# Patient Record
Sex: FEMALE | Race: WHITE | Employment: UNEMPLOYED | URBAN - METROPOLITAN AREA
[De-identification: names, ages, dates, MRNs, and addresses within clinical notes are randomized per-mention and may not be internally consistent; named-entity substitution may affect disease eponyms.]

---

## 2017-02-21 RX ORDER — METOPROLOL TARTRATE 50 MG/1
TABLET, FILM COATED ORAL
Qty: 60 TABLET | Refills: 11 | Status: SHIPPED | OUTPATIENT
Start: 2017-02-21 | End: 2018-02-13 | Stop reason: SDUPTHER

## 2017-03-02 ENCOUNTER — APPOINTMENT (OUTPATIENT)
Dept: NUCLEAR MEDICINE | Age: 57
DRG: 190 | End: 2017-03-02
Attending: INTERNAL MEDICINE
Payer: COMMERCIAL

## 2017-03-02 ENCOUNTER — HOSPITAL ENCOUNTER (INPATIENT)
Age: 57
LOS: 1 days | Discharge: HOME | DRG: 190 | End: 2017-03-03
Attending: INTERNAL MEDICINE | Admitting: FAMILY MEDICINE
Payer: COMMERCIAL

## 2017-03-02 DIAGNOSIS — I21.4 NSTEMI (NON-ST ELEVATED MYOCARDIAL INFARCTION) (HCC): Primary | ICD-10-CM

## 2017-03-02 PROBLEM — F12.10 MARIHUANA ABUSE: Status: ACTIVE | Noted: 2017-03-02

## 2017-03-02 PROBLEM — F10.10 ALCOHOL ABUSE: Status: ACTIVE | Noted: 2017-03-02

## 2017-03-02 LAB
ABSOLUTE EOS #: 0.2 K/UL (ref 0–0.4)
ABSOLUTE LYMPH #: 2.1 K/UL (ref 1–4.8)
ABSOLUTE MONO #: 0.4 K/UL (ref 0.1–1.2)
ALBUMIN SERPL-MCNC: 3.6 G/DL (ref 3.5–5.2)
ALBUMIN/GLOBULIN RATIO: 1.2 (ref 1–2.5)
ALP BLD-CCNC: 54 U/L (ref 35–104)
ALT SERPL-CCNC: 18 U/L (ref 5–33)
AMPHETAMINE SCREEN URINE: NEGATIVE
ANION GAP SERPL CALCULATED.3IONS-SCNC: 14 MMOL/L (ref 9–17)
AST SERPL-CCNC: 28 U/L
BARBITURATE SCREEN URINE: NEGATIVE
BASOPHILS # BLD: 1 % (ref 0–2)
BASOPHILS ABSOLUTE: 0 K/UL (ref 0–0.2)
BENZODIAZEPINE SCREEN, URINE: NEGATIVE
BILIRUB SERPL-MCNC: 1.41 MG/DL (ref 0.3–1.2)
BILIRUBIN DIRECT: 0.25 MG/DL
BILIRUBIN, INDIRECT: 1.16 MG/DL (ref 0–1)
BUN BLDV-MCNC: 13 MG/DL (ref 6–20)
BUN/CREAT BLD: NORMAL (ref 9–20)
BUPRENORPHINE URINE: ABNORMAL
CALCIUM SERPL-MCNC: 9.2 MG/DL (ref 8.6–10.4)
CANNABINOID SCREEN URINE: POSITIVE
CHLORIDE BLD-SCNC: 103 MMOL/L (ref 98–107)
CHOLESTEROL/HDL RATIO: 3.9
CHOLESTEROL: 232 MG/DL
CO2: 21 MMOL/L (ref 20–31)
COCAINE METABOLITE, URINE: NEGATIVE
CREAT SERPL-MCNC: 0.65 MG/DL (ref 0.5–0.9)
DIFFERENTIAL TYPE: NORMAL
EOSINOPHILS RELATIVE PERCENT: 3 % (ref 1–4)
ESTIMATED AVERAGE GLUCOSE: 120 MG/DL
GFR AFRICAN AMERICAN: >60 ML/MIN
GFR NON-AFRICAN AMERICAN: >60 ML/MIN
GFR SERPL CREATININE-BSD FRML MDRD: NORMAL ML/MIN/{1.73_M2}
GFR SERPL CREATININE-BSD FRML MDRD: NORMAL ML/MIN/{1.73_M2}
GLOBULIN: ABNORMAL G/DL (ref 1.5–3.8)
GLUCOSE BLD-MCNC: 87 MG/DL (ref 70–99)
HBA1C MFR BLD: 5.8 % (ref 4–6)
HCT VFR BLD CALC: 37.8 % (ref 36–46)
HDLC SERPL-MCNC: 60 MG/DL
HEMOGLOBIN: 13 G/DL (ref 12–16)
LDL CHOLESTEROL: 148 MG/DL (ref 0–130)
LYMPHOCYTES # BLD: 39 % (ref 24–44)
MAGNESIUM: 2.2 MG/DL (ref 1.6–2.6)
MCH RBC QN AUTO: 32.4 PG (ref 26–34)
MCHC RBC AUTO-ENTMCNC: 34.4 G/DL (ref 31–37)
MCV RBC AUTO: 94.1 FL (ref 80–100)
MDMA URINE: ABNORMAL
METHADONE SCREEN, URINE: NEGATIVE
METHAMPHETAMINE, URINE: ABNORMAL
MONOCYTES # BLD: 9 % (ref 2–11)
OPIATES, URINE: NEGATIVE
OXYCODONE SCREEN URINE: NEGATIVE
PDW BLD-RTO: 14 % (ref 12.5–15.4)
PHENCYCLIDINE, URINE: NEGATIVE
PLATELET # BLD: 381 K/UL (ref 140–450)
PLATELET ESTIMATE: NORMAL
PMV BLD AUTO: 7.5 FL (ref 6–12)
POTASSIUM SERPL-SCNC: 4.4 MMOL/L (ref 3.7–5.3)
PROPOXYPHENE, URINE: ABNORMAL
RBC # BLD: 4.02 M/UL (ref 4–5.2)
RBC # BLD: NORMAL 10*6/UL
SEG NEUTROPHILS: 48 % (ref 36–66)
SEGMENTED NEUTROPHILS ABSOLUTE COUNT: 2.5 K/UL (ref 1.8–7.7)
SODIUM BLD-SCNC: 138 MMOL/L (ref 135–144)
TEST INFORMATION: ABNORMAL
TOTAL PROTEIN: 6.6 G/DL (ref 6.4–8.3)
TRICYCLIC ANTIDEPRESSANTS, UR: ABNORMAL
TRIGL SERPL-MCNC: 118 MG/DL
TROPONIN INTERP: ABNORMAL
TROPONIN INTERP: ABNORMAL
TROPONIN T: 0.18 NG/ML
TROPONIN T: 0.22 NG/ML
VLDLC SERPL CALC-MCNC: ABNORMAL MG/DL (ref 1–30)
WBC # BLD: 5.2 K/UL (ref 3.5–11)
WBC # BLD: NORMAL 10*3/UL

## 2017-03-02 PROCEDURE — 6370000000 HC RX 637 (ALT 250 FOR IP): Performed by: NURSE PRACTITIONER

## 2017-03-02 PROCEDURE — 83036 HEMOGLOBIN GLYCOSYLATED A1C: CPT

## 2017-03-02 PROCEDURE — 99223 1ST HOSP IP/OBS HIGH 75: CPT | Performed by: FAMILY MEDICINE

## 2017-03-02 PROCEDURE — 85025 COMPLETE CBC W/AUTO DIFF WBC: CPT

## 2017-03-02 PROCEDURE — 83735 ASSAY OF MAGNESIUM: CPT

## 2017-03-02 PROCEDURE — 2580000003 HC RX 258: Performed by: NURSE PRACTITIONER

## 2017-03-02 PROCEDURE — 80048 BASIC METABOLIC PNL TOTAL CA: CPT

## 2017-03-02 PROCEDURE — 36415 COLL VENOUS BLD VENIPUNCTURE: CPT

## 2017-03-02 PROCEDURE — 97161 PT EVAL LOW COMPLEX 20 MIN: CPT

## 2017-03-02 PROCEDURE — G8978 MOBILITY CURRENT STATUS: HCPCS

## 2017-03-02 PROCEDURE — 2060000000 HC ICU INTERMEDIATE R&B

## 2017-03-02 PROCEDURE — 6360000002 HC RX W HCPCS: Performed by: NURSE PRACTITIONER

## 2017-03-02 PROCEDURE — 80076 HEPATIC FUNCTION PANEL: CPT

## 2017-03-02 PROCEDURE — 84484 ASSAY OF TROPONIN QUANT: CPT

## 2017-03-02 PROCEDURE — 80307 DRUG TEST PRSMV CHEM ANLYZR: CPT

## 2017-03-02 PROCEDURE — 6360000002 HC RX W HCPCS

## 2017-03-02 PROCEDURE — 94762 N-INVAS EAR/PLS OXIMTRY CONT: CPT

## 2017-03-02 PROCEDURE — 80061 LIPID PANEL: CPT

## 2017-03-02 PROCEDURE — 6370000000 HC RX 637 (ALT 250 FOR IP): Performed by: INTERNAL MEDICINE

## 2017-03-02 PROCEDURE — 2500000003 HC RX 250 WO HCPCS: Performed by: NURSE PRACTITIONER

## 2017-03-02 PROCEDURE — 2580000003 HC RX 258: Performed by: INTERNAL MEDICINE

## 2017-03-02 RX ORDER — SODIUM CHLORIDE 0.9 % (FLUSH) 0.9 %
10 SYRINGE (ML) INJECTION PRN
Status: DISCONTINUED | OUTPATIENT
Start: 2017-03-02 | End: 2017-03-03 | Stop reason: HOSPADM

## 2017-03-02 RX ORDER — NITROGLYCERIN 0.4 MG/1
0.4 TABLET SUBLINGUAL EVERY 5 MIN PRN
Status: DISCONTINUED | OUTPATIENT
Start: 2017-03-02 | End: 2017-03-03 | Stop reason: HOSPADM

## 2017-03-02 RX ORDER — DOCUSATE SODIUM 100 MG/1
100 CAPSULE, LIQUID FILLED ORAL 2 TIMES DAILY
Status: DISCONTINUED | OUTPATIENT
Start: 2017-03-02 | End: 2017-03-03 | Stop reason: HOSPADM

## 2017-03-02 RX ORDER — SODIUM CHLORIDE 0.9 % (FLUSH) 0.9 %
10 SYRINGE (ML) INJECTION PRN
Status: CANCELLED | OUTPATIENT
Start: 2017-03-02 | End: 2017-03-02

## 2017-03-02 RX ORDER — POTASSIUM CHLORIDE 20MEQ/15ML
40 LIQUID (ML) ORAL PRN
Status: DISCONTINUED | OUTPATIENT
Start: 2017-03-02 | End: 2017-03-03 | Stop reason: HOSPADM

## 2017-03-02 RX ORDER — DOCUSATE SODIUM 100 MG/1
100 CAPSULE, LIQUID FILLED ORAL 2 TIMES DAILY PRN
Status: DISCONTINUED | OUTPATIENT
Start: 2017-03-02 | End: 2017-03-02

## 2017-03-02 RX ORDER — BISACODYL 10 MG
10 SUPPOSITORY, RECTAL RECTAL DAILY PRN
Status: DISCONTINUED | OUTPATIENT
Start: 2017-03-02 | End: 2017-03-03 | Stop reason: HOSPADM

## 2017-03-02 RX ORDER — AMINOPHYLLINE DIHYDRATE 25 MG/ML
100 INJECTION, SOLUTION INTRAVENOUS
Status: CANCELLED | OUTPATIENT
Start: 2017-03-02 | End: 2017-03-02

## 2017-03-02 RX ORDER — NITROGLYCERIN 0.4 MG/1
0.4 TABLET SUBLINGUAL EVERY 5 MIN PRN
Status: CANCELLED | OUTPATIENT
Start: 2017-03-02 | End: 2017-03-02

## 2017-03-02 RX ORDER — ATORVASTATIN CALCIUM 80 MG/1
80 TABLET, FILM COATED ORAL NIGHTLY
Status: DISCONTINUED | OUTPATIENT
Start: 2017-03-02 | End: 2017-03-03 | Stop reason: HOSPADM

## 2017-03-02 RX ORDER — ACETAMINOPHEN 325 MG/1
650 TABLET ORAL EVERY 4 HOURS PRN
Status: DISCONTINUED | OUTPATIENT
Start: 2017-03-02 | End: 2017-03-03 | Stop reason: HOSPADM

## 2017-03-02 RX ORDER — ALBUTEROL SULFATE 2.5 MG/3ML
2.5 SOLUTION RESPIRATORY (INHALATION)
Status: DISCONTINUED | OUTPATIENT
Start: 2017-03-02 | End: 2017-03-03 | Stop reason: HOSPADM

## 2017-03-02 RX ORDER — MORPHINE SULFATE 2 MG/ML
2 INJECTION, SOLUTION INTRAMUSCULAR; INTRAVENOUS EVERY 4 HOURS PRN
Status: DISCONTINUED | OUTPATIENT
Start: 2017-03-02 | End: 2017-03-03 | Stop reason: HOSPADM

## 2017-03-02 RX ORDER — ASPIRIN 81 MG/1
81 TABLET, CHEWABLE ORAL DAILY
Status: DISCONTINUED | OUTPATIENT
Start: 2017-03-03 | End: 2017-03-03 | Stop reason: HOSPADM

## 2017-03-02 RX ORDER — POTASSIUM CHLORIDE 7.45 MG/ML
10 INJECTION INTRAVENOUS PRN
Status: DISCONTINUED | OUTPATIENT
Start: 2017-03-02 | End: 2017-03-03 | Stop reason: HOSPADM

## 2017-03-02 RX ORDER — SODIUM CHLORIDE 9 MG/ML
INJECTION, SOLUTION INTRAVENOUS CONTINUOUS
Status: DISCONTINUED | OUTPATIENT
Start: 2017-03-02 | End: 2017-03-03 | Stop reason: HOSPADM

## 2017-03-02 RX ORDER — SODIUM CHLORIDE 0.9 % (FLUSH) 0.9 %
10 SYRINGE (ML) INJECTION EVERY 12 HOURS SCHEDULED
Status: DISCONTINUED | OUTPATIENT
Start: 2017-03-02 | End: 2017-03-03 | Stop reason: HOSPADM

## 2017-03-02 RX ORDER — LISINOPRIL 20 MG/1
20 TABLET ORAL 2 TIMES DAILY
Status: DISCONTINUED | OUTPATIENT
Start: 2017-03-02 | End: 2017-03-03 | Stop reason: HOSPADM

## 2017-03-02 RX ORDER — METOPROLOL TARTRATE 50 MG/1
50 TABLET, FILM COATED ORAL 2 TIMES DAILY
Status: DISCONTINUED | OUTPATIENT
Start: 2017-03-02 | End: 2017-03-03 | Stop reason: HOSPADM

## 2017-03-02 RX ORDER — POTASSIUM CHLORIDE 20 MEQ/1
40 TABLET, EXTENDED RELEASE ORAL PRN
Status: DISCONTINUED | OUTPATIENT
Start: 2017-03-02 | End: 2017-03-03 | Stop reason: HOSPADM

## 2017-03-02 RX ORDER — SODIUM CHLORIDE 9 MG/ML
50 INJECTION, SOLUTION INTRAVENOUS ONCE
Status: CANCELLED | OUTPATIENT
Start: 2017-03-02 | End: 2017-03-02

## 2017-03-02 RX ORDER — ONDANSETRON 2 MG/ML
4 INJECTION INTRAMUSCULAR; INTRAVENOUS EVERY 6 HOURS PRN
Status: DISCONTINUED | OUTPATIENT
Start: 2017-03-02 | End: 2017-03-03 | Stop reason: HOSPADM

## 2017-03-02 RX ADMIN — Medication 10 ML: at 08:24

## 2017-03-02 RX ADMIN — LISINOPRIL 20 MG: 20 TABLET ORAL at 08:24

## 2017-03-02 RX ADMIN — LISINOPRIL 20 MG: 20 TABLET ORAL at 02:29

## 2017-03-02 RX ADMIN — DOCUSATE SODIUM 100 MG: 100 CAPSULE, LIQUID FILLED ORAL at 02:29

## 2017-03-02 RX ADMIN — Medication 10 ML: at 20:42

## 2017-03-02 RX ADMIN — METOPROLOL TARTRATE 50 MG: 50 TABLET, FILM COATED ORAL at 10:31

## 2017-03-02 RX ADMIN — SODIUM CHLORIDE: 9 INJECTION, SOLUTION INTRAVENOUS at 16:47

## 2017-03-02 RX ADMIN — METOPROLOL TARTRATE 5 MG: 5 INJECTION INTRAVENOUS at 12:32

## 2017-03-02 RX ADMIN — LISINOPRIL 20 MG: 20 TABLET ORAL at 20:42

## 2017-03-02 RX ADMIN — ATORVASTATIN CALCIUM 80 MG: 80 TABLET, FILM COATED ORAL at 03:44

## 2017-03-02 RX ADMIN — DOCUSATE SODIUM 100 MG: 100 CAPSULE, LIQUID FILLED ORAL at 08:24

## 2017-03-02 RX ADMIN — METOPROLOL TARTRATE 50 MG: 50 TABLET, FILM COATED ORAL at 20:42

## 2017-03-02 RX ADMIN — METOPROLOL TARTRATE 50 MG: 50 TABLET, FILM COATED ORAL at 02:29

## 2017-03-02 RX ADMIN — ENOXAPARIN SODIUM 60 MG: 60 INJECTION SUBCUTANEOUS at 20:42

## 2017-03-02 RX ADMIN — DOCUSATE SODIUM 100 MG: 100 CAPSULE, LIQUID FILLED ORAL at 20:42

## 2017-03-02 ASSESSMENT — ENCOUNTER SYMPTOMS
NAUSEA: 0
ABDOMINAL PAIN: 0
RHINORRHEA: 0
BLOOD IN STOOL: 0
EYE PAIN: 0
CONSTIPATION: 0
COUGH: 0
BACK PAIN: 0
VOMITING: 0
SHORTNESS OF BREATH: 0
CHEST TIGHTNESS: 0
DIARRHEA: 0

## 2017-03-02 ASSESSMENT — PAIN SCALES - GENERAL: PAINLEVEL_OUTOF10: 0

## 2017-03-03 ENCOUNTER — APPOINTMENT (OUTPATIENT)
Dept: CARDIAC CATH/INVASIVE PROCEDURES | Age: 57
DRG: 190 | End: 2017-03-03
Attending: INTERNAL MEDICINE
Payer: COMMERCIAL

## 2017-03-03 VITALS
HEART RATE: 70 BPM | TEMPERATURE: 97.5 F | DIASTOLIC BLOOD PRESSURE: 70 MMHG | SYSTOLIC BLOOD PRESSURE: 100 MMHG | BODY MASS INDEX: 22.43 KG/M2 | RESPIRATION RATE: 22 BRPM | HEIGHT: 63 IN | OXYGEN SATURATION: 97 % | WEIGHT: 126.6 LBS

## 2017-03-03 LAB
ABSOLUTE EOS #: 0.2 K/UL (ref 0–0.4)
ABSOLUTE LYMPH #: 2.5 K/UL (ref 1–4.8)
ABSOLUTE MONO #: 0.7 K/UL (ref 0.1–1.2)
ANION GAP SERPL CALCULATED.3IONS-SCNC: 11 MMOL/L (ref 9–17)
BASOPHILS # BLD: 0 % (ref 0–2)
BASOPHILS ABSOLUTE: 0 K/UL (ref 0–0.2)
BUN BLDV-MCNC: 17 MG/DL (ref 6–20)
BUN/CREAT BLD: NORMAL (ref 9–20)
CALCIUM SERPL-MCNC: 8.8 MG/DL (ref 8.6–10.4)
CHLORIDE BLD-SCNC: 100 MMOL/L (ref 98–107)
CO2: 24 MMOL/L (ref 20–31)
CREAT SERPL-MCNC: 0.61 MG/DL (ref 0.5–0.9)
DIFFERENTIAL TYPE: NORMAL
EOSINOPHILS RELATIVE PERCENT: 3 % (ref 1–4)
GFR AFRICAN AMERICAN: >60 ML/MIN
GFR NON-AFRICAN AMERICAN: >60 ML/MIN
GFR SERPL CREATININE-BSD FRML MDRD: NORMAL ML/MIN/{1.73_M2}
GFR SERPL CREATININE-BSD FRML MDRD: NORMAL ML/MIN/{1.73_M2}
GLUCOSE BLD-MCNC: 92 MG/DL (ref 70–99)
HCT VFR BLD CALC: 38.9 % (ref 36–46)
HEMOGLOBIN: 13.3 G/DL (ref 12–16)
LYMPHOCYTES # BLD: 35 % (ref 24–44)
MCH RBC QN AUTO: 32.8 PG (ref 26–34)
MCHC RBC AUTO-ENTMCNC: 34.2 G/DL (ref 31–37)
MCV RBC AUTO: 96 FL (ref 80–100)
MONOCYTES # BLD: 10 % (ref 2–11)
PDW BLD-RTO: 14.4 % (ref 12.5–15.4)
PLATELET # BLD: 352 K/UL (ref 140–450)
PLATELET ESTIMATE: NORMAL
PMV BLD AUTO: 7.5 FL (ref 6–12)
POTASSIUM SERPL-SCNC: 3.8 MMOL/L (ref 3.7–5.3)
RBC # BLD: 4.05 M/UL (ref 4–5.2)
RBC # BLD: NORMAL 10*6/UL
SEG NEUTROPHILS: 52 % (ref 36–66)
SEGMENTED NEUTROPHILS ABSOLUTE COUNT: 3.8 K/UL (ref 1.8–7.7)
SODIUM BLD-SCNC: 135 MMOL/L (ref 135–144)
WBC # BLD: 7.2 K/UL (ref 3.5–11)
WBC # BLD: NORMAL 10*3/UL

## 2017-03-03 PROCEDURE — 6370000000 HC RX 637 (ALT 250 FOR IP): Performed by: NURSE PRACTITIONER

## 2017-03-03 PROCEDURE — B41F1ZZ FLUOROSCOPY OF RIGHT LOWER EXTREMITY ARTERIES USING LOW OSMOLAR CONTRAST: ICD-10-PCS | Performed by: INTERNAL MEDICINE

## 2017-03-03 PROCEDURE — 99239 HOSP IP/OBS DSCHRG MGMT >30: CPT | Performed by: FAMILY MEDICINE

## 2017-03-03 PROCEDURE — C1769 GUIDE WIRE: HCPCS

## 2017-03-03 PROCEDURE — 36140 INTRO NDL ICATH UPR/LXTR ART: CPT | Performed by: INTERNAL MEDICINE

## 2017-03-03 PROCEDURE — C1894 INTRO/SHEATH, NON-LASER: HCPCS

## 2017-03-03 PROCEDURE — 2580000003 HC RX 258: Performed by: NURSE PRACTITIONER

## 2017-03-03 PROCEDURE — 6370000000 HC RX 637 (ALT 250 FOR IP): Performed by: INTERNAL MEDICINE

## 2017-03-03 PROCEDURE — 6370000000 HC RX 637 (ALT 250 FOR IP): Performed by: FAMILY MEDICINE

## 2017-03-03 PROCEDURE — 2500000003 HC RX 250 WO HCPCS: Performed by: NURSE PRACTITIONER

## 2017-03-03 PROCEDURE — 75710 ARTERY X-RAYS ARM/LEG: CPT | Performed by: INTERNAL MEDICINE

## 2017-03-03 PROCEDURE — 36120: CPT | Performed by: INTERNAL MEDICINE

## 2017-03-03 PROCEDURE — 80048 BASIC METABOLIC PNL TOTAL CA: CPT

## 2017-03-03 PROCEDURE — 36415 COLL VENOUS BLD VENIPUNCTURE: CPT

## 2017-03-03 PROCEDURE — 2500000003 HC RX 250 WO HCPCS

## 2017-03-03 PROCEDURE — 94762 N-INVAS EAR/PLS OXIMTRY CONT: CPT

## 2017-03-03 PROCEDURE — 85025 COMPLETE CBC W/AUTO DIFF WBC: CPT

## 2017-03-03 RX ORDER — ISOSORBIDE MONONITRATE 30 MG/1
30 TABLET, EXTENDED RELEASE ORAL DAILY
Status: DISCONTINUED | OUTPATIENT
Start: 2017-03-03 | End: 2017-03-03 | Stop reason: HOSPADM

## 2017-03-03 RX ORDER — ISOSORBIDE MONONITRATE 30 MG/1
30 TABLET, EXTENDED RELEASE ORAL DAILY
Qty: 30 TABLET | Refills: 3 | Status: SHIPPED | OUTPATIENT
Start: 2017-03-03 | End: 2017-05-01 | Stop reason: SDUPTHER

## 2017-03-03 RX ORDER — NITROGLYCERIN 0.4 MG/1
TABLET SUBLINGUAL
Qty: 25 TABLET | Refills: 3 | Status: SHIPPED | OUTPATIENT
Start: 2017-03-03 | End: 2021-07-21

## 2017-03-03 RX ORDER — ATORVASTATIN CALCIUM 80 MG/1
80 TABLET, FILM COATED ORAL NIGHTLY
Qty: 30 TABLET | Refills: 3 | Status: SHIPPED | OUTPATIENT
Start: 2017-03-03 | End: 2017-05-01 | Stop reason: SDUPTHER

## 2017-03-03 RX ORDER — CLOPIDOGREL BISULFATE 75 MG/1
75 TABLET ORAL DAILY
Status: DISCONTINUED | OUTPATIENT
Start: 2017-03-03 | End: 2017-03-03 | Stop reason: HOSPADM

## 2017-03-03 RX ORDER — ASPIRIN 81 MG/1
81 TABLET, CHEWABLE ORAL DAILY
Qty: 30 TABLET | Refills: 3 | Status: SHIPPED | OUTPATIENT
Start: 2017-03-03 | End: 2017-05-01 | Stop reason: SDUPTHER

## 2017-03-03 RX ORDER — CLOPIDOGREL BISULFATE 75 MG/1
75 TABLET ORAL DAILY
Qty: 30 TABLET | Refills: 3 | Status: SHIPPED | OUTPATIENT
Start: 2017-03-03 | End: 2017-05-01 | Stop reason: SDUPTHER

## 2017-03-03 RX ADMIN — ASPIRIN 81 MG: 81 TABLET, CHEWABLE ORAL at 09:04

## 2017-03-03 RX ADMIN — METOPROLOL TARTRATE 5 MG: 5 INJECTION INTRAVENOUS at 16:26

## 2017-03-03 RX ADMIN — CLOPIDOGREL 75 MG: 75 TABLET, FILM COATED ORAL at 09:05

## 2017-03-03 RX ADMIN — ISOSORBIDE MONONITRATE 30 MG: 30 TABLET ORAL at 16:26

## 2017-03-03 RX ADMIN — LISINOPRIL 20 MG: 20 TABLET ORAL at 09:05

## 2017-03-03 RX ADMIN — Medication 10 ML: at 09:06

## 2017-03-03 RX ADMIN — METOPROLOL TARTRATE 50 MG: 50 TABLET, FILM COATED ORAL at 09:05

## 2017-03-03 ASSESSMENT — PAIN SCALES - GENERAL
PAINLEVEL_OUTOF10: 0
PAINLEVEL_OUTOF10: 2
PAINLEVEL_OUTOF10: 4

## 2017-03-03 ASSESSMENT — ENCOUNTER SYMPTOMS
DIARRHEA: 0
ABDOMINAL PAIN: 0
VOMITING: 0
CHEST TIGHTNESS: 0
NAUSEA: 0
RHINORRHEA: 0
CONSTIPATION: 0
COUGH: 0
SHORTNESS OF BREATH: 0
BLOOD IN STOOL: 0
EYE PAIN: 0
BACK PAIN: 0

## 2017-03-03 ASSESSMENT — PAIN DESCRIPTION - ORIENTATION
ORIENTATION: RIGHT;OTHER (COMMENT)
ORIENTATION: RIGHT;INNER

## 2017-03-03 ASSESSMENT — PAIN DESCRIPTION - LOCATION
LOCATION: ARM
LOCATION: ARM

## 2017-03-03 ASSESSMENT — PAIN DESCRIPTION - PAIN TYPE
TYPE: ACUTE PAIN
TYPE: ACUTE PAIN

## 2017-03-20 ENCOUNTER — HOSPITAL ENCOUNTER (OUTPATIENT)
Dept: CARDIAC REHAB | Age: 57
Setting detail: THERAPIES SERIES
Discharge: HOME | End: 2017-03-20
Payer: COMMERCIAL

## 2017-03-20 VITALS — BODY MASS INDEX: 21.79 KG/M2 | WEIGHT: 123 LBS | HEIGHT: 63 IN

## 2017-03-20 PROCEDURE — 93798 PHYS/QHP OP CAR RHAB W/ECG: CPT

## 2017-03-22 ENCOUNTER — HOSPITAL ENCOUNTER (OUTPATIENT)
Dept: CARDIAC REHAB | Age: 57
Setting detail: THERAPIES SERIES
Discharge: HOME | End: 2017-03-22
Payer: COMMERCIAL

## 2017-03-22 PROCEDURE — 93798 PHYS/QHP OP CAR RHAB W/ECG: CPT

## 2017-03-23 ENCOUNTER — HOSPITAL ENCOUNTER (OUTPATIENT)
Age: 57
Setting detail: SPECIMEN
Discharge: HOME | End: 2017-03-23
Payer: COMMERCIAL

## 2017-03-23 ENCOUNTER — PROCEDURE VISIT (OUTPATIENT)
Dept: UROLOGY | Age: 57
End: 2017-03-23
Payer: COMMERCIAL

## 2017-03-23 VITALS
HEIGHT: 63 IN | DIASTOLIC BLOOD PRESSURE: 80 MMHG | BODY MASS INDEX: 22.15 KG/M2 | SYSTOLIC BLOOD PRESSURE: 128 MMHG | WEIGHT: 125 LBS

## 2017-03-23 DIAGNOSIS — C67.2 MALIGNANT NEOPLASM OF LATERAL WALL OF URINARY BLADDER (HCC): Primary | ICD-10-CM

## 2017-03-23 LAB
CASE NUMBER:: NORMAL
SPECIMEN DESCRIPTION: NORMAL

## 2017-03-23 PROCEDURE — 99213 OFFICE O/P EST LOW 20 MIN: CPT | Performed by: UROLOGY

## 2017-03-23 PROCEDURE — 52000 CYSTOURETHROSCOPY: CPT | Performed by: UROLOGY

## 2017-03-23 PROCEDURE — 88112 CYTOPATH CELL ENHANCE TECH: CPT

## 2017-03-23 ASSESSMENT — ENCOUNTER SYMPTOMS
BACK PAIN: 0
EYE PAIN: 0
VOMITING: 0
COLOR CHANGE: 0
NAUSEA: 0
WHEEZING: 0
COUGH: 0
ABDOMINAL PAIN: 0
SHORTNESS OF BREATH: 0
EYE REDNESS: 0

## 2017-03-24 ENCOUNTER — HOSPITAL ENCOUNTER (OUTPATIENT)
Dept: CARDIAC REHAB | Age: 57
Setting detail: THERAPIES SERIES
Discharge: HOME | End: 2017-03-24
Payer: COMMERCIAL

## 2017-03-24 PROCEDURE — 93798 PHYS/QHP OP CAR RHAB W/ECG: CPT

## 2017-03-27 ENCOUNTER — HOSPITAL ENCOUNTER (OUTPATIENT)
Age: 57
Discharge: HOME | End: 2017-03-27
Payer: COMMERCIAL

## 2017-03-27 ENCOUNTER — HOSPITAL ENCOUNTER (OUTPATIENT)
Dept: CARDIAC REHAB | Age: 57
Setting detail: THERAPIES SERIES
Discharge: HOME | End: 2017-03-27
Payer: COMMERCIAL

## 2017-03-27 DIAGNOSIS — I73.9 PERIPHERAL VASCULAR DISEASE (HCC): ICD-10-CM

## 2017-03-27 DIAGNOSIS — E55.9 UNSPECIFIED VITAMIN D DEFICIENCY: ICD-10-CM

## 2017-03-27 DIAGNOSIS — I10 ESSENTIAL HYPERTENSION: ICD-10-CM

## 2017-03-27 LAB
ALBUMIN SERPL-MCNC: 3.8 G/DL (ref 3.5–5.2)
ALBUMIN/GLOBULIN RATIO: ABNORMAL (ref 1–2.5)
ALP BLD-CCNC: 68 U/L (ref 35–104)
ALT SERPL-CCNC: 21 U/L (ref 5–33)
ANION GAP SERPL CALCULATED.3IONS-SCNC: 12 MMOL/L (ref 9–17)
AST SERPL-CCNC: 21 U/L
BILIRUB SERPL-MCNC: 0.48 MG/DL (ref 0.3–1.2)
BUN BLDV-MCNC: 17 MG/DL (ref 6–20)
BUN/CREAT BLD: 25 (ref 9–20)
CALCIUM SERPL-MCNC: 9.6 MG/DL (ref 8.6–10.4)
CHLORIDE BLD-SCNC: 102 MMOL/L (ref 98–107)
CO2: 23 MMOL/L (ref 20–31)
CREAT SERPL-MCNC: 0.69 MG/DL (ref 0.5–0.9)
GFR AFRICAN AMERICAN: >60 ML/MIN
GFR NON-AFRICAN AMERICAN: >60 ML/MIN
GFR SERPL CREATININE-BSD FRML MDRD: ABNORMAL ML/MIN/{1.73_M2}
GFR SERPL CREATININE-BSD FRML MDRD: ABNORMAL ML/MIN/{1.73_M2}
GLUCOSE BLD-MCNC: 85 MG/DL (ref 70–99)
PATIENT FASTING?: YES
POTASSIUM SERPL-SCNC: 4.3 MMOL/L (ref 3.7–5.3)
SODIUM BLD-SCNC: 137 MMOL/L (ref 135–144)
SURGICAL PATHOLOGY REPORT: NORMAL
TOTAL PROTEIN: 7.5 G/DL (ref 6.4–8.3)
TSH SERPL DL<=0.05 MIU/L-ACNC: 1.93 MIU/L (ref 0.3–5)
VITAMIN D 25-HYDROXY: 24.5 NG/ML (ref 30–100)

## 2017-03-27 PROCEDURE — 80053 COMPREHEN METABOLIC PANEL: CPT

## 2017-03-27 PROCEDURE — 93798 PHYS/QHP OP CAR RHAB W/ECG: CPT

## 2017-03-27 PROCEDURE — 82306 VITAMIN D 25 HYDROXY: CPT

## 2017-03-27 PROCEDURE — 36415 COLL VENOUS BLD VENIPUNCTURE: CPT

## 2017-03-27 PROCEDURE — 84443 ASSAY THYROID STIM HORMONE: CPT

## 2017-03-29 ENCOUNTER — HOSPITAL ENCOUNTER (OUTPATIENT)
Dept: CARDIAC REHAB | Age: 57
Setting detail: THERAPIES SERIES
Discharge: HOME | End: 2017-03-29
Payer: COMMERCIAL

## 2017-03-30 ENCOUNTER — OFFICE VISIT (OUTPATIENT)
Dept: CARDIOLOGY CLINIC | Age: 57
End: 2017-03-30
Payer: COMMERCIAL

## 2017-03-30 VITALS
WEIGHT: 128 LBS | SYSTOLIC BLOOD PRESSURE: 140 MMHG | OXYGEN SATURATION: 97 % | HEART RATE: 70 BPM | DIASTOLIC BLOOD PRESSURE: 70 MMHG | BODY MASS INDEX: 22.67 KG/M2

## 2017-03-30 DIAGNOSIS — R94.31 ABNORMAL EKG: ICD-10-CM

## 2017-03-30 DIAGNOSIS — R06.02 SOB (SHORTNESS OF BREATH): ICD-10-CM

## 2017-03-30 DIAGNOSIS — I25.118 CORONARY ARTERY DISEASE OF NATIVE HEART WITH STABLE ANGINA PECTORIS, UNSPECIFIED VESSEL OR LESION TYPE (HCC): ICD-10-CM

## 2017-03-30 DIAGNOSIS — E78.5 HYPERLIPIDEMIA, UNSPECIFIED HYPERLIPIDEMIA TYPE: ICD-10-CM

## 2017-03-30 DIAGNOSIS — I10 ESSENTIAL HYPERTENSION: Primary | ICD-10-CM

## 2017-03-30 PROCEDURE — 99214 OFFICE O/P EST MOD 30 MIN: CPT | Performed by: INTERNAL MEDICINE

## 2017-03-30 RX ORDER — LOSARTAN POTASSIUM 50 MG/1
50 TABLET ORAL 2 TIMES DAILY
Qty: 60 TABLET | Refills: 11 | Status: SHIPPED | OUTPATIENT
Start: 2017-03-30 | End: 2018-03-07 | Stop reason: SDUPTHER

## 2017-03-31 ENCOUNTER — HOSPITAL ENCOUNTER (OUTPATIENT)
Dept: CARDIAC REHAB | Age: 57
Setting detail: THERAPIES SERIES
Discharge: HOME | End: 2017-03-31
Payer: COMMERCIAL

## 2017-03-31 PROCEDURE — 93798 PHYS/QHP OP CAR RHAB W/ECG: CPT

## 2017-04-03 ENCOUNTER — HOSPITAL ENCOUNTER (OUTPATIENT)
Dept: CARDIAC REHAB | Age: 57
Setting detail: THERAPIES SERIES
Discharge: HOME | End: 2017-04-03
Payer: COMMERCIAL

## 2017-04-03 PROCEDURE — 93798 PHYS/QHP OP CAR RHAB W/ECG: CPT

## 2017-04-05 ENCOUNTER — HOSPITAL ENCOUNTER (OUTPATIENT)
Dept: CARDIAC REHAB | Age: 57
Setting detail: THERAPIES SERIES
Discharge: HOME | End: 2017-04-05
Payer: COMMERCIAL

## 2017-04-05 PROCEDURE — 93798 PHYS/QHP OP CAR RHAB W/ECG: CPT

## 2017-04-06 ENCOUNTER — HOSPITAL ENCOUNTER (OUTPATIENT)
Dept: NUCLEAR MEDICINE | Age: 57
Discharge: HOME | End: 2017-04-06
Payer: COMMERCIAL

## 2017-04-06 ENCOUNTER — HOSPITAL ENCOUNTER (OUTPATIENT)
Dept: NON INVASIVE DIAGNOSTICS | Age: 57
Discharge: HOME | End: 2017-04-06
Payer: COMMERCIAL

## 2017-04-06 VITALS — SYSTOLIC BLOOD PRESSURE: 119 MMHG | HEART RATE: 70 BPM | DIASTOLIC BLOOD PRESSURE: 68 MMHG

## 2017-04-06 DIAGNOSIS — R94.31 ABNORMAL EKG: ICD-10-CM

## 2017-04-06 DIAGNOSIS — R06.02 SOB (SHORTNESS OF BREATH): ICD-10-CM

## 2017-04-06 LAB
LV EF: 55 %
LVEF MODALITY: NORMAL

## 2017-04-06 PROCEDURE — 78452 HT MUSCLE IMAGE SPECT MULT: CPT

## 2017-04-06 PROCEDURE — 93306 TTE W/DOPPLER COMPLETE: CPT

## 2017-04-06 PROCEDURE — A9500 TC99M SESTAMIBI: HCPCS | Performed by: INTERNAL MEDICINE

## 2017-04-06 PROCEDURE — 3430000000 HC RX DIAGNOSTIC RADIOPHARMACEUTICAL: Performed by: INTERNAL MEDICINE

## 2017-04-06 PROCEDURE — 6360000002 HC RX W HCPCS: Performed by: INTERNAL MEDICINE

## 2017-04-06 PROCEDURE — 93017 CV STRESS TEST TRACING ONLY: CPT

## 2017-04-06 PROCEDURE — 2580000003 HC RX 258: Performed by: INTERNAL MEDICINE

## 2017-04-06 RX ORDER — AMINOPHYLLINE DIHYDRATE 25 MG/ML
100 INJECTION, SOLUTION INTRAVENOUS
Status: ACTIVE | OUTPATIENT
Start: 2017-04-06 | End: 2017-04-06

## 2017-04-06 RX ORDER — AMINOPHYLLINE DIHYDRATE 25 MG/ML
50 INJECTION, SOLUTION INTRAVENOUS
Status: ACTIVE | OUTPATIENT
Start: 2017-04-06 | End: 2017-04-06

## 2017-04-06 RX ORDER — 0.9 % SODIUM CHLORIDE 0.9 %
10 VIAL (ML) INJECTION PRN
Status: DISCONTINUED | OUTPATIENT
Start: 2017-04-06 | End: 2017-04-07 | Stop reason: HOSPADM

## 2017-04-06 RX ADMIN — TETRAKIS(2-METHOXYISOBUTYLISOCYANIDE)COPPER(I) TETRAFLUOROBORATE 28.7 MILLICURIE: 1 INJECTION, POWDER, LYOPHILIZED, FOR SOLUTION INTRAVENOUS at 11:47

## 2017-04-06 RX ADMIN — Medication 10 ML: at 11:47

## 2017-04-06 RX ADMIN — REGADENOSON 0.4 MG: 0.08 INJECTION, SOLUTION INTRAVENOUS at 11:47

## 2017-04-06 RX ADMIN — TETRAKIS(2-METHOXYISOBUTYLISOCYANIDE)COPPER(I) TETRAFLUOROBORATE 9.8 MILLICURIE: 1 INJECTION, POWDER, LYOPHILIZED, FOR SOLUTION INTRAVENOUS at 10:45

## 2017-04-07 ENCOUNTER — HOSPITAL ENCOUNTER (OUTPATIENT)
Dept: CARDIAC REHAB | Age: 57
Setting detail: THERAPIES SERIES
Discharge: HOME | End: 2017-04-07
Payer: COMMERCIAL

## 2017-04-07 PROCEDURE — 93798 PHYS/QHP OP CAR RHAB W/ECG: CPT

## 2017-04-10 ENCOUNTER — HOSPITAL ENCOUNTER (OUTPATIENT)
Dept: CARDIAC REHAB | Age: 57
Setting detail: THERAPIES SERIES
Discharge: HOME | End: 2017-04-10
Payer: COMMERCIAL

## 2017-04-10 PROCEDURE — 93798 PHYS/QHP OP CAR RHAB W/ECG: CPT

## 2017-04-12 ENCOUNTER — HOSPITAL ENCOUNTER (OUTPATIENT)
Dept: CARDIAC REHAB | Age: 57
Setting detail: THERAPIES SERIES
Discharge: HOME | End: 2017-04-12
Payer: COMMERCIAL

## 2017-04-12 PROCEDURE — 93798 PHYS/QHP OP CAR RHAB W/ECG: CPT

## 2017-04-14 ENCOUNTER — HOSPITAL ENCOUNTER (OUTPATIENT)
Dept: CARDIAC REHAB | Age: 57
Setting detail: THERAPIES SERIES
Discharge: HOME | End: 2017-04-14
Payer: COMMERCIAL

## 2017-04-14 PROCEDURE — 93798 PHYS/QHP OP CAR RHAB W/ECG: CPT

## 2017-04-17 ENCOUNTER — HOSPITAL ENCOUNTER (OUTPATIENT)
Dept: CARDIAC REHAB | Age: 57
Setting detail: THERAPIES SERIES
Discharge: HOME | End: 2017-04-17
Payer: COMMERCIAL

## 2017-04-17 PROCEDURE — 93798 PHYS/QHP OP CAR RHAB W/ECG: CPT

## 2017-04-19 ENCOUNTER — HOSPITAL ENCOUNTER (OUTPATIENT)
Dept: CARDIAC REHAB | Age: 57
Setting detail: THERAPIES SERIES
Discharge: HOME | End: 2017-04-19
Payer: COMMERCIAL

## 2017-04-19 PROCEDURE — 93798 PHYS/QHP OP CAR RHAB W/ECG: CPT

## 2017-04-21 ENCOUNTER — HOSPITAL ENCOUNTER (OUTPATIENT)
Dept: CARDIAC REHAB | Age: 57
Setting detail: THERAPIES SERIES
Discharge: HOME | End: 2017-04-21
Payer: COMMERCIAL

## 2017-04-21 PROCEDURE — 93798 PHYS/QHP OP CAR RHAB W/ECG: CPT

## 2017-04-24 ENCOUNTER — HOSPITAL ENCOUNTER (OUTPATIENT)
Dept: CARDIAC REHAB | Age: 57
Setting detail: THERAPIES SERIES
Discharge: HOME | End: 2017-04-24
Payer: COMMERCIAL

## 2017-04-24 PROCEDURE — 93798 PHYS/QHP OP CAR RHAB W/ECG: CPT

## 2017-04-26 ENCOUNTER — HOSPITAL ENCOUNTER (OUTPATIENT)
Dept: CARDIAC REHAB | Age: 57
Setting detail: THERAPIES SERIES
Discharge: HOME | End: 2017-04-26
Payer: COMMERCIAL

## 2017-04-26 PROCEDURE — 93798 PHYS/QHP OP CAR RHAB W/ECG: CPT

## 2017-04-28 ENCOUNTER — HOSPITAL ENCOUNTER (OUTPATIENT)
Dept: CARDIAC REHAB | Age: 57
Setting detail: THERAPIES SERIES
Discharge: HOME | End: 2017-04-28
Payer: COMMERCIAL

## 2017-04-28 PROCEDURE — 93798 PHYS/QHP OP CAR RHAB W/ECG: CPT

## 2017-05-01 ENCOUNTER — OFFICE VISIT (OUTPATIENT)
Dept: CARDIOLOGY CLINIC | Age: 57
End: 2017-05-01
Payer: COMMERCIAL

## 2017-05-01 ENCOUNTER — HOSPITAL ENCOUNTER (OUTPATIENT)
Dept: CARDIAC REHAB | Age: 57
Setting detail: THERAPIES SERIES
Discharge: HOME | End: 2017-05-01
Payer: COMMERCIAL

## 2017-05-01 VITALS — BODY MASS INDEX: 22.67 KG/M2 | WEIGHT: 128 LBS | HEART RATE: 69 BPM | OXYGEN SATURATION: 100 %

## 2017-05-01 DIAGNOSIS — I25.118 CORONARY ARTERY DISEASE OF NATIVE HEART WITH STABLE ANGINA PECTORIS, UNSPECIFIED VESSEL OR LESION TYPE (HCC): ICD-10-CM

## 2017-05-01 DIAGNOSIS — I10 ESSENTIAL HYPERTENSION: ICD-10-CM

## 2017-05-01 DIAGNOSIS — I21.4 NSTEMI (NON-ST ELEVATED MYOCARDIAL INFARCTION) (HCC): Primary | ICD-10-CM

## 2017-05-01 PROCEDURE — 93798 PHYS/QHP OP CAR RHAB W/ECG: CPT

## 2017-05-01 PROCEDURE — 99213 OFFICE O/P EST LOW 20 MIN: CPT | Performed by: INTERNAL MEDICINE

## 2017-05-01 RX ORDER — ASPIRIN 81 MG/1
81 TABLET, CHEWABLE ORAL DAILY
Qty: 30 TABLET | Refills: 11 | Status: SHIPPED | OUTPATIENT
Start: 2017-05-01 | End: 2021-07-21

## 2017-05-01 RX ORDER — ATORVASTATIN CALCIUM 80 MG/1
80 TABLET, FILM COATED ORAL NIGHTLY
Qty: 30 TABLET | Refills: 11 | Status: SHIPPED | OUTPATIENT
Start: 2017-05-01 | End: 2017-07-06 | Stop reason: SDUPTHER

## 2017-05-01 RX ORDER — CLOPIDOGREL BISULFATE 75 MG/1
75 TABLET ORAL DAILY
Qty: 30 TABLET | Refills: 11 | Status: SHIPPED | OUTPATIENT
Start: 2017-05-01 | End: 2017-07-06 | Stop reason: SDUPTHER

## 2017-05-01 RX ORDER — ISOSORBIDE MONONITRATE 30 MG/1
30 TABLET, EXTENDED RELEASE ORAL DAILY
Qty: 30 TABLET | Refills: 11 | Status: SHIPPED | OUTPATIENT
Start: 2017-05-01 | End: 2017-07-06 | Stop reason: SDUPTHER

## 2017-05-03 ENCOUNTER — HOSPITAL ENCOUNTER (OUTPATIENT)
Dept: CARDIAC REHAB | Age: 57
Setting detail: THERAPIES SERIES
Discharge: HOME | End: 2017-05-03
Payer: COMMERCIAL

## 2017-05-05 ENCOUNTER — HOSPITAL ENCOUNTER (OUTPATIENT)
Dept: CARDIAC REHAB | Age: 57
Setting detail: THERAPIES SERIES
Discharge: HOME | End: 2017-05-05
Payer: COMMERCIAL

## 2017-05-05 PROCEDURE — 93798 PHYS/QHP OP CAR RHAB W/ECG: CPT

## 2017-05-08 ENCOUNTER — HOSPITAL ENCOUNTER (OUTPATIENT)
Dept: CARDIAC REHAB | Age: 57
Setting detail: THERAPIES SERIES
Discharge: HOME | End: 2017-05-08
Payer: COMMERCIAL

## 2017-05-08 PROCEDURE — 93798 PHYS/QHP OP CAR RHAB W/ECG: CPT

## 2017-05-10 ENCOUNTER — HOSPITAL ENCOUNTER (OUTPATIENT)
Dept: CARDIAC REHAB | Age: 57
Setting detail: THERAPIES SERIES
Discharge: HOME | End: 2017-05-10
Payer: COMMERCIAL

## 2017-05-10 PROCEDURE — 93798 PHYS/QHP OP CAR RHAB W/ECG: CPT

## 2017-05-12 ENCOUNTER — HOSPITAL ENCOUNTER (OUTPATIENT)
Dept: CARDIAC REHAB | Age: 57
Setting detail: THERAPIES SERIES
Discharge: HOME | End: 2017-05-12
Payer: COMMERCIAL

## 2017-05-12 PROCEDURE — 93798 PHYS/QHP OP CAR RHAB W/ECG: CPT

## 2017-05-15 ENCOUNTER — HOSPITAL ENCOUNTER (OUTPATIENT)
Dept: CARDIAC REHAB | Age: 57
Setting detail: THERAPIES SERIES
Discharge: HOME | End: 2017-05-15
Payer: COMMERCIAL

## 2017-05-15 PROCEDURE — 93798 PHYS/QHP OP CAR RHAB W/ECG: CPT

## 2017-05-17 ENCOUNTER — HOSPITAL ENCOUNTER (OUTPATIENT)
Dept: CARDIAC REHAB | Age: 57
Setting detail: THERAPIES SERIES
Discharge: HOME | End: 2017-05-17
Payer: COMMERCIAL

## 2017-05-17 PROCEDURE — 93798 PHYS/QHP OP CAR RHAB W/ECG: CPT

## 2017-05-22 ENCOUNTER — HOSPITAL ENCOUNTER (OUTPATIENT)
Dept: CARDIAC REHAB | Age: 57
Discharge: HOME | End: 2017-05-22

## 2017-05-30 ENCOUNTER — HOSPITAL ENCOUNTER (OUTPATIENT)
Dept: CARDIAC REHAB | Age: 57
Setting detail: THERAPIES SERIES
Discharge: HOME | End: 2017-05-30
Payer: COMMERCIAL

## 2017-05-31 ENCOUNTER — HOSPITAL ENCOUNTER (OUTPATIENT)
Dept: CARDIAC REHAB | Age: 57
Setting detail: THERAPIES SERIES
Discharge: HOME | End: 2017-05-31
Payer: COMMERCIAL

## 2017-06-02 ENCOUNTER — HOSPITAL ENCOUNTER (OUTPATIENT)
Dept: CARDIAC REHAB | Age: 57
Setting detail: THERAPIES SERIES
Discharge: HOME | End: 2017-06-02
Payer: COMMERCIAL

## 2017-06-02 PROCEDURE — 93798 PHYS/QHP OP CAR RHAB W/ECG: CPT

## 2017-06-05 ENCOUNTER — HOSPITAL ENCOUNTER (OUTPATIENT)
Dept: CARDIAC REHAB | Age: 57
Setting detail: THERAPIES SERIES
Discharge: HOME | End: 2017-06-05
Payer: COMMERCIAL

## 2017-06-05 PROCEDURE — 93798 PHYS/QHP OP CAR RHAB W/ECG: CPT

## 2017-06-07 ENCOUNTER — HOSPITAL ENCOUNTER (OUTPATIENT)
Dept: CARDIAC REHAB | Age: 57
Setting detail: THERAPIES SERIES
Discharge: HOME | End: 2017-06-07
Payer: COMMERCIAL

## 2017-06-07 PROCEDURE — 93798 PHYS/QHP OP CAR RHAB W/ECG: CPT

## 2017-06-09 ENCOUNTER — HOSPITAL ENCOUNTER (OUTPATIENT)
Dept: CARDIAC REHAB | Age: 57
Setting detail: THERAPIES SERIES
Discharge: HOME | End: 2017-06-09
Payer: COMMERCIAL

## 2017-06-09 PROCEDURE — 93798 PHYS/QHP OP CAR RHAB W/ECG: CPT

## 2017-06-12 ENCOUNTER — HOSPITAL ENCOUNTER (OUTPATIENT)
Dept: CARDIAC REHAB | Age: 57
Setting detail: THERAPIES SERIES
Discharge: HOME | End: 2017-06-12
Payer: COMMERCIAL

## 2017-06-12 PROCEDURE — 93798 PHYS/QHP OP CAR RHAB W/ECG: CPT

## 2017-06-14 ENCOUNTER — HOSPITAL ENCOUNTER (OUTPATIENT)
Dept: CARDIAC REHAB | Age: 57
Setting detail: THERAPIES SERIES
Discharge: HOME | End: 2017-06-14
Payer: COMMERCIAL

## 2017-06-14 PROCEDURE — 93798 PHYS/QHP OP CAR RHAB W/ECG: CPT

## 2017-06-15 ENCOUNTER — TELEPHONE (OUTPATIENT)
Dept: CARDIOLOGY CLINIC | Age: 57
End: 2017-06-15

## 2017-06-15 RX ORDER — AMLODIPINE BESYLATE 5 MG/1
5 TABLET ORAL DAILY
Qty: 30 TABLET | Refills: 11 | Status: SHIPPED | OUTPATIENT
Start: 2017-06-15 | End: 2018-05-31 | Stop reason: SDUPTHER

## 2017-06-29 ENCOUNTER — PROCEDURE VISIT (OUTPATIENT)
Dept: UROLOGY | Age: 57
End: 2017-06-29
Payer: COMMERCIAL

## 2017-06-29 ENCOUNTER — HOSPITAL ENCOUNTER (OUTPATIENT)
Age: 57
Setting detail: SPECIMEN
Discharge: HOME | End: 2017-06-29
Payer: COMMERCIAL

## 2017-06-29 VITALS
DIASTOLIC BLOOD PRESSURE: 80 MMHG | HEIGHT: 63 IN | WEIGHT: 128 LBS | SYSTOLIC BLOOD PRESSURE: 134 MMHG | BODY MASS INDEX: 22.68 KG/M2

## 2017-06-29 DIAGNOSIS — C67.2 MALIGNANT NEOPLASM OF LATERAL WALL OF URINARY BLADDER (HCC): ICD-10-CM

## 2017-06-29 DIAGNOSIS — C67.2 MALIGNANT NEOPLASM OF LATERAL WALL OF URINARY BLADDER (HCC): Primary | ICD-10-CM

## 2017-06-29 LAB
-: NORMAL
AMORPHOUS: NORMAL
BACTERIA: NORMAL
BILIRUBIN URINE: NEGATIVE
CASTS UA: NORMAL /LPF
COLOR: YELLOW
COMMENT UA: NORMAL
CRYSTALS, UA: NORMAL /HPF
EPITHELIAL CELLS UA: NORMAL /HPF
GLUCOSE URINE: NEGATIVE
KETONES, URINE: NEGATIVE
LEUKOCYTE ESTERASE, URINE: NEGATIVE
MUCUS: NORMAL
NITRITE, URINE: NEGATIVE
OTHER OBSERVATIONS UA: NORMAL
PH UA: 6 (ref 5–8)
PROTEIN UA: NEGATIVE
RBC UA: NORMAL /HPF (ref 0–2)
RENAL EPITHELIAL, UA: NORMAL /HPF
SPECIFIC GRAVITY UA: 1.01 (ref 1–1.03)
TRICHOMONAS: NORMAL
TURBIDITY: CLEAR
URINE HGB: NEGATIVE
UROBILINOGEN, URINE: NORMAL
WBC UA: NORMAL /HPF
YEAST: NORMAL

## 2017-06-29 PROCEDURE — 81001 URINALYSIS AUTO W/SCOPE: CPT

## 2017-06-29 PROCEDURE — 99213 OFFICE O/P EST LOW 20 MIN: CPT | Performed by: UROLOGY

## 2017-06-29 PROCEDURE — 52000 CYSTOURETHROSCOPY: CPT | Performed by: UROLOGY

## 2017-06-29 PROCEDURE — 87086 URINE CULTURE/COLONY COUNT: CPT

## 2017-06-29 ASSESSMENT — ENCOUNTER SYMPTOMS
EYE REDNESS: 0
EYE PAIN: 0
ABDOMINAL PAIN: 0
COUGH: 0
NAUSEA: 0
VOMITING: 0
COLOR CHANGE: 0
SHORTNESS OF BREATH: 0
BACK PAIN: 0
WHEEZING: 0

## 2017-06-30 LAB
CULTURE: NORMAL
CULTURE: NORMAL
Lab: NORMAL
SPECIMEN DESCRIPTION: NORMAL
SPECIMEN DESCRIPTION: NORMAL
STATUS: NORMAL

## 2017-07-06 RX ORDER — CLOPIDOGREL BISULFATE 75 MG/1
75 TABLET ORAL DAILY
Qty: 30 TABLET | Refills: 11 | Status: SHIPPED | OUTPATIENT
Start: 2017-07-06 | End: 2018-06-23 | Stop reason: SDUPTHER

## 2017-07-06 RX ORDER — ATORVASTATIN CALCIUM 80 MG/1
80 TABLET, FILM COATED ORAL NIGHTLY
Qty: 30 TABLET | Refills: 11 | Status: SHIPPED | OUTPATIENT
Start: 2017-07-06 | End: 2019-09-12 | Stop reason: SDUPTHER

## 2017-07-06 RX ORDER — ISOSORBIDE MONONITRATE 30 MG/1
30 TABLET, EXTENDED RELEASE ORAL DAILY
Qty: 30 TABLET | Refills: 11 | Status: SHIPPED | OUTPATIENT
Start: 2017-07-06 | End: 2018-07-03 | Stop reason: SDUPTHER

## 2017-07-20 ENCOUNTER — OFFICE VISIT (OUTPATIENT)
Dept: UROLOGY | Age: 57
End: 2017-07-20
Payer: COMMERCIAL

## 2017-07-20 VITALS
SYSTOLIC BLOOD PRESSURE: 124 MMHG | BODY MASS INDEX: 23.04 KG/M2 | HEIGHT: 63 IN | WEIGHT: 130 LBS | DIASTOLIC BLOOD PRESSURE: 78 MMHG

## 2017-07-20 DIAGNOSIS — C67.2 MALIGNANT NEOPLASM OF LATERAL WALL OF URINARY BLADDER (HCC): Primary | ICD-10-CM

## 2017-07-20 DIAGNOSIS — N32.89 BLADDER MASS: ICD-10-CM

## 2017-07-20 PROCEDURE — 51720 TREATMENT OF BLADDER LESION: CPT | Performed by: UROLOGY

## 2017-07-27 ENCOUNTER — OFFICE VISIT (OUTPATIENT)
Dept: UROLOGY | Age: 57
End: 2017-07-27
Payer: COMMERCIAL

## 2017-07-27 VITALS
WEIGHT: 128 LBS | DIASTOLIC BLOOD PRESSURE: 78 MMHG | BODY MASS INDEX: 22.68 KG/M2 | HEIGHT: 63 IN | SYSTOLIC BLOOD PRESSURE: 122 MMHG

## 2017-07-27 DIAGNOSIS — C67.2 MALIGNANT NEOPLASM OF LATERAL WALL OF URINARY BLADDER (HCC): Primary | ICD-10-CM

## 2017-07-27 PROCEDURE — 51720 TREATMENT OF BLADDER LESION: CPT | Performed by: UROLOGY

## 2017-08-03 ENCOUNTER — OFFICE VISIT (OUTPATIENT)
Dept: UROLOGY | Age: 57
End: 2017-08-03
Payer: COMMERCIAL

## 2017-08-03 VITALS
SYSTOLIC BLOOD PRESSURE: 128 MMHG | BODY MASS INDEX: 23.19 KG/M2 | WEIGHT: 126 LBS | HEIGHT: 62 IN | DIASTOLIC BLOOD PRESSURE: 80 MMHG

## 2017-08-03 DIAGNOSIS — C67.2 MALIGNANT NEOPLASM OF LATERAL WALL OF URINARY BLADDER (HCC): Primary | ICD-10-CM

## 2017-08-03 PROCEDURE — 51720 TREATMENT OF BLADDER LESION: CPT | Performed by: UROLOGY

## 2017-10-05 ENCOUNTER — PROCEDURE VISIT (OUTPATIENT)
Dept: UROLOGY | Age: 57
End: 2017-10-05
Payer: COMMERCIAL

## 2017-10-05 ENCOUNTER — HOSPITAL ENCOUNTER (OUTPATIENT)
Age: 57
Setting detail: SPECIMEN
Discharge: HOME | End: 2017-10-05
Payer: COMMERCIAL

## 2017-10-05 VITALS — SYSTOLIC BLOOD PRESSURE: 116 MMHG | DIASTOLIC BLOOD PRESSURE: 60 MMHG | WEIGHT: 125 LBS | BODY MASS INDEX: 22.86 KG/M2

## 2017-10-05 DIAGNOSIS — C67.2 MALIGNANT NEOPLASM OF LATERAL WALL OF URINARY BLADDER (HCC): Primary | ICD-10-CM

## 2017-10-05 DIAGNOSIS — C67.2 MALIGNANT NEOPLASM OF LATERAL WALL OF URINARY BLADDER (HCC): ICD-10-CM

## 2017-10-05 LAB
CASE NUMBER:: NORMAL
SPECIMEN DESCRIPTION: NORMAL

## 2017-10-05 PROCEDURE — 99213 OFFICE O/P EST LOW 20 MIN: CPT | Performed by: UROLOGY

## 2017-10-05 PROCEDURE — 52000 CYSTOURETHROSCOPY: CPT | Performed by: UROLOGY

## 2017-10-05 PROCEDURE — 88112 CYTOPATH CELL ENHANCE TECH: CPT

## 2017-10-05 ASSESSMENT — ENCOUNTER SYMPTOMS
NAUSEA: 0
BACK PAIN: 0
VOMITING: 0
COLOR CHANGE: 0
WHEEZING: 0
ABDOMINAL PAIN: 0
EYE PAIN: 0
SHORTNESS OF BREATH: 0
EYE REDNESS: 0
COUGH: 0

## 2017-10-05 NOTE — MR AVS SNAPSHOT
After Visit Summary             Christ Scheuermann   10/5/2017 4:00 PM   Procedure visit    Description:  Female : 1960   Provider:  Carlos Montiel MD   Department:  [facility]              Your Follow-Up and Future Appointments         Below is a list of your follow-up and future appointments. This may not be a complete list as you may have made appointments directly with providers that we are not aware of or your providers may have made some for you. Please call your providers to confirm appointments. It is important to keep your appointments. Please bring your current insurance card, photo ID, co-pay, and all medication bottles to your appointment. If self-pay, payment is expected at the time of service. Your To-Do List     Future Appointments Provider Department Dept Phone    2018 2:00 PM Carlos Montiel MD [facility] [phone]    2018 1:00 PM [place]gs [place] [address] [facility] Cardiology Specialist [phone]         Information from Your Visit        Department     Name Address Phone Fax    [facility] [address] [place] [address] [phone]      You Were Seen for:         Comments    Malignant neoplasm of lateral wall of urinary bladder ([facility] [address]   [038. 2. ICD-9-CM]         Vital Signs     Blood Pressure Weight Last Menstrual Period Body Mass Index Smoking Status       116/60 (Site: Right Arm, Position: Sitting, Cuff Size: Medium Adult) 125 lb (56.7 kg) 2010 22.86 kg/m2 Former Smoker          Medications and Orders      Your Current Medications Are              isosorbide mononitrate (IMDUR) 30 MG extended release tablet Take 1 tablet by mouth daily    clopidogrel (PLAVIX) 75 MG tablet Take 1 tablet by mouth daily    atorvastatin (LIPITOR) 80 MG tablet Take 1 tablet by mouth nightly    amLODIPine (NORVASC) 5 MG tablet Take 1 tablet by mouth daily    aspirin 81 MG chewable tablet Take 1 tablet by mouth daily losartan (COZAAR) 50 MG tablet Take 1 tablet by mouth 2 times daily    nitroGLYCERIN (NITROSTAT) 0.4 MG SL tablet up to max of 3 total doses. If no relief after 1 dose, call 911. metoprolol tartrate (LOPRESSOR) 50 MG tablet TAKE 1 TABLET BY MOUTH TWICE DAILY      Allergies           No Known Allergies      We Ordered/Performed the following           Cytology, Non-Gyn     SC CYSTOURETHROSCOPY     Comments: To be performed in clinic today         Additional Information        Basic Information     Date Of Birth Sex Race Ethnicity Preferred Language    1960 Female White Non-/Non  English      Problem List as of 10/5/2017  Date Reviewed: 7/27/2017                Coronary artery disease of native heart with stable angina pectoris (HCC)    NSTEMI (non-ST elevated myocardial infarction) ([facility] [address]    Alcohol abuse    Marihuana abuse    Malignant neoplasm of lateral wall of urinary bladder (HCC)    Bladder mass    Umbilical hernia    Hyponatremia    Agitation    Takayasu's arteritis (HCC)    Peripheral vascular disease (HCC)    Altered mental status    Calcium deposit tendon    Abscess of finger    Incisional hernia at bottom of median sternotomy scar    Low back pain    Essential hypertension    Dyslipidemia      Preventive Care        Date Due    Tetanus Combination Vaccine (1 - Tdap) 12/17/1980    Pap Smear 6/24/2017    Yearly Flu Vaccine (1) 9/1/2017    Mammograms are recommended every 2 years for low/average risk patients aged 48 - 69, and every year for high risk patients per updated national guidelines. However these guidelines can be individualized by your provider. 3/11/2018    Cholesterol Screening 3/2/2022    Colonoscopy 6/24/2026            Net[employer] Signup           Our records indicate that you have an active Net[employer] account. You can view your After Visit Summary by going to [email or web address] and logging in with your Net[employer] username and password.

## 2017-10-05 NOTE — PROGRESS NOTES
Subjective:      Patient ID: Joann Haddad is a 55 y.o. female. HPI  Patient is a 55 y.o. female in no acute distress. She is alert and oriented to person, place, and time. History  TURBT 4/5/2016 High Grade Ta  TURBT re-resection 5/17/2016 High Grade Ta  6 Weekly BCG instuillations starting 6/16/16  3 weekly BCG starting 7/20/17      Currently  Patient is here today for lower tract visualization. Patient has been doing well. No recent gross hematuria or dysuria. She reports no recent new or worsening lower urinary tract symptoms. She has no pain today. She did recently finished a 3 week course of maintenance BCG. She did tolerate this well. Cystoscopy Procedure Note    Indications:   1. Malignant neoplasm of lateral wall of urinary bladder (HCC)  AK CYSTOURETHROSCOPY    Cytology, Non-Gyn       Pre-operative Diagnosis:   1. Malignant neoplasm of lateral wall of urinary bladder (HCC)  AK CYSTOURETHROSCOPY    Cytology, Non-Gyn       Post-operative Diagnosis: Same    Surgeon: Mendel Vaughn     Assistants: None    Anesthesia : Local    Procedure Details   The risks, benefits, complications, treatment options, and expected outcomes were discussed with the patient. The patient concurred with the proposed plan, giving informed consent. Cystoscopy was performed today under local anesthesia, using sterile technique. The patient was placed in the dorsal lithotomy position, prepped with CHG, and draped in the usual sterile fashion. A 14 Kyrgyz flexible cystoscope was used to systematically inspect both the urethra and bladder in their entirety. Findings:  Anterior urethra: normal without strictures  Hyperplasia: na  Bladder: Normal mucosa, without lesions.   Ureteral orifice(s) was/were seen in the normal position and effluxing clear urine  Trabeculations No  Diverticulum No  Description: normal anatomy, previous resection site clear         Specimens: Cytology                 Complications:  None; patient tolerated the procedure well. Disposition: home           Condition: stable    Past Medical History:   Diagnosis Date    Alcohol abuse 3/2/2017    Arthritis     Bladder cancer ([facility] [address] 2016    CAD (coronary artery disease)     Cancer ([facility] [address] 2001    vulvar-    Carotid artery stenosis     Chronic back pain     Hyperlipidemia     Hypertension     Hypoglycemia     MI (myocardial infarction) ([facility] [address] 03/01/2017    Peripheral vascular disease ([facility] [address]     Takayasu's arteritis ([facility] [address]     Tibial fracture     right    Umbilical hernia     Unspecified cerebral artery occlusion with cerebral infarction 1993     Past Surgical History:   Procedure Laterality Date    BLADDER SURGERY  5/17/2016    cysto with transurethral resection of bladder with mitomycin    CARDIAC CATHETERIZATION  12/2010   [facility] CARDIAC SURGERY  1993    aortic bypass graft for right carotid artery obstruction    FEMORAL BYPASS  5/12, 6/12    per Dr Kristin Mejia  4-2016    TURBT    OTHER SURGICAL HISTORY      brachiocephalic-bypass    OTHER SURGICAL HISTORY      stenting of left internal carotid artery    VASCULAR SURGERY      VASCULAR SURGERY  5/12, 6/12    ken leg vacular surgery.     VULVA SURGERY  2001    cancer     Family History   Problem Relation Age of Onset    Cancer Father      lung    Cancer Mother      Current Outpatient Prescriptions on File Prior to Visit   Medication Sig Dispense Refill    isosorbide mononitrate (IMDUR) 30 MG extended release tablet Take 1 tablet by mouth daily 30 tablet 11    clopidogrel (PLAVIX) 75 MG tablet Take 1 tablet by mouth daily 30 tablet 11    atorvastatin (LIPITOR) 80 MG tablet Take 1 tablet by mouth nightly 30 tablet 11    amLODIPine (NORVASC) 5 MG tablet Take 1 tablet by mouth daily 30 tablet 11    aspirin 81 MG chewable tablet Take 1 tablet by mouth daily 30 tablet 11    losartan (COZAAR) 50 MG tablet Take 1 tablet by mouth 2 times daily 60 tablet 11    nitroGLYCERIN (NITROSTAT) 0.4 MG SL tablet up to max of 3 total doses. If no relief after 1 dose, call 911. 25 tablet 3    metoprolol tartrate (LOPRESSOR) 50 MG tablet TAKE 1 TABLET BY MOUTH TWICE DAILY 60 tablet 11     No current facility-administered medications on file prior to visit. Outpatient Encounter Prescriptions as of 10/5/2017   Medication Sig Dispense Refill    isosorbide mononitrate (IMDUR) 30 MG extended release tablet Take 1 tablet by mouth daily 30 tablet 11    clopidogrel (PLAVIX) 75 MG tablet Take 1 tablet by mouth daily 30 tablet 11    atorvastatin (LIPITOR) 80 MG tablet Take 1 tablet by mouth nightly 30 tablet 11    amLODIPine (NORVASC) 5 MG tablet Take 1 tablet by mouth daily 30 tablet 11    aspirin 81 MG chewable tablet Take 1 tablet by mouth daily 30 tablet 11    losartan (COZAAR) 50 MG tablet Take 1 tablet by mouth 2 times daily 60 tablet 11    nitroGLYCERIN (NITROSTAT) 0.4 MG SL tablet up to max of 3 total doses. If no relief after 1 dose, call 911. 25 tablet 3    metoprolol tartrate (LOPRESSOR) 50 MG tablet TAKE 1 TABLET BY MOUTH TWICE DAILY 60 tablet 11     No facility-administered encounter medications on file as of 10/5/2017. Review of patient's allergies indicates no known allergies. History   Smoking Status    Former Smoker    Packs/day: 1.50    Years: 15.00    Types: Cigarettes   Smokeless Tobacco    Never Used     Comment: QUIT 1993     History   Alcohol Use    3.6 oz/week    6 Cans of beer per week     Comment: occasional       There were no vitals filed for this visit. PHYSICAL EXAM:  Constitutional: Patient resting comfortably, in no acute distress. Neuro: Alert and oriented to person place and time. Cranial nerves grossly intact.   Psych: Mood and affect normal.  Skin: Warm, dry  HEENT: normocephalic, atraumatic  Lymphatics: No palpable lymphadenopathy  Lungs: Respiratory effort normal, unlabored  Cardiovascular:  Normal peripheral pulses  Abdomen: Soft, non-tender, non-distended with no organomegaly or palpable masses. : No CVA tenderness. Bladder non-tender and not distended. Pelvic: deferred      No results found for this visit on 10/05/17. Lab Results   Component Value Date    BUN 17 03/27/2017     Lab Results   Component Value Date    CREATININE 0.69 03/27/2017     Review of Systems   Constitutional: Negative for appetite change, chills and fever. Eyes: Negative for pain, redness and visual disturbance. Respiratory: Negative for cough, shortness of breath and wheezing. Cardiovascular: Negative for chest pain and leg swelling. Gastrointestinal: Negative for abdominal pain, nausea and vomiting. Genitourinary: Negative for difficulty urinating, dysuria, flank pain, frequency, hematuria, pelvic pain, vaginal bleeding and vaginal discharge. Musculoskeletal: Negative for back pain, joint swelling and myalgias. Skin: Negative for color change, rash and wound. Neurological: Negative for dizziness, tremors and numbness. Hematological: Negative for adenopathy. Does not bruise/bleed easily. Objective:   Physical Exam    Assessment: This is a 55 y.o. female with the following diagnoses:  1. Malignant neoplasm of lateral wall of urinary bladder (HCC)  MI CYSTOURETHROSCOPY    Cytology, Non-Gyn         Plan:      Patient is clear today. We will repeat her surveillance in 3 months. We will need to repeat maintenance BCG in the near future but not now. We will check urinary for cytology.

## 2017-10-09 LAB — SURGICAL PATHOLOGY REPORT: NORMAL

## 2018-02-06 ENCOUNTER — OFFICE VISIT (OUTPATIENT)
Dept: OBGYN CLINIC | Age: 58
End: 2018-02-06
Payer: COMMERCIAL

## 2018-02-06 ENCOUNTER — HOSPITAL ENCOUNTER (OUTPATIENT)
Age: 58
Setting detail: SPECIMEN
Discharge: HOME | End: 2018-02-06
Payer: COMMERCIAL

## 2018-02-06 VITALS
DIASTOLIC BLOOD PRESSURE: 84 MMHG | RESPIRATION RATE: 16 BRPM | HEIGHT: 65 IN | BODY MASS INDEX: 21.16 KG/M2 | SYSTOLIC BLOOD PRESSURE: 160 MMHG | WEIGHT: 127 LBS

## 2018-02-06 DIAGNOSIS — Z12.31 ENCOUNTER FOR SCREENING MAMMOGRAM FOR BREAST CANCER: Primary | ICD-10-CM

## 2018-02-06 DIAGNOSIS — Z12.4 ENCOUNTER FOR SCREENING FOR CERVICAL CANCER: ICD-10-CM

## 2018-02-06 PROCEDURE — G0145 SCR C/V CYTO,THINLAYER,RESCR: HCPCS

## 2018-02-06 PROCEDURE — 99386 PREV VISIT NEW AGE 40-64: CPT | Performed by: OBSTETRICS & GYNECOLOGY

## 2018-02-06 ASSESSMENT — PATIENT HEALTH QUESTIONNAIRE - PHQ9
2. FEELING DOWN, DEPRESSED OR HOPELESS: 0
SUM OF ALL RESPONSES TO PHQ QUESTIONS 1-9: 0
SUM OF ALL RESPONSES TO PHQ9 QUESTIONS 1 & 2: 0
1. LITTLE INTEREST OR PLEASURE IN DOING THINGS: 0

## 2018-02-06 NOTE — PROGRESS NOTES
YEARLY PHYSICAL    Date of service: 2018    Keven Vela  Is a 65 y.o.   female    PT's PCP is: Miguel Ashford MD     : 1960                                             Subjective:       Patient's last menstrual period was 2010. Are your menses regular: not applicable    OB History    Para Term  AB Living   4 3     1 3   SAB TAB Ectopic Molar Multiple Live Births                    # Outcome Date GA Lbr Ruslan/2nd Weight Sex Delivery Anes PTL Lv   4 AB            3 Para            2 Para            1 Para                    History   Smoking Status    Former Smoker    Packs/day: 1.50    Years: 15.00    Types: Cigarettes   Smokeless Tobacco    Never Used     Comment: QUIT         History   Alcohol Use    3.6 oz/week    6 Cans of beer per week     Comment: occasional       Family History   Problem Relation Age of Onset   [facility] Cancer Father      lung    Cancer Mother        Any family history of breast or ovarian cancer: Yes-maternal grandmother    Any family history of blood clots: No      Allergies: Review of patient's allergies indicates no known allergies. Current Outpatient Prescriptions:     isosorbide mononitrate (IMDUR) 30 MG extended release tablet, Take 1 tablet by mouth daily, Disp: 30 tablet, Rfl: 11    clopidogrel (PLAVIX) 75 MG tablet, Take 1 tablet by mouth daily, Disp: 30 tablet, Rfl: 11    atorvastatin (LIPITOR) 80 MG tablet, Take 1 tablet by mouth nightly, Disp: 30 tablet, Rfl: 11    amLODIPine (NORVASC) 5 MG tablet, Take 1 tablet by mouth daily, Disp: 30 tablet, Rfl: 11    aspirin 81 MG chewable tablet, Take 1 tablet by mouth daily, Disp: 30 tablet, Rfl: 11    losartan (COZAAR) 50 MG tablet, Take 1 tablet by mouth 2 times daily, Disp: 60 tablet, Rfl: 11    nitroGLYCERIN (NITROSTAT) 0.4 MG SL tablet, up to max of 3 total doses.  If no relief after 1 dose, call 911., Disp: 25 tablet, Rfl: 3    metoprolol tartrate (LOPRESSOR) 50 MG tablet, TAKE 1 TABLET BY MOUTH TWICE DAILY, Disp: 60 tablet, Rfl: 11    History   Sexual Activity    Sexual activity: Yes    Partners: Male       Any bleeding or pain with intercourse: No    Last Yearly:  2015    Last pap: 2015    Last HPV:     Last Mammogram: 2016    Last Dexascan     Do you do self breast exams: Yes    Past Medical History:   Diagnosis Date    Alcohol abuse 3/2/2017    Arthritis     Bladder cancer ([facility] [address] 2016    CAD (coronary artery disease)     Cancer ([facility] [address] 2001    vulvar-    Carotid artery stenosis     Chronic back pain     Hyperlipidemia     Hypertension     Hypoglycemia     MI (myocardial infarction) 03/01/2017    Peripheral vascular disease ([facility] [address]     Takayasu's arteritis ([facility] [address]     Tibial fracture     right    Umbilical hernia     Unspecified cerebral artery occlusion with cerebral infarction 1993       Past Surgical History:   Procedure Laterality Date    BLADDER SURGERY  5/17/2016    cysto with transurethral resection of bladder with mitomycin    CARDIAC CATHETERIZATION  12/2010   [facility] CARDIAC SURGERY  1993    aortic bypass graft for right carotid artery obstruction    FEMORAL BYPASS  5/12, 6/12    per Dr Cara Kerr  4-2016    TURBT    OTHER SURGICAL HISTORY      brachiocephalic-bypass    OTHER SURGICAL HISTORY      stenting of left internal carotid artery    VASCULAR SURGERY      VASCULAR SURGERY  5/12, 6/12    ken leg vacular surgery.  VULVA SURGERY  2001    cancer       Family History   Problem Relation Age of Onset    Cancer Father      lung    Cancer Mother        Chief Complaint   Patient presents with    Gynecologic Exam     yearly          PE:  Vital Signs  Resp. rate 16, weight 127 lb (57.6 kg), last menstrual period 12/01/2010, not currently breastfeeding. Labs:    No results found for this visit on 02/06/18.       NURSE: Ofe BERKOWITZ    HPI: Patient is here today for her

## 2018-02-13 RX ORDER — METOPROLOL TARTRATE 50 MG/1
TABLET, FILM COATED ORAL
Qty: 60 TABLET | Refills: 11 | Status: SHIPPED | OUTPATIENT
Start: 2018-02-13 | End: 2019-02-18 | Stop reason: SDUPTHER

## 2018-02-16 LAB — CYTOLOGY REPORT: NORMAL

## 2018-02-27 ENCOUNTER — HOSPITAL ENCOUNTER (OUTPATIENT)
Dept: MAMMOGRAPHY | Age: 58
Discharge: HOME | End: 2018-03-01
Payer: COMMERCIAL

## 2018-02-27 DIAGNOSIS — Z12.31 ENCOUNTER FOR SCREENING MAMMOGRAM FOR BREAST CANCER: ICD-10-CM

## 2018-02-27 PROCEDURE — 77067 SCR MAMMO BI INCL CAD: CPT

## 2018-03-08 ENCOUNTER — HOSPITAL ENCOUNTER (OUTPATIENT)
Age: 58
Setting detail: SPECIMEN
Discharge: HOME | End: 2018-03-08
Payer: COMMERCIAL

## 2018-03-08 ENCOUNTER — PROCEDURE VISIT (OUTPATIENT)
Dept: UROLOGY | Age: 58
End: 2018-03-08
Payer: COMMERCIAL

## 2018-03-08 VITALS
WEIGHT: 129 LBS | BODY MASS INDEX: 21.49 KG/M2 | DIASTOLIC BLOOD PRESSURE: 82 MMHG | SYSTOLIC BLOOD PRESSURE: 140 MMHG | HEIGHT: 65 IN

## 2018-03-08 DIAGNOSIS — C67.2 MALIGNANT NEOPLASM OF LATERAL WALL OF URINARY BLADDER (HCC): ICD-10-CM

## 2018-03-08 DIAGNOSIS — C67.2 MALIGNANT NEOPLASM OF LATERAL WALL OF URINARY BLADDER (HCC): Primary | ICD-10-CM

## 2018-03-08 LAB
-: ABNORMAL
AMORPHOUS: ABNORMAL
BACTERIA: ABNORMAL
BILIRUBIN URINE: NEGATIVE
CASE NUMBER:: NORMAL
CASTS UA: ABNORMAL /LPF
COLOR: YELLOW
COMMENT UA: ABNORMAL
CRYSTALS, UA: ABNORMAL /HPF
EPITHELIAL CELLS UA: ABNORMAL /HPF
GLUCOSE URINE: NEGATIVE
KETONES, URINE: NEGATIVE
LEUKOCYTE ESTERASE, URINE: ABNORMAL
MUCUS: ABNORMAL
NITRITE, URINE: NEGATIVE
OTHER OBSERVATIONS UA: ABNORMAL
PH UA: 6 (ref 5–8)
PROTEIN UA: NEGATIVE
RBC UA: ABNORMAL /HPF (ref 0–2)
RENAL EPITHELIAL, UA: ABNORMAL /HPF
SPECIFIC GRAVITY UA: 1.01 (ref 1–1.03)
SPECIMEN DESCRIPTION: NORMAL
TRICHOMONAS: ABNORMAL
TURBIDITY: CLEAR
URINE HGB: NEGATIVE
UROBILINOGEN, URINE: NORMAL
WBC UA: ABNORMAL /HPF
YEAST: ABNORMAL

## 2018-03-08 PROCEDURE — 1036F TOBACCO NON-USER: CPT | Performed by: UROLOGY

## 2018-03-08 PROCEDURE — 87086 URINE CULTURE/COLONY COUNT: CPT

## 2018-03-08 PROCEDURE — 88112 CYTOPATH CELL ENHANCE TECH: CPT

## 2018-03-08 PROCEDURE — G8420 CALC BMI NORM PARAMETERS: HCPCS | Performed by: UROLOGY

## 2018-03-08 PROCEDURE — 3014F SCREEN MAMMO DOC REV: CPT | Performed by: UROLOGY

## 2018-03-08 PROCEDURE — G8598 ASA/ANTIPLAT THER USED: HCPCS | Performed by: UROLOGY

## 2018-03-08 PROCEDURE — 81001 URINALYSIS AUTO W/SCOPE: CPT

## 2018-03-08 PROCEDURE — G8484 FLU IMMUNIZE NO ADMIN: HCPCS | Performed by: UROLOGY

## 2018-03-08 PROCEDURE — G8427 DOCREV CUR MEDS BY ELIG CLIN: HCPCS | Performed by: UROLOGY

## 2018-03-08 PROCEDURE — 99213 OFFICE O/P EST LOW 20 MIN: CPT | Performed by: UROLOGY

## 2018-03-08 PROCEDURE — 52000 CYSTOURETHROSCOPY: CPT | Performed by: UROLOGY

## 2018-03-08 PROCEDURE — 3017F COLORECTAL CA SCREEN DOC REV: CPT | Performed by: UROLOGY

## 2018-03-08 RX ORDER — LOSARTAN POTASSIUM 50 MG/1
TABLET ORAL
Qty: 60 TABLET | Refills: 11 | Status: SHIPPED | OUTPATIENT
Start: 2018-03-08 | End: 2019-04-15 | Stop reason: SDUPTHER

## 2018-03-08 ASSESSMENT — ENCOUNTER SYMPTOMS
COLOR CHANGE: 0
ABDOMINAL PAIN: 0
WHEEZING: 0
BACK PAIN: 0
NAUSEA: 0
EYE PAIN: 0
VOMITING: 0
SHORTNESS OF BREATH: 0
COUGH: 0
EYE REDNESS: 0

## 2018-03-08 NOTE — PROGRESS NOTES
current facility-administered medications on file prior to visit. Outpatient Encounter Prescriptions as of 3/8/2018   Medication Sig Dispense Refill    losartan (COZAAR) 50 MG tablet TAKE 1 TABLET BY MOUTH TWICE DAILY 60 tablet 11    metoprolol tartrate (LOPRESSOR) 50 MG tablet TAKE 1 TABLET BY MOUTH TWICE DAILY 60 tablet 11    isosorbide mononitrate (IMDUR) 30 MG extended release tablet Take 1 tablet by mouth daily 30 tablet 11    clopidogrel (PLAVIX) 75 MG tablet Take 1 tablet by mouth daily 30 tablet 11    atorvastatin (LIPITOR) 80 MG tablet Take 1 tablet by mouth nightly 30 tablet 11    amLODIPine (NORVASC) 5 MG tablet Take 1 tablet by mouth daily 30 tablet 11    aspirin 81 MG chewable tablet Take 1 tablet by mouth daily 30 tablet 11    nitroGLYCERIN (NITROSTAT) 0.4 MG SL tablet up to max of 3 total doses. If no relief after 1 dose, call 911. 25 tablet 3     No facility-administered encounter medications on file as of 3/8/2018. Patient has no known allergies. History   Smoking Status    Former Smoker    Packs/day: 1.50    Years: 15.00    Types: Cigarettes   Smokeless Tobacco    Never Used     Comment: QUIT 1993     History   Alcohol Use    3.6 oz/week    6 Cans of beer per week     Comment: occasional       Vitals:    03/08/18 1543   BP: (!) 146/82     PHYSICAL EXAM:  Constitutional: Patient resting comfortably, in no acute distress. Neuro: Alert and oriented to person place and time. Cranial nerves grossly intact. Psych: Mood and affect normal.  Skin: Warm, dry  HEENT: normocephalic, atraumatic  Lymphatics: No palpable lymphadenopathy  Lungs: Respiratory effort normal, unlabored  Cardiovascular:  Normal peripheral pulses  Abdomen: Soft, non-tender, non-distended with no organomegaly or palpable masses. : No CVA tenderness. Bladder non-tender and not distended. Pelvic: vaaginal atrophy      No results found for this visit on 03/08/18.   Lab Results   Component Value Date    BUN

## 2018-03-09 LAB
CULTURE: NORMAL
CULTURE: NORMAL
Lab: NORMAL
SPECIMEN DESCRIPTION: NORMAL
SPECIMEN DESCRIPTION: NORMAL
STATUS: NORMAL
SURGICAL PATHOLOGY REPORT: NORMAL

## 2018-03-14 NOTE — PROGRESS NOTES
Call pt - urine cx reviewed and negative for UTI   cytology reviewed and negative for abnormality (signs of malignancy)

## 2018-03-15 ENCOUNTER — OFFICE VISIT (OUTPATIENT)
Dept: FAMILY MEDICINE CLINIC | Age: 58
End: 2018-03-15
Payer: COMMERCIAL

## 2018-03-15 VITALS
BODY MASS INDEX: 23.55 KG/M2 | HEIGHT: 62 IN | HEART RATE: 72 BPM | DIASTOLIC BLOOD PRESSURE: 73 MMHG | SYSTOLIC BLOOD PRESSURE: 134 MMHG | WEIGHT: 128 LBS | RESPIRATION RATE: 18 BRPM

## 2018-03-15 DIAGNOSIS — I25.118 CORONARY ARTERY DISEASE OF NATIVE HEART WITH STABLE ANGINA PECTORIS, UNSPECIFIED VESSEL OR LESION TYPE (HCC): ICD-10-CM

## 2018-03-15 DIAGNOSIS — Z13.1 SCREENING FOR DIABETES MELLITUS (DM): ICD-10-CM

## 2018-03-15 DIAGNOSIS — I10 ESSENTIAL HYPERTENSION: ICD-10-CM

## 2018-03-15 DIAGNOSIS — G89.29 CHRONIC MIDLINE LOW BACK PAIN WITHOUT SCIATICA: Primary | ICD-10-CM

## 2018-03-15 DIAGNOSIS — M54.50 CHRONIC MIDLINE LOW BACK PAIN WITHOUT SCIATICA: Primary | ICD-10-CM

## 2018-03-15 DIAGNOSIS — E78.5 DYSLIPIDEMIA: ICD-10-CM

## 2018-03-15 LAB — HBA1C MFR BLD: 5.2 %

## 2018-03-15 PROCEDURE — 83036 HEMOGLOBIN GLYCOSYLATED A1C: CPT | Performed by: INTERNAL MEDICINE

## 2018-03-15 PROCEDURE — 3017F COLORECTAL CA SCREEN DOC REV: CPT | Performed by: INTERNAL MEDICINE

## 2018-03-15 PROCEDURE — 3014F SCREEN MAMMO DOC REV: CPT | Performed by: INTERNAL MEDICINE

## 2018-03-15 PROCEDURE — 99213 OFFICE O/P EST LOW 20 MIN: CPT | Performed by: INTERNAL MEDICINE

## 2018-03-15 PROCEDURE — G8484 FLU IMMUNIZE NO ADMIN: HCPCS | Performed by: INTERNAL MEDICINE

## 2018-03-15 PROCEDURE — G8427 DOCREV CUR MEDS BY ELIG CLIN: HCPCS | Performed by: INTERNAL MEDICINE

## 2018-03-15 PROCEDURE — G8420 CALC BMI NORM PARAMETERS: HCPCS | Performed by: INTERNAL MEDICINE

## 2018-03-15 PROCEDURE — G8598 ASA/ANTIPLAT THER USED: HCPCS | Performed by: INTERNAL MEDICINE

## 2018-03-15 PROCEDURE — 1036F TOBACCO NON-USER: CPT | Performed by: INTERNAL MEDICINE

## 2018-03-15 RX ORDER — NAPROXEN 500 MG/1
500 TABLET ORAL 2 TIMES DAILY WITH MEALS
Qty: 14 TABLET | Refills: 0 | Status: SHIPPED | OUTPATIENT
Start: 2018-03-15 | End: 2019-09-12 | Stop reason: ALTCHOICE

## 2018-03-15 ASSESSMENT — ENCOUNTER SYMPTOMS
BACK PAIN: 1
SHORTNESS OF BREATH: 0
BOWEL INCONTINENCE: 0
ABDOMINAL PAIN: 0
BLURRED VISION: 0
NAUSEA: 0
SORE THROAT: 0
COUGH: 0
HEARTBURN: 0

## 2018-03-15 NOTE — PROGRESS NOTES
HPI Notes    Name: Cecy Venegas  : 1960         Chief Complaint:     Chief Complaint   Patient presents with    Annual Exam     her insurance is wanting her to get  PCP since she sees other specialists    Lower Back Pain     this is an on going problem    Hypertension       History of Present Illness:        Tre Marcano is here for general evaluation and follow up for  HTN. She also complaints of lower back pain which is chronic. Says has been lifting and pushing heavy things. She says has a lot of pending  tests to do this month before seeing Dr. Alla Strong  She has bladder tumor and follows up with Dr. Maura Wren. Hypertension   This is a chronic problem. The current episode started more than 1 year ago. The problem is unchanged. The problem is controlled. Pertinent negatives include no anxiety, blurred vision, chest pain, headaches, palpitations, peripheral edema or shortness of breath. Neck pain: occasional leg cramps. There are no associated agents to hypertension. Risk factors: has CAD. Past treatments include angiotensin blockers, beta blockers and calcium channel blockers. The current treatment provides significant improvement. Compliance problems: takes meds only once a day instead Bid. Hypertensive end-organ damage includes CAD/MI and PVD. There is no history of kidney disease or heart failure. Back Pain   This is a chronic problem. The current episode started more than 1 year ago. The problem occurs 2 to 4 times per day. The problem is unchanged. The pain is present in the lumbar spine. The quality of the pain is described as aching. The pain does not radiate. The pain is at a severity of 5/10. The pain is the same all the time. The symptoms are aggravated by bending and twisting. Pertinent negatives include no abdominal pain, bladder incontinence, bowel incontinence, chest pain, dysuria, fever, headaches, leg pain, numbness, paresis, pelvic pain, tingling or weight loss.  She has tried Mj Vazquez MD   metoprolol tartrate (LOPRESSOR) 50 MG tablet TAKE 1 TABLET BY MOUTH TWICE DAILY 2/13/18  Yes Saurav Morgan MD   isosorbide mononitrate (IMDUR) 30 MG extended release tablet Take 1 tablet by mouth daily 7/6/17  Yes Saurav Morgan MD   clopidogrel (PLAVIX) 75 MG tablet Take 1 tablet by mouth daily 7/6/17  Yes Saurav Morgan MD   atorvastatin (LIPITOR) 80 MG tablet Take 1 tablet by mouth nightly 7/6/17  Yes Saurav Morgan MD   amLODIPine (NORVASC) 5 MG tablet Take 1 tablet by mouth daily 6/15/17  Yes Saurav Morgan MD   aspirin 81 MG chewable tablet Take 1 tablet by mouth daily 5/1/17  Yes Saurav Morgan MD   nitroGLYCERIN (NITROSTAT) 0.4 MG SL tablet up to max of 3 total doses. If no relief after 1 dose, call 911. 3/3/17  Yes Raghavendra Brown MD        Allergies:       Patient has no known allergies. Social History:     Tobacco:    reports that she has quit smoking. Her smoking use included Cigarettes. She has a 22.50 pack-year smoking history. She has never used smokeless tobacco.  Alcohol:      reports that she drinks about 3.6 oz of alcohol per week . Drug Use:  reports that she uses drugs, including Marijuana. Family History:     Family History   Problem Relation Age of Onset    Cancer Father      lung    Cancer Mother        Review of Systems:         Review of Systems   Constitutional: Negative for chills, fever and weight loss. HENT: Negative for congestion, hearing loss and sore throat. Eyes: Negative for blurred vision. Respiratory: Negative for cough and shortness of breath. Cardiovascular: Negative for chest pain and palpitations. Gastrointestinal: Negative for abdominal pain, bowel incontinence, heartburn and nausea. Genitourinary: Negative for bladder incontinence, dysuria and pelvic pain. Musculoskeletal: Positive for back pain (lumbar ) and myalgias. Negative for falls and joint pain. Neck pain: occasional leg cramps. Skin: Negative for rash.    Neurological: 3.36 05/31/2012    HGB 13.3 03/03/2017    HCT 38.9 03/03/2017    MCV 96.0 03/03/2017    MCH 32.8 03/03/2017    MCHC 34.2 03/03/2017    RDW 14.4 03/03/2017     03/03/2017     05/31/2012    MPV 7.5 03/03/2017     Lab Results   Component Value Date    TSH 1.93 03/27/2017     Lab Results   Component Value Date    CHOL 232 03/02/2017    HDL 60 03/02/2017    LABA1C 5.2 03/15/2018          Assessment & Plan       1. Chronic midline low back pain without sciatica   Her pain is close to baseline. Will try Naproxen, advise PT but she declined for now. She is advised to call us if pain gets worse   naproxen (EC NAPROSYN) 500 MG EC tablet   2. Screening for diabetes mellitus (DM)   HbA1c is 5.2% POCT glycosylated hemoglobin (Hb A1C)   3. Essential hypertension   BP controlled on current meds, will continue same regimen    4. Coronary artery disease of native heart with stable angina pectoris, unspecified vessel or lesion type [facility])   She follows up with Dr. Mj Vazquez    5. Dyslipidemia                     Completed Refills   Requested Prescriptions     Signed Prescriptions Disp Refills    naproxen (EC NAPROSYN) 500 MG EC tablet 14 tablet 0     Sig: Take 1 tablet by mouth 2 times daily (with meals) for 7 days     Return in about 6 months (around 9/15/2018) for HTN. Orders Placed This Encounter   Medications    naproxen (EC NAPROSYN) 500 MG EC tablet     Sig: Take 1 tablet by mouth 2 times daily (with meals) for 7 days     Dispense:  14 tablet     Refill:  0     Orders Placed This Encounter   Procedures    POCT glycosylated hemoglobin (Hb A1C)         There are no Patient Instructions on file for this visit.     Electronically signed by Vielka Flynn MD on 3/15/2018 at 11:14 AM           Completed Refills   Requested Prescriptions     Signed Prescriptions Disp Refills    naproxen (EC NAPROSYN) 500 MG EC tablet 14 tablet 0     Sig: Take 1 tablet by mouth 2 times daily (with meals) for 7 days

## 2018-04-05 ENCOUNTER — OFFICE VISIT (OUTPATIENT)
Dept: UROLOGY | Age: 58
End: 2018-04-05
Payer: COMMERCIAL

## 2018-04-05 VITALS
DIASTOLIC BLOOD PRESSURE: 78 MMHG | WEIGHT: 128 LBS | SYSTOLIC BLOOD PRESSURE: 120 MMHG | BODY MASS INDEX: 22.68 KG/M2 | HEIGHT: 63 IN

## 2018-04-05 DIAGNOSIS — C67.2 MALIGNANT NEOPLASM OF LATERAL WALL OF URINARY BLADDER (HCC): Primary | ICD-10-CM

## 2018-04-05 PROCEDURE — 51720 TREATMENT OF BLADDER LESION: CPT | Performed by: UROLOGY

## 2018-04-12 ENCOUNTER — OFFICE VISIT (OUTPATIENT)
Dept: UROLOGY | Age: 58
End: 2018-04-12
Payer: COMMERCIAL

## 2018-04-12 VITALS
WEIGHT: 129 LBS | HEIGHT: 63 IN | DIASTOLIC BLOOD PRESSURE: 82 MMHG | SYSTOLIC BLOOD PRESSURE: 138 MMHG | BODY MASS INDEX: 22.86 KG/M2

## 2018-04-12 DIAGNOSIS — C67.2 MALIGNANT NEOPLASM OF LATERAL WALL OF URINARY BLADDER (HCC): Primary | ICD-10-CM

## 2018-04-12 PROCEDURE — 51720 TREATMENT OF BLADDER LESION: CPT | Performed by: UROLOGY

## 2018-04-19 ENCOUNTER — OFFICE VISIT (OUTPATIENT)
Dept: UROLOGY | Age: 58
End: 2018-04-19
Payer: COMMERCIAL

## 2018-04-19 VITALS — BODY MASS INDEX: 22.67 KG/M2 | DIASTOLIC BLOOD PRESSURE: 70 MMHG | WEIGHT: 128 LBS | SYSTOLIC BLOOD PRESSURE: 120 MMHG

## 2018-04-19 DIAGNOSIS — C67.2 MALIGNANT NEOPLASM OF LATERAL WALL OF URINARY BLADDER (HCC): Primary | ICD-10-CM

## 2018-04-19 PROCEDURE — 51720 TREATMENT OF BLADDER LESION: CPT | Performed by: UROLOGY

## 2018-04-27 ENCOUNTER — HOSPITAL ENCOUNTER (OUTPATIENT)
Dept: GENERAL RADIOLOGY | Age: 58
Discharge: HOME | End: 2018-04-29
Payer: COMMERCIAL

## 2018-04-27 ENCOUNTER — HOSPITAL ENCOUNTER (OUTPATIENT)
Age: 58
Discharge: HOME | End: 2018-04-29
Payer: COMMERCIAL

## 2018-04-27 ENCOUNTER — HOSPITAL ENCOUNTER (OUTPATIENT)
Age: 58
Discharge: HOME | End: 2018-04-27
Payer: COMMERCIAL

## 2018-04-27 DIAGNOSIS — I21.4 NSTEMI (NON-ST ELEVATED MYOCARDIAL INFARCTION) (HCC): ICD-10-CM

## 2018-04-27 DIAGNOSIS — I25.118 CORONARY ARTERY DISEASE OF NATIVE HEART WITH STABLE ANGINA PECTORIS, UNSPECIFIED VESSEL OR LESION TYPE (HCC): ICD-10-CM

## 2018-04-27 DIAGNOSIS — I10 ESSENTIAL HYPERTENSION: ICD-10-CM

## 2018-04-27 LAB
ABSOLUTE EOS #: 0.2 K/UL (ref 0–0.4)
ABSOLUTE IMMATURE GRANULOCYTE: NORMAL K/UL (ref 0–0.3)
ABSOLUTE LYMPH #: 2.1 K/UL (ref 1–4.8)
ABSOLUTE MONO #: 0.5 K/UL (ref 0–1)
ALBUMIN SERPL-MCNC: 4 G/DL (ref 3.5–5.2)
ALBUMIN/GLOBULIN RATIO: ABNORMAL (ref 1–2.5)
ALP BLD-CCNC: 66 U/L (ref 35–104)
ALT SERPL-CCNC: 20 U/L (ref 5–33)
ANION GAP SERPL CALCULATED.3IONS-SCNC: 11 MMOL/L (ref 9–17)
AST SERPL-CCNC: 21 U/L
BASOPHILS # BLD: 0 % (ref 0–2)
BASOPHILS ABSOLUTE: 0 K/UL (ref 0–0.2)
BILIRUB SERPL-MCNC: 0.62 MG/DL (ref 0.3–1.2)
BUN BLDV-MCNC: 15 MG/DL (ref 6–20)
BUN/CREAT BLD: 21 (ref 9–20)
CALCIUM SERPL-MCNC: 9.7 MG/DL (ref 8.6–10.4)
CHLORIDE BLD-SCNC: 106 MMOL/L (ref 98–107)
CHOLESTEROL/HDL RATIO: 2.8
CHOLESTEROL: 180 MG/DL
CO2: 23 MMOL/L (ref 20–31)
CREAT SERPL-MCNC: 0.73 MG/DL (ref 0.5–0.9)
DIFFERENTIAL TYPE: YES
EKG ATRIAL RATE: 60 BPM
EKG P AXIS: 50 DEGREES
EKG P-R INTERVAL: 174 MS
EKG Q-T INTERVAL: 394 MS
EKG QRS DURATION: 86 MS
EKG QTC CALCULATION (BAZETT): 394 MS
EKG R AXIS: 64 DEGREES
EKG T AXIS: 62 DEGREES
EKG VENTRICULAR RATE: 60 BPM
EOSINOPHILS RELATIVE PERCENT: 2 % (ref 0–5)
GFR AFRICAN AMERICAN: >60 ML/MIN
GFR NON-AFRICAN AMERICAN: >60 ML/MIN
GFR SERPL CREATININE-BSD FRML MDRD: ABNORMAL ML/MIN/{1.73_M2}
GFR SERPL CREATININE-BSD FRML MDRD: ABNORMAL ML/MIN/{1.73_M2}
GLUCOSE BLD-MCNC: 102 MG/DL (ref 70–99)
HCT VFR BLD CALC: 39.2 % (ref 36–46)
HDLC SERPL-MCNC: 65 MG/DL
HEMOGLOBIN: 13.4 G/DL (ref 12–16)
IMMATURE GRANULOCYTES: NORMAL %
LDL CHOLESTEROL: 98 MG/DL (ref 0–130)
LYMPHOCYTES # BLD: 31 % (ref 15–40)
MAGNESIUM: 2 MG/DL (ref 1.6–2.6)
MCH RBC QN AUTO: 33 PG (ref 26–34)
MCHC RBC AUTO-ENTMCNC: 34.1 G/DL (ref 31–37)
MCV RBC AUTO: 96.7 FL (ref 80–100)
MONOCYTES # BLD: 7 % (ref 4–8)
NRBC AUTOMATED: NORMAL PER 100 WBC
PATIENT FASTING?: YES
PDW BLD-RTO: 13.6 % (ref 12.1–15.2)
PLATELET # BLD: 403 K/UL (ref 140–450)
PLATELET ESTIMATE: NORMAL
PMV BLD AUTO: NORMAL FL (ref 6–12)
POTASSIUM SERPL-SCNC: 4.3 MMOL/L (ref 3.7–5.3)
RBC # BLD: 4.05 M/UL (ref 4–5.2)
RBC # BLD: NORMAL 10*6/UL
SEG NEUTROPHILS: 60 % (ref 47–75)
SEGMENTED NEUTROPHILS ABSOLUTE COUNT: 4 K/UL (ref 2.5–7)
SODIUM BLD-SCNC: 140 MMOL/L (ref 135–144)
TOTAL PROTEIN: 7.4 G/DL (ref 6.4–8.3)
TRIGL SERPL-MCNC: 86 MG/DL
TSH SERPL DL<=0.05 MIU/L-ACNC: 3.36 MIU/L (ref 0.3–5)
VITAMIN D 25-HYDROXY: 25.4 NG/ML (ref 30–100)
VLDLC SERPL CALC-MCNC: NORMAL MG/DL (ref 1–30)
WBC # BLD: 6.7 K/UL (ref 3.5–11)
WBC # BLD: NORMAL 10*3/UL

## 2018-04-27 PROCEDURE — 80061 LIPID PANEL: CPT

## 2018-04-27 PROCEDURE — 84443 ASSAY THYROID STIM HORMONE: CPT

## 2018-04-27 PROCEDURE — 85025 COMPLETE CBC W/AUTO DIFF WBC: CPT

## 2018-04-27 PROCEDURE — 93005 ELECTROCARDIOGRAM TRACING: CPT

## 2018-04-27 PROCEDURE — 36415 COLL VENOUS BLD VENIPUNCTURE: CPT

## 2018-04-27 PROCEDURE — 82306 VITAMIN D 25 HYDROXY: CPT

## 2018-04-27 PROCEDURE — 83735 ASSAY OF MAGNESIUM: CPT

## 2018-04-27 PROCEDURE — 80053 COMPREHEN METABOLIC PANEL: CPT

## 2018-04-27 PROCEDURE — 71046 X-RAY EXAM CHEST 2 VIEWS: CPT

## 2018-04-30 ENCOUNTER — OFFICE VISIT (OUTPATIENT)
Dept: CARDIOLOGY CLINIC | Age: 58
End: 2018-04-30
Payer: COMMERCIAL

## 2018-04-30 VITALS
SYSTOLIC BLOOD PRESSURE: 180 MMHG | BODY MASS INDEX: 23.03 KG/M2 | WEIGHT: 130 LBS | OXYGEN SATURATION: 98 % | DIASTOLIC BLOOD PRESSURE: 80 MMHG | HEART RATE: 78 BPM

## 2018-04-30 DIAGNOSIS — I10 ESSENTIAL HYPERTENSION: Primary | ICD-10-CM

## 2018-04-30 DIAGNOSIS — E55.9 VITAMIN D DEFICIENCY DISEASE: ICD-10-CM

## 2018-04-30 DIAGNOSIS — I25.118 CORONARY ARTERY DISEASE OF NATIVE HEART WITH STABLE ANGINA PECTORIS, UNSPECIFIED VESSEL OR LESION TYPE (HCC): ICD-10-CM

## 2018-04-30 PROCEDURE — 1036F TOBACCO NON-USER: CPT | Performed by: INTERNAL MEDICINE

## 2018-04-30 PROCEDURE — G8420 CALC BMI NORM PARAMETERS: HCPCS | Performed by: INTERNAL MEDICINE

## 2018-04-30 PROCEDURE — 99214 OFFICE O/P EST MOD 30 MIN: CPT | Performed by: INTERNAL MEDICINE

## 2018-04-30 PROCEDURE — G8598 ASA/ANTIPLAT THER USED: HCPCS | Performed by: INTERNAL MEDICINE

## 2018-04-30 PROCEDURE — G8427 DOCREV CUR MEDS BY ELIG CLIN: HCPCS | Performed by: INTERNAL MEDICINE

## 2018-04-30 PROCEDURE — 3017F COLORECTAL CA SCREEN DOC REV: CPT | Performed by: INTERNAL MEDICINE

## 2018-05-31 RX ORDER — AMLODIPINE BESYLATE 5 MG/1
5 TABLET ORAL DAILY
Qty: 30 TABLET | Refills: 11 | Status: SHIPPED | OUTPATIENT
Start: 2018-05-31 | End: 2019-06-03 | Stop reason: SDUPTHER

## 2018-06-14 ENCOUNTER — PROCEDURE VISIT (OUTPATIENT)
Dept: UROLOGY | Age: 58
End: 2018-06-14
Payer: COMMERCIAL

## 2018-06-14 ENCOUNTER — HOSPITAL ENCOUNTER (OUTPATIENT)
Age: 58
Setting detail: SPECIMEN
Discharge: HOME | End: 2018-06-14
Payer: COMMERCIAL

## 2018-06-14 VITALS — BODY MASS INDEX: 22.32 KG/M2 | DIASTOLIC BLOOD PRESSURE: 64 MMHG | WEIGHT: 126 LBS | SYSTOLIC BLOOD PRESSURE: 120 MMHG

## 2018-06-14 DIAGNOSIS — C67.2 MALIGNANT NEOPLASM OF LATERAL WALL OF URINARY BLADDER (HCC): Primary | ICD-10-CM

## 2018-06-14 LAB
CASE NUMBER:: NORMAL
SPECIMEN DESCRIPTION: NORMAL

## 2018-06-14 PROCEDURE — 88112 CYTOPATH CELL ENHANCE TECH: CPT

## 2018-06-14 PROCEDURE — G8427 DOCREV CUR MEDS BY ELIG CLIN: HCPCS | Performed by: UROLOGY

## 2018-06-14 PROCEDURE — G8420 CALC BMI NORM PARAMETERS: HCPCS | Performed by: UROLOGY

## 2018-06-14 PROCEDURE — 1036F TOBACCO NON-USER: CPT | Performed by: UROLOGY

## 2018-06-14 PROCEDURE — 52000 CYSTOURETHROSCOPY: CPT | Performed by: UROLOGY

## 2018-06-14 PROCEDURE — 3017F COLORECTAL CA SCREEN DOC REV: CPT | Performed by: UROLOGY

## 2018-06-14 PROCEDURE — G8598 ASA/ANTIPLAT THER USED: HCPCS | Performed by: UROLOGY

## 2018-06-14 PROCEDURE — 99213 OFFICE O/P EST LOW 20 MIN: CPT | Performed by: UROLOGY

## 2018-06-14 ASSESSMENT — ENCOUNTER SYMPTOMS
WHEEZING: 0
VOMITING: 0
ABDOMINAL PAIN: 0
COLOR CHANGE: 0
SHORTNESS OF BREATH: 0
EYE PAIN: 0
NAUSEA: 0
COUGH: 0
EYE REDNESS: 0
BACK PAIN: 0

## 2018-06-18 LAB — SURGICAL PATHOLOGY REPORT: NORMAL

## 2018-06-25 RX ORDER — CLOPIDOGREL BISULFATE 75 MG/1
TABLET ORAL
Qty: 30 TABLET | Refills: 11 | Status: SHIPPED | OUTPATIENT
Start: 2018-06-25 | End: 2019-07-09 | Stop reason: SDUPTHER

## 2018-07-03 RX ORDER — ISOSORBIDE MONONITRATE 30 MG/1
TABLET, EXTENDED RELEASE ORAL
Qty: 30 TABLET | Refills: 11 | Status: SHIPPED | OUTPATIENT
Start: 2018-07-03 | End: 2019-07-09 | Stop reason: SDUPTHER

## 2018-07-12 ENCOUNTER — OFFICE VISIT (OUTPATIENT)
Dept: FAMILY MEDICINE CLINIC | Age: 58
End: 2018-07-12
Payer: COMMERCIAL

## 2018-07-12 VITALS
HEART RATE: 72 BPM | HEIGHT: 63 IN | WEIGHT: 127 LBS | RESPIRATION RATE: 20 BRPM | SYSTOLIC BLOOD PRESSURE: 130 MMHG | BODY MASS INDEX: 22.5 KG/M2 | DIASTOLIC BLOOD PRESSURE: 72 MMHG

## 2018-07-12 DIAGNOSIS — L98.9 SKIN LESION OF SCALP: Primary | ICD-10-CM

## 2018-07-12 PROCEDURE — G8427 DOCREV CUR MEDS BY ELIG CLIN: HCPCS | Performed by: INTERNAL MEDICINE

## 2018-07-12 PROCEDURE — G8598 ASA/ANTIPLAT THER USED: HCPCS | Performed by: INTERNAL MEDICINE

## 2018-07-12 PROCEDURE — 99213 OFFICE O/P EST LOW 20 MIN: CPT | Performed by: INTERNAL MEDICINE

## 2018-07-12 PROCEDURE — 3017F COLORECTAL CA SCREEN DOC REV: CPT | Performed by: INTERNAL MEDICINE

## 2018-07-12 PROCEDURE — 1036F TOBACCO NON-USER: CPT | Performed by: INTERNAL MEDICINE

## 2018-07-12 PROCEDURE — G8420 CALC BMI NORM PARAMETERS: HCPCS | Performed by: INTERNAL MEDICINE

## 2018-07-12 RX ORDER — SULFAMETHOXAZOLE AND TRIMETHOPRIM 800; 160 MG/1; MG/1
1 TABLET ORAL 2 TIMES DAILY
Qty: 14 TABLET | Refills: 0 | Status: SHIPPED | OUTPATIENT
Start: 2018-07-12 | End: 2018-07-19

## 2018-07-12 ASSESSMENT — ENCOUNTER SYMPTOMS
HEARTBURN: 0
ABDOMINAL PAIN: 0
SORE THROAT: 0
NAUSEA: 0
SHORTNESS OF BREATH: 0
BLURRED VISION: 0
CONSTIPATION: 0
COUGH: 0

## 2018-07-12 NOTE — PROGRESS NOTES
SURGICAL HISTORY      stenting of left internal carotid artery    VASCULAR SURGERY      VASCULAR SURGERY  5/12, 6/12    ken leg vacular surgery.  VULVA SURGERY  2001    cancer        Medications:       Prior to Admission medications    Medication Sig Start Date End Date Taking? Authorizing Provider   sulfamethoxazole-trimethoprim (BACTRIM DS;SEPTRA DS) 800-160 MG per tablet Take 1 tablet by mouth 2 times daily for 7 days 7/12/18 7/19/18 Yes Sarina Miner MD   isosorbide mononitrate (IMDUR) 30 MG extended release tablet TAKE 1 TABLET BY MOUTH EVERY DAY 7/3/18  Yes Asher Wood MD   clopidogrel (PLAVIX) 75 MG tablet TAKE 1 TABLET BY MOUTH EVERY DAY 6/25/18  Yes Asher Wood MD   amLODIPine (NORVASC) 5 MG tablet Take 1 tablet by mouth daily 5/31/18  Yes Asher Wood MD   losartan (COZAAR) 50 MG tablet TAKE 1 TABLET BY MOUTH TWICE DAILY 3/8/18  Yes Asher Wood MD   metoprolol tartrate (LOPRESSOR) 50 MG tablet TAKE 1 TABLET BY MOUTH TWICE DAILY 2/13/18  Yes Asher Wood MD   atorvastatin (LIPITOR) 80 MG tablet Take 1 tablet by mouth nightly 7/6/17  Yes Asher Wood MD   aspirin 81 MG chewable tablet Take 1 tablet by mouth daily 5/1/17  Yes Asher Wood MD   nitroGLYCERIN (NITROSTAT) 0.4 MG SL tablet up to max of 3 total doses. If no relief after 1 dose, call 911. 3/3/17  Yes Yeny Segura MD   naproxen (EC NAPROSYN) 500 MG EC tablet Take 1 tablet by mouth 2 times daily (with meals) for 7 days 3/15/18 3/22/18  Sarina Miner MD        Allergies:       Patient has no known allergies. Social History:     Tobacco:    reports that she has quit smoking. Her smoking use included Cigarettes. She has a 22.50 pack-year smoking history. She has never used smokeless tobacco.  Alcohol:      reports that she drinks about 3.6 oz of alcohol per week . Drug Use:  reports that she uses drugs, including Marijuana.     Family History:     Family History   Problem Relation Age of Onset    Cancer Father Completed Refills   Requested Prescriptions     Signed Prescriptions Disp Refills    sulfamethoxazole-trimethoprim (BACTRIM DS;SEPTRA DS) 800-160 MG per tablet 14 tablet 0     Sig: Take 1 tablet by mouth 2 times daily for 7 days

## 2019-02-19 RX ORDER — METOPROLOL TARTRATE 50 MG/1
TABLET, FILM COATED ORAL
Qty: 60 TABLET | Refills: 11 | Status: SHIPPED | OUTPATIENT
Start: 2019-02-19 | End: 2020-05-14

## 2019-03-21 ENCOUNTER — PROCEDURE VISIT (OUTPATIENT)
Dept: UROLOGY | Age: 59
End: 2019-03-21

## 2019-03-21 ENCOUNTER — HOSPITAL ENCOUNTER (OUTPATIENT)
Age: 59
Setting detail: SPECIMEN
Discharge: HOME | End: 2019-03-21

## 2019-03-21 VITALS — DIASTOLIC BLOOD PRESSURE: 68 MMHG | WEIGHT: 127 LBS | BODY MASS INDEX: 22.5 KG/M2 | SYSTOLIC BLOOD PRESSURE: 116 MMHG

## 2019-03-21 DIAGNOSIS — C67.2 MALIGNANT NEOPLASM OF LATERAL WALL OF URINARY BLADDER (HCC): Primary | ICD-10-CM

## 2019-03-21 DIAGNOSIS — C67.2 MALIGNANT NEOPLASM OF LATERAL WALL OF URINARY BLADDER (HCC): ICD-10-CM

## 2019-03-21 PROCEDURE — 88112 CYTOPATH CELL ENHANCE TECH: CPT

## 2019-03-21 PROCEDURE — 52000 CYSTOURETHROSCOPY: CPT | Performed by: UROLOGY

## 2019-03-22 LAB
CASE NUMBER:: NORMAL
SPECIMEN DESCRIPTION: NORMAL

## 2019-03-25 LAB — SURGICAL PATHOLOGY REPORT: NORMAL

## 2019-03-26 ENCOUNTER — TELEPHONE (OUTPATIENT)
Dept: UROLOGY | Age: 59
End: 2019-03-26

## 2019-04-16 RX ORDER — LOSARTAN POTASSIUM 50 MG/1
TABLET ORAL
Qty: 60 TABLET | Refills: 11 | Status: SHIPPED | OUTPATIENT
Start: 2019-04-16 | End: 2019-09-12 | Stop reason: DRUGHIGH

## 2019-06-04 RX ORDER — AMLODIPINE BESYLATE 5 MG/1
5 TABLET ORAL DAILY
Qty: 30 TABLET | Refills: 11 | Status: SHIPPED | OUTPATIENT
Start: 2019-06-04 | End: 2020-06-05

## 2019-07-10 RX ORDER — ISOSORBIDE MONONITRATE 30 MG/1
TABLET, EXTENDED RELEASE ORAL
Qty: 30 TABLET | Refills: 11 | Status: SHIPPED | OUTPATIENT
Start: 2019-07-10 | End: 2020-07-13

## 2019-07-10 RX ORDER — CLOPIDOGREL BISULFATE 75 MG/1
TABLET ORAL
Qty: 30 TABLET | Refills: 11 | Status: SHIPPED | OUTPATIENT
Start: 2019-07-10 | End: 2020-07-13

## 2019-09-12 ENCOUNTER — OFFICE VISIT (OUTPATIENT)
Dept: FAMILY MEDICINE CLINIC | Age: 59
End: 2019-09-12

## 2019-09-12 VITALS
BODY MASS INDEX: 22.5 KG/M2 | SYSTOLIC BLOOD PRESSURE: 150 MMHG | HEART RATE: 78 BPM | WEIGHT: 127 LBS | DIASTOLIC BLOOD PRESSURE: 98 MMHG | OXYGEN SATURATION: 97 % | HEIGHT: 63 IN

## 2019-09-12 DIAGNOSIS — I10 ESSENTIAL HYPERTENSION: ICD-10-CM

## 2019-09-12 DIAGNOSIS — E78.5 HYPERLIPIDEMIA, UNSPECIFIED HYPERLIPIDEMIA TYPE: ICD-10-CM

## 2019-09-12 DIAGNOSIS — L98.9 LESION OF SKIN OF SCALP: Primary | ICD-10-CM

## 2019-09-12 PROCEDURE — 99213 OFFICE O/P EST LOW 20 MIN: CPT | Performed by: INTERNAL MEDICINE

## 2019-09-12 RX ORDER — ATORVASTATIN CALCIUM 80 MG/1
80 TABLET, FILM COATED ORAL NIGHTLY
Qty: 30 TABLET | Refills: 11 | Status: SHIPPED | OUTPATIENT
Start: 2019-09-12 | End: 2021-06-03 | Stop reason: SDUPTHER

## 2019-09-12 RX ORDER — LOSARTAN POTASSIUM 100 MG/1
100 TABLET ORAL DAILY
Qty: 90 TABLET | Refills: 1 | Status: SHIPPED | OUTPATIENT
Start: 2019-09-12 | End: 2021-06-03

## 2019-09-12 ASSESSMENT — ENCOUNTER SYMPTOMS
SORE THROAT: 0
ROS SKIN COMMENTS: SCALP LESIONS
WHEEZING: 0
COUGH: 0
CHEST TIGHTNESS: 0
NAUSEA: 0
ABDOMINAL PAIN: 0
SHORTNESS OF BREATH: 0

## 2019-09-12 ASSESSMENT — PATIENT HEALTH QUESTIONNAIRE - PHQ9
SUM OF ALL RESPONSES TO PHQ QUESTIONS 1-9: 0
SUM OF ALL RESPONSES TO PHQ QUESTIONS 1-9: 0
2. FEELING DOWN, DEPRESSED OR HOPELESS: 0
SUM OF ALL RESPONSES TO PHQ9 QUESTIONS 1 & 2: 0
1. LITTLE INTEREST OR PLEASURE IN DOING THINGS: 0

## 2019-09-12 NOTE — PATIENT INSTRUCTIONS
SURVEY:    You may be receiving a survey from [employer] regarding your visit today. Please complete the survey to enable us to provide the highest quality of care to you and your family. If you cannot score us a very good on any question, please call the office to discuss how we could of made your experience a very good one. Thank you.

## 2019-09-12 NOTE — PROGRESS NOTES
12/01/2010   SpO2 97%   BMI 22.50 kg/m²       Physical Exam   Constitutional: She is oriented to person, place, and time. She appears well-developed and well-nourished. No distress. HENT:   Head: Normocephalic and atraumatic. Right Ear: External ear normal.   Left Ear: External ear normal.   Nose: Nose normal.   Mouth/Throat: Oropharynx is clear and moist.   Eyes: Conjunctivae are normal. Left eye exhibits no discharge. No scleral icterus. Neck: No thyromegaly present. Cardiovascular: Normal rate, regular rhythm and normal heart sounds. No murmur heard. Pulmonary/Chest: Effort normal and breath sounds normal. She has no wheezes. She has no rales. Abdominal: Soft. Bowel sounds are normal. She exhibits no distension and no mass. There is no tenderness. Musculoskeletal: Normal range of motion. She exhibits no edema or deformity. Lymphadenopathy:     She has no cervical adenopathy. Neurological: She is alert and oriented to person, place, and time. No cranial nerve deficit. Skin: Skin is warm and dry. No rash noted. Multiple scabs and red lesions on the top scalp, neck hairline area, behind right ear hairline area. Some are with small amount of dried blood  Non tender on palpation   Psychiatric: She has a normal mood and affect. Her behavior is normal. Judgment normal.   Vitals reviewed.             Data:     Lab Results   Component Value Date     04/27/2018    K 4.3 04/27/2018     04/27/2018    CO2 23 04/27/2018    BUN 15 04/27/2018    CREATININE 0.73 04/27/2018    GLUCOSE 102 04/27/2018    GLUCOSE 86 05/31/2012    PROT 7.4 04/27/2018    LABALBU 4.0 04/27/2018    LABALBU 4.1 02/20/2012    BILITOT 0.62 04/27/2018    ALKPHOS 66 04/27/2018    AST 21 04/27/2018    ALT 20 04/27/2018     Lab Results   Component Value Date    WBC 6.7 04/27/2018    RBC 4.05 04/27/2018    RBC 3.36 05/31/2012    HGB 13.4 04/27/2018    HCT 39.2 04/27/2018    MCV 96.7 04/27/2018    MCH 33.0 04/27/2018    MCH

## 2019-12-26 ENCOUNTER — APPOINTMENT (OUTPATIENT)
Dept: CT IMAGING | Age: 59
End: 2019-12-26

## 2019-12-26 ENCOUNTER — HOSPITAL ENCOUNTER (EMERGENCY)
Age: 59
Discharge: HOME | End: 2019-12-26

## 2019-12-26 VITALS
BODY MASS INDEX: 22.15 KG/M2 | HEART RATE: 64 BPM | DIASTOLIC BLOOD PRESSURE: 88 MMHG | OXYGEN SATURATION: 96 % | WEIGHT: 125 LBS | TEMPERATURE: 97.3 F | RESPIRATION RATE: 18 BRPM | HEIGHT: 63 IN | SYSTOLIC BLOOD PRESSURE: 117 MMHG

## 2019-12-26 DIAGNOSIS — K46.9 HERNIA OF ABDOMINAL CAVITY: Primary | ICD-10-CM

## 2019-12-26 LAB
ABSOLUTE EOS #: 0.19 K/UL (ref 0–0.44)
ABSOLUTE IMMATURE GRANULOCYTE: <0.03 K/UL (ref 0–0.3)
ABSOLUTE LYMPH #: 2.58 K/UL (ref 1.1–3.7)
ABSOLUTE MONO #: 0.62 K/UL (ref 0.1–1.2)
ANION GAP SERPL CALCULATED.3IONS-SCNC: 12 MMOL/L (ref 9–17)
BASOPHILS # BLD: 1 % (ref 0–2)
BASOPHILS ABSOLUTE: 0.04 K/UL (ref 0–0.2)
BUN BLDV-MCNC: 9 MG/DL (ref 6–20)
BUN/CREAT BLD: 12 (ref 9–20)
CALCIUM SERPL-MCNC: 9.8 MG/DL (ref 8.6–10.4)
CHLORIDE BLD-SCNC: 99 MMOL/L (ref 98–107)
CO2: 25 MMOL/L (ref 20–31)
CREAT SERPL-MCNC: 0.75 MG/DL (ref 0.5–0.9)
DIFFERENTIAL TYPE: ABNORMAL
EOSINOPHILS RELATIVE PERCENT: 3 % (ref 1–4)
GFR AFRICAN AMERICAN: >60 ML/MIN
GFR NON-AFRICAN AMERICAN: >60 ML/MIN
GFR SERPL CREATININE-BSD FRML MDRD: NORMAL ML/MIN/{1.73_M2}
GFR SERPL CREATININE-BSD FRML MDRD: NORMAL ML/MIN/{1.73_M2}
GLUCOSE BLD-MCNC: 99 MG/DL (ref 70–99)
HCT VFR BLD CALC: 38.2 % (ref 36.3–47.1)
HEMOGLOBIN: 12.8 G/DL (ref 11.9–15.1)
IMMATURE GRANULOCYTES: 0 %
LACTIC ACID, WHOLE BLOOD: NORMAL MMOL/L (ref 0.7–2.1)
LACTIC ACID: 0.9 MMOL/L (ref 0.5–2.2)
LYMPHOCYTES # BLD: 38 % (ref 24–43)
MCH RBC QN AUTO: 33.1 PG (ref 25.2–33.5)
MCHC RBC AUTO-ENTMCNC: 33.5 G/DL (ref 28.4–34.8)
MCV RBC AUTO: 98.7 FL (ref 82.6–102.9)
MONOCYTES # BLD: 9 % (ref 3–12)
NRBC AUTOMATED: 0 PER 100 WBC
PDW BLD-RTO: 12.5 % (ref 11.8–14.4)
PLATELET # BLD: 411 K/UL (ref 138–453)
PLATELET ESTIMATE: ABNORMAL
PMV BLD AUTO: 8.6 FL (ref 8.1–13.5)
POTASSIUM SERPL-SCNC: 3.7 MMOL/L (ref 3.7–5.3)
RBC # BLD: 3.87 M/UL (ref 3.95–5.11)
RBC # BLD: ABNORMAL 10*6/UL
SEG NEUTROPHILS: 49 % (ref 36–65)
SEGMENTED NEUTROPHILS ABSOLUTE COUNT: 3.44 K/UL (ref 1.5–8.1)
SODIUM BLD-SCNC: 136 MMOL/L (ref 135–144)
WBC # BLD: 6.9 K/UL (ref 3.5–11.3)
WBC # BLD: ABNORMAL 10*3/UL

## 2019-12-26 PROCEDURE — 6360000004 HC RX CONTRAST MEDICATION: Performed by: PHYSICIAN ASSISTANT

## 2019-12-26 PROCEDURE — 85025 COMPLETE CBC W/AUTO DIFF WBC: CPT

## 2019-12-26 PROCEDURE — 6370000000 HC RX 637 (ALT 250 FOR IP)

## 2019-12-26 PROCEDURE — 96376 TX/PRO/DX INJ SAME DRUG ADON: CPT

## 2019-12-26 PROCEDURE — 74177 CT ABD & PELVIS W/CONTRAST: CPT

## 2019-12-26 PROCEDURE — 83605 ASSAY OF LACTIC ACID: CPT

## 2019-12-26 PROCEDURE — 6360000002 HC RX W HCPCS: Performed by: PHYSICIAN ASSISTANT

## 2019-12-26 PROCEDURE — 99284 EMERGENCY DEPT VISIT MOD MDM: CPT

## 2019-12-26 PROCEDURE — 96374 THER/PROPH/DIAG INJ IV PUSH: CPT

## 2019-12-26 PROCEDURE — 80048 BASIC METABOLIC PNL TOTAL CA: CPT

## 2019-12-26 PROCEDURE — 6360000002 HC RX W HCPCS: Performed by: EMERGENCY MEDICINE

## 2019-12-26 RX ORDER — MORPHINE SULFATE 4 MG/ML
4 INJECTION, SOLUTION INTRAMUSCULAR; INTRAVENOUS ONCE
Status: COMPLETED | OUTPATIENT
Start: 2019-12-26 | End: 2019-12-26

## 2019-12-26 RX ORDER — HYDROCODONE BITARTRATE AND ACETAMINOPHEN 5; 325 MG/1; MG/1
1 TABLET ORAL EVERY 6 HOURS PRN
Qty: 10 TABLET | Refills: 0 | Status: SHIPPED | OUTPATIENT
Start: 2019-12-26 | End: 2019-12-29

## 2019-12-26 RX ORDER — ONDANSETRON 4 MG/1
4 TABLET, ORALLY DISINTEGRATING ORAL EVERY 8 HOURS PRN
Qty: 20 TABLET | Refills: 0 | Status: SHIPPED | OUTPATIENT
Start: 2019-12-26 | End: 2019-12-30

## 2019-12-26 RX ADMIN — IOPAMIDOL 75 ML: 755 INJECTION, SOLUTION INTRAVENOUS at 20:49

## 2019-12-26 RX ADMIN — MORPHINE SULFATE 4 MG: 4 INJECTION, SOLUTION INTRAMUSCULAR; INTRAVENOUS at 20:28

## 2019-12-26 RX ADMIN — MORPHINE SULFATE 4 MG: 4 INJECTION, SOLUTION INTRAMUSCULAR; INTRAVENOUS at 22:51

## 2019-12-26 ASSESSMENT — PAIN DESCRIPTION - LOCATION
LOCATION: ABDOMEN
LOCATION: ABDOMEN

## 2019-12-26 ASSESSMENT — PAIN DESCRIPTION - ORIENTATION
ORIENTATION: MID
ORIENTATION: MID

## 2019-12-26 ASSESSMENT — PAIN DESCRIPTION - DESCRIPTORS
DESCRIPTORS: PRESSURE
DESCRIPTORS: PRESSURE

## 2019-12-26 ASSESSMENT — PAIN SCALES - GENERAL
PAINLEVEL_OUTOF10: 7
PAINLEVEL_OUTOF10: 7
PAINLEVEL_OUTOF10: 8
PAINLEVEL_OUTOF10: 7

## 2019-12-26 ASSESSMENT — PAIN DESCRIPTION - PAIN TYPE: TYPE: ACUTE PAIN

## 2019-12-30 ENCOUNTER — OFFICE VISIT (OUTPATIENT)
Dept: SURGERY | Age: 59
End: 2019-12-30

## 2019-12-30 VITALS
RESPIRATION RATE: 16 BRPM | BODY MASS INDEX: 21.97 KG/M2 | SYSTOLIC BLOOD PRESSURE: 130 MMHG | DIASTOLIC BLOOD PRESSURE: 80 MMHG | HEART RATE: 60 BPM | WEIGHT: 124 LBS | HEIGHT: 63 IN

## 2019-12-30 DIAGNOSIS — K43.2 INCISIONAL HERNIA, WITHOUT OBSTRUCTION OR GANGRENE: Primary | ICD-10-CM

## 2019-12-30 DIAGNOSIS — Z01.818 PRE-OP TESTING: Primary | ICD-10-CM

## 2019-12-30 DIAGNOSIS — I73.9 PERIPHERAL VASCULAR DISEASE (HCC): ICD-10-CM

## 2019-12-30 DIAGNOSIS — K40.90 LEFT INGUINAL HERNIA: ICD-10-CM

## 2019-12-30 PROCEDURE — 99202 OFFICE O/P NEW SF 15 MIN: CPT | Performed by: SURGERY

## 2020-01-03 ENCOUNTER — TELEPHONE (OUTPATIENT)
Dept: CARDIOLOGY CLINIC | Age: 60
End: 2020-01-03

## 2020-04-14 ENCOUNTER — TELEPHONE (OUTPATIENT)
Dept: PRIMARY CARE CLINIC | Age: 60
End: 2020-04-14

## 2020-05-14 RX ORDER — METOPROLOL TARTRATE 50 MG/1
TABLET, FILM COATED ORAL
Qty: 60 TABLET | Refills: 11 | Status: SHIPPED | OUTPATIENT
Start: 2020-05-14 | End: 2021-06-03 | Stop reason: SDUPTHER

## 2020-05-14 RX ORDER — LOSARTAN POTASSIUM 50 MG/1
TABLET ORAL
Qty: 60 TABLET | Refills: 11 | Status: SHIPPED | OUTPATIENT
Start: 2020-05-14 | End: 2021-06-03 | Stop reason: SDUPTHER

## 2020-06-05 RX ORDER — AMLODIPINE BESYLATE 5 MG/1
TABLET ORAL
Qty: 30 TABLET | Refills: 11 | Status: SHIPPED | OUTPATIENT
Start: 2020-06-05 | End: 2021-10-25 | Stop reason: ALTCHOICE

## 2020-07-13 RX ORDER — ISOSORBIDE MONONITRATE 30 MG/1
TABLET, EXTENDED RELEASE ORAL
Qty: 30 TABLET | Refills: 11 | Status: SHIPPED | OUTPATIENT
Start: 2020-07-13 | End: 2021-07-21

## 2020-07-13 RX ORDER — CLOPIDOGREL BISULFATE 75 MG/1
TABLET ORAL
Qty: 30 TABLET | Refills: 11 | Status: SHIPPED | OUTPATIENT
Start: 2020-07-13 | End: 2021-06-03 | Stop reason: SDUPTHER

## 2021-06-02 NOTE — PATIENT INSTRUCTIONS
SURVEY:    You may be receiving a survey from [employer] regarding your visit today. Please complete the survey to enable us to provide the highest quality of care to you and your family. If you cannot score us a very good on any question, please call the office to discuss how we could of made your experience a very good one. Thank you. You may be receiving a survey from [employer] regarding your visit today. You may get this in the mail, through your MyChart or in your email. Please complete the survey to enable us to provide the highest quality of care to you and your family. If you cannot score us as very good ( 5 Stars) on any question, please feel free to call the office to discuss how we could have made your experience exceptional.     Thank You!       MD Rhonda Cortez RMA

## 2021-06-03 ENCOUNTER — OFFICE VISIT (OUTPATIENT)
Dept: FAMILY MEDICINE CLINIC | Age: 61
End: 2021-06-03
Payer: COMMERCIAL

## 2021-06-03 VITALS
BODY MASS INDEX: 23.5 KG/M2 | WEIGHT: 132.6 LBS | SYSTOLIC BLOOD PRESSURE: 134 MMHG | DIASTOLIC BLOOD PRESSURE: 88 MMHG | HEART RATE: 110 BPM | OXYGEN SATURATION: 96 % | TEMPERATURE: 97.7 F | HEIGHT: 63 IN

## 2021-06-03 DIAGNOSIS — K43.2 INCISIONAL HERNIA, WITHOUT OBSTRUCTION OR GANGRENE: ICD-10-CM

## 2021-06-03 DIAGNOSIS — Z12.31 ENCOUNTER FOR SCREENING MAMMOGRAM FOR BREAST CANCER: ICD-10-CM

## 2021-06-03 DIAGNOSIS — I73.9 PERIPHERAL VASCULAR DISEASE (HCC): ICD-10-CM

## 2021-06-03 DIAGNOSIS — E78.5 HYPERLIPIDEMIA, UNSPECIFIED HYPERLIPIDEMIA TYPE: ICD-10-CM

## 2021-06-03 DIAGNOSIS — I10 ESSENTIAL HYPERTENSION: Primary | ICD-10-CM

## 2021-06-03 PROCEDURE — 3017F COLORECTAL CA SCREEN DOC REV: CPT | Performed by: INTERNAL MEDICINE

## 2021-06-03 PROCEDURE — G8427 DOCREV CUR MEDS BY ELIG CLIN: HCPCS | Performed by: INTERNAL MEDICINE

## 2021-06-03 PROCEDURE — G8420 CALC BMI NORM PARAMETERS: HCPCS | Performed by: INTERNAL MEDICINE

## 2021-06-03 PROCEDURE — 99214 OFFICE O/P EST MOD 30 MIN: CPT | Performed by: INTERNAL MEDICINE

## 2021-06-03 PROCEDURE — 1036F TOBACCO NON-USER: CPT | Performed by: INTERNAL MEDICINE

## 2021-06-03 RX ORDER — LOSARTAN POTASSIUM 50 MG/1
TABLET ORAL
Qty: 60 TABLET | Refills: 11 | Status: SHIPPED | OUTPATIENT
Start: 2021-06-03 | End: 2022-06-13 | Stop reason: DRUGHIGH

## 2021-06-03 RX ORDER — ATORVASTATIN CALCIUM 80 MG/1
80 TABLET, FILM COATED ORAL NIGHTLY
Qty: 30 TABLET | Refills: 11 | Status: SHIPPED | OUTPATIENT
Start: 2021-06-03 | End: 2021-07-21

## 2021-06-03 RX ORDER — METOPROLOL TARTRATE 50 MG/1
TABLET, FILM COATED ORAL
Qty: 60 TABLET | Refills: 11 | Status: SHIPPED | OUTPATIENT
Start: 2021-06-03 | End: 2022-06-13 | Stop reason: SDUPTHER

## 2021-06-03 RX ORDER — CLOPIDOGREL BISULFATE 75 MG/1
75 TABLET ORAL DAILY
Qty: 30 TABLET | Refills: 11 | Status: SHIPPED | OUTPATIENT
Start: 2021-06-03 | End: 2021-10-25 | Stop reason: ALTCHOICE

## 2021-06-03 SDOH — ECONOMIC STABILITY: FOOD INSECURITY: WITHIN THE PAST 12 MONTHS, YOU WORRIED THAT YOUR FOOD WOULD RUN OUT BEFORE YOU GOT MONEY TO BUY MORE.: NEVER TRUE

## 2021-06-03 SDOH — ECONOMIC STABILITY: FOOD INSECURITY: WITHIN THE PAST 12 MONTHS, THE FOOD YOU BOUGHT JUST DIDN'T LAST AND YOU DIDN'T HAVE MONEY TO GET MORE.: NEVER TRUE

## 2021-06-03 ASSESSMENT — PATIENT HEALTH QUESTIONNAIRE - PHQ9
2. FEELING DOWN, DEPRESSED OR HOPELESS: 0
1. LITTLE INTEREST OR PLEASURE IN DOING THINGS: 0
SUM OF ALL RESPONSES TO PHQ QUESTIONS 1-9: 0
SUM OF ALL RESPONSES TO PHQ9 QUESTIONS 1 & 2: 0

## 2021-06-03 ASSESSMENT — SOCIAL DETERMINANTS OF HEALTH (SDOH): HOW HARD IS IT FOR YOU TO PAY FOR THE VERY BASICS LIKE FOOD, HOUSING, MEDICAL CARE, AND HEATING?: NOT VERY HARD

## 2021-06-03 ASSESSMENT — ENCOUNTER SYMPTOMS
CONSTIPATION: 0
NAUSEA: 0
COUGH: 0
ABDOMINAL PAIN: 1
SHORTNESS OF BREATH: 0
CHEST TIGHTNESS: 0
SORE THROAT: 0
VOMITING: 0

## 2021-06-03 NOTE — PROGRESS NOTES
HPI Notes    Name: Sam Edwards  : 1960         Chief Complaint:     Chief Complaint   Patient presents with    Hernia     Patient has a hernia in her abdomen. She saw Dr. Rojelio Walden 19 for this issue but lose her insurance and was unable to get it taken care of. Patient now has insurance but the hernia has gotten bigger and is causing more issues.  Medication Refill    Hernia             History of Present Illness:        Bryce Whitehead presents to office to follow up for HTN, Hyperlipidemia and a large incisional hernia    States hernia is getting bigger. Unable to wear a bra, clothes, unable to bend . Sometimes has pain. Reports no severe abdominal pain, no nausea, vomiting. Her appetite is still good, has no trouble swallowing. Reports no problems with bowel movements. In the past was referred to general surgeon Bentley Cooley and apparently was recommended to have surgical repair. Unfortunately lost her health insurance coverage and did not follow-up. Has a h/o  Takayasu arteritis, right carotid artery occlusion, s/p brachiocephalic aorta bypass [address] . She had a heart catheterization on 2010 by Dr. Amberly Vital showing 95% disease in the RCA, status post 2 stents. She did multiple interventions for PAD including angioplasty of the left common carotid artery by Dr. Wayne Renae, right iliac artery angioplasty by Dr. Jose Quijano, s/p left femoral artery stent. Has a h/o bladder cancer, s/p TURBT 2016 High Grade Ta  TURBT re-resection 2016 by urologist Kathy Wilburn. She  has not seen him for almost 2 years. States lost her medical insurance in sometime end of 2019 and stopped seeing doctors  Has been taking BP meds intermittently. Not taking Plavix, Lipitor, aspirin  Admits to smoking marijuana but no cigarettes. Hypertension  This is a chronic problem. The current episode started more than 1 year ago. The problem is controlled.  Pertinent negatives include no chest pain, headaches, palpitations or shortness of breath. There are no associated agents to hypertension. Risk factors for coronary artery disease include dyslipidemia, smoking/tobacco exposure and post-menopausal state. Past treatments include beta blockers and angiotensin blockers. The current treatment provides significant improvement. Hypertensive end-organ damage includes CAD/MI, CVA and PVD. Hyperlipidemia  This is a chronic problem. The current episode started more than 1 year ago. The problem is controlled. Factors aggravating her hyperlipidemia include smoking. Pertinent negatives include no chest pain or shortness of breath. She is currently on no antihyperlipidemic treatment.            Past Medical History:     Past Medical History:   Diagnosis Date    Alcohol abuse 3/2/2017    Arthritis     Bladder [facility] 2016    CAD (coronary artery disease)     [facility]    vulvar-    Carotid artery stenosis     Chronic back pain     Hyperlipidemia     Hypertension     Hypoglycemia     [facility] 03/01/2017    Peripheral vascular disease ([facility]     Takayasu's arteritis (HCC)     Tibial fracture     right    Umbilical hernia     Unspecified cerebral artery occlusion with cerebral infarction 1993      Reviewed all health maintenance requirements and orderedappropriate tests  Health Maintenance Due   Topic Date Due    Pneumococcal 0-64 years Vaccine (1 of 2 - PPSV23) Never done    COVID-19 Vaccine (1) Never done    DTaP/Tdap/Td vaccine (1 - Tdap) Never done    Shingles Vaccine (1 of 2) Never done    Lipid screen  04/27/2019    Breast cancer screen  02/27/2020    Potassium monitoring  12/26/2020    Creatinine monitoring  12/26/2020    Cervical cancer screen  02/06/2021       Past Surgical History:     Past Surgical History:   Procedure Laterality Date    BLADDER SURGERY  5/17/2016    cysto with transurethral resection of bladder with mitomycin    CARDIAC CATHETERIZATION  12/2010   [place] CARDIAC SURGERY  1993    aortic bypass graft for right carotid artery obstruction    FEMORAL BYPASS  5/12, 6/12    per Dr Dimitris Terrazas  4-2016    TURBT    OTHER SURGICAL HISTORY      brachiocephalic-bypass    OTHER SURGICAL HISTORY      stenting of left internal carotid artery    VASCULAR SURGERY      VASCULAR SURGERY  5/12, 6/12    ken leg vacular surgery.  VULVA SURGERY  2001    cancer        Medications:       Prior to Admission medications    Medication Sig Start Date End Date Taking? Authorizing Provider   metoprolol tartrate (LOPRESSOR) 50 MG tablet TAKE 1 TABLET BY MOUTH TWICE DAILY 6/3/21  Yes Heidi Ramires MD   losartan (COZAAR) 50 MG tablet TAKE 1 TABLET BY MOUTH TWICE DAILY 6/3/21  Yes Heidi Ramires MD   clopidogrel (PLAVIX) 75 MG tablet Take 1 tablet by mouth daily 6/3/21  Yes Heidi Ramires MD   atorvastatin (LIPITOR) 80 MG tablet Take 1 tablet by mouth nightly 6/3/21  Yes Heidi Ramires MD   isosorbide mononitrate (IMDUR) 30 MG extended release tablet TAKE 1 [place]  Patient not taking: Reported on 6/3/2021 7/13/20   Pepe Ruiz MD   amLODIPine (NORVASC) 5 MG tablet TAKE 1 TABLET BY MOUTH EVERY DAY  Patient not taking: Reported on 6/3/2021 6/5/20   Pepe Ruiz MD   hydrocortisone (WESTCORT) 0.2 % cream Apply topically 2 times daily. Patient not taking: Reported on 6/3/2021 9/12/19   Heidi Ramires MD   aspirin 81 MG chewable tablet Take 1 tablet by mouth daily  Patient not taking: Reported on 6/3/2021 5/1/17   Pepe Ruiz MD   nitroGLYCERIN (NITROSTAT) 0.4 MG SL tablet up to max of 3 total doses. If no relief after 1 dose, call 911. Patient not taking: Reported on 6/3/2021 3/3/17   Geoffrey James MD        Allergies:       Patient has no known allergies. Social History:     Tobacco: reports that she quit smoking about 28 years ago. Her smoking use included cigarettes.  She has a 22.50 pack-year smoking history. She has never used smokeless tobacco.  Alcohol:      reports current alcohol use of about 6.0 standard drinks of alcohol per week. Drug Use:  reports current drug use. Drug: Marijuana. Family History:     Family History   Problem Relation Age of Onset    Cancer Father         lung    Cancer Mother        Review of Systems:         Review of Systems   Constitutional: Negative for activity change, appetite change, chills, fatigue and fever. HENT: Negative for congestion and sore throat. Respiratory: Negative for cough, chest tightness and shortness of breath. Cardiovascular: Negative for chest pain and palpitations. Gastrointestinal: Positive for abdominal pain. Negative for constipation, nausea and vomiting. Large abdominal hernia   Genitourinary: Negative for dysuria. Skin: Negative for rash. Neurological: Negative for dizziness and headaches. Hematological: Negative for adenopathy. Psychiatric/Behavioral: Negative for dysphoric mood. The patient is not nervous/anxious. Physical Exam:     Vitals:  /88 (Site: Left Upper Arm, Position: Sitting, Cuff Size: Medium Adult)   Pulse 110   Temp 97.7 °F (36.5 °C)   Ht 5' 3\" (1.6 m)   Wt 132 lb 9.6 oz (60.1 kg)   LMP 08/17/2011   SpO2 96%   BMI 23.49 kg/m²       Physical Exam  Vitals reviewed. Constitutional:       General: She is not in acute distress. Appearance: Normal appearance. She is well-developed. HENT:      Head: Normocephalic and atraumatic. Right Ear: Tympanic membrane, ear canal and external ear normal.      Left Ear: Tympanic membrane, ear canal and external ear normal.      Nose: Nose normal.      Mouth/Throat:      Mouth: Mucous membranes are moist.   Eyes:      General: No scleral icterus. Right eye: No discharge. Left eye: No discharge. Conjunctiva/sclera: Conjunctivae normal.   Neck:      Thyroid: No thyromegaly.    Cardiovascular:      Rate and Rhythm: Regular rhythm. Tachycardia present. Heart sounds: Normal heart sounds. No murmur heard. Pulmonary:      Effort: Pulmonary effort is normal.      Breath sounds: Normal breath sounds. No wheezing or rales. Abdominal:      General: Bowel sounds are normal. There is no distension. Palpations: Abdomen is soft. There is no mass. Tenderness: There is no abdominal tenderness. Hernia: A hernia (large ventral abdominal wall hernia) is present. Musculoskeletal:         General: Normal range of motion. Right lower leg: No edema. Left lower leg: No edema. Lymphadenopathy:      Cervical: No cervical adenopathy. Skin:     General: Skin is warm and dry. Coloration: Skin is not jaundiced or pale. Findings: No rash. Neurological:      General: No focal deficit present. Mental Status: She is alert and oriented to person, place, and time. Mental status is at baseline. Psychiatric:         Mood and Affect: Mood normal.         Behavior: Behavior normal.         Thought Content:  Thought content normal.         Judgment: Judgment normal.               Data:     Lab Results   Component Value Date     12/26/2019    K 3.7 12/26/2019    CL 99 12/26/2019    CO2 25 12/26/2019    BUN 9 12/26/2019    CREATININE 0.75 12/26/2019    GLUCOSE 99 12/26/2019    GLUCOSE 86 05/31/2012    PROT 7.4 04/27/2018    LABALBU 4.0 04/27/2018    LABALBU 4.1 02/20/2012    BILITOT 0.62 04/27/2018    ALKPHOS 66 04/27/2018    AST 21 04/27/2018    ALT 20 04/27/2018     Lab Results   Component Value Date    WBC 6.9 12/26/2019    RBC 3.87 12/26/2019    RBC 3.36 05/31/2012    HGB 12.8 12/26/2019    HCT 38.2 12/26/2019    MCV 98.7 12/26/2019    MCH 33.1 12/26/2019    MCHC 33.5 12/26/2019    RDW 12.5 12/26/2019     12/26/2019     05/31/2012    MPV 8.6 12/26/2019     Lab Results   Component Value Date    TSH 3.36 04/27/2018     Lab Results   Component Value Date    CHOL 180 04/27/2018    HDL 65 04/27/2018    LABA1C 5.2 03/15/2018          Assessment & Plan        Diagnosis Orders   1. Essential hypertension   Blood pressure reasonably controlled, will refill losartan and Lopressor. Check BMP metoprolol tartrate (LOPRESSOR) 50 MG tablet    losartan (COZAAR) 50 MG tablet    Basic Metabolic Panel    TSH with Reflex   2. Incisional hernia, without obstruction or gangrene   Will refer to Elise Fischer for reevaluation Basic Metabolic Panel    CBC Auto Differential    Lin Leavitt MD, General Surgery, [facility]   3. Hyperlipidemia, unspecified hyperlipidemia type   We will resume atorvastatin, check LFTs, hepatic function panel Lipid Panel    Hepatic Function Panel    atorvastatin (LIPITOR) 80 MG tablet   4. Peripheral vascular disease (HCC)   Resume Plavix, statins. clopidogrel (PLAVIX) 75 MG tablet    atorvastatin (LIPITOR) 80 MG tablet   5. Encounter for screening mammogram for breast cancer   Screening mammogram ordered DAGO DIGITAL SCREEN W OR WO CAD BILATERAL                   Completed Refills   Requested Prescriptions     Signed Prescriptions Disp Refills    metoprolol tartrate (LOPRESSOR) 50 MG tablet 60 tablet 11     Sig: TAKE 1 TABLET BY MOUTH TWICE DAILY    losartan (COZAAR) 50 MG tablet 60 tablet 11     Sig: TAKE 1 TABLET BY MOUTH TWICE DAILY    clopidogrel (PLAVIX) 75 MG tablet 30 tablet 11     Sig: Take 1 tablet by mouth daily    atorvastatin (LIPITOR) 80 MG tablet 30 tablet 11     Sig: Take 1 tablet by mouth nightly     Return in about 6 months (around 12/3/2021) for HTN, hyperlipidemia.      Orders Placed This Encounter   Medications    metoprolol tartrate (LOPRESSOR) 50 MG tablet     Sig: TAKE 1 TABLET BY MOUTH TWICE DAILY     Dispense:  60 tablet     Refill:  11    losartan (COZAAR) 50 MG tablet     Sig: TAKE 1 TABLET BY MOUTH TWICE DAILY     Dispense:  60 tablet     Refill:  11    clopidogrel (PLAVIX) 75 MG tablet     Sig: Take 1 tablet by mouth daily     Dispense:  30 tablet family. If you cannot score us as very good ( 5 Stars) on any question, please feel free to call the office to discuss how we could have made your experience exceptional.     Thank You!       Tony Connolly MD  [employer], RMA          Electronically signed by Tony Connolly MD on 6/3/2021 at 3:35 PM           Completed Refills      Requested Prescriptions     Signed Prescriptions Disp Refills    metoprolol tartrate (LOPRESSOR) 50 MG tablet 60 tablet 11     Sig: TAKE 1 TABLET BY MOUTH TWICE DAILY    losartan (COZAAR) 50 MG tablet 60 tablet 11     Sig: TAKE 1 TABLET BY MOUTH TWICE DAILY    clopidogrel (PLAVIX) 75 MG tablet 30 tablet 11     Sig: Take 1 tablet by mouth daily    atorvastatin (LIPITOR) 80 MG tablet 30 tablet 11     Sig: Take 1 tablet by mouth nightly

## 2021-06-07 ENCOUNTER — OFFICE VISIT (OUTPATIENT)
Dept: SURGERY | Age: 61
End: 2021-06-07
Payer: COMMERCIAL

## 2021-06-07 VITALS
HEIGHT: 63 IN | DIASTOLIC BLOOD PRESSURE: 86 MMHG | OXYGEN SATURATION: 97 % | TEMPERATURE: 97.3 F | WEIGHT: 131.2 LBS | SYSTOLIC BLOOD PRESSURE: 138 MMHG | HEART RATE: 66 BPM | BODY MASS INDEX: 23.25 KG/M2

## 2021-06-07 DIAGNOSIS — K21.9 GASTROESOPHAGEAL REFLUX DISEASE, UNSPECIFIED WHETHER ESOPHAGITIS PRESENT: ICD-10-CM

## 2021-06-07 DIAGNOSIS — Z01.818 PREOPERATIVE CLEARANCE: ICD-10-CM

## 2021-06-07 DIAGNOSIS — K62.5 RECTAL BLEEDING: Primary | ICD-10-CM

## 2021-06-07 DIAGNOSIS — I73.9 PERIPHERAL VASCULAR DISEASE (HCC): ICD-10-CM

## 2021-06-07 DIAGNOSIS — K43.2 VENTRAL INCISIONAL HERNIA: ICD-10-CM

## 2021-06-07 DIAGNOSIS — Z87.891 HISTORY OF TOBACCO ABUSE: ICD-10-CM

## 2021-06-07 DIAGNOSIS — R13.10 DYSPHAGIA, UNSPECIFIED TYPE: ICD-10-CM

## 2021-06-07 DIAGNOSIS — F19.11 HISTORY OF SUBSTANCE ABUSE (HCC): ICD-10-CM

## 2021-06-07 DIAGNOSIS — K43.2 INCISIONAL HERNIA, WITHOUT OBSTRUCTION OR GANGRENE: Primary | ICD-10-CM

## 2021-06-07 DIAGNOSIS — I25.2 HISTORY OF MI (MYOCARDIAL INFARCTION): ICD-10-CM

## 2021-06-07 DIAGNOSIS — R06.02 SHORTNESS OF BREATH ON EXERTION: ICD-10-CM

## 2021-06-07 DIAGNOSIS — Z85.51 HISTORY OF BLADDER CANCER: ICD-10-CM

## 2021-06-07 PROCEDURE — 1036F TOBACCO NON-USER: CPT | Performed by: SURGERY

## 2021-06-07 PROCEDURE — G8420 CALC BMI NORM PARAMETERS: HCPCS | Performed by: SURGERY

## 2021-06-07 PROCEDURE — G8427 DOCREV CUR MEDS BY ELIG CLIN: HCPCS | Performed by: SURGERY

## 2021-06-07 PROCEDURE — 99213 OFFICE O/P EST LOW 20 MIN: CPT | Performed by: SURGERY

## 2021-06-07 PROCEDURE — 3017F COLORECTAL CA SCREEN DOC REV: CPT | Performed by: SURGERY

## 2021-06-07 NOTE — PATIENT INSTRUCTIONS
see your stomach clearly during the test. It also reduces your chances of vomiting. If you vomit, there is a small risk that the vomit could enter your lungs. (This is called aspiration.) If the test is done in an emergency, a tube may be inserted through your nose or mouth to empty your stomach.     · Do not take sucralfate (Carafate) or antacids on the day of the test. These medicines can make it hard for your doctor to see your upper GI tract.     · If your doctor tells you to, stop taking iron supplements 7 to 14 days before the test.     · Be sure you have someone to take you home. Anesthesia and pain medicine will make it unsafe for you to drive or get home on your own.     · Understand exactly what procedure is planned, along with the risks, benefits, and other options. · Tell your doctor ALL the medicines, vitamins, supplements, and herbal remedies you take. Some may increase the risk of problems during your procedure. Your doctor will tell you if you should stop taking any of them before the procedure and how soon to do it.     · If you take aspirin or some other blood thinner, ask your doctor if you should stop taking it before your procedure. Make sure that you understand exactly what your doctor wants you to do. These medicines increase the risk of bleeding.     · Make sure your doctor and the hospital have a copy of your advance directive. If you don't have one, you may want to prepare one. It lets others know your health care wishes. It's a good thing to have before any type of surgery or procedure. What happens on the day of the procedure? · Follow the instructions exactly about when to stop eating and drinking. If you don't, your procedure may be canceled. If your doctor told you to take your medicines on the day of the procedure, take them with only a sip of water.     · Take a bath or shower before you come in for your procedure. Do not apply lotions, perfumes, deodorants, or nail polish.   · Take off all jewelry and piercings. And take out contact lenses, if you wear them. At the hospital or surgery center   · Bring a picture ID.     · The test may take 15 to 30 minutes.     · The doctor may spray medicine on the back of your throat to numb it. You also will get medicine to prevent pain and to relax you.     · You will lie on your left side. The doctor will put the scope in your mouth and toward the back of your throat. The doctor will tell you when to swallow. This helps the scope move down your throat. You will be able to breathe normally. The doctor will move the scope down your esophagus into your stomach. The doctor also may look at the duodenum.     · If your doctor wants to take a sample of tissue for a biopsy, he or she may use small surgical tools, which are put into the scope, to cut off some tissue. You will not feel a biopsy, if one is taken. The doctor also can use the tools to stop bleeding or to do other treatments, if needed.     · You will stay at the hospital or surgery center for 1 to 2 hours until the medicine you were given wears off. What happens after an upper GI endoscopy? · After the test, you may belch and feel bloated for a while.     · You may have a tickling, dry throat or mouth. You may feel a bit hoarse, and you may have a mild sore throat. These symptoms may last several days. Throat lozenges and warm saltwater gargles can help relieve the throat symptoms.     · Don't drive or operate machinery for 12 hours after the test.     · Your doctor will tell you when you can go back to your usual diet and activities.     · Don't drink alcohol for 12 to 24 hours after the test.   When should you call your doctor? · You have questions or concerns.     · You don't understand how to prepare for your procedure.     · You become ill before the procedure (such as fever, flu, or a cold).     · You need to reschedule or have changed your mind about having the procedure. Where can you learn more? Go to https://chpepiceweb.[email or web address]. org and sign in to your [employer] account. Enter P790 in the "[employer]hire box to learn more about \"Upper GI Endoscopy: Before Your Procedure. \"     If you do not have an account, please click on the \"Sign Up Now\" link. Current as of: February 10, 2021               Content Version: 12.9  © 2006-2021 [employer]. Care instructions adapted under license by [facility] ([facility]). If you have questions about a medical condition or this instruction, always ask your healthcare professional. [place] [address] any warranty or liability for your use of this information. Patient Education        Learning About Colonoscopy  What is a colonoscopy? A colonoscopy is a test (also called a procedure) that lets a doctor look inside your large intestine. The doctor uses a thin, lighted tube called a colonoscope. The doctor uses it to look for small growths called polyps, colon or rectal cancer (colorectal cancer), or other problems like bleeding. During the procedure, the doctor can take samples of tissue. The samples can then be checked for cancer or other conditions. The doctor can also take out polyps. How is a colonoscopy done? This procedure is done in a doctor's office or a clinic or hospital. You will get medicine to help you relax and not feel pain. Some people find that they don't remember having the test because of the medicine. The doctor gently moves the colonoscope, or scope, through the colon. The scope is also a small video camera. It lets the doctor see the colon and take pictures. How do you prepare for the procedure? You need to clean out your colon before the procedure so the doctor can see your colon. This depends on which \"colon prep\" your doctor recommends. To clean out your colon, you'll do a \"colon prep\" before the test. This means you stop eating solid foods and drink only clear liquids. You can have water, tea, coffee, clear juices, clear broths, flavored ice pops, and gelatin (such as Jell-O). Do not drink anything red or purple. The day or night before the procedure, you drink a large amount of a special liquid. This causes loose, frequent stools. You will go to the bathroom a lot. Your doctor may have you drink part of the liquid the evening before and the rest on the day of the test. It's very important to drink all of the liquid. If you have problems drinking it, call your doctor. Arrange to have someone take you home after the test.  What can you expect after a colonoscopy? Your doctor will tell you when you can eat and do your usual activities. Drink a lot of fluid after the test to replace the fluids you may have lost during the colon prep. But don't drink alcohol. Your doctor will talk to you about when you'll need your next colonoscopy. The results of your test and your risk for colorectal cancer will help your doctor decide how often you need to be checked. After the test, you may be bloated or have gas pains. You may need to pass gas. If a biopsy was done or a polyp was removed, you may have streaks of blood in your stool (feces) for a few days. Check with your doctor to see when it is safe to take aspirin and nonsteroidal anti-inflammatory drugs (NSAIDs) again. Problems such as heavy rectal bleeding may not occur until several weeks after the test. This isn't common. But it can happen after polyps are removed. Follow-up care is a key part of your treatment and safety. Be sure to make and go to all appointments, and call your doctor if you are having problems. It's also a good idea to know your test results and keep a list of the medicines you take. Where can you learn more? Go to https://ca.[employer]. org and sign in to your [employer] account. Enter C213 in the [employer] box to learn more about \"Learning About Colonoscopy. \"     If you do not have

## 2021-06-07 NOTE — PROGRESS NOTES
Tiara Diez MD  General Surgery, Endoscopy  Chief Medical Officer    [address] [address]  Dept: [phone]  Fax: [address]  Chief Complaint   Patient presents with    Surgical Consult     Incisional hernia, inital visit on 12/30/19, no history of covid, patient has not recevied covid vaccine       HPI    Ms Ysabel Reid returns for re-evaluation of incisional hernia. She is a 61 yo WF with an extensive medical and surgical history including significant vascular disease, substance abuse, HTN, bladder cancer, etc initially to me by Dr Benjamin Richards for evaluation of an ventral incisional hernia December 2019. This has been present for years, but recently increased in size and become uncomfortable. CT Dec 26, 2019 shows a less than 1 cm defect above the umbilicus, but 6 cm of herniated fat. Current CT June 2021 shows that the fascial defect has increased to 1.6 x 1.3 cm, with 8.3 cm sac  containing omental fat. She also has a left inguinal hernia. Pain is worse with abdominal straining. Neither previously repaired. Extensive vascular surgery years ago. She also has noticed blood per rectum, mostly on toilet tissue. Long history of epigastric pain with reflux symptoms and occasional dysphagia. No recent weight changes, 131 pounds, BMI 23. No family history of colon cancer nor polyps to her knowledge. She lost [employer] coverage when granddaughter's income was included in household income in 2019. Her granddaughter has since moved out, and she is in the process of re establishing insurance coverage. She no longer smokes cigarettes. History marijuana and alcohol abuse. Review of Systems   Constitutional: Negative for activity change, appetite change, chills, fever and unexpected weight change. HENT: Negative for nosebleeds, sneezing, sore throat and trouble swallowing. Eyes: Negative for visual disturbance. Respiratory: Negative for cough, choking and shortness of breath. Cardiovascular: Negative for chest pain, palpitations and leg swelling. Gastrointestinal: Positive for abdominal distention, abdominal pain and blood in stool. Negative for nausea and vomiting. Genitourinary: Negative for dysuria, flank pain and hematuria. Musculoskeletal: Positive for arthralgias and back pain. Negative for gait problem and myalgias. Allergic/Immunologic: Negative for immunocompromised state. Neurological: Negative for dizziness, seizures, syncope, weakness and headaches. Hematological: Does not bruise/bleed easily. Psychiatric/Behavioral: Negative for confusion and sleep disturbance. Past Medical History:   Diagnosis Date    Alcohol abuse 3/2/2017    Arthritis     Bladder cancer [facility]) 2016    CAD (coronary artery disease)     [facility] 2001    vulvar-    Carotid artery stenosis     Chronic back pain     Hyperlipidemia     Hypertension     Hypoglycemia     MI (myocardial infarction) ([facility] 03/01/2017    Peripheral vascular disease ([facility]     Takayasu's arteritis ([facility]     Tibial fracture     right    Umbilical hernia     Unspecified cerebral artery occlusion with cerebral infarction 1993       Past Surgical History:   Procedure Laterality Date    BLADDER SURGERY  5/17/2016    cysto with transurethral resection of bladder with mitomycin    CARDIAC CATHETERIZATION  12/2010   [facility]IAC SURGERY  1993    aortic bypass graft for right carotid artery obstruction    FEMORAL BYPASS  5/12, 6/12    per Dr Sy Thacker  4-2016    TURBT    OTHER SURGICAL HISTORY      brachiocephalic-bypass    OTHER SURGICAL HISTORY      stenting of left internal carotid artery    VASCULAR SURGERY      VASCULAR SURGERY  5/12, 6/12    ken leg vacular surgery.     VULVA SURGERY  2001    cancer       Family History   Problem Relation Age of Onset    Cancer Father         lung    Cancer Mother Allergies:  See list    Current Outpatient Medications   Medication Sig Dispense Refill    metoprolol tartrate (LOPRESSOR) 50 MG tablet TAKE 1 TABLET BY MOUTH TWICE DAILY 60 tablet 11    losartan (COZAAR) 50 MG tablet TAKE 1 TABLET BY MOUTH TWICE DAILY (Patient not taking: Reported on 2021) 60 tablet 11    clopidogrel (PLAVIX) 75 MG tablet Take 1 tablet by mouth daily (Patient not taking: Reported on 2021) 30 tablet 11    atorvastatin (LIPITOR) 80 MG tablet Take 1 tablet by mouth nightly (Patient not taking: Reported on 2021) 30 tablet 11    isosorbide mononitrate (IMDUR) 30 MG extended release tablet TAKE 1 TABLET BY MOUTH EVERY DAY (Patient not taking: Reported on 6/3/2021) 30 tablet 11    amLODIPine (NORVASC) 5 MG tablet TAKE 1 TABLET BY MOUTH EVERY DAY (Patient not taking: Reported on 6/3/2021) 30 tablet 11    hydrocortisone (WESTCORT) 0.2 % cream Apply topically 2 times daily. (Patient not taking: Reported on 6/3/2021) 15 g 1    aspirin 81 MG chewable tablet Take 1 tablet by mouth daily (Patient not taking: Reported on 6/3/2021) 30 tablet 11    nitroGLYCERIN (NITROSTAT) 0.4 MG SL tablet up to max of 3 total doses. If no relief after 1 dose, call 911. (Patient not taking: Reported on 6/3/2021) 25 tablet 3     No current facility-administered medications for this visit. Social History     Socioeconomic History    Marital status:      Spouse name: Not on file    Number of children: Not on file    Years of education: Not on file    Highest education level: Not on file   Occupational History    Not on file   Tobacco Use    Smoking status: Former Smoker     Packs/day: 1.50     Years: 15.00     Pack years: 22.50     Types: Cigarettes     Quit date: 1993     Years since quittin.4    Smokeless tobacco: Never Used    Tobacco comment: QUIT    Vaping Use    Vaping Use: Never used   Substance and Sexual Activity    Alcohol use:  Yes     Alcohol/week: 6.0 standard drinks     Types: 6 Cans of beer per week     Comment: occasional    Drug use: Yes     Types: Marijuana    Sexual activity: Yes     Partners: Male   Other Topics Concern    Not on file   Social History Narrative    Not on file     Social Determinants of Health     Financial Resource Strain: Low Risk     Difficulty of Paying Living Expenses: Not very hard   Food Insecurity: No Food Insecurity    Worried About Running Out of Food in the Last Year: Never true    Jameel of Food in the Last Year: Never true   Transportation Needs:     Lack of Transportation (Medical):  Lack of Transportation (Non-Medical):    Physical Activity:     Days of Exercise per Week:     Minutes of Exercise per Session:    Stress:     Feeling of Stress :    Social Connections:     Frequency of Communication with Friends and Family:     Frequency of Social Gatherings with Friends and Family:     Attends [employer] Services:     Active Member of Clubs or Organizations:     Attends Club or Organization Meetings:     Marital Status:    Intimate Partner Violence:     Fear of Current or Ex-Partner:     Emotionally Abused:     Physically Abused:     Sexually Abused:        /86 (Site: Right Upper Arm, Position: Sitting, Cuff Size: Medium Adult)   Pulse 66   Temp 97.3 °F (36.3 °C) (Temporal)   Ht 5' 3\" (1.6 m)   Wt 131 lb 3.2 oz (59.5 kg)   LMP 08/17/2011   SpO2 97%   BMI 23.24 kg/m²      Physical Exam  Vitals and nursing note reviewed. Constitutional:       Appearance: She is well-developed. HENT:      Head: Normocephalic and atraumatic. Eyes:      General: No scleral icterus. Conjunctiva/sclera: Conjunctivae normal.      Pupils: Pupils are equal, round, and reactive to light. Neck:      Vascular: No JVD. Trachea: No tracheal deviation. Cardiovascular:      Rate and Rhythm: Normal rate and regular rhythm. Pulmonary:      Effort: Pulmonary effort is normal. No respiratory distress.    Chest: Chest wall: No tenderness. Abdominal:      General: There is no distension. Palpations: Abdomen is soft. There is no mass. Tenderness: There is abdominal tenderness. There is no guarding or rebound. Hernia: A hernia is present. Musculoskeletal:         General: Normal range of motion. Cervical back: Normal range of motion and neck supple. Lymphadenopathy:      Cervical: No cervical adenopathy. Skin:     General: Skin is warm and dry. Findings: No erythema or rash. Neurological:      Mental Status: She is alert and oriented to person, place, and time. Cranial Nerves: No cranial nerve deficit. Psychiatric:         Behavior: Behavior normal.         Thought Content:  Thought content normal.         Judgment: Judgment normal.         IMAGING/LABS    CT ABDOMEN PELVIS W IV CONTRAST Additional Contrast? Oral  Status: Final result   Order Providers    Authorizing Encounter Billing   Alejandra Tobias MD [facility] CAT SCAN ROOM Rachael Roman MD          Signed by    Signed Date/Time Phone Pager   Sienna Du 6/16/2021 11:48 [phone]    Reading Providers    Read Date Phone Pager   Daniela Grossman 16, 2021 11:48 [phone]    All Reviewers List    Alejandra Tobias MD on 6/16/2021 17:37   Routing History    Priority Sent On From To Message Type    6/16/2021 11:50 AM Amber Ybarra Incoming Radiant Results From Madelaine Hoyos MD Results   Radiation Dose Estimates    No radiation information found for this patient   Narrative   EXAMINATION: CT ABDOMEN PELVIS W IV CONTRAST       HISTORY: Incisional hernia followup       COMPARISON: CT 12/26/2019.            TECHNIQUE: CT examination of the abdomen and pelvis following the    administration of intravenous contrast. Coronal and sagittal reformations were    performed.       Dose reduction techniques were achieved by using automated exposure control    and/or adjustment of mA and/or kV according to patient size and/or use of    iterative reconstruction technique.           FINDINGS:        Lung bases are clear.       ABDOMEN:       Liver is homogeneous in attenuation without evidence for focal lesion. Probable    small gallstones. No pericholecystic inflammatory changes by CT. No abnormal    biliary dilatation. The portal vein is patent. Spleen, adrenal glands and    pancreas are normal in appearance.       Symmetric enhancement and excretion of contrast from the kidneys without    evidence for hydronephrosis or focal renal lesion. No definite intrarenal    calculus. Left extrarenal pelvis, as before.       PELVIS:       Ureters and bladder are unremarkable. Uterus and adnexa are unremarkable for    age. No pelvic free fluid. Aortoiliac atherosclerotic calcifications are    present. Colonic diverticulosis without evidence for acute diverticulitis. No    evidence for small or large bowel obstruction. Normal appendix. No free    intraperitoneal air. There is a midline supraumbilical fat-containing ventral    hernia with hernia sac measuring 8.3 x 4.5 x 8.3 cm. Fascial defect measures    1.6 x 1.3 cm. No additional ventral hernia. No suspicious osteolytic or    osteoblastic lesion. 5 mm anterolisthesis of L4 on L5 secondary to facet    degenerative change.               Impression       1. Large fat-containing supraumbilical midline ventral hernia with fascial    defect measuring 1.6 x 1.3 cm and hernia sac measuring 8.3 x 4.5 x 8.3 cm. No    associated inflammatory change.       2. During contrast injection the technologist reports that the patient's right    antecubital fossa IV infiltrated with up to 15 mL of intravenous contrast. A    new IV was placed in the left upper extremity and contrast injected and images    acquired.       3. Colonic diverticulosis without evidence for acute diverticulitis. Normal    appendix. ASSESSMENT     Diagnosis Orders   1. Rectal bleeding     2. Gastroesophageal reflux disease, unspecified whether esophagitis present     3. Dysphagia, unspecified type     4. Ventral incisional hernia     5. History of bladder cancer     6. History of MI (myocardial infarction)     7. [facility]ipheral vascular disease ([facility]     8. BMI 23.0-23.9, adult     9. History of substance abuse ([facility]     10. History of tobacco abuse         PLAN    Discussed at length with Ms. Isai Sherman her complaints of rectal bleeding, epigastric pain with reflux symptoms, incisional hernia, etc.  We will proceed with diagnostic endoscopy. Risks, benefits, alternatives thoroughly reviewed and accepted by Ms. Isai Sherman, including GI bleeding, perforation, missed lesions, COVID-19 exposure/infection, etc.  [place] diet for now. Encouraged to continue complete tobacco cessation.      Trinity Brewer MD

## 2021-06-08 DIAGNOSIS — I10 ESSENTIAL HYPERTENSION: Primary | ICD-10-CM

## 2021-06-08 DIAGNOSIS — I25.118 CORONARY ARTERY DISEASE OF NATIVE ARTERY OF NATIVE HEART WITH STABLE ANGINA PECTORIS (HCC): ICD-10-CM

## 2021-06-08 DIAGNOSIS — Z01.810 ENCOUNTER FOR PRE-OPERATIVE CARDIOVASCULAR CLEARANCE: ICD-10-CM

## 2021-06-08 DIAGNOSIS — I73.9 PERIPHERAL VASCULAR DISEASE (HCC): ICD-10-CM

## 2021-06-08 DIAGNOSIS — E56.9 VITAMIN DEFICIENCY: ICD-10-CM

## 2021-06-09 ENCOUNTER — HOSPITAL ENCOUNTER (OUTPATIENT)
Age: 61
Discharge: HOME | End: 2021-06-09
Payer: COMMERCIAL

## 2021-06-09 ENCOUNTER — HOSPITAL ENCOUNTER (OUTPATIENT)
Age: 61
Discharge: HOME | End: 2021-06-11
Payer: COMMERCIAL

## 2021-06-09 ENCOUNTER — HOSPITAL ENCOUNTER (OUTPATIENT)
Dept: GENERAL RADIOLOGY | Age: 61
Discharge: HOME | End: 2021-06-11
Payer: COMMERCIAL

## 2021-06-09 DIAGNOSIS — I10 ESSENTIAL HYPERTENSION: ICD-10-CM

## 2021-06-09 DIAGNOSIS — I73.9 PERIPHERAL VASCULAR DISEASE (HCC): ICD-10-CM

## 2021-06-09 DIAGNOSIS — E56.9 VITAMIN DEFICIENCY: ICD-10-CM

## 2021-06-09 DIAGNOSIS — Z01.810 ENCOUNTER FOR PRE-OPERATIVE CARDIOVASCULAR CLEARANCE: ICD-10-CM

## 2021-06-09 DIAGNOSIS — I25.118 CORONARY ARTERY DISEASE OF NATIVE ARTERY OF NATIVE HEART WITH STABLE ANGINA PECTORIS (HCC): ICD-10-CM

## 2021-06-09 DIAGNOSIS — K43.2 INCISIONAL HERNIA, WITHOUT OBSTRUCTION OR GANGRENE: ICD-10-CM

## 2021-06-09 DIAGNOSIS — E78.5 HYPERLIPIDEMIA, UNSPECIFIED HYPERLIPIDEMIA TYPE: ICD-10-CM

## 2021-06-09 LAB
ABSOLUTE BANDS #: 0.05 K/UL (ref 0–1)
ABSOLUTE EOS #: 0.58 K/UL (ref 0–0.4)
ABSOLUTE IMMATURE GRANULOCYTE: ABNORMAL K/UL (ref 0–0.3)
ABSOLUTE LYMPH #: 2.6 K/UL (ref 1–4.8)
ABSOLUTE MONO #: 0.53 K/UL (ref 0–1)
ALBUMIN SERPL-MCNC: 3.9 G/DL (ref 3.5–5.2)
ALBUMIN SERPL-MCNC: 4.1 G/DL (ref 3.5–5.2)
ALBUMIN/GLOBULIN RATIO: ABNORMAL (ref 1–2.5)
ALBUMIN/GLOBULIN RATIO: NORMAL (ref 1–2.5)
ALP BLD-CCNC: 69 U/L (ref 35–104)
ALP BLD-CCNC: 70 U/L (ref 35–104)
ALT SERPL-CCNC: 13 U/L (ref 5–33)
ALT SERPL-CCNC: 13 U/L (ref 5–33)
ANION GAP SERPL CALCULATED.3IONS-SCNC: 11 MMOL/L (ref 9–17)
ANION GAP SERPL CALCULATED.3IONS-SCNC: 9 MMOL/L (ref 9–17)
AST SERPL-CCNC: 18 U/L
AST SERPL-CCNC: 19 U/L
BANDS: 1 % (ref 0–10)
BASOPHILS # BLD: ABNORMAL % (ref 0–2)
BASOPHILS ABSOLUTE: ABNORMAL K/UL (ref 0–0.2)
BILIRUB SERPL-MCNC: 0.42 MG/DL (ref 0.3–1.2)
BILIRUB SERPL-MCNC: 0.42 MG/DL (ref 0.3–1.2)
BILIRUBIN DIRECT: <0.08 MG/DL
BILIRUBIN, INDIRECT: NORMAL MG/DL (ref 0–1)
BUN BLDV-MCNC: 12 MG/DL (ref 6–20)
BUN BLDV-MCNC: 12 MG/DL (ref 6–20)
BUN/CREAT BLD: 17 (ref 9–20)
BUN/CREAT BLD: 18 (ref 9–20)
CALCIUM SERPL-MCNC: 9.5 MG/DL (ref 8.6–10.4)
CALCIUM SERPL-MCNC: 9.7 MG/DL (ref 8.6–10.4)
CHLORIDE BLD-SCNC: 103 MMOL/L (ref 98–107)
CHLORIDE BLD-SCNC: 104 MMOL/L (ref 98–107)
CHOLESTEROL/HDL RATIO: 3.5
CHOLESTEROL: 244 MG/DL
CO2: 21 MMOL/L (ref 20–31)
CO2: 22 MMOL/L (ref 20–31)
CREAT SERPL-MCNC: 0.68 MG/DL (ref 0.5–0.9)
CREAT SERPL-MCNC: 0.69 MG/DL (ref 0.5–0.9)
DIFFERENTIAL TYPE: ABNORMAL
EOSINOPHILS RELATIVE PERCENT: 11 % (ref 0–5)
GFR AFRICAN AMERICAN: >60 ML/MIN
GFR AFRICAN AMERICAN: >60 ML/MIN
GFR NON-AFRICAN AMERICAN: >60 ML/MIN
GFR NON-AFRICAN AMERICAN: >60 ML/MIN
GFR SERPL CREATININE-BSD FRML MDRD: ABNORMAL ML/MIN/{1.73_M2}
GLOBULIN: NORMAL G/DL (ref 1.5–3.8)
GLUCOSE BLD-MCNC: 101 MG/DL (ref 70–99)
GLUCOSE BLD-MCNC: 101 MG/DL (ref 70–99)
HCT VFR BLD CALC: 39.3 % (ref 36–46)
HDLC SERPL-MCNC: 70 MG/DL
HEMOGLOBIN: 13.7 G/DL (ref 12–16)
IMMATURE GRANULOCYTES: ABNORMAL %
LDL CHOLESTEROL: 147 MG/DL (ref 0–130)
LYMPHOCYTES # BLD: 49 % (ref 15–40)
MAGNESIUM: 2 MG/DL (ref 1.6–2.6)
MCH RBC QN AUTO: 35 PG (ref 26–34)
MCHC RBC AUTO-ENTMCNC: 34.9 G/DL (ref 31–37)
MCV RBC AUTO: 100.2 FL (ref 80–100)
MONOCYTES # BLD: 10 % (ref 4–8)
MORPHOLOGY: ABNORMAL
MORPHOLOGY: ABNORMAL
NRBC AUTOMATED: ABNORMAL PER 100 WBC
PATIENT FASTING?: YES
PDW BLD-RTO: 14 % (ref 12.1–15.2)
PLATELET # BLD: 335 K/UL (ref 140–450)
PLATELET ESTIMATE: ABNORMAL
PMV BLD AUTO: ABNORMAL FL (ref 6–12)
POTASSIUM SERPL-SCNC: 4.1 MMOL/L (ref 3.7–5.3)
POTASSIUM SERPL-SCNC: 4.1 MMOL/L (ref 3.7–5.3)
RBC # BLD: 3.92 M/UL (ref 4–5.2)
RBC # BLD: ABNORMAL 10*6/UL
SEG NEUTROPHILS: 29 % (ref 47–75)
SEGMENTED NEUTROPHILS ABSOLUTE COUNT: 1.54 K/UL (ref 2.5–7)
SODIUM BLD-SCNC: 134 MMOL/L (ref 135–144)
SODIUM BLD-SCNC: 136 MMOL/L (ref 135–144)
TOTAL PROTEIN: 7.5 G/DL (ref 6.4–8.3)
TOTAL PROTEIN: 7.6 G/DL (ref 6.4–8.3)
TRIGL SERPL-MCNC: 134 MG/DL
VITAMIN D 25-HYDROXY: 42.7 NG/ML (ref 30–100)
VLDLC SERPL CALC-MCNC: ABNORMAL MG/DL (ref 1–30)
WBC # BLD: 5.3 K/UL (ref 3.5–11)
WBC # BLD: ABNORMAL 10*3/UL

## 2021-06-09 PROCEDURE — 71046 X-RAY EXAM CHEST 2 VIEWS: CPT

## 2021-06-09 PROCEDURE — 85025 COMPLETE CBC W/AUTO DIFF WBC: CPT

## 2021-06-09 PROCEDURE — 82306 VITAMIN D 25 HYDROXY: CPT

## 2021-06-09 PROCEDURE — 80053 COMPREHEN METABOLIC PANEL: CPT

## 2021-06-09 PROCEDURE — 83735 ASSAY OF MAGNESIUM: CPT

## 2021-06-09 PROCEDURE — 80061 LIPID PANEL: CPT

## 2021-06-09 PROCEDURE — 80048 BASIC METABOLIC PNL TOTAL CA: CPT

## 2021-06-09 PROCEDURE — 93005 ELECTROCARDIOGRAM TRACING: CPT

## 2021-06-09 PROCEDURE — 36415 COLL VENOUS BLD VENIPUNCTURE: CPT

## 2021-06-09 PROCEDURE — 80076 HEPATIC FUNCTION PANEL: CPT

## 2021-06-10 LAB
EKG ATRIAL RATE: 66 BPM
EKG P AXIS: 51 DEGREES
EKG P-R INTERVAL: 160 MS
EKG Q-T INTERVAL: 402 MS
EKG QRS DURATION: 90 MS
EKG QTC CALCULATION (BAZETT): 421 MS
EKG R AXIS: 58 DEGREES
EKG T AXIS: 56 DEGREES
EKG VENTRICULAR RATE: 66 BPM

## 2021-06-10 PROCEDURE — 93010 ELECTROCARDIOGRAM REPORT: CPT | Performed by: INTERNAL MEDICINE

## 2021-06-15 ENCOUNTER — HOSPITAL ENCOUNTER (OUTPATIENT)
Dept: CT IMAGING | Age: 61
Discharge: HOME | End: 2021-06-17
Payer: COMMERCIAL

## 2021-06-15 DIAGNOSIS — K43.2 INCISIONAL HERNIA, WITHOUT OBSTRUCTION OR GANGRENE: ICD-10-CM

## 2021-06-15 PROCEDURE — 74177 CT ABD & PELVIS W/CONTRAST: CPT

## 2021-06-15 PROCEDURE — 6360000004 HC RX CONTRAST MEDICATION: Performed by: SURGERY

## 2021-06-15 RX ADMIN — IOPAMIDOL 75 ML: 755 INJECTION, SOLUTION INTRAVENOUS at 10:29

## 2021-06-15 RX ADMIN — IOHEXOL 20 ML: 240 INJECTION, SOLUTION INTRATHECAL; INTRAVASCULAR; INTRAVENOUS; ORAL at 10:26

## 2021-07-01 ENCOUNTER — OFFICE VISIT (OUTPATIENT)
Dept: CARDIOLOGY CLINIC | Age: 61
End: 2021-07-01
Payer: COMMERCIAL

## 2021-07-01 VITALS
OXYGEN SATURATION: 96 % | SYSTOLIC BLOOD PRESSURE: 150 MMHG | BODY MASS INDEX: 23.56 KG/M2 | HEART RATE: 78 BPM | WEIGHT: 133 LBS | DIASTOLIC BLOOD PRESSURE: 100 MMHG

## 2021-07-01 DIAGNOSIS — I10 ESSENTIAL HYPERTENSION: Primary | ICD-10-CM

## 2021-07-01 DIAGNOSIS — E56.9 VITAMIN DEFICIENCY: ICD-10-CM

## 2021-07-01 DIAGNOSIS — I25.118 CORONARY ARTERY DISEASE OF NATIVE ARTERY OF NATIVE HEART WITH STABLE ANGINA PECTORIS (HCC): ICD-10-CM

## 2021-07-01 PROCEDURE — G8428 CUR MEDS NOT DOCUMENT: HCPCS | Performed by: INTERNAL MEDICINE

## 2021-07-01 PROCEDURE — 1036F TOBACCO NON-USER: CPT | Performed by: INTERNAL MEDICINE

## 2021-07-01 PROCEDURE — 3017F COLORECTAL CA SCREEN DOC REV: CPT | Performed by: INTERNAL MEDICINE

## 2021-07-01 PROCEDURE — G8420 CALC BMI NORM PARAMETERS: HCPCS | Performed by: INTERNAL MEDICINE

## 2021-07-01 PROCEDURE — 99214 OFFICE O/P EST MOD 30 MIN: CPT | Performed by: INTERNAL MEDICINE

## 2021-07-01 NOTE — LETTER
Evangelina Ahumada, M.D. [address] [place] [address]  [phone]        2021    Rita Dougherty MD  [address] [address]    RE:   Edenilson Marking  :  1960    Dear Dr. Billie Wright:    CHIEF COMPLAINT:  1. Cardiac clearance for ventral hernia repair by Dr. Billie Wright. 2.  Coronary artery disease. 3.  Takayasu arteritis. HISTORY OF PRESENT ILLNESS:  I had the pleasure of seeing Mrs. Denny Gillespie in the office on 2021. She is a complex 60-year-old female who had Takayasu arteritis. She had right carotid artery occlusion in , was transferred to [facility] where she had a brachiocephalic aorta bypass. In , she had chest pain and an abnormal stress test, and I did a catheterization on 2010 through right femoral artery approach that showed 95% disease in the right coronary artery, LAD and circumflex were unremarkable, EF of 60%. I did angioplasty of the right coronary artery, placing 3.0 x 32 and 3.0 x 28 mm Taxus stents with a good end result. The aortic arch demonstrated widely patent brachiocephalic bypass grafts, the native brachiocephalic was occluded. We did inject the left subclavian artery and could not cannulate it, and we suspected that it was occluded. She had 80% to 90% disease in the right iliac artery with stenting of the ostium of the left common carotid artery on 2011 by Dr. Moises Byrne. She had angioplasty of the right iliac artery on 2012 by Dr. Benjamin Zamora. She then developed obstruction of the left femoral artery on 2012 and had angioplasty of the left femoral artery as well. She has had no further cardiac catheterizations since . She has a blood pressure difference between her left arm to her right arm because of an occluded left subclavian artery.     She had a urinary bladder mass, which was 4.8 cm, with bladder resection in [place] in 04/2016. On 3/01/2017, she had syncope, left jaw pain, and detectable troponins. She went to [facility]. [facility] and attempts were made to cross her femoral artery on both the left and right side, which were unsuccessful. She had a right brachial approach, which was failed approach. The right brachial was successfully cannulated; however, the brachiocephalic trunk was occluded and unable to be crossed. The bypass graft could not be appreciated and, therefore, was aborted and she had no angiogram or cardiac catheterization. I last saw her in 2018. She has done well since that time. She has had no chest pain or chest discomfort. She has chronic shortness of breath, which is about the same as it had been previously. She does have a large ventral hernia pending having surgery by Dr. Sim Ferguson. She has had no syncope or near syncope. Her legs are \"heavy\" and I suspect secondary to claudication. She did see Dr. Haseeb Olivier recently who placed her on guideline-mediated medications such as Lipitor, Plavix; however she has not filled them as of yet. CARDIAC RISK FACTORS:  Known CAD:  Positive. Hypertension:  Positive. Hyperlipidemia:  Positive. Peripheral Vascular Disease:  Positive. Diabetes:  Positive. Smoking:  Negative. MEDICATIONS AT THIS TIME:  She is taking Cozaar 50 mg once a day (supposed to take twice a day, but she is taking once a day), Lopressor 50 mg daily (prescribed b.i.d.). She was given prescriptions for Norvasc 5 mg daily, aspirin 81 mg daily, Lipitor 80 mg daily, Plavix 75 mg daily, Imdur 30 mg daily, which she has not filled as of yet. PAST MEDICAL AND SURGICAL HISTORY:  1. Cardiac as above. 2.  Labile hypertension. 3.  Occluded left subclavian, occluded right iliac, and left femoral.  She had angioplasty of the left common femoral artery by Dr. Mahamed Patel, angioplasty of the right coronary artery by myself in 2010. 4.  Severe hyperlipidemia.   5.  Bladder cancer with resection done by Dr. Corina Qureshi on 2016.  6.  Cardiovascular procedures that were dictated above. FAMILY HISTORY:  Mother  of heart disease. Father  of cancer. SOCIAL HISTORY:  She is 62years old. Lives with her . She has 2 grandchildren in their house; a 59-year-old, Mildrednorman Burgos and 12year-old, [facility]. She does not smoke. She smokes marijuana. Does not drink alcohol. REVIEW OF SYSTEMS:  Cardiac as above. Other systems reviewed including constitutional, eyes, ears, nose and throat, cardiovascular, respiratory, GI, , musculoskeletal, integumentary, neurologic, endocrine, hematologic and allergic/immunologic, are negative except for what is described above. PHYSICAL EXAMINATION:   VITAL SIGNS:  Her blood pressure was 180/100 in her right arm and 150/90 in her left arm. Heart rate was 70 and regular. Respiratory rate 18. O2 saturation was 96%. Weight 133 pounds. GENERAL:  She is a pleasant 60-year-old female. Denied pain. She was oriented to person, place and time. Answered questions appropriately. SKIN:  No unusual skin changes. HEENT:  The pupils are equally round and intact. Mucous membranes were dry. NECK:  No JVD. Good carotid pulses. No carotid bruits. No lymphadenopathy or thyromegaly. CARDIOVASCULAR EXAM:  S1 and S2 were normal.  No S3 or S4. Soft systolic blowing type murmur. No diastolic murmur. PMI was normal.  No lift, thrust, or pericardial friction rub. LUNGS:  Quite clear to auscultation and percussion. ABDOMEN:  Soft and nontender. Good bowel sounds. EXTREMITIES:  She had femoral pulses. Decreased pedal pulses, but I could palpate. NEUROLOGIC EXAM:  Unremarkable. PSYCHIATRIC EXAM:  Unremarkable. LABORATORY DATA:  Her sodium was 136, potassium 4.1, BUN 12, creatinine 0.69, GFR greater than 60, magnesium 2.0, glucose 101, calcium 9.7. ALT was 13, AST was 18. Glucose 101. Vitamin D 42.7.   Her white count was 5.3, hemoglobin 13.7 with a platelet count of 335,000. Cholesterol was 244 with an HDL of 70, , triglycerides 134. EKG showed normal sinus rhythm and was normal.    Bedside echocardiogram showed normal LV size and function. Her right ventricle appeared to be somewhat enlarged and I suspect mild pulmonary hypertension. IMPRESSION:  1. Severe coronary artery disease, although functional class I at this time with no symptoms. 2.  Takayasu arteritis, status post brachiocephalic aorta bypass graft in 1993 at [facility] after a CVA with occlusion of the right brachiocephalic artery. 3.  Catheterization on 12/08/2010 after an abnormal stress test and chest pain that showed 95% RCA with unremarkable LAD and circumflex, EF of 60%, with placement of 3.0 x 32 and 3.0 x 28 mm Taxus stents. 4.  Severe peripheral vascular disease with 80% disease in the right iliac artery, with angioplasty by Dr. Danie West in 06/2012, with occlusion of the left femoral artery and a stent to the left femoral artery in 06/2012, both of which are evidently occluded although I could feel pedal pulses. 5.  Stenting of the left carotid artery by Dr. Chica Brink in 01/2011. 6.  Known occlusion of the left subclavian artery. 7.  Non-ST-elevation myocardial infarction on 03/01/2017, with unable to gain access to left femoral or right femoral or right brachial with no catheterization being done. 8.  Marked blood pressure difference between the left arm and right arm with the left arm 150 and the right arm 180/100, due to occlusion of the left subclavian. 9.  Bladder cancer on 04/05/2016, secondary noninvasive papillary urethral carcinoma, which is in remission. 10.  Large ventral hernia, pending surgery for which she is cleared. PLAN:  1. Cleared for ventral hernia repair. 2.  Recommended that she fill the prescriptions given by Dr. Tk Esparza and start taking. 3.  I will see her in 4 to 5 months. We will reevaluate and consider a stress test at that time.   We will not do a stress test at this time as she is pain-free. DISCUSSION:  Mrs. Ad Saldaña really has no symptoms of chest pain. She has chronic shortness of breath, which has not changed. Her risk of developing further coronary artery disease is very high, but again she is symptom-free at this time. I will not do a stress test at this time. As her ventral hernia is very bothersome, I think it is reasonable to get that repaired. I have done no other testing. Her bedside echocardiogram showed normal LV function. She is at high risk again for progression of her cardiovascular disease with her noncompliance with taking her medications. However, at this time she is asymptomatic. Thank you very much for allowing me the privilege of seeing Mrs. Ad Saldaña. If you have any questions on my thoughts, please do not hesitate to contact me.      Sincerely,        Joaquin Bass    D: 07/01/2021 8:45:57     T: 07/01/2021 8:53:59     [email or web address]  Job#: [id number]   Doc#: [id number]

## 2021-07-13 NOTE — PROGRESS NOTES
[facility]   Preadmission Testing    Name: Edenilson Eng  : 1960  Patient Phone: [phone] (home)     Procedure: Colonoscopy  Date of Procedure: 2021  Surgeon: Dari Gonsalves MD    Ht:  5' 3\" (160 cm)  Wt: 130 lb (59 kg)  Wt method: Stated    Allergies: No Known Allergies        Latex Allergy Screening Tool  Have you ever had a reaction to or been told by a physician that you have an allergy to latex or natural rubber?: No    There were no vitals filed for this visit. Patient's last menstrual period was 2011. Do you take blood thinners? [] Yes    [x] No         Instructed to stop blood thinners prior to procedure? [] Yes    [] No      [x] N/A   Do you have sleep apnea? [] Yes    [x] No     Do you have acid reflux ? [] Yes    [x] No     Do you have  hiatal hernia? [] Yes    [x] No    Do you ever experience motion sickness? [] Yes    [x] No     Have you had a respiratory infection or sore throat in last 4 weeks before surgery? [] Yes    [x] No     Do you have poorly controlled asthma or COPD? Difficulty with intubation in past? [] Yes    [x] No      [] Yes    [x] No       Do you have a history of angina in the last month or symptomatic arrhythmia? [] Yes    [x] No     Do you have significant central nervous system disease? [] Yes    [x] No     Have you had an EKG, labs, or chest xray in last 12 months? If yes provide copies to anesthesia   [x] Yes    [] No       [] Lab    [x] EKG    [x] CXR     Have you had a stress test?     [x] Yes    [] No    When/where:    Was it normal?    [x] Yes    [] No     Do you or your family have a history of Malignant Hyperthermia? [] Yes    [x] No           Do you smoke? [x] Yes    [] No      Please refrain from smoking on the day of surgery.       Patient instructed on: [x] NPO Status   [x] Meds to Take  [x] Ride Home  [x]No Jewelry/Contact Lenses/Nail [place]  [] Prep/Lax/Clear Liquids    [] Chlorhexidene     DOS Patient Needs [] HCG   [] Blood Sugar  [] PT/INR    [] T&S       COVID Vaccinated? [] Yes    [x] No           PAT Call/Visit Questions  Person Interviewed: Felipe Lara  Relationship to Patient: Self  Medical History Reviewed: Yes  NPO Status Reinforced: Yes  Ride and Caregiver Arranged: Yes  Patient Knows to Bring Current Medications: Yes         Patient instructed on the pre-operative, intra-operative, and post-operative process? Yes  Medication instructions reviewed with patient?   Yes

## 2021-07-16 RX ORDER — POLYETHYLENE GLYCOL 3350, SODIUM CHLORIDE, SODIUM BICARBONATE, POTASSIUM CHLORIDE 420; 11.2; 5.72; 1.48 G/4L; G/4L; G/4L; G/4L
4000 POWDER, FOR SOLUTION ORAL ONCE
Qty: 1 BOTTLE | Refills: 0 | Status: SHIPPED | OUTPATIENT
Start: 2021-07-16 | End: 2021-07-16

## 2021-07-19 NOTE — PROGRESS NOTES
Seth Zamudio M.D. [address] [place] [address]  [phone]        2021    Rosa Jimenez MD  [address] [address]    RE:   Rufina Johnson  :  1960    Dear Dr. Steve Chappell:    CHIEF COMPLAINT:  1. Cardiac clearance for ventral hernia repair by Dr. Steve Chappell. 2.  Coronary artery disease. 3.  Takayasu arteritis. HISTORY OF PRESENT ILLNESS:  I had the pleasure of seeing Mrs. Elton Claude in the office on 2021. She is a complex 52-year-old female who had Takayasu arteritis. She had right carotid artery occlusion in , was transferred to [facility] where she had a brachiocephalic aorta bypass. In , she had chest pain and an abnormal stress test, and I did a catheterization on 2010 through right femoral artery approach that showed 95% disease in the right coronary artery, LAD and circumflex were unremarkable, EF of 60%. I did angioplasty of the right coronary artery, placing 3.0 x 32 and 3.0 x 28 mm Taxus stents with a good end result. The aortic arch demonstrated widely patent brachiocephalic bypass grafts, the native brachiocephalic was occluded. We did inject the left subclavian artery and could not cannulate it, and we suspected that it was occluded. She had 80% to 90% disease in the right iliac artery with stenting of the ostium of the left common carotid artery on 2011 by Dr. Christian Rowley. She had angioplasty of the right iliac artery on 2012 by Dr. Gonzalo Cabrera. She then developed obstruction of the left femoral artery on 2012 and had angioplasty of the left femoral artery as well. She has had no further cardiac catheterizations since . She has a blood pressure difference between her left arm to her right arm because of an occluded left subclavian artery.     She had a urinary bladder mass, which was 4.8 cm, with bladder resection in [place] in 04/2016. On 3/01/2017, she had syncope, left jaw pain, and detectable troponins. She went to [facility]. G[facility] and attempts were made to cross her femoral artery on both the left and right side, which were unsuccessful. She had a right brachial approach, which was failed approach. The right brachial was successfully cannulated; however, the brachiocephalic trunk was occluded and unable to be crossed. The bypass graft could not be appreciated and, therefore, was aborted and she had no angiogram or cardiac catheterization. I last saw her in 2018. She has done well since that time. She has had no chest pain or chest discomfort. She has chronic shortness of breath, which is about the same as it had been previously. She does have a large ventral hernia pending having surgery by Dr. Lalito Hester. She has had no syncope or near syncope. Her legs are \"heavy\" and I suspect secondary to claudication. She did see Dr. Waqas Kasper recently who placed her on guideline-mediated medications such as Lipitor, Plavix; however she has not filled them as of yet. CARDIAC RISK FACTORS:  Known CAD:  Positive. Hypertension:  Positive. Hyperlipidemia:  Positive. Peripheral Vascular Disease:  Positive. Diabetes:  Positive. Smoking:  Negative. MEDICATIONS AT THIS TIME:  She is taking Cozaar 50 mg once a day (supposed to take twice a day, but she is taking once a day), Lopressor 50 mg daily (prescribed b.i.d.). She was given prescriptions for Norvasc 5 mg daily, aspirin 81 mg daily, Lipitor 80 mg daily, Plavix 75 mg daily, Imdur 30 mg daily, which she has not filled as of yet. PAST MEDICAL AND SURGICAL HISTORY:  1. Cardiac as above. 2.  Labile hypertension. 3.  Occluded left subclavian, occluded right iliac, and left femoral.  She had angioplasty of the left common femoral artery by Dr. Vashti Hardin, angioplasty of the right coronary artery by myself in 2010. 4.  Severe hyperlipidemia.   5.  Bladder cancer with resection done by Dr. Adelia Castro on 2016.  6.  Cardiovascular procedures that were dictated above. FAMILY HISTORY:  Mother  of heart disease. Father  of cancer. SOCIAL HISTORY:  She is 62years old. Lives with her . She has 2 grandchildren in their house; a 80-year-old, Jodi Whitt and 12year-old, [facility] [employer]. She does not smoke. She smokes marijuana. Does not drink alcohol. REVIEW OF SYSTEMS:  Cardiac as above. Other systems reviewed including constitutional, eyes, ears, nose and throat, cardiovascular, respiratory, GI, , musculoskeletal, integumentary, neurologic, endocrine, hematologic and allergic/immunologic, are negative except for what is described above. PHYSICAL EXAMINATION:   VITAL SIGNS:  Her blood pressure was 180/100 in her right arm and 150/90 in her left arm. Heart rate was 70 and regular. Respiratory rate 18. O2 saturation was 96%. Weight 133 pounds. GENERAL:  She is a pleasant 64-year-old female. Denied pain. She was oriented to person, place and time. Answered questions appropriately. SKIN:  No unusual skin changes. HEENT:  The pupils are equally round and intact. Mucous membranes were dry. NECK:  No JVD. Good carotid pulses. No carotid bruits. No lymphadenopathy or thyromegaly. CARDIOVASCULAR EXAM:  S1 and S2 were normal.  No S3 or S4. Soft systolic blowing type murmur. No diastolic murmur. PMI was normal.  No lift, thrust, or pericardial friction rub. LUNGS:  Quite clear to auscultation and percussion. ABDOMEN:  Soft and nontender. Good bowel sounds. EXTREMITIES:  She had femoral pulses. Decreased pedal pulses, but I could palpate. NEUROLOGIC EXAM:  Unremarkable. PSYCHIATRIC EXAM:  Unremarkable. LABORATORY DATA:  Her sodium was 136, potassium 4.1, BUN 12, creatinine 0.69, GFR greater than 60, magnesium 2.0, glucose 101, calcium 9.7. ALT was 13, AST was 18. Glucose 101. Vitamin D 42.7.   Her white count was 5.3, hemoglobin 13.7 with a platelet count of 335,000. Cholesterol was 244 with an HDL of 70, , triglycerides 134. EKG showed normal sinus rhythm and was normal.    Bedside echocardiogram showed normal LV size and function. Her right ventricle appeared to be somewhat enlarged and I suspect mild pulmonary hypertension. IMPRESSION:  1. Severe coronary artery disease, although functional class I at this time with no symptoms. 2.  Takayasu arteritis, status post brachiocephalic aorta bypass graft in 1993 at [facility] after a CVA with occlusion of the right brachiocephalic artery. 3.  Catheterization on 12/08/2010 after an abnormal stress test and chest pain that showed 95% RCA with unremarkable LAD and circumflex, EF of 60%, with placement of 3.0 x 32 and 3.0 x 28 mm Taxus stents. 4.  Severe peripheral vascular disease with 80% disease in the right iliac artery, with angioplasty by Dr. Vane Garcia in 06/2012, with occlusion of the left femoral artery and a stent to the left femoral artery in 06/2012, both of which are evidently occluded although I could feel pedal pulses. 5.  Stenting of the left carotid artery by Dr. Hannah Neal in 01/2011. 6.  Known occlusion of the left subclavian artery. 7.  Non-ST-elevation myocardial infarction on 03/01/2017, with unable to gain access to left femoral or right femoral or right brachial with no catheterization being done. 8.  Marked blood pressure difference between the left arm and right arm with the left arm 150 and the right arm 180/100, due to occlusion of the left subclavian. 9.  Bladder cancer on 04/05/2016, secondary noninvasive papillary urethral carcinoma, which is in remission. 10.  Large ventral hernia, pending surgery for which she is cleared. PLAN:  1. Cleared for ventral hernia repair. 2.  Recommended that she fill the prescriptions given by Dr. Feng Mccauley and start taking. 3.  I will see her in 4 to 5 months. We will reevaluate and consider a stress test at that time.   We will not do a stress test at this time as she is pain-free. DISCUSSION:  Mrs. Leticia Caruso really has no symptoms of chest pain. She has chronic shortness of breath, which has not changed. Her risk of developing further coronary artery disease is very high, but again she is symptom-free at this time. I will not do a stress test at this time. As her ventral hernia is very bothersome, I think it is reasonable to get that repaired. I have done no other testing. Her bedside echocardiogram showed normal LV function. She is at high risk again for progression of her cardiovascular disease with her noncompliance with taking her medications. However, at this time she is asymptomatic. Thank you very much for allowing me the privilege of seeing Mrs. Leticia Caruso. If you have any questions on my thoughts, please do not hesitate to contact me.      Sincerely,        Vinh Phillips    D: 07/01/2021 8:45:57     T: 07/01/2021 8:53:59     [email or web address]  Job#: [id number]   Doc#: [id number]

## 2021-07-21 ASSESSMENT — ENCOUNTER SYMPTOMS
BLOOD IN STOOL: 1
ABDOMINAL DISTENTION: 1
BACK PAIN: 1
NAUSEA: 0
SHORTNESS OF BREATH: 0
CHOKING: 0
TROUBLE SWALLOWING: 0
ABDOMINAL PAIN: 1
SORE THROAT: 0
VOMITING: 0
COUGH: 0

## 2021-07-22 ENCOUNTER — HOSPITAL ENCOUNTER (OUTPATIENT)
Dept: PREADMISSION TESTING | Age: 61
Setting detail: SPECIMEN
Discharge: HOME | End: 2021-07-22
Payer: COMMERCIAL

## 2021-07-22 ENCOUNTER — HOSPITAL ENCOUNTER (OUTPATIENT)
Age: 61
Setting detail: OUTPATIENT SURGERY
Discharge: HOME | End: 2021-07-22
Attending: SURGERY | Admitting: SURGERY
Payer: COMMERCIAL

## 2021-07-22 ENCOUNTER — ANESTHESIA (OUTPATIENT)
Dept: ENDOSCOPY | Age: 61
End: 2021-07-22
Payer: COMMERCIAL

## 2021-07-22 ENCOUNTER — ANESTHESIA EVENT (OUTPATIENT)
Dept: ENDOSCOPY | Age: 61
End: 2021-07-22
Payer: COMMERCIAL

## 2021-07-22 VITALS
RESPIRATION RATE: 28 BRPM | OXYGEN SATURATION: 97 % | DIASTOLIC BLOOD PRESSURE: 108 MMHG | SYSTOLIC BLOOD PRESSURE: 191 MMHG

## 2021-07-22 VITALS
HEART RATE: 68 BPM | WEIGHT: 132.2 LBS | BODY MASS INDEX: 23.42 KG/M2 | RESPIRATION RATE: 18 BRPM | DIASTOLIC BLOOD PRESSURE: 104 MMHG | HEIGHT: 63 IN | OXYGEN SATURATION: 98 % | SYSTOLIC BLOOD PRESSURE: 194 MMHG | TEMPERATURE: 97.2 F

## 2021-07-22 LAB
CARCINOEMBRYONIC ANTIGEN: 7.5 NG/ML
SARS-COV-2, RAPID: NOT DETECTED
SPECIMEN DESCRIPTION: NORMAL

## 2021-07-22 PROCEDURE — 36415 COLL VENOUS BLD VENIPUNCTURE: CPT

## 2021-07-22 PROCEDURE — 87635 SARS-COV-2 COVID-19 AMP PRB: CPT

## 2021-07-22 PROCEDURE — C9803 HOPD COVID-19 SPEC COLLECT: HCPCS

## 2021-07-22 PROCEDURE — 82378 CARCINOEMBRYONIC ANTIGEN: CPT

## 2021-07-22 PROCEDURE — 7100000010 HC PHASE II RECOVERY - FIRST 15 MIN: Performed by: SURGERY

## 2021-07-22 PROCEDURE — 2500000003 HC RX 250 WO HCPCS: Performed by: NURSE ANESTHETIST, CERTIFIED REGISTERED

## 2021-07-22 PROCEDURE — 88305 TISSUE EXAM BY PATHOLOGIST: CPT

## 2021-07-22 PROCEDURE — 3609013500 HC EGD REMOVAL TUMOR POLYP/OTHER LESION SNARE TECH: Performed by: SURGERY

## 2021-07-22 PROCEDURE — 6360000002 HC RX W HCPCS: Performed by: NURSE ANESTHETIST, CERTIFIED REGISTERED

## 2021-07-22 PROCEDURE — 3700000001 HC ADD 15 MINUTES (ANESTHESIA): Performed by: SURGERY

## 2021-07-22 PROCEDURE — 2580000003 HC RX 258: Performed by: SURGERY

## 2021-07-22 PROCEDURE — 87077 CULTURE AEROBIC IDENTIFY: CPT

## 2021-07-22 PROCEDURE — 7100000011 HC PHASE II RECOVERY - ADDTL 15 MIN: Performed by: SURGERY

## 2021-07-22 PROCEDURE — 2709999900 HC NON-CHARGEABLE SUPPLY: Performed by: SURGERY

## 2021-07-22 PROCEDURE — 3609010400 HC COLONOSCOPY POLYPECTOMY HOT BIOPSY: Performed by: SURGERY

## 2021-07-22 PROCEDURE — 3700000000 HC ANESTHESIA ATTENDED CARE: Performed by: SURGERY

## 2021-07-22 RX ORDER — PROPOFOL 10 MG/ML
INJECTION, EMULSION INTRAVENOUS PRN
Status: DISCONTINUED | OUTPATIENT
Start: 2021-07-22 | End: 2021-07-22 | Stop reason: SDUPTHER

## 2021-07-22 RX ORDER — SODIUM CHLORIDE, SODIUM LACTATE, POTASSIUM CHLORIDE, CALCIUM CHLORIDE 600; 310; 30; 20 MG/100ML; MG/100ML; MG/100ML; MG/100ML
INJECTION, SOLUTION INTRAVENOUS CONTINUOUS
Status: DISCONTINUED | OUTPATIENT
Start: 2021-07-22 | End: 2021-07-22 | Stop reason: HOSPADM

## 2021-07-22 RX ORDER — SODIUM CHLORIDE, SODIUM LACTATE, POTASSIUM CHLORIDE, CALCIUM CHLORIDE 600; 310; 30; 20 MG/100ML; MG/100ML; MG/100ML; MG/100ML
INJECTION, SOLUTION INTRAVENOUS CONTINUOUS
Status: CANCELLED | OUTPATIENT
Start: 2021-07-22

## 2021-07-22 RX ORDER — SODIUM CHLORIDE 9 MG/ML
25 INJECTION, SOLUTION INTRAVENOUS PRN
Status: CANCELLED | OUTPATIENT
Start: 2021-07-22

## 2021-07-22 RX ORDER — PROPOFOL 10 MG/ML
INJECTION, EMULSION INTRAVENOUS CONTINUOUS PRN
Status: DISCONTINUED | OUTPATIENT
Start: 2021-07-22 | End: 2021-07-22 | Stop reason: SDUPTHER

## 2021-07-22 RX ORDER — SODIUM CHLORIDE 0.9 % (FLUSH) 0.9 %
5-40 SYRINGE (ML) INJECTION EVERY 12 HOURS SCHEDULED
Status: DISCONTINUED | OUTPATIENT
Start: 2021-07-22 | End: 2021-07-22 | Stop reason: HOSPADM

## 2021-07-22 RX ORDER — MAGNESIUM HYDROXIDE 1200 MG/15ML
LIQUID ORAL PRN
Status: DISCONTINUED | OUTPATIENT
Start: 2021-07-22 | End: 2021-07-22 | Stop reason: ALTCHOICE

## 2021-07-22 RX ORDER — METOPROLOL TARTRATE 5 MG/5ML
INJECTION INTRAVENOUS PRN
Status: DISCONTINUED | OUTPATIENT
Start: 2021-07-22 | End: 2021-07-22 | Stop reason: SDUPTHER

## 2021-07-22 RX ORDER — PROPOFOL 10 MG/ML
INJECTION, EMULSION INTRAVENOUS PRN
Status: DISCONTINUED | OUTPATIENT
Start: 2021-07-22 | End: 2021-07-22

## 2021-07-22 RX ORDER — SODIUM CHLORIDE 9 MG/ML
25 INJECTION, SOLUTION INTRAVENOUS PRN
Status: DISCONTINUED | OUTPATIENT
Start: 2021-07-22 | End: 2021-07-22 | Stop reason: HOSPADM

## 2021-07-22 RX ORDER — LIDOCAINE HYDROCHLORIDE 10 MG/ML
INJECTION, SOLUTION EPIDURAL; INFILTRATION; INTRACAUDAL; PERINEURAL PRN
Status: DISCONTINUED | OUTPATIENT
Start: 2021-07-22 | End: 2021-07-22 | Stop reason: SDUPTHER

## 2021-07-22 RX ORDER — SODIUM CHLORIDE 0.9 % (FLUSH) 0.9 %
5-40 SYRINGE (ML) INJECTION PRN
Status: DISCONTINUED | OUTPATIENT
Start: 2021-07-22 | End: 2021-07-22 | Stop reason: HOSPADM

## 2021-07-22 RX ORDER — LIDOCAINE HYDROCHLORIDE 10 MG/ML
INJECTION, SOLUTION EPIDURAL; INFILTRATION; INTRACAUDAL; PERINEURAL PRN
Status: DISCONTINUED | OUTPATIENT
Start: 2021-07-22 | End: 2021-07-22

## 2021-07-22 RX ORDER — SODIUM CHLORIDE 0.9 % (FLUSH) 0.9 %
10 SYRINGE (ML) INJECTION PRN
Status: CANCELLED | OUTPATIENT
Start: 2021-07-22

## 2021-07-22 RX ORDER — SODIUM CHLORIDE 0.9 % (FLUSH) 0.9 %
10 SYRINGE (ML) INJECTION EVERY 12 HOURS SCHEDULED
Status: CANCELLED | OUTPATIENT
Start: 2021-07-22

## 2021-07-22 RX ORDER — PANTOPRAZOLE SODIUM 40 MG/1
40 TABLET, DELAYED RELEASE ORAL DAILY
Qty: 30 TABLET | Refills: 3 | Status: SHIPPED | OUTPATIENT
Start: 2021-07-22 | End: 2021-10-25 | Stop reason: ALTCHOICE

## 2021-07-22 RX ADMIN — METOROPROLOL TARTRATE 2.5 MG: 5 INJECTION, SOLUTION INTRAVENOUS at 12:07

## 2021-07-22 RX ADMIN — METOROPROLOL TARTRATE 2.5 MG: 5 INJECTION, SOLUTION INTRAVENOUS at 12:10

## 2021-07-22 RX ADMIN — PROPOFOL 100 MCG/KG/MIN: 10 INJECTION, EMULSION INTRAVENOUS at 11:40

## 2021-07-22 RX ADMIN — PROPOFOL 40 MG: 10 INJECTION, EMULSION INTRAVENOUS at 11:28

## 2021-07-22 RX ADMIN — LIDOCAINE HYDROCHLORIDE 50 MG: 10 INJECTION, SOLUTION EPIDURAL; INFILTRATION; INTRACAUDAL; PERINEURAL at 11:28

## 2021-07-22 RX ADMIN — SODIUM CHLORIDE, POTASSIUM CHLORIDE, SODIUM LACTATE AND CALCIUM CHLORIDE: 600; 310; 30; 20 INJECTION, SOLUTION INTRAVENOUS at 09:58

## 2021-07-22 ASSESSMENT — PAIN - FUNCTIONAL ASSESSMENT: PAIN_FUNCTIONAL_ASSESSMENT: 0-10

## 2021-07-22 NOTE — H&P
HPI     Ms Jennifer Staton returns for re-evaluation of incisional hernia. She is a 61 yo WF with an extensive medical and surgical history including significant vascular disease, substance abuse, HTN, bladder cancer, etc initially to me by Dr Roopa Warren for evaluation of an ventral incisional hernia December 2019.  This has been present for years, but recently increased in size and become uncomfortable.  CT Dec 26, 2019 shows a less than 1 cm defect above the umbilicus, but 6 cm of herniated fat. Current CT June 2021 shows that the fascial defect has increased to 1.6 x 1.3 cm, with 8.3 cm sac  containing omental fat. She also has a left inguinal hernia.  Pain is worse with abdominal straining.  Neither previously repaired.  Extensive vascular surgery years ago.    She also has noticed blood per rectum, mostly on toilet tissue. Long history of epigastric pain with reflux symptoms and occasional dysphagia. No recent weight changes, 131 pounds, BMI 23. No family history of colon cancer nor polyps to her knowledge. She lost [employer] coverage when granddaughter's income was included in household income in 2019. Kahlil Merino granddaughter has since moved out, and she is in the process of re establishing insurance coverage.  She no longer smokes cigarettes.  History marijuana and alcohol abuse.       Review of Systems   Constitutional: Negative for activity change, appetite change, chills, fever and unexpected weight change. HENT: Negative for nosebleeds, sneezing, sore throat and trouble swallowing. Eyes: Negative for visual disturbance. Respiratory: Negative for cough, choking and shortness of breath. Cardiovascular: Negative for chest pain, palpitations and leg swelling. Gastrointestinal: Positive for abdominal distention, abdominal pain and blood in stool. Negative for nausea and vomiting. Genitourinary: Negative for dysuria, flank pain and hematuria. Musculoskeletal: Positive for arthralgias and back pain. Negative for gait problem and myalgias. Allergic/Immunologic: Negative for immunocompromised state. Neurological: Negative for dizziness, seizures, syncope, weakness and headaches. Hematological: Does not bruise/bleed easily. Psychiatric/Behavioral: Negative for confusion and sleep disturbance.         Past Medical History        Past Medical History:   Diagnosis Date    Alcohol abuse 3/2/2017    Arthritis      [facility] 2016    CAD (coronary artery disease)      Cancer ([facility] 2001     vulvar-    Carotid artery stenosis      Chronic back pain      Hyperlipidemia      Hypertension      Hypoglycemia      MI (myocardial infarction) ([facility] 03/01/2017    Peripheral vascular disease ([facility]      Takayasu's arteritis (HCC)      Tibial fracture       right    Umbilical hernia      Unspecified cerebral artery occlusion with cerebral infarction 1993            Past Surgical History         Past Surgical History:   Procedure Laterality Date    BLADDER SURGERY   5/17/2016     cysto with transurethral resection of bladder with mitomycin    CARDIAC CATHETERIZATION   12/2010    CARDIAC SURGERY   1993     aortic bypass graft for right carotid artery obstruction    FEMORAL BYPASS   5/12, 6/12     per Dr Sy Thacker   4-2016     TURBT    OTHER SURGICAL HISTORY         brachiocephalic-bypass    OTHER SURGICAL HISTORY         stenting of left internal carotid artery    VASCULAR SURGERY        VASCULAR SURGERY   5/12, 6/12     ken leg vacular surgery.     VULVA SURGERY   2001     cancer            Family History         Family History   Problem Relation Age of Onset    Cancer Father           lung    Cancer Mother              Allergies:  See list     Current Facility-Administered Medications          Current Outpatient Medications   Medication Sig Dispense Refill    metoprolol tartrate (LOPRESSOR) 50 MG tablet TAKE 1 TABLET BY MOUTH TWICE DAILY 60 tablet 11    losartan (COZAAR) 50 MG tablet TAKE 1 TABLET BY MOUTH TWICE DAILY (Patient not taking: Reported on 2021) 60 tablet 11    clopidogrel (PLAVIX) 75 MG tablet Take 1 tablet by mouth daily (Patient not taking: Reported on 2021) 30 tablet 11    atorvastatin (LIPITOR) 80 MG tablet Take 1 tablet by mouth nightly (Patient not taking: Reported on 2021) 30 tablet 11    isosorbide mononitrate (IMDUR) 30 MG extended release tablet TAKE 1 TABLET BY MOUTH EVERY DAY (Patient not taking: Reported on 6/3/2021) 30 tablet 11    amLODIPine (NORVASC) 5 MG tablet TAKE 1 TABLET BY MOUTH EVERY DAY (Patient not taking: Reported on 6/3/2021) 30 tablet 11    hydrocortisone (WESTCORT) 0.2 % cream Apply topically 2 times daily. (Patient not taking: Reported on 6/3/2021) 15 g 1    aspirin 81 MG chewable tablet Take 1 tablet by mouth daily (Patient not taking: Reported on 6/3/2021) 30 tablet 11    nitroGLYCERIN (NITROSTAT) 0.4 MG SL tablet up to max of 3 total doses. If no relief after 1 dose, call 911. (Patient not taking: Reported on 6/3/2021) 25 tablet 3      No current facility-administered medications for this visit.            Social History   Social History            Socioeconomic History    Marital status:        Spouse name: Not on file    Number of children: Not on file    Years of education: Not on file    Highest education level: Not on file   Occupational History    Not on file   Tobacco Use    Smoking status: Former Smoker       Packs/day: 1.50       Years: 15.00       Pack years: 22.50       Types: Cigarettes       Quit date: 1993       Years since quittin.4    Smokeless tobacco: Never Used    Tobacco comment: QUIT    Vaping Use    Vaping Use: Never used   Substance and Sexual Activity    Alcohol use: Yes       Alcohol/week: 6.0 standard drinks       Types: 6 Cans of beer per week       Comment: occasional    Drug use:  Yes       Types: Marijuana    Sexual activity: Yes       Partners: Male Other Topics Concern    Not on file   Social History Narrative    Not on file      Social Determinants of Health          Financial Resource Strain: Low Risk     Difficulty of Paying Living Expenses: Not very hard   Food Insecurity: No Food Insecurity    Worried About Running Out of Food in the Last Year: Never true    Jameel of Food in the Last Year: Never true   Transportation Needs:     Lack of Transportation (Medical):  Lack of Transportation (Non-Medical):    Physical Activity:     Days of Exercise per Week:     Minutes of Exercise per Session:    Stress:     Feeling of Stress :    Social Connections:     Frequency of Communication with Friends and Family:     Frequency of Social Gatherings with Friends and Family:     Attends Scientologist Services:     Active Member of Clubs or Organizations:     Attends Club or Organization Meetings:     Marital Status:    Intimate Partner Violence:     Fear of Current or Ex-Partner:     Emotionally Abused:     Physically Abused:     Sexually Abused:             /86 (Site: Right Upper Arm, Position: Sitting, Cuff Size: Medium Adult)   Pulse 66   Temp 97.3 °F (36.3 °C) (Temporal)   Ht 5' 3\" (1.6 m)   Wt 131 lb 3.2 oz (59.5 kg)   LMP 08/17/2011   SpO2 97%   BMI 23.24 kg/m²       Physical Exam  Vitals and nursing note reviewed. Constitutional:       Appearance: She is well-developed. HENT:      Head: Normocephalic and atraumatic. Eyes:      General: No scleral icterus. Conjunctiva/sclera: Conjunctivae normal.      Pupils: Pupils are equal, round, and reactive to light. Neck:      Vascular: No JVD. Trachea: No tracheal deviation. Cardiovascular:      Rate and Rhythm: Normal rate and regular rhythm. Pulmonary:      Effort: Pulmonary effort is normal. No respiratory distress. Chest:      Chest wall: No tenderness. Abdominal:      General: There is no distension. Palpations: Abdomen is soft. There is no mass. Tenderness: There is abdominal tenderness. There is no guarding or rebound. Hernia: A hernia is present. Musculoskeletal:         General: Normal range of motion. Cervical back: Normal range of motion and neck supple. Lymphadenopathy:      Cervical: No cervical adenopathy. Skin:     General: Skin is warm and dry. Findings: No erythema or rash. Neurological:      Mental Status: She is alert and oriented to person, place, and time. Cranial Nerves: No cranial nerve deficit. Psychiatric:         Behavior: Behavior normal.         Thought Content:  Thought content normal.         Judgment: Judgment normal.            IMAGING/LABS     CT ABDOMEN PELVIS W IV CONTRAST Additional Contrast? Oral  Status: Final result   Order Providers     Authorizing Encounter Billing   Oliverio Quevedo MD [facility] [address], [place]           Signed by     Signed Date/Time Phone Pager   Fabrizio Israel 6/16/2021 11:48 [phone]     Reading Providers     Read Date Phone Pager   David Boogie Jun 16, 2021 11:48 [phone]     All Reviewers List     Oliverio Quevedo MD on 6/16/2021 17:37   Routing History     Priority Sent On From To Message Type     6/16/2021 11:50 AM Tate, [facility] Incoming Radiant Results From [facility] [address] Griselda Moreno MD Results   Radiation Dose Estimates     No radiation information found for this patient   Narrative   EXAMINATION: CT ABDOMEN PELVIS W IV CONTRAST       HISTORY: Incisional hernia followup       COMPARISON: CT 12/26/2019.            TECHNIQUE: CT examination of the abdomen and pelvis following the    administration of intravenous contrast. Coronal and sagittal reformations were    performed.       Dose reduction techniques were achieved by using automated exposure control    and/or adjustment of mA and/or kV according to patient size and/or use of    iterative reconstruction technique.           FINDINGS:        Lung bases are clear.       ABDOMEN:       Liver is homogeneous in attenuation without evidence for focal lesion. Probable    small gallstones. No pericholecystic inflammatory changes by CT. No abnormal    biliary dilatation. The portal vein is patent. Spleen, adrenal glands and    pancreas are normal in appearance.       Symmetric enhancement and excretion of contrast from the kidneys without    evidence for hydronephrosis or focal renal lesion. No definite intrarenal    calculus. Left extrarenal pelvis, as before.       PELVIS:       Ureters and bladder are unremarkable. Uterus and adnexa are unremarkable for    age. No pelvic free fluid. Aortoiliac atherosclerotic calcifications are    present. Colonic diverticulosis without evidence for acute diverticulitis. No    evidence for small or large bowel obstruction. Normal appendix. No free    intraperitoneal air. There is a midline supraumbilical fat-containing ventral    hernia with hernia sac measuring 8.3 x 4.5 x 8.3 cm. Fascial defect measures    1.6 x 1.3 cm. No additional ventral hernia. No suspicious osteolytic or    osteoblastic lesion. 5 mm anterolisthesis of L4 on L5 secondary to facet    degenerative change.               Impression       1. Large fat-containing supraumbilical midline ventral hernia with fascial    defect measuring 1.6 x 1.3 cm and hernia sac measuring 8.3 x 4.5 x 8.3 cm. No    associated inflammatory change.       2. During contrast injection the technologist reports that the patient's right    antecubital fossa IV infiltrated with up to 15 mL of intravenous contrast. A    new IV was placed in the left upper extremity and contrast injected and images    acquired.       3. Colonic diverticulosis without evidence for acute diverticulitis. Normal    appendix.            ASSESSMENT       Diagnosis Orders   1. Rectal bleeding      2. Gastroesophageal reflux disease, unspecified whether esophagitis present      3.  Dysphagia, unspecified type      4. Ventral incisional hernia      5. History of bladder cancer      6. History of MI (myocardial infarction)      7. Peripheral vascular disease (HCC)      8. BMI 23.0-23.9, adult      9. History of substance abuse ([facility]      10. History of tobacco abuse            PLAN     Previously discussed at length with Ms. Sushila Collins her complaints of rectal bleeding, epigastric pain with reflux symptoms, incisional hernia, etc.  We will proceed with diagnostic endoscopy. Risks, benefits, alternatives thoroughly reviewed and accepted by Ms. Sushila Collins, including GI bleeding, perforation, missed lesions, COVID-19 exposure/infection, etc.  [place] diet for now. Encouraged to continue complete tobacco cessation.      Jas Ness MD

## 2021-07-22 NOTE — ANESTHESIA POSTPROCEDURE EVALUATION
Department of Anesthesiology  Postprocedure Note    Patient: Chidi Rust  MRN: [id number]  YOB: 1960  Date of evaluation: 7/22/2021  Time:  12:52 PM     Procedure Summary     Date: 07/22/21 Room / Location: [facility] / [facility] ENDOSCOPY    Anesthesia Start: 5809 Anesthesia Stop:     Procedures:       COLONOSCOPY POLYPECTOMY HOT BIOPSY WITH INK TATTOO (N/A Abdomen)      EGD POLYP SNARE (N/A ) Diagnosis: (DYSPHAGIA REFLUX SYMPTOMS, RECTAL BLEEDING)    Surgeons: Edwin Taveras MD Responsible Provider: MARGARITA Black CRNA    Anesthesia Type: MAC ASA Status: 3          Anesthesia Type: No value filed. Segundo Phase I: Segundo Score: 10    Segundo Phase II: Segundo Score: 10    Last vitals: Reviewed and per EMR flowsheets.        Anesthesia Post Evaluation    Patient location during evaluation: PACU  Patient participation: complete - patient participated  Level of consciousness: awake and alert  Pain score: 0  Airway patency: patent  Nausea & Vomiting: no nausea and no vomiting  Complications: no  Cardiovascular status: blood pressure returned to baseline and hemodynamically stable  Respiratory status: acceptable, room air and spontaneous ventilation  Hydration status: euvolemic

## 2021-07-22 NOTE — ANESTHESIA PRE PROCEDURE
Department of Anesthesiology  Preprocedure Note       Name:  Edenilson Eng   Age:  62 y.o.  :  1960                                          MRN:  [id number]         Date:  2021      Surgeon: Adarsh Art):  Dari Gonsalves MD    Procedure: Procedure(s):  COLONOSCOPY  EGD    Medications prior to admission:   Prior to Admission medications    Medication Sig Start Date End Date Taking? Authorizing Provider   Omega-3 Fatty Acids (OMEGA 3 PO) Take by mouth   Yes Historical Provider, MD   Apoaequorin (PREVAGEN PO) Take by mouth   Yes Historical Provider, MD   metoprolol tartrate (LOPRESSOR) 50 MG tablet TAKE 1 TABLET BY MOUTH TWICE DAILY 6/3/21  Yes Kari Lino MD   losartan (COZAAR) 50 MG tablet TAKE 1 TABLET BY MOUTH TWICE DAILY 6/3/21  Yes Kari Lino MD   clopidogrel (PLAVIX) 75 MG tablet Take 1 tablet by mouth daily  Patient not taking: Reported on 2021 6/3/21   Kari Lino MD   amLODIPine (NORVASC) 5 MG tablet TAKE 1 TABLET BY MOUTH EVERY DAY  Patient not taking: Reported on 6/3/2021 6/5/20   Mirella Dubois MD   hydrocortisone (WESTCORT) 0.2 % cream Apply topically 2 times daily.   Patient not taking: Reported on 6/3/2021 9/12/19   Kari Lino MD       Current medications:    Current Facility-Administered Medications   Medication Dose Route Frequency Provider Last Rate Last Admin    lactated ringers infusion   Intravenous Continuous Dari Gonsalves  mL/hr at 21 0958 New Bag at 21 0958    sodium chloride flush 0.9 % injection 5-40 mL  5-40 mL Intravenous 2 times per day Dari Gonsalves MD        sodium chloride flush 0.9 % injection 5-40 mL  5-40 mL Intravenous PRN Dari Gonsalves MD        0.9 % sodium chloride infusion  25 mL Intravenous PRN Dari Gonsalves MD           Allergies:  No Known Allergies    Problem List:    Patient Active Problem List   Diagnosis Code    Low back pain M54.5    Essential hypertension I10    Dyslipidemia E78.5    Incisional hernia at bottom of median sternotomy scar K43.2    Calcium deposit tendon M65.80    Peripheral vascular disease (HCC) H09.7    Umbilical hernia V73.2    Bladder mass N32.89    Malignant neoplasm of lateral wall of urinary bladder (HCC) C67.2    NSTEMI (non-ST elevated myocardial infarction) ([facility]) I21.4    Alcohol abuse F10.10    Marihuana abuse F12.10    Coronary artery disease of native heart with stable angina pectoris ([address] I25.118       Past Medical History:        Diagnosis Date    Alcohol abuse 3/2/2017    Arthritis     Bladder cancer ([address]     CAD (coronary artery disease)     Cancer ([facility]     vulvar-    Carotid artery stenosis     Chronic back pain     Hyperlipidemia     Hypertension     Hypoglycemia     MI (myocardial infarction) ([facility] 2017    Peripheral vascular disease ([facility]     Takayasu's arteritis ([facility]     Tibial fracture     right    Umbilical hernia     Unspecified cerebral artery occlusion with cerebral infarction        Past Surgical History:        Procedure Laterality Date    BLADDER SURGERY  2016    cysto with transurethral resection of bladder with mitomycin    CARDIAC CATHETERIZATION  2010   [employer] CARDIAC SURGERY      aortic bypass graft for right carotid artery obstruction    FEMORAL BYPASS  ,     per Dr Deena Arias      TURBT    OTHER SURGICAL HISTORY      brachiocephalic-bypass    OTHER SURGICAL HISTORY      stenting of left internal carotid artery    VASCULAR SURGERY      VASCULAR SURGERY  ,     ken leg vacular surgery.  VULVA SURGERY      cancer       Social History:    Social History     Tobacco Use    Smoking status: Former Smoker     Packs/day: 1.50     Years: 15.00     Pack years: 22.50     Types: Cigarettes     Quit date: 1993     Years since quittin.5    Smokeless tobacco: Never Used    Tobacco comment: QUIT    Substance Use Topics    Alcohol use:  Yes Alcohol/week: 6.0 standard drinks     Types: 6 Cans of beer per week     Comment: occasional                                Counseling given: Not Answered  Comment: QUIT 1993      Vital Signs (Current):   Vitals:    07/13/21 1318 07/22/21 0936 07/22/21 0952   BP:   (!) 170/120   Pulse:   70   Resp:   20   Temp:   36.9 °C (98.5 °F)   TempSrc:   Temporal   SpO2:   96%   Weight: 130 lb (59 kg) 132 lb 3.2 oz (60 kg)    Height: 5' 3\" (1.6 m) 5' 3\" (1.6 m)                                               BP Readings from Last 3 Encounters:   07/22/21 (!) 170/120   07/01/21 (!) 150/100   06/07/21 138/86       NPO Status: Time of last liquid consumption: 2345                        Time of last solid consumption: 0800                        Date of last liquid consumption: 07/21/21                        Date of last solid food consumption: 07/21/21    BMI:   Wt Readings from Last 3 Encounters:   07/22/21 132 lb 3.2 oz (60 kg)   07/01/21 133 lb (60.3 kg)   06/07/21 131 lb 3.2 oz (59.5 kg)     Body mass index is 23.42 kg/m². CBC:   Lab Results   Component Value Date    WBC 5.3 06/09/2021    RBC 3.92 06/09/2021    RBC 3.36 05/31/2012    HGB 13.7 06/09/2021    HCT 39.3 06/09/2021    .2 06/09/2021    RDW 14.0 06/09/2021     06/09/2021     05/31/2012       CMP:   Lab Results   Component Value Date     06/09/2021    K 4.1 06/09/2021     06/09/2021    CO2 21 06/09/2021    BUN 12 06/09/2021    CREATININE 0.69 06/09/2021    GFRAA >60 06/09/2021    LABGLOM >60 06/09/2021    GLUCOSE 101 06/09/2021    GLUCOSE 86 05/31/2012    PROT 7.6 06/09/2021    CALCIUM 9.7 06/09/2021    BILITOT 0.42 06/09/2021    ALKPHOS 70 06/09/2021    AST 18 06/09/2021    ALT 13 06/09/2021       POC Tests: No results for input(s): POCGLU, POCNA, POCK, POCCL, POCBUN, POCHEMO, POCHCT in the last 72 hours.     Coags: No results found for: PROTIME, INR, APTT    HCG (If Applicable): No results found for: PREGTESTUR, PREGSERUM, HCG, HCGQUANT     ABGs: No results found for: PHART, PO2ART, TNX5DVC, BPG5BUL, BEART, J8WWVJOC     Type & Screen (If Applicable):  No results found for: LABABO, LABRH    Drug/Infectious Status (If Applicable):  No results found for: HIV, HEPCAB    COVID-19 Screening (If Applicable):   Lab Results   Component Value Date    COVID19 Not Detected 07/22/2021           Anesthesia Evaluation  Patient summary reviewed and Nursing notes reviewed  Airway: Mallampati: II  TM distance: >3 FB   Neck ROM: full  Mouth opening: > = 3 FB Dental:    (+) upper dentures and lower dentures  Comment: Poor repair    Pulmonary:normal exam  breath sounds clear to auscultation                            ROS comment: Former smoker with emphasematic changes noted on chest X ray   Cardiovascular:    (+) hypertension:, angina (no current or recent angina): no interval change, past MI:, CAD:, CABG/stent: no interval change,       ECG reviewed  Rhythm: regular  Rate: normal  Echocardiogram reviewed  Stress test reviewed                Neuro/Psych:   (+) CVA: no interval change, psychiatric history:            GI/Hepatic/Renal:   (+) bowel prep,           Endo/Other:    (+) malignancy/cancer (bladder mass). Abdominal:       Abdomen: soft. Vascular:   + PVD, aortic or cerebral, . Other Findings:           Anesthesia Plan      MAC     ASA 3       Induction: intravenous. Anesthetic plan and risks discussed with patient. Plan discussed with attending.                   MARGARITA Muro - EZNIA   7/22/2021

## 2021-07-22 NOTE — PROGRESS NOTES
Discharge Criteria  Outpatients must meet criteria 1 through 7. Up to restroom, void sufficient amount. Yes    1. Minimum 30 minutes after last dose of sedative medication, minimum 120 minutes after last dose of reversal agent. Yes    2. Systolic BP stable within 20 mmHg for 30 minutes & systolic BP between 90 & 058 or within 10 mmHg of baseline. Yes    3. Pulse between 60 and 100 or within 10 bpm of baseline. Yes    4. Spontaneous respiratory rate >/= 10 per minute. Yes    5. SaO2 >/= 95 or  >/= baseline. Yes    6. Able to cough and swallow or return to baseline function. Yes    7. Alert and oriented or return to baseline mental status. Yes    8. Demonstrates controlled, coordinated movements, ambulates with steady gait, or return to baseline activity function. Yes    9. Minimal or no pain or nausea, or at a level tolerable and acceptable to patient. Yes    10. Takes and retains oral fluids as allowed. Yes    11. Procedural / perioperative site stable. Minimal or no bleeding. Yes        12. If GI endoscopy procedure, minimal or no abdominal distention or passing flatus. Yes    13. Written discharge instructions and emergency telephone number provided. Yes    14. Accompanied by a responsible adult. Yes    Adult patient discharged from facility without responsible person meets above criteria plus the following:   a) remains awake without stimulus for 30 minutes     b) oriented appropriate for age      c) all vital signs stable   d) no significant risk of losing protective reflexes      e) able to maintain pre-procedure mobility without assistance   f) no nausea or dizziness      g) transportation arrangements that do not require patient to operate motor   Vehicle.   Yes

## 2021-07-22 NOTE — PROGRESS NOTES
Pts blood pressure remains high. Pt states, \"I probably need to take all my blood pressure medication like the doctor tells me. \" Pt questioned about her pills. She states she only took her metoprolol this morning but is supposed to take it twice a day, plus her cozaar and amlodipine. Pt educated on the importance of taking them as directed by the physician. Pt and   Both state they understand. Pt also educated on the full liquid diet and the importance of  Maintaining it to watch for any perforation. Pt denies further questions at this time. Call light encouraged.

## 2021-07-22 NOTE — BRIEF OP NOTE
Brief Postoperative Note      Patient: Patrick Tse  YOB: 1960  MRN: [id number]    Date of Procedure: 7/22/2021    Pre-Op Diagnosis:      1. Epigastric pain with GERD symptoms     2. Rectal bleeding    Post-Op Diagnosis:      1. Diffuse superficial gastritis with duodenitis     2. Small hiatal hernia     3. Multiple colon polyps, descending and sigmoid      4. Extensive diverticulosis, R and L colon       Operation:     1. EGD     2. Antral biopsies     3. Colonoscopy anus to cecum     4. Large sessile ~2cm pedunculated sigmoid polypectomy with tattoo (~20cm from anal verge)     5. Sessile sigmoid polypectomy with tattoo, (~50 cm from anal verge)    Surgeon(s):  Carmela Whitt MD    Assistant:  * No surgical staff found *    Anesthesia: Monitor Anesthesia Care    Estimated Blood Loss (mL): less than 45MY     Complications: None    Specimens:   ID Type Source Tests Collected by Time Destination   1 : 1. clotest Tissue Stomach H. PYLORI DETECTION Carmela Whitt MD 7/22/2021 1134    A : A. ANTRUM X3 Tissue Stomach SURGICAL PATHOLOGY Carmela Whitt MD 7/22/2021 1135    B : B. SIGMOID 35 Tissue Sigmoid Colon SURGICAL PATHOLOGY Carmela Whitt MD 7/22/2021 1209    C : [address] Tissue Sigmoid Colon SURGICAL PATHOLOGY Carmela Whitt MD 7/22/2021 1225        Findings:  As above.     Dictated # [id number]    Electronically signed by Carmela Whitt MD on 7/22/2021 at 12:33 PM

## 2021-07-23 LAB
DIRECT EXAM: NEGATIVE
Lab: NORMAL
SPECIMEN DESCRIPTION: NORMAL

## 2021-07-23 NOTE — OP NOTE
[place] [address]                                OPERATIVE REPORT    PATIENT NAME: Re THIBODEAUX                       :        1960  MED REC NO:   [id number]                              ROOM:  ACCOUNT NO:   [de-identified]                           ADMIT DATE: 2021  PROVIDER:     Vicki Wolf    DATE OF PROCEDURE:  2021    ATTENDING SURGEON:  Vicki Wolf MD    PCP:  Erman Litten, MD    PREOPERATIVE DIAGNOSES:  1.  Epigastric pain with reflux symptoms. 2.  Rectal bleeding. POSTOPERATIVE DIAGNOSES:  1. Diffuse superficial gastritis with duodenitis. 2.  Small hiatal hernia. 3.  Multiple colon polyps, descending and sigmoid colon. 4.  Extensive diverticulosis, right and left colon. OPERATIONS:  1. Esophagogastroduodenoscopy. 2.  Prepyloric antral biopsies. 3.  Colonoscopy, anus to cecum. 4.  Large sessile approximately 2 cm pedunculated sigmoid polypectomy  with tattoo (approximately 20 cm from anal verge). 5.  Sessile sigmoid polypectomy with tattoo (approximately 50 cm from  anal verge). ANESTHESIA:  MAC.    ESTIMATED BLOOD LOSS:  Less than 20 mL. INDICATIONS:  The patient is a 50-year-old white female who has recently  noticed blood per rectum mostly on toilet tissue, long history of  epigastric pain with reflux symptoms, occasional dysphagia, BMI of 23,  no family history of colon cancer nor polyps, the patient quit tobacco  use, history of marijuana and alcohol abuse, also with a epigastric  incisional hernia with 1.6 cm fascial defect. At this time, endoscopy  is indicated.     OPERATIVE PROCEDURE:  After obtaining informed consent with discussion  of risks, benefits, and alternatives including a risk of GI bleeding,  perforation, missed lesions, COVID-19 exposure/infection, etc., the  patient was taken to the endoscopy suite and placed in the left lateral  recumbent position. Following adequate IV sedation, an endoscope was  passed over the tongue into the posterior oropharynx. Vocal folds were  visualized and appeared normal.  The scope was directed into the  esophagus and onto the GE junction. Upper, mid, and lower esophagus all  appeared normal with the exception of a small hiatal hernia. There were  no rings, no webs, no strictures, no varices. The stomach was entered,  had normal distensibility. On retroflexion, the fundus and cardia  appeared normal.  The body and prepyloric antrum had diffuse superficial  gastritis without ulceration. Prepyloric antral biopsies were obtained. Pylorus was patent. The duodenum was entered. First portion of the  duodenum had mild superficial duodenitis. Second and third portions  appeared normal.  The stomach was decompressed by suction as the scope  was removed. A digital rectal exam was performed. Sphincter tone was  normal.  Perianal skin tags were present. A colonoscope was passed  transanally into the rectum and advanced with gentle insufflation  throughout the entirety of the colon to the cecum. Cecal position was  confirmed by clear visualization of the ileocecal valve, the appendiceal  orifice, and transduction of manual pressure in the right lower quadrant  to the cecum. The bowel prep was marginal.  There was thick brown stool  still present throughout all of the colon. The majority of this was  able to be suction irrigated clear to provide suitable mucosal  evaluation. The cecum was normal.  There were multiple diverticula. The ascending colon, hepatic flexure was normal.  Transverse colon with  scattered diverticula. Splenic flexure normal.  The descending colon  and sigmoid were remarkably redundant with extensive diverticular  changes. Approximately 50 to 55 cm from the anal verge, a sessile polyp  was removed by hot snare polypectomy. This was subcentimeter in  diameter.   The

## 2021-07-26 LAB — SURGICAL PATHOLOGY REPORT: NORMAL

## 2021-08-02 ENCOUNTER — OFFICE VISIT (OUTPATIENT)
Dept: SURGERY | Age: 61
End: 2021-08-02
Payer: COMMERCIAL

## 2021-08-02 VITALS
TEMPERATURE: 98.7 F | RESPIRATION RATE: 18 BRPM | HEIGHT: 63 IN | BODY MASS INDEX: 23.74 KG/M2 | HEART RATE: 94 BPM | WEIGHT: 134 LBS

## 2021-08-02 DIAGNOSIS — F12.90 MARIJUANA USE: ICD-10-CM

## 2021-08-02 DIAGNOSIS — K57.30 DIVERTICULOSIS OF COLON: ICD-10-CM

## 2021-08-02 DIAGNOSIS — K29.90 GASTRITIS AND DUODENITIS: ICD-10-CM

## 2021-08-02 DIAGNOSIS — D12.5 ADENOMATOUS POLYP OF SIGMOID COLON: ICD-10-CM

## 2021-08-02 DIAGNOSIS — R97.0 ELEVATED CEA: ICD-10-CM

## 2021-08-02 DIAGNOSIS — Z98.890 S/P COLONOSCOPY WITH POLYPECTOMY: Primary | ICD-10-CM

## 2021-08-02 DIAGNOSIS — K43.2 INCISIONAL HERNIA, WITHOUT OBSTRUCTION OR GANGRENE: ICD-10-CM

## 2021-08-02 DIAGNOSIS — K44.9 HIATAL HERNIA: ICD-10-CM

## 2021-08-02 PROCEDURE — 1036F TOBACCO NON-USER: CPT | Performed by: SURGERY

## 2021-08-02 PROCEDURE — 99213 OFFICE O/P EST LOW 20 MIN: CPT | Performed by: SURGERY

## 2021-08-02 PROCEDURE — G8427 DOCREV CUR MEDS BY ELIG CLIN: HCPCS | Performed by: SURGERY

## 2021-08-02 PROCEDURE — G8420 CALC BMI NORM PARAMETERS: HCPCS | Performed by: SURGERY

## 2021-08-02 PROCEDURE — 3017F COLORECTAL CA SCREEN DOC REV: CPT | Performed by: SURGERY

## 2021-08-02 ASSESSMENT — ENCOUNTER SYMPTOMS
COUGH: 0
CHOKING: 0
BLOOD IN STOOL: 0
SHORTNESS OF BREATH: 0
BACK PAIN: 1
VOMITING: 0
ABDOMINAL PAIN: 0
TROUBLE SWALLOWING: 0
NAUSEA: 0
SORE THROAT: 0

## 2021-08-02 NOTE — PATIENT INSTRUCTIONS
Patient Education   Patient Education        Hiatal Hernia: Care Instructions  Your Care Instructions  A hiatal hernia occurs when part of the stomach bulges into the chest cavity. A hiatal hernia may allow stomach acid and juices to back up into the esophagus (acid reflux). This can cause a feeling of burning, warmth, heat, or pain behind the breastbone. This feeling may often occur after you eat, soon after you lie down, or when you bend forward, and it may come and go. You also may have a sour taste in your mouth. These symptoms are commonly known as heartburn or reflux. But not all hiatal hernias cause symptoms. Follow-up care is a key part of your treatment and safety. Be sure to make and go to all appointments, and call your doctor if you are having problems. It's also a good idea to know your test results and keep a list of the medicines you take. How can you care for yourself at home? · Take your medicines exactly as prescribed. Call your doctor if you think you are having a problem with your medicine. · Do not take aspirin or other nonsteroidal anti-inflammatory drugs (NSAIDs), such as ibuprofen (Advil, Motrin) or naproxen (Aleve), unless your doctor says it is okay. Ask your doctor what you can take for pain. · Your doctor may recommend over-the-counter medicine. For mild or occasional indigestion, antacids such as Tums, Gaviscon, Maalox, or Mylanta may help. Your doctor also may recommend over-the-counter acid reducers, such as famotidine (Pepcid AC), cimetidine (Tagamet HB), or omeprazole (Prilosec). Read and follow all instructions on the label. If you use these medicines often, talk with your doctor. · Change your eating habits. ? It's best to eat several small meals instead of two or three large meals. ? After you eat, wait 2 to 3 hours before you lie down. Late-night snacks aren't a good idea. ? Chocolate, mint, and alcohol can make heartburn worse.  They relax the valve between the esophagus and the stomach. ? Spicy foods, foods that have a lot of acid (like tomatoes and oranges), and coffee can make heartburn symptoms worse in some people. If your symptoms are worse after you eat a certain food, you may want to stop eating that food to see if your symptoms get better. · Do not smoke or chew tobacco.  · If you get heartburn at night, raise the head of your bed 6 to 8 inches by putting the frame on blocks or placing a foam wedge under the head of your mattress. (Adding extra pillows does not work.)  · Do not wear tight clothing around your middle. · Lose weight if you need to. Losing just 5 to 10 pounds can help. When should you call for help? Call your doctor now or seek immediate medical care if:    · You have new or worse belly pain.     · You are vomiting. Watch closely for changes in your health, and be sure to contact your doctor if:    · You have new or worse symptoms of indigestion.     · You have trouble or pain swallowing.     · You are losing weight.     · You do not get better as expected. Where can you learn more? Go to https://YASSSUpeNoDaysOff.Kaizena. org and sign in to your [employer] account. Enter W667 in the [facility] box to learn more about \"Hiatal Hernia: Care Instructions. \"     If you do not have an account, please click on the \"Sign Up Now\" link. Current as of: February 10, 2021               Content Version: 12.9  © 2006-2021 Healthwise, [facility]. Care instructions adapted under license by [facility] ([facility]). If you have questions about a medical condition or this instruction, always ask your healthcare professional. [place] [address] any warranty or liability for your use of this information. Diverticulosis: Care Instructions  Your Care Instructions  In diverticulosis, pouches called diverticula form in the wall of the large intestine (colon). The pouches do not cause any pain or other symptoms.  Most people who have diverticulosis do not know they have it. But the pouches sometimes bleed, and if they become infected, they can cause pain and other symptoms. When this happens, it is called diverticulitis. Diverticula form when pressure pushes the wall of the colon outward at certain weak points. A diet that is too low in fiber can cause diverticula. Follow-up care is a key part of your treatment and safety. Be sure to make and go to all appointments, and call your doctor if you are having problems. It's also a good idea to know your test results and keep a list of the medicines you take. How can you care for yourself at home? · Include fruits, leafy green vegetables, beans, and whole grains in your diet each day. These foods are high in fiber. · Take a fiber supplement, such as Citrucel or Metamucil, every day if needed. Read and follow all instructions on the label. · Drink plenty of fluids. If you have kidney, heart, or liver disease and have to limit fluids, talk with your doctor before you increase the amount of fluids you drink. · Get at least 30 minutes of exercise on most days of the week. Walking is a good choice. You also may want to do other activities, such as running, swimming, cycling, or playing tennis or team sports. · Cut out foods that cause gas, pain, or other symptoms. When should you call for help? Call your doctor now or seek immediate medical care if:    · You have belly pain.     · You pass maroon or very bloody stools.     · You have a fever.     · You have nausea and vomiting.     · You have unusual changes in your bowel movements or abdominal swelling.     · You have burning pain when you urinate.     · You have abnormal vaginal discharge.     · You have shoulder pain.     · You have cramping pain that does not get better when you have a bowel movement or pass gas.     · You pass gas or stool from your urethra while urinating.    Watch closely for changes in your health, and be sure to contact your doctor if you have any problems. Where can you learn more? Go to https://chpepiceweb.[email or web address]. org and sign in to your [employer] account. Enter X275 in the [employer] box to learn more about \"Diverticulosis: Care Instructions. \"     If you do not have an account, please click on the \"Sign Up Now\" link. Current as of: February 10, 2021               Content Version: 12.9  © 2006-2021 Healthwise, Incorporated. Care instructions adapted under license by [facility] ([facility]). If you have questions about a medical condition or this instruction, always ask your healthcare professional. [address] any warranty or liability for your use of this information.

## 2021-08-02 NOTE — LETTER
[address] [place] [id number]  Phone: [phone]  Fax: [phone]    Too Cheema MD    August 2, 2021     Sigifredo Meng MD  [address] [place] [id number]    Patient: Angela [place]   MR Number: [id number]   YOB: 1960   Date of Visit: 8/2/2021       Dear Sigifredo Meng:    Thank you for referring Tyra Whipple to me for evaluation/treatment. Below are the relevant portions of my assessment and plan of care. ASSESSMENT     Diagnosis Orders   1. S/P colonoscopy with polypectomy     2. Adenomatous polyp of sigmoid colon     3. Diverticulosis of colon     4. Hiatal hernia     5. Gastritis and duodenitis     6. Elevated CEA     7. Marijuana use     8. BMI 23.0-23.9, adult     9. Incisional hernia, without obstruction or gangrene       PLAN    Endoscopic findings and pathology reviewed with Ms. Mendel Quan. Given the size of the large sessile 2 cm pedunculated adenomatous polyp of the lower sigmoid, will repeat colonoscopy in 3 to 6 months. CEA is elevated to 7.5, nearly twice normal.  Patient states that she no longer uses tobacco, but smokes marijuana regularly. We will repeat CEA in the coming months with next colonoscopy. Discussed importance of a healthy, balanced high-fiber low-fat diet, with fiber supplementation, increased water intake, etc.  Continue proton pump inhibition. Consider decrease marijuana use. Epigastric incisional hernia with 1.6 cm fascial defect will be repaired laparoscopically in the coming months, following the next colonoscopy. If you have questions, please do not hesitate to call me. I look forward to following Kristen Burnett along with you.     Sincerely,    MD Too Rivas MD

## 2021-08-02 NOTE — PROGRESS NOTES
Mariah Ho MD  General Surgery, Endoscopy  Chief Medical Officer    [facility] Sis Shook  [address] [facility] [address]  Dept: [phone]  Fax: 56 Kaitlin Wen  Chief Complaint   Patient presents with    Follow Up After Procedure     f/u egd, colonoscopy 07/22/21. HPI    Ms Jerel Gaston returns for follow-up after endoscopy. DATE OF PROCEDURE:  07/22/2021     ATTENDING SURGEON:  Juan Manuel Castaneda MD     PCP:  Yesenia Freitas MD     PREOPERATIVE DIAGNOSES:  1.  Epigastric pain with reflux symptoms. 2.  Rectal bleeding.     POSTOPERATIVE DIAGNOSES:  1. Diffuse superficial gastritis with duodenitis. 2.  Small hiatal hernia. 3.  Multiple colon polyps, descending and sigmoid colon. 4.  Extensive diverticulosis, right and left colon.     OPERATIONS:  1. Esophagogastroduodenoscopy. 2.  Prepyloric antral biopsies. 3.  Colonoscopy, anus to cecum. 4.  Large sessile ~ 2 cm pedunculated sigmoid polypectomy with tattoo (~20 cm from anal verge). 5.  Sessile sigmoid polypectomy with tattoo (approximately 50 cm from anal verge)    She is doing well. No new complaints. Review of Systems   Constitutional: Negative for activity change, appetite change, chills, fever and unexpected weight change. HENT: Negative for nosebleeds, sneezing, sore throat and trouble swallowing. Eyes: Negative for visual disturbance. Respiratory: Negative for cough, choking and shortness of breath. Cardiovascular: Negative for chest pain, palpitations and leg swelling. Gastrointestinal: Negative for abdominal pain, blood in stool, nausea and vomiting. Genitourinary: Negative for dysuria, flank pain and hematuria. Musculoskeletal: Positive for arthralgias and back pain. Negative for gait problem and myalgias. Allergic/Immunologic: Negative for immunocompromised state. Neurological: Negative for dizziness, seizures, syncope, weakness and headaches.    Hematological: Does not bruise/bleed easily. Psychiatric/Behavioral: Negative for confusion and sleep disturbance. Past Medical History:   Diagnosis Date    Alcohol abuse 3/2/2017    Arthritis     Bladder cancer [facility]) 2016    CAD (coronary artery disease)     Cancer ([facility] 2001    vulvar-    Carotid artery stenosis     Chronic back pain     Hyperlipidemia     Hypertension     Hypoglycemia     MI (myocardial infarction) ([facility] 03/01/2017    Peripheral vascular disease ([facility]     Takayasu's arteritis ([facility]     Tibial fracture     right    Umbilical hernia     Unspecified cerebral artery occlusion with cerebral infarction 1993       Past Surgical History:   Procedure Laterality Date    BLADDER SURGERY  5/17/2016    cysto with transurethral resection of bladder with mitomycin    CARDIAC CATHETERIZATION  12/2010   [facility] CARDIAC SURGERY  1993    aortic bypass graft for right carotid artery obstruction    COLONOSCOPY N/A 7/22/2021    COLONOSCOPY POLYPECTOMY HOT BIOPSY WITH INK TATTOO [place] [facility]  [place] 6[place]12    per Dr Mayuri Dorantes  4-2016    TURBT    OTHER SURGICAL HISTORY      brachiocephalic-bypass    OTHER SURGICAL HISTORY      stenting of left internal carotid artery    UPPER GASTROINTESTINAL ENDOSCOPY N/A 7/22/2021    EGD POLYP SNARE performed by Renee Meng MD at [address]  5/12, 6/12    ken leg vacular surgery.     VULVA SURGERY  2001    cancer       Family History   Problem Relation Age of Onset    Cancer Father         lung    Cancer Mother        Allergies:  See list    Current Outpatient Medications   Medication Sig Dispense Refill    pantoprazole (PROTONIX) 40 MG tablet Take 1 tablet by mouth daily 30 tablet 3    Omega-3 Fatty Acids (OMEGA 3 PO) Take by mouth      Apoaequorin (PREVAGEN PO) Take by mouth      metoprolol tartrate (LOPRESSOR) 50 MG tablet TAKE 1 TABLET BY MOUTH TWICE DAILY 60 tablet 11    losartan (COZAAR) 50 MG tablet TAKE 1 TABLET BY MOUTH TWICE DAILY 60 tablet 11    hydrocortisone (WESTCORT) 0.2 % cream Apply topically 2 times daily. 15 g 1    clopidogrel (PLAVIX) 75 MG tablet Take 1 tablet by mouth daily (Patient not taking: Reported on 2021) 30 tablet 11    amLODIPine (NORVASC) 5 MG tablet TAKE 1 TABLET BY MOUTH EVERY DAY (Patient not taking: Reported on 6/3/2021) 30 tablet 11     No current facility-administered medications for this visit. Social History     Socioeconomic History    Marital status:      Spouse name: None    Number of children: None    Years of education: None    Highest education level: None   Occupational History    None   Tobacco Use    Smoking status: Former Smoker     Packs/day: 1.50     Years: 15.00     Pack years: 22.50     Types: Cigarettes     Quit date: 1993     Years since quittin.6    Smokeless tobacco: Never Used    Tobacco comment: QUIT    Vaping Use    Vaping Use: Never used   Substance and Sexual Activity    Alcohol use: Yes     Alcohol/week: 6.0 standard drinks     Types: 6 Cans of beer per week     Comment: occasional    Drug use: Yes     Types: Marijuana     Comment: a couple times a day    Sexual activity: Yes     Partners: Male   Other Topics Concern    None   Social History Narrative    None     Social Determinants of Health     Financial Resource Strain: Low Risk     Difficulty of Paying Living Expenses: Not very hard   Food Insecurity: No Food Insecurity    Worried About Running Out of Food in the Last Year: Never true    Jameel of Food in the Last Year: Never true   Transportation Needs:     Lack of Transportation (Medical):      Lack of Transportation (Non-Medical):    Physical Activity:     Days of Exercise per Week:     Minutes of Exercise per Session:    Stress:     Feeling of Stress :    Social Connections:     Frequency of Communication with Friends and Family:     Frequency of Social Gatherings with Friends and Family:     Attends Jewish Services:     Active Member of Clubs or Organizations:     Attends Club or Organization Meetings:     Marital Status:    Intimate Partner Violence:     Fear of Current or Ex-Partner:     Emotionally Abused:     Physically Abused:     Sexually Abused:        Pulse 94   Temp 98.7 °F (37.1 °C) (Infrared)   Resp 18   Ht 5' 3\" (1.6 m)   Wt 134 lb (60.8 kg)   LMP 08/17/2011   BMI 23.74 kg/m²      Physical Exam  Vitals and nursing note reviewed. Constitutional:       General: She is not in acute distress. Appearance: She is well-developed. HENT:      Head: Normocephalic and atraumatic. Eyes:      General: No scleral icterus. Conjunctiva/sclera: Conjunctivae normal.      Pupils: Pupils are equal, round, and reactive to light. Neck:      Trachea: No tracheal deviation. Cardiovascular:      Rate and Rhythm: Normal rate. Pulmonary:      Effort: Pulmonary effort is normal. No respiratory distress. Skin:     General: Skin is warm and dry. Neurological:      Mental Status: She is alert and oriented to person, place, and time. Psychiatric:         Behavior: Behavior normal.         Thought Content: Thought content normal.         Judgment: Judgment normal.         IMAGING/LABS    7/22/2021 10:48 PM - Amber Ybarra Incoming Lab Results From [facility]    Component Value Ref Range & Units Status Collected Lab   CEA 7.5High   <3.9 ng/mL Final 07/22/2021  1:13 PM [address] \"ECLIA\" assay is used.  Results obtained with different assay methods cannot be   used interchangeably.     Testing Performed By    Cinthya Esquivel Name Director Address Valid Date Range   [address] [address] 08/30/17 0801-Present   Lab and Collection    CEA - 7/22/2021 7/26/2021 11:25 AM - Amber Ybarra Incoming Lab Results From [facility]    Component Collected Lab   Surgical Pathology Report 07/22/2021 10:45 AM [facility]   -- Diagnosis --   1.  Stomach, antrum biopsies:  Normal gastric mucosa. 2.  Sigmoid colon, biopsy at 35 cm:  Tubular adenoma. 3.  Sigmoid colon, polyp, biopsy:  Tubular adenoma.       MORELIA Sparks   **Electronically Signed Out**         rdd/7/26/2021         Clinical Information   Pre-op Diagnosis:  DYSPHAGIA, REFLUX SYMPTOMS, RECTAL BLEEDING   Operative Findings:  CLOTEST (H. PYLORI DETECTION); ANTRUM x 3;   SIGMOID 35; SIGMOID POLYP   Operation Performed:  COLONOSCOPY, POLYPECTOMY HOT BIOPSY WITH INK   TATTOO, EGD POLYP SNARE     Source of Specimen   1: ANTRUM x 3   2: SIGMOID 35   3: SIGMOID POLYP     Gross Description   1.  \"OTILIA STEFANI, ANTRUM x 3\" Three tan-white tissue fragments each 0.2   cm and are 0.4 x 0.4 x 0.2 cm in aggregate.  Entirely 1cs. 2.  \"OTILIA STEFANI, SIGMOID 35\" One tan-white tissue fragment, 0.2 cm. Entirely 1cs. 3.  \"OTILIA STEFANI, SIGMOID POLYP\" 1.5 x 1.4 x 1.2 cm polypoid portion of   tan tissue, inked and serially sectioned.  Entirely 1cs.  mpb tm       Microscopic Description   1.  The gastric mucosa shows intact architecture and no active or   chronic inflammation.  There is no histological evidence for   Helicobacter. Lyn Contes is no intestinal metaplasia or dysplasia. 2 -3. Microscopic examination performed. SURGICAL PATHOLOGY CONSULTATION         Patient Name: Teresa Yadav    [facility] Rec: [id number]   Path Number: [id number]     [address], [facility] [address] [place], [address]   [phone]   Fax: [phone]    Testing Performed By    Cinthya Esquivel Name Director Address Valid Date Range   [address]  [address] [address] 08/30/17 [address]01-Present   Lab and Collection    Surgical Pathology - 7/23/2021  Result Information    Status: Final result (Collected: 7/22/2021 10:45)       ASSESSMENT     Diagnosis Orders   1. S/P colonoscopy with polypectomy     2. Adenomatous polyp of sigmoid colon     3. Diverticulosis of colon     4. Hiatal hernia     5. Gastritis and duodenitis     6. Elevated CEA     7. Marijuana use     8. BMI 23.0-23.9, adult     9. Incisional hernia, without obstruction or gangrene       PLAN    Endoscopic findings and pathology reviewed with Ms. Jeffrey Emery. Given the size of the large sessile 2 cm pedunculated adenomatous polyp of the lower sigmoid, will repeat colonoscopy in 3 to 6 months. CEA is elevated to 7.5, nearly twice normal.  Patient states that she no longer uses tobacco, but smokes marijuana regularly. We will repeat CEA in the coming months with next colonoscopy. Discussed importance of a healthy, balanced high-fiber low-fat diet, with fiber supplementation, increased water intake, etc.  Continue proton pump inhibition. Consider decrease marijuana use. Epigastric incisional hernia with 1.6 cm fascial defect will be repaired laparoscopically in the coming months, following the next colonoscopy.      Karina Barr MD

## 2021-10-25 NOTE — PROGRESS NOTES
[facility]URRAM   Preadmission Testing    Name: Leontine Pallas  : 1960  Patient Phone: [phone] (home)     Procedure: Colonoscopy   Date of Procedure: 2021  Surgeon: Karma Metcalf MD    Ht:  5' 3\" (160 cm)  Wt: 134 lb (60.8 kg)  Wt method: Allergies: No Known Allergies        Latex Allergy Screening Tool  Have you ever had a reaction to or been told by a physician that you have an allergy to latex or natural rubber?: No    There were no vitals filed for this visit. Patient's last menstrual period was 2011. Do you take blood thinners? [] Yes    [x] No         Instructed to stop blood thinners prior to procedure? [] Yes    [] No      [x] N/A   Do you have sleep apnea? [] Yes    [x] No     Do you have acid reflux ? [] Yes    [x] No     Do you have  hiatal hernia? [] Yes    [x] No    Do you ever experience motion sickness? [] Yes    [x] No     Have you had a respiratory infection or sore throat in last 4 weeks before surgery? [] Yes    [x] No     Do you have poorly controlled asthma or COPD? Difficulty with intubation in past? [] Yes    [x] No      [] Yes    [x] No       Do you have a history of angina in the last month or symptomatic arrhythmia? [] Yes    [x] No     Do you have significant central nervous system disease? [] Yes    [x] No     Have you had an EKG, labs, or chest xray in last 12 months? If yes provide copies to anesthesia   [x] Yes    [] No       [] Lab    [x] EKG    [] CXR     Have you had a stress test?     [x] Yes    [] No    When/where:2017    Was it normal?    [] Yes    [] No     Do you or your family have a history of Malignant Hyperthermia? [] Yes    [x] No           Do you smoke? [] Yes    [x] No      Please refrain from smoking on the day of surgery.       Patient instructed on: [x] NPO Status   [x] Meds to Take  [x] Ride Home  [x]No Jewelry/Contact Lenses/Nail [place]  [x] Prep/Lax/Clear Liquids    [] Chlorhexidene     DOS Patient Needs [] HCG   [] Blood Sugar  [] PT/INR    [] T&S       COVID Vaccinated? [x] Yes    [] No                     Patient instructed on the pre-operative, intra-operative, and post-operative process? Yes  Medication instructions reviewed with patient?   Yes

## 2021-10-26 ENCOUNTER — ANESTHESIA EVENT (OUTPATIENT)
Dept: ENDOSCOPY | Age: 61
End: 2021-10-26
Payer: COMMERCIAL

## 2021-10-26 NOTE — ANESTHESIA PRE-OP
Anesthesia Chart review for suitability for anesthesia. Cleared for anesthesia for proposed procedure. Cecy AVILA, CRNA, NP-C

## 2021-10-31 NOTE — H&P
HPI     Ms Nallely Jose returns for follow-up after endoscopy.     DATE OF PROCEDURE:  07/22/2021     ATTENDING SURGEON: Nico Elizalde MD     PCP: Governor Cuco MD     PREOPERATIVE DIAGNOSES:  1.  Epigastric pain with reflux symptoms. 2.  Rectal bleeding.     POSTOPERATIVE DIAGNOSES:  1.  Diffuse superficial gastritis with duodenitis. 2.  Small hiatal hernia. 3.  Multiple colon polyps, descending and sigmoid colon. 4.  Extensive diverticulosis, right and left colon.     OPERATIONS:  1.  Esophagogastroduodenoscopy. 2.  Prepyloric antral biopsies. 3.  Colonoscopy, anus to cecum. 4.  Large sessile ~ 2 cm pedunculated sigmoid polypectomy with tattoo (~20 cm from anal verge). 5.  Sessile sigmoid polypectomy with tattoo (approximately 50 cm from anal verge)     She is doing well. No new complaints. Evaluation 7/22/2021. ...     Ms Nallely Jose returns for re-evaluation of incisional hernia. Kellen Zapata is a 64 yo WF with an extensive medical and surgical history including significant vascular disease, substance abuse, HTN, bladder cancer, etc initially to me by Dr Levy Headley for evaluation of an ventral incisional hernia December 2019.  This has been present for years, but recently increased in size and become uncomfortable.  CT Dec 26, 2019 shows a less than 1 cm defect above the umbilicus, but 6 cm of herniated fat.  Current CT June 2021 shows that the fascial defect has increased to 1.6 x 1.3 cm, with 8.3 cm sac  containing omental fat.  She also has a left inguinal hernia.  Pain is worse with abdominal straining.  Neither previously repaired.  Extensive vascular surgery years ago.    She also has noticed blood per rectum, mostly on toilet tissue.  Long history of epigastric pain with reflux symptoms and occasional dysphagia.  No recent weight changes, 131 pounds, BMI 23.  No family history of colon cancer nor polyps to her knowledge.  She lost [employer] coverage when granddaughter's income was included in household income in 2019. Oriana Stephen granddaughter has since moved out, and she is in the process of re establishing insurance coverage.  She no longer smokes cigarettes.  History marijuana and alcohol abuse.       Review of Systems   Constitutional: Negative for activity change, appetite change, chills, fever and unexpected weight change. HENT: Negative for nosebleeds, sneezing, sore throat and trouble swallowing. Eyes: Negative for visual disturbance. Respiratory: Negative for cough, choking and shortness of breath. Cardiovascular: Negative for chest pain, palpitations and leg swelling. Gastrointestinal: Negative for abdominal pain, blood in stool, nausea and vomiting. Genitourinary: Negative for dysuria, flank pain and hematuria. Musculoskeletal: Positive for arthralgias and back pain. Negative for gait problem and myalgias. Allergic/Immunologic: Negative for immunocompromised state. Neurological: Negative for dizziness, seizures, syncope, weakness and headaches. Hematological: Does not bruise/bleed easily.    Psychiatric/Behavioral: Negative for confusion and sleep disturbance.         Past Medical History        Past Medical History:   Diagnosis Date    Alcohol abuse 3/2/2017    Arthritis      Bladder cancer ([facility] 2016    CAD (coronary artery disease)      Cancer ([facility] 2001     vulvar-    Carotid artery stenosis      Chronic back pain      Hyperlipidemia      Hypertension      Hypoglycemia      MI (myocardial infarction) ([facility] 03/01/2017    Peripheral vascular disease ([facility]      Takayasu's arteritis ([facility])      Tibial fracture       right    Umbilical hernia      Unspecified cerebral artery occlusion with cerebral infarction 1993            Past Surgical History         Past Surgical History:   Procedure Laterality Date    BLADDER SURGERY   5/17/2016     cysto with transurethral resection of bladder with mitomycin    CARDIAC CATHETERIZATION   12/2010   [facility]     aortic bypass graft for right carotid artery obstruction    COLONOSCOPY N/A 7/22/2021     COLONOSCOPY POLYPECTOMY HOT BIOPSY WITH INK TATTOO [place] [facility]   [place]     per Dr Katherine Garcia   4-2016     TURBT    OTHER SURGICAL HISTORY         brachiocephalic-bypass    OTHER SURGICAL HISTORY         stenting of left internal carotid artery    UPPER GASTROINTESTINAL ENDOSCOPY N/A 7/22/2021     EGD POLYP SNARE performed by Donnell Romero MD at [address] [facility]   5/12, 6/12     ken leg vacular surgery.  VULVA SURGERY   2001     cancer            Family History         Family History   Problem Relation Age of Onset    Cancer Father           lung    Cancer Mother              Allergies:  See list     Current Facility-Administered Medications          Current Outpatient Medications   Medication Sig Dispense Refill    pantoprazole (PROTONIX) 40 MG tablet Take 1 tablet by mouth daily 30 tablet 3    Omega-3 Fatty Acids (OMEGA 3 PO) Take by mouth        Apoaequorin (PREVAGEN PO) Take by mouth        metoprolol tartrate (LOPRESSOR) 50 MG tablet TAKE 1 TABLET BY MOUTH TWICE DAILY 60 tablet 11    losartan (COZAAR) 50 MG tablet TAKE 1 TABLET BY MOUTH TWICE DAILY 60 tablet 11    hydrocortisone (WESTCORT) 0.2 % cream Apply topically 2 times daily.  15 g 1    clopidogrel (PLAVIX) 75 MG tablet Take 1 tablet by mouth daily (Patient not taking: Reported on 6/7/2021) 30 tablet 11    amLODIPine (NORVASC) 5 MG tablet TAKE 1 TABLET BY MOUTH EVERY DAY (Patient not taking: Reported on 6/3/2021) 30 tablet 11      No current facility-administered medications for this visit.            Social History   Social History      Socioeconomic History    Marital status:        Spouse name: None    Number of children: None    Years of education: None    Highest education level: None   Occupational History    None   Tobacco Use    Smoking status: Former Smoker       Packs/day: 1.50       Years: 15.00       Pack years: 22.50       Types: Cigarettes       Quit date: 1993       Years since quittin.6    Smokeless tobacco: Never Used    Tobacco comment: QUIT    Vaping Use    Vaping Use: Never used   Substance and Sexual Activity    Alcohol use: Yes       Alcohol/week: 6.0 standard drinks       Types: 6 Cans of beer per week       Comment: occasional    Drug use: Yes       Types: Marijuana       Comment: a couple times a day    Sexual activity: Yes       Partners: Male   Other Topics Concern    None   Social History Narrative    None      Social Determinants of Health          Financial Resource Strain: Low Risk     Difficulty of Paying Living Expenses: Not very hard   Food Insecurity: No Food Insecurity    Worried About Running Out of Food in the Last Year: Never true    Jameel of Food in the Last Year: Never true   Transportation Needs:     Lack of Transportation (Medical):  Lack of Transportation (Non-Medical):    Physical Activity:     Days of Exercise per Week:     Minutes of Exercise per Session:    Stress:     Feeling of Stress :    Social Connections:     Frequency of Communication with Friends and Family:     Frequency of Social Gatherings with Friends and Family:     Attends Restoration Services:     Active Member of Clubs or Organizations:     Attends Club or Organization Meetings:     Marital Status:    Intimate Partner Violence:     Fear of Current or Ex-Partner:     Emotionally Abused:     Physically Abused:     Sexually Abused:             Pulse 94   Temp 98.7 °F (37.1 °C) (Infrared)   Resp 18   Ht 5' 3\" (1.6 m)   Wt 134 lb (60.8 kg)   LMP 2011   BMI 23.74 kg/m²       Physical Exam  Vitals and nursing note reviewed. Constitutional:       General: She is not in acute distress. Appearance: She is well-developed.    HENT:      Head: Normocephalic and atraumatic. Eyes:      General: No scleral icterus. Conjunctiva/sclera: Conjunctivae normal.      Pupils: Pupils are equal, round, and reactive to light. Neck:      Trachea: No tracheal deviation. Cardiovascular:      Rate and Rhythm: Normal rate. Pulmonary:      Effort: Pulmonary effort is normal. No respiratory distress. Skin:     General: Skin is warm and dry. Neurological:      Mental Status: She is alert and oriented to person, place, and time. Psychiatric:         Behavior: Behavior normal.         Thought Content: Thought content normal.         Judgment: Judgment normal.            IMAGING/LABS     7/22/2021 10:48 PM - Tate, pn Incoming Lab Results From [facility]     Component Value Ref Range & Units Status Collected Lab   CEA 7.5High   <3.9 ng/mL Final 07/22/2021  1:13 PM [address] \"ECLIA\" assay is used.  Results obtained with different assay methods cannot be   used interchangeably. Testing Performed By     Lab - Abbreviation Name Director Address Valid Date Range   [address] [address]  [facility] [address] [address] 08/30/17 [address]01-Present   Lab and Collection     CEA - 7/22/2021 7/26/2021 11:25 AM - Tate johnson Incoming Lab Results From [facility]     Component Collected Lab   Surgical Pathology Report 07/22/2021 10:45  [address]   -- Diagnosis --   1.  Stomach, antrum biopsies:  Normal gastric mucosa. 2.  Sigmoid colon, biopsy at 35 cm:  Tubular adenoma.      3.  Sigmoid colon, polyp, biopsy:  Tubular adenoma.       MORELIA Luz   **Electronically Signed Out**         rdd/7/26/2021         Clinical Information   Pre-op Diagnosis:  DYSPHAGIA, REFLUX SYMPTOMS, RECTAL BLEEDING   Operative Findings:  CLOTEST (H. PYLORI DETECTION); ANTRUM x 3;   SIGMOID 35; SIGMOID POLYP   Operation Performed:  COLONOSCOPY, POLYPECTOMY HOT BIOPSY WITH INK   TATTOO, EGD POLYP SNARE     Source of since evaluation August 2021. CEA is elevated to 7.5, nearly twice normal.  Patient states that she no longer uses tobacco, but smokes marijuana regularly. We will repeat CEA. Discussed importance of a healthy, balanced high-fiber low-fat diet, with fiber supplementation, increased water intake, etc.  Continue proton pump inhibition. Consider decrease marijuana use. Epigastric incisional hernia with 1.6 cm fascial defect will be repaired laparoscopically in the coming months, following the next colonoscopy.      Micheline Abdi MD

## 2021-11-01 ENCOUNTER — HOSPITAL ENCOUNTER (OUTPATIENT)
Age: 61
Setting detail: OUTPATIENT SURGERY
Discharge: HOME | End: 2021-11-01
Attending: SURGERY | Admitting: SURGERY
Payer: COMMERCIAL

## 2021-11-01 ENCOUNTER — ANESTHESIA (OUTPATIENT)
Dept: ENDOSCOPY | Age: 61
End: 2021-11-01
Payer: COMMERCIAL

## 2021-11-01 ENCOUNTER — HOSPITAL ENCOUNTER (OUTPATIENT)
Dept: PREADMISSION TESTING | Age: 61
Setting detail: SPECIMEN
Discharge: HOME | End: 2021-11-01
Payer: COMMERCIAL

## 2021-11-01 VITALS
HEIGHT: 63 IN | BODY MASS INDEX: 24.01 KG/M2 | RESPIRATION RATE: 17 BRPM | DIASTOLIC BLOOD PRESSURE: 73 MMHG | WEIGHT: 135.5 LBS | OXYGEN SATURATION: 95 % | HEART RATE: 64 BPM | SYSTOLIC BLOOD PRESSURE: 127 MMHG | TEMPERATURE: 98 F

## 2021-11-01 VITALS
TEMPERATURE: 98.6 F | SYSTOLIC BLOOD PRESSURE: 120 MMHG | RESPIRATION RATE: 16 BRPM | DIASTOLIC BLOOD PRESSURE: 77 MMHG | OXYGEN SATURATION: 100 %

## 2021-11-01 PROBLEM — Z86.0100 HISTORY OF COLON POLYPS: Status: ACTIVE | Noted: 2021-11-01

## 2021-11-01 PROBLEM — Z86.010 HISTORY OF COLON POLYPS: Status: ACTIVE | Noted: 2021-11-01

## 2021-11-01 LAB
SARS-COV-2, RAPID: NOT DETECTED
SPECIMEN DESCRIPTION: NORMAL

## 2021-11-01 PROCEDURE — 7100000010 HC PHASE II RECOVERY - FIRST 15 MIN: Performed by: SURGERY

## 2021-11-01 PROCEDURE — C9803 HOPD COVID-19 SPEC COLLECT: HCPCS

## 2021-11-01 PROCEDURE — 88305 TISSUE EXAM BY PATHOLOGIST: CPT

## 2021-11-01 PROCEDURE — 3700000000 HC ANESTHESIA ATTENDED CARE: Performed by: SURGERY

## 2021-11-01 PROCEDURE — 6360000002 HC RX W HCPCS: Performed by: NURSE ANESTHETIST, CERTIFIED REGISTERED

## 2021-11-01 PROCEDURE — 3609010400 HC COLONOSCOPY POLYPECTOMY HOT BIOPSY: Performed by: SURGERY

## 2021-11-01 PROCEDURE — 7100000011 HC PHASE II RECOVERY - ADDTL 15 MIN: Performed by: SURGERY

## 2021-11-01 PROCEDURE — 3700000001 HC ADD 15 MINUTES (ANESTHESIA): Performed by: SURGERY

## 2021-11-01 PROCEDURE — 2500000003 HC RX 250 WO HCPCS: Performed by: NURSE ANESTHETIST, CERTIFIED REGISTERED

## 2021-11-01 PROCEDURE — 2709999900 HC NON-CHARGEABLE SUPPLY: Performed by: SURGERY

## 2021-11-01 PROCEDURE — 87635 SARS-COV-2 COVID-19 AMP PRB: CPT

## 2021-11-01 PROCEDURE — 2580000003 HC RX 258: Performed by: SURGERY

## 2021-11-01 RX ORDER — SODIUM CHLORIDE 0.9 % (FLUSH) 0.9 %
5-40 SYRINGE (ML) INJECTION PRN
Status: DISCONTINUED | OUTPATIENT
Start: 2021-11-01 | End: 2021-11-01 | Stop reason: HOSPADM

## 2021-11-01 RX ORDER — SODIUM CHLORIDE, SODIUM LACTATE, POTASSIUM CHLORIDE, CALCIUM CHLORIDE 600; 310; 30; 20 MG/100ML; MG/100ML; MG/100ML; MG/100ML
INJECTION, SOLUTION INTRAVENOUS CONTINUOUS
Status: DISCONTINUED | OUTPATIENT
Start: 2021-11-01 | End: 2021-11-01 | Stop reason: HOSPADM

## 2021-11-01 RX ORDER — FENTANYL CITRATE 50 UG/ML
INJECTION, SOLUTION INTRAMUSCULAR; INTRAVENOUS PRN
Status: DISCONTINUED | OUTPATIENT
Start: 2021-11-01 | End: 2021-11-01 | Stop reason: SDUPTHER

## 2021-11-01 RX ORDER — EPHEDRINE SULFATE/0.9% NACL/PF 50 MG/5 ML
SYRINGE (ML) INTRAVENOUS PRN
Status: DISCONTINUED | OUTPATIENT
Start: 2021-11-01 | End: 2021-11-01 | Stop reason: SDUPTHER

## 2021-11-01 RX ORDER — PROPOFOL 10 MG/ML
INJECTION, EMULSION INTRAVENOUS CONTINUOUS PRN
Status: DISCONTINUED | OUTPATIENT
Start: 2021-11-01 | End: 2021-11-01 | Stop reason: SDUPTHER

## 2021-11-01 RX ORDER — SODIUM CHLORIDE 9 MG/ML
25 INJECTION, SOLUTION INTRAVENOUS PRN
Status: DISCONTINUED | OUTPATIENT
Start: 2021-11-01 | End: 2021-11-01 | Stop reason: HOSPADM

## 2021-11-01 RX ORDER — SODIUM CHLORIDE 0.9 % (FLUSH) 0.9 %
10 SYRINGE (ML) INJECTION PRN
Status: DISCONTINUED | OUTPATIENT
Start: 2021-11-01 | End: 2021-11-01 | Stop reason: HOSPADM

## 2021-11-01 RX ORDER — LIDOCAINE HYDROCHLORIDE 10 MG/ML
INJECTION, SOLUTION EPIDURAL; INFILTRATION; INTRACAUDAL; PERINEURAL PRN
Status: DISCONTINUED | OUTPATIENT
Start: 2021-11-01 | End: 2021-11-01 | Stop reason: SDUPTHER

## 2021-11-01 RX ORDER — SODIUM CHLORIDE 0.9 % (FLUSH) 0.9 %
5-40 SYRINGE (ML) INJECTION EVERY 12 HOURS SCHEDULED
Status: DISCONTINUED | OUTPATIENT
Start: 2021-11-01 | End: 2021-11-01 | Stop reason: HOSPADM

## 2021-11-01 RX ORDER — SODIUM CHLORIDE 0.9 % (FLUSH) 0.9 %
10 SYRINGE (ML) INJECTION EVERY 12 HOURS SCHEDULED
Status: DISCONTINUED | OUTPATIENT
Start: 2021-11-01 | End: 2021-11-01 | Stop reason: HOSPADM

## 2021-11-01 RX ORDER — MIDAZOLAM HYDROCHLORIDE 1 MG/ML
INJECTION INTRAMUSCULAR; INTRAVENOUS PRN
Status: DISCONTINUED | OUTPATIENT
Start: 2021-11-01 | End: 2021-11-01 | Stop reason: SDUPTHER

## 2021-11-01 RX ADMIN — FENTANYL CITRATE 50 MCG: 50 INJECTION, SOLUTION INTRAMUSCULAR; INTRAVENOUS at 08:23

## 2021-11-01 RX ADMIN — MIDAZOLAM 1 MG: 1 INJECTION INTRAMUSCULAR; INTRAVENOUS at 08:18

## 2021-11-01 RX ADMIN — SODIUM CHLORIDE, POTASSIUM CHLORIDE, SODIUM LACTATE AND CALCIUM CHLORIDE: 600; 310; 30; 20 INJECTION, SOLUTION INTRAVENOUS at 07:21

## 2021-11-01 RX ADMIN — LIDOCAINE HYDROCHLORIDE 50 MG: 10 INJECTION, SOLUTION EPIDURAL; INFILTRATION; INTRACAUDAL; PERINEURAL at 08:21

## 2021-11-01 RX ADMIN — PROPOFOL 100 MCG/KG/MIN: 10 INJECTION, EMULSION INTRAVENOUS at 08:21

## 2021-11-01 RX ADMIN — MIDAZOLAM 1 MG: 1 INJECTION INTRAMUSCULAR; INTRAVENOUS at 08:23

## 2021-11-01 RX ADMIN — FENTANYL CITRATE 50 MCG: 50 INJECTION, SOLUTION INTRAMUSCULAR; INTRAVENOUS at 08:18

## 2021-11-01 RX ADMIN — Medication 10 MG: at 08:30

## 2021-11-01 ASSESSMENT — PAIN SCALES - GENERAL: PAINLEVEL_OUTOF10: 0

## 2021-11-01 ASSESSMENT — PAIN - FUNCTIONAL ASSESSMENT: PAIN_FUNCTIONAL_ASSESSMENT: 0-10

## 2021-11-01 NOTE — OP NOTE
[place] [address]                                OPERATIVE REPORT    PATIENT NAME: Anthony KO                       :        1960  MED REC NO:   [id number]                              ROOM:  ACCOUNT NO:   [de-identified]                           ADMIT DATE: 2021  PROVIDER:     Charleston Callow    DATE OF PROCEDURE:  2021    ATTENDING SURGEON:  Charleston Callow, MD    PCP:  Yaima Ryder MD    PREOPERATIVE DIAGNOSIS:  History of colon polyps. POSTOPERATIVE DIAGNOSES:  1.  Multiple polyps x9 (ascending x1, descending x1, sigmoid x5 rectum  x2). 2.  Extensive diverticulosis, right and left colon. OPERATION:  1. Colonoscopy, anus to cecum. 2.  Multiple polypectomies x9 (ascending x1, descending x1, sigmoid x5,  rectum x2). ANESTHESIA:  MAC.    ESTIMATED BLOOD LOSS:  Less than 20 mL. INDICATIONS:  The patient is a pleasant 60-year-old white female who is  status post endoscopy in 2021, at which time she was found to have  two large polyps, sigmoid colon, sessile and pedunculated 20 and 50 cm  from the anal verge respectively which were benign adenomatous polyps. They were removed in their entirety and marked with tattoos. Her bowel  prep was marginal at that time. Other polyps were present, but deferred  to a later date with a better prep. She returns at this time for repeat  colonoscopy. No new complaints. OPERATIVE PROCEDURE:  After obtaining informed consent with discussion  of risks, benefits, and alternatives including risk of GI bleeding,  perforation, missed lesions, COVID-19 exposure/infection, etc., the  patient was taken to the endoscopy suite and placed in the left lateral  recumbent position. Following adequate IV sedation, a digital rectal  exam was performed.   Sphincter tone was normal.  A colonoscope was  passed transanally into the rectum and advanced with gentle insufflation  throughout the entirety of the colon to the cecum. Cecal position was  confirmed by clear visualization of the ileocecal valve, the appendiceal  orifice, and transduction of manual pressure in the right lower quadrant  to the cecum. The bowel prep was excellent. All colonic mucosa was  clearly visible. The cecum was normal.  In the proximal ascending  colon, a sessile polyp was removed by hot snare polypectomy. Scattered  diverticula were noted in the mid to distal ascending colon. Hepatic  flexure was normal.  Transverse colon was quite redundant with scattered  diverticula. Splenic flexure was normal.  In the descending colon, a  sessile polyp was removed by hot snare polypectomy. The descending  colon and sigmoid was remarkably redundant with extensive diverticular  changes. Five sessile sigmoid polyps were removed by hot snare  polypectomy. Two of these were from prior polypectomy site 20 cm from  anal verge. Upper rectum was normal.  In the mid rectum, a sessile  polyp was removed by hot snare polypectomy. In the lower rectum, a  diminutive sessile polyp was fulgurated with cautery. On retroflexion,  there were minimal internal hemorrhoids. The colon was decompressed by  suction as the scope was removed. The patient tolerated the procedure  well and was transferred to PACU in stable condition. SPECIMENS:  Sessile polyps x8. DRAINS:  None. COMPLICATIONS:  None. DISPOSITION:  To PACU, awake, alert, and stable. Following recovery, we  will discharge the patient home with gradual advancement of diet and  activity as tolerated with instructions for a healthy, balanced  high-fiber, low-fat diet with fiber supplementation, increased water  intake and physical activity, etc.  We will most likely recommend next  screening colonoscopy in 2 years pending final polyp pathology.   Ventral  herniorrhaphy will be scheduled when the patient is ready to calvin Urbano    D: 11/01/2021 9:35:33       T: 11/01/2021 9:38:29     [email or web address]  Job#: [id number]     Doc#: [id number]    CC:  Daphnie Schneider

## 2021-11-01 NOTE — ANESTHESIA PRE PROCEDURE
heart with stable angina pectoris ([facility] I25.118       Past Medical History:        Diagnosis Date    Alcohol abuse 3/2/2017    Arthritis     [facility]     CAD (coronary artery disease)     Cancer ([facility]     vulvar-    Carotid artery stenosis     Chronic back pain     Hyperlipidemia     Hypertension     Hypoglycemia     MI (myocardial infarction) ([facility] 2017    Peripheral vascular disease ([facility]     Takayasu's arteritis ([facility]     Tibial fracture     right    Umbilical hernia     Unspecified cerebral artery occlusion with cerebral infarction        Past Surgical History:        Procedure Laterality Date    BLADDER SURGERY  2016    cysto with transurethral resection of bladder with mitomycin    CARDIAC CATHETERIZATION  2010   [facility] CARDIAC SURGERY      aortic bypass graft for right carotid artery obstruction    COLONOSCOPY N/A 2021    COLONOSCOPY POLYPECTOMY HOT BIOPSY WITH INK TATTOO [place] [facility] [place] Dr Bhumika Barkley      TURBT    OTHER SURGICAL HISTORY      brachiocephalic-bypass    OTHER SURGICAL HISTORY      stenting of left internal carotid artery    UPPER GASTROINTESTINAL ENDOSCOPY N/A 2021    EGD POLYP SNARE performed by Micheline Abdi MD at [address]  ,     ken leg vacular surgery.  VULVA SURGERY      cancer       Social History:    Social History     Tobacco Use    Smoking status: Former Smoker     Packs/day: 1.50     Years: 15.00     Pack years: 22.50     Types: Cigarettes     Quit date: 1993     Years since quittin.8    Smokeless tobacco: Never Used    Tobacco comment: QUIT    Substance Use Topics    Alcohol use:  Yes     Alcohol/week: 6.0 standard drinks     Types: 6 Cans of beer per week     Comment: occasional                                Counseling given: Not Answered  Comment: QUIT 1993      Vital Signs (Current):   Vitals:    10/25/21 1433 11/01/21 0707 11/01/21 0718   BP:   129/86   Pulse:   67   Resp:   18   Temp:   36 °C (96.8 °F)   TempSrc:   Temporal   SpO2:   97%   Weight: 134 lb (60.8 kg) 135 lb 8 oz (61.5 kg)    Height: 5' 3\" (1.6 m) 5' 3\" (1.6 m)                                               BP Readings from Last 3 Encounters:   11/01/21 129/86   07/22/21 (S) (!) 194/104   07/22/21 (!) 191/108       NPO Status: Time of last liquid consumption: 2200                        Time of last solid consumption: 1800                        Date of last liquid consumption: 10/31/21                        Date of last solid food consumption: 10/30/21    BMI:   Wt Readings from Last 3 Encounters:   11/01/21 135 lb 8 oz (61.5 kg)   08/02/21 134 lb (60.8 kg)   07/22/21 132 lb 3.2 oz (60 kg)     Body mass index is 24 kg/m². CBC:   Lab Results   Component Value Date    WBC 5.3 06/09/2021    RBC 3.92 06/09/2021    RBC 3.36 05/31/2012    HGB 13.7 06/09/2021    HCT 39.3 06/09/2021    .2 06/09/2021    RDW 14.0 06/09/2021     06/09/2021     05/31/2012       CMP:   Lab Results   Component Value Date     06/09/2021    K 4.1 06/09/2021     06/09/2021    CO2 21 06/09/2021    BUN 12 06/09/2021    CREATININE 0.69 06/09/2021    GFRAA >60 06/09/2021    LABGLOM >60 06/09/2021    GLUCOSE 101 06/09/2021    GLUCOSE 86 05/31/2012    PROT 7.6 06/09/2021    CALCIUM 9.7 06/09/2021    BILITOT 0.42 06/09/2021    ALKPHOS 70 06/09/2021    AST 18 06/09/2021    ALT 13 06/09/2021       POC Tests: No results for input(s): POCGLU, POCNA, POCK, POCCL, POCBUN, POCHEMO, POCHCT in the last 72 hours.     Coags: No results found for: PROTIME, INR, APTT    HCG (If Applicable): No results found for: PREGTESTUR, PREGSERUM, HCG, HCGQUANT     ABGs: No results found for: PHART, PO2ART, BXC3FSO, JYY0HJL, BEART, X6HCRLZD     Type & Screen (If Applicable):  No results found for: LABABO,

## 2021-11-01 NOTE — BRIEF OP NOTE
Brief Postoperative Note      Patient: Nancy Salinas  YOB: 1960  MRN: [id number]    Date of Procedure: 11/1/2021    Pre-Op Diagnosis:      1. History colon polyps    Post-Op Diagnosis:     1. Multiple polyps x9 (ascending x1, descending x1, sigmoid x5, rectum x2)     2. Extensive diverticulosis, R and L colon       Operation:     1. Colonoscopy anus to cecum     2. Multiple polypectomies x9 (ascend x1, descend x1, sig x5, rectum x2)    Surgeon(s):  Sj Keys MD    Assistant:  * No surgical staff found *    Anesthesia: Monitor Anesthesia Care    Estimated Blood Loss (mL): less than 35CD     Complications: None    Specimens:   ID Type Source Tests Collected by Time Destination   A : Ascending Colon Polyp Tissue Colon-Ascending SURGICAL PATHOLOGY Sj Keys MD 11/1/2021 9979    B : Descending Colon Polyp Tissue Colon-Descending SURGICAL PATHOLOGY Sj Keys MD 11/1/2021 1642    C : Sigmoid Polyps Tissue Sigmoid Colon SURGICAL PATHOLOGY Sj Keys MD 11/1/2021 0906    D : Sigmoid Higher Polypectomy Site Tissue Sigmoid Colon SURGICAL PATHOLOGY Sj Keys MD 11/1/2021 0915    E : Rectal Polyp Tissue Rectum SURGICAL PATHOLOGY Sj Keys MD 11/1/2021 0919      Findings:  As above.     Dictated # [id number]    Electronically signed by Sj Keys MD on 11/1/2021 at 9:27 AM

## 2021-11-01 NOTE — ANESTHESIA POSTPROCEDURE EVALUATION
Department of Anesthesiology  Postprocedure Note    Patient: Cora Gudino  MRN: [id number]  YOB: 1960  Date of evaluation: 11/1/2021  Time:  9:49 AM     Procedure Summary     Date: 11/01/21 Room / Location: [address] [place] [address] / [facility] [place]    Anesthesia Start: 0818 Anesthesia Stop: 1849    Procedure: COLONOSCOPY POLYPECTOMY HOT BIOPSY (N/A Abdomen) Diagnosis: (HISTORY COLON POLYPS, ELEVATED CEA)    Surgeons: Jacy Rucker MD Responsible Provider: MARGARITA Gómez CRNA    Anesthesia Type: general ASA Status: 4          Anesthesia Type: general    Segundo Phase I: Segundo Score: 10    Segundo Phase II: Segundo Score: 10    Last vitals: Reviewed and per EMR flowsheets.        Anesthesia Post Evaluation    Patient location during evaluation: PACU  Patient participation: complete - patient participated  Level of consciousness: awake and alert  Pain score: 0  Airway patency: patent  Nausea & Vomiting: no nausea and no vomiting  Complications: no  Cardiovascular status: blood pressure returned to baseline and hemodynamically stable  Hydration status: euvolemic

## 2021-11-02 LAB — SURGICAL PATHOLOGY REPORT: NORMAL

## 2021-11-08 ENCOUNTER — OFFICE VISIT (OUTPATIENT)
Dept: SURGERY | Age: 61
End: 2021-11-08
Payer: COMMERCIAL

## 2021-11-08 DIAGNOSIS — D12.5 ADENOMATOUS POLYP OF SIGMOID COLON: ICD-10-CM

## 2021-11-08 DIAGNOSIS — K57.30 DIVERTICULOSIS OF COLON: ICD-10-CM

## 2021-11-08 DIAGNOSIS — K43.2 INCISIONAL HERNIA, WITHOUT OBSTRUCTION OR GANGRENE: Primary | ICD-10-CM

## 2021-11-08 DIAGNOSIS — Z98.890 S/P COLONOSCOPY WITH POLYPECTOMY: ICD-10-CM

## 2021-11-08 DIAGNOSIS — D12.2 ADENOMATOUS POLYP OF ASCENDING COLON: ICD-10-CM

## 2021-11-08 PROCEDURE — 3017F COLORECTAL CA SCREEN DOC REV: CPT | Performed by: SURGERY

## 2021-11-08 PROCEDURE — G8420 CALC BMI NORM PARAMETERS: HCPCS | Performed by: SURGERY

## 2021-11-08 PROCEDURE — 1036F TOBACCO NON-USER: CPT | Performed by: SURGERY

## 2021-11-08 PROCEDURE — G8484 FLU IMMUNIZE NO ADMIN: HCPCS | Performed by: SURGERY

## 2021-11-08 PROCEDURE — 99212 OFFICE O/P EST SF 10 MIN: CPT | Performed by: SURGERY

## 2021-11-08 PROCEDURE — G8428 CUR MEDS NOT DOCUMENT: HCPCS | Performed by: SURGERY

## 2021-11-08 NOTE — PROGRESS NOTES
Erika Montgomery MD  General Surgery, Endoscopy  Chief Medical Officer    [facility]io Ramos  [address] [facility] [address]  Dept: [phone]  Fax: [address]  Chief Complaint   Patient presents with    Follow Up After Procedure     colonoscopy 11/1. discuss hernia repair       HPI    Ms Kenny Martinez returns for follow-up after endoscopy and to schedule herniorrhaphy. DATE OF PROCEDURE:  11/01/2021     ATTENDING SURGEON:  Edilberto Miller MD     PCP:  Thong Rocha MD     PREOPERATIVE DIAGNOSIS:  History of colon polyps.     POSTOPERATIVE DIAGNOSES:  1.  Multiple polyps x9 (ascending x1, descending x1, sigmoid x5 rectum  x2). 2.  Extensive diverticulosis, right and left colon.     OPERATION:  1. Colonoscopy, anus to cecum. 2.  Multiple polypectomies x9 (ascending x1, descending x1, sigmoid x5,  rectum x2).     INDICATIONS:  The patient is a pleasant 64-year-old white female who is  status post endoscopy in 07/2021, at which time she was found to have  two large polyps, sigmoid colon, sessile and pedunculated 20 and 50 cm  from the anal verge respectively which were benign adenomatous polyps. They were removed in their entirety and marked with tattoos. Her bowel  prep was marginal at that time. Other polyps were present, but deferred  to a later date with a better prep. She returns at this time for repeat  colonoscopy. No new complaints. Evaluation 6/7/2021. .. Ms Kenny Martinez returns for re-evaluation of incisional hernia. She is a 63 yo WF with an extensive medical and surgical history including significant vascular disease, substance abuse, HTN, bladder cancer, etc initially to me by Dr Toby Villegas for evaluation of an ventral incisional hernia December 2019.  This has been present for years, but recently increased in size and become uncomfortable.  CT Dec 26, 2019 shows a less than 1 cm defect above the umbilicus, but 6 cm of herniated fat.   Current CT June 2021 shows that the fascial defect has increased to 1.6 x 1.3 cm, with 8.3 cm sac  containing omental fat. She also has a left inguinal hernia.  Pain is worse with abdominal straining.  Neither previously repaired.  Extensive vascular surgery years ago.    She also has noticed blood per rectum, mostly on toilet tissue. Long history of epigastric pain with reflux symptoms and occasional dysphagia. No recent weight changes, 131 pounds, BMI 23. No family history of colon cancer nor polyps to her knowledge. She lost [employer] coverage when granddaughter's income was included in household income in 2019. Laura Aguila granddaughter has since moved out, and she is in the process of re establishing insurance coverage.  She no longer smokes cigarettes.  History marijuana and alcohol abuse.      Review of Systems   Constitutional: Negative for activity change, appetite change, chills, fever and unexpected weight change. HENT: Negative for nosebleeds, sneezing, sore throat and trouble swallowing. Eyes: Negative for visual disturbance. Respiratory: Negative for cough, choking and shortness of breath. Cardiovascular: Negative for chest pain, palpitations and leg swelling. Gastrointestinal: Positive for abdominal pain. Negative for blood in stool, nausea and vomiting. Genitourinary: Negative for dysuria, flank pain and hematuria. Musculoskeletal: Positive for arthralgias and back pain. Negative for gait problem and myalgias. Allergic/Immunologic: Negative for immunocompromised state. Neurological: Negative for dizziness, seizures, syncope, weakness and headaches. Hematological: Does not bruise/bleed easily. Psychiatric/Behavioral: Negative for confusion and sleep disturbance.        Past Medical History:   Diagnosis Date    Alcohol abuse 3/2/2017    Arthritis     Bladder cancer [facility]) 2016    CAD (coronary artery disease)     [facility]    vulvar-    Carotid artery stenosis     Chronic back pain     Hyperlipidemia     Hypertension     Hypoglycemia     [facility]) [facility] 03/01/2017    Peripheral vascular disease ([facility]     Takayasu's arteritis ([facility]     Tibial fracture     right    Umbilical hernia     Unspecified cerebral artery occlusion with cerebral infarction 1993       Past Surgical History:   Procedure Laterality Date    BLADDER SURGERY  5/17/2016    cysto with transurethral resection of bladder with mitomycin    CARDIAC CATHETERIZATION  12/2010   [facility]    aortic bypass graft for right carotid artery obstruction    COLONOSCOPY N/A 7/22/2021    COLONOSCOPY POLYPECTOMY HOT BIOPSY WITH INK TATTOO performed by Haily Wharton MD at [address] [facility] N/A 11/1/2021    COLONOSCOPY POLYPECTOMY HOT BIOPSY [place] [facility] [place]    per Dr Cathie Avila  4-2016    TURBT    OTHER SURGICAL HISTORY      brachiocephalic-bypass    OTHER SURGICAL HISTORY      stenting of left internal carotid artery    UPPER GASTROINTESTINAL ENDOSCOPY N/A 7/22/2021    EGD POLYP SNARE performed by Haily Wharton MD at [address]  5/12, 6/12    ken leg vacular surgery.  VULVA SURGERY  2001    cancer       Family History   Problem Relation Age of Onset   Corbin Cancer Father         lung    Cancer Mother        Allergies:  See list    Current Outpatient Medications   Medication Sig Dispense Refill    Apoaequorin (PREVAGEN PO) Take by mouth      metoprolol tartrate (LOPRESSOR) 50 MG tablet TAKE 1 TABLET BY MOUTH TWICE DAILY 60 tablet 11    losartan (COZAAR) 50 MG tablet TAKE 1 TABLET BY MOUTH TWICE DAILY 60 tablet 11    hydrocortisone (WESTCORT) 0.2 % cream Apply topically 2 times daily. 15 g 1     No current facility-administered medications for this visit.        Social History     Socioeconomic History    Marital status:      Spouse name: Not on file    Number of children: Not on file    Years of education: Not on file    Highest education level: Not on file   Occupational History    Not on file   Tobacco Use    Smoking status: Former Smoker     Packs/day: 1.50     Years: 15.00     Pack years: 22.50     Types: Cigarettes     Quit date: 1993     Years since quittin.8    Smokeless tobacco: Never Used    Tobacco comment: QUIT    Vaping Use    Vaping Use: Never used   Substance and Sexual Activity    Alcohol use: Yes     Alcohol/week: 6.0 standard drinks     Types: 6 Cans of beer per week     Comment: occasional    Drug use: Yes     Types: Marijuana (Weed)     Comment: a couple times a day    Sexual activity: Yes     Partners: Male   Other Topics Concern    Not on file   Social History Narrative    Not on file     Social Determinants of Health     Financial Resource Strain: Low Risk     Difficulty of Paying Living Expenses: Not very hard   Food Insecurity: No Food Insecurity    Worried About Running Out of Food in the Last Year: Never true    Jameel of Food in the Last Year: Never true   Transportation Needs:     Lack of Transportation (Medical): Not on file    Lack of Transportation (Non-Medical):  Not on file   Physical Activity:     Days of Exercise per Week: Not on file    Minutes of Exercise per Session: Not on file   Stress:     Feeling of Stress : Not on file   Social Connections:     Frequency of Communication with Friends and Family: Not on file    Frequency of Social Gatherings with Friends and Family: Not on file    Attends Restorationist Services: Not on file    Active Member of Clubs or Organizations: Not on file    Attends Club or Organization Meetings: Not on file    Marital Status: Not on file   Intimate Partner Violence:     Fear of Current or Ex-Partner: Not on file    Emotionally Abused: Not on file    Physically Abused: Not on file    Sexually Abused: Not on file   Housing Stability:     Unable to Pay for Housing in the Last Year: Not on file    Number of Places Lived in the Last Year: Not on file    Unstable Housing in the Last Year: Not on file       [facility] 08/17/2011      Physical Exam  Vitals and nursing note reviewed. Constitutional:       Appearance: She is well-developed. HENT:      Head: Normocephalic and atraumatic. Eyes:      General: No scleral icterus. Conjunctiva/sclera: Conjunctivae normal.      Pupils: Pupils are equal, round, and reactive to light. Neck:      Vascular: No JVD. Trachea: No tracheal deviation. Cardiovascular:      Rate and Rhythm: Normal rate and regular rhythm. Pulmonary:      Effort: Pulmonary effort is normal. No respiratory distress. Chest:      Chest wall: No tenderness. Abdominal:      General: There is no distension. Palpations: Abdomen is soft. There is no mass. Tenderness: There is no abdominal tenderness. There is no guarding or rebound. Hernia: A hernia is present. Musculoskeletal:         General: Normal range of motion. Cervical back: Normal range of motion and neck supple. Lymphadenopathy:      Cervical: No cervical adenopathy. Skin:     General: Skin is warm and dry. Findings: No erythema or rash. Neurological:      Mental Status: She is alert and oriented to person, place, and time. Cranial Nerves: No cranial nerve deficit. Psychiatric:         Behavior: Behavior normal.         Thought Content: Thought content normal.         Judgment: Judgment normal.         IMAGING/LABS    SURGICAL PATHOLOGY REPORT  Order: [id number]   Status: Final result     Visible to patient: Yes (not seen)     Next appt: 01/13/2022 at 10:30 AM in General Surgery Jenni Peabody, MD)     0 Result Notes    Component 11/1/21 1538   Surgical Pathology Report -- Diagnosis --   1.  ASCENDING COLON, BIOPSIES:   - ADENOMATOUS POLYP (TUBULAR ADENOMA).      2.  DESCENDING COLON, BIOPSY:   - ADENOMATOUS POLYP (TUBULAR hernia, without obstruction or gangrene     2. S/P colonoscopy with polypectomy     3. Adenomatous polyp of sigmoid colon     4. Adenomatous polyp of ascending colon     5. Diverticulosis of colon     6. BMI 23.0-23.9, adult         PLAN    Endoscopic findings and pathology showing adenomatous polypectomies of the ascending, sigmoid, rectum, diverticulosis, etc. reviewed with Ms. Cowan Parents. Recommend next screening colonoscopy in 2 years, age 59. We will proceed with laparoscopic ventral/incisional herniorrhaphy. Risks, benefits, alternatives thoroughly reviewed and accepted by Ms. Cowan Parents including bleeding, infection, internal organ injury, hernia recurrence, chronic pain, COVID-19 exposure/infection, etc.     Abby Vera MD

## 2021-12-03 ENCOUNTER — HOSPITAL ENCOUNTER (OUTPATIENT)
Dept: MAMMOGRAPHY | Age: 61
Discharge: HOME | End: 2021-12-05
Payer: COMMERCIAL

## 2021-12-03 ENCOUNTER — HOSPITAL ENCOUNTER (OUTPATIENT)
Age: 61
Discharge: HOME | End: 2021-12-03
Payer: COMMERCIAL

## 2021-12-03 DIAGNOSIS — E56.9 VITAMIN DEFICIENCY: ICD-10-CM

## 2021-12-03 DIAGNOSIS — I25.118 CORONARY ARTERY DISEASE OF NATIVE ARTERY OF NATIVE HEART WITH STABLE ANGINA PECTORIS (HCC): ICD-10-CM

## 2021-12-03 DIAGNOSIS — Z12.31 ENCOUNTER FOR SCREENING MAMMOGRAM FOR BREAST CANCER: ICD-10-CM

## 2021-12-03 DIAGNOSIS — I10 ESSENTIAL HYPERTENSION: ICD-10-CM

## 2021-12-03 LAB
ABSOLUTE EOS #: 0.5 K/UL (ref 0–0.4)
ABSOLUTE IMMATURE GRANULOCYTE: ABNORMAL K/UL (ref 0–0.3)
ABSOLUTE LYMPH #: 2.9 K/UL (ref 1–4.8)
ABSOLUTE MONO #: 0.6 K/UL (ref 0–1)
ALBUMIN SERPL-MCNC: 4 G/DL (ref 3.5–5.2)
ALBUMIN/GLOBULIN RATIO: ABNORMAL (ref 1–2.5)
ALP BLD-CCNC: 73 U/L (ref 35–104)
ALT SERPL-CCNC: 10 U/L (ref 5–33)
ANION GAP SERPL CALCULATED.3IONS-SCNC: 11 MMOL/L (ref 9–17)
AST SERPL-CCNC: 17 U/L
BASOPHILS # BLD: 0 % (ref 0–2)
BASOPHILS ABSOLUTE: 0 K/UL (ref 0–0.2)
BILIRUB SERPL-MCNC: 0.45 MG/DL (ref 0.3–1.2)
BUN BLDV-MCNC: 13 MG/DL (ref 6–20)
BUN/CREAT BLD: 17 (ref 9–20)
CALCIUM SERPL-MCNC: 9.4 MG/DL (ref 8.6–10.4)
CHLORIDE BLD-SCNC: 102 MMOL/L (ref 98–107)
CHOLESTEROL/HDL RATIO: 4.4
CHOLESTEROL: 235 MG/DL
CO2: 22 MMOL/L (ref 20–31)
CREAT SERPL-MCNC: 0.77 MG/DL (ref 0.5–0.9)
DIFFERENTIAL TYPE: YES
EOSINOPHILS RELATIVE PERCENT: 9 % (ref 0–5)
GFR AFRICAN AMERICAN: >60 ML/MIN
GFR NON-AFRICAN AMERICAN: >60 ML/MIN
GFR SERPL CREATININE-BSD FRML MDRD: ABNORMAL ML/MIN/{1.73_M2}
GFR SERPL CREATININE-BSD FRML MDRD: ABNORMAL ML/MIN/{1.73_M2}
GLUCOSE BLD-MCNC: 111 MG/DL (ref 70–99)
HCT VFR BLD CALC: 39.1 % (ref 36–46)
HDLC SERPL-MCNC: 53 MG/DL
HEMOGLOBIN: 13.3 G/DL (ref 12–16)
IMMATURE GRANULOCYTES: ABNORMAL %
LDL CHOLESTEROL: 156 MG/DL (ref 0–130)
LYMPHOCYTES # BLD: 45 % (ref 15–40)
MAGNESIUM: 2.1 MG/DL (ref 1.6–2.6)
MCH RBC QN AUTO: 34 PG (ref 26–34)
MCHC RBC AUTO-ENTMCNC: 34.1 G/DL (ref 31–37)
MCV RBC AUTO: 99.9 FL (ref 80–100)
MONOCYTES # BLD: 9 % (ref 4–8)
NRBC AUTOMATED: ABNORMAL PER 100 WBC
PATIENT FASTING?: YES
PDW BLD-RTO: 13.9 % (ref 12.1–15.2)
PLATELET # BLD: 512 K/UL (ref 140–450)
PLATELET ESTIMATE: ABNORMAL
PMV BLD AUTO: ABNORMAL FL (ref 6–12)
POTASSIUM SERPL-SCNC: 4.3 MMOL/L (ref 3.7–5.3)
RBC # BLD: 3.91 M/UL (ref 4–5.2)
RBC # BLD: ABNORMAL 10*6/UL
SEG NEUTROPHILS: 37 % (ref 47–75)
SEGMENTED NEUTROPHILS ABSOLUTE COUNT: 2.3 K/UL (ref 2.5–7)
SODIUM BLD-SCNC: 135 MMOL/L (ref 135–144)
TOTAL PROTEIN: 7.3 G/DL (ref 6.4–8.3)
TRIGL SERPL-MCNC: 130 MG/DL
TSH SERPL DL<=0.05 MIU/L-ACNC: 3.74 MIU/L (ref 0.3–5)
VITAMIN D 25-HYDROXY: 43 NG/ML (ref 30–100)
VLDLC SERPL CALC-MCNC: ABNORMAL MG/DL (ref 1–30)
WBC # BLD: 6.3 K/UL (ref 3.5–11)
WBC # BLD: ABNORMAL 10*3/UL

## 2021-12-03 PROCEDURE — 80053 COMPREHEN METABOLIC PANEL: CPT

## 2021-12-03 PROCEDURE — 84443 ASSAY THYROID STIM HORMONE: CPT

## 2021-12-03 PROCEDURE — 83735 ASSAY OF MAGNESIUM: CPT

## 2021-12-03 PROCEDURE — 93005 ELECTROCARDIOGRAM TRACING: CPT

## 2021-12-03 PROCEDURE — 77063 BREAST TOMOSYNTHESIS BI: CPT

## 2021-12-03 PROCEDURE — 85025 COMPLETE CBC W/AUTO DIFF WBC: CPT

## 2021-12-03 PROCEDURE — 82306 VITAMIN D 25 HYDROXY: CPT

## 2021-12-03 PROCEDURE — 80061 LIPID PANEL: CPT

## 2021-12-03 PROCEDURE — 36415 COLL VENOUS BLD VENIPUNCTURE: CPT

## 2021-12-05 LAB
EKG ATRIAL RATE: 71 BPM
EKG P AXIS: 60 DEGREES
EKG P-R INTERVAL: 150 MS
EKG Q-T INTERVAL: 382 MS
EKG QRS DURATION: 86 MS
EKG QTC CALCULATION (BAZETT): 415 MS
EKG R AXIS: 51 DEGREES
EKG T AXIS: 63 DEGREES
EKG VENTRICULAR RATE: 71 BPM

## 2021-12-05 PROCEDURE — 93010 ELECTROCARDIOGRAM REPORT: CPT | Performed by: INTERNAL MEDICINE

## 2021-12-06 ENCOUNTER — OFFICE VISIT (OUTPATIENT)
Dept: CARDIOLOGY CLINIC | Age: 61
End: 2021-12-06
Payer: COMMERCIAL

## 2021-12-06 ENCOUNTER — OFFICE VISIT (OUTPATIENT)
Dept: FAMILY MEDICINE CLINIC | Age: 61
End: 2021-12-06
Payer: COMMERCIAL

## 2021-12-06 VITALS
DIASTOLIC BLOOD PRESSURE: 99 MMHG | OXYGEN SATURATION: 97 % | WEIGHT: 130 LBS | SYSTOLIC BLOOD PRESSURE: 132 MMHG | TEMPERATURE: 97.3 F | HEART RATE: 78 BPM | RESPIRATION RATE: 18 BRPM | BODY MASS INDEX: 23.03 KG/M2

## 2021-12-06 VITALS
OXYGEN SATURATION: 96 % | SYSTOLIC BLOOD PRESSURE: 100 MMHG | DIASTOLIC BLOOD PRESSURE: 80 MMHG | WEIGHT: 129 LBS | HEART RATE: 97 BPM | BODY MASS INDEX: 22.85 KG/M2

## 2021-12-06 DIAGNOSIS — E78.5 DYSLIPIDEMIA: ICD-10-CM

## 2021-12-06 DIAGNOSIS — I10 ESSENTIAL HYPERTENSION: Primary | ICD-10-CM

## 2021-12-06 DIAGNOSIS — R06.02 SOB (SHORTNESS OF BREATH): ICD-10-CM

## 2021-12-06 DIAGNOSIS — E56.9 VITAMIN DEFICIENCY: ICD-10-CM

## 2021-12-06 DIAGNOSIS — I25.118 CORONARY ARTERY DISEASE OF NATIVE ARTERY OF NATIVE HEART WITH STABLE ANGINA PECTORIS (HCC): ICD-10-CM

## 2021-12-06 PROCEDURE — 3017F COLORECTAL CA SCREEN DOC REV: CPT | Performed by: INTERNAL MEDICINE

## 2021-12-06 PROCEDURE — G8484 FLU IMMUNIZE NO ADMIN: HCPCS | Performed by: INTERNAL MEDICINE

## 2021-12-06 PROCEDURE — G8428 CUR MEDS NOT DOCUMENT: HCPCS | Performed by: INTERNAL MEDICINE

## 2021-12-06 PROCEDURE — 99214 OFFICE O/P EST MOD 30 MIN: CPT | Performed by: INTERNAL MEDICINE

## 2021-12-06 PROCEDURE — 1036F TOBACCO NON-USER: CPT | Performed by: INTERNAL MEDICINE

## 2021-12-06 PROCEDURE — G8420 CALC BMI NORM PARAMETERS: HCPCS | Performed by: INTERNAL MEDICINE

## 2021-12-06 PROCEDURE — 99213 OFFICE O/P EST LOW 20 MIN: CPT | Performed by: INTERNAL MEDICINE

## 2021-12-06 PROCEDURE — G8427 DOCREV CUR MEDS BY ELIG CLIN: HCPCS | Performed by: INTERNAL MEDICINE

## 2021-12-06 ASSESSMENT — ENCOUNTER SYMPTOMS
NAUSEA: 0
SORE THROAT: 0
ABDOMINAL PAIN: 0
COUGH: 0
ORTHOPNEA: 0
BLURRED VISION: 0
SHORTNESS OF BREATH: 1

## 2021-12-06 NOTE — PROGRESS NOTES
Mary Kidd M.D. [address] [place] [address]  [phone]          2021          Miladys Thomas MD  [address] [address]      RE:   Everardo Harden  :  1960      Dear Dr. Sumeet Damon:    CHIEF COMPLAINT:  1. Cardiac clearance for ventral hernia repair by Dr. Sumeet Damon on 2022.  2.  Shortness of breath with some chest discomfort. 3.  Coronary artery disease. HISTORY OF PRESENT ILLNESS:  I had the pleasure of seeing Mrs. Aguila Last in the office on 2021. She is a complex 41-year-old female who had Takayasu arteritis. She had a right carotid artery occlusion in , and transferred to [facility] where she had a brachiocephalic aorta bypass. In , she had chest pain and abnormal stress test and I did a catheterization on 2010, through the right femoral artery approach that showed 95% disease in the right coronary artery, the LAD and circumflex were unremarkable, EF of 60%, and I did angioplasty of the right coronary artery, placing 3.0 x 32 and 3.0 x 28 mm Taxus stents with a good end result. The aortic arch demonstrated widely patent brachiocephalic bypass grafts, the native brachiocephalic was occluded. We injected left subclavian artery and could not cannulate it, and we suspected that it was occluded. She always has a blood pressure that is markedly low in the left arm compared to the right arm. She had 80% to 90% disease in the right iliac artery. She also had left carotid artery disease, which was stented on 2011 by Dr. Niya Greenberg. She had angioplasty of the right iliac artery on 2012, by Dr. Roberto Caballero. She then developed obstruction of the left femoral artery on 2012, and had angioplasty of the left femoral artery as well. On 2017, she had syncope and left jaw pain and detectable troponin.   She went to [facility] and attempts were made to cross her femoral artery on both the left and right side, which were unsuccessful, also had right brachial artery approach, which was failed. The right brachial artery was successfully cannulated but the brachiocephalic trunk was occluded and unable to be crossed. The bypass graft could not be appreciated and, therefore, was aborted and she had no angiogram or cardiac catheterization. She has been placed on guideline-mediated medications, but unfortunately Mrs. Tommie Gibson continues to not take her medications. She has a large ventral hernia and is pending a hernia repair by Dr. Tania Britt on 01/05/2022. I was asked to see her for preoperative clearance. She has been having more shortness of breath with walking up steps. She has some chest pain but cannot tell if this is just her shortness of breath or an actual discomfort. She has had no syncope or near syncope, lightheadedness and no palpitations. She does have difficulty walking because of claudication of both lower extremities. CARDIAC RISK FACTORS:  Known CAD:  Positive. Hypertension:  Positive. Hyperlipidemia:  Positive. Peripheral Vascular Disease:  Positive. Diabetes:  Positive. Smoking:  Negative. MEDICATIONS AT THIS TIME:  She is taking Cozaar 50 mg daily and Lopressor 50 mg daily. She should be on Cozaar 50 mg b.i.d., Lopressor 50 mg b.i.d., Plavix 75 mg daily, aspirin 81 mg daily and Lipitor 80 mg daily. She is taking the none of these other medications. PAST MEDICAL AND SURGICAL HISTORY:  1. Cardiac as above. 2.  Labile hypertension. 3.  Occluded left subclavian, occluded right iliac and left femoral.  4.  She has had angioplasty of the left common femoral by Dr. Evelin Evans, angioplasty of the right coronary artery by myself in 2010.  5.  Severe hyperlipidemia, untreated as she is not taking statins.   6.  Bladder cancer with a resection by Dr. Pily Baxter on 05/17/2016.  7.  Cardiovascular procedures as dictated. FAMILY HISTORY:  Mother  of heart disease. Father  of cancer. SOCIAL HISTORY:  She is 60 years ago. Lives with her . Two grandchildren, 79-year-old Rick Bullock and 79-year-old Malou [facility]. Does not smoke and does not drink alcohol. Smokes some marijuana. REVIEW OF SYSTEMS:  Cardiac as above. Other systems reviewed including constitutional, eyes, ears, nose and throat, cardiovascular, respiratory, GI, , musculoskeletal, integumentary, neurologic, endocrine, hematologic and allergic/immunologic, are negative except for what is described above. No weight loss or weight gain. No change in bowel habits. No blood in stool. No fevers, sweats or chills. PHYSICAL EXAMINATION:  VITAL SIGNS:  Her blood pressure was 100/80 in the left arm and 164/100 in the right arm. Respiratory rate 18. O2 saturation of 96%. Weight 129 pounds. GENERAL:  She is a pleasant 80-year-old female. Denied pain. She was oriented to person, place and time. Answered questions appropriately. SKIN:  No unusual skin changes. HEENT:  The pupils are equally round and intact. Mucous membranes were dry. NECK:  No JVD. Good carotid pulses. Bilateral carotid bruits. No lymphadenopathy or thyromegaly. CARDIOVASCULAR EXAM:  S1 and S2 were normal.  No S3 or S4. Soft systolic blowing type murmur. No diastolic murmur. PMI was normal.  No lift, thrust, or pericardial friction rub. LUNGS:  Fairly clear to auscultation and percussion. ABDOMEN:  Soft and nontender. Good bowel sounds. The aorta was not enlarged. No hepatomegaly, splenomegaly. Her ventral hernia is easily palpated. EXTREMITIES:  Could not feel pedal pulses in either lower extremity. I did feel femoral pulses. NEUROLOGIC EXAM:  Within normal limits. PSYCHIATRIC EXAM:  Within normal limits. LABORATORY DATA:  Her sodium was 135, potassium 4.3, BUN 13, creatinine 0.77, GFR greater than 60, magnesium 2.1, calcium was 9.4.   Cholesterol 235 some chest pain and tightness, although it has been difficult for her to discern pain from her shortness of breath. She is at incredible risk for progression of her coronary artery disease. Her lipids are untreated and her blood pressure is high. The pressure in the left arm is much lower than the right arm because of occluded left subclavian artery. Therefore, blood pressures should only be taken through the right arm. I did encourage her to take her medications. We will repeat the Lexiscan stress test because of her no new symptoms. She is unable to walk a treadmill because of claudication. If her Lexiscan stress test is unchanged, she will be cleared for surgery for ventral hernia by Dr. Priscilla Swanson. Thank you very much for allowing me the privilege of seeing Mrs. Saranya Murray. If you have any questions on my thoughts, please do not hesitate to contact me.      Sincerely,        Erica De La Fuente    D: 12/06/2021 8:32:16     T: 12/06/2021 8:36:42     [id number]  Job#: [id number]   Doc#: [id number]

## 2021-12-06 NOTE — LETTER
Sid Kaye M.D. [address] [place] [address]  [phone]          2021          Sara Wen MD  [address] [address]      RE:   Saranya Murray  :  1960      Dear Dr. Priscilla Swanson:    CHIEF COMPLAINT:  1. Cardiac clearance for ventral hernia repair by Dr. Priscilla Swanson on 2022.  2.  Shortness of breath with some chest discomfort. 3.  Coronary artery disease. HISTORY OF PRESENT ILLNESS:  I had the pleasure of seeing Mrs. Haris Parsons in the office on 2021. 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 She had a right carotid artery occlusion in , and transferred to [facility] where she had a brachiocephalic aorta bypass. In , she had chest pain and abnormal stress test and I did a catheterization on 2010, through the right femoral artery approach that showed 95% disease in the right coronary artery, the LAD and circumflex were unremarkable, EF of 60%, and I did angioplasty of the right coronary artery, placing 3.0 x 32 and 3.0 x 28 mm Taxus stents with a good end result. The aortic arch demonstrated widely patent brachiocephalic bypass grafts, the native brachiocephalic was occluded. We injected left subclavian artery and could not cannulate it, and we suspected that it was occluded. She always has a blood pressure that is markedly low in the left arm compared to the right arm. She had 80% to 90% disease in the right iliac artery. She also had left carotid artery disease, which was stented on 2011 by Dr. Dean Flowers. She had angioplasty of the right iliac artery on 2012, by Dr. Ping Guevara. She then developed obstruction of the left femoral artery on 2012, and had angioplasty of the left femoral artery as well. On 2017, she had syncope and left jaw pain and detectable troponin.   She went to [facility] and attempts were made to cross her femoral artery on both the left and right side, which were unsuccessful, also had right brachial artery approach, which was failed. The right brachial artery was successfully cannulated but the brachiocephalic trunk was occluded and unable to be crossed. The bypass graft could not be appreciated and, therefore, was aborted and she had no angiogram or cardiac catheterization. She has been placed on guideline-mediated medications, but unfortunately Mrs. Nita Cruz continues to not take her medications. She has a large ventral hernia and is pending a hernia repair by Dr. Vernell Diaz on 01/05/2022. I was asked to see her for preoperative clearance. She has been having more shortness of breath with walking up steps. She has some chest pain but cannot tell if this is just her shortness of breath or an actual discomfort. She has had no syncope or near syncope, lightheadedness and no palpitations. She does have difficulty walking because of claudication of both lower extremities. CARDIAC RISK FACTORS:  Known CAD:  Positive. Hypertension:  Positive. Hyperlipidemia:  Positive. Peripheral Vascular Disease:  Positive. Diabetes:  Positive. Smoking:  Negative. MEDICATIONS AT THIS TIME:  She is taking Cozaar 50 mg daily and Lopressor 50 mg daily. She should be on Cozaar 50 mg b.i.d., Lopressor 50 mg b.i.d., Plavix 75 mg daily, aspirin 81 mg daily and Lipitor 80 mg daily. She is taking the none of these other medications. PAST MEDICAL AND SURGICAL HISTORY:  1. Cardiac as above. 2.  Labile hypertension. 3.  Occluded left subclavian, occluded right iliac and left femoral.  4.  She has had angioplasty of the left common femoral by Dr. Minerva Caraballo, angioplasty of the right coronary artery by myself in 2010.  5.  Severe hyperlipidemia, untreated as she is not taking statins.   6.  Bladder cancer with a resection by Dr. Gaston Damian on 05/17/2016.  7.  Cardiovascular procedures as dictated. FAMILY HISTORY:  Mother  of heart disease. Father  of cancer. SOCIAL HISTORY:  She is 60 years ago. Lives with her . Two grandchildren, 27-year-old Margarita Pagan and 27-year-old Malou [facility]. Does not smoke and does not drink alcohol. Smokes some marijuana. REVIEW OF SYSTEMS:  Cardiac as above. Other systems reviewed including constitutional, eyes, ears, nose and throat, cardiovascular, respiratory, GI, , musculoskeletal, integumentary, neurologic, endocrine, hematologic and allergic/immunologic, are negative except for what is described above. No weight loss or weight gain. No change in bowel habits. No blood in stool. No fevers, sweats or chills. PHYSICAL EXAMINATION:  VITAL SIGNS:  Her blood pressure was 100/80 in the left arm and 164/100 in the right arm. Respiratory rate 18. O2 saturation of 96%. Weight 129 pounds. GENERAL:  She is a pleasant 60-year-old female. Denied pain. She was oriented to person, place and time. Answered questions appropriately. SKIN:  No unusual skin changes. HEENT:  The pupils are equally round and intact. Mucous membranes were dry. NECK:  No JVD. Good carotid pulses. Bilateral carotid bruits. No lymphadenopathy or thyromegaly. CARDIOVASCULAR EXAM:  S1 and S2 were normal.  No S3 or S4. Soft systolic blowing type murmur. No diastolic murmur. PMI was normal.  No lift, thrust, or pericardial friction rub. LUNGS:  Fairly clear to auscultation and percussion. ABDOMEN:  Soft and nontender. Good bowel sounds. The aorta was not enlarged. No hepatomegaly, splenomegaly. Her ventral hernia is easily palpated. EXTREMITIES:  Could not feel pedal pulses in either lower extremity. I did feel femoral pulses. NEUROLOGIC EXAM:  Within normal limits. PSYCHIATRIC EXAM:  Within normal limits. LABORATORY DATA:  Her sodium was 135, potassium 4.3, BUN 13, creatinine 0.77, GFR greater than 60, magnesium 2.1, calcium was 9.4.   Cholesterol 235 with an HDL of 53, , triglycerides 130. ALT was 10, AST was 17. TSH 3.74. Vitamin D 43.0. White count 6.3, hemoglobin 13.3 with a platelet count [phone]. EKG showed normal sinus rhythm, was normal.    Chest x-ray in June showed emphysematous changes. IMPRESSION:  1. Severe coronary artery disease. 2.  Takayasu arteritis, status post brachiocephalic aorta bypass graft in 1993 at [facility] after a CVA with occlusion of the right brachiocephalic artery. 3.  Catheterization on 12/08/2010, after an abnormal stress test and chest pain, that showed 95% RCA, with unremarkable LAD and circumflex, EF of 60%, with placement of 3.0 x 32 and 3.0 x 28 mm Taxus stents. 4.  Severe peripheral vascular disease, with 80% disease in the right iliac artery, with angioplasty by Dr. Susan Granados in 06/2012, with occlusion of the left femoral artery with stent to the left femoral artery in 06/2012, both of which are evidently occluded, although I did feel femoral pulses. 5.  Stenting of the left carotid artery by Dr. Deidra Caldwell in 01/2011. 6.  Known occlusion of the left subclavian artery. 7.  Non-ST-elevation myocardial infarction on 03/01/2017, unable to cross or get access for the left femoral or right femoral or right brachial artery with no catheterization being done. 8.  Marked difference in left arm and right arm, with the left arm 100, with the right arm 160 due to occlusion of the left subclavian. 9.  Bladder cancer on 04/05/2016 secondary to noninvasive papillary urethral carcinoma, in remission. 10.  Large ventral hernia, pending surgery. 11.  Claudication because of severe peripheral vascular disease. PLAN:  1. Lexiscan Cardiolite stress test.  2.  If there is no significant change, she would be cleared for her ventral hernia surgery by Dr. Woodrow Arteaga on 01/05/2022.  3.  We would encourage her to take her medications. ..     DISCUSSION:  Mrs. Natividad Lennox has noticed more shortness of breath with walking up steps with some chest pain and tightness, although it has been difficult for her to discern pain from her shortness of breath. She is at incredible risk for progression of her coronary artery disease. Her lipids are untreated and her blood pressure is high. The pressure in the left arm is much lower than the right arm because of occluded left subclavian artery. Therefore, blood pressures should only be taken through the right arm. I did encourage her to take her medications. We will repeat the Lexiscan stress test because of her no new symptoms. She is unable to walk a treadmill because of claudication. If her Lexiscan stress test is unchanged, she will be cleared for surgery for ventral hernia by Dr. Ismael Booker. Thank you very much for allowing me the privilege of seeing Mrs. Oli Thrasher. If you have any questions on my thoughts, please do not hesitate to contact me.      Sincerely,        Avel Arambula    D: 12/06/2021 8:32:16     T: 12/06/2021 8:36:42     [id number]  Job#: [id number]   Doc#: [id number]

## 2021-12-06 NOTE — PROGRESS NOTES
Ov Dr. Domi Walker for follow up   And cardiac clearance  \"Im suppose to be on a bunch but I dont take but two\" taking losartan and metoprolol  \"Daily\" not bid   Having hernia surgery in Jan   2 girls still with her  Kathreen Person - into boys right now/home schooling   [employer]-home schooling   Will be seeing Dr. Yeison Martinez this afternoon  No chest pain   Sob but not any worse   Has not taken medications today   So bp is high per pt   Not taking metamucil     Will set up for lexiscan   Clearance will be pending stress test   Will call with results     Follow up in one year

## 2021-12-06 NOTE — PATIENT INSTRUCTIONS
SURVEY:    You may be receiving a survey from [employer] regarding your visit today. Please complete the survey to enable us to provide the highest quality of care to you and your family. If you cannot score us a very good on any question, please call the office to discuss how we could have made your experience a very good one. Thank you.

## 2021-12-06 NOTE — PROGRESS NOTES
HPI Notes    Name: Rachael Pratt  : 1960         Chief Complaint:     Chief Complaint   Patient presents with    6 Month Follow-Up     Hypertension, Hyperlipidemia. Patient states no concerns. History of Present Illness:        Sheila Crow presents to office to follow up for HTN and Hyperlipidemia    Sates doing well  . States has not filled Rx for Lipitor prescribed for high cholesterol   Not taking Plavix and ASA because dhe does not like taking pills. s  Was seen by Dr. Tacho Schaefer today for preop clearance for hernia repair scheduled for 22 with Dr. Jesus Wilcox. Ordered Lexiscan test for reported SOB      Hypertension  This is a chronic problem. The current episode started more than 1 year ago. The problem is unchanged. The problem is controlled. Associated symptoms include shortness of breath. Pertinent negatives include no blurred vision, chest pain, headaches, orthopnea, palpitations or peripheral edema. There are no associated agents to hypertension. Risk factors for coronary artery disease include dyslipidemia and family history. Past treatments include angiotensin blockers and beta blockers. The current treatment provides significant improvement. Hypertensive end-organ damage includes CAD/MI and PVD. Hyperlipidemia  This is a chronic problem. The current episode started more than 1 year ago. The problem is uncontrolled. Recent lipid tests were reviewed and are variable. Associated symptoms include shortness of breath. Pertinent negatives include no chest pain. She is currently on no antihyperlipidemic treatment.            Past Medical History:     Past Medical History:   Diagnosis Date    Alcohol abuse 3/2/2017    Arthritis     Bladder cancer [facility])     CAD (coronary artery disease)     Cancer ([facility] [address]     vulvar-    Carotid artery stenosis     Chronic back pain     Hyperlipidemia     Hypertension     Hypoglycemia     MI (myocardial infarction) ([facility] [address] 2017    Peripheral vascular disease [facility] [address]     Takayasu's arteritis ([facility] [address]     Tibial fracture     right    Umbilical hernia     Unspecified cerebral artery occlusion with cerebral infarction 1993      Reviewed all health maintenance requirements and orderedappropriate tests  Health Maintenance Due   Topic Date Due    Shingles Vaccine (1 of 2) Never done    Breast cancer screen  02/27/2020    Cervical cancer screen  02/06/2021       Past Surgical History:     Past Surgical History:   Procedure Laterality Date    BLADDER SURGERY  5/17/2016    cysto with transurethral resection of bladder with mitomycin    CARDIAC CATHETERIZATION  12/2010   [facility] CARDIAC SURGERY  1993    aortic bypass graft for right carotid artery obstruction    COLONOSCOPY N/A 7/22/2021    COLONOSCOPY POLYPECTOMY HOT BIOPSY WITH INK TATTOO performed by Gisella Gonsales MD at [facility] N/A 11/1/2021    COLONOSCOPY POLYPECTOMY HOT BIOPSY performed by Gisella Gonsales MD at [address] [place]  5/12, 6/12    per Dr Arely Han  4-2016    TURBT    OTHER SURGICAL HISTORY      brachiocephalic-bypass    OTHER SURGICAL HISTORY      stenting of left internal carotid artery    UPPER GASTROINTESTINAL ENDOSCOPY N/A 7/22/2021    EGD POLYP SNARE performed by Gisella Gonsales MD at [address]  5/12, 6/12    ken leg vacular surgery.  VULVA SURGERY  2001    cancer        Medications:       Prior to Admission medications    Medication Sig Start Date End Date Taking? Authorizing Provider   metoprolol tartrate (LOPRESSOR) 50 MG tablet TAKE 1 TABLET BY MOUTH TWICE DAILY 6/3/21  Yes Lucia Toussaint MD   losartan (COZAAR) 50 MG tablet TAKE 1 TABLET BY MOUTH TWICE DAILY  Patient taking differently: daily TAKE 1 TABLET BY MOUTH TWICE DAILY 6/3/21  Yes Lucia Toussaint MD        Allergies:       Patient has no known allergies.     Social History:     Tobacco: reports that she quit smoking about 28 years ago. Her smoking use included cigarettes. She has a 22.50 pack-year smoking history. She has never used smokeless tobacco.  Alcohol:      reports current alcohol use of about 6.0 standard drinks of alcohol per week. Drug Use:  reports current drug use. Drug: Marijuana Lucianne Bars). Family History:     Family History   Problem Relation Age of Onset    Cancer Father         lung    Cancer Mother        Review of Systems:         Review of Systems   Constitutional: Negative for chills and fever. HENT: Negative for congestion and sore throat. Eyes: Negative for blurred vision. Respiratory: Positive for shortness of breath. Negative for cough. Cardiovascular: Negative for chest pain, palpitations and orthopnea. Gastrointestinal: Negative for abdominal pain and nausea. Genitourinary: Negative for dysuria. Skin: Negative for rash. Neurological: Negative for dizziness and headaches. Psychiatric/Behavioral: The patient is not nervous/anxious. Physical Exam:     Vitals:  BP (!) 132/99   Pulse 78   Temp 97.3 °F (36.3 °C) (Temporal)   Resp 18   Wt 130 lb (59 kg)   LMP 08/17/2011   SpO2 97%   BMI 23.03 kg/m²       Physical Exam  Vitals reviewed. Constitutional:       General: She is not in acute distress. Appearance: She is well-developed. HENT:      Head: Normocephalic and atraumatic. Right Ear: External ear normal.      Left Ear: External ear normal.      Nose: No congestion. Neck:      Thyroid: No thyromegaly. Cardiovascular:      Rate and Rhythm: Normal rate and regular rhythm. Heart sounds: Normal heart sounds. No murmur heard. Pulmonary:      Effort: Pulmonary effort is normal.      Breath sounds: Normal breath sounds. No wheezing or rales. Abdominal:      General: Bowel sounds are normal. There is no distension. Palpations: Abdomen is soft. There is no mass. Tenderness: There is no abdominal tenderness.    Musculoskeletal: General: Normal range of motion. Right lower leg: No edema. Left lower leg: No edema. Lymphadenopathy:      Cervical: No cervical adenopathy. Skin:     General: Skin is warm and dry. Coloration: Skin is not jaundiced or pale. Findings: No rash. Neurological:      General: No focal deficit present. Mental Status: She is alert and oriented to person, place, and time. Psychiatric:         Behavior: Behavior normal.         Judgment: Judgment normal.               Data:     Lab Results   Component Value Date     12/03/2021    K 4.3 12/03/2021     12/03/2021    CO2 22 12/03/2021    BUN 13 12/03/2021    CREATININE 0.77 12/03/2021    GLUCOSE 111 12/03/2021    GLUCOSE 86 05/31/2012    PROT 7.3 12/03/2021    LABALBU 4.0 12/03/2021    LABALBU 4.1 02/20/2012    BILITOT 0.45 12/03/2021    ALKPHOS 73 12/03/2021    AST 17 12/03/2021    ALT 10 12/03/2021     Lab Results   Component Value Date    WBC 6.3 12/03/2021    RBC 3.91 12/03/2021    RBC 3.36 05/31/2012    HGB 13.3 12/03/2021    HCT 39.1 12/03/2021    MCV 99.9 12/03/2021    MCH 34.0 12/03/2021    MCHC 34.1 12/03/2021    RDW 13.9 12/03/2021     12/03/2021     05/31/2012    MPV NOT REPORTED 12/03/2021     Lab Results   Component Value Date    TSH 3.74 12/03/2021     Lab Results   Component Value Date    CHOL 235 12/03/2021    HDL 53 12/03/2021    LABA1C 5.2 03/15/2018          Assessment & Plan        Diagnosis Orders   1. Essential hypertension   BP elevated in office today but pt reports better control at home will continue same meds. She is encouraged to be compliant with meds    2. Dyslipidemia   Not taking Lipitor despite h/o PAD, CAD and elevated LDL. Strongly advised to resume Lipitor follow up in 3 months                     Completed Refills   Requested Prescriptions      No prescriptions requested or ordered in this encounter     Return in about 6 months (around 6/6/2022) for HTN, hyperlipidemia.    No orders of the defined types were placed in this encounter. No orders of the defined types were placed in this encounter. Patient Instructions     SURVEY:    You may be receiving a survey from [employer] regarding your visit today. Please complete the survey to enable us to provide the highest quality of care to you and your family. If you cannot score us a very good on any question, please call the office to discuss how we could have made your experience a very good one. Thank you.         Electronically signed by Mena Schafer MD on 12/6/2021 at 7:44 PM           Completed Refills      Requested Prescriptions      No prescriptions requested or ordered in this encounter

## 2021-12-07 ENCOUNTER — TELEPHONE (OUTPATIENT)
Dept: FAMILY MEDICINE CLINIC | Age: 61
End: 2021-12-07

## 2021-12-07 NOTE — TELEPHONE ENCOUNTER
----- Message from Dayan Turcios MD sent at 12/7/2021  2:10 PM EST -----  Please let the patient know her mammogram is normal  Thank you

## 2021-12-14 ENCOUNTER — HOSPITAL ENCOUNTER (OUTPATIENT)
Dept: NUCLEAR MEDICINE | Age: 61
Discharge: HOME | End: 2021-12-16
Payer: COMMERCIAL

## 2021-12-14 ENCOUNTER — HOSPITAL ENCOUNTER (OUTPATIENT)
Dept: NON INVASIVE DIAGNOSTICS | Age: 61
Discharge: HOME | End: 2021-12-14
Payer: COMMERCIAL

## 2021-12-14 VITALS — DIASTOLIC BLOOD PRESSURE: 104 MMHG | HEART RATE: 90 BPM | SYSTOLIC BLOOD PRESSURE: 184 MMHG

## 2021-12-14 DIAGNOSIS — I10 ESSENTIAL HYPERTENSION: ICD-10-CM

## 2021-12-14 DIAGNOSIS — E56.9 VITAMIN DEFICIENCY: ICD-10-CM

## 2021-12-14 DIAGNOSIS — R06.02 SOB (SHORTNESS OF BREATH): ICD-10-CM

## 2021-12-14 DIAGNOSIS — I25.118 CORONARY ARTERY DISEASE OF NATIVE ARTERY OF NATIVE HEART WITH STABLE ANGINA PECTORIS (HCC): ICD-10-CM

## 2021-12-14 PROCEDURE — 3430000000 HC RX DIAGNOSTIC RADIOPHARMACEUTICAL: Performed by: INTERNAL MEDICINE

## 2021-12-14 PROCEDURE — 2580000003 HC RX 258: Performed by: INTERNAL MEDICINE

## 2021-12-14 PROCEDURE — 78452 HT MUSCLE IMAGE SPECT MULT: CPT

## 2021-12-14 PROCEDURE — 6360000002 HC RX W HCPCS: Performed by: INTERNAL MEDICINE

## 2021-12-14 PROCEDURE — 93017 CV STRESS TEST TRACING ONLY: CPT

## 2021-12-14 PROCEDURE — A9500 TC99M SESTAMIBI: HCPCS | Performed by: INTERNAL MEDICINE

## 2021-12-14 RX ORDER — SODIUM CHLORIDE 0.9 % (FLUSH) 0.9 %
10 SYRINGE (ML) INJECTION PRN
Status: DISCONTINUED | OUTPATIENT
Start: 2021-12-14 | End: 2021-12-15 | Stop reason: HOSPADM

## 2021-12-14 RX ORDER — AMINOPHYLLINE DIHYDRATE 25 MG/ML
100 INJECTION, SOLUTION INTRAVENOUS
Status: DISCONTINUED | OUTPATIENT
Start: 2021-12-14 | End: 2021-12-14 | Stop reason: HOSPADM

## 2021-12-14 RX ORDER — AMINOPHYLLINE DIHYDRATE 25 MG/ML
50 INJECTION, SOLUTION INTRAVENOUS
Status: DISCONTINUED | OUTPATIENT
Start: 2021-12-14 | End: 2021-12-14 | Stop reason: HOSPADM

## 2021-12-14 RX ADMIN — REGADENOSON 0.4 MG: 0.08 INJECTION, SOLUTION INTRAVENOUS at 10:24

## 2021-12-14 RX ADMIN — TETRAKIS(2-METHOXYISOBUTYLISOCYANIDE)COPPER(I) TETRAFLUOROBORATE 30 MILLICURIE: 1 INJECTION, POWDER, LYOPHILIZED, FOR SOLUTION INTRAVENOUS at 10:25

## 2021-12-14 RX ADMIN — SODIUM CHLORIDE, PRESERVATIVE FREE 10 ML: 5 INJECTION INTRAVENOUS at 10:24

## 2021-12-14 RX ADMIN — TETRAKIS(2-METHOXYISOBUTYLISOCYANIDE)COPPER(I) TETRAFLUOROBORATE 10 MILLICURIE: 1 INJECTION, POWDER, LYOPHILIZED, FOR SOLUTION INTRAVENOUS at 09:10

## 2021-12-14 NOTE — PROGRESS NOTES
Testing complete - patient tolerated well. Patient denies any pain/discomfort or shortness of breath. States nausea has also subsided. Drink and snack provided.

## 2021-12-15 ENCOUNTER — TELEPHONE (OUTPATIENT)
Dept: CARDIOLOGY CLINIC | Age: 61
End: 2021-12-15

## 2021-12-15 NOTE — PROCEDURES
[place] [address]                              CARDIAC STRESS TEST    PATIENT NAME: Ge Womack                       :        1960  MED REC NO:   [id number]                              ROOM:  ACCOUNT NO:   [de-identified]                           ADMIT DATE: 2021  PROVIDER:     Mario Alberto [facility]      DATE OF STUDY:  2021    Cardiovascular Diagnostics Department    Ordering Provider:  Gabriele Gong MD    Primary Care Provider:  Lucia Toussaint MD    Interpreting Physician:  Mario Alberto Green MD    MYOCARDIAL PERFUSION STRESS IMAGING    The stress ECG results are reported separately. NUCLEAR IMAGING RESULTS:  The overall quality of the study is good. Mild/moderate attenuation artifact was seen. There is no evidence of  abnormal lung uptake. Additionally, the right ventricle appears normal.  The left ventricular cavity is noted to be normal in size on stress  images. There is no evidence of transient ischemic dilatation (TID) of  the left ventricle. Gated SPECT imaging reveals normal myocardial thickening and wall motion  with a calculated left ventricular ejection fraction (EF) of 75%. The rest images demonstrated a small perfusion abnormality of mild  intensity in the inferior region(s) which is most likely due to  artifact. On stress imaging, a small perfusion abnormality of mild intensity was  noted in the inferior region(s) which is most likely due to artifact. IMPRESSION:  1. Largely normal myocardial perfusion imaging with soft tissue  artifact but without evidence of significant myocardial ischemia or  infarction. 2.  Global left ventricular systolic function was normal without  regional wall motion abnormalities. Overall these results are most consistent with a low risk for  significant coronary artery disease.     Although the patient's results were not completely normal, unless  clinical suspicion for significant ongoing coronary artery ischemia is  high, I would not suggest pursuing additional testing by coronary  angiography.          Juan Lucas    D: 12/15/2021 9:47:10       T: 12/15/2021 9:48:37     [email or web address]  Job#: [id number]     Doc#: Unknown    CC:  Marian Castellanos

## 2021-12-20 NOTE — PROCEDURES
[place] [address]                              CARDIAC STRESS TEST    PATIENT NAME: Nati Mcmillan                       :        1960  MED REC NO:   [id number]                              ROOM:  ACCOUNT NO:   [de-identified]                           ADMIT DATE: 2021  PROVIDER:     Rylee Edwards    DATE OF STUDY:  2021    LEXISCAN CARDIOLITE STRESS TEST:    INDICATION:  Chest pain. IMPRESSION:  1. We gave 0.4 mg of Lexiscan intravenously. 2.  This was followed in 20 seconds by Cardiolite infusion. 3.  There was no chest pain. 4.  There was no ST depression. 5.  It was an overall negative Lexiscan stress test.  6.  Cardiolite to follow.         Keegan Voss    D: 2021 21:53:09       T: 2021 22:31:54     [email or web address]  Job#: [id number]     Doc#: [id number]    CC:

## 2021-12-21 NOTE — PROGRESS NOTES
Patient instructed on the pre-operative, intra-operative, and post-operative process. Patient instructed on NPO status. Medication instructions and pre operative instruction sheet reviewed over the phone. CHG skin prep instructions reviewed with patient. Instructed pt to take metoprolol with a small sip of water prior to arriving to the hospital the day of surgery.

## 2022-01-04 ENCOUNTER — ANESTHESIA EVENT (OUTPATIENT)
Dept: OPERATING ROOM | Age: 62
End: 2022-01-04
Payer: COMMERCIAL

## 2022-01-05 ENCOUNTER — ANESTHESIA (OUTPATIENT)
Dept: OPERATING ROOM | Age: 62
End: 2022-01-05
Payer: COMMERCIAL

## 2022-01-05 ENCOUNTER — HOSPITAL ENCOUNTER (OUTPATIENT)
Age: 62
Setting detail: OUTPATIENT SURGERY
Discharge: HOME | End: 2022-01-05
Attending: SURGERY | Admitting: SURGERY
Payer: COMMERCIAL

## 2022-01-05 VITALS
RESPIRATION RATE: 18 BRPM | HEART RATE: 64 BPM | TEMPERATURE: 97 F | SYSTOLIC BLOOD PRESSURE: 130 MMHG | BODY MASS INDEX: 23.5 KG/M2 | DIASTOLIC BLOOD PRESSURE: 69 MMHG | HEIGHT: 63 IN | WEIGHT: 132.6 LBS | OXYGEN SATURATION: 95 %

## 2022-01-05 VITALS — DIASTOLIC BLOOD PRESSURE: 99 MMHG | OXYGEN SATURATION: 100 % | SYSTOLIC BLOOD PRESSURE: 217 MMHG

## 2022-01-05 DIAGNOSIS — Z98.890 S/P REPAIR OF VENTRAL HERNIA: Primary | ICD-10-CM

## 2022-01-05 DIAGNOSIS — Z87.19 S/P REPAIR OF VENTRAL HERNIA: Primary | ICD-10-CM

## 2022-01-05 PROCEDURE — 7100000001 HC PACU RECOVERY - ADDTL 15 MIN: Performed by: SURGERY

## 2022-01-05 PROCEDURE — 6360000002 HC RX W HCPCS: Performed by: SURGERY

## 2022-01-05 PROCEDURE — 2580000003 HC RX 258: Performed by: NURSE ANESTHETIST, CERTIFIED REGISTERED

## 2022-01-05 PROCEDURE — 7100000000 HC PACU RECOVERY - FIRST 15 MIN: Performed by: SURGERY

## 2022-01-05 PROCEDURE — 88305 TISSUE EXAM BY PATHOLOGIST: CPT

## 2022-01-05 PROCEDURE — 6370000000 HC RX 637 (ALT 250 FOR IP): Performed by: NURSE ANESTHETIST, CERTIFIED REGISTERED

## 2022-01-05 PROCEDURE — 3700000001 HC ADD 15 MINUTES (ANESTHESIA): Performed by: SURGERY

## 2022-01-05 PROCEDURE — 2709999900 HC NON-CHARGEABLE SUPPLY: Performed by: SURGERY

## 2022-01-05 PROCEDURE — 88302 TISSUE EXAM BY PATHOLOGIST: CPT

## 2022-01-05 PROCEDURE — 7100000011 HC PHASE II RECOVERY - ADDTL 15 MIN: Performed by: SURGERY

## 2022-01-05 PROCEDURE — 3600000003 HC SURGERY LEVEL 3 BASE: Performed by: SURGERY

## 2022-01-05 PROCEDURE — 64486 TAP BLOCK UNIL BY INJECTION: CPT | Performed by: NURSE ANESTHETIST, CERTIFIED REGISTERED

## 2022-01-05 PROCEDURE — 2720000010 HC SURG SUPPLY STERILE: Performed by: SURGERY

## 2022-01-05 PROCEDURE — 3700000000 HC ANESTHESIA ATTENDED CARE: Performed by: SURGERY

## 2022-01-05 PROCEDURE — 2500000003 HC RX 250 WO HCPCS: Performed by: NURSE ANESTHETIST, CERTIFIED REGISTERED

## 2022-01-05 PROCEDURE — 6360000002 HC RX W HCPCS: Performed by: NURSE ANESTHETIST, CERTIFIED REGISTERED

## 2022-01-05 PROCEDURE — 3600000013 HC SURGERY LEVEL 3 ADDTL 15MIN: Performed by: SURGERY

## 2022-01-05 PROCEDURE — 7100000010 HC PHASE II RECOVERY - FIRST 15 MIN: Performed by: SURGERY

## 2022-01-05 RX ORDER — DIMENHYDRINATE 50 MG/1
50 TABLET ORAL ONCE
Status: COMPLETED | OUTPATIENT
Start: 2022-01-05 | End: 2022-01-05

## 2022-01-05 RX ORDER — HYDRALAZINE HYDROCHLORIDE 20 MG/ML
10 INJECTION INTRAMUSCULAR; INTRAVENOUS ONCE
Status: COMPLETED | OUTPATIENT
Start: 2022-01-05 | End: 2022-01-05

## 2022-01-05 RX ORDER — MORPHINE SULFATE 1 MG/ML
1 INJECTION, SOLUTION EPIDURAL; INTRATHECAL; INTRAVENOUS
Status: DISCONTINUED | OUTPATIENT
Start: 2022-01-05 | End: 2022-01-05 | Stop reason: HOSPADM

## 2022-01-05 RX ORDER — SODIUM CHLORIDE, SODIUM LACTATE, POTASSIUM CHLORIDE, CALCIUM CHLORIDE 600; 310; 30; 20 MG/100ML; MG/100ML; MG/100ML; MG/100ML
INJECTION, SOLUTION INTRAVENOUS CONTINUOUS
Status: DISCONTINUED | OUTPATIENT
Start: 2022-01-05 | End: 2022-01-05 | Stop reason: HOSPADM

## 2022-01-05 RX ORDER — ONDANSETRON 2 MG/ML
INJECTION INTRAMUSCULAR; INTRAVENOUS PRN
Status: DISCONTINUED | OUTPATIENT
Start: 2022-01-05 | End: 2022-01-05 | Stop reason: SDUPTHER

## 2022-01-05 RX ORDER — PROMETHAZINE HYDROCHLORIDE 25 MG/ML
6.25 INJECTION, SOLUTION INTRAMUSCULAR; INTRAVENOUS
Status: DISCONTINUED | OUTPATIENT
Start: 2022-01-05 | End: 2022-01-05 | Stop reason: HOSPADM

## 2022-01-05 RX ORDER — SODIUM CHLORIDE 0.9 % (FLUSH) 0.9 %
5-40 SYRINGE (ML) INJECTION PRN
Status: DISCONTINUED | OUTPATIENT
Start: 2022-01-05 | End: 2022-01-05 | Stop reason: HOSPADM

## 2022-01-05 RX ORDER — GLYCOPYRROLATE 1 MG/5 ML
SYRINGE (ML) INTRAVENOUS PRN
Status: DISCONTINUED | OUTPATIENT
Start: 2022-01-05 | End: 2022-01-05 | Stop reason: SDUPTHER

## 2022-01-05 RX ORDER — ACETAMINOPHEN 325 MG/1
650 TABLET ORAL ONCE
Status: COMPLETED | OUTPATIENT
Start: 2022-01-05 | End: 2022-01-05

## 2022-01-05 RX ORDER — ROCURONIUM BROMIDE 10 MG/ML
INJECTION, SOLUTION INTRAVENOUS PRN
Status: DISCONTINUED | OUTPATIENT
Start: 2022-01-05 | End: 2022-01-05 | Stop reason: SDUPTHER

## 2022-01-05 RX ORDER — HYDRALAZINE HYDROCHLORIDE 20 MG/ML
INJECTION INTRAMUSCULAR; INTRAVENOUS PRN
Status: DISCONTINUED | OUTPATIENT
Start: 2022-01-05 | End: 2022-01-05 | Stop reason: SDUPTHER

## 2022-01-05 RX ORDER — LIDOCAINE HYDROCHLORIDE 20 MG/ML
INJECTION, SOLUTION EPIDURAL; INFILTRATION; INTRACAUDAL; PERINEURAL PRN
Status: DISCONTINUED | OUTPATIENT
Start: 2022-01-05 | End: 2022-01-05 | Stop reason: SDUPTHER

## 2022-01-05 RX ORDER — ROPIVACAINE HYDROCHLORIDE 5 MG/ML
INJECTION, SOLUTION EPIDURAL; INFILTRATION; PERINEURAL PRN
Status: DISCONTINUED | OUTPATIENT
Start: 2022-01-05 | End: 2022-01-05 | Stop reason: SDUPTHER

## 2022-01-05 RX ORDER — SODIUM CHLORIDE 9 MG/ML
INJECTION INTRAVENOUS PRN
Status: DISCONTINUED | OUTPATIENT
Start: 2022-01-05 | End: 2022-01-05 | Stop reason: SDUPTHER

## 2022-01-05 RX ORDER — HYDRALAZINE HYDROCHLORIDE 20 MG/ML
10 INJECTION INTRAMUSCULAR; INTRAVENOUS EVERY 10 MIN PRN
Status: DISCONTINUED | OUTPATIENT
Start: 2022-01-05 | End: 2022-01-05

## 2022-01-05 RX ORDER — HYDROCODONE BITARTRATE AND ACETAMINOPHEN 5; 325 MG/1; MG/1
1 TABLET ORAL EVERY 6 HOURS PRN
Qty: 12 TABLET | Refills: 0 | Status: SHIPPED | OUTPATIENT
Start: 2022-01-05 | End: 2022-01-08

## 2022-01-05 RX ORDER — PROPOFOL 10 MG/ML
INJECTION, EMULSION INTRAVENOUS PRN
Status: DISCONTINUED | OUTPATIENT
Start: 2022-01-05 | End: 2022-01-05 | Stop reason: SDUPTHER

## 2022-01-05 RX ORDER — SODIUM CHLORIDE 0.9 % (FLUSH) 0.9 %
10 SYRINGE (ML) INJECTION PRN
Status: DISCONTINUED | OUTPATIENT
Start: 2022-01-05 | End: 2022-01-05 | Stop reason: HOSPADM

## 2022-01-05 RX ORDER — SODIUM CHLORIDE 0.9 % (FLUSH) 0.9 %
10 SYRINGE (ML) INJECTION EVERY 12 HOURS SCHEDULED
Status: DISCONTINUED | OUTPATIENT
Start: 2022-01-05 | End: 2022-01-05 | Stop reason: HOSPADM

## 2022-01-05 RX ORDER — SODIUM CHLORIDE 0.9 % (FLUSH) 0.9 %
5-40 SYRINGE (ML) INJECTION EVERY 12 HOURS SCHEDULED
Status: DISCONTINUED | OUTPATIENT
Start: 2022-01-05 | End: 2022-01-05 | Stop reason: HOSPADM

## 2022-01-05 RX ORDER — MIDAZOLAM HYDROCHLORIDE 1 MG/ML
INJECTION INTRAMUSCULAR; INTRAVENOUS PRN
Status: DISCONTINUED | OUTPATIENT
Start: 2022-01-05 | End: 2022-01-05 | Stop reason: SDUPTHER

## 2022-01-05 RX ORDER — NEOSTIGMINE METHYLSULFATE 1 MG/ML
INJECTION, SOLUTION INTRAVENOUS PRN
Status: DISCONTINUED | OUTPATIENT
Start: 2022-01-05 | End: 2022-01-05 | Stop reason: SDUPTHER

## 2022-01-05 RX ORDER — HYDROCODONE BITARTRATE AND ACETAMINOPHEN 5; 325 MG/1; MG/1
1 TABLET ORAL
Status: COMPLETED | OUTPATIENT
Start: 2022-01-05 | End: 2022-01-05

## 2022-01-05 RX ORDER — FENTANYL CITRATE 50 UG/ML
INJECTION, SOLUTION INTRAMUSCULAR; INTRAVENOUS PRN
Status: DISCONTINUED | OUTPATIENT
Start: 2022-01-05 | End: 2022-01-05 | Stop reason: SDUPTHER

## 2022-01-05 RX ORDER — DEXAMETHASONE SODIUM PHOSPHATE 4 MG/ML
INJECTION, SOLUTION INTRA-ARTICULAR; INTRALESIONAL; INTRAMUSCULAR; INTRAVENOUS; SOFT TISSUE PRN
Status: DISCONTINUED | OUTPATIENT
Start: 2022-01-05 | End: 2022-01-05 | Stop reason: SDUPTHER

## 2022-01-05 RX ORDER — HYDRALAZINE HYDROCHLORIDE 20 MG/ML
10 INJECTION INTRAMUSCULAR; INTRAVENOUS EVERY 10 MIN PRN
Status: DISCONTINUED | OUTPATIENT
Start: 2022-01-05 | End: 2022-01-05 | Stop reason: HOSPADM

## 2022-01-05 RX ORDER — LOSARTAN POTASSIUM 50 MG/1
50 TABLET ORAL ONCE
Status: COMPLETED | OUTPATIENT
Start: 2022-01-05 | End: 2022-01-05

## 2022-01-05 RX ORDER — SODIUM CHLORIDE 9 MG/ML
25 INJECTION, SOLUTION INTRAVENOUS PRN
Status: DISCONTINUED | OUTPATIENT
Start: 2022-01-05 | End: 2022-01-05 | Stop reason: HOSPADM

## 2022-01-05 RX ORDER — METOCLOPRAMIDE HYDROCHLORIDE 5 MG/ML
10 INJECTION INTRAMUSCULAR; INTRAVENOUS
Status: COMPLETED | OUTPATIENT
Start: 2022-01-05 | End: 2022-01-05

## 2022-01-05 RX ORDER — DEXAMETHASONE SODIUM PHOSPHATE 10 MG/ML
INJECTION, SOLUTION INTRAMUSCULAR; INTRAVENOUS PRN
Status: DISCONTINUED | OUTPATIENT
Start: 2022-01-05 | End: 2022-01-05 | Stop reason: SDUPTHER

## 2022-01-05 RX ORDER — FENTANYL CITRATE 50 UG/ML
50 INJECTION, SOLUTION INTRAMUSCULAR; INTRAVENOUS EVERY 5 MIN PRN
Status: DISCONTINUED | OUTPATIENT
Start: 2022-01-05 | End: 2022-01-05 | Stop reason: HOSPADM

## 2022-01-05 RX ADMIN — FENTANYL CITRATE 25 MCG: 50 INJECTION INTRAMUSCULAR; INTRAVENOUS at 10:01

## 2022-01-05 RX ADMIN — FENTANYL CITRATE 100 MCG: 50 INJECTION INTRAMUSCULAR; INTRAVENOUS at 07:45

## 2022-01-05 RX ADMIN — METOCLOPRAMIDE 10 MG: 5 INJECTION, SOLUTION INTRAMUSCULAR; INTRAVENOUS at 11:27

## 2022-01-05 RX ADMIN — ONDANSETRON 4 MG: 2 INJECTION INTRAMUSCULAR; INTRAVENOUS at 09:17

## 2022-01-05 RX ADMIN — HYDRALAZINE HYDROCHLORIDE 10 MG: 20 INJECTION INTRAMUSCULAR; INTRAVENOUS at 07:16

## 2022-01-05 RX ADMIN — ROCURONIUM BROMIDE 30 MG: 10 SOLUTION INTRAVENOUS at 08:47

## 2022-01-05 RX ADMIN — FENTANYL CITRATE 50 MCG: 50 INJECTION INTRAMUSCULAR; INTRAVENOUS at 10:40

## 2022-01-05 RX ADMIN — SODIUM CHLORIDE, POTASSIUM CHLORIDE, SODIUM LACTATE AND CALCIUM CHLORIDE: 600; 310; 30; 20 INJECTION, SOLUTION INTRAVENOUS at 09:40

## 2022-01-05 RX ADMIN — PROPOFOL 150 MG: 10 INJECTION, EMULSION INTRAVENOUS at 08:47

## 2022-01-05 RX ADMIN — ROPIVACAINE HYDROCHLORIDE 35 ML: 5 INJECTION, SOLUTION EPIDURAL; INFILTRATION; PERINEURAL at 07:55

## 2022-01-05 RX ADMIN — FENTANYL CITRATE 25 MCG: 50 INJECTION INTRAMUSCULAR; INTRAVENOUS at 09:56

## 2022-01-05 RX ADMIN — FENTANYL CITRATE 50 MCG: 50 INJECTION INTRAMUSCULAR; INTRAVENOUS at 09:26

## 2022-01-05 RX ADMIN — SODIUM CHLORIDE 40 ML: 9 INJECTION INTRAMUSCULAR; INTRAVENOUS; SUBCUTANEOUS at 07:55

## 2022-01-05 RX ADMIN — CEFAZOLIN 2000 MG: 10 INJECTION, POWDER, FOR SOLUTION INTRAVENOUS at 08:39

## 2022-01-05 RX ADMIN — ACETAMINOPHEN 650 MG: 325 TABLET ORAL at 07:43

## 2022-01-05 RX ADMIN — DEXAMETHASONE SODIUM PHOSPHATE 10 MG: 10 INJECTION, SOLUTION INTRAMUSCULAR; INTRAVENOUS at 07:55

## 2022-01-05 RX ADMIN — HYDRALAZINE HYDROCHLORIDE 10 MG: 20 INJECTION INTRAMUSCULAR; INTRAVENOUS at 11:01

## 2022-01-05 RX ADMIN — Medication 3 MG: at 09:55

## 2022-01-05 RX ADMIN — LOSARTAN POTASSIUM 50 MG: 50 TABLET, FILM COATED ORAL at 12:09

## 2022-01-05 RX ADMIN — ROCURONIUM BROMIDE 20 MG: 10 SOLUTION INTRAVENOUS at 09:16

## 2022-01-05 RX ADMIN — HYDRALAZINE HYDROCHLORIDE 10 MG: 20 INJECTION INTRAMUSCULAR; INTRAVENOUS at 07:30

## 2022-01-05 RX ADMIN — MIDAZOLAM 2 MG: 1 INJECTION INTRAMUSCULAR; INTRAVENOUS at 07:45

## 2022-01-05 RX ADMIN — SODIUM CHLORIDE, POTASSIUM CHLORIDE, SODIUM LACTATE AND CALCIUM CHLORIDE: 600; 310; 30; 20 INJECTION, SOLUTION INTRAVENOUS at 07:15

## 2022-01-05 RX ADMIN — Medication 0.4 MG: at 09:55

## 2022-01-05 RX ADMIN — DEXAMETHASONE SODIUM PHOSPHATE 4 MG: 4 INJECTION, SOLUTION INTRAMUSCULAR; INTRAVENOUS at 09:17

## 2022-01-05 RX ADMIN — LIDOCAINE HYDROCHLORIDE 75 MG: 20 INJECTION, SOLUTION EPIDURAL; INFILTRATION; INTRACAUDAL; PERINEURAL at 08:47

## 2022-01-05 RX ADMIN — DIMENHYDRINATE 50 MG: 50 TABLET ORAL at 07:43

## 2022-01-05 RX ADMIN — HYDROCODONE BITARTRATE AND ACETAMINOPHEN 1 TABLET: 5; 325 TABLET ORAL at 11:44

## 2022-01-05 ASSESSMENT — PAIN DESCRIPTION - ORIENTATION
ORIENTATION: LOWER

## 2022-01-05 ASSESSMENT — PAIN DESCRIPTION - DESCRIPTORS
DESCRIPTORS: DISCOMFORT
DESCRIPTORS: DISCOMFORT

## 2022-01-05 ASSESSMENT — PAIN DESCRIPTION - LOCATION
LOCATION: ABDOMEN

## 2022-01-05 ASSESSMENT — ENCOUNTER SYMPTOMS
BACK PAIN: 1
CHOKING: 0
SHORTNESS OF BREATH: 0
SORE THROAT: 0
ABDOMINAL PAIN: 1
COUGH: 0
NAUSEA: 0
VOMITING: 0
BLOOD IN STOOL: 0
TROUBLE SWALLOWING: 0

## 2022-01-05 ASSESSMENT — PAIN DESCRIPTION - PAIN TYPE
TYPE: SURGICAL PAIN

## 2022-01-05 ASSESSMENT — PAIN SCALES - GENERAL
PAINLEVEL_OUTOF10: 5
PAINLEVEL_OUTOF10: 5
PAINLEVEL_OUTOF10: 4
PAINLEVEL_OUTOF10: 5
PAINLEVEL_OUTOF10: 5
PAINLEVEL_OUTOF10: 7
PAINLEVEL_OUTOF10: 0
PAINLEVEL_OUTOF10: 5

## 2022-01-05 ASSESSMENT — PAIN DESCRIPTION - FREQUENCY
FREQUENCY: CONTINUOUS

## 2022-01-05 ASSESSMENT — PAIN - FUNCTIONAL ASSESSMENT: PAIN_FUNCTIONAL_ASSESSMENT: 0-10

## 2022-01-05 ASSESSMENT — LIFESTYLE VARIABLES: SMOKING_STATUS: 1

## 2022-01-05 NOTE — BRIEF OP NOTE
Brief Postoperative Note      Patient: Gavino Cancino  YOB: 1960  MRN: [id number]    Date of Procedure: 1/5/2022    Pre-Op Diagnosis:      1. Incisional hernia    Post-Op Diagnosis:      1. Incisional hernia       Operation:     1. Incisional hernia repair, primary, no mesh     2. Partial omentectomy    Surgeon(s):  Yonatan Moreno MD    Assistant:  First Assistant: Yancy Aguilar RN    Anesthesia: General    Estimated Blood Loss (mL): less than 90GW     Complications: None    Specimens:   ID Type Source Tests Collected by Time Destination   A : [facility] WITH OMENTUM CONTENTS Tissue Hernia Sac SURGICAL PATHOLOGY Yonatan Moreno MD 1/5/2022 0935       Drains:   Closed/Suction Drain Inferior;Medial Bulb 7 Icelandic (Active)       Urethral Catheter Non-latex 16 fr (Active)       Findings:  As above.     Dictated # [id number]    Electronically signed by Yonatan Moreno MD on 1/5/2022 at 10:04 AM

## 2022-01-05 NOTE — ANESTHESIA PROCEDURE NOTES
Peripheral Block    Patient location during procedure: pre-op  Start time: 1/5/2022 7:40 AM  End time: 1/5/2022 7:55 AM  Staffing  Performed: resident/CRNA   Resident/CRNA: MARGARITA Sorto CRNA  Other anesthesia staff: Humberto Shah. MARGARITA Anthony CRNA  Preanesthetic Checklist  Completed: patient identified, IV checked, site marked, risks and benefits discussed, surgical consent, monitors and equipment checked, pre-op evaluation, timeout performed, anesthesia consent given, oxygen available and patient being monitored  Peripheral Block  Patient position: supine  Prep: ChloraPrep  Patient monitoring: continuous pulse ox and IV access  Block type: TAP (Right side TAP block, Left side QL II)  Laterality: right  Injection technique: single-shot  Guidance: ultrasound guided  Local infiltration: ropivacaine and decadron (See MAR for details.)  Provider prep: mask and sterile gloves  Local infiltration: ropivacaine and decadron (See MAR for details.)  Needle  Needle type: Other   Needle gauge: 22 G  Needle length: 8 cm  Needle localization: ultrasound guidanceOther needle type: pajunk  Assessment  Injection assessment: no paresthesia on injection, local visualized surrounding nerve on ultrasound and negative aspiration for heme  Paresthesia pain: none  Slow fractionated injection: yes  Hemodynamics: stable  Additional Notes  Left side QLII block was placed. The anatomy on the patients right side could not be clearly identified with ultrasound in order to place a QL II on the right so a TAP block was performed instead.   Reason for block: post-op pain management and at surgeon's request
Statement Selected

## 2022-01-05 NOTE — PROGRESS NOTES
Yue KNUTSON notified of pt's recent BP of 100/60 and pt c/o being very nauseous. RN already opened fluids wide open. No new orders at this time.

## 2022-01-05 NOTE — OP NOTE
[address] [place], [place] [address]                                OPERATIVE REPORT    PATIENT NAME: Vini Bermeo                       :        1960  MED REC NO:   [id number]                              ROOM:  ACCOUNT NO:   [de-identified]                           ADMIT DATE: 2022  PROVIDER:     Kael Orta    DATE OF PROCEDURE:  2022    ATTENDING SURGEON:  Dr. Kael Orta. PRIMARY CARE PROVIDER:  Lili Hinton M.D. PREOPERATIVE DIAGNOSIS:  Incisional hernia. POSTOPERATIVE DIAGNOSIS:  Incisional hernia. PROCEDURES PERFORMED:  1. Incisional hernia, primary, no mesh. 2.  Partial omentectomy. ANESTHESIA:  General endotracheal tube. IV FLUIDS:  One liter of crystalloids. ESTIMATED BLOOD LOSS:  Less than 20 mL. INTRAOPERATIVE FINDINGS:  As mentioned above. A 1 to 2 cm fascial  defect, closed primarily, no mesh. Good postoperative hemostasis. INDICATIONS:  The patient is a 71-year-old white female with an  extensive medical and surgical history, who presents for incisional  hernia. It has been present for years. CT scan of abdomen in 2021  shows a fascial defect of 1.6 cm with an 8.3 cm sac containing omental  fat. At this time, hernia repair is indicated. Initial plan for  laparoscopic approach was abandoned for open primary repair given that  the patient was hypertensive upon admission and there are no hospital  beds during the current COVID-19 crisis, making postoperative overnight  hospital admission for postoperative care not an option at this time. The patient conveys understanding and is in agreement.     DESCRIPTION OF PROCEDURE:  After obtaining informed consent with  discussion of the risks, benefits, and alternatives including a risk of  bleeding, infection, hernia recurrence, internal organ injury, chronic  pain, COVID-19 exposure/infection, etc., the patient was taken to the  operating theater and placed in the supine position on the operating  table. She received preoperative IV antibiotics and RAYMUNDO sequentials. Following smooth induction of general anesthesia, she was prepped and  draped in the standard fashion. An incision was carried down through  the skin and subcutaneous tissues directly overlying the herniated  contents. It was deepened into the subcutaneous tissues. The hernia  sac was dissected from the surrounding tissues. It was opened at the  fascial level. The substantial herniated omentum was then divided with  LigaSure and passed off as specimen. Hemostasis was excellent. The  fascial defect was closed by reapproximation of the fascial edges with 0  Vicryl in a figure-of-eight fashion x2. A TLS drain was placed in the  wound at the fascial level, brought out through a separate stab  incision, and secured with a silk stay suture. The skin was then  approximated with interrupted 3-0 Vicryl over the drain in the  subcutaneous tissues. Skin edges were finally approximated with  interrupted 3-0 Vicryl followed by a running 4-0 Monocryl stitch. Steri-Strips were applied to the wound. The drain was placed to  suction. All sponge, needle, and instrument counts were correct at the  end of the case. The patient tolerated the procedure well and was  transferred to PACU in stable condition. SPECIMENS:  Hernia sac with omental content    DRAINS:  Wound TLS. COMPLICATIONS:  None. DISPOSITION:  To PACU extubated, awake, alert, and stable. Following  recovery, we will discharge the patient home with gradual advancement of  diet and activity as tolerated with instructions for a healthy balanced  high-fiber, low-fat diet with fiber supplementation, increased water  intake, etc.  No heavy lifting nor abdominal straining for the next four  weeks. Followup will be with me in one to two weeks for wound check.         RAMON Palomino    D: 01/05/2022 10:10:44 T: 01/05/2022 10:15:32     [email or web address]  Job#: [id number]     Doc#: [id number]    CC:  Judit Bhatt

## 2022-01-05 NOTE — PROGRESS NOTES
Discharge instructions given to pt and visitor. 1230 pt bp 92/65. Pt. States she feels fine ready to go home. PO fluids encouraged. Demonstrated with pt and visitor how to empty drain with discharge instructions. Small amt red drainage present when drain emptied, less than 25ml. 1240 bp 137/73 . Spoke with Sofia cruz, stated ok to send pt home. 1245 bp 130/69. Discharge Criteria    Inpatients must meet Criteria 1 through 7. All other patients are either YES or N/A. If a NO is chosen then Anesthesia or Surgeon must be notified. 1.  Minimum 30 minutes after last dose of sedative medication, minimum 120 minutes after last dose of reversal agent. Yes      2. Systolic BP stable within 20 mmHg for 30 minutes & systolic BP between 90 & 291 or within 10 mmHg of baseline. Yes      3. Pulse between 60 and 100 or within 10 bpm of baseline. Yes      4. Spontaneous respiratory rate >/= 10 per minute. Yes      5. SaO2 >/= 95 or  >/= baseline. Yes      6. Able to cough and swallow or return to baseline function. Yes      7. Alert and oriented or return to baseline mental status. Yes      8. Demonstrates controlled, coordinated movements, ambulates with steady gait, or return to baseline activity function. Yes      9. Minimal or no pain or nausea, or at a level tolerable and acceptable to patient. Yes      10. Takes and retains oral fluids as allowed. Yes      11. Procedural / perioperative site stable. Minimal or no bleeding. Yes          12. If GI endoscopy procedure, minimal or no abdominal distention or passing flatus. N/A      13. Written discharge instructions and emergency telephone number provided. Yes      14. Accompanied by a responsible adult.     Yes

## 2022-01-05 NOTE — PATIENT INSTRUCTIONS
Patient Education        Diverticulosis: Care Instructions  Your Care Instructions  In diverticulosis, pouches called diverticula form in the wall of the large intestine (colon). The pouches do not cause any pain or other symptoms. Most people who have diverticulosis do not know they have it. But the pouches sometimes bleed, and if they become infected, they can cause pain and other symptoms. When this happens, it is called diverticulitis. Diverticula form when pressure pushes the wall of the colon outward at certain weak points. A diet that is too low in fiber can cause diverticula. Follow-up care is a key part of your treatment and safety. Be sure to make and go to all appointments, and call your doctor if you are having problems. It's also a good idea to know your test results and keep a list of the medicines you take. How can you care for yourself at home? · Include fruits, leafy green vegetables, beans, and whole grains in your diet each day. These foods are high in fiber. · Take a fiber supplement, such as Citrucel or Metamucil, every day if needed. Read and follow all instructions on the label. · Drink plenty of fluids. If you have kidney, heart, or liver disease and have to limit fluids, talk with your doctor before you increase the amount of fluids you drink. · Get at least 30 minutes of exercise on most days of the week. Walking is a good choice. You also may want to do other activities, such as running, swimming, cycling, or playing tennis or team sports. · Cut out foods that cause gas, pain, or other symptoms. When should you call for help?    Call your doctor now or seek immediate medical care if:    · You have belly pain.     · You pass maroon or very bloody stools.     · You have a fever.     · You have nausea and vomiting.     · You have unusual changes in your bowel movements or abdominal swelling.     · You have burning pain when you urinate.     · You have abnormal vaginal discharge.     · You have shoulder pain.     · You have cramping pain that does not get better when you have a bowel movement or pass gas.     · You pass gas or stool from your urethra while urinating. Watch closely for changes in your health, and be sure to contact your doctor if you have any problems. Where can you learn more? Go to https://chpecamiloewronn.Mango. org and sign in to your [employer] account. Enter S312 in the Quick Key box to learn more about \"Diverticulosis: Care Instructions. \"     If you do not have an account, please click on the \"Sign Up Now\" link. Current as of: September 8, 2021               Content Version: 13.1  © 2006-2021 [employer]wise[employer] Inc[employer]. Care instructions adapted under license by [facility] ([facility]). If you have questions about a medical condition or this instruction, always ask your healthcare professional. [address] any warranty or liability for your use of this information.

## 2022-01-05 NOTE — ANESTHESIA POSTPROCEDURE EVALUATION
Department of Anesthesiology  Postprocedure Note    Patient: Cheikh Valera  MRN: [id number]  [place]: 1960  Date of evaluation: 1/5/2022  Time:  1:02 PM     Procedure Summary     Date: 01/05/22 Room / Location: [address] [place] [address]    Anesthesia Start: 5732 Anesthesia Stop: 3338    Procedure: HERNIA VENTRAL REPAIR (N/A ) Diagnosis: (INCISIONAL HERNIA)    Surgeons: Erika Montgomery MD Responsible Provider: MARGARITA Almanza CRNA    Anesthesia Type: general ASA Status: 3          Anesthesia Type: general    Segundo Phase I: Segudno Score: 10    Segundo Phase II: Segundo Score: 10    Last vitals: Reviewed and per EMR flowsheets. Anesthesia Post Evaluation    Patient location during evaluation: PACU  Patient participation: complete - patient participated  Level of consciousness: awake and alert  Pain score: 2  Airway patency: patent  Nausea & Vomiting: no vomiting and no nausea  Complications: no  Cardiovascular status: blood pressure returned to baseline  Respiratory status: acceptable  Hydration status: stable  Comments: Pt with hypertension preoperatively, responsive to medications. Postoperatively pt BP stable and has return to BP baseline.

## 2022-01-05 NOTE — ANESTHESIA PRE PROCEDURE
Department of Anesthesiology  Preprocedure Note       Name:  Nigel Kaye   Age:  62 y.o.  :  1960                                          MRN:  [id number]         Date:  2022      Surgeon: Britney Manrique):  Ann Hernández MD    Procedure: Procedure(s): HERNIA VENTRAL REPAIR LAPAROSCOPIC ROBOTIC-WITH MESH    Medications prior to admission:   Prior to Admission medications    Medication Sig Start Date End Date Taking?  Authorizing Provider   metoprolol tartrate (LOPRESSOR) 50 MG tablet TAKE 1 TABLET BY MOUTH TWICE DAILY 6/3/21  Yes Malcolm Dick MD   losartan (COZAAR) 50 MG tablet TAKE 1 TABLET BY MOUTH TWICE DAILY  Patient taking differently: daily TAKE 1 TABLET BY MOUTH TWICE DAILY 6/3/21  Yes Malcolm Dick MD       Current medications:    Current Facility-Administered Medications   Medication Dose Route Frequency Provider Last Rate Last Admin    acetaminophen (TYLENOL) tablet 650 mg  650 mg Oral Once Aleksandra Go, APRN - CRNA        dimenhyDRINATE (DRAMAMINE) tablet 50 mg  50 mg Oral Once Aleksandra Go, APRN - CRNA        lactated ringers infusion   IntraVENous Continuous Aleksandra Go, APRN - CRNA 100 mL/hr at [address] at 22 0715    lactated ringers infusion   IntraVENous Continuous Ann Hernández MD        sodium chloride flush 0.9 % injection 5-40 mL  5-40 mL IntraVENous 2 times per day Ann Hernández MD        sodium chloride flush 0.9 % injection 5-40 mL  5-40 mL IntraVENous PRN Ann Hernández MD        0.9 % sodium chloride infusion  25 mL IntraVENous PRN Ann Hernández MD        ceFAZolin (ANCEF) 2000 mg in dextrose 5 % 100 mL IVPB  2,000 mg IntraVENous On Call to [address] [place] [address] Mario Caballero MD           Allergies:  No Known Allergies    Problem List:    Patient Active Problem List   Diagnosis Code    Low back pain M54.50    Essential hypertension I10    Dyslipidemia E78.5    Incisional hernia at bottom of median sternotomy scar K43.2    Peripheral vascular disease ([facility] L10.6    Umbilical hernia M19.3    Bladder mass N32.89    Malignant neoplasm of lateral wall of urinary bladder (HCC) C67.2    NSTEMI (non-ST elevated myocardial infarction) (HCC) I21.4    Alcohol abuse F10.10    Marihuana abuse F12.10    Coronary artery disease of native heart with stable angina pectoris (HCC) I25.118    History of colon polyps Z86.010       Past Medical History:        Diagnosis Date    Alcohol abuse 3/2/2017    Arthritis     Bladder cancer ([facility] 2016    CAD (coronary artery disease)     Cancer ([facility] 2001    vulvar-    Carotid artery stenosis     Chronic back pain     Hyperlipidemia     Hypertension     Hypoglycemia     MI (myocardial infarction) ([facility] 03/01/2017    Peripheral vascular disease ([facility]     Takayasu's arteritis ([facility]     Tibial fracture     right    Umbilical hernia     Unspecified cerebral artery occlusion with cerebral infarction 1993       Past Surgical History:        Procedure Laterality Date    BLADDER SURGERY  5/17/2016    cysto with transurethral resection of bladder with mitomycin    CARDIAC CATHETERIZATION  12/2010   [facility] CARDIAC SURGERY  1993    aortic bypass graft for right carotid artery obstruction    COLONOSCOPY N/A 7/22/2021    COLONOSCOPY POLYPECTOMY HOT BIOPSY WITH INK TATTOO performed by Trish Ricardo MD at [facility] N/A 11/1/2021    COLONOSCOPY POLYPECTOMY HOT BIOPSY performed by Trish Ricardo MD at [facility]  5/12, 6/12    per Dr Ricardo Gomes  4-2016    TURBT    OTHER SURGICAL HISTORY      brachiocephalic-bypass    OTHER SURGICAL HISTORY      stenting of left internal carotid artery    UPPER GASTROINTESTINAL ENDOSCOPY N/A 7/22/2021    EGD POLYP SNARE performed by Trish Ricardo MD at [address] [facility] VASCULAR SURGERY  5/12, 6/12    ken leg vacular surgery.     VULVA SURGERY  2001    cancer       Social History:    Social History     Tobacco Use    Smoking status: Former Smoker     Packs/day: 1.50     Years: 15.00     Pack years: 22.50     Types: Cigarettes     Quit date: 1993     Years since quittin.0    Smokeless tobacco: Never Used    Tobacco comment: QUIT    Substance Use Topics    Alcohol use: Yes     Alcohol/week: 6.0 standard drinks     Types: 6 Cans of beer per week     Comment: occasional                                Counseling given: Not Answered  Comment: QUIT       Vital Signs (Current):   Vitals:    21 0906 22 0649 22 0653 22 0716   BP:  (!) 224/130 (!) 163/104 (!) 219/118   Pulse:  62 62    Resp:  16     Temp:  36.7 °C (98.1 °F)     TempSrc:  Temporal     SpO2:  99%     Weight: 130 lb (59 kg) 132 lb 9.6 oz (60.1 kg)     Height: 5' 3\" (1.6 m) 5' 3\" (1.6 m)                                                BP Readings from Last 3 Encounters:   22 (!) 219/118   21 (!) 184/104   21 (!) 132/99       NPO Status: Time of last liquid consumption:                         Time of last solid consumption:                         Date of last liquid consumption: 22                        Date of last solid food consumption: 22    BMI:   Wt Readings from Last 3 Encounters:   22 132 lb 9.6 oz (60.1 kg)   21 130 lb (59 kg)   21 129 lb (58.5 kg)     Body mass index is 23.49 kg/m².     CBC:   Lab Results   Component Value Date    WBC 6.3 2021    RBC 3.91 2021    RBC 3.36 2012    HGB 13.3 2021    HCT 39.1 2021    MCV 99.9 2021    RDW 13.9 2021     2021     2012       CMP:   Lab Results   Component Value Date     2021    K 4.3 2021     2021    CO2 22 2021    BUN 13 2021    CREATININE 0.77 2021    GFRAA >60 2021    LABGLOM >60 2021    GLUCOSE 111 2021    GLUCOSE 86 2012    PROT 7.3 2021    CALCIUM 9.4 12/03/2021    BILITOT 0.45 12/03/2021    ALKPHOS 73 12/03/2021    AST 17 12/03/2021    ALT 10 12/03/2021       POC Tests: No results for input(s): POCGLU, POCNA, POCK, POCCL, POCBUN, POCHEMO, POCHCT in the last 72 hours. Coags: No results found for: PROTIME, INR, APTT    HCG (If Applicable): No results found for: PREGTESTUR, PREGSERUM, HCG, HCGQUANT     ABGs: No results found for: PHART, PO2ART, EMB2IMI, XFU4WFF, BEART, K6THBKYJ     Type & Screen (If Applicable):  No results found for: LABABO, LABRH    Drug/Infectious Status (If Applicable):  No results found for: HIV, HEPCAB    COVID-19 Screening (If Applicable):   Lab Results   Component Value Date    COVID19 Not Detected 11/01/2021           Anesthesia Evaluation  Patient summary reviewed and Nursing notes reviewed no history of anesthetic complications:   Airway: Mallampati: II  TM distance: >3 FB   Neck ROM: full  Mouth opening: > = 3 FB Dental:          Pulmonary:normal exam  breath sounds clear to auscultation  (+) current smoker          Patient did not smoke on day of surgery. Cardiovascular:  Exercise tolerance: good (>4 METS),   (+) hypertension:, past MI:, CAD:,       ECG reviewed  Rhythm: regular  Rate: normal    Stress test reviewed       Beta Blocker:  Dose within 24 Hrs      ROS comment: Stress Test: Overall these results are most consistent with a low risk for  significant coronary artery disease. EKG: Normal sinus rhythm  Normal ECG     Neuro/Psych:   (+) TIA, psychiatric history (ETOH):            GI/Hepatic/Renal: Neg GI/Hepatic/Renal ROS            Endo/Other:    (+) : arthritis:., .                 Abdominal:             Vascular:           ROS comment: Patient has a carotid stent. . Other Findings:           Anesthesia Plan      general     ASA 3       Induction: intravenous. MIPS: Postoperative opioids intended and Prophylactic antiemetics administered. Anesthetic plan and risks discussed with patient.       Plan discussed with CRNA. Patient is non-compliant with home medications used for the treatment of HTN. The patient reports that she is supposed to take her BP medications twice daily but only takes them once daily. The patient was provided education on the risks of not following the directions for her prescriptions. The patient is being treated for HTN. If the BP responds to the medications provided the procedure will proceed, otherwise the case will be cancelled.       MARGARITA Arias - ZENIA   1/5/2022

## 2022-01-07 LAB — SURGICAL PATHOLOGY REPORT: NORMAL

## 2022-01-13 ENCOUNTER — OFFICE VISIT (OUTPATIENT)
Dept: SURGERY | Age: 62
End: 2022-01-13

## 2022-01-13 DIAGNOSIS — Z48.89 ENCOUNTER FOR POSTOPERATIVE WOUND CHECK: Primary | ICD-10-CM

## 2022-01-13 DIAGNOSIS — K43.2 INCISIONAL HERNIA, WITHOUT OBSTRUCTION OR GANGRENE: ICD-10-CM

## 2022-01-13 PROCEDURE — 99024 POSTOP FOLLOW-UP VISIT: CPT | Performed by: SURGERY

## 2022-01-13 NOTE — LETTER
[address] [address]  Phone: [phone]  Fax: [phone]    Kenji Castillo MD    February 5, 2022     Anita Lerner, [address] [place] [address] Bro[place] Amin [address]    Patient: Javier Hoang   MR Number: [id number]   YOB: 1960   Date of Visit: 1/13/2022       Dear Anita Lerner:    Thank you for referring Delano Crain to me for evaluation/treatment. Below are the relevant portions of my assessment and plan of care. ASSESSMENT     Diagnosis Orders   1. Encounter for postoperative wound check     2. Incisional hernia, without obstruction or gangrene     3. BMI 23.0-23.9, adult         PLAN    Ms. Shelia Napier is doing well. Continue gradual advancement of diet and activity as tolerated, with no heavy lifting or abdominal straining for the next few weeks. Follow-up with me as needed. If you have questions, please do not hesitate to call me. I look forward to following Johnie Mccracken along with you.     Sincerely,      Kenji Castillo MD

## 2022-02-05 NOTE — PROGRESS NOTES
Sia Holland MD  General Surgery, Endoscopy  Chief Medical Officer    [facility] [address] [address]  Dept: [phone]  Fax: [phone]    CHIEF COMPLAINT  No chief complaint on file. HPI    Ms Steve Patiño returns for follow-up after incisional herniorrhaphy. DATE OF PROCEDURE:  01/05/2022     ATTENDING SURGEON:  Dr. Daria Jean.     PRIMARY CARE PROVIDER:  Lynnette Cunha M.D.     PREOPERATIVE DIAGNOSIS:  Incisional hernia.     POSTOPERATIVE DIAGNOSIS:  Incisional hernia.     PROCEDURES PERFORMED:  1. Incisional hernia, primary, no mesh. 2.  Partial omentectomy.      INTRAOPERATIVE FINDINGS:  As mentioned above. A 1 to 2 cm fascial  defect, closed primarily, no mesh. Good postoperative hemostasis.     INDICATIONS:  The patient is a 58-year-old white female with an  extensive medical and surgical history, who presents for incisional  hernia. It has been present for years. CT scan of abdomen in 06/2021  shows a fascial defect of 1.6 cm with an 8.3 cm sac containing omental  fat. At this time, hernia repair is indicated. Initial plan for  laparoscopic approach was abandoned for open primary repair given that  the patient was hypertensive upon admission and there are no hospital  beds during the current COVID-19 crisis, making postoperative overnight  hospital admission for postoperative care not an option at this time. The patient conveys understanding and is in agreement. She is doing very well. Original discomfort has resolved. Tolerating diet. No complaints.     Review of Systems    Past Medical History:   Diagnosis Date    Alcohol abuse 3/2/2017    Arthritis     Bladder cancer [facility]) 2016    CAD (coronary artery disease)     [facility]    vulvar-    Carotid artery stenosis     Chronic back pain     Hyperlipidemia     Hypertension     Hypoglycemia     [facility] 03/01/2017    Peripheral vascular disease [facility]     Takayasu's arteritis ([facility]     Tibial fracture     right    Umbilical hernia     Unspecified cerebral artery occlusion with cerebral infarction 1993       Past Surgical History:   Procedure Laterality Date    BLADDER SURGERY  5/17/2016    cysto with transurethral resection of bladder with mitomycin    CARDIAC CATHETERIZATION  12/2010   [facility] CARDIAC SURGERY  1993    aortic bypass graft for right carotid artery obstruction    COLONOSCOPY N/A 7/22/2021    COLONOSCOPY POLYPECTOMY HOT BIOPSY WITH INK TATTOO performed by Jenni Peabody, MD at [address] N/A 11/1/2021    COLONOSCOPY POLYPECTOMY HOT BIOPSY performed by Jenni Peabody, MD at [facility]  5/12, 6/12    per Dr Baldwin Ny      ventral    OTHER SURGICAL HISTORY  4-2016    TURBT    OTHER SURGICAL HISTORY      brachiocephalic-bypass    OTHER SURGICAL HISTORY      stenting of left internal carotid artery    UPPER GASTROINTESTINAL ENDOSCOPY N/A 7/22/2021    EGD POLYP SNARE performed by Jenni Peabody, MD at [address]  5/12, 6/12    ken leg vacular surgery.  VENTRAL HERNIA REPAIR N/A 1/5/2022    HERNIA VENTRAL REPAIR performed by Jenni Peabody, MD at [facility] SURGERY  2001    cancer       Family History   Problem Relation Age of Onset    Cancer Father         lung    Cancer Mother        Allergies:  See list    Current Outpatient Medications   Medication Sig Dispense Refill    metoprolol tartrate (LOPRESSOR) 50 MG tablet TAKE 1 TABLET BY MOUTH TWICE DAILY 60 tablet 11    losartan (COZAAR) 50 MG tablet TAKE 1 TABLET BY MOUTH TWICE DAILY (Patient taking differently: daily TAKE 1 TABLET BY MOUTH TWICE DAILY) 60 tablet 11     No current facility-administered medications for this visit.        Social History     Socioeconomic History    Marital status:      Spouse name: Not on file    Number of children: Not on file  Years of education: Not on file    Highest education level: Not on file   Occupational History    Not on file   Tobacco Use    Smoking status: Former Smoker     Packs/day: 1.50     Years: 15.00     Pack years: 22.50     Types: Cigarettes     Quit date: 1993     Years since quittin.1    Smokeless tobacco: Never Used    Tobacco comment: QUIT    Vaping Use    Vaping Use: Never used   Substance and Sexual Activity    Alcohol use: Yes     Alcohol/week: 6.0 standard drinks     Types: 6 Cans of beer per week     Comment: occasional    Drug use: Yes     Types: Marijuana (Weed)     Comment: a couple times a day    Sexual activity: Yes     Partners: Male   Other Topics Concern    Not on file   Social History Narrative    Not on file     Social Determinants of Health     Financial Resource Strain: Low Risk     Difficulty of Paying Living Expenses: Not very hard   Food Insecurity: No Food Insecurity    Worried About Running Out of Food in the Last Year: Never true    Jameel of Food in the Last Year: Never true   Transportation Needs:     Lack of Transportation (Medical): Not on file    Lack of Transportation (Non-Medical):  Not on file   Physical Activity:     Days of Exercise per Week: Not on file    Minutes of Exercise per Session: Not on file   Stress:     Feeling of Stress : Not on file   Social Connections:     Frequency of Communication with Friends and Family: Not on file    Frequency of Social Gatherings with Friends and Family: Not on file    Attends [employer] Services: Not on file    Active Member of Clubs or Organizations: Not on file    Attends Club or Organization Meetings: Not on file    Marital Status: Not on file   Intimate Partner Violence:     Fear of Current or Ex-Partner: Not on file    Emotionally Abused: Not on file    Physically Abused: Not on file    Sexually Abused: Not on file   Housing Stability:     Unable to Pay for Housing in the Last Year: Not on file  Number of Places Lived in the Last Year: Not on file    Unstable Housing in the Last Year: Not on file       [facility] 08/17/2011      Physical Exam  Vitals and nursing note reviewed. Constitutional:       General: She is not in acute distress. Appearance: She is well-developed. HENT:      Head: Normocephalic and atraumatic. Eyes:      General: No scleral icterus. Conjunctiva/sclera: Conjunctivae normal.      Pupils: Pupils are equal, round, and reactive to light. Neck:      Trachea: No tracheal deviation. Cardiovascular:      Rate and Rhythm: Normal rate. Pulmonary:      Effort: Pulmonary effort is normal. No respiratory distress. Abdominal:      General: There is no distension. Palpations: Abdomen is soft. Tenderness: There is no abdominal tenderness. There is no guarding. Hernia: No hernia is present. Comments: Wound is clean, dry, intact. Skin:     General: Skin is warm and dry. Neurological:      Mental Status: She is alert and oriented to person, place, and time. Psychiatric:         Behavior: Behavior normal.         Thought Content: Thought content normal.         Judgment: Judgment normal.       ASSESSMENT     Diagnosis Orders   1. Encounter for postoperative wound check     2. Incisional hernia, without obstruction or gangrene     3. BMI 23.0-23.9, adult         PLAN    Ms. Brittney Flores is doing well. Continue gradual advancement of diet and activity as tolerated, with no heavy lifting or abdominal straining for the next few weeks. Follow-up with me as needed.      Jenni Peabody, MD

## 2022-06-13 ENCOUNTER — OFFICE VISIT (OUTPATIENT)
Dept: FAMILY MEDICINE CLINIC | Age: 62
End: 2022-06-13
Payer: COMMERCIAL

## 2022-06-13 VITALS
WEIGHT: 135 LBS | RESPIRATION RATE: 18 BRPM | BODY MASS INDEX: 23.92 KG/M2 | TEMPERATURE: 97.9 F | OXYGEN SATURATION: 98 % | HEART RATE: 73 BPM | HEIGHT: 63 IN | DIASTOLIC BLOOD PRESSURE: 82 MMHG | SYSTOLIC BLOOD PRESSURE: 130 MMHG

## 2022-06-13 DIAGNOSIS — G45.9 TIA (TRANSIENT ISCHEMIC ATTACK): ICD-10-CM

## 2022-06-13 DIAGNOSIS — Z23 NEED FOR SHINGLES VACCINE: ICD-10-CM

## 2022-06-13 DIAGNOSIS — E78.5 DYSLIPIDEMIA: ICD-10-CM

## 2022-06-13 DIAGNOSIS — I73.9 PERIPHERAL VASCULAR DISEASE (HCC): ICD-10-CM

## 2022-06-13 DIAGNOSIS — I10 ESSENTIAL HYPERTENSION: Primary | ICD-10-CM

## 2022-06-13 PROCEDURE — 1036F TOBACCO NON-USER: CPT | Performed by: INTERNAL MEDICINE

## 2022-06-13 PROCEDURE — 3017F COLORECTAL CA SCREEN DOC REV: CPT | Performed by: INTERNAL MEDICINE

## 2022-06-13 PROCEDURE — G8427 DOCREV CUR MEDS BY ELIG CLIN: HCPCS | Performed by: INTERNAL MEDICINE

## 2022-06-13 PROCEDURE — 99214 OFFICE O/P EST MOD 30 MIN: CPT | Performed by: INTERNAL MEDICINE

## 2022-06-13 PROCEDURE — G8420 CALC BMI NORM PARAMETERS: HCPCS | Performed by: INTERNAL MEDICINE

## 2022-06-13 RX ORDER — ZOSTER VACCINE RECOMBINANT, ADJUVANTED 50 MCG/0.5
0.5 KIT INTRAMUSCULAR SEE ADMIN INSTRUCTIONS
Qty: 0.5 ML | Refills: 0 | Status: SHIPPED | OUTPATIENT
Start: 2022-06-13 | End: 2022-12-10

## 2022-06-13 RX ORDER — METOPROLOL TARTRATE 50 MG/1
TABLET, FILM COATED ORAL
Qty: 180 TABLET | Refills: 1 | Status: SHIPPED | OUTPATIENT
Start: 2022-06-13

## 2022-06-13 RX ORDER — CLOPIDOGREL BISULFATE 75 MG/1
75 TABLET ORAL DAILY
Qty: 30 TABLET | Refills: 3 | Status: SHIPPED | OUTPATIENT
Start: 2022-06-13

## 2022-06-13 RX ORDER — ATORVASTATIN CALCIUM 40 MG/1
40 TABLET, FILM COATED ORAL DAILY
Qty: 30 TABLET | Refills: 3 | Status: SHIPPED | OUTPATIENT
Start: 2022-06-13

## 2022-06-13 RX ORDER — LOSARTAN POTASSIUM 100 MG/1
100 TABLET ORAL DAILY
Qty: 90 TABLET | Refills: 1 | Status: SHIPPED | OUTPATIENT
Start: 2022-06-13

## 2022-06-13 RX ORDER — LOSARTAN POTASSIUM 50 MG/1
TABLET ORAL
Qty: 180 TABLET | Refills: 1 | Status: CANCELLED | OUTPATIENT
Start: 2022-06-13

## 2022-06-13 SDOH — ECONOMIC STABILITY: FOOD INSECURITY: WITHIN THE PAST 12 MONTHS, YOU WORRIED THAT YOUR FOOD WOULD RUN OUT BEFORE YOU GOT MONEY TO BUY MORE.: NEVER TRUE

## 2022-06-13 SDOH — ECONOMIC STABILITY: FOOD INSECURITY: WITHIN THE PAST 12 MONTHS, THE FOOD YOU BOUGHT JUST DIDN'T LAST AND YOU DIDN'T HAVE MONEY TO GET MORE.: NEVER TRUE

## 2022-06-13 ASSESSMENT — PATIENT HEALTH QUESTIONNAIRE - PHQ9
2. FEELING DOWN, DEPRESSED OR HOPELESS: 0
SUM OF ALL RESPONSES TO PHQ QUESTIONS 1-9: 0
1. LITTLE INTEREST OR PLEASURE IN DOING THINGS: 0
SUM OF ALL RESPONSES TO PHQ QUESTIONS 1-9: 0
SUM OF ALL RESPONSES TO PHQ9 QUESTIONS 1 & 2: 0

## 2022-06-13 ASSESSMENT — ENCOUNTER SYMPTOMS
NAUSEA: 0
SORE THROAT: 0
ABDOMINAL PAIN: 0
BACK PAIN: 1
SHORTNESS OF BREATH: 0
COUGH: 0

## 2022-06-13 ASSESSMENT — SOCIAL DETERMINANTS OF HEALTH (SDOH): HOW HARD IS IT FOR YOU TO PAY FOR THE VERY BASICS LIKE FOOD, HOUSING, MEDICAL CARE, AND HEATING?: NOT HARD AT ALL

## 2022-06-13 NOTE — PROGRESS NOTES
HPI Notes    Name: John Agrawal  : 1960         Chief Complaint:     Chief Complaint   Patient presents with    Hypertension     6 month check.  Transient Ischemic Attack     c/o some numbness in face and eye sight starts to fade. Patient notices she has these frequnetly but then she will come back to it. History of Present Illness:        Roxy Dennis presents to office to follow up for HTN, Hyperlipidemia    She admits to not taking her BP meds as prescribed. She takes Losartan and metoprolol only once a day instead of twice daily. Has a h/o CAD, PAD and does not take ASA. Plavix, statins. She states she does not want to take pills every day. Has been experiencing numbness on the left side of her face. Says \" it's like I am having a stroke\". Has a history of PAD/carotid artery stenosis. Hypertension  This is a chronic problem. The current episode started more than 1 year ago. The problem is unchanged. The problem is controlled. Pertinent negatives include no anxiety, chest pain, headaches, palpitations or shortness of breath. There are no associated agents to hypertension. Risk factors for coronary artery disease include dyslipidemia and smoking/tobacco exposure. Past treatments include beta blockers and angiotensin blockers. The current treatment provides significant improvement. There are no compliance problems. Hypertensive end-organ damage includes CAD/MI and PVD. There is no history of CVA or heart failure. Hyperlipidemia  This is a chronic problem. The current episode started more than 1 year ago. The problem is uncontrolled. Recent lipid tests were reviewed and are variable. She has no history of obesity. Pertinent negatives include no chest pain or shortness of breath. She is currently on no antihyperlipidemic treatment.            Past Medical History:     Past Medical History:   Diagnosis Date    Alcohol abuse 3/2/2017    Arthritis     Bladder cancer ([facility] [address] 2016    CAD (coronary artery disease)     Cancer ([facility] [address] 2001    vulvar-    Carotid artery stenosis     Chronic back pain     Hyperlipidemia     Hypertension     Hypoglycemia     MI (myocardial infarction) ([facility] [address] 03/01/2017    Peripheral vascular disease ([facility] [address] [facility]     Takayasu's arteritis ([facility] [address] [facility]     Tibial fracture     right    Umbilical hernia     Unspecified cerebral artery occlusion with cerebral infarction 1993      Reviewed all health maintenance requirements and orderedappropriate tests  Health Maintenance Due   Topic Date Due    Shingles vaccine (1 of 2) Never done    Cervical cancer screen  02/06/2021    Depression Screen  06/03/2022       Past Surgical History:     Past Surgical History:   Procedure Laterality Date    BLADDER SURGERY  5/17/2016    cysto with transurethral resection of bladder with mitomycin    CARDIAC CATHETERIZATION  12/2010   Jo Solum CARDIAC SURGERY  1993    aortic bypass graft for right carotid artery obstruction    COLONOSCOPY N/A 7/22/2021    COLONOSCOPY POLYPECTOMY HOT BIOPSY WITH INK TATTOO performed by Ailyn Case MD at [facility] N/A 11/1/2021    COLONOSCOPY POLYPECTOMY HOT BIOPSY performed by Ailyn Case MD at [address] [place] [address]  5/12, 6/12    per Dr [facility]      ventral    OTHER SURGICAL HISTORY  4-2016    TURBT    OTHER SURGICAL HISTORY      brachiocephalic-bypass    OTHER SURGICAL HISTORY      stenting of left internal carotid artery    UPPER GASTROINTESTINAL ENDOSCOPY N/A 7/22/2021    EGD POLYP SNARE performed by Ailyn Case MD at [address]  5/12, 6/12    ken leg vacular surgery.  VENTRAL HERNIA REPAIR N/A 1/5/2022    HERNIA VENTRAL REPAIR performed by Ailyn Case MD at [facility]  2001    cancer        Medications:       Prior to Admission medications    Medication Sig Start Date End Date Taking?  Authorizing Provider   metoprolol tartrate (LOPRESSOR) 50 MG tablet TAKE 1 TABLET BY MOUTH TWICE DAILY 6/13/22  Yes Peyton Child MD   zoster recombinant adjuvanted vaccine [facility]) 50 MCG/0.5ML SUSR injection Inject 0.5 mLs into the muscle See Admin Instructions 1 dose now and repeat in 2-6 months 6/13/22 12/10/22 Yes Peyton Child MD   losartan (COZAAR) 100 MG tablet Take 1 tablet by mouth daily 6/13/22  Yes Peyton Child MD   clopidogrel (PLAVIX) 75 MG tablet Take 1 tablet by mouth daily 6/13/22  Yes Peyton Child MD   atorvastatin (LIPITOR) 40 MG tablet Take 1 tablet by mouth daily 6/13/22  Yes Peyton Child MD        Allergies:       Patient has no known allergies. Social History:     Tobacco: reports that she quit smoking about 29 years ago. Her smoking use included cigarettes. She has a 22.50 pack-year smoking history. She has never used smokeless tobacco.  Alcohol:      reports current alcohol use of about 6.0 standard drinks of alcohol per week. Drug Use:  reports current drug use. Drug: Marijuana Isaac Smith). Family History:     Family History   Problem Relation Age of Onset    Cancer Father         lung    Cancer Mother        Review of Systems:         Review of Systems   Constitutional: Negative for activity change, appetite change, chills, fatigue and fever. HENT: Negative for congestion and sore throat. Respiratory: Negative for cough and shortness of breath. Cardiovascular: Negative for chest pain and palpitations. Gastrointestinal: Negative for abdominal pain and nausea. Genitourinary: Negative for dysuria. Musculoskeletal: Positive for back pain. Skin: Negative for rash. Neurological: Positive for numbness (facial numbness on the left side). Negative for dizziness, tremors, syncope and headaches. Psychiatric/Behavioral: Negative for dysphoric mood. The patient is not nervous/anxious.           Physical Exam:     Vitals:  /82   Pulse 73   Temp 97.9 °F (36.6 °C) (Temporal)   Resp 18   Ht 5' 3\" (1.6 m)   Wt 135 lb (61.2 kg)   LMP 08/17/2011   SpO2 98%   BMI 23.91 kg/m²       Physical Exam  Vitals reviewed. Constitutional:       General: She is not in acute distress. Appearance: Normal appearance. She is well-developed. She is not ill-appearing. HENT:      Head: Normocephalic and atraumatic. Right Ear: External ear normal.      Left Ear: External ear normal.      Nose: Nose normal. No congestion. Eyes:      General:         Right eye: No discharge. Left eye: No discharge. Pupils: Pupils are equal, round, and reactive to light. Neck:      Thyroid: No thyromegaly. Cardiovascular:      Rate and Rhythm: Normal rate and regular rhythm. Heart sounds: Normal heart sounds. No murmur heard. Pulmonary:      Effort: Pulmonary effort is normal.      Breath sounds: Normal breath sounds. No wheezing or rales. Abdominal:      General: Bowel sounds are normal. There is no distension. Palpations: Abdomen is soft. There is no mass. Tenderness: There is no abdominal tenderness. Musculoskeletal:         General: Normal range of motion. Right lower leg: No edema. Left lower leg: No edema. Lymphadenopathy:      Cervical: No cervical adenopathy. Skin:     General: Skin is warm and dry. Coloration: Skin is not jaundiced or pale. Findings: No rash. Neurological:      General: No focal deficit present. Mental Status: She is alert and oriented to person, place, and time. Mental status is at baseline. Psychiatric:         Mood and Affect: Mood normal.         Behavior: Behavior normal.         Thought Content:  Thought content normal.               Data:     Lab Results   Component Value Date     12/03/2021    K 4.3 12/03/2021     12/03/2021    CO2 22 12/03/2021    BUN 13 12/03/2021    CREATININE 0.77 12/03/2021    GLUCOSE 111 12/03/2021    GLUCOSE 86 05/31/2012    PROT 7.3 12/03/2021    LABALBU 4.0 12/03/2021    LABALBU 4.1 Instructions 1 dose now and repeat in 2-6 months    losartan (COZAAR) 100 MG tablet 90 tablet 1     Sig: Take 1 tablet by mouth daily    clopidogrel (PLAVIX) 75 MG tablet 30 tablet 3     Sig: Take 1 tablet by mouth daily    atorvastatin (LIPITOR) 40 MG tablet 30 tablet 3     Sig: Take 1 tablet by mouth daily     Return in about 2 weeks (around 6/27/2022) for HTN, back pain. Orders Placed This Encounter   Medications    metoprolol tartrate (LOPRESSOR) 50 MG tablet     Sig: TAKE 1 TABLET BY MOUTH TWICE DAILY     Dispense:  180 tablet     Refill:  1    zoster recombinant adjuvanted vaccine (SHINGRIX) 50 MCG/0.5ML SUSR injection     Sig: Inject 0.5 mLs into the muscle See Admin Instructions 1 dose now and repeat in 2-6 months     Dispense:  0.5 mL     Refill:  0    losartan (COZAAR) 100 MG tablet     Sig: Take 1 tablet by mouth daily     Dispense:  90 tablet     Refill:  1    clopidogrel (PLAVIX) 75 MG tablet     Sig: Take 1 tablet by mouth daily     Dispense:  30 tablet     Refill:  3    atorvastatin (LIPITOR) 40 MG tablet     Sig: Take 1 tablet by mouth daily     Dispense:  30 tablet     Refill:  3     No orders of the defined types were placed in this encounter. There are no Patient Instructions on file for this visit.     Electronically signed by Tabatha Simpson MD on 6/13/2022 at 12:22 PM           Completed Refills      Requested Prescriptions     Signed Prescriptions Disp Refills    metoprolol tartrate (LOPRESSOR) 50 MG tablet 180 tablet 1     Sig: TAKE 1 TABLET BY MOUTH TWICE DAILY    zoster recombinant adjuvanted vaccine (SHINGRIX) 50 MCG/0.5ML SUSR injection 0.5 mL 0     Sig: Inject 0.5 mLs into the muscle See Admin Instructions 1 dose now and repeat in 2-6 months    losartan (COZAAR) 100 MG tablet 90 tablet 1     Sig: Take 1 tablet by mouth daily    clopidogrel (PLAVIX) 75 MG tablet 30 tablet 3     Sig: Take 1 tablet by mouth daily    atorvastatin (LIPITOR) 40 MG tablet 30 tablet 3 Sig: Take 1 tablet by mouth daily

## 2022-12-02 ENCOUNTER — HOSPITAL ENCOUNTER (OUTPATIENT)
Age: 62
Discharge: HOME | End: 2022-12-02
Payer: COMMERCIAL

## 2022-12-02 ENCOUNTER — HOSPITAL ENCOUNTER (OUTPATIENT)
Age: 62
End: 2022-12-02
Payer: COMMERCIAL

## 2022-12-02 ENCOUNTER — HOSPITAL ENCOUNTER (OUTPATIENT)
Dept: GENERAL RADIOLOGY | Age: 62
End: 2022-12-02
Payer: COMMERCIAL

## 2022-12-02 DIAGNOSIS — I10 ESSENTIAL HYPERTENSION: ICD-10-CM

## 2022-12-02 DIAGNOSIS — E56.9 VITAMIN DEFICIENCY: ICD-10-CM

## 2022-12-02 DIAGNOSIS — I25.118 CORONARY ARTERY DISEASE OF NATIVE ARTERY OF NATIVE HEART WITH STABLE ANGINA PECTORIS (HCC): ICD-10-CM

## 2022-12-02 LAB
ABSOLUTE EOS #: 0.4 K/UL (ref 0–0.4)
ABSOLUTE LYMPH #: 2.4 K/UL (ref 1–4.8)
ABSOLUTE MONO #: 0.6 K/UL (ref 0–1)
ALBUMIN SERPL-MCNC: 4.2 G/DL (ref 3.5–5.2)
ALP BLD-CCNC: 86 U/L (ref 35–104)
ALT SERPL-CCNC: 21 U/L (ref 5–33)
ANION GAP SERPL CALCULATED.3IONS-SCNC: 12 MMOL/L (ref 9–17)
AST SERPL-CCNC: 23 U/L
BASOPHILS # BLD: 1 % (ref 0–2)
BASOPHILS ABSOLUTE: 0 K/UL (ref 0–0.2)
BILIRUB SERPL-MCNC: 1.2 MG/DL (ref 0.3–1.2)
BUN BLDV-MCNC: 9 MG/DL (ref 8–23)
BUN/CREAT BLD: 13 (ref 9–20)
CALCIUM SERPL-MCNC: 9.7 MG/DL (ref 8.6–10.4)
CHLORIDE BLD-SCNC: 100 MMOL/L (ref 98–107)
CHOLESTEROL/HDL RATIO: 4
CHOLESTEROL: 265 MG/DL
CO2: 21 MMOL/L (ref 20–31)
CREAT SERPL-MCNC: 0.67 MG/DL (ref 0.5–0.9)
DIFFERENTIAL TYPE: YES
EOSINOPHILS RELATIVE PERCENT: 5 % (ref 0–5)
GFR SERPL CREATININE-BSD FRML MDRD: >60 ML/MIN/1.73M2
GLUCOSE BLD-MCNC: 107 MG/DL (ref 70–99)
HCT VFR BLD CALC: 40.4 % (ref 36–46)
HDLC SERPL-MCNC: 67 MG/DL
HEMOGLOBIN: 13.7 G/DL (ref 12–16)
LDL CHOLESTEROL: 181 MG/DL (ref 0–130)
LYMPHOCYTES # BLD: 33 % (ref 15–40)
MAGNESIUM: 2 MG/DL (ref 1.6–2.6)
MCH RBC QN AUTO: 33.6 PG (ref 26–34)
MCHC RBC AUTO-ENTMCNC: 33.8 G/DL (ref 31–37)
MCV RBC AUTO: 99.2 FL (ref 80–100)
MONOCYTES # BLD: 8 % (ref 4–8)
PATIENT FASTING?: YES
PDW BLD-RTO: 13.4 % (ref 12.1–15.2)
PLATELET # BLD: 516 K/UL (ref 140–450)
POTASSIUM SERPL-SCNC: 4.2 MMOL/L (ref 3.7–5.3)
RBC # BLD: 4.08 M/UL (ref 4–5.2)
SEG NEUTROPHILS: 53 % (ref 47–75)
SEGMENTED NEUTROPHILS ABSOLUTE COUNT: 3.8 K/UL (ref 2.5–7)
SODIUM BLD-SCNC: 133 MMOL/L (ref 135–144)
TOTAL PROTEIN: 7.9 G/DL (ref 6.4–8.3)
TRIGL SERPL-MCNC: 85 MG/DL
TSH SERPL DL<=0.05 MIU/L-ACNC: 2.06 UIU/ML (ref 0.3–5)
VITAMIN D 25-HYDROXY: 36.3 NG/ML
WBC # BLD: 7.2 K/UL (ref 3.5–11)

## 2022-12-02 PROCEDURE — 93005 ELECTROCARDIOGRAM TRACING: CPT

## 2022-12-02 PROCEDURE — 80053 COMPREHEN METABOLIC PANEL: CPT

## 2022-12-02 PROCEDURE — 71046 X-RAY EXAM CHEST 2 VIEWS: CPT

## 2022-12-02 PROCEDURE — 82306 VITAMIN D 25 HYDROXY: CPT

## 2022-12-02 PROCEDURE — 84443 ASSAY THYROID STIM HORMONE: CPT

## 2022-12-02 PROCEDURE — 85025 COMPLETE CBC W/AUTO DIFF WBC: CPT

## 2022-12-02 PROCEDURE — 36415 COLL VENOUS BLD VENIPUNCTURE: CPT

## 2022-12-02 PROCEDURE — 83735 ASSAY OF MAGNESIUM: CPT

## 2022-12-02 PROCEDURE — 80061 LIPID PANEL: CPT

## 2022-12-02 RX ORDER — LOSARTAN POTASSIUM 100 MG/1
100 TABLET ORAL DAILY
Qty: 90 TABLET | Refills: 1 | Status: SHIPPED | OUTPATIENT
Start: 2022-12-02

## 2022-12-04 LAB
EKG ATRIAL RATE: 73 BPM
EKG P AXIS: 43 DEGREES
EKG P-R INTERVAL: 138 MS
EKG Q-T INTERVAL: 372 MS
EKG QRS DURATION: 84 MS
EKG QTC CALCULATION (BAZETT): 409 MS
EKG R AXIS: 79 DEGREES
EKG T AXIS: 70 DEGREES
EKG VENTRICULAR RATE: 73 BPM

## 2022-12-05 ENCOUNTER — OFFICE VISIT (OUTPATIENT)
Dept: CARDIOLOGY CLINIC | Age: 62
End: 2022-12-05
Payer: COMMERCIAL

## 2022-12-05 VITALS
HEART RATE: 88 BPM | BODY MASS INDEX: 23.38 KG/M2 | SYSTOLIC BLOOD PRESSURE: 180 MMHG | WEIGHT: 132 LBS | OXYGEN SATURATION: 97 % | DIASTOLIC BLOOD PRESSURE: 90 MMHG

## 2022-12-05 DIAGNOSIS — I10 ESSENTIAL HYPERTENSION: ICD-10-CM

## 2022-12-05 DIAGNOSIS — E78.5 DYSLIPIDEMIA: ICD-10-CM

## 2022-12-05 DIAGNOSIS — I73.9 PERIPHERAL VASCULAR DISEASE (HCC): ICD-10-CM

## 2022-12-05 DIAGNOSIS — I25.118 CORONARY ARTERY DISEASE OF NATIVE ARTERY OF NATIVE HEART WITH STABLE ANGINA PECTORIS (HCC): Primary | ICD-10-CM

## 2022-12-05 PROCEDURE — 3078F DIAST BP <80 MM HG: CPT | Performed by: INTERNAL MEDICINE

## 2022-12-05 PROCEDURE — 99214 OFFICE O/P EST MOD 30 MIN: CPT | Performed by: INTERNAL MEDICINE

## 2022-12-05 PROCEDURE — G8484 FLU IMMUNIZE NO ADMIN: HCPCS | Performed by: INTERNAL MEDICINE

## 2022-12-05 PROCEDURE — 1036F TOBACCO NON-USER: CPT | Performed by: INTERNAL MEDICINE

## 2022-12-05 PROCEDURE — G8420 CALC BMI NORM PARAMETERS: HCPCS | Performed by: INTERNAL MEDICINE

## 2022-12-05 PROCEDURE — G8427 DOCREV CUR MEDS BY ELIG CLIN: HCPCS | Performed by: INTERNAL MEDICINE

## 2022-12-05 PROCEDURE — 3074F SYST BP LT 130 MM HG: CPT | Performed by: INTERNAL MEDICINE

## 2022-12-05 PROCEDURE — 3017F COLORECTAL CA SCREEN DOC REV: CPT | Performed by: INTERNAL MEDICINE

## 2022-12-05 NOTE — LETTER
Vilma Whitehead M.D. [address] [place] [address]  [phone]          2022          Kareem Bonds MD  [address] [facility], [address] [place] [address]      RE:   Krystyna Later  :  1960      Dear Dr. Hale Cadena:    CHIEF COMPLAINT:  1. Hypertension. 2.  Severe hyperlipidemia. 3.  Coronary artery disease. 4.  Severe peripheral vascular disease. HISTORY OF PRESENT ILLNESS:  I had the pleasure of seeing Mrs. Greenwood Later in our office on 2022. 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 In , she had chest pain and a catheterization was done by myself on 2010, through the right femoral artery that showed 95% disease in the right coronary artery, LAD and circumflex were unremarkable, EF of 60%. I did angioplasty of the right coronary artery, placing 3.0 x 32 and 3.0 x 28 mm Taxus stents with a good end result. The aortic arch demonstrated widely patent brachiocephalic bypass grafts, with the native brachiocephalic being occluded. We also injected the left subclavian artery and could not cannulate it and we suspected that it was occluded. She had 80% to 90% disease in the right iliac artery and also left carotid artery disease, which was stented on 2011, by Dr. Ambrocio Thayer. She also had angioplasty of the right iliac artery on 2012, by Dr. Renuka Mueller. She developed obstruction of the left femoral artery on 2012, and angioplasty of the left femoral artery as well. On 2017, she had syncope and left jaw pain, detectable troponin. She went to [facility]. [facility] and attempts were made to cross her femoral artery on both the left and right side, which were unsuccessful. The right brachial artery approach was also tried and failed.   The right brachial artery was cannulated but the right brachiocephalic trunk was occluded and unable to be crossed and the bypass graft could not be appreciated, therefore was aborted. No angiogram or cardiac catheterization was done. Mrs. Aristides Chavez continues to not take her medications, which has been a long trend. I last saw her on 2021. She had a hernia repair on 2022, by Dr. Sandra Stone. She denies any chest pain or chest discomfort. No unusual shortness of breath. She does have back pain. She can do very little walking because of back pain. However, it also comes when she is standing such as doing dishes. She does have pain in both her left and right hands and feeling pedal pulses was very difficult. She is quite limited in her activity again at this point. She does complain of chest pain, but it is difficult to determine whether this is shortness of breath or actual chest pain. I believe that her lower back is actually claudication. CARDIAC RISK FACTORS:  Known CAD:  Positive. Hypertension:  Positive. Hyperlipidemia:  Positive. Peripheral Vascular Disease:  Positive. Diabetes:  Positive. Smoking:  Negative. MEDICATIONS AT THIS TIME:  She is taking Cozaar 100 mg daily, Lopressor 50 mg b.i.d. which she is taking daily, she is not taking any lipid-lowering agent. PAST MEDICAL AND SURGICAL HISTORY:  1. Cardiac as above. 2.  Very labile hypertension. 3.  Occluded left subclavian, right iliac and right femoral, with unable to cannulate or get into the aorta from the right brachial artery in 2017.  4.  Bladder cancer with resection by Dr. Rima Ohara on 2016. FAMILY HISTORY:  Mother  of heart disease. Father  of cancer. SOCIAL HISTORY:  She is 62years old. Lives with her . Two grandchildren, 51-year-old Tera Tovar, and 25year-old [facility] [employer] live with her, and also [facility] who applied to nursing school. Tera Tovar graduated, [facility] [employer] works at [employer].     REVIEW OF SYSTEMS:  Cardiac as above. Other systems reviewed including constitutional, eyes, ears, nose and throat, cardiovascular, respiratory, GI, , musculoskeletal, integumentary, neurologic, endocrine, hematologic and allergic/immunologic, are negative except for what is described above. No weight loss or weight gain. No change in bowel habits. No blood in stool. No fevers, sweats or chills. PHYSICAL EXAMINATION:  VITAL SIGNS:  Her blood pressure in her right arm was 180/90, in the left arm 160/90. She weighed 132 pounds. O2 sat was 97%. Pulses 88. GENERAL:  She is a pleasant 28-year-old female. Denied pain. She was oriented to person, place and time. Answered questions appropriately. SKIN:  No unusual skin changes. HEENT:  The pupils are equally round and intact. Mucous membranes were dry. NECK:  No JVD. Good carotid pulses. No carotid bruits. No lymphadenopathy or thyromegaly. CARDIOVASCULAR EXAM:  S1 and S2 were normal.  No S3 or S4. Soft systolic blowing type murmur. No diastolic murmur. PMI was normal.  No lift, thrust, or pericardial friction rub. LUNGS:  Quite clear to auscultation and percussion. ABDOMEN:  Soft and nontender. Good bowel sounds. EXTREMITIES:  Could not feel femoral pulses. Could not feel pedal pulses. We could get faint femoral pulses with Doppler and can get some arterial flow with Doppler on her pedal pulses. NEUROLOGIC EXAM:  Unremarkable. PSYCHIATRIC EXAM:  Unremarkable. LABORATORY DATA:  Sodium was 133, potassium 4.2, BUN 9, creatinine 0.67, GFR greater than 60, magnesium 2.0, calcium was 9.7. Cholesterol 265 with an HDL of 67,  with triglycerides 85. ALT was 21, AST was 23. TSH was 2.06. Vitamin D was 36. White count 7.2, hemoglobin 13.7 with a platelet count 880,804. EKG showed sinus rhythm, was normal.    Lexiscan Cardiolite stress test on 12/15/2021, was unremarkable. IMPRESSION:  1. Severe coronary artery disease.   2.  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 3.  Catheterization on 12/08/2010, after an abnormal stress test and chest pain that showed 95% RCA, with unremarkable LAD and circumflex, EF of had 60%, with placement of 3.0 x 32 and 3.0 x 28 mm Taxus stents. 4.  Severe peripheral vascular disease with 80% right iliac artery with angioplasty by Dr. Liliana Landon in 06/2012, with occlusion of the left femoral artery and a stent to the left femoral artery in 06/2012, both of which were evidently occluded. 5.  Stenting of the left carotid artery by Dr. Leeann Theodore in 01/2011. 6.  Known occlusion of the left subclavian artery. 7.  Non-ST-elevation myocardial infarction on 03/03/2017, unable to cross or get access on the left femoral, right femoral or right brachial artery with no catheterization being done. 8.  Bladder cancer on 04/05/2016, secondary to noninvasive papillary urethral carcinoma, in remission. 9.  Status post ventral hernia repair by Dr. Anita Clayton on 01/05/2022. 10.  Severe claudication because of severe peripheral vascular disease with most likely occluded aorta. 11.  Noncompliance. 12.  Severe hyperlipidemia. PLAN:  1. We will do a CTA of the ascending aorta and great vessels to see if her brachiocephalic bypass graft is patent and therefore amenable to cardiac catheterization through the right radial artery. Would consider cardiac catheterization to define anatomy if CTA shows her brachiocephalic bypass graft patent. 2.  Call with the results. DISCUSSION:  Mrs. wTan Moreland continues to struggle. She is very limited in her activity because of severe pain in her lower back and hips, which I think is claudication, most likely an occluded aorta. Could not access the left femoral or right femoral artery or the right brachial artery. She does have elevated blood pressure on both her left and right arm.   On her cardiac catheterization in 2010, we could not cannulate her left subclavian artery. However, again with the blood pressure that high in both arms, I would think there have to be a fairly good flow. I would like to see if the right brachiocephalic bypass graft appears to be open and if so, I could again try to do a cardiac catheterization through the right radial or brachial artery. Unfortunately, she continues to not take her medications. Her LDL is one of the highest that I have seen with her. She is not taking Lipitor, although she has been told for many years to take it. She states she is taking her Cozaar and metoprolol, although again her pressures were extremely high in both arms. We will do a CTA of the aorta with runoff to see if we could do a cardiac catheterization through the right radial artery depending that the brachiocephalic aorta bypass graft is patent. Thank you very much for allowing me the privilege of seeing Mrs. Pat Wright. I did encourage her to take her medications, although I doubt this will happen. If you have any questions on my thoughts, please do not hesitate to contact me.      Sincerely,        Torsten Livingston    D: 12/05/2022 22:31:12     T: 12/05/2022 22:38:06     [email or web address]  Job#: [id number]   Doc#: [id number]

## 2022-12-05 NOTE — PROGRESS NOTES
Ov DR Elma Mehta 1 year follow up   DR Che Cain changed losartan to   Daily pt only taking bid meds once   A day. No chest pain or sob  C/o back pain   Had hernia repair on 1/5 per   DR Cathy Angel. Hope lives with pt  Works at [employer] applied to [employer]. Michalene Goldmann graduated. Pedal pulses obtained  Per doppler   lt femoral pulse per  Doppler ,none on rt. Will do CTA aorta with   Ascending aorta   Checking great vessels. Call with results.      See in 1 year

## 2022-12-07 NOTE — PROGRESS NOTES
Warden Quincy M.D. [address] [place] [address]  [phone]          2022          Emmanuelle Maurer MD  [address] [facility], [address] [facility]      RE:   Yan Ly  :  1960      Dear Dr. Flores [place]:    CHIEF COMPLAINT:  1. Hypertension. 2.  Severe hyperlipidemia. 3.  Coronary artery disease. 4.  Severe peripheral vascular disease. HISTORY OF PRESENT ILLNESS:  I had the pleasure of seeing Mrs. Yan Ly in our office on 2022. She is a 73-year-old female with Takayasu arteritis. She had right carotid artery occlusion in , was transferred to [facility] where she had a brachiocephalic aorta bypass. In , she had chest pain and a catheterization was done by myself on 2010, through the right femoral artery that showed 95% disease in the right coronary artery, LAD and circumflex were unremarkable, EF of 60%. I did angioplasty of the right coronary artery, placing 3.0 x 32 and 3.0 x 28 mm Taxus stents with a good end result. The aortic arch demonstrated widely patent brachiocephalic bypass grafts, with the native brachiocephalic being occluded. We also injected the left subclavian artery and could not cannulate it and we suspected that it was occluded. She had 80% to 90% disease in the right iliac artery and also left carotid artery disease, which was stented on 2011, by Dr. Chris Berrios. She also had angioplasty of the right iliac artery on 2012, by Dr. Carrillo Khan. She developed obstruction of the left femoral artery on 2012, and angioplasty of the left femoral artery as well. On 2017, she had syncope and left jaw pain, detectable troponin. She went to [facility] and attempts were made to cross her femoral artery on both the left and right side, which were unsuccessful. The right brachial artery approach was also tried and failed.   The right brachial artery was cannulated but the right brachiocephalic trunk was occluded and unable to be crossed and the bypass graft could not be appreciated, therefore was aborted. No angiogram or cardiac catheterization was done. Mrs. Polo Wright continues to not take her medications, which has been a long trend. I last saw her on 2021. She had a hernia repair on 2022, by Dr. Tra Diaz. She denies any chest pain or chest discomfort. No unusual shortness of breath. She does have back pain. She can do very little walking because of back pain. However, it also comes when she is standing such as doing dishes. She does have pain in both her left and right hands and feeling pedal pulses was very difficult. She is quite limited in her activity again at this point. She does complain of chest pain, but it is difficult to determine whether this is shortness of breath or actual chest pain. I believe that her lower back is actually claudication. CARDIAC RISK FACTORS:  Known CAD:  Positive. Hypertension:  Positive. Hyperlipidemia:  Positive. Peripheral Vascular Disease:  Positive. Diabetes:  Positive. Smoking:  Negative. MEDICATIONS AT THIS TIME:  She is taking Cozaar 100 mg daily, Lopressor 50 mg b.i.d. which she is taking daily, she is not taking any lipid-lowering agent. PAST MEDICAL AND SURGICAL HISTORY:  1. Cardiac as above. 2.  Very labile hypertension. 3.  Occluded left subclavian, right iliac and right femoral, with unable to cannulate or get into the aorta from the right brachial artery in 2017.  4.  Bladder cancer with resection by Dr. Quentin Victoria on 2016. FAMILY HISTORY:  Mother  of heart disease. Father  of cancer. SOCIAL HISTORY:  She is 62years old. Lives with her . Two grandchildren, 66-year-old Sam Araya, and 25year-old [facility] [employer] live with her, and also [facility] who applied to nursing school. Sam Araya graduated, [employer] works at [employer].     REVIEW OF SYSTEMS:  Cardiac as above. Other systems reviewed including constitutional, eyes, ears, nose and throat, cardiovascular, respiratory, GI, , musculoskeletal, integumentary, neurologic, endocrine, hematologic and allergic/immunologic, are negative except for what is described above. No weight loss or weight gain. No change in bowel habits. No blood in stool. No fevers, sweats or chills. PHYSICAL EXAMINATION:  VITAL SIGNS:  Her blood pressure in her right arm was 180/90, in the left arm 160/90. She weighed 132 pounds. O2 sat was 97%. Pulses 88. GENERAL:  She is a pleasant 71-year-old female. Denied pain. She was oriented to person, place and time. Answered questions appropriately. SKIN:  No unusual skin changes. HEENT:  The pupils are equally round and intact. Mucous membranes were dry. NECK:  No JVD. Good carotid pulses. No carotid bruits. No lymphadenopathy or thyromegaly. CARDIOVASCULAR EXAM:  S1 and S2 were normal.  No S3 or S4. Soft systolic blowing type murmur. No diastolic murmur. PMI was normal.  No lift, thrust, or pericardial friction rub. LUNGS:  Quite clear to auscultation and percussion. ABDOMEN:  Soft and nontender. Good bowel sounds. EXTREMITIES:  Could not feel femoral pulses. Could not feel pedal pulses. We could get faint femoral pulses with Doppler and can get some arterial flow with Doppler on her pedal pulses. NEUROLOGIC EXAM:  Unremarkable. PSYCHIATRIC EXAM:  Unremarkable. LABORATORY DATA:  Sodium was 133, potassium 4.2, BUN 9, creatinine 0.67, GFR greater than 60, magnesium 2.0, calcium was 9.7. Cholesterol 265 with an HDL of 67,  with triglycerides 85. ALT was 21, AST was 23. TSH was 2.06. Vitamin D was 36. White count 7.2, hemoglobin 13.7 with a platelet count [phone]. EKG showed sinus rhythm, was normal.    Lexiscan Cardiolite stress test on 12/15/2021, was unremarkable. IMPRESSION:  1. Severe coronary artery disease.   2.  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 3.  Catheterization on 12/08/2010, after an abnormal stress test and chest pain that showed 95% RCA, with unremarkable LAD and circumflex, EF of had 60%, with placement of 3.0 x 32 and 3.0 x 28 mm Taxus stents. 4.  Severe peripheral vascular disease with 80% right iliac artery with angioplasty by Dr. Varun Parra in 06/2012, with occlusion of the left femoral artery and a stent to the left femoral artery in 06/2012, both of which were evidently occluded. 5.  Stenting of the left carotid artery by Dr. Agustina Plascencia in 01/2011. 6.  Known occlusion of the left subclavian artery. 7.  Non-ST-elevation myocardial infarction on 03/03/2017, unable to cross or get access on the left femoral, right femoral or right brachial artery with no catheterization being done. 8.  Bladder cancer on 04/05/2016, secondary to noninvasive papillary urethral carcinoma, in remission. 9.  Status post ventral hernia repair by Dr. Antonia Batres on 01/05/2022. 10.  Severe claudication because of severe peripheral vascular disease with most likely occluded aorta. 11.  Noncompliance. 12.  Severe hyperlipidemia. PLAN:  1. We will do a CTA of the ascending aorta and great vessels to see if her brachiocephalic bypass graft is patent and therefore amenable to cardiac catheterization through the right radial artery. Would consider cardiac catheterization to define anatomy if CTA shows her brachiocephalic bypass graft patent. 2.  Call with the results. DISCUSSION:  Mrs. Ellis Urbano continues to struggle. She is very limited in her activity because of severe pain in her lower back and hips, which I think is claudication, most likely an occluded aorta. Could not access the left femoral or right femoral artery or the right brachial artery. She does have elevated blood pressure on both her left and right arm.   On her cardiac catheterization in 2010, we could not cannulate her left subclavian artery. However, again with the blood pressure that high in both arms, I would think there have to be a fairly good flow. I would like to see if the right brachiocephalic bypass graft appears to be open and if so, I could again try to do a cardiac catheterization through the right radial or brachial artery. Unfortunately, she continues to not take her medications. Her LDL is one of the highest that I have seen with her. She is not taking Lipitor, although she has been told for many years to take it. She states she is taking her Cozaar and metoprolol, although again her pressures were extremely high in both arms. We will do a CTA of the aorta with runoff to see if we could do a cardiac catheterization through the right radial artery depending that the brachiocephalic aorta bypass graft is patent. Thank you very much for allowing me the privilege of seeing Mrs. Maria Elena Lilly. I did encourage her to take her medications, although I doubt this will happen. If you have any questions on my thoughts, please do not hesitate to contact me.      Sincerely,        Beth Solorzano    D: 12/05/2022 22:31:12     T: 12/05/2022 22:38:06     [email or web address]  Job#: [id number]   Doc#: [id number]

## 2023-06-29 DIAGNOSIS — I10 ESSENTIAL HYPERTENSION: ICD-10-CM

## 2023-06-30 DIAGNOSIS — I10 ESSENTIAL HYPERTENSION: ICD-10-CM

## 2023-06-30 RX ORDER — METOPROLOL TARTRATE 50 MG/1
TABLET, FILM COATED ORAL
Qty: 60 TABLET | Refills: 0 | Status: SHIPPED | OUTPATIENT
Start: 2023-06-30 | End: 2023-08-28

## 2023-06-30 RX ORDER — LOSARTAN POTASSIUM 100 MG/1
100 TABLET ORAL DAILY
Qty: 90 TABLET | Refills: 1 | Status: SHIPPED | OUTPATIENT
Start: 2023-06-30

## 2023-08-28 DIAGNOSIS — I10 ESSENTIAL HYPERTENSION: ICD-10-CM

## 2023-08-28 RX ORDER — METOPROLOL TARTRATE 50 MG/1
TABLET, FILM COATED ORAL
Qty: 60 TABLET | Refills: 0 | Status: SHIPPED | OUTPATIENT
Start: 2023-08-28

## 2023-10-26 DIAGNOSIS — I10 ESSENTIAL HYPERTENSION: ICD-10-CM

## 2023-10-27 RX ORDER — METOPROLOL TARTRATE 50 MG/1
TABLET, FILM COATED ORAL
Qty: 60 TABLET | Refills: 0 | OUTPATIENT
Start: 2023-10-27

## 2023-11-03 ENCOUNTER — OFFICE VISIT (OUTPATIENT)
Dept: FAMILY MEDICINE CLINIC | Age: 63
End: 2023-11-03
Payer: COMMERCIAL

## 2023-11-03 VITALS
BODY MASS INDEX: 21.51 KG/M2 | HEART RATE: 83 BPM | DIASTOLIC BLOOD PRESSURE: 109 MMHG | OXYGEN SATURATION: 98 % | WEIGHT: 121.4 LBS | RESPIRATION RATE: 18 BRPM | HEIGHT: 63 IN | SYSTOLIC BLOOD PRESSURE: 155 MMHG

## 2023-11-03 DIAGNOSIS — Z12.31 BREAST CANCER SCREENING BY MAMMOGRAM: ICD-10-CM

## 2023-11-03 DIAGNOSIS — I10 ESSENTIAL HYPERTENSION: Primary | ICD-10-CM

## 2023-11-03 DIAGNOSIS — E78.5 DYSLIPIDEMIA: ICD-10-CM

## 2023-11-03 DIAGNOSIS — Z86.010 HISTORY OF COLON POLYPS: ICD-10-CM

## 2023-11-03 DIAGNOSIS — I73.9 PERIPHERAL VASCULAR DISEASE (HCC): ICD-10-CM

## 2023-11-03 PROCEDURE — 3080F DIAST BP >= 90 MM HG: CPT | Performed by: INTERNAL MEDICINE

## 2023-11-03 PROCEDURE — G8427 DOCREV CUR MEDS BY ELIG CLIN: HCPCS | Performed by: INTERNAL MEDICINE

## 2023-11-03 PROCEDURE — G8484 FLU IMMUNIZE NO ADMIN: HCPCS | Performed by: INTERNAL MEDICINE

## 2023-11-03 PROCEDURE — 99214 OFFICE O/P EST MOD 30 MIN: CPT | Performed by: INTERNAL MEDICINE

## 2023-11-03 PROCEDURE — 3017F COLORECTAL CA SCREEN DOC REV: CPT | Performed by: INTERNAL MEDICINE

## 2023-11-03 PROCEDURE — 3077F SYST BP >= 140 MM HG: CPT | Performed by: INTERNAL MEDICINE

## 2023-11-03 PROCEDURE — G8420 CALC BMI NORM PARAMETERS: HCPCS | Performed by: INTERNAL MEDICINE

## 2023-11-03 PROCEDURE — 1036F TOBACCO NON-USER: CPT | Performed by: INTERNAL MEDICINE

## 2023-11-03 RX ORDER — LOSARTAN POTASSIUM 100 MG/1
100 TABLET ORAL DAILY
Qty: 90 TABLET | Refills: 1 | Status: SHIPPED | OUTPATIENT
Start: 2023-11-03

## 2023-11-03 RX ORDER — METOPROLOL TARTRATE 50 MG/1
50 TABLET, FILM COATED ORAL 2 TIMES DAILY
Qty: 60 TABLET | Refills: 5 | Status: SHIPPED | OUTPATIENT
Start: 2023-11-03

## 2023-11-03 SDOH — ECONOMIC STABILITY: INCOME INSECURITY: HOW HARD IS IT FOR YOU TO PAY FOR THE VERY BASICS LIKE FOOD, HOUSING, MEDICAL CARE, AND HEATING?: NOT HARD AT ALL

## 2023-11-03 SDOH — ECONOMIC STABILITY: FOOD INSECURITY: WITHIN THE PAST 12 MONTHS, YOU WORRIED THAT YOUR FOOD WOULD RUN OUT BEFORE YOU GOT MONEY TO BUY MORE.: NEVER TRUE

## 2023-11-03 SDOH — ECONOMIC STABILITY: HOUSING INSECURITY
IN THE LAST 12 MONTHS, WAS THERE A TIME WHEN YOU DID NOT HAVE A STEADY PLACE TO SLEEP OR SLEPT IN A SHELTER (INCLUDING NOW)?: NO

## 2023-11-03 SDOH — ECONOMIC STABILITY: FOOD INSECURITY: WITHIN THE PAST 12 MONTHS, THE FOOD YOU BOUGHT JUST DIDN'T LAST AND YOU DIDN'T HAVE MONEY TO GET MORE.: NEVER TRUE

## 2023-11-03 ASSESSMENT — PATIENT HEALTH QUESTIONNAIRE - PHQ9
SUM OF ALL RESPONSES TO PHQ9 QUESTIONS 1 & 2: 0
1. LITTLE INTEREST OR PLEASURE IN DOING THINGS: 0
SUM OF ALL RESPONSES TO PHQ QUESTIONS 1-9: 0
2. FEELING DOWN, DEPRESSED OR HOPELESS: 0
SUM OF ALL RESPONSES TO PHQ QUESTIONS 1-9: 0

## 2023-11-03 ASSESSMENT — ENCOUNTER SYMPTOMS
NAUSEA: 0
ABDOMINAL PAIN: 0
COUGH: 0
ORTHOPNEA: 0
CHEST TIGHTNESS: 0
BLURRED VISION: 0
SHORTNESS OF BREATH: 0
SORE THROAT: 0

## 2023-11-03 NOTE — PROGRESS NOTES
12/02/2022 11:31 AM    MCH 33.6 12/02/2022 11:31 AM    MCHC 33.8 12/02/2022 11:31 AM    RDW 13.4 12/02/2022 11:31 AM     12/02/2022 11:31 AM     05/31/2012 03:00 PM    MPV NOT REPORTED 12/03/2021 08:39 AM     Lab Results   Component Value Date/Time    TSH 2.06 12/02/2022 11:31 AM     Lab Results   Component Value Date/Time    CHOL 265 12/02/2022 11:31 AM    HDL 67 12/02/2022 11:31 AM    LABA1C 5.2 03/15/2018 09:56 AM          Assessment & Plan        Diagnosis Orders   1. Essential hypertension   Blood pressure uncontrolled due to noncompliance . Will refill meds. Advised to monitor BP at home and keep a log book to follow up.  metoprolol tartrate (LOPRESSOR) 50 MG tablet    losartan (COZAAR) 100 MG tablet      2. Dyslipidemia   Not on statins by her choice. Advised to follow low fat. 3. Peripheral vascular disease ([address])        4. History of colon polyps   Will refer to Dr. Fausto Pope for colonoscopy        5. Breast cancer screening by mammogram   Ordered mammogram  DAGO DIGITAL SCREEN W OR WO CAD BILATERAL                      Completed Refills   Requested Prescriptions     Signed Prescriptions Disp Refills    metoprolol tartrate (LOPRESSOR) 50 MG tablet 60 tablet 5     Sig: Take 1 tablet by mouth 2 times daily    losartan (COZAAR) 100 MG tablet 90 tablet 1     Sig: Take 1 tablet by mouth daily     No follow-ups on file.      Orders Placed This Encounter   Medications    metoprolol tartrate (LOPRESSOR) 50 MG tablet     Sig: Take 1 tablet by mouth 2 times daily     Dispense:  60 tablet     Refill:  5    losartan (COZAAR) 100 MG tablet     Sig: Take 1 tablet by mouth daily     Dispense:  90 tablet     Refill:  1     Orders Placed This Encounter   Procedures    DAGO DIGITAL SCREEN W OR WO CAD BILATERAL     Standing Status:   Future     Standing Expiration Date:   1/1/2025     Order Specific Question:   Reason for exam:     Answer:   Breast cancer screening         There are no Patient Instructions on file

## 2023-11-09 ENCOUNTER — HOSPITAL ENCOUNTER (OUTPATIENT)
Age: 63
Discharge: HOME | End: 2023-11-09
Payer: COMMERCIAL

## 2023-11-09 ENCOUNTER — HOSPITAL ENCOUNTER (OUTPATIENT)
Dept: GENERAL RADIOLOGY | Age: 63
Discharge: HOME | End: 2023-11-11
Payer: COMMERCIAL

## 2023-11-09 ENCOUNTER — HOSPITAL ENCOUNTER (OUTPATIENT)
Age: 63
Discharge: HOME | End: 2023-11-11
Payer: COMMERCIAL

## 2023-11-09 DIAGNOSIS — E78.5 DYSLIPIDEMIA: ICD-10-CM

## 2023-11-09 DIAGNOSIS — I10 ESSENTIAL HYPERTENSION: ICD-10-CM

## 2023-11-09 DIAGNOSIS — I25.118 CORONARY ARTERY DISEASE OF NATIVE ARTERY OF NATIVE HEART WITH STABLE ANGINA PECTORIS (HCC): ICD-10-CM

## 2023-11-09 DIAGNOSIS — I73.9 PERIPHERAL VASCULAR DISEASE (HCC): ICD-10-CM

## 2023-11-09 LAB
ALBUMIN SERPL-MCNC: 4.2 G/DL (ref 3.5–5.2)
ALP SERPL-CCNC: 67 U/L (ref 35–104)
ALT SERPL-CCNC: 11 U/L (ref 5–33)
ANION GAP SERPL CALCULATED.3IONS-SCNC: 13 MMOL/L (ref 9–17)
AST SERPL-CCNC: 19 U/L
BASOPHILS # BLD: 0.04 K/UL (ref 0–0.2)
BASOPHILS NFR BLD: 1 % (ref 0–2)
BILIRUB SERPL-MCNC: 0.4 MG/DL (ref 0.3–1.2)
BUN SERPL-MCNC: 13 MG/DL (ref 8–23)
BUN/CREAT SERPL: 14 (ref 9–20)
CALCIUM SERPL-MCNC: 9.6 MG/DL (ref 8.6–10.4)
CHLORIDE SERPL-SCNC: 95 MMOL/L (ref 98–107)
CHOLEST SERPL-MCNC: 218 MG/DL (ref 0–199)
CHOLESTEROL/HDL RATIO: 4
CO2 SERPL-SCNC: 19 MMOL/L (ref 20–31)
CREAT SERPL-MCNC: 0.9 MG/DL (ref 0.5–0.9)
EKG ATRIAL RATE: 66 BPM
EKG P AXIS: 67 DEGREES
EKG P-R INTERVAL: 152 MS
EKG Q-T INTERVAL: 408 MS
EKG QRS DURATION: 84 MS
EKG QTC CALCULATION (BAZETT): 427 MS
EKG R AXIS: 66 DEGREES
EKG T AXIS: 74 DEGREES
EKG VENTRICULAR RATE: 66 BPM
EOSINOPHIL # BLD: 0.49 K/UL (ref 0–0.4)
EOSINOPHILS RELATIVE PERCENT: 9 % (ref 0–5)
ERYTHROCYTE [DISTWIDTH] IN BLOOD BY AUTOMATED COUNT: 11.8 % (ref 12.1–15.2)
GFR SERPL CREATININE-BSD FRML MDRD: >60 ML/MIN/1.73M2
GLUCOSE SERPL-MCNC: 84 MG/DL (ref 70–99)
HCT VFR BLD AUTO: 36 % (ref 36–46)
HDLC SERPL-MCNC: 62 MG/DL
HGB BLD-MCNC: 12.7 G/DL (ref 12–16)
IMM GRANULOCYTES # BLD AUTO: 0.01 K/UL (ref 0–0.3)
IMM GRANULOCYTES NFR BLD: 0 % (ref 0–5)
LDLC SERPL CALC-MCNC: 123 MG/DL (ref 0–100)
LYMPHOCYTES NFR BLD: 1.96 K/UL (ref 1–4.8)
LYMPHOCYTES RELATIVE PERCENT: 37 % (ref 15–40)
MAGNESIUM SERPL-MCNC: 2.2 MG/DL (ref 1.6–2.6)
MCH RBC QN AUTO: 33.2 PG (ref 26–34)
MCHC RBC AUTO-ENTMCNC: 35.3 G/DL (ref 31–37)
MCV RBC AUTO: 94.2 FL (ref 80–100)
MONOCYTES NFR BLD: 0.42 K/UL (ref 0–1)
MONOCYTES NFR BLD: 8 % (ref 4–8)
NEUTROPHILS NFR BLD: 44 % (ref 47–75)
NEUTS SEG NFR BLD: 2.33 K/UL (ref 2.5–7)
PLATELET # BLD AUTO: 435 K/UL (ref 140–450)
PMV BLD AUTO: 8.8 FL (ref 6–12)
POTASSIUM SERPL-SCNC: 4.7 MMOL/L (ref 3.7–5.3)
PROT SERPL-MCNC: 7.6 G/DL (ref 6.4–8.3)
RBC # BLD AUTO: 3.82 M/UL (ref 4–5.2)
SODIUM SERPL-SCNC: 127 MMOL/L (ref 135–144)
TRIGL SERPL-MCNC: 167 MG/DL (ref 0–149)
TSH SERPL DL<=0.05 MIU/L-ACNC: 2.34 UIU/ML (ref 0.3–5)
VLDLC SERPL CALC-MCNC: 33 MG/DL
WBC OTHER # BLD: 5.3 K/UL (ref 3.5–11)

## 2023-11-09 PROCEDURE — 93005 ELECTROCARDIOGRAM TRACING: CPT

## 2023-11-09 PROCEDURE — 93010 ELECTROCARDIOGRAM REPORT: CPT | Performed by: INTERNAL MEDICINE

## 2023-11-09 PROCEDURE — 71046 X-RAY EXAM CHEST 2 VIEWS: CPT

## 2023-11-09 PROCEDURE — 36415 COLL VENOUS BLD VENIPUNCTURE: CPT

## 2023-11-09 PROCEDURE — 85025 COMPLETE CBC W/AUTO DIFF WBC: CPT

## 2023-11-09 PROCEDURE — 80061 LIPID PANEL: CPT

## 2023-11-09 PROCEDURE — 84443 ASSAY THYROID STIM HORMONE: CPT

## 2023-11-09 PROCEDURE — 83735 ASSAY OF MAGNESIUM: CPT

## 2023-11-09 PROCEDURE — 80053 COMPREHEN METABOLIC PANEL: CPT

## 2023-12-04 ENCOUNTER — TELEPHONE (OUTPATIENT)
Dept: GASTROENTEROLOGY | Age: 63
End: 2023-12-04

## 2024-01-09 DIAGNOSIS — I25.118 CORONARY ARTERY DISEASE OF NATIVE ARTERY OF NATIVE HEART WITH STABLE ANGINA PECTORIS (HCC): ICD-10-CM

## 2024-01-09 DIAGNOSIS — I73.9 PERIPHERAL VASCULAR DISEASE (HCC): ICD-10-CM

## 2024-01-09 DIAGNOSIS — E78.5 DYSLIPIDEMIA: ICD-10-CM

## 2024-01-09 DIAGNOSIS — I10 ESSENTIAL HYPERTENSION: Primary | ICD-10-CM

## 2024-01-17 ENCOUNTER — HOSPITAL ENCOUNTER (OUTPATIENT)
Dept: CT IMAGING | Age: 64
Discharge: HOME | End: 2024-01-19
Attending: INTERNAL MEDICINE
Payer: COMMERCIAL

## 2024-01-17 DIAGNOSIS — I73.9 PERIPHERAL VASCULAR DISEASE (HCC): Primary | ICD-10-CM

## 2024-01-17 DIAGNOSIS — I73.9 PERIPHERAL VASCULAR DISEASE (HCC): ICD-10-CM

## 2024-01-17 DIAGNOSIS — E78.5 DYSLIPIDEMIA: ICD-10-CM

## 2024-01-17 DIAGNOSIS — I73.9 PVD (PERIPHERAL VASCULAR DISEASE) (HCC): ICD-10-CM

## 2024-01-17 DIAGNOSIS — I10 ESSENTIAL HYPERTENSION: ICD-10-CM

## 2024-01-17 DIAGNOSIS — I25.118 CORONARY ARTERY DISEASE OF NATIVE ARTERY OF NATIVE HEART WITH STABLE ANGINA PECTORIS (HCC): ICD-10-CM

## 2024-01-17 LAB
BUN SERPL-MCNC: 18 MG/DL (ref 8–23)
CREAT SERPL-MCNC: 1.2 MG/DL (ref 0.5–0.9)
GFR SERPL CREATININE-BSD FRML MDRD: 51 ML/MIN/1.73M2

## 2024-01-17 PROCEDURE — 71275 CT ANGIOGRAPHY CHEST: CPT

## 2024-01-17 PROCEDURE — 36415 COLL VENOUS BLD VENIPUNCTURE: CPT

## 2024-01-17 PROCEDURE — 6360000004 HC RX CONTRAST MEDICATION: Performed by: INTERNAL MEDICINE

## 2024-01-17 PROCEDURE — 82565 ASSAY OF CREATININE: CPT

## 2024-01-17 PROCEDURE — 75635 CT ANGIO ABDOMINAL ARTERIES: CPT

## 2024-01-17 PROCEDURE — 84520 ASSAY OF UREA NITROGEN: CPT

## 2024-01-17 RX ADMIN — IOPAMIDOL 100 ML: 755 INJECTION, SOLUTION INTRAVENOUS at 12:40

## 2024-02-06 ENCOUNTER — OFFICE VISIT (OUTPATIENT)
Dept: FAMILY MEDICINE CLINIC | Age: 64
End: 2024-02-06
Payer: COMMERCIAL

## 2024-02-06 VITALS
OXYGEN SATURATION: 99 % | DIASTOLIC BLOOD PRESSURE: 105 MMHG | SYSTOLIC BLOOD PRESSURE: 191 MMHG | BODY MASS INDEX: 21.51 KG/M2 | HEIGHT: 63 IN | WEIGHT: 121.4 LBS | RESPIRATION RATE: 18 BRPM | HEART RATE: 97 BPM

## 2024-02-06 DIAGNOSIS — N28.9 RENAL LESION: ICD-10-CM

## 2024-02-06 DIAGNOSIS — E78.5 HYPERLIPIDEMIA, UNSPECIFIED HYPERLIPIDEMIA TYPE: ICD-10-CM

## 2024-02-06 DIAGNOSIS — C67.2 MALIGNANT NEOPLASM OF LATERAL WALL OF URINARY BLADDER (HCC): ICD-10-CM

## 2024-02-06 DIAGNOSIS — N13.30 HYDRONEPHROSIS OF LEFT KIDNEY: Primary | ICD-10-CM

## 2024-02-06 DIAGNOSIS — I73.9 PERIPHERAL VASCULAR DISEASE (HCC): ICD-10-CM

## 2024-02-06 DIAGNOSIS — I10 ESSENTIAL HYPERTENSION: ICD-10-CM

## 2024-02-06 PROCEDURE — G8420 CALC BMI NORM PARAMETERS: HCPCS | Performed by: INTERNAL MEDICINE

## 2024-02-06 PROCEDURE — G8484 FLU IMMUNIZE NO ADMIN: HCPCS | Performed by: INTERNAL MEDICINE

## 2024-02-06 PROCEDURE — 3017F COLORECTAL CA SCREEN DOC REV: CPT | Performed by: INTERNAL MEDICINE

## 2024-02-06 PROCEDURE — G8427 DOCREV CUR MEDS BY ELIG CLIN: HCPCS | Performed by: INTERNAL MEDICINE

## 2024-02-06 PROCEDURE — 3077F SYST BP >= 140 MM HG: CPT | Performed by: INTERNAL MEDICINE

## 2024-02-06 PROCEDURE — 3080F DIAST BP >= 90 MM HG: CPT | Performed by: INTERNAL MEDICINE

## 2024-02-06 PROCEDURE — 99214 OFFICE O/P EST MOD 30 MIN: CPT | Performed by: INTERNAL MEDICINE

## 2024-02-06 PROCEDURE — 1036F TOBACCO NON-USER: CPT | Performed by: INTERNAL MEDICINE

## 2024-02-06 RX ORDER — ATORVASTATIN CALCIUM 80 MG/1
80 TABLET, FILM COATED ORAL NIGHTLY
Qty: 30 TABLET | Refills: 11 | Status: SHIPPED | OUTPATIENT
Start: 2024-02-06

## 2024-02-06 RX ORDER — ASPIRIN 81 MG/1
81 TABLET, CHEWABLE ORAL DAILY
Qty: 30 TABLET | Refills: 3 | Status: SHIPPED | OUTPATIENT
Start: 2024-02-06

## 2024-02-06 RX ORDER — AMLODIPINE BESYLATE 10 MG/1
10 TABLET ORAL DAILY
Qty: 90 TABLET | Refills: 1 | Status: SHIPPED | OUTPATIENT
Start: 2024-02-06

## 2024-02-06 ASSESSMENT — ENCOUNTER SYMPTOMS
COUGH: 0
SORE THROAT: 0
ABDOMINAL PAIN: 0
NAUSEA: 0
SHORTNESS OF BREATH: 0

## 2024-02-06 ASSESSMENT — PATIENT HEALTH QUESTIONNAIRE - PHQ9
SUM OF ALL RESPONSES TO PHQ QUESTIONS 1-9: 0
2. FEELING DOWN, DEPRESSED OR HOPELESS: 0
SUM OF ALL RESPONSES TO PHQ QUESTIONS 1-9: 0
SUM OF ALL RESPONSES TO PHQ QUESTIONS 1-9: 0
1. LITTLE INTEREST OR PLEASURE IN DOING THINGS: 0
SUM OF ALL RESPONSES TO PHQ9 QUESTIONS 1 & 2: 0
SUM OF ALL RESPONSES TO PHQ QUESTIONS 1-9: 0

## 2024-02-06 NOTE — PROGRESS NOTES
MPV 8.8 11/09/2023 09:22 AM     Lab Results   Component Value Date/Time    TSH 2.34 11/09/2023 09:22 AM     Lab Results   Component Value Date/Time    CHOL 218 11/09/2023 09:22 AM    HDL 62 11/09/2023 09:22 AM    LABA1C 5.2 03/15/2018 09:56 AM          Assessment & Plan        Diagnosis Orders   1. Hydronephrosis of left kidney   Patient with abnormal findings on recent CTA abdominal aorta with BL runoff  Concerned about recurrent bladder malignancy and will refer to urology for evaluation. She is strongly advised not to miss her appointments with urology.   We will call Dr. Holder's office for an appointment asap  .   Defer additional imaging studies to urology ( CT, MRI, etc).  Tulio Rashid MD, [facility]      2. Malignant neoplasm of lateral wall of urinary bladder (HCC)   Refer to urology  Tulio Rashid MD, [facility] peguero      3. Renal lesion   Refer to urology  Tulio Rashid MD, [facility]      4. Essential hypertension   Uncontrolled, added Amlodipine 10 mg /day.   She has extensive PAD, STEPHIE, follow up with Dr. Cortez  amLODIPine (NORVASC) 10 MG tablet      5. Peripheral vascular disease (HCC)   Resume statins and ASA. Follow up with Dr. Cortez .                        Completed Refills   Requested Prescriptions     Signed Prescriptions Disp Refills    amLODIPine (NORVASC) 10 MG tablet 90 tablet 1     Sig: Take 1 tablet by mouth daily     No follow-ups on file.     Orders Placed This Encounter   Medications    amLODIPine (NORVASC) 10 MG tablet     Sig: Take 1 tablet by mouth daily     Dispense:  90 tablet     Refill:  1     Orders Placed This Encounter   Procedures    Tulio Rashid MD, [facility]guero     Referral Priority:   Routine     Referral Type:   Eval and Treat     Referral Reason:   Specialty Services Required     Referred to Provider:   Tulio Holder MD     Requested Specialty:   Urology     Number of Visits Requested:   1

## 2024-02-06 NOTE — PATIENT INSTRUCTIONS
SURVEY:    You may be receiving a survey from [facility] your visit today.    Please complete the survey to enable us to provide the highest quality of care to you and your family.    If you cannot score us a very good on any question, please call the office to discuss how we could have made your experience a very good one.    Thank you.  [facility]  MD Joaquina Teixeira, MD Jalil Darby, DO  Jc Valentine, MD Stacey Steele, APRN-CNP  Ayaka Beaulieu, Practice Manager  Lyudmial, CMA  Thania, CCMA  Nitin, CMA  Lance, PSC   Nani, LPN     Dear valued patient,    We hope this message finds you in good health. At [facility], we are committed to providing you with the best possible healthcare experience. To further enhance your convenience and streamline your access to our services, we would like to introduce you to the benefits of utilizing direct scheduling through the [facility] Patient Portal.    Direct scheduling is a feature within the [facility] Patient Portal that allows you to schedule appointments with your healthcare provider directly, without the need to make a phone call or wait for a callback. We understand that your time is carolee, and we want to ensure that you have quick and easy access to our services whenever you need them.  Here are some reasons why you should consider utilizing direct scheduling through the [facility] Patient Portal:    1. Convenience: With direct scheduling, you can book appointments at any time that suits you best, 24 hours a day, 7 days a week. No more waiting on hold or playing phone tag with our office staff. It puts you in control of managing your healthcare appointments effortlessly.    2. Accessibility: The [facility] Patient Portal is accessible through your computer, smartphone, or tablet, allowing you to schedule appointments from the comfort of your home, office, or on the go. You can access your medical records,

## 2024-02-13 ENCOUNTER — OFFICE VISIT (OUTPATIENT)
Dept: CARDIOLOGY CLINIC | Age: 64
End: 2024-02-13
Payer: COMMERCIAL

## 2024-02-13 VITALS
BODY MASS INDEX: 21.61 KG/M2 | OXYGEN SATURATION: 95 % | WEIGHT: 122 LBS | DIASTOLIC BLOOD PRESSURE: 86 MMHG | SYSTOLIC BLOOD PRESSURE: 178 MMHG | HEART RATE: 92 BPM

## 2024-02-13 DIAGNOSIS — E78.5 DYSLIPIDEMIA: ICD-10-CM

## 2024-02-13 DIAGNOSIS — I10 ESSENTIAL HYPERTENSION: ICD-10-CM

## 2024-02-13 DIAGNOSIS — K43.2 INCISIONAL HERNIA, WITHOUT OBSTRUCTION OR GANGRENE: ICD-10-CM

## 2024-02-13 DIAGNOSIS — R06.02 SOB (SHORTNESS OF BREATH): Primary | ICD-10-CM

## 2024-02-13 DIAGNOSIS — I25.118 CORONARY ARTERY DISEASE OF NATIVE ARTERY OF NATIVE HEART WITH STABLE ANGINA PECTORIS (HCC): ICD-10-CM

## 2024-02-13 DIAGNOSIS — I73.9 PERIPHERAL VASCULAR DISEASE (HCC): ICD-10-CM

## 2024-02-13 PROCEDURE — G8428 CUR MEDS NOT DOCUMENT: HCPCS | Performed by: INTERNAL MEDICINE

## 2024-02-13 PROCEDURE — G8420 CALC BMI NORM PARAMETERS: HCPCS | Performed by: INTERNAL MEDICINE

## 2024-02-13 PROCEDURE — 1036F TOBACCO NON-USER: CPT | Performed by: INTERNAL MEDICINE

## 2024-02-13 PROCEDURE — G8484 FLU IMMUNIZE NO ADMIN: HCPCS | Performed by: INTERNAL MEDICINE

## 2024-02-13 PROCEDURE — 99214 OFFICE O/P EST MOD 30 MIN: CPT | Performed by: INTERNAL MEDICINE

## 2024-02-13 PROCEDURE — 3077F SYST BP >= 140 MM HG: CPT | Performed by: INTERNAL MEDICINE

## 2024-02-13 PROCEDURE — 3079F DIAST BP 80-89 MM HG: CPT | Performed by: INTERNAL MEDICINE

## 2024-02-13 PROCEDURE — 3017F COLORECTAL CA SCREEN DOC REV: CPT | Performed by: INTERNAL MEDICINE

## 2024-02-13 NOTE — PROGRESS NOTES
Ov Dr. Cortez for 1 yr follow up   Has start taking metoprolol bid ( from qd)  And started taking amlodipine   Since appt with Jose last week   Bp 200   Has appt with Dr. KAN Thursday   Has hx bladder bladder ca   CTA abn   C/o fatigue in legs   B/l calf pain at night       Will get echo     Will follow up in one year with   Testing and CTA     
Disease   6.        Left Inguinal Hernia       PLAN:         Coronary Artery Disease:  Antiplatelet Agent: Continue Aspirin 81 mg daily.   Beta Blocker: Continue Metoprolol tartrate (Lopressor) 50 mg bid.   Anti-anginal medications: Continue amlodipine (Norvasc) 10 mg once daily.  Cholesterol Reduction Therapy: Continue Atorvastatin (Lipitor) 80 mg daily. I discussed the potential benefits of statin therapy as well as the potential risks including myalgia as well as the rare but potentially serious complication of liver or kidney damage. Although rare, I told them that this could be serious and therefore told them to stop the medication immediately and call if they developed any severe muscle aches or pains and they agreed to do so.  Additional counseling: I advised them to call our office or go to the emergency room if they developed worsening or persistent chest pain or increased shortness of breath as this could be life threatening.   2.  No change in medications.  3.  Will order CTA chest with contrast to be done in 1 year prior to her appointment.  4.  Will follow up in 1 year.    Finally, I recommended that she continue her current medications and follow up with you as previously scheduled.     DISCUSSION:        Reviewed findings with Dr. Cortez and then Ms. Espinosa was also evaluated by Dr. Cortez.    She has had no chest pain, chest discomfort, shortness of breath or change in energy level.   She will hopefully continue her medications as ordered as her blood pressure is still very elevated.   She has no chest pain or chest discomfort to indicate need to do further ischemia workup at this time.     She continues to have pain and weakness in both of her legs due to her severe peripheral vascular disease which limits her activity.    She will be following up with Urology on 02/15/2024 for 10 mm left lateral renal lesion that is concerning for malignancy.  She has a history of bladder and vulvar cancer.    She

## 2024-02-15 ENCOUNTER — OFFICE VISIT (OUTPATIENT)
Dept: UROLOGY | Age: 64
End: 2024-02-15
Payer: COMMERCIAL

## 2024-02-15 ENCOUNTER — HOSPITAL ENCOUNTER (OUTPATIENT)
Age: 64
Setting detail: SPECIMEN
Discharge: HOME | End: 2024-02-15
Payer: COMMERCIAL

## 2024-02-15 VITALS
SYSTOLIC BLOOD PRESSURE: 162 MMHG | DIASTOLIC BLOOD PRESSURE: 82 MMHG | HEIGHT: 63 IN | BODY MASS INDEX: 22.15 KG/M2 | WEIGHT: 125 LBS

## 2024-02-15 DIAGNOSIS — C68.9 TRANSITIONAL CELL CARCINOMA (HCC): ICD-10-CM

## 2024-02-15 DIAGNOSIS — N13.30 HYDRONEPHROSIS, UNSPECIFIED HYDRONEPHROSIS TYPE: ICD-10-CM

## 2024-02-15 DIAGNOSIS — N32.89 BLADDER WALL THICKENING: ICD-10-CM

## 2024-02-15 DIAGNOSIS — C68.9 TRANSITIONAL CELL CARCINOMA (HCC): Primary | ICD-10-CM

## 2024-02-15 LAB
-: ABNORMAL
BACTERIA URNS QL MICRO: ABNORMAL
BILIRUB UR QL STRIP: NEGATIVE
CLARITY UR: CLEAR
COLOR UR: YELLOW
COMMENT: ABNORMAL
EPI CELLS #/AREA URNS HPF: ABNORMAL /HPF
GLUCOSE UR STRIP-MCNC: NEGATIVE MG/DL
HGB UR QL STRIP.AUTO: NEGATIVE
KETONES UR STRIP-MCNC: NEGATIVE MG/DL
LEUKOCYTE ESTERASE UR QL STRIP: NEGATIVE
NITRITE UR QL STRIP: NEGATIVE
PH UR STRIP: 6.5 [PH] (ref 5–8)
PROT UR STRIP-MCNC: ABNORMAL MG/DL
RBC #/AREA URNS HPF: ABNORMAL /HPF (ref 0–2)
SP GR UR STRIP: 1.01 (ref 1–1.03)
UROBILINOGEN UR STRIP-ACNC: NORMAL EU/DL (ref 0–1)
WBC #/AREA URNS HPF: ABNORMAL /HPF

## 2024-02-15 PROCEDURE — 87086 URINE CULTURE/COLONY COUNT: CPT

## 2024-02-15 PROCEDURE — G8427 DOCREV CUR MEDS BY ELIG CLIN: HCPCS | Performed by: UROLOGY

## 2024-02-15 PROCEDURE — 3077F SYST BP >= 140 MM HG: CPT | Performed by: UROLOGY

## 2024-02-15 PROCEDURE — 99204 OFFICE O/P NEW MOD 45 MIN: CPT | Performed by: UROLOGY

## 2024-02-15 PROCEDURE — 81001 URINALYSIS AUTO W/SCOPE: CPT

## 2024-02-15 PROCEDURE — 3079F DIAST BP 80-89 MM HG: CPT | Performed by: UROLOGY

## 2024-02-15 PROCEDURE — 3017F COLORECTAL CA SCREEN DOC REV: CPT | Performed by: UROLOGY

## 2024-02-15 PROCEDURE — G8420 CALC BMI NORM PARAMETERS: HCPCS | Performed by: UROLOGY

## 2024-02-15 PROCEDURE — 1036F TOBACCO NON-USER: CPT | Performed by: UROLOGY

## 2024-02-15 PROCEDURE — G8484 FLU IMMUNIZE NO ADMIN: HCPCS | Performed by: UROLOGY

## 2024-02-15 PROCEDURE — 51798 US URINE CAPACITY MEASURE: CPT | Performed by: UROLOGY

## 2024-02-15 NOTE — PROGRESS NOTES
HPI:        Patient is a 63 y.o. female in no acute distress.  She is alert and oriented to person, place, and time.        History  TURBT 4/5/2016 High Grade Ta  TURBT re-resection 5/17/2016 High Grade Ta  6 Weekly BCG instuillations starting 6/16/16  3 weekly BCG starting 7/20/17  Lost to follow-up in 2019    Currently  Patient is here today as a new patient.  Patient was well-known to our practice several years ago.  She did have multiple bladder resections.  She was lost to follow-up approximately 5 years ago.  Patient did have a recent CT scan.  This film was independently reviewed.  This does show severe left hydronephrosis.  This is down to the level of the bladder.  The bladder does appear to be thickened on the left side.  Patient has had slight worsening of her creatinine.  Patient has been having some left-sided flank pain.  Is unclear how long this has been going on.  Patient has no unintentional weight loss or decreased appetite.  She reports no nausea or vomiting.  Past Medical History:   Diagnosis Date    Alcohol abuse 3/2/2017    Arthritis     Bladder cancer (HCC) 2016    CAD (coronary artery disease)     Cancer (HCC) 2001    vulvar-    Carotid artery stenosis     Chronic back pain     Hyperlipidemia     Hypertension     Hypoglycemia     MI (myocardial infarction) (HCC) 03/01/2017    Peripheral vascular disease (HCC)     Takayasu's arteritis (HCC)     Tibial fracture     right    Umbilical hernia     Unspecified cerebral artery occlusion with cerebral infarction 1993     Past Surgical History:   Procedure Laterality Date    BLADDER SURGERY  5/17/2016    cysto with transurethral resection of bladder with mitomycin    CARDIAC CATHETERIZATION  12/2010    CARDIAC SURGERY  1993    aortic bypass graft for right carotid artery obstruction    COLONOSCOPY N/A 7/22/2021    COLONOSCOPY POLYPECTOMY HOT BIOPSY WITH INK TATTOO performed by Jose R Jack MD at [facility] ENDOSCOPY    COLONOSCOPY N/A 11/1/2021

## 2024-02-16 ENCOUNTER — TELEPHONE (OUTPATIENT)
Dept: PREADMISSION TESTING | Age: 64
End: 2024-02-16

## 2024-02-16 LAB
MICROORGANISM SPEC CULT: NORMAL
SPECIMEN DESCRIPTION: NORMAL

## 2024-02-16 NOTE — PROGRESS NOTES
Patient instructed on the pre-operative, intra-operative, and post-operative process. Patient instructed on NPO status. Medication instructions and pre operative instruction sheet reviewed with the patient. Instructed pt to stop aspirin pre-op as instructed per Dr. Herrera and to take metoprolol and amlodipine with a small sip of water prior to arriving to the hospital the day of surgery. Patient states she will call Dr. Herrera's office to obtain aspirin instructions.     Deepika at Dr. Holder's office notified patient will need pre-op aspirin instructions and patient plans on calling cardiology to obtain information.

## 2024-02-16 NOTE — TELEPHONE ENCOUNTER
Please review chart. Patient has a significant health and cardiac history. Scheduled with Dr. Holder 2/27/24. Thank you.

## 2024-02-19 ENCOUNTER — TELEPHONE (OUTPATIENT)
Dept: UROLOGY | Age: 64
End: 2024-02-19

## 2024-02-19 ENCOUNTER — ANESTHESIA EVENT (OUTPATIENT)
Dept: OPERATING ROOM | Age: 64
End: 2024-02-19
Payer: COMMERCIAL

## 2024-02-19 NOTE — TELEPHONE ENCOUNTER
Chart and notes reviewed.  Reviewed recent cardiology visit notes on 2/13, no further orders per cardiology and is aware of procedure with Dr BRYAN Matos to proceed with procedure.

## 2024-02-19 NOTE — TELEPHONE ENCOUNTER
----- Message from Osmin Nicole PA-C sent at 2/19/2024  8:46 AM EST -----  Please let her know her urine culture was negative for UTI, proceed with surgery as planned

## 2024-02-27 ENCOUNTER — APPOINTMENT (OUTPATIENT)
Dept: GENERAL RADIOLOGY | Age: 64
End: 2024-02-27
Attending: UROLOGY
Payer: COMMERCIAL

## 2024-02-27 ENCOUNTER — ANESTHESIA (OUTPATIENT)
Dept: OPERATING ROOM | Age: 64
End: 2024-02-27
Payer: COMMERCIAL

## 2024-02-27 ENCOUNTER — HOSPITAL ENCOUNTER (OUTPATIENT)
Age: 64
Setting detail: OUTPATIENT SURGERY
Discharge: HOME | End: 2024-02-27
Attending: UROLOGY | Admitting: UROLOGY
Payer: COMMERCIAL

## 2024-02-27 VITALS
OXYGEN SATURATION: 98 % | TEMPERATURE: 97.3 F | SYSTOLIC BLOOD PRESSURE: 113 MMHG | HEART RATE: 56 BPM | HEIGHT: 63 IN | DIASTOLIC BLOOD PRESSURE: 78 MMHG | WEIGHT: 119 LBS | BODY MASS INDEX: 21.09 KG/M2 | RESPIRATION RATE: 16 BRPM

## 2024-02-27 DIAGNOSIS — N32.89 BLADDER MASS: ICD-10-CM

## 2024-02-27 PROCEDURE — 2709999900 HC NON-CHARGEABLE SUPPLY: Performed by: UROLOGY

## 2024-02-27 PROCEDURE — 3600000013 HC SURGERY LEVEL 3 ADDTL 15MIN: Performed by: UROLOGY

## 2024-02-27 PROCEDURE — 3700000001 HC ADD 15 MINUTES (ANESTHESIA): Performed by: UROLOGY

## 2024-02-27 PROCEDURE — 6370000000 HC RX 637 (ALT 250 FOR IP): Performed by: NURSE ANESTHETIST, CERTIFIED REGISTERED

## 2024-02-27 PROCEDURE — C1758 CATHETER, URETERAL: HCPCS | Performed by: UROLOGY

## 2024-02-27 PROCEDURE — 7100000011 HC PHASE II RECOVERY - ADDTL 15 MIN: Performed by: UROLOGY

## 2024-02-27 PROCEDURE — 88307 TISSUE EXAM BY PATHOLOGIST: CPT

## 2024-02-27 PROCEDURE — 2580000003 HC RX 258: Performed by: NURSE ANESTHETIST, CERTIFIED REGISTERED

## 2024-02-27 PROCEDURE — 6360000002 HC RX W HCPCS: Performed by: UROLOGY

## 2024-02-27 PROCEDURE — 3600000003 HC SURGERY LEVEL 3 BASE: Performed by: UROLOGY

## 2024-02-27 PROCEDURE — 6370000000 HC RX 637 (ALT 250 FOR IP): Performed by: UROLOGY

## 2024-02-27 PROCEDURE — 3700000000 HC ANESTHESIA ATTENDED CARE: Performed by: UROLOGY

## 2024-02-27 PROCEDURE — 7100000010 HC PHASE II RECOVERY - FIRST 15 MIN: Performed by: UROLOGY

## 2024-02-27 PROCEDURE — C1769 GUIDE WIRE: HCPCS | Performed by: UROLOGY

## 2024-02-27 PROCEDURE — 6360000002 HC RX W HCPCS: Performed by: NURSE ANESTHETIST, CERTIFIED REGISTERED

## 2024-02-27 PROCEDURE — 2500000003 HC RX 250 WO HCPCS: Performed by: NURSE ANESTHETIST, CERTIFIED REGISTERED

## 2024-02-27 PROCEDURE — 2720000010 HC SURG SUPPLY STERILE: Performed by: UROLOGY

## 2024-02-27 PROCEDURE — C2617 STENT, NON-COR, TEM W/O DEL: HCPCS | Performed by: UROLOGY

## 2024-02-27 DEVICE — URETERAL STENT
Type: IMPLANTABLE DEVICE | Site: URETER | Status: FUNCTIONAL
Brand: PERCUFLEX™ PLUS

## 2024-02-27 RX ORDER — GLYCOPYRROLATE 0.2 MG/ML
INJECTION INTRAMUSCULAR; INTRAVENOUS PRN
Status: DISCONTINUED | OUTPATIENT
Start: 2024-02-27 | End: 2024-02-27 | Stop reason: SDUPTHER

## 2024-02-27 RX ORDER — PROPOFOL 10 MG/ML
INJECTION, EMULSION INTRAVENOUS PRN
Status: DISCONTINUED | OUTPATIENT
Start: 2024-02-27 | End: 2024-02-27 | Stop reason: SDUPTHER

## 2024-02-27 RX ORDER — EPHEDRINE SULFATE/0.9% NACL/PF 25 MG/5 ML
SYRINGE (ML) INTRAVENOUS PRN
Status: DISCONTINUED | OUTPATIENT
Start: 2024-02-27 | End: 2024-02-27 | Stop reason: SDUPTHER

## 2024-02-27 RX ORDER — SODIUM CHLORIDE, SODIUM LACTATE, POTASSIUM CHLORIDE, CALCIUM CHLORIDE 600; 310; 30; 20 MG/100ML; MG/100ML; MG/100ML; MG/100ML
INJECTION, SOLUTION INTRAVENOUS CONTINUOUS
Status: DISCONTINUED | OUTPATIENT
Start: 2024-02-27 | End: 2024-02-27 | Stop reason: HOSPADM

## 2024-02-27 RX ORDER — DIMENHYDRINATE 50 MG
50 TABLET ORAL ONCE
Status: COMPLETED | OUTPATIENT
Start: 2024-02-27 | End: 2024-02-27

## 2024-02-27 RX ORDER — ONDANSETRON 2 MG/ML
4 INJECTION INTRAMUSCULAR; INTRAVENOUS
Status: DISCONTINUED | OUTPATIENT
Start: 2024-02-27 | End: 2024-02-27 | Stop reason: HOSPADM

## 2024-02-27 RX ORDER — LIDOCAINE HYDROCHLORIDE 20 MG/ML
INJECTION, SOLUTION EPIDURAL; INFILTRATION; INTRACAUDAL; PERINEURAL PRN
Status: DISCONTINUED | OUTPATIENT
Start: 2024-02-27 | End: 2024-02-27 | Stop reason: SDUPTHER

## 2024-02-27 RX ORDER — OXYCODONE HYDROCHLORIDE 5 MG/1
5 TABLET ORAL
Status: DISCONTINUED | OUTPATIENT
Start: 2024-02-27 | End: 2024-02-27 | Stop reason: HOSPADM

## 2024-02-27 RX ORDER — DEXAMETHASONE SODIUM PHOSPHATE 4 MG/ML
INJECTION, SOLUTION INTRA-ARTICULAR; INTRALESIONAL; INTRAMUSCULAR; INTRAVENOUS; SOFT TISSUE PRN
Status: DISCONTINUED | OUTPATIENT
Start: 2024-02-27 | End: 2024-02-27 | Stop reason: SDUPTHER

## 2024-02-27 RX ORDER — FENTANYL CITRATE 50 UG/ML
INJECTION, SOLUTION INTRAMUSCULAR; INTRAVENOUS PRN
Status: DISCONTINUED | OUTPATIENT
Start: 2024-02-27 | End: 2024-02-27 | Stop reason: SDUPTHER

## 2024-02-27 RX ORDER — ACETAMINOPHEN 325 MG/1
650 TABLET ORAL ONCE
Status: COMPLETED | OUTPATIENT
Start: 2024-02-27 | End: 2024-02-27

## 2024-02-27 RX ORDER — ONDANSETRON 2 MG/ML
INJECTION INTRAMUSCULAR; INTRAVENOUS PRN
Status: DISCONTINUED | OUTPATIENT
Start: 2024-02-27 | End: 2024-02-27 | Stop reason: SDUPTHER

## 2024-02-27 RX ORDER — NEOSTIGMINE METHYLSULFATE 1 MG/ML
INJECTION, SOLUTION INTRAVENOUS PRN
Status: DISCONTINUED | OUTPATIENT
Start: 2024-02-27 | End: 2024-02-27 | Stop reason: SDUPTHER

## 2024-02-27 RX ORDER — ROCURONIUM BROMIDE 10 MG/ML
INJECTION, SOLUTION INTRAVENOUS PRN
Status: DISCONTINUED | OUTPATIENT
Start: 2024-02-27 | End: 2024-02-27 | Stop reason: SDUPTHER

## 2024-02-27 RX ORDER — LIDOCAINE HYDROCHLORIDE 20 MG/ML
JELLY TOPICAL PRN
Status: DISCONTINUED | OUTPATIENT
Start: 2024-02-27 | End: 2024-02-27 | Stop reason: ALTCHOICE

## 2024-02-27 RX ORDER — CEFAZOLIN SODIUM IN 0.9 % NACL 2 G/100 ML
2000 PLASTIC BAG, INJECTION (ML) INTRAVENOUS
Status: COMPLETED | OUTPATIENT
Start: 2024-02-27 | End: 2024-02-27

## 2024-02-27 RX ADMIN — FENTANYL CITRATE 50 MCG: 50 INJECTION, SOLUTION INTRAMUSCULAR; INTRAVENOUS at 11:55

## 2024-02-27 RX ADMIN — LIDOCAINE HYDROCHLORIDE 60 MG: 20 INJECTION, SOLUTION EPIDURAL; INFILTRATION; INTRACAUDAL; PERINEURAL at 11:13

## 2024-02-27 RX ADMIN — EPHEDRINE SULFATE 10 MG: 5 INJECTION INTRAVENOUS at 11:26

## 2024-02-27 RX ADMIN — PROPOFOL 50 MG: 10 INJECTION, EMULSION INTRAVENOUS at 12:12

## 2024-02-27 RX ADMIN — GLYCOPYRROLATE 0.5 MG: 0.2 INJECTION INTRAMUSCULAR; INTRAVENOUS at 12:24

## 2024-02-27 RX ADMIN — PHENYLEPHRINE HYDROCHLORIDE 100 MCG: 10 INJECTION INTRAVENOUS at 11:36

## 2024-02-27 RX ADMIN — DIMENHYDRINATE 50 MG: 50 TABLET ORAL at 10:28

## 2024-02-27 RX ADMIN — NEOSTIGMINE METHYLSULFATE 3 MG: 1 INJECTION, SOLUTION INTRAVENOUS at 12:24

## 2024-02-27 RX ADMIN — ROCURONIUM BROMIDE 40 MG: 50 INJECTION INTRAVENOUS at 11:13

## 2024-02-27 RX ADMIN — PROPOFOL 150 MG: 10 INJECTION, EMULSION INTRAVENOUS at 11:13

## 2024-02-27 RX ADMIN — PHENYLEPHRINE HYDROCHLORIDE 100 MCG: 10 INJECTION INTRAVENOUS at 12:10

## 2024-02-27 RX ADMIN — SODIUM CHLORIDE, POTASSIUM CHLORIDE, SODIUM LACTATE AND CALCIUM CHLORIDE: 600; 310; 30; 20 INJECTION, SOLUTION INTRAVENOUS at 10:41

## 2024-02-27 RX ADMIN — LIDOCAINE HYDROCHLORIDE 40 MG: 20 INJECTION, SOLUTION EPIDURAL; INFILTRATION; INTRACAUDAL; PERINEURAL at 12:12

## 2024-02-27 RX ADMIN — ONDANSETRON 4 MG: 2 INJECTION INTRAMUSCULAR; INTRAVENOUS at 11:38

## 2024-02-27 RX ADMIN — FENTANYL CITRATE 50 MCG: 50 INJECTION, SOLUTION INTRAMUSCULAR; INTRAVENOUS at 11:12

## 2024-02-27 RX ADMIN — EPHEDRINE SULFATE 10 MG: 5 INJECTION INTRAVENOUS at 11:22

## 2024-02-27 RX ADMIN — SODIUM CHLORIDE, POTASSIUM CHLORIDE, SODIUM LACTATE AND CALCIUM CHLORIDE: 600; 310; 30; 20 INJECTION, SOLUTION INTRAVENOUS at 11:44

## 2024-02-27 RX ADMIN — ACETAMINOPHEN 650 MG: 325 TABLET ORAL at 10:28

## 2024-02-27 RX ADMIN — Medication 2000 MG: at 11:06

## 2024-02-27 RX ADMIN — DEXAMETHASONE SODIUM PHOSPHATE 4 MG: 4 INJECTION, SOLUTION INTRAMUSCULAR; INTRAVENOUS at 11:38

## 2024-02-27 RX ADMIN — PHENYLEPHRINE HYDROCHLORIDE 100 MCG: 10 INJECTION INTRAVENOUS at 12:22

## 2024-02-27 RX ADMIN — EPHEDRINE SULFATE 5 MG: 5 INJECTION INTRAVENOUS at 11:36

## 2024-02-27 ASSESSMENT — PAIN SCALES - GENERAL
PAINLEVEL_OUTOF10: 0
PAINLEVEL_OUTOF10: 0

## 2024-02-27 ASSESSMENT — PAIN - FUNCTIONAL ASSESSMENT
PAIN_FUNCTIONAL_ASSESSMENT: 0-10
PAIN_FUNCTIONAL_ASSESSMENT: 0-10
PAIN_FUNCTIONAL_ASSESSMENT: ACTIVITIES ARE NOT PREVENTED
PAIN_FUNCTIONAL_ASSESSMENT: 0-10

## 2024-02-27 ASSESSMENT — PAIN DESCRIPTION - DESCRIPTORS: DESCRIPTORS: ACHING

## 2024-02-27 NOTE — PROGRESS NOTES
Talked with anesthesia about SpO2 dropping into the 80's. Patient is alert and oriented x4, no dyspnea noted and patient denies any shortness of breath or trouble breathing.

## 2024-02-27 NOTE — H&P
History and Physical    Patient:  Daja Espinosa  MRN: [id number]    CHIEF COMPLAINT:  bladder tumor/ left hydronephrosis    HISTORY OF PRESENT ILLNESS:   The patient is a 63 y.o. female who presents with bladder tumor and left hydroneophrosis    Patient is here today as a new patient.  Patient was well-known to our practice several years ago.  She did have multiple bladder resections.  She was lost to follow-up approximately 5 years ago.  Patient did have a recent CT scan.  This film was independently reviewed.  This does show severe left hydronephrosis.  This is down to the level of the bladder.  The bladder does appear to be thickened on the left side.  Patient has had slight worsening of her creatinine.  Patient has been having some left-sided flank pain.  Is unclear how long this has been going on.  Patient has no unintentional weight loss or decreased appetite.  She reports no nausea or vomiting.     Past Medical History:    Past Medical History:   Diagnosis Date    Alcohol abuse 3/2/2017    Arthritis     Bladder cancer (HCC) 2016    CAD (coronary artery disease)     Cancer (HCC) 2001    vulvar-    Carotid artery stenosis     Chronic back pain     Hydronephrosis 2/15/2024    Hyperlipidemia     Hypertension     Hypoglycemia     MI (myocardial infarction) (HCC) 03/01/2017    Peripheral vascular disease (HCC)     Takayasu's arteritis (HCC)     Tibial fracture     right    Umbilical hernia     Unspecified cerebral artery occlusion with cerebral infarction 1993       Past Surgical History:    Past Surgical History:   Procedure Laterality Date    BLADDER SURGERY  5/17/2016    cysto with transurethral resection of bladder with mitomycin    CARDIAC CATHETERIZATION  12/2010    CARDIAC SURGERY  1993    aortic bypass graft for right carotid artery obstruction    COLONOSCOPY N/A 7/22/2021    COLONOSCOPY POLYPECTOMY HOT BIOPSY WITH INK TATTOO performed by Jose R Jack MD at [facility] ENDOSCOPY    COLONOSCOPY N/A 11/1/2021

## 2024-02-27 NOTE — ANESTHESIA PRE PROCEDURE
arthritis:., malignancy/cancer (bladder CA).                 Abdominal: normal exam            Vascular:   + PVD, aortic or cerebral.        ROS comment: Patient has a carotid stent.. Other Findings:         Anesthesia Plan      general     ASA 3       Induction: intravenous.    MIPS: Postoperative opioids intended and Prophylactic antiemetics administered.  Anesthetic plan and risks discussed with patient.    Use of blood products discussed with patient whom consented to blood products.    Plan discussed with CRNA.            Patient is non-compliant with home medications used for the treatment of HTN. The patient reports that she is supposed to take her BP medications twice daily but only takes them once daily. The patient was provided education on the risks of not following the directions for her prescriptions. The patient is being treated for HTN. If the BP responds to the medications provided the procedure will proceed, otherwise the case will be cancelled.      MARGARITA Johnson - CRNA   2/27/2024

## 2024-02-27 NOTE — DISCHARGE INSTRUCTIONS
SAME DAY SURGERY DISCHARGE INSTRUCTIONS    1.  Do not drive or operate hazardous machinery for 24 hours.    2.  Do not make important personal or business decisions for 24 hours.    3.  Do not drink alcoholic beverages for 24 hours.    4.  Do not smoke tobacco products for 24 hours.    5.  Eat light foods (Jell-O, soups, etc....) and drink plenty of fluids (water, Sprite, etc...) up to 8 glasses per day, as you can tolerate.    6.  Limit your activities for 24 hours.  Do not engage in heavy work until your surgeon gives you permission.      7.  Patient should not be left alone for 12-24 hours following surgical procedure.    8.  Wash hands before and after incision care.  It is important to practice good personal hygiene during the post op period.    9.  Report the following signs or any questions regarding your physical condition to your surgeon immediately:    Excessive swelling of, or around the wound area.    Redness.    Temperature of 100 degrees (F) or above.    Excessive pain.    10.  Call your surgeon for any questions regarding your surgery.    CYSTOSCOPY DISCHARGE INSTRUCTIONS    Possible burning during urination and/or blood tinged urine.    Drink 6-8 glasses of water for the next day or so.  (This helps to flush the urinary tract.)      Call Dr. Holder ([phone]) if you develop:    Fever over 100 degrees  Prolonged soreness/pain  Unusual bleeding/bruising  Unable to urinate or if urine is bloody  You cannot pass urine 8 hours after the test.  You have pain in your belly or your back just below your rib cage.  (This is called flank pain.)  You have frequent urge to urinate but can pass only small amounts of urine.    Call Dr. Holder office for follow-up appointment ([phone]).

## 2024-02-27 NOTE — ANESTHESIA POSTPROCEDURE EVALUATION
Department of Anesthesiology  Postprocedure Note    Patient: Daja Espinosa  MRN: [id number]  YOB: 1960  Date of evaluation: 2/27/2024    Procedure Summary       Date: 02/27/24 Room / Location: [facility]    Anesthesia Start: 1108 Anesthesia Stop: 1239    Procedures:       CYSTOSCOPY- POSS  TRANSURETHRAL RESECTION OF BLADDER TUMOR,  FULGURATION      CYSTOSCOPY URETERAL STENT INSERTION (Left) Diagnosis:       Bladder mass      (Bladder mass [N32.89])    Surgeons: Tulio Holder MD Responsible Provider: Kristyn Patel APRN - CRNA    Anesthesia Type: general ASA Status: 3            Anesthesia Type: No value filed.    Segundo Phase I: Segundo Score: 10    Segundo Phase II: Segundo Score: 10    Anesthesia Post Evaluation    Patient location during evaluation: bedside  Patient participation: complete - patient participated  Level of consciousness: awake and alert  Pain score: 0  Airway patency: patent  Nausea & Vomiting: no nausea and no vomiting  Cardiovascular status: hemodynamically stable  Respiratory status: acceptable  Hydration status: stable  Comments: Pt maintaining sats now  Multimodal analgesia pain management approach  Pain management: adequate    No notable events documented.

## 2024-02-28 NOTE — OP NOTE
attempt to find the ureteral orifice.  We were unable to do this.  We then removed the scope, replaced the resectoscope and we resected the tumor.  We did resect approximately 80% of the tumor and collected these specimens in the [facility] evacuator, placed them in formalin on the back table for permanent analysis.  Once this was done, we were able to then go back in with the scope and we did identify the left ureteral orifice.  At this point in time, 0.035 inch wire was passed up, 6-German x 24 cm double-J ureteral stent was passed up the ureter into the kidney.  Guidewire was removed and the proximal curl was confirmed by fluoroscopy and the distal curl was confirmed by visualization.  At this point in time, hemostasis was achieved.  We drained the bladder, put a 2% lidocaine Uro-Jet in for local anesthetic.  She was then awoken from general anesthesia and transferred to the [facility], and taken to the PACU in satisfactory condition by the nursing and anesthesia teams.    PLAN:  The patient will be discharged home per PACU criteria.  She will follow up with us in 1 week for pathology review and further treatment planning.          RILEY MORELAND MD      D:  02/27/2024 13:49:29     T:  02/27/2024 20:42:44     CHAR/MIGUEL ANGEL  Job #:  [id number]     Doc#:  [id number]

## 2024-03-01 LAB — SURGICAL PATHOLOGY REPORT: NORMAL

## 2024-03-05 ENCOUNTER — OFFICE VISIT (OUTPATIENT)
Dept: FAMILY MEDICINE CLINIC | Age: 64
End: 2024-03-05
Payer: COMMERCIAL

## 2024-03-05 VITALS
HEIGHT: 63 IN | HEART RATE: 62 BPM | SYSTOLIC BLOOD PRESSURE: 120 MMHG | DIASTOLIC BLOOD PRESSURE: 71 MMHG | WEIGHT: 114 LBS | OXYGEN SATURATION: 98 % | BODY MASS INDEX: 20.2 KG/M2 | RESPIRATION RATE: 18 BRPM

## 2024-03-05 DIAGNOSIS — E78.5 HYPERLIPIDEMIA, UNSPECIFIED HYPERLIPIDEMIA TYPE: ICD-10-CM

## 2024-03-05 DIAGNOSIS — I10 ESSENTIAL HYPERTENSION: Primary | ICD-10-CM

## 2024-03-05 DIAGNOSIS — C67.2 MALIGNANT NEOPLASM OF LATERAL WALL OF URINARY BLADDER (HCC): ICD-10-CM

## 2024-03-05 PROCEDURE — 3074F SYST BP LT 130 MM HG: CPT | Performed by: INTERNAL MEDICINE

## 2024-03-05 PROCEDURE — G8427 DOCREV CUR MEDS BY ELIG CLIN: HCPCS | Performed by: INTERNAL MEDICINE

## 2024-03-05 PROCEDURE — G8484 FLU IMMUNIZE NO ADMIN: HCPCS | Performed by: INTERNAL MEDICINE

## 2024-03-05 PROCEDURE — 3078F DIAST BP <80 MM HG: CPT | Performed by: INTERNAL MEDICINE

## 2024-03-05 PROCEDURE — G8420 CALC BMI NORM PARAMETERS: HCPCS | Performed by: INTERNAL MEDICINE

## 2024-03-05 PROCEDURE — 1036F TOBACCO NON-USER: CPT | Performed by: INTERNAL MEDICINE

## 2024-03-05 PROCEDURE — 3017F COLORECTAL CA SCREEN DOC REV: CPT | Performed by: INTERNAL MEDICINE

## 2024-03-05 PROCEDURE — 99213 OFFICE O/P EST LOW 20 MIN: CPT | Performed by: INTERNAL MEDICINE

## 2024-03-05 ASSESSMENT — ENCOUNTER SYMPTOMS
COUGH: 0
ABDOMINAL PAIN: 0
SHORTNESS OF BREATH: 0
SORE THROAT: 0
NAUSEA: 0

## 2024-03-05 NOTE — PATIENT INSTRUCTIONS
review test results, and communicate with your healthcare provider all in one secure and user-friendly platform.    3. Timesaving: By utilizing direct scheduling, you can avoid unnecessary delays and save carolee time. You can easily browse the available appointment slots and select the one that works best for you, eliminating the back-and-forth communication typically required when scheduling via phone.    4. Reminders and notifications: [facility] sends automatic reminders for upcoming appointments, ensuring that you never miss an important visit. You can also receive notifications about test results and important health updates, keeping you well-informed and engaged in your healthcare journey.    5. Increased engagement and collaboration: Direct scheduling encourages greater patient engagement and empowers you to take an active role in managing your healthcare. By accessing the [facility] Portal, you can securely message your healthcare provider, ask questions, request prescription refills, and seek medical advice whenever you need it.    To get started with direct scheduling through the [facility] or to register with [facility], simply visit [email or web address].     We understand that change can sometimes be overwhelming, but we assure you that adopting direct scheduling through the [facility] Portal will enhance your healthcare experience and put you in control of your appointments. Our dedicated staff is available to answer any questions or provide assistance throughout the process.    Thank you for entrusting us with your healthcare needs. We are committed to continuously improving our services and ensuring your satisfaction. Together, let's embrace the future of healthcare convenience and accessibility through direct scheduling on the [facility] Portal.    Wishing you good health and happiness,    Thania Porter

## 2024-03-05 NOTE — PROGRESS NOTES
HPI Notes    Name: Daja Espinosa  : 1960         Chief Complaint:     Chief Complaint   Patient presents with    Follow-up     Patient had surgery last week follows up for biopsy tomorrow.    Hypertension     Patient states she is taking her meds and pain is bearable.       History of Present Illness:        HPI    Daja presents to office to follow-up for controlled hypertension.    She has a history of hypertension for many years and unfortunately has been noncompliant for follow-up and taking prescription medications as recommended.  She was on Lopressor 50 mg twice daily and Cozaar 100 mg daily.  Unfortunately continues to have elevated BP and amlodipine 10 mg daily was added on 2024.  She has an extensive history of severe PVD, renal artery stenosis, CAD.  On medical management.  Today has no complaints. Has been compliant with her BP meds and it improved. She does not check BP at home.     She is s/p TURBT P and cystoscopy with left ureteral stent placement on 2024 by urology Dr. Holder secondary to large bladder tumor.  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  She is to follow-up with urology tomorrow for further recommendations regarding treatment        Past Medical History:     Past Medical History:   Diagnosis Date    Alcohol abuse 3/2/2017    Arthritis     Bladder cancer (HCC)     CAD (coronary artery disease)     Cancer (HCC)     vulvar-    Carotid artery stenosis     Chronic back pain     Hydronephrosis 2/15/2024    Hyperlipidemia     Hypertension     Hypoglycemia     MI (myocardial infarction) (HCC) 2017    Peripheral vascular disease (HCC)     Takayasu's arteritis (HCC)     Tibial fracture     right    Umbilical hernia     Unspecified cerebral artery occlusion with cerebral infarction       Reviewed all health maintenance requirements and orderedappropriate tests  Health Maintenance Due   Topic

## 2024-03-06 ENCOUNTER — OFFICE VISIT (OUTPATIENT)
Dept: UROLOGY | Age: 64
End: 2024-03-06
Payer: COMMERCIAL

## 2024-03-06 VITALS
SYSTOLIC BLOOD PRESSURE: 124 MMHG | WEIGHT: 113 LBS | HEART RATE: 64 BPM | BODY MASS INDEX: 20.02 KG/M2 | TEMPERATURE: 98.6 F | DIASTOLIC BLOOD PRESSURE: 85 MMHG

## 2024-03-06 DIAGNOSIS — N13.30 HYDRONEPHROSIS, UNSPECIFIED HYDRONEPHROSIS TYPE: ICD-10-CM

## 2024-03-06 DIAGNOSIS — C67.9 UROTHELIAL CARCINOMA OF BLADDER WITH INVASION OF MUSCLE (HCC): Primary | ICD-10-CM

## 2024-03-06 PROCEDURE — G8484 FLU IMMUNIZE NO ADMIN: HCPCS | Performed by: PHYSICIAN ASSISTANT

## 2024-03-06 PROCEDURE — 1036F TOBACCO NON-USER: CPT | Performed by: PHYSICIAN ASSISTANT

## 2024-03-06 PROCEDURE — G8427 DOCREV CUR MEDS BY ELIG CLIN: HCPCS | Performed by: PHYSICIAN ASSISTANT

## 2024-03-06 PROCEDURE — G8420 CALC BMI NORM PARAMETERS: HCPCS | Performed by: PHYSICIAN ASSISTANT

## 2024-03-06 PROCEDURE — 3074F SYST BP LT 130 MM HG: CPT | Performed by: PHYSICIAN ASSISTANT

## 2024-03-06 PROCEDURE — 3017F COLORECTAL CA SCREEN DOC REV: CPT | Performed by: PHYSICIAN ASSISTANT

## 2024-03-06 PROCEDURE — 99213 OFFICE O/P EST LOW 20 MIN: CPT | Performed by: PHYSICIAN ASSISTANT

## 2024-03-06 PROCEDURE — 3079F DIAST BP 80-89 MM HG: CPT | Performed by: PHYSICIAN ASSISTANT

## 2024-03-06 ASSESSMENT — ENCOUNTER SYMPTOMS
EYE REDNESS: 0
VOMITING: 0
COLOR CHANGE: 0
NAUSEA: 0
BACK PAIN: 0
WHEEZING: 0
CONSTIPATION: 1
APNEA: 0
COUGH: 0
SHORTNESS OF BREATH: 0
ABDOMINAL PAIN: 0

## 2024-03-06 NOTE — PROGRESS NOTES
HPI:    Patient is a 63 y.o. female in no acute distress.  She is alert and oriented to person, place, and time.      History  TURBT 4/5/2016 High Grade Ta  TURBT re-resection 5/17/2016 High Grade Ta  6 Weekly BCG instuillations starting 6/16/16  3 weekly BCG starting 7/20/17    Lost to follow-up in 2019 2/2024 Patient is here today as a new patient.  Patient was well-known to our practice several years ago.  She did have multiple bladder resections.  She was lost to follow-up approximately 5 years ago.  Patient did have a recent CT scan.  This film was independently reviewed.  This does show severe left hydronephrosis.  This is down to the level of the bladder.  The bladder does appear to be thickened on the left side.  Patient has had slight worsening of her creatinine.  Patient has been having some left-sided flank pain.  Is unclear how long this has been going on.  Patient has no unintentional weight loss or decreased appetite.  She reports no nausea or vomiting.    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     Today:  Patient is here today status post TURBT and left ureteral stent placement.  Patient is accompanied by family today pathology did show High-grade urothelial carcinoma with squamous differentiation invading detrusor muscle.  Patient states that her pain is significantly improved with stent in place.  She denies any ureteral or bladder spasms.  She does have intermittent hematuria.  She does state that she is having issues with constipation and sometimes diarrhea.  She does drink minimal water.  She is a chronic marijuana smoker.    Past Medical History:   Diagnosis Date    Alcohol abuse 3/2/2017    Arthritis     Bladder cancer (HCC) 2016    CAD (coronary artery disease)     Cancer (HCC) 2001    vulvar-    Carotid artery stenosis     Chronic back pain     Hydronephrosis 2/15/2024

## 2024-03-11 ENCOUNTER — TELEPHONE (OUTPATIENT)
Dept: ONCOLOGY | Age: 64
End: 2024-03-11

## 2024-03-11 ENCOUNTER — OFFICE VISIT (OUTPATIENT)
Dept: ONCOLOGY | Age: 64
End: 2024-03-11
Payer: COMMERCIAL

## 2024-03-11 ENCOUNTER — HOSPITAL ENCOUNTER (OUTPATIENT)
Age: 64
Discharge: HOME | End: 2024-03-11
Payer: COMMERCIAL

## 2024-03-11 VITALS
RESPIRATION RATE: 18 BRPM | HEART RATE: 63 BPM | BODY MASS INDEX: 21.16 KG/M2 | HEIGHT: 62 IN | WEIGHT: 115 LBS | TEMPERATURE: 97.2 F | SYSTOLIC BLOOD PRESSURE: 149 MMHG | DIASTOLIC BLOOD PRESSURE: 77 MMHG

## 2024-03-11 DIAGNOSIS — C67.2 MALIGNANT NEOPLASM OF LATERAL WALL OF URINARY BLADDER (HCC): Primary | ICD-10-CM

## 2024-03-11 DIAGNOSIS — C67.2 MALIGNANT NEOPLASM OF LATERAL WALL OF URINARY BLADDER (HCC): ICD-10-CM

## 2024-03-11 DIAGNOSIS — N13.30 HYDRONEPHROSIS, UNSPECIFIED HYDRONEPHROSIS TYPE: ICD-10-CM

## 2024-03-11 LAB
ALBUMIN SERPL-MCNC: 3.8 G/DL (ref 3.5–5.2)
ALBUMIN/GLOB SERPL: 1 {RATIO} (ref 1–2.5)
ALP SERPL-CCNC: 99 U/L (ref 35–104)
ALT SERPL-CCNC: 13 U/L (ref 5–33)
ANION GAP SERPL CALCULATED.3IONS-SCNC: 11 MMOL/L (ref 9–17)
AST SERPL-CCNC: 16 U/L
BASOPHILS # BLD: 0.04 K/UL (ref 0–0.2)
BASOPHILS NFR BLD: 0 % (ref 0–2)
BILIRUB SERPL-MCNC: 0.6 MG/DL (ref 0.3–1.2)
BUN SERPL-MCNC: 22 MG/DL (ref 8–23)
BUN/CREAT SERPL: 16 (ref 9–20)
CALCIUM SERPL-MCNC: 9.6 MG/DL (ref 8.6–10.4)
CHLORIDE SERPL-SCNC: 100 MMOL/L (ref 98–107)
CO2 SERPL-SCNC: 23 MMOL/L (ref 20–31)
CREAT SERPL-MCNC: 1.4 MG/DL (ref 0.5–0.9)
EOSINOPHIL # BLD: 0.55 K/UL (ref 0–0.44)
EOSINOPHILS RELATIVE PERCENT: 5 % (ref 1–4)
ERYTHROCYTE [DISTWIDTH] IN BLOOD BY AUTOMATED COUNT: 12 % (ref 11.8–14.4)
GFR SERPL CREATININE-BSD FRML MDRD: 43 ML/MIN/1.73M2
GLUCOSE SERPL-MCNC: 100 MG/DL (ref 70–99)
HCT VFR BLD AUTO: 36.2 % (ref 36.3–47.1)
HGB BLD-MCNC: 12.5 G/DL (ref 11.9–15.1)
IMM GRANULOCYTES # BLD AUTO: 0.04 K/UL (ref 0–0.3)
IMM GRANULOCYTES NFR BLD: 0 %
LYMPHOCYTES NFR BLD: 2.39 K/UL (ref 1.1–3.7)
LYMPHOCYTES RELATIVE PERCENT: 22 % (ref 24–43)
MCH RBC QN AUTO: 33.2 PG (ref 25.2–33.5)
MCHC RBC AUTO-ENTMCNC: 34.5 G/DL (ref 28.4–34.8)
MCV RBC AUTO: 96.3 FL (ref 82.6–102.9)
MONOCYTES NFR BLD: 0.72 K/UL (ref 0.1–1.2)
MONOCYTES NFR BLD: 7 % (ref 3–12)
NEUTROPHILS NFR BLD: 66 % (ref 36–65)
NEUTS SEG NFR BLD: 7.19 K/UL (ref 1.5–8.1)
NRBC BLD-RTO: 0 PER 100 WBC
PLATELET # BLD AUTO: 640 K/UL (ref 138–453)
PMV BLD AUTO: 8.7 FL (ref 8.1–13.5)
POTASSIUM SERPL-SCNC: 4.6 MMOL/L (ref 3.7–5.3)
PROT SERPL-MCNC: 7.8 G/DL (ref 6.4–8.3)
RBC # BLD AUTO: 3.76 M/UL (ref 3.95–5.11)
SODIUM SERPL-SCNC: 134 MMOL/L (ref 135–144)
WBC OTHER # BLD: 10.9 K/UL (ref 3.5–11.3)

## 2024-03-11 PROCEDURE — G8427 DOCREV CUR MEDS BY ELIG CLIN: HCPCS | Performed by: INTERNAL MEDICINE

## 2024-03-11 PROCEDURE — G8420 CALC BMI NORM PARAMETERS: HCPCS | Performed by: INTERNAL MEDICINE

## 2024-03-11 PROCEDURE — 3078F DIAST BP <80 MM HG: CPT | Performed by: INTERNAL MEDICINE

## 2024-03-11 PROCEDURE — G8484 FLU IMMUNIZE NO ADMIN: HCPCS | Performed by: INTERNAL MEDICINE

## 2024-03-11 PROCEDURE — 99245 OFF/OP CONSLTJ NEW/EST HI 55: CPT | Performed by: INTERNAL MEDICINE

## 2024-03-11 PROCEDURE — 85025 COMPLETE CBC W/AUTO DIFF WBC: CPT

## 2024-03-11 PROCEDURE — 36415 COLL VENOUS BLD VENIPUNCTURE: CPT

## 2024-03-11 PROCEDURE — 80053 COMPREHEN METABOLIC PANEL: CPT

## 2024-03-11 PROCEDURE — 3077F SYST BP >= 140 MM HG: CPT | Performed by: INTERNAL MEDICINE

## 2024-03-11 RX ORDER — ONDANSETRON 8 MG/1
8 TABLET, ORALLY DISINTEGRATING ORAL EVERY 8 HOURS PRN
Qty: 60 TABLET | Refills: 2 | Status: ON HOLD | OUTPATIENT
Start: 2024-03-11

## 2024-03-11 RX ORDER — LIDOCAINE AND PRILOCAINE 25; 25 MG/G; MG/G
CREAM TOPICAL
Qty: 30 G | Refills: 1 | Status: ON HOLD | OUTPATIENT
Start: 2024-03-11

## 2024-03-11 NOTE — TELEPHONE ENCOUNTER
Name: Daja Espinosa  : 1960  MRN: [id number]    Oncology Navigation- Initial Note:    Intake-  Contact Type: Medical Oncology    Continuum of Care: Diagnosis/Active Treatment    Smoking hx:  Pt states she smokes Marijuana.  Smoking cessation assistance and resources provided     Notes: Writer met with Daja and family in clinic. Writer introduced self as ONN and to navigation program.  Daja lives with spouse in [facility]. Daja has a good support system.  Caresouce coverage.  Denies financial concerns, or transportation needs at this time.  Daja states she is eating and drinking well.  Daja had questions regarding what she should eat while on treatment.  Discussed diet and importance of hydration.  Pt informed that chemotherapy education will be done prior to starting treatment. Pt informed that she may have taste changes during treatment and eating with plastic silverware my help with this.  Daja states she smokes marijuana but no cigarettes.  Drank alcohol daily prior to diagnosis but states she has not had a drink since. Pt denies illegal drug use.  Pt given navigation folder with business card.  Daja encouraged to call with any questions, or needs.  Will continue to follow.     Pt to have labs drawn today.    Referral placed for IR PORT placement    Electronically signed by Ave Arreaga RN on 3/11/2024 at 11:08 AM

## 2024-03-11 NOTE — PROGRESS NOTES
obtain kidney function today  Port-A-Cath            I spent a total of 60 minutes on the date of the service which included preparing to see the patient, face-to-face patient care, completing clinical documentation, obtaining and/or reviewing separately obtained history, performing a medically appropriate examination, counseling and educating the patient/family/caregiver and ordering medications, tests, or procedures.                                 Familia Shepard MD                          [facility]                            This note is created with the assistance of a speech recognition program.  While intending to generate a document that actually reflects the content of the visit, the document can still have some errors including those of syntax and sound a like substitutions which may escape proof reading.  It such instances, actual meaning can be extrapolated by contextual diversion.

## 2024-03-13 ENCOUNTER — HOSPITAL ENCOUNTER (OUTPATIENT)
Dept: INTERVENTIONAL RADIOLOGY/VASCULAR | Age: 64
Discharge: HOME | End: 2024-03-15
Payer: COMMERCIAL

## 2024-03-13 VITALS
HEART RATE: 58 BPM | SYSTOLIC BLOOD PRESSURE: 153 MMHG | OXYGEN SATURATION: 98 % | TEMPERATURE: 96.8 F | DIASTOLIC BLOOD PRESSURE: 93 MMHG | RESPIRATION RATE: 18 BRPM

## 2024-03-13 DIAGNOSIS — C80.1 CANCER (HCC): ICD-10-CM

## 2024-03-13 PROCEDURE — 7100000010 HC PHASE II RECOVERY - FIRST 15 MIN

## 2024-03-13 PROCEDURE — 6360000002 HC RX W HCPCS: Performed by: RADIOLOGY

## 2024-03-13 PROCEDURE — 2580000003 HC RX 258: Performed by: RADIOLOGY

## 2024-03-13 PROCEDURE — C1894 INTRO/SHEATH, NON-LASER: HCPCS

## 2024-03-13 PROCEDURE — 2709999900 HC NON-CHARGEABLE SUPPLY

## 2024-03-13 PROCEDURE — C1788 PORT, INDWELLING, IMP: HCPCS

## 2024-03-13 PROCEDURE — 77001 FLUOROGUIDE FOR VEIN DEVICE: CPT

## 2024-03-13 PROCEDURE — 7100000011 HC PHASE II RECOVERY - ADDTL 15 MIN

## 2024-03-13 PROCEDURE — C1769 GUIDE WIRE: HCPCS

## 2024-03-13 RX ORDER — ACETAMINOPHEN 325 MG/1
650 TABLET ORAL EVERY 4 HOURS PRN
Status: DISCONTINUED | OUTPATIENT
Start: 2024-03-13 | End: 2024-03-16 | Stop reason: HOSPADM

## 2024-03-13 RX ORDER — BUPIVACAINE HYDROCHLORIDE 5 MG/ML
INJECTION, SOLUTION EPIDURAL; INTRACAUDAL PRN
Status: COMPLETED | OUTPATIENT
Start: 2024-03-13 | End: 2024-03-13

## 2024-03-13 RX ORDER — HEPARIN SODIUM 1000 [USP'U]/ML
INJECTION, SOLUTION INTRAVENOUS; SUBCUTANEOUS PRN
Status: COMPLETED | OUTPATIENT
Start: 2024-03-13 | End: 2024-03-13

## 2024-03-13 RX ORDER — CEFAZOLIN SODIUM IN 0.9 % NACL 2 G/100 ML
2000 PLASTIC BAG, INJECTION (ML) INTRAVENOUS ONCE
Status: DISCONTINUED | OUTPATIENT
Start: 2024-03-13 | End: 2024-03-16 | Stop reason: HOSPADM

## 2024-03-13 RX ORDER — SODIUM CHLORIDE 0.9 % (FLUSH) 0.9 %
SYRINGE (ML) INJECTION PRN
Status: COMPLETED | OUTPATIENT
Start: 2024-03-13 | End: 2024-03-13

## 2024-03-13 RX ORDER — MIDAZOLAM HYDROCHLORIDE 5 MG/ML
INJECTION INTRAMUSCULAR; INTRAVENOUS PRN
Status: COMPLETED | OUTPATIENT
Start: 2024-03-13 | End: 2024-03-13

## 2024-03-13 RX ORDER — FENTANYL CITRATE 50 UG/ML
INJECTION, SOLUTION INTRAMUSCULAR; INTRAVENOUS PRN
Status: COMPLETED | OUTPATIENT
Start: 2024-03-13 | End: 2024-03-13

## 2024-03-13 RX ADMIN — BUPIVACAINE HYDROCHLORIDE 9 ML: 5 INJECTION, SOLUTION EPIDURAL; INTRACAUDAL; PERINEURAL at 11:03

## 2024-03-13 RX ADMIN — FENTANYL CITRATE 25 MCG: 50 INJECTION, SOLUTION INTRAMUSCULAR; INTRAVENOUS at 10:51

## 2024-03-13 RX ADMIN — SODIUM CHLORIDE, PRESERVATIVE FREE 10 ML: 5 INJECTION INTRAVENOUS at 11:12

## 2024-03-13 RX ADMIN — MIDAZOLAM 0.5 MG: 5 INJECTION INTRAMUSCULAR; INTRAVENOUS at 10:59

## 2024-03-13 RX ADMIN — HEPARIN SODIUM 500 UNITS: 1000 INJECTION, SOLUTION INTRAVENOUS; SUBCUTANEOUS at 11:13

## 2024-03-13 RX ADMIN — FENTANYL CITRATE 25 MCG: 50 INJECTION, SOLUTION INTRAMUSCULAR; INTRAVENOUS at 10:59

## 2024-03-13 RX ADMIN — MIDAZOLAM 0.5 MG: 5 INJECTION INTRAMUSCULAR; INTRAVENOUS at 10:51

## 2024-03-13 RX ADMIN — BUPIVACAINE HYDROCHLORIDE 0.5 ML: 5 INJECTION, SOLUTION EPIDURAL; INTRACAUDAL; PERINEURAL at 10:54

## 2024-03-13 NOTE — POST SEDATION
Sedation Post Procedure Note    Patient Name: Daja Espinosa   YOB: 1960  Room/Bed: Room/bed info not found  Medical Record Number: [id number]  Date: 3/13/2024   Time: 11:33 AM         Physicians/Assistants: Lloyd Tran MD, MD    Procedure Performed:  Port placement    Post-Sedation Vital Signs:  Vitals:    03/13/24 1130   BP: (!) 193/93   Pulse: 64   Resp: 12   Temp: 96.8 °F (36 °C)   SpO2: 94%      Vital signs were reviewed and were stable after the procedure (see flow sheet for vitals)            Post-Sedation Exam: Responding appropriately, breathing spontaneously           Complications: none    Electronically signed by Lloyd Tran MD on 3/13/2024 at 11:33 AM

## 2024-03-13 NOTE — PROGRESS NOTES
All discharge instructions given with family at side. All questions answered at this time. All belongings returned.

## 2024-03-13 NOTE — PRE SEDATION
Place 1 tablet under the tongue every 8 hours as needed for Nausea or Vomiting 3/11/24   Familia Shepard MD   lidocaine-prilocaine (EMLA) 2.5-2.5 % cream Apply topically to port site 60 minutes before access as needed. 3/11/24   Familia Shepard MD   amLODIPine (NORVASC) 10 MG tablet Take 1 tablet by mouth daily 2/6/24   Sohail Garcia MD   atorvastatin (LIPITOR) 80 MG tablet Take 1 tablet by mouth nightly 2/6/24   Sohail Garcia MD   aspirin 81 MG chewable tablet Take 1 tablet by mouth daily 2/6/24   Sohail Garcia MD   metoprolol tartrate (LOPRESSOR) 50 MG tablet Take 1 tablet by mouth 2 times daily 11/3/23   Sohail Garcia MD   losartan (COZAAR) 100 MG tablet Take 1 tablet by mouth daily 11/3/23   Sohail Garcia MD     Coumadin Use Last 7 Days:  no  Antiplatelet drug therapy use last 7 days: yes - ASA  Other anticoagulant use last 7 days: no  Additional Medication Information:  (see above)      Pre-Sedation Documentation and Exam:   Vital signs have been reviewed (see flow sheet for vitals).    Mallampati Airway Assessment:  Mallampati Class I - (soft palate, fauces, uvula & anterior/posterior tonsillar pillars are visible), lower central incisors missing    Prior History of Anesthesia Complications:   none    ASA Classification:  Class 3 - A patient with severe systemic disease that limits activity but is not incapacitating    Sedation/ Anesthesia Plan:   intravenous sedation    Medications Planned:   midazolam (Versed) intravenously and fentanyl intravenously    Patient is an appropriate candidate for plan of sedation: yes    Electronically signed by Lloyd Tran MD on 3/13/2024 at 10:41 AM

## 2024-03-13 NOTE — DISCHARGE INSTRUCTIONS
DISCHARGE INSTRUCTIONS FOR PORTCATH  What is a port:  A portcath is a small tube inserted into a vein in a patient’s chest.   The small tube extends along the vein to the larger vein just above the heart and is connected to a port that is implanted under the skin in the upper chest.    This port is accessed with a special needle when it is used for IV therapy or chemotherapy.   When not in use the port is nearly invisible.    The port remains in place until therapy is finished which could be for several months.    CARE AFTER PORT:  The port does not require daily care unless an access needle is in place.    The port needs flushed every 4 wks. if not in use.    The doctor who ordered the port will give instructions if needed.  WOUND CARE:  Your wound was closed using sutures inside which will dissolve on their own.   The incision is held together with derma bond and covered with a tegaderm and telfa.  This clear bandage may be changed after 24 hours and daily until healed.    You may shower but keep dressing covered with clear dressing to keep dry.   No baths or swimming until site is healed.    Some patients are allergic to the dressing and notice a rash; if this happens notify your physician.  DIET:   Resume your normal diet.  ACTIVITY:    You may resume your normal activity after 24 hours.  No exercising, lifting heavy objects or strenuous activity for 7 days.    For 24 hours, do not drive, drink alcohol, operate machinery, sign important papers, or make important decisions if sedation medications were received.  PAIN:  You may use Tylenol for pain or apply ice to the site on for 20 min each hour x 24 hours.  WHEN TO CALL PHYSICIAN:  Fever greater than 101.5 and chills.  Swelling or severe pain in arm/leg on side of port.  Bleeding, redness, drainage or swelling at or around the port.  Call 911 for any shortness of breath, severe bleeding or chest pain.

## 2024-03-14 ENCOUNTER — TELEPHONE (OUTPATIENT)
Dept: ONCOLOGY | Age: 64
End: 2024-03-14

## 2024-03-14 NOTE — TELEPHONE ENCOUNTER
Name: Daja Espinosa  : 1960  MRN: [id number]    Oncology Navigation Follow-Up Note    Contact Type:  Telephone    Notes: Call made to Daja for ONN follow up. Daja had port placed on 3/13/24.  Daja states she is doing well with no complaints. Notes she is going to take the bandages off this afternoon.  Daja states consult with Dr. Arellano is Monday 3/18/24 at 1200.  Daja denies any questions or needs from navigation at this time.  Will continue to follow.     Electronically signed by Ave Arreaga RN on 3/14/2024 at 11:18 AM

## 2024-03-19 ENCOUNTER — HOSPITAL ENCOUNTER (OUTPATIENT)
Dept: INFUSION THERAPY | Age: 64
Discharge: HOME | End: 2024-03-19
Payer: COMMERCIAL

## 2024-03-19 VITALS
WEIGHT: 119 LBS | RESPIRATION RATE: 18 BRPM | DIASTOLIC BLOOD PRESSURE: 83 MMHG | SYSTOLIC BLOOD PRESSURE: 155 MMHG | HEIGHT: 62 IN | HEART RATE: 58 BPM | TEMPERATURE: 97.4 F | BODY MASS INDEX: 21.9 KG/M2

## 2024-03-19 DIAGNOSIS — N13.30 HYDRONEPHROSIS, UNSPECIFIED HYDRONEPHROSIS TYPE: ICD-10-CM

## 2024-03-19 DIAGNOSIS — C67.2 MALIGNANT NEOPLASM OF LATERAL WALL OF URINARY BLADDER (HCC): Primary | ICD-10-CM

## 2024-03-19 LAB
ALBUMIN SERPL-MCNC: 3.7 G/DL (ref 3.5–5.2)
ALBUMIN/GLOB SERPL: 1.1 {RATIO} (ref 1–2.5)
ALP SERPL-CCNC: 80 U/L (ref 35–104)
ALT SERPL-CCNC: 10 U/L (ref 5–33)
ANION GAP SERPL CALCULATED.3IONS-SCNC: 10 MMOL/L (ref 9–17)
AST SERPL-CCNC: 15 U/L
BASOPHILS # BLD: 0.06 K/UL (ref 0–0.2)
BASOPHILS NFR BLD: 1 % (ref 0–2)
BILIRUB SERPL-MCNC: 0.4 MG/DL (ref 0.3–1.2)
BUN SERPL-MCNC: 24 MG/DL (ref 8–23)
BUN/CREAT SERPL: 20 (ref 9–20)
CALCIUM SERPL-MCNC: 9.4 MG/DL (ref 8.6–10.4)
CHLORIDE SERPL-SCNC: 101 MMOL/L (ref 98–107)
CO2 SERPL-SCNC: 23 MMOL/L (ref 20–31)
CREAT SERPL-MCNC: 1.2 MG/DL (ref 0.5–0.9)
EOSINOPHIL # BLD: 0.85 K/UL (ref 0–0.44)
EOSINOPHILS RELATIVE PERCENT: 8 % (ref 1–4)
ERYTHROCYTE [DISTWIDTH] IN BLOOD BY AUTOMATED COUNT: 12.1 % (ref 11.8–14.4)
GFR SERPL CREATININE-BSD FRML MDRD: 51 ML/MIN/1.73M2
GLUCOSE SERPL-MCNC: 133 MG/DL (ref 70–99)
HCT VFR BLD AUTO: 33.6 % (ref 36.3–47.1)
HGB BLD-MCNC: 11.4 G/DL (ref 11.9–15.1)
IMM GRANULOCYTES # BLD AUTO: <0.03 K/UL (ref 0–0.3)
IMM GRANULOCYTES NFR BLD: 0 %
LYMPHOCYTES NFR BLD: 2.02 K/UL (ref 1.1–3.7)
LYMPHOCYTES RELATIVE PERCENT: 18 % (ref 24–43)
MAGNESIUM SERPL-MCNC: 1.7 MG/DL (ref 1.6–2.6)
MCH RBC QN AUTO: 32.8 PG (ref 25.2–33.5)
MCHC RBC AUTO-ENTMCNC: 33.9 G/DL (ref 28.4–34.8)
MCV RBC AUTO: 96.6 FL (ref 82.6–102.9)
MONOCYTES NFR BLD: 0.77 K/UL (ref 0.1–1.2)
MONOCYTES NFR BLD: 7 % (ref 3–12)
NEUTROPHILS NFR BLD: 67 % (ref 36–65)
NEUTS SEG NFR BLD: 7.67 K/UL (ref 1.5–8.1)
NRBC BLD-RTO: 0 PER 100 WBC
PLATELET # BLD AUTO: 540 K/UL (ref 138–453)
PMV BLD AUTO: 8.6 FL (ref 8.1–13.5)
POTASSIUM SERPL-SCNC: 4.4 MMOL/L (ref 3.7–5.3)
PROT SERPL-MCNC: 7.1 G/DL (ref 6.4–8.3)
RBC # BLD AUTO: 3.48 M/UL (ref 3.95–5.11)
SODIUM SERPL-SCNC: 134 MMOL/L (ref 135–144)
WBC OTHER # BLD: 11.4 K/UL (ref 3.5–11.3)

## 2024-03-19 PROCEDURE — 2580000003 HC RX 258: Performed by: INTERNAL MEDICINE

## 2024-03-19 PROCEDURE — 85025 COMPLETE CBC W/AUTO DIFF WBC: CPT

## 2024-03-19 PROCEDURE — 6360000002 HC RX W HCPCS: Performed by: INTERNAL MEDICINE

## 2024-03-19 PROCEDURE — 96361 HYDRATE IV INFUSION ADD-ON: CPT

## 2024-03-19 PROCEDURE — 96415 CHEMO IV INFUSION ADDL HR: CPT

## 2024-03-19 PROCEDURE — 36591 DRAW BLOOD OFF VENOUS DEVICE: CPT

## 2024-03-19 PROCEDURE — 96375 TX/PRO/DX INJ NEW DRUG ADDON: CPT

## 2024-03-19 PROCEDURE — 96367 TX/PROPH/DG ADDL SEQ IV INF: CPT

## 2024-03-19 PROCEDURE — 83735 ASSAY OF MAGNESIUM: CPT

## 2024-03-19 PROCEDURE — 80053 COMPREHEN METABOLIC PANEL: CPT

## 2024-03-19 PROCEDURE — 96417 CHEMO IV INFUS EACH ADDL SEQ: CPT

## 2024-03-19 PROCEDURE — 96413 CHEMO IV INFUSION 1 HR: CPT

## 2024-03-19 RX ORDER — DEXAMETHASONE SODIUM PHOSPHATE 10 MG/ML
10 INJECTION, SOLUTION INTRAMUSCULAR; INTRAVENOUS ONCE
Status: COMPLETED | OUTPATIENT
Start: 2024-03-19 | End: 2024-03-19

## 2024-03-19 RX ORDER — SODIUM CHLORIDE 0.9 % (FLUSH) 0.9 %
5-40 SYRINGE (ML) INJECTION PRN
OUTPATIENT
Start: 2024-03-19

## 2024-03-19 RX ORDER — HEPARIN 100 UNIT/ML
500 SYRINGE INTRAVENOUS PRN
Status: DISCONTINUED | OUTPATIENT
Start: 2024-03-19 | End: 2024-03-20 | Stop reason: HOSPADM

## 2024-03-19 RX ORDER — SODIUM CHLORIDE 0.9 % (FLUSH) 0.9 %
5-40 SYRINGE (ML) INJECTION PRN
Status: DISCONTINUED | OUTPATIENT
Start: 2024-03-19 | End: 2024-03-20 | Stop reason: HOSPADM

## 2024-03-19 RX ORDER — PALONOSETRON 0.05 MG/ML
0.25 INJECTION, SOLUTION INTRAVENOUS ONCE
Status: COMPLETED | OUTPATIENT
Start: 2024-03-19 | End: 2024-03-19

## 2024-03-19 RX ORDER — HEPARIN 100 UNIT/ML
500 SYRINGE INTRAVENOUS PRN
OUTPATIENT
Start: 2024-03-19

## 2024-03-19 RX ORDER — SODIUM CHLORIDE 9 MG/ML
5-250 INJECTION, SOLUTION INTRAVENOUS PRN
Status: DISCONTINUED | OUTPATIENT
Start: 2024-03-19 | End: 2024-03-20 | Stop reason: HOSPADM

## 2024-03-19 RX ADMIN — SODIUM CHLORIDE 200 ML/HR: 9 INJECTION, SOLUTION INTRAVENOUS at 09:09

## 2024-03-19 RX ADMIN — SODIUM CHLORIDE 150 MG: 9 INJECTION, SOLUTION INTRAVENOUS at 09:17

## 2024-03-19 RX ADMIN — HEPARIN 500 UNITS: 100 SYRINGE at 14:13

## 2024-03-19 RX ADMIN — SODIUM CHLORIDE, PRESERVATIVE FREE 10 ML: 5 INJECTION INTRAVENOUS at 14:12

## 2024-03-19 RX ADMIN — GEMCITABINE HYDROCHLORIDE 1510 MG: 1 INJECTION, SOLUTION INTRAVENOUS at 10:51

## 2024-03-19 RX ADMIN — PALONOSETRON 0.25 MG: 0.05 INJECTION, SOLUTION INTRAVENOUS at 09:10

## 2024-03-19 RX ADMIN — SODIUM CHLORIDE, PRESERVATIVE FREE 10 ML: 5 INJECTION INTRAVENOUS at 14:13

## 2024-03-19 RX ADMIN — POTASSIUM CHLORIDE: 2 INJECTION, SOLUTION, CONCENTRATE INTRAVENOUS at 13:07

## 2024-03-19 RX ADMIN — POTASSIUM CHLORIDE: 2 INJECTION, SOLUTION, CONCENTRATE INTRAVENOUS at 09:42

## 2024-03-19 RX ADMIN — CISPLATIN 53 MG: 1 INJECTION, SOLUTION INTRAVENOUS at 11:27

## 2024-03-19 RX ADMIN — DEXAMETHASONE SODIUM PHOSPHATE 10 MG: 10 INJECTION, SOLUTION INTRAMUSCULAR; INTRAVENOUS at 09:13

## 2024-03-19 NOTE — PROGRESS NOTES
Pt arrived for first chemotherapy. Oriented pt to room and surroundings. Explained the plan of care for the day. Port then accessed and blood drawn. Upon labs being resulted creatinine still slightly elevated at 1.2 which calculates a cr clearance of 38. Plan states if cr clearance less than 50 hold treatment and notify doctor. Krystina Hernandez RN notified Dr. Shepard of lab results and orders received to hydration pt on day 8 as well as week off. Also draw labs on week off. He also ordered a 50% dose reduction and he stated he would change the plan. No other orders at this time. Instructed pt and doctors orders. Also educated patient on maintain hydration with non-caffeine beverages. She and her family all verbalized understanding.

## 2024-03-19 NOTE — PROGRESS NOTES
Initial visit with pt and two family members.  Support offered.  An assurance of prayers given.  This is her first treatment and is adjusting to it.  She has family support.  Continue to visit and to support.  See Flowsheet.    Sister Radha Randolph, OSF/T  [facility]

## 2024-03-25 ENCOUNTER — HOSPITAL ENCOUNTER (OUTPATIENT)
Dept: INFUSION THERAPY | Age: 64
Discharge: HOME | End: 2024-03-25
Payer: COMMERCIAL

## 2024-03-25 VITALS
SYSTOLIC BLOOD PRESSURE: 131 MMHG | WEIGHT: 113 LBS | BODY MASS INDEX: 20.8 KG/M2 | RESPIRATION RATE: 18 BRPM | HEIGHT: 62 IN | TEMPERATURE: 97.7 F | HEART RATE: 60 BPM | DIASTOLIC BLOOD PRESSURE: 85 MMHG

## 2024-03-25 DIAGNOSIS — C67.2 MALIGNANT NEOPLASM OF LATERAL WALL OF URINARY BLADDER (HCC): Primary | ICD-10-CM

## 2024-03-25 DIAGNOSIS — N13.30 HYDRONEPHROSIS, UNSPECIFIED HYDRONEPHROSIS TYPE: ICD-10-CM

## 2024-03-25 LAB
ALBUMIN SERPL-MCNC: 3.7 G/DL (ref 3.5–5.2)
ALBUMIN/GLOB SERPL: 1.1 {RATIO} (ref 1–2.5)
ALP SERPL-CCNC: 102 U/L (ref 35–104)
ALT SERPL-CCNC: 21 U/L (ref 5–33)
ANION GAP SERPL CALCULATED.3IONS-SCNC: 11 MMOL/L (ref 9–17)
AST SERPL-CCNC: 17 U/L
BASOPHILS # BLD: 0.04 K/UL (ref 0–0.2)
BASOPHILS NFR BLD: 1 % (ref 0–2)
BILIRUB SERPL-MCNC: 0.8 MG/DL (ref 0.3–1.2)
BUN SERPL-MCNC: 25 MG/DL (ref 8–23)
BUN/CREAT SERPL: 17 (ref 9–20)
CALCIUM SERPL-MCNC: 9.5 MG/DL (ref 8.6–10.4)
CHLORIDE SERPL-SCNC: 96 MMOL/L (ref 98–107)
CO2 SERPL-SCNC: 24 MMOL/L (ref 20–31)
CREAT SERPL-MCNC: 1.5 MG/DL (ref 0.5–0.9)
EOSINOPHIL # BLD: 0.25 K/UL (ref 0–0.44)
EOSINOPHILS RELATIVE PERCENT: 5 % (ref 1–4)
ERYTHROCYTE [DISTWIDTH] IN BLOOD BY AUTOMATED COUNT: 11.9 % (ref 11.8–14.4)
GFR SERPL CREATININE-BSD FRML MDRD: 39 ML/MIN/1.73M2
GLUCOSE SERPL-MCNC: 117 MG/DL (ref 70–99)
HCT VFR BLD AUTO: 32.9 % (ref 36.3–47.1)
HGB BLD-MCNC: 11.4 G/DL (ref 11.9–15.1)
IMM GRANULOCYTES # BLD AUTO: <0.03 K/UL (ref 0–0.3)
IMM GRANULOCYTES NFR BLD: 0 %
LYMPHOCYTES NFR BLD: 1.75 K/UL (ref 1.1–3.7)
LYMPHOCYTES RELATIVE PERCENT: 33 % (ref 24–43)
MAGNESIUM SERPL-MCNC: 1.7 MG/DL (ref 1.6–2.6)
MCH RBC QN AUTO: 32.6 PG (ref 25.2–33.5)
MCHC RBC AUTO-ENTMCNC: 34.7 G/DL (ref 28.4–34.8)
MCV RBC AUTO: 94 FL (ref 82.6–102.9)
MONOCYTES NFR BLD: 0.16 K/UL (ref 0.1–1.2)
MONOCYTES NFR BLD: 3 % (ref 3–12)
NEUTROPHILS NFR BLD: 59 % (ref 36–65)
NEUTS SEG NFR BLD: 3.16 K/UL (ref 1.5–8.1)
NRBC BLD-RTO: 0 PER 100 WBC
PLATELET # BLD AUTO: 352 K/UL (ref 138–453)
PMV BLD AUTO: 8.8 FL (ref 8.1–13.5)
POTASSIUM SERPL-SCNC: 4.2 MMOL/L (ref 3.7–5.3)
PROT SERPL-MCNC: 7.2 G/DL (ref 6.4–8.3)
RBC # BLD AUTO: 3.5 M/UL (ref 3.95–5.11)
SODIUM SERPL-SCNC: 131 MMOL/L (ref 135–144)
WBC OTHER # BLD: 5.4 K/UL (ref 3.5–11.3)

## 2024-03-25 PROCEDURE — 2580000003 HC RX 258: Performed by: INTERNAL MEDICINE

## 2024-03-25 PROCEDURE — 83735 ASSAY OF MAGNESIUM: CPT

## 2024-03-25 PROCEDURE — 80053 COMPREHEN METABOLIC PANEL: CPT

## 2024-03-25 PROCEDURE — 85025 COMPLETE CBC W/AUTO DIFF WBC: CPT

## 2024-03-25 PROCEDURE — 96413 CHEMO IV INFUSION 1 HR: CPT

## 2024-03-25 PROCEDURE — 96375 TX/PRO/DX INJ NEW DRUG ADDON: CPT

## 2024-03-25 PROCEDURE — 96361 HYDRATE IV INFUSION ADD-ON: CPT

## 2024-03-25 PROCEDURE — 36591 DRAW BLOOD OFF VENOUS DEVICE: CPT

## 2024-03-25 PROCEDURE — 6360000002 HC RX W HCPCS: Performed by: INTERNAL MEDICINE

## 2024-03-25 RX ORDER — HEPARIN 100 UNIT/ML
500 SYRINGE INTRAVENOUS PRN
Status: DISCONTINUED | OUTPATIENT
Start: 2024-03-25 | End: 2024-03-26 | Stop reason: HOSPADM

## 2024-03-25 RX ORDER — SODIUM CHLORIDE 0.9 % (FLUSH) 0.9 %
5-40 SYRINGE (ML) INJECTION PRN
Status: DISCONTINUED | OUTPATIENT
Start: 2024-03-25 | End: 2024-03-26 | Stop reason: HOSPADM

## 2024-03-25 RX ORDER — ONDANSETRON 2 MG/ML
8 INJECTION INTRAMUSCULAR; INTRAVENOUS ONCE
Status: COMPLETED | OUTPATIENT
Start: 2024-03-25 | End: 2024-03-25

## 2024-03-25 RX ORDER — SODIUM CHLORIDE 9 MG/ML
5-250 INJECTION, SOLUTION INTRAVENOUS PRN
Status: DISCONTINUED | OUTPATIENT
Start: 2024-03-25 | End: 2024-03-26 | Stop reason: HOSPADM

## 2024-03-25 RX ADMIN — HEPARIN 500 UNITS: 100 SYRINGE at 13:14

## 2024-03-25 RX ADMIN — ONDANSETRON 8 MG: 2 INJECTION INTRAMUSCULAR; INTRAVENOUS at 11:21

## 2024-03-25 RX ADMIN — SODIUM CHLORIDE, PRESERVATIVE FREE 10 ML: 5 INJECTION INTRAVENOUS at 13:13

## 2024-03-25 RX ADMIN — SODIUM CHLORIDE 700 ML/HR: 9 INJECTION, SOLUTION INTRAVENOUS at 11:19

## 2024-03-25 RX ADMIN — SODIUM CHLORIDE, PRESERVATIVE FREE 10 ML: 5 INJECTION INTRAVENOUS at 10:18

## 2024-03-25 RX ADMIN — SODIUM CHLORIDE, PRESERVATIVE FREE 10 ML: 5 INJECTION INTRAVENOUS at 10:17

## 2024-03-25 RX ADMIN — SODIUM CHLORIDE, PRESERVATIVE FREE 10 ML: 5 INJECTION INTRAVENOUS at 13:14

## 2024-03-25 RX ADMIN — GEMCITABINE HYDROCHLORIDE 1510 MG: 1 INJECTION, SOLUTION INTRAVENOUS at 12:23

## 2024-03-29 DIAGNOSIS — I65.23 BILATERAL CAROTID ARTERY STENOSIS: Primary | ICD-10-CM

## 2024-04-01 ENCOUNTER — HOSPITAL ENCOUNTER (OUTPATIENT)
Dept: INFUSION THERAPY | Age: 64
Discharge: HOME | End: 2024-04-01
Payer: COMMERCIAL

## 2024-04-01 ENCOUNTER — TELEPHONE (OUTPATIENT)
Dept: ONCOLOGY | Age: 64
End: 2024-04-01

## 2024-04-01 ENCOUNTER — OFFICE VISIT (OUTPATIENT)
Dept: ONCOLOGY | Age: 64
End: 2024-04-01
Payer: COMMERCIAL

## 2024-04-01 VITALS
DIASTOLIC BLOOD PRESSURE: 77 MMHG | TEMPERATURE: 97 F | HEART RATE: 59 BPM | SYSTOLIC BLOOD PRESSURE: 138 MMHG | BODY MASS INDEX: 21.71 KG/M2 | RESPIRATION RATE: 18 BRPM | WEIGHT: 118 LBS | HEIGHT: 62 IN

## 2024-04-01 DIAGNOSIS — C68.9 TRANSITIONAL CELL CARCINOMA (HCC): Primary | ICD-10-CM

## 2024-04-01 DIAGNOSIS — C67.2 MALIGNANT NEOPLASM OF LATERAL WALL OF URINARY BLADDER (HCC): ICD-10-CM

## 2024-04-01 DIAGNOSIS — M54.50 CHRONIC MIDLINE LOW BACK PAIN WITHOUT SCIATICA: ICD-10-CM

## 2024-04-01 DIAGNOSIS — G89.29 CHRONIC MIDLINE LOW BACK PAIN WITHOUT SCIATICA: ICD-10-CM

## 2024-04-01 DIAGNOSIS — N13.30 HYDRONEPHROSIS, UNSPECIFIED HYDRONEPHROSIS TYPE: ICD-10-CM

## 2024-04-01 DIAGNOSIS — C67.2 MALIGNANT NEOPLASM OF LATERAL WALL OF URINARY BLADDER (HCC): Primary | ICD-10-CM

## 2024-04-01 LAB
ALBUMIN SERPL-MCNC: 3.5 G/DL (ref 3.5–5.2)
ALBUMIN/GLOB SERPL: 1.1 {RATIO} (ref 1–2.5)
ALP SERPL-CCNC: 94 U/L (ref 35–104)
ALT SERPL-CCNC: 14 U/L (ref 5–33)
ANION GAP SERPL CALCULATED.3IONS-SCNC: 10 MMOL/L (ref 9–17)
AST SERPL-CCNC: 16 U/L
BASOPHILS # BLD: <0.03 K/UL (ref 0–0.2)
BASOPHILS NFR BLD: 0 % (ref 0–2)
BILIRUB SERPL-MCNC: 0.3 MG/DL (ref 0.3–1.2)
BUN SERPL-MCNC: 35 MG/DL (ref 8–23)
BUN/CREAT SERPL: 22 (ref 9–20)
CALCIUM SERPL-MCNC: 8.8 MG/DL (ref 8.6–10.4)
CHLORIDE SERPL-SCNC: 98 MMOL/L (ref 98–107)
CO2 SERPL-SCNC: 23 MMOL/L (ref 20–31)
CREAT SERPL-MCNC: 1.6 MG/DL (ref 0.5–0.9)
EOSINOPHIL # BLD: 0.07 K/UL (ref 0–0.44)
EOSINOPHILS RELATIVE PERCENT: 2 % (ref 1–4)
ERYTHROCYTE [DISTWIDTH] IN BLOOD BY AUTOMATED COUNT: 11.7 % (ref 11.8–14.4)
GFR SERPL CREATININE-BSD FRML MDRD: 36 ML/MIN/1.73M2
GLUCOSE SERPL-MCNC: 84 MG/DL (ref 70–99)
HCT VFR BLD AUTO: 26.6 % (ref 36.3–47.1)
HGB BLD-MCNC: 9.5 G/DL (ref 11.9–15.1)
IMM GRANULOCYTES # BLD AUTO: <0.03 K/UL (ref 0–0.3)
IMM GRANULOCYTES NFR BLD: 0 %
LYMPHOCYTES NFR BLD: 1.97 K/UL (ref 1.1–3.7)
LYMPHOCYTES RELATIVE PERCENT: 51 % (ref 24–43)
MCH RBC QN AUTO: 33.3 PG (ref 25.2–33.5)
MCHC RBC AUTO-ENTMCNC: 35.7 G/DL (ref 28.4–34.8)
MCV RBC AUTO: 93.3 FL (ref 82.6–102.9)
MONOCYTES NFR BLD: 0.33 K/UL (ref 0.1–1.2)
MONOCYTES NFR BLD: 9 % (ref 3–12)
NEUTROPHILS NFR BLD: 38 % (ref 36–65)
NEUTS SEG NFR BLD: 1.47 K/UL (ref 1.5–8.1)
NRBC BLD-RTO: 0 PER 100 WBC
PLATELET # BLD AUTO: 134 K/UL (ref 138–453)
PMV BLD AUTO: 8.6 FL (ref 8.1–13.5)
POTASSIUM SERPL-SCNC: 4.1 MMOL/L (ref 3.7–5.3)
PROT SERPL-MCNC: 6.8 G/DL (ref 6.4–8.3)
RBC # BLD AUTO: 2.85 M/UL (ref 3.95–5.11)
SODIUM SERPL-SCNC: 131 MMOL/L (ref 135–144)
WBC OTHER # BLD: 3.9 K/UL (ref 3.5–11.3)

## 2024-04-01 PROCEDURE — 80053 COMPREHEN METABOLIC PANEL: CPT

## 2024-04-01 PROCEDURE — 3075F SYST BP GE 130 - 139MM HG: CPT | Performed by: INTERNAL MEDICINE

## 2024-04-01 PROCEDURE — 2580000003 HC RX 258: Performed by: INTERNAL MEDICINE

## 2024-04-01 PROCEDURE — 6360000002 HC RX W HCPCS: Performed by: INTERNAL MEDICINE

## 2024-04-01 PROCEDURE — 3017F COLORECTAL CA SCREEN DOC REV: CPT | Performed by: INTERNAL MEDICINE

## 2024-04-01 PROCEDURE — 99214 OFFICE O/P EST MOD 30 MIN: CPT | Performed by: INTERNAL MEDICINE

## 2024-04-01 PROCEDURE — G8428 CUR MEDS NOT DOCUMENT: HCPCS | Performed by: INTERNAL MEDICINE

## 2024-04-01 PROCEDURE — G8420 CALC BMI NORM PARAMETERS: HCPCS | Performed by: INTERNAL MEDICINE

## 2024-04-01 PROCEDURE — 1036F TOBACCO NON-USER: CPT | Performed by: INTERNAL MEDICINE

## 2024-04-01 PROCEDURE — 85025 COMPLETE CBC W/AUTO DIFF WBC: CPT

## 2024-04-01 PROCEDURE — 36591 DRAW BLOOD OFF VENOUS DEVICE: CPT

## 2024-04-01 PROCEDURE — 96360 HYDRATION IV INFUSION INIT: CPT

## 2024-04-01 PROCEDURE — 3078F DIAST BP <80 MM HG: CPT | Performed by: INTERNAL MEDICINE

## 2024-04-01 PROCEDURE — 96361 HYDRATE IV INFUSION ADD-ON: CPT

## 2024-04-01 RX ORDER — SODIUM CHLORIDE 0.9 % (FLUSH) 0.9 %
5-40 SYRINGE (ML) INJECTION PRN
Status: DISCONTINUED | OUTPATIENT
Start: 2024-04-01 | End: 2024-04-02 | Stop reason: HOSPADM

## 2024-04-01 RX ORDER — HEPARIN 100 UNIT/ML
500 SYRINGE INTRAVENOUS PRN
Status: DISCONTINUED | OUTPATIENT
Start: 2024-04-01 | End: 2024-04-02 | Stop reason: HOSPADM

## 2024-04-01 RX ORDER — SODIUM CHLORIDE 0.9 % (FLUSH) 0.9 %
5-40 SYRINGE (ML) INJECTION PRN
Status: CANCELLED | OUTPATIENT
Start: 2024-04-01

## 2024-04-01 RX ORDER — 0.9 % SODIUM CHLORIDE 0.9 %
1000 INTRAVENOUS SOLUTION INTRAVENOUS ONCE
Status: COMPLETED | OUTPATIENT
Start: 2024-04-01 | End: 2024-04-01

## 2024-04-01 RX ORDER — HEPARIN 100 UNIT/ML
500 SYRINGE INTRAVENOUS PRN
Status: CANCELLED | OUTPATIENT
Start: 2024-04-01

## 2024-04-01 RX ADMIN — SODIUM CHLORIDE, PRESERVATIVE FREE 10 ML: 5 INJECTION INTRAVENOUS at 11:48

## 2024-04-01 RX ADMIN — SODIUM CHLORIDE 1000 ML: 9 INJECTION, SOLUTION INTRAVENOUS at 10:14

## 2024-04-01 RX ADMIN — HEPARIN 500 UNITS: 100 SYRINGE at 11:48

## 2024-04-01 RX ADMIN — SODIUM CHLORIDE, PRESERVATIVE FREE 10 ML: 5 INJECTION INTRAVENOUS at 10:10

## 2024-04-01 NOTE — TELEPHONE ENCOUNTER
Name: Daja Espinosa  : 1960  MRN: [id number]    Oncology Navigation Follow-Up Note    Contact Type:  Medical Oncology    Notes: Writer met with Daja in treatment area.  Daja states she tolerated her first treatment well with minimal side effects.  Pt notes itchy dry back, denies rash.  Discussed emollient creams without fragrance and importance of hydration.  Pt encouraged to discuss with Dr. Shepard at appointment today.  Pt is scheduled for cystoscopy and stent change on 24 with Dr. Arellano.  Daja denies needs from navigation at this time.  Encouraged Daja to call with any needs.  Will continue to follow.    F/u  24 C2 D1  PET 24    Discussed with Dr. Shepard.  Okay to proceed with C2 with PET on 24.    Electronically signed by Ave Arreaga RN on 2024 at 11:46 AM

## 2024-04-01 NOTE — PROGRESS NOTES
Patient ID: Daja Espinosa, 1960, [id number], 63 y.o.  Referred by :  No ref. provider found   Reason for consultation: Muscle invasive bladder cancer      HISTORY OF PRESENT ILLNESS:    Oncologic History:    Daja Espinosa is a very pleasant 63 y.o. female.  With history of nonmuscle invasive bladder cancer status post multiple resection she lost to follow-up for almost 5 years, Patient did have a recent CT scan. This does show severe left hydronephrosis.  This is down to the level of the bladder.  The bladder does appear to be thickened on the left side.  Patient has had slight worsening of her creatinine.  Patient has been having some left-sided flank pain.  Patient has no unintentional weight loss or decreased appetite.  She reports no nausea or vomiting.     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   Pain significantly improved she still have intermittent hematuria and she was referred for neoadjuvant chemotherapy  CT chest abdomen pelvis no evidence of metastasis  Did have scattered atherosclerosis and stenosis of the celiac artery  Have severe peripheral artery disease with prior history of carotid bypass    Interval history  Patient presents to the clinic for a follow-up visit and to discuss results of his lab work-up and other relevant clinical data.  Overall patient has been tolerating treatment without unexpected or severe side effects.    During this visit patient's allergy, social, medical, surgical history and medications were reviewed and updated.       Status post 1 cycle of chemotherapy  Creatinine rising  Plan to have cystoscopy possible stent by  tomorrow  Today to get hydration    Past Medical History:   Diagnosis Date    Alcohol abuse 03/02/2017    Arthritis     Bladder cancer (HCC) 2016    CAD (coronary artery disease)     Cancer (HCC) 2001    vulvar-    Carotid artery

## 2024-04-02 ENCOUNTER — ANESTHESIA EVENT (OUTPATIENT)
Dept: OPERATING ROOM | Age: 64
End: 2024-04-02
Payer: COMMERCIAL

## 2024-04-02 ENCOUNTER — APPOINTMENT (OUTPATIENT)
Dept: GENERAL RADIOLOGY | Age: 64
End: 2024-04-02
Attending: UROLOGY
Payer: COMMERCIAL

## 2024-04-02 ENCOUNTER — HOSPITAL ENCOUNTER (OUTPATIENT)
Age: 64
Setting detail: OUTPATIENT SURGERY
Discharge: HOME | End: 2024-04-02
Attending: UROLOGY | Admitting: UROLOGY
Payer: COMMERCIAL

## 2024-04-02 ENCOUNTER — ANESTHESIA (OUTPATIENT)
Dept: OPERATING ROOM | Age: 64
End: 2024-04-02
Payer: COMMERCIAL

## 2024-04-02 VITALS
OXYGEN SATURATION: 92 % | DIASTOLIC BLOOD PRESSURE: 77 MMHG | BODY MASS INDEX: 21.66 KG/M2 | RESPIRATION RATE: 21 BRPM | SYSTOLIC BLOOD PRESSURE: 110 MMHG | HEART RATE: 64 BPM | WEIGHT: 117.73 LBS | HEIGHT: 62 IN | TEMPERATURE: 97 F

## 2024-04-02 DIAGNOSIS — C67.9 MALIGNANT NEOPLASM OF URINARY BLADDER, UNSPECIFIED SITE (HCC): ICD-10-CM

## 2024-04-02 PROCEDURE — 7100000010 HC PHASE II RECOVERY - FIRST 15 MIN: Performed by: UROLOGY

## 2024-04-02 PROCEDURE — 6360000002 HC RX W HCPCS: Performed by: NURSE ANESTHETIST, CERTIFIED REGISTERED

## 2024-04-02 PROCEDURE — 2720000010 HC SURG SUPPLY STERILE: Performed by: UROLOGY

## 2024-04-02 PROCEDURE — C2617 STENT, NON-COR, TEM W/O DEL: HCPCS | Performed by: UROLOGY

## 2024-04-02 PROCEDURE — 6370000000 HC RX 637 (ALT 250 FOR IP): Performed by: NURSE ANESTHETIST, CERTIFIED REGISTERED

## 2024-04-02 PROCEDURE — 6360000002 HC RX W HCPCS: Performed by: STUDENT IN AN ORGANIZED HEALTH CARE EDUCATION/TRAINING PROGRAM

## 2024-04-02 PROCEDURE — 2580000003 HC RX 258: Performed by: ANESTHESIOLOGY

## 2024-04-02 PROCEDURE — 88307 TISSUE EXAM BY PATHOLOGIST: CPT

## 2024-04-02 PROCEDURE — 7100000000 HC PACU RECOVERY - FIRST 15 MIN: Performed by: UROLOGY

## 2024-04-02 PROCEDURE — 3600000014 HC SURGERY LEVEL 4 ADDTL 15MIN: Performed by: UROLOGY

## 2024-04-02 PROCEDURE — C1769 GUIDE WIRE: HCPCS | Performed by: UROLOGY

## 2024-04-02 PROCEDURE — 7100000011 HC PHASE II RECOVERY - ADDTL 15 MIN: Performed by: UROLOGY

## 2024-04-02 PROCEDURE — 3600000004 HC SURGERY LEVEL 4 BASE: Performed by: UROLOGY

## 2024-04-02 PROCEDURE — 2709999900 HC NON-CHARGEABLE SUPPLY: Performed by: UROLOGY

## 2024-04-02 PROCEDURE — 7100000001 HC PACU RECOVERY - ADDTL 15 MIN: Performed by: UROLOGY

## 2024-04-02 PROCEDURE — 2500000003 HC RX 250 WO HCPCS: Performed by: NURSE ANESTHETIST, CERTIFIED REGISTERED

## 2024-04-02 PROCEDURE — 3700000001 HC ADD 15 MINUTES (ANESTHESIA): Performed by: UROLOGY

## 2024-04-02 PROCEDURE — 2580000003 HC RX 258: Performed by: UROLOGY

## 2024-04-02 PROCEDURE — 3700000000 HC ANESTHESIA ATTENDED CARE: Performed by: UROLOGY

## 2024-04-02 DEVICE — URETERAL STENT
Type: IMPLANTABLE DEVICE | Site: URETER | Status: FUNCTIONAL
Brand: POLARIS™ ULTRA

## 2024-04-02 RX ORDER — METOCLOPRAMIDE HYDROCHLORIDE 5 MG/ML
10 INJECTION INTRAMUSCULAR; INTRAVENOUS
Status: DISCONTINUED | OUTPATIENT
Start: 2024-04-02 | End: 2024-04-02 | Stop reason: HOSPADM

## 2024-04-02 RX ORDER — NALOXONE HYDROCHLORIDE 0.4 MG/ML
INJECTION, SOLUTION INTRAMUSCULAR; INTRAVENOUS; SUBCUTANEOUS PRN
Status: DISCONTINUED | OUTPATIENT
Start: 2024-04-02 | End: 2024-04-02 | Stop reason: HOSPADM

## 2024-04-02 RX ORDER — DEXAMETHASONE SODIUM PHOSPHATE 10 MG/ML
INJECTION INTRAMUSCULAR; INTRAVENOUS PRN
Status: DISCONTINUED | OUTPATIENT
Start: 2024-04-02 | End: 2024-04-02 | Stop reason: SDUPTHER

## 2024-04-02 RX ORDER — ROCURONIUM BROMIDE 10 MG/ML
INJECTION, SOLUTION INTRAVENOUS PRN
Status: DISCONTINUED | OUTPATIENT
Start: 2024-04-02 | End: 2024-04-02 | Stop reason: SDUPTHER

## 2024-04-02 RX ORDER — ALBUTEROL SULFATE 90 UG/1
AEROSOL, METERED RESPIRATORY (INHALATION) PRN
Status: DISCONTINUED | OUTPATIENT
Start: 2024-04-02 | End: 2024-04-02 | Stop reason: SDUPTHER

## 2024-04-02 RX ORDER — PROPOFOL 10 MG/ML
INJECTION, EMULSION INTRAVENOUS PRN
Status: DISCONTINUED | OUTPATIENT
Start: 2024-04-02 | End: 2024-04-02 | Stop reason: SDUPTHER

## 2024-04-02 RX ORDER — MAGNESIUM HYDROXIDE 1200 MG/15ML
LIQUID ORAL CONTINUOUS PRN
Status: DISCONTINUED | OUTPATIENT
Start: 2024-04-02 | End: 2024-04-02 | Stop reason: HOSPADM

## 2024-04-02 RX ORDER — DIPHENHYDRAMINE HYDROCHLORIDE 50 MG/ML
12.5 INJECTION INTRAMUSCULAR; INTRAVENOUS
Status: DISCONTINUED | OUTPATIENT
Start: 2024-04-02 | End: 2024-04-02 | Stop reason: HOSPADM

## 2024-04-02 RX ORDER — SODIUM CHLORIDE, SODIUM LACTATE, POTASSIUM CHLORIDE, CALCIUM CHLORIDE 600; 310; 30; 20 MG/100ML; MG/100ML; MG/100ML; MG/100ML
INJECTION, SOLUTION INTRAVENOUS CONTINUOUS
Status: DISCONTINUED | OUTPATIENT
Start: 2024-04-02 | End: 2024-04-02 | Stop reason: HOSPADM

## 2024-04-02 RX ORDER — SODIUM CHLORIDE 9 MG/ML
INJECTION, SOLUTION INTRAVENOUS PRN
Status: DISCONTINUED | OUTPATIENT
Start: 2024-04-02 | End: 2024-04-02 | Stop reason: HOSPADM

## 2024-04-02 RX ORDER — SODIUM CHLORIDE 0.9 % (FLUSH) 0.9 %
5-40 SYRINGE (ML) INJECTION PRN
Status: DISCONTINUED | OUTPATIENT
Start: 2024-04-02 | End: 2024-04-02 | Stop reason: HOSPADM

## 2024-04-02 RX ORDER — FENTANYL CITRATE 50 UG/ML
INJECTION, SOLUTION INTRAMUSCULAR; INTRAVENOUS PRN
Status: DISCONTINUED | OUTPATIENT
Start: 2024-04-02 | End: 2024-04-02 | Stop reason: SDUPTHER

## 2024-04-02 RX ORDER — PHENYLEPHRINE HCL IN 0.9% NACL 1 MG/10 ML
SYRINGE (ML) INTRAVENOUS PRN
Status: DISCONTINUED | OUTPATIENT
Start: 2024-04-02 | End: 2024-04-02 | Stop reason: SDUPTHER

## 2024-04-02 RX ORDER — LIDOCAINE HYDROCHLORIDE 10 MG/ML
INJECTION, SOLUTION EPIDURAL; INFILTRATION; INTRACAUDAL; PERINEURAL PRN
Status: DISCONTINUED | OUTPATIENT
Start: 2024-04-02 | End: 2024-04-02 | Stop reason: SDUPTHER

## 2024-04-02 RX ORDER — CEFADROXIL 500 MG/1
500 CAPSULE ORAL DAILY
Qty: 3 CAPSULE | Refills: 0 | Status: SHIPPED | OUTPATIENT
Start: 2024-04-02 | End: 2024-04-05

## 2024-04-02 RX ORDER — ONDANSETRON 2 MG/ML
INJECTION INTRAMUSCULAR; INTRAVENOUS PRN
Status: DISCONTINUED | OUTPATIENT
Start: 2024-04-02 | End: 2024-04-02 | Stop reason: SDUPTHER

## 2024-04-02 RX ORDER — MIDAZOLAM HYDROCHLORIDE 1 MG/ML
INJECTION INTRAMUSCULAR; INTRAVENOUS PRN
Status: DISCONTINUED | OUTPATIENT
Start: 2024-04-02 | End: 2024-04-02 | Stop reason: SDUPTHER

## 2024-04-02 RX ORDER — SODIUM CHLORIDE 0.9 % (FLUSH) 0.9 %
5-40 SYRINGE (ML) INJECTION EVERY 12 HOURS SCHEDULED
Status: DISCONTINUED | OUTPATIENT
Start: 2024-04-02 | End: 2024-04-02 | Stop reason: HOSPADM

## 2024-04-02 RX ORDER — PHENAZOPYRIDINE HYDROCHLORIDE 100 MG/1
100 TABLET, FILM COATED ORAL 3 TIMES DAILY PRN
Qty: 9 TABLET | Refills: 0 | Status: SHIPPED | OUTPATIENT
Start: 2024-04-02 | End: 2024-04-05

## 2024-04-02 RX ORDER — HYDRALAZINE HYDROCHLORIDE 20 MG/ML
10 INJECTION INTRAMUSCULAR; INTRAVENOUS
Status: DISCONTINUED | OUTPATIENT
Start: 2024-04-02 | End: 2024-04-02 | Stop reason: HOSPADM

## 2024-04-02 RX ORDER — DROPERIDOL 2.5 MG/ML
0.62 INJECTION, SOLUTION INTRAMUSCULAR; INTRAVENOUS
Status: DISCONTINUED | OUTPATIENT
Start: 2024-04-02 | End: 2024-04-02 | Stop reason: HOSPADM

## 2024-04-02 RX ORDER — HYOSCYAMINE SULFATE 0.12 MG/1
1 TABLET SUBLINGUAL EVERY 8 HOURS PRN
Qty: 15 EACH | Refills: 0 | Status: SHIPPED | OUTPATIENT
Start: 2024-04-02 | End: 2024-04-06

## 2024-04-02 RX ADMIN — ALBUTEROL SULFATE 10 PUFF: 90 AEROSOL, METERED RESPIRATORY (INHALATION) at 14:40

## 2024-04-02 RX ADMIN — FENTANYL CITRATE 100 MCG: 50 INJECTION, SOLUTION INTRAMUSCULAR; INTRAVENOUS at 13:49

## 2024-04-02 RX ADMIN — Medication 100 MCG: at 14:15

## 2024-04-02 RX ADMIN — ROCURONIUM BROMIDE 50 MG: 10 INJECTION, SOLUTION INTRAVENOUS at 13:50

## 2024-04-02 RX ADMIN — Medication 100 MCG: at 13:57

## 2024-04-02 RX ADMIN — PROPOFOL 130 MG: 10 INJECTION, EMULSION INTRAVENOUS at 13:49

## 2024-04-02 RX ADMIN — LIDOCAINE HYDROCHLORIDE 50 MG: 10 INJECTION, SOLUTION EPIDURAL; INFILTRATION; INTRACAUDAL; PERINEURAL at 13:49

## 2024-04-02 RX ADMIN — ONDANSETRON 4 MG: 2 INJECTION INTRAMUSCULAR; INTRAVENOUS at 14:30

## 2024-04-02 RX ADMIN — Medication 2000 MG: at 14:11

## 2024-04-02 RX ADMIN — DEXAMETHASONE SODIUM PHOSPHATE 10 MG: 10 INJECTION INTRAMUSCULAR; INTRAVENOUS at 13:53

## 2024-04-02 RX ADMIN — MIDAZOLAM 2 MG: 1 INJECTION INTRAMUSCULAR; INTRAVENOUS at 13:43

## 2024-04-02 RX ADMIN — SODIUM CHLORIDE, POTASSIUM CHLORIDE, SODIUM LACTATE AND CALCIUM CHLORIDE: 600; 310; 30; 20 INJECTION, SOLUTION INTRAVENOUS at 12:50

## 2024-04-02 RX ADMIN — Medication 100 MCG: at 14:21

## 2024-04-02 RX ADMIN — SUGAMMADEX 200 MG: 100 INJECTION, SOLUTION INTRAVENOUS at 14:30

## 2024-04-02 RX ADMIN — Medication 100 MCG: at 14:06

## 2024-04-02 ASSESSMENT — PAIN DESCRIPTION - DESCRIPTORS: DESCRIPTORS: DISCOMFORT;ACHING

## 2024-04-02 ASSESSMENT — PAIN - FUNCTIONAL ASSESSMENT
PAIN_FUNCTIONAL_ASSESSMENT: NONE - DENIES PAIN
PAIN_FUNCTIONAL_ASSESSMENT: ACTIVITIES ARE NOT PREVENTED
PAIN_FUNCTIONAL_ASSESSMENT: 0-10

## 2024-04-02 ASSESSMENT — LIFESTYLE VARIABLES: SMOKING_STATUS: 1

## 2024-04-02 NOTE — ANESTHESIA PRE PROCEDURE
Temporal   SpO2:  98%   Weight: 51.3 kg (113 lb) 53.4 kg (117 lb 11.6 oz)   Height: 1.575 m (5' 2\") 1.575 m (5' 2\")                                              BP Readings from Last 3 Encounters:   04/02/24 118/77   04/01/24 138/77   03/25/24 131/85       NPO Status: Time of last liquid consumption: 2200                        Time of last solid consumption: 2200                        Date of last liquid consumption: 04/01/24                        Date of last solid food consumption: 04/01/24    BMI:   Wt Readings from Last 3 Encounters:   04/02/24 53.4 kg (117 lb 11.6 oz)   04/01/24 53.5 kg (118 lb)   03/25/24 51.3 kg (113 lb)     Body mass index is 21.53 kg/m².    CBC:   Lab Results   Component Value Date/Time    WBC 3.9 04/01/2024 10:10 AM    RBC 2.85 04/01/2024 10:10 AM    RBC 3.36 05/31/2012 03:00 PM    HGB 9.5 04/01/2024 10:10 AM    HCT 26.6 04/01/2024 10:10 AM    MCV 93.3 04/01/2024 10:10 AM    RDW 11.7 04/01/2024 10:10 AM     04/01/2024 10:10 AM     05/31/2012 03:00 PM       CMP:   Lab Results   Component Value Date/Time     04/01/2024 10:10 AM    K 4.1 04/01/2024 10:10 AM    CL 98 04/01/2024 10:10 AM    CO2 23 04/01/2024 10:10 AM    BUN 35 04/01/2024 10:10 AM    CREATININE 1.6 04/01/2024 10:10 AM    GFRAA >60 12/03/2021 08:39 AM    AGRATIO 1.1 04/01/2024 10:10 AM    LABGLOM 36 04/01/2024 10:10 AM    GLUCOSE 84 04/01/2024 10:10 AM    GLUCOSE 86 05/31/2012 03:00 PM    PROT 6.8 04/01/2024 10:10 AM    CALCIUM 8.8 04/01/2024 10:10 AM    BILITOT 0.3 04/01/2024 10:10 AM    ALKPHOS 94 04/01/2024 10:10 AM    AST 16 04/01/2024 10:10 AM    ALT 14 04/01/2024 10:10 AM       POC Tests: No results for input(s): \"POCGLU\", \"POCNA\", \"POCK\", \"POCCL\", \"POCBUN\", \"POCHEMO\", \"POCHCT\" in the last 72 hours.    Coags: No results found for: \"PROTIME\", \"INR\", \"APTT\"    HCG (If Applicable): No results found for: \"PREGTESTUR\", \"PREGSERUM\", \"HCG\", \"HCGQUANT\"     ABGs: No results found for: \"PHART\", \"PO2ART\",

## 2024-04-02 NOTE — DISCHARGE INSTRUCTIONS
TURBT DISCHARGE INSTRUCTIONS   The patient should have CBI weaned off in recovery.  Please call if urine is not clear / pink with CBI.  You may see blood in the urine after the procedure.  This should resolve over the next couple days.  Please stay hydrated.  You may experience frequency/urgency of urination after the procedure.  We expect these symptoms to improve over the next couple weeks.      Tylenol for pain control  Pt ok to discharge home in good condition  No heavy lifting, >10 lbs for today  Pt should avoid strenuous activity for today  Pt should walk moderately at home  Pt ok to shower   Pt may resume diet as tolerated  Pt should take Rx as directed  No driving while on narcotics  Please call attending physician or hospital  with questions  Call or Present to ED if fever (> 101F), intractable nausea vomiting or pain.  HOLD ASPIRIN FOR 5 DAYS     Pt should follow up with Dr. Arellano, as scheduled in 3 days for dennis removal, please call to confirm appointment

## 2024-04-02 NOTE — OP NOTE
Operative Note      Patient: Daja Espinosa  YOB: 1960  MRN: [id number]    Date of Procedure: 4/2/2024    Pre-Op Diagnosis Codes:     * Malignant neoplasm of urinary bladder, unspecified site (HCC) [C67.9]    Post-Op Diagnosis: Same       Procedure(s):  CYSTOSCOPY TUR BLADDER TUMOR, LEFT  STENT EXCHANGE    Surgeon(s):  Rob Arellano MD    Assistant:   Shea Gooden MD PGY4    Anesthesia: General    Estimated Blood Loss (mL): Minimal    Complications: None    Specimens:   Bladder tumor    Implants:  6 x 24 double-J      Drains:   22 Occitan three-way Anguiano catheter    Indications:  63-year-old female history of high-grade TA in 2017 status post multiple resections.    6 Weekly BCG instuillations starting 6/16/16  3 weekly BCG starting 7/20/17  Lost to follow-up in 2019  Came back to follow-up in 2024 and was found to have thickening of the left bladder wall and left hydronephrosis severe.  Underwent a TURBT and was found to have tumor on the left-hand side 80% of the tumor was debulked and were able to find the left ureteral orifice which was stented with a 6x24 stent.  Pathology significant for high-grade muscle invasive cancer with squamous differentiation.    Findings: Post resection changes but also tumor across the left sidewall and on the floor of the bladder.  Successful TURBT.  1 small area on the floor of the bladder with deep resection down to muscle, no perivesical fat visualized, diameter less than 1 cm, otherwise all other areas resection not down to muscle.  Abdomen soft prior to TURBT and abdomen remains soft after TURBT.  Good hemostasis.  Successful stent exchange.    Detailed Description of Procedure:   Patient was brought back to the operating room and general anesthesia was induced.  Patient was given antibiotics and prepped and draped in sterile fashion.  Abdomen was soft to palpation prior to procedure start.  Resectoscope was entered into the bladder and a thorough cystoscopy was

## 2024-04-02 NOTE — H&P
Westley Arellano, Shashank, Rupert, Priscilla, Jennifer, Blu, & Jeremy   Urology H&P      Patient:  Daja Espinosa  MRN: [id number]  YOB: 1960    HISTORY OF PRESENT ILLNESS:   The patient is a 63 y.o. female who presents with a hsitory of muscle invasive bladder cancer with squamous differentiation on gem/cis. She underwent TURBT with tumor on the left side of the bladder and resection of the left ureteral orifice which required left stent placement with only 80% of tumor obtained. She is here for TURBT and left stent exchange.     Patient's old records, notes and chart reviewed and summarized above.    Past Medical History:    Past Medical History:   Diagnosis Date    Alcohol abuse 03/02/2017    Arthritis     Bladder cancer (HCC) 2016    CAD (coronary artery disease)     Cancer (HCC) 2001    vulvar-    Carotid artery occlusion 1993    [facility] where she had a brachiocephalic aorta bypass.    Carotid artery stenosis     Chronic back pain     History of chemotherapy     Hydronephrosis 02/15/2024    Hyperlipidemia     Hypertension     Hypoglycemia     MI (myocardial infarction) (HCC)     Peripheral vascular disease (HCC)     Takayasu's arteritis (HCC)     Tibial fracture     right    Umbilical hernia     Under care of team 03/29/2024    Cardiologist: Terrell Cortez MD, [place], last visit 2/13/2024    Under care of team 03/29/2024    Oncology: Familia Shepard MD, [place], last visit 3/11/2024    Under care of team 03/29/2024    PCP: Sohail Garcia MD, [place], last visit 3/2024    Unspecified cerebral artery occlusion with cerebral infarction 1993    Wears partial dentures     Dentures/top, partial/bottom       Past Surgical History:    Past Surgical History:   Procedure Laterality Date    BLADDER SURGERY  05/17/2016    cysto with transurethral resection of bladder with mitomycin    CARDIAC CATHETERIZATION  12/2010    CARDIAC SURGERY  1993    aortic bypass graft for right carotid artery

## 2024-04-04 ENCOUNTER — HOSPITAL ENCOUNTER (OUTPATIENT)
Dept: INFUSION THERAPY | Age: 64
Discharge: HOME | End: 2024-04-04
Payer: COMMERCIAL

## 2024-04-04 DIAGNOSIS — C67.2 MALIGNANT NEOPLASM OF LATERAL WALL OF URINARY BLADDER (HCC): ICD-10-CM

## 2024-04-04 DIAGNOSIS — N13.30 HYDRONEPHROSIS, UNSPECIFIED HYDRONEPHROSIS TYPE: ICD-10-CM

## 2024-04-04 DIAGNOSIS — C68.9 TRANSITIONAL CELL CARCINOMA (HCC): Primary | ICD-10-CM

## 2024-04-04 LAB
ALBUMIN SERPL-MCNC: 3.4 G/DL (ref 3.5–5.2)
ALBUMIN/GLOB SERPL: 1 {RATIO} (ref 1–2.5)
ALP SERPL-CCNC: 92 U/L (ref 35–104)
ALT SERPL-CCNC: 9 U/L (ref 5–33)
ANION GAP SERPL CALCULATED.3IONS-SCNC: 9 MMOL/L (ref 9–17)
AST SERPL-CCNC: 14 U/L
BILIRUB SERPL-MCNC: 0.4 MG/DL (ref 0.3–1.2)
BUN SERPL-MCNC: 18 MG/DL (ref 8–23)
BUN/CREAT SERPL: 14 (ref 9–20)
CALCIUM SERPL-MCNC: 8.9 MG/DL (ref 8.6–10.4)
CHLORIDE SERPL-SCNC: 103 MMOL/L (ref 98–107)
CO2 SERPL-SCNC: 24 MMOL/L (ref 20–31)
CREAT SERPL-MCNC: 1.3 MG/DL (ref 0.5–0.9)
GFR SERPL CREATININE-BSD FRML MDRD: 46 ML/MIN/1.73M2
GLUCOSE SERPL-MCNC: 112 MG/DL (ref 70–99)
POTASSIUM SERPL-SCNC: 3.6 MMOL/L (ref 3.7–5.3)
PROT SERPL-MCNC: 6.7 G/DL (ref 6.4–8.3)
SODIUM SERPL-SCNC: 136 MMOL/L (ref 135–144)

## 2024-04-04 PROCEDURE — 96361 HYDRATE IV INFUSION ADD-ON: CPT

## 2024-04-04 PROCEDURE — 2580000003 HC RX 258: Performed by: INTERNAL MEDICINE

## 2024-04-04 PROCEDURE — 96360 HYDRATION IV INFUSION INIT: CPT

## 2024-04-04 PROCEDURE — 6360000002 HC RX W HCPCS: Performed by: INTERNAL MEDICINE

## 2024-04-04 PROCEDURE — 36591 DRAW BLOOD OFF VENOUS DEVICE: CPT

## 2024-04-04 PROCEDURE — 80053 COMPREHEN METABOLIC PANEL: CPT

## 2024-04-04 RX ORDER — HEPARIN 100 UNIT/ML
500 SYRINGE INTRAVENOUS PRN
OUTPATIENT
Start: 2024-04-04

## 2024-04-04 RX ORDER — SODIUM CHLORIDE 0.9 % (FLUSH) 0.9 %
5-40 SYRINGE (ML) INJECTION PRN
OUTPATIENT
Start: 2024-04-04

## 2024-04-04 RX ORDER — SODIUM CHLORIDE 9 MG/ML
INJECTION, SOLUTION INTRAVENOUS ONCE
Status: COMPLETED | OUTPATIENT
Start: 2024-04-04 | End: 2024-04-04

## 2024-04-04 RX ORDER — SODIUM CHLORIDE 0.9 % (FLUSH) 0.9 %
5-40 SYRINGE (ML) INJECTION PRN
Status: DISCONTINUED | OUTPATIENT
Start: 2024-04-04 | End: 2024-04-05 | Stop reason: HOSPADM

## 2024-04-04 RX ORDER — HEPARIN 100 UNIT/ML
500 SYRINGE INTRAVENOUS PRN
Status: DISCONTINUED | OUTPATIENT
Start: 2024-04-04 | End: 2024-04-05 | Stop reason: HOSPADM

## 2024-04-04 RX ADMIN — SODIUM CHLORIDE: 9 INJECTION, SOLUTION INTRAVENOUS at 11:26

## 2024-04-04 RX ADMIN — HEPARIN 500 UNITS: 100 SYRINGE at 12:53

## 2024-04-04 RX ADMIN — SODIUM CHLORIDE, PRESERVATIVE FREE 10 ML: 5 INJECTION INTRAVENOUS at 12:52

## 2024-04-04 RX ADMIN — SODIUM CHLORIDE, PRESERVATIVE FREE 10 ML: 5 INJECTION INTRAVENOUS at 11:20

## 2024-04-04 NOTE — PROGRESS NOTES
A Good Follow up visit with grief care included for the explanation of an [age] year old girl who lost family member to cancer.  Active listening offered and support given.  They engaged readily in conversation.  Prayers said with both of them at the end of the visit.  Continue to visit and to support.  See Flowsheet.    Sister Radha Randolph, OSF/T  [facility]

## 2024-04-04 NOTE — PLAN OF CARE
Problem: Chronic Conditions and Co-morbidities  Goal: Patient's chronic conditions and co-morbidity symptoms are monitored and maintained or improved  Outcome: Adequate for Discharge     Problem: Chronic Conditions and Co-morbidities  Goal: Patient's chronic conditions and co-morbidity symptoms are monitored and maintained or improved  Outcome: Adequate for Discharge     Problem: Safety - Adult  Goal: Free from fall injury  Outcome: Adequate for Discharge

## 2024-04-05 LAB — SURGICAL PATHOLOGY REPORT: NORMAL

## 2024-04-06 ENCOUNTER — HOSPITAL ENCOUNTER (EMERGENCY)
Age: 64
Discharge: ANOTHER HOSPITAL | End: 2024-04-06
Attending: FAMILY MEDICINE
Payer: COMMERCIAL

## 2024-04-06 ENCOUNTER — APPOINTMENT (OUTPATIENT)
Dept: CT IMAGING | Age: 64
End: 2024-04-06
Payer: COMMERCIAL

## 2024-04-06 ENCOUNTER — APPOINTMENT (OUTPATIENT)
Dept: MRI IMAGING | Age: 64
DRG: 045 | End: 2024-04-06
Payer: COMMERCIAL

## 2024-04-06 ENCOUNTER — HOSPITAL ENCOUNTER (INPATIENT)
Age: 64
LOS: 4 days | Discharge: HOME | DRG: 045 | End: 2024-04-10
Attending: EMERGENCY MEDICINE | Admitting: PSYCHIATRY & NEUROLOGY
Payer: COMMERCIAL

## 2024-04-06 VITALS
WEIGHT: 117 LBS | DIASTOLIC BLOOD PRESSURE: 87 MMHG | OXYGEN SATURATION: 100 % | SYSTOLIC BLOOD PRESSURE: 150 MMHG | BODY MASS INDEX: 21.4 KG/M2 | RESPIRATION RATE: 21 BRPM | HEART RATE: 72 BPM | TEMPERATURE: 96.8 F

## 2024-04-06 DIAGNOSIS — I63.9 STROKE (HCC): ICD-10-CM

## 2024-04-06 DIAGNOSIS — R29.90 STROKE-LIKE SYMPTOMS: Primary | ICD-10-CM

## 2024-04-06 DIAGNOSIS — I63.9 ACUTE CVA (CEREBROVASCULAR ACCIDENT) (HCC): Primary | ICD-10-CM

## 2024-04-06 DIAGNOSIS — I63.9 ACUTE ISCHEMIC STROKE (HCC): ICD-10-CM

## 2024-04-06 PROBLEM — I65.23 BILATERAL CAROTID ARTERY STENOSIS: Status: ACTIVE | Noted: 2024-04-06

## 2024-04-06 LAB
ALBUMIN SERPL-MCNC: 3.2 G/DL (ref 3.5–5.2)
ALP SERPL-CCNC: 96 U/L (ref 35–104)
ALT SERPL-CCNC: 10 U/L (ref 5–33)
ANION GAP SERPL CALCULATED.3IONS-SCNC: 11 MMOL/L (ref 9–17)
AST SERPL-CCNC: 15 U/L
BASOPHILS # BLD: 0.03 K/UL (ref 0–0.2)
BASOPHILS NFR BLD: 0 % (ref 0–2)
BILIRUB SERPL-MCNC: 0.4 MG/DL (ref 0.3–1.2)
BUN SERPL-MCNC: 23 MG/DL (ref 8–23)
BUN/CREAT SERPL: 15 (ref 9–20)
CALCIUM SERPL-MCNC: 9.1 MG/DL (ref 8.6–10.4)
CHLORIDE SERPL-SCNC: 103 MMOL/L (ref 98–107)
CO2 SERPL-SCNC: 24 MMOL/L (ref 20–31)
CREAT SERPL-MCNC: 1.5 MG/DL (ref 0.5–0.9)
EKG ATRIAL RATE: 73 BPM
EKG P AXIS: 48 DEGREES
EKG P-R INTERVAL: 160 MS
EKG Q-T INTERVAL: 424 MS
EKG QRS DURATION: 84 MS
EKG QTC CALCULATION (BAZETT): 467 MS
EKG R AXIS: 32 DEGREES
EKG T AXIS: 41 DEGREES
EKG VENTRICULAR RATE: 73 BPM
EOSINOPHIL # BLD: 0.13 K/UL (ref 0–0.4)
EOSINOPHILS RELATIVE PERCENT: 2 % (ref 0–5)
ERYTHROCYTE [DISTWIDTH] IN BLOOD BY AUTOMATED COUNT: 12.8 % (ref 12.1–15.2)
ERYTHROCYTE [DISTWIDTH] IN BLOOD BY AUTOMATED COUNT: 12.9 % (ref 11.8–14.4)
GFR SERPL CREATININE-BSD FRML MDRD: 39 ML/MIN/1.73M2
GLUCOSE SERPL-MCNC: 138 MG/DL (ref 70–99)
HCT VFR BLD AUTO: 29.2 % (ref 36.3–47.1)
HCT VFR BLD AUTO: 29.5 % (ref 36–46)
HGB BLD-MCNC: 9.5 G/DL (ref 11.9–15.1)
HGB BLD-MCNC: 9.9 G/DL (ref 12–16)
IMM GRANULOCYTES # BLD AUTO: 0.03 K/UL (ref 0–0.3)
IMM GRANULOCYTES NFR BLD: 0 % (ref 0–5)
INR PPP: 1
INR PPP: 1
LYMPHOCYTES NFR BLD: 2.5 K/UL (ref 1–4.8)
LYMPHOCYTES RELATIVE PERCENT: 31 % (ref 15–40)
MCH RBC QN AUTO: 32.3 PG (ref 25.2–33.5)
MCH RBC QN AUTO: 32.5 PG (ref 26–34)
MCHC RBC AUTO-ENTMCNC: 32.5 G/DL (ref 28.4–34.8)
MCHC RBC AUTO-ENTMCNC: 33.6 G/DL (ref 31–37)
MCV RBC AUTO: 96.7 FL (ref 80–100)
MCV RBC AUTO: 99.3 FL (ref 82.6–102.9)
MONOCYTES NFR BLD: 1.03 K/UL (ref 0–1)
MONOCYTES NFR BLD: 13 % (ref 4–8)
NEUTROPHILS NFR BLD: 54 % (ref 47–75)
NEUTS SEG NFR BLD: 4.33 K/UL (ref 2.5–7)
NRBC BLD-RTO: 0 PER 100 WBC
PARTIAL THROMBOPLASTIN TIME: 23.6 SEC (ref 23.9–33.8)
PARTIAL THROMBOPLASTIN TIME: 29.3 SEC (ref 23–36.5)
PLATELET # BLD AUTO: 427 K/UL (ref 138–453)
PLATELET # BLD AUTO: 541 K/UL (ref 140–450)
PMV BLD AUTO: 9.5 FL (ref 8.1–13.5)
PMV BLD AUTO: 9.7 FL (ref 6–12)
POTASSIUM SERPL-SCNC: 4 MMOL/L (ref 3.7–5.3)
PROT SERPL-MCNC: 6.5 G/DL (ref 6.4–8.3)
PROTHROMBIN TIME: 12.9 SEC (ref 11.5–14.2)
PROTHROMBIN TIME: 13.2 SEC (ref 11.7–14.9)
RBC # BLD AUTO: 2.94 M/UL (ref 3.95–5.11)
RBC # BLD AUTO: 3.05 M/UL (ref 4–5.2)
SODIUM SERPL-SCNC: 138 MMOL/L (ref 135–144)
TROPONIN I SERPL HS-MCNC: 13 NG/L (ref 0–14)
WBC OTHER # BLD: 5.6 K/UL (ref 3.5–11.3)
WBC OTHER # BLD: 8.1 K/UL (ref 3.5–11)

## 2024-04-06 PROCEDURE — 85027 COMPLETE CBC AUTOMATED: CPT

## 2024-04-06 PROCEDURE — 93005 ELECTROCARDIOGRAM TRACING: CPT | Performed by: FAMILY MEDICINE

## 2024-04-06 PROCEDURE — 6360000002 HC RX W HCPCS: Performed by: STUDENT IN AN ORGANIZED HEALTH CARE EDUCATION/TRAINING PROGRAM

## 2024-04-06 PROCEDURE — 85730 THROMBOPLASTIN TIME PARTIAL: CPT

## 2024-04-06 PROCEDURE — 6360000002 HC RX W HCPCS

## 2024-04-06 PROCEDURE — 36415 COLL VENOUS BLD VENIPUNCTURE: CPT

## 2024-04-06 PROCEDURE — 84484 ASSAY OF TROPONIN QUANT: CPT

## 2024-04-06 PROCEDURE — 2580000003 HC RX 258: Performed by: STUDENT IN AN ORGANIZED HEALTH CARE EDUCATION/TRAINING PROGRAM

## 2024-04-06 PROCEDURE — 6370000000 HC RX 637 (ALT 250 FOR IP): Performed by: STUDENT IN AN ORGANIZED HEALTH CARE EDUCATION/TRAINING PROGRAM

## 2024-04-06 PROCEDURE — 70450 CT HEAD/BRAIN W/O DYE: CPT

## 2024-04-06 PROCEDURE — 70498 CT ANGIOGRAPHY NECK: CPT

## 2024-04-06 PROCEDURE — 6360000004 HC RX CONTRAST MEDICATION: Performed by: FAMILY MEDICINE

## 2024-04-06 PROCEDURE — 85610 PROTHROMBIN TIME: CPT

## 2024-04-06 PROCEDURE — 70551 MRI BRAIN STEM W/O DYE: CPT

## 2024-04-06 PROCEDURE — 80053 COMPREHEN METABOLIC PANEL: CPT

## 2024-04-06 PROCEDURE — 85025 COMPLETE CBC W/AUTO DIFF WBC: CPT

## 2024-04-06 PROCEDURE — 99285 EMERGENCY DEPT VISIT HI MDM: CPT

## 2024-04-06 PROCEDURE — 99255 IP/OBS CONSLTJ NEW/EST HI 80: CPT | Performed by: INTERNAL MEDICINE

## 2024-04-06 PROCEDURE — 2060000000 HC ICU INTERMEDIATE R&B

## 2024-04-06 RX ORDER — LABETALOL HYDROCHLORIDE 5 MG/ML
10 INJECTION, SOLUTION INTRAVENOUS EVERY 10 MIN PRN
Status: DISCONTINUED | OUTPATIENT
Start: 2024-04-06 | End: 2024-04-10 | Stop reason: HOSPADM

## 2024-04-06 RX ORDER — SODIUM CHLORIDE 0.9 % (FLUSH) 0.9 %
5-40 SYRINGE (ML) INJECTION EVERY 12 HOURS SCHEDULED
Status: DISCONTINUED | OUTPATIENT
Start: 2024-04-06 | End: 2024-04-10 | Stop reason: HOSPADM

## 2024-04-06 RX ORDER — ONDANSETRON 2 MG/ML
INJECTION INTRAMUSCULAR; INTRAVENOUS
Status: COMPLETED
Start: 2024-04-06 | End: 2024-04-06

## 2024-04-06 RX ORDER — ONDANSETRON 2 MG/ML
4 INJECTION INTRAMUSCULAR; INTRAVENOUS EVERY 6 HOURS PRN
Status: DISCONTINUED | OUTPATIENT
Start: 2024-04-06 | End: 2024-04-10 | Stop reason: HOSPADM

## 2024-04-06 RX ORDER — SODIUM CHLORIDE 0.9 % (FLUSH) 0.9 %
5-40 SYRINGE (ML) INJECTION PRN
Status: DISCONTINUED | OUTPATIENT
Start: 2024-04-06 | End: 2024-04-10 | Stop reason: HOSPADM

## 2024-04-06 RX ORDER — ACETAMINOPHEN 325 MG/1
650 TABLET ORAL EVERY 4 HOURS PRN
Status: DISCONTINUED | OUTPATIENT
Start: 2024-04-06 | End: 2024-04-10 | Stop reason: HOSPADM

## 2024-04-06 RX ORDER — ASPIRIN 300 MG/1
300 SUPPOSITORY RECTAL DAILY
Status: DISCONTINUED | OUTPATIENT
Start: 2024-04-07 | End: 2024-04-10 | Stop reason: HOSPADM

## 2024-04-06 RX ORDER — POLYETHYLENE GLYCOL 3350 17 G/17G
17 POWDER, FOR SOLUTION ORAL DAILY PRN
Status: DISCONTINUED | OUTPATIENT
Start: 2024-04-06 | End: 2024-04-10 | Stop reason: HOSPADM

## 2024-04-06 RX ORDER — SODIUM CHLORIDE 9 MG/ML
INJECTION, SOLUTION INTRAVENOUS PRN
Status: DISCONTINUED | OUTPATIENT
Start: 2024-04-06 | End: 2024-04-10 | Stop reason: HOSPADM

## 2024-04-06 RX ORDER — ROSUVASTATIN CALCIUM 10 MG/1
40 TABLET, COATED ORAL NIGHTLY
Status: DISCONTINUED | OUTPATIENT
Start: 2024-04-06 | End: 2024-04-08

## 2024-04-06 RX ORDER — MAGNESIUM SULFATE 1 G/100ML
1000 INJECTION INTRAVENOUS ONCE
Status: COMPLETED | OUTPATIENT
Start: 2024-04-06 | End: 2024-04-06

## 2024-04-06 RX ORDER — ENOXAPARIN SODIUM 100 MG/ML
30 INJECTION SUBCUTANEOUS DAILY
Status: DISCONTINUED | OUTPATIENT
Start: 2024-04-06 | End: 2024-04-06

## 2024-04-06 RX ORDER — HEPARIN SODIUM 10000 [USP'U]/100ML
5-30 INJECTION, SOLUTION INTRAVENOUS CONTINUOUS
Status: DISCONTINUED | OUTPATIENT
Start: 2024-04-06 | End: 2024-04-09 | Stop reason: ALTCHOICE

## 2024-04-06 RX ORDER — ONDANSETRON 4 MG/1
4 TABLET, ORALLY DISINTEGRATING ORAL EVERY 8 HOURS PRN
Status: DISCONTINUED | OUTPATIENT
Start: 2024-04-06 | End: 2024-04-10 | Stop reason: HOSPADM

## 2024-04-06 RX ORDER — ASPIRIN 81 MG/1
81 TABLET, CHEWABLE ORAL DAILY
Status: DISCONTINUED | OUTPATIENT
Start: 2024-04-07 | End: 2024-04-10 | Stop reason: HOSPADM

## 2024-04-06 RX ADMIN — IOPAMIDOL 100 ML: 755 INJECTION, SOLUTION INTRAVENOUS at 12:49

## 2024-04-06 RX ADMIN — MAGNESIUM SULFATE HEPTAHYDRATE 1000 MG: 1 INJECTION, SOLUTION INTRAVENOUS at 17:17

## 2024-04-06 RX ADMIN — SODIUM CHLORIDE: 9 INJECTION, SOLUTION INTRAVENOUS at 17:17

## 2024-04-06 RX ADMIN — ENOXAPARIN SODIUM 30 MG: 100 INJECTION SUBCUTANEOUS at 17:17

## 2024-04-06 RX ADMIN — HEPARIN SODIUM 12 UNITS/KG/HR: 10000 INJECTION, SOLUTION INTRAVENOUS at 18:34

## 2024-04-06 RX ADMIN — ROSUVASTATIN CALCIUM 40 MG: 10 TABLET, COATED ORAL at 20:10

## 2024-04-06 RX ADMIN — ONDANSETRON 4 MG: 2 INJECTION INTRAMUSCULAR; INTRAVENOUS at 12:40

## 2024-04-06 RX ADMIN — ACETAMINOPHEN 325MG 650 MG: 325 TABLET ORAL at 17:18

## 2024-04-06 RX ADMIN — HEPARIN SODIUM 12 UNITS/KG/HR: 10000 INJECTION, SOLUTION INTRAVENOUS at 18:23

## 2024-04-06 ASSESSMENT — PAIN SCALES - GENERAL
PAINLEVEL_OUTOF10: 0
PAINLEVEL_OUTOF10: 5
PAINLEVEL_OUTOF10: 5

## 2024-04-06 ASSESSMENT — PAIN DESCRIPTION - LOCATION
LOCATION: HEAD
LOCATION: HEAD

## 2024-04-06 ASSESSMENT — PAIN DESCRIPTION - DESCRIPTORS
DESCRIPTORS: ACHING
DESCRIPTORS: ACHING

## 2024-04-06 ASSESSMENT — PAIN - FUNCTIONAL ASSESSMENT
PAIN_FUNCTIONAL_ASSESSMENT: PREVENTS OR INTERFERES SOME ACTIVE ACTIVITIES AND ADLS
PAIN_FUNCTIONAL_ASSESSMENT: NONE - DENIES PAIN
PAIN_FUNCTIONAL_ASSESSMENT: PREVENTS OR INTERFERES SOME ACTIVE ACTIVITIES AND ADLS

## 2024-04-06 ASSESSMENT — LIFESTYLE VARIABLES
HOW MANY STANDARD DRINKS CONTAINING ALCOHOL DO YOU HAVE ON A TYPICAL DAY: PATIENT DOES NOT DRINK
HOW OFTEN DO YOU HAVE A DRINK CONTAINING ALCOHOL: NEVER

## 2024-04-06 ASSESSMENT — PAIN DESCRIPTION - ORIENTATION
ORIENTATION: ANTERIOR;POSTERIOR;LEFT;RIGHT
ORIENTATION: RIGHT;LEFT;ANTERIOR;POSTERIOR

## 2024-04-06 NOTE — ED PROVIDER NOTES
[facility]     Emergency Department     Faculty Note/ Attestation      Pt Name: Daja Espinosa                                       MRN: [id number]  Birthdate 1960  Date of evaluation: 4/6/2024    Patients PCP:    Sohail Garcia MD    Note Started: 2:16 PM EDT      Attestation  I performed a history and physical examination of the patient and discussed management with the resident. I reviewed the resident’s note and agree with the documented findings and plan of care. Any areas of disagreement are noted on the chart. I was personally present for the key portions of any procedures. I have documented in the chart those procedures where I was not present during the key portions. I have reviewed the emergency nurses triage note. I agree with the chief complaint, past medical history, past surgical history, allergies, medications, social and family history as documented unless otherwise noted below.    For Physician Assistant/ Nurse Practitioner cases/documentation I have personally evaluated this patient and have completed at least one if not all key elements of the E/M (history, physical exam, and MDM). Additional findings are as noted.      Initial Screens:             Vitals:  There were no vitals filed for this visit.    CHIEF COMPLAINT     No chief complaint on file.            DIAGNOSTIC RESULTS             RADIOLOGY:   No orders to display         LABS:  Labs Reviewed - No data to display      EMERGENCY DEPARTMENT COURSE:     -------------------------   ,  ,  ,        Comments  TSF note:  62 yo F  Daja Espinosa  Hx of bladder CA on Chemo  Large stroke, LKW 1000  Family declined TPA  L sided facial droop and L hemiparesis  VSS  Glucose 112    ED Note  VSS, L arm paralysis  Awake and alert, BIB flight  Stroke alert paged, neuro at bedside on arrival    Will follow-up stroke recommendations, patient will need to be admitted    (Please note that portions of this note were completed with 
the Last Year: Never true   Transportation Needs: No Transportation Needs (4/6/2024)    PRAPARE - Transportation     Lack of Transportation (Medical): No     Lack of Transportation (Non-Medical): No   Physical Activity: Not on file   Stress: Not on file   Social Connections: Not on file   Intimate Partner Violence: Not on file   Housing Stability: Low Risk  (4/6/2024)    Housing Stability Vital Sign     Unable to Pay for Housing in the Last Year: No     Number of Places Lived in the Last Year: 1     Unstable Housing in the Last Year: No       Family History   Problem Relation Age of Onset    Cancer Father         lung    Cancer Mother        Allergies:  Patient has no known allergies.    Home Medications:  Prior to Admission medications    Medication Sig Start Date End Date Taking? Authorizing Provider   lidocaine-prilocaine (EMLA) 2.5-2.5 % cream Apply topically to port site 60 minutes before access as needed.  Patient not taking: Reported on 4/6/2024 3/11/24   Familia Shepard MD   amLODIPine (NORVASC) 10 MG tablet Take 1 tablet by mouth daily 2/6/24   Sohail Garcia MD   atorvastatin (LIPITOR) 80 MG tablet Take 1 tablet by mouth nightly 2/6/24   Sohail Garcia MD   aspirin 81 MG chewable tablet Take 1 tablet by mouth daily 2/6/24   Sohail Garcia MD   metoprolol tartrate (LOPRESSOR) 50 MG tablet Take 1 tablet by mouth 2 times daily 11/3/23   Sohail Garcia MD   losartan (COZAAR) 100 MG tablet Take 1 tablet by mouth daily 11/3/23   Sohail Garcia MD         REVIEW OF SYSTEMS       Review of Systems   Constitutional:  Negative for chills and fever.   Neurological:  Positive for speech difficulty and weakness.       PHYSICAL EXAM      INITIAL VITALS:   BP (!) 141/83   Pulse 80   Temp 98.4 °F (36.9 °C) (Oral)   Resp 19   Ht 1.575 m (5' 2\")   Wt 53.5 kg (117 lb 15.1 oz)   LMP 12/01/2010   SpO2 95%   BMI 21.57 kg/m²     Physical Exam  Vitals reviewed.   Constitutional:       General: She is not

## 2024-04-06 NOTE — ED PROVIDER NOTES
medical contra-indication or reason for not administering [] (ex. neurologist does not believe t-PA is appropriate, active internal bleeding, serious head trauma, acute current or history of intracranial hemorrhage, uncontrollable hypertension, seizure at onset of stroke, CVA in last 3 months, Intracranial or intraspinal surgery in last 3 months, bleeding disorder, thrombocytopenia < 100,000, early radiographic ischemic changes on head CT, INR > 1.7, intracranial neoplasm, AVM, or aneurysm, patient in stroke trial, patient admitted for elective carotid intervention)  [x] Patient or family declined IV t-PA  [] The patient, who arrived at the hospital within 2 hours of time last known well, was diagnosed with subacute or acute ischemic stroke. IV t-PA was not initiated within 3 hours of time last known well. [DOES NOT SATISFY MIPS PERFORMANCE]    EMERGENCY DEPARTMENT COURSE and DIFFERENTIAL DIAGNOSIS/MDM:    Patient Course:     Number and complexity of problems:    Differential Diagnosis: Acute CVA    Pertinent Comorbid Conditions: Bladder cancer.    2)  Data Reviewed    Decision Rules/Scores utilized: NIH stroke scale score of 8.    Labs/tests: Labs were reviewed by me    EKG/rhythm strips: EKG was reviewed by me.    Radiology interpretation/review: Imaging studies were reviewed by me.          3)  Treatment and Disposition    Patient repeat assessment: Patient was stable for transfer.    Disposition discussion with patient/family: Patient's medical condition was reviewed with the patient and her family.  Patient and family declined tPA treatment.  Benefits and risks were reviewed with the patient and her family.    Shared Decision Making: Need for transfer was reviewed with the patient and her family.    Case discussed with consulting clinician:  Discussed case with Dr. Peña and Dr. Valles. Patient will be transferred to [facility] ED. Dr. Valles accepted transfer.    ED Medications administered this visit:

## 2024-04-06 NOTE — ED NOTES
Patient was throwing up. RN notified Dr. Bush, he ordered to override 4mg IV zofran so patient could go to CT. Med given. RN accomanied patient to CT.

## 2024-04-06 NOTE — ED NOTES
62 yo F  Daja Espinosa  Hx of bladder CA on Chemo  Large stroke, LKW 1000  Family declined TPA  L sided facial droop and L hemiparesis  VSS  Glucose 112    Accepted by Dr. Valles  
64 yo F  Daja Espinosa  Hx of bladder CA on Chemo  Large stroke, LKW 1000  Family declined TPA  L sided facial droop and L hemiparesis  VSS  Glucose 112    Accepted by Dr Valles   
Bertha Resident is bedside for evaluation   
Patient returned from MRI and was placed on bedside monitor.   
Patient taken to MRI   
Spoke with Neuro Resident in reference to medications. No ASA or Plavix have been ordered. Per Resident he will review the chart.   
Zahraa ROMAN at bedside.   Completing MRI checklist.   MRI called, ready for patient when checklist complete.   
   ken leg vacular surgery.    VENTRAL HERNIA REPAIR N/A 01/05/2022    HERNIA VENTRAL REPAIR performed by Jose R Jack MD at [facility] OR    VULVA SURGERY  2001    cancer       PAST MEDICAL HISTORY       Past Medical History:   Diagnosis Date    Alcohol abuse 03/02/2017    Arthritis     Bladder cancer (HCC) 2016    CAD (coronary artery disease)     Cancer (HCC) 2001    vulvar-    Carotid artery occlusion 1993    [facility] where she had a brachiocephalic aorta bypass.    Carotid artery stenosis     Chronic back pain     History of chemotherapy     Hydronephrosis 02/15/2024    Hyperlipidemia     Hypertension     Hypoglycemia     MI (myocardial infarction) (HCC)     Peripheral vascular disease (HCC)     Takayasu's arteritis (HCC)     Tibial fracture     right    Umbilical hernia     Under care of team 03/29/2024    Cardiologist: Terrell Cortez MD, [place], last visit 2/13/2024    Under care of team 03/29/2024    Oncology: Familia Shepard MD, [place], last visit 3/11/2024    Under care of team 03/29/2024    PCP: Sohail Garcia MD, [place], last visit 3/2024    Unspecified cerebral artery occlusion with cerebral infarction 1993    Wears partial dentures     Dentures/top, partial/bottom       Labs:  Labs Reviewed - No data to display    Electronically signed by Javier Ashford RN on 4/6/2024 at 3:24 PM

## 2024-04-06 NOTE — ED TRIAGE NOTES
Patient presents to the ED via EMS. Patient is a transfer from [place]. Patient has known stroke and per transferring facility patient and family denied TPA. Patient has left sided weakness and numbness along with left sided facial droop. Patient is AOX4, respirations are even and unlabored. Patient was placed on bedside monitor and her right sided chest port was access PTA. Resident and Attending bedside for evaluation. Writer will continue with plan of care.

## 2024-04-06 NOTE — H&P
[facility]   IN-PATIENT SERVICE   [facility]    HISTORY AND PHYSICAL EXAMINATION            Date:   4/6/2024  Patient name:  Daja Espinosa  Date of admission:  4/6/2024  2:09 PM  MRN:   [id number]  Account:  [id number]  YOB: 1960  PCP:    Sohail Garcia MD  Room:   [address] [place] [address]  Code Status:    Full Code    Chief Complaint:     Chief Complaint   Patient presents with    Cerebrovascular Accident       History Obtained From:     patient    History of Present Illness:     The patient is a 63 y.o. female who presents with past medical history of arthritis, alcohol abuse, CAD, bladder cancer status post hemotherapy, hypertension, hyperlipidemia, history of mild,, left CCA stenosis status post stent placement, peripheral vascular disease, history of former smoker 22 packs/year presented with complaint of sudden onset left upper extremity weakness and left facial droop and was taken to [facility] with last known well 10AM 4/6/24.    At outside hospital, given significant hemiplegia in the left upper extremity patient was offered TNK as CT head was unremarkable and she was qualifying but after discussion with family and bleeding risk of bleeding she refused.  CTA head and neck prior stent placement in the left CCA with stenosis 75% on the left common carotid and 45% in the left ICA.  No stenosis in the right carotid.  Extracranial and atherosclerosis.  Patient was taken to [facility] and was given renewal on aspirin 81 mg daily.    On arrival to the ED, patient had left upper extremity hemiplegia and left facial droop otherwise no visual deficit or speech deficit.  She did refuse TNK again given risk of bleeding.  Patient was taken for stat MRI brain without contrast that showed DWI and FLAIR changes in multiple territories.    Discussed with , plan to continue aspirin 81 mg and consult hematology oncology given multiple territory stroke and in the context of

## 2024-04-07 ENCOUNTER — SOCIAL WORK (OUTPATIENT)
Dept: EMERGENCY DEPT | Age: 64
End: 2024-04-07

## 2024-04-07 ENCOUNTER — APPOINTMENT (OUTPATIENT)
Dept: MRI IMAGING | Age: 64
DRG: 045 | End: 2024-04-07
Payer: COMMERCIAL

## 2024-04-07 LAB
ANTI-XA UNFRAC HEPARIN: 0.18 IU/L
ANTI-XA UNFRAC HEPARIN: 0.18 IU/L
ANTI-XA UNFRAC HEPARIN: 0.39 IU/L
ANTI-XA UNFRAC HEPARIN: <0.1 IU/L
CHOLEST SERPL-MCNC: 109 MG/DL (ref 0–199)
CHOLESTEROL/HDL RATIO: 3
ERYTHROCYTE [DISTWIDTH] IN BLOOD BY AUTOMATED COUNT: 12.9 % (ref 11.8–14.4)
EST. AVERAGE GLUCOSE BLD GHB EST-MCNC: 123 MG/DL
HBA1C MFR BLD: 5.9 % (ref 4–6)
HCT VFR BLD AUTO: 28.2 % (ref 36.3–47.1)
HDLC SERPL-MCNC: 41 MG/DL
HGB BLD-MCNC: 9 G/DL (ref 11.9–15.1)
LDLC SERPL CALC-MCNC: 50 MG/DL (ref 0–100)
MCH RBC QN AUTO: 32.1 PG (ref 25.2–33.5)
MCHC RBC AUTO-ENTMCNC: 31.9 G/DL (ref 28.4–34.8)
MCV RBC AUTO: 100.7 FL (ref 82.6–102.9)
NRBC BLD-RTO: 0 PER 100 WBC
PLATELET # BLD AUTO: 524 K/UL (ref 138–453)
PMV BLD AUTO: 9.4 FL (ref 8.1–13.5)
RBC # BLD AUTO: 2.8 M/UL (ref 3.95–5.11)
TRIGL SERPL-MCNC: 92 MG/DL
VLDLC SERPL CALC-MCNC: 18 MG/DL
WBC OTHER # BLD: 6.7 K/UL (ref 3.5–11.3)

## 2024-04-07 PROCEDURE — 85027 COMPLETE CBC AUTOMATED: CPT

## 2024-04-07 PROCEDURE — 2580000003 HC RX 258: Performed by: STUDENT IN AN ORGANIZED HEALTH CARE EDUCATION/TRAINING PROGRAM

## 2024-04-07 PROCEDURE — 70553 MRI BRAIN STEM W/O & W/DYE: CPT

## 2024-04-07 PROCEDURE — 99222 1ST HOSP IP/OBS MODERATE 55: CPT | Performed by: SURGERY

## 2024-04-07 PROCEDURE — 83036 HEMOGLOBIN GLYCOSYLATED A1C: CPT

## 2024-04-07 PROCEDURE — 80061 LIPID PANEL: CPT

## 2024-04-07 PROCEDURE — 6360000004 HC RX CONTRAST MEDICATION: Performed by: STUDENT IN AN ORGANIZED HEALTH CARE EDUCATION/TRAINING PROGRAM

## 2024-04-07 PROCEDURE — 2060000000 HC ICU INTERMEDIATE R&B

## 2024-04-07 PROCEDURE — 6360000002 HC RX W HCPCS: Performed by: STUDENT IN AN ORGANIZED HEALTH CARE EDUCATION/TRAINING PROGRAM

## 2024-04-07 PROCEDURE — A9579 GAD-BASE MR CONTRAST NOS,1ML: HCPCS | Performed by: STUDENT IN AN ORGANIZED HEALTH CARE EDUCATION/TRAINING PROGRAM

## 2024-04-07 PROCEDURE — 36415 COLL VENOUS BLD VENIPUNCTURE: CPT

## 2024-04-07 PROCEDURE — 6370000000 HC RX 637 (ALT 250 FOR IP): Performed by: STUDENT IN AN ORGANIZED HEALTH CARE EDUCATION/TRAINING PROGRAM

## 2024-04-07 PROCEDURE — 85520 HEPARIN ASSAY: CPT

## 2024-04-07 PROCEDURE — 99232 SBSQ HOSP IP/OBS MODERATE 35: CPT | Performed by: INTERNAL MEDICINE

## 2024-04-07 RX ORDER — SODIUM CHLORIDE 0.9 % (FLUSH) 0.9 %
10 SYRINGE (ML) INJECTION PRN
Status: DISCONTINUED | OUTPATIENT
Start: 2024-04-07 | End: 2024-04-10 | Stop reason: HOSPADM

## 2024-04-07 RX ADMIN — ACETAMINOPHEN 325MG 650 MG: 325 TABLET ORAL at 06:10

## 2024-04-07 RX ADMIN — SODIUM CHLORIDE, PRESERVATIVE FREE 10 ML: 5 INJECTION INTRAVENOUS at 14:18

## 2024-04-07 RX ADMIN — ASPIRIN 81 MG 81 MG: 81 TABLET ORAL at 08:51

## 2024-04-07 RX ADMIN — HEPARIN SODIUM 18 UNITS/KG/HR: 10000 INJECTION, SOLUTION INTRAVENOUS at 22:41

## 2024-04-07 RX ADMIN — ROSUVASTATIN CALCIUM 40 MG: 10 TABLET, COATED ORAL at 20:50

## 2024-04-07 RX ADMIN — GADOTERIDOL 10 ML: 279.3 INJECTION, SOLUTION INTRAVENOUS at 14:18

## 2024-04-07 ASSESSMENT — PAIN DESCRIPTION - DESCRIPTORS: DESCRIPTORS: ACHING

## 2024-04-07 ASSESSMENT — PAIN DESCRIPTION - LOCATION: LOCATION: HEAD

## 2024-04-07 ASSESSMENT — PAIN SCALES - GENERAL: PAINLEVEL_OUTOF10: 4

## 2024-04-08 ENCOUNTER — HOSPITAL ENCOUNTER (OUTPATIENT)
Dept: INFUSION THERAPY | Age: 64
End: 2024-04-08

## 2024-04-08 PROBLEM — D68.59 HYPERCOAGULABLE STATE (HCC): Status: ACTIVE | Noted: 2024-04-08

## 2024-04-08 PROBLEM — I63.9 STROKE (HCC): Status: ACTIVE | Noted: 2024-04-06

## 2024-04-08 LAB
ANION GAP SERPL CALCULATED.3IONS-SCNC: 15 MMOL/L (ref 9–16)
ANTI-XA UNFRAC HEPARIN: 0.28 IU/L
ANTI-XA UNFRAC HEPARIN: 0.38 IU/L
ANTI-XA UNFRAC HEPARIN: 0.41 IU/L
ANTI-XA UNFRAC HEPARIN: 0.5 IU/L
BUN SERPL-MCNC: 20 MG/DL (ref 8–23)
CALCIUM SERPL-MCNC: 9.3 MG/DL (ref 8.6–10.4)
CHLORIDE SERPL-SCNC: 102 MMOL/L (ref 98–107)
CO2 SERPL-SCNC: 18 MMOL/L (ref 20–31)
CREAT SERPL-MCNC: 1.3 MG/DL (ref 0.5–0.9)
EKG ATRIAL RATE: 73 BPM
EKG P AXIS: 48 DEGREES
EKG P-R INTERVAL: 160 MS
EKG Q-T INTERVAL: 424 MS
EKG QRS DURATION: 84 MS
EKG QTC CALCULATION (BAZETT): 467 MS
EKG R AXIS: 32 DEGREES
EKG T AXIS: 41 DEGREES
EKG VENTRICULAR RATE: 73 BPM
ERYTHROCYTE [DISTWIDTH] IN BLOOD BY AUTOMATED COUNT: 13.2 % (ref 11.8–14.4)
GFR SERPL CREATININE-BSD FRML MDRD: 47 ML/MIN/1.73M2
GLUCOSE SERPL-MCNC: 82 MG/DL (ref 74–99)
HCT VFR BLD AUTO: 29.3 % (ref 36.3–47.1)
HGB BLD-MCNC: 9.6 G/DL (ref 11.9–15.1)
MCH RBC QN AUTO: 32.4 PG (ref 25.2–33.5)
MCHC RBC AUTO-ENTMCNC: 32.8 G/DL (ref 28.4–34.8)
MCV RBC AUTO: 99 FL (ref 82.6–102.9)
NRBC BLD-RTO: 0 PER 100 WBC
PLATELET # BLD AUTO: 655 K/UL (ref 138–453)
PMV BLD AUTO: 9.7 FL (ref 8.1–13.5)
POTASSIUM SERPL-SCNC: 4.4 MMOL/L (ref 3.7–5.3)
RBC # BLD AUTO: 2.96 M/UL (ref 3.95–5.11)
SODIUM SERPL-SCNC: 135 MMOL/L (ref 136–145)
WBC OTHER # BLD: 7.4 K/UL (ref 3.5–11.3)

## 2024-04-08 PROCEDURE — 36415 COLL VENOUS BLD VENIPUNCTURE: CPT

## 2024-04-08 PROCEDURE — 97116 GAIT TRAINING THERAPY: CPT

## 2024-04-08 PROCEDURE — 99232 SBSQ HOSP IP/OBS MODERATE 35: CPT | Performed by: INTERNAL MEDICINE

## 2024-04-08 PROCEDURE — 97162 PT EVAL MOD COMPLEX 30 MIN: CPT

## 2024-04-08 PROCEDURE — 2580000003 HC RX 258: Performed by: STUDENT IN AN ORGANIZED HEALTH CARE EDUCATION/TRAINING PROGRAM

## 2024-04-08 PROCEDURE — 97535 SELF CARE MNGMENT TRAINING: CPT

## 2024-04-08 PROCEDURE — 85027 COMPLETE CBC AUTOMATED: CPT

## 2024-04-08 PROCEDURE — 93010 ELECTROCARDIOGRAM REPORT: CPT | Performed by: INTERNAL MEDICINE

## 2024-04-08 PROCEDURE — 99254 IP/OBS CNSLTJ NEW/EST MOD 60: CPT | Performed by: STUDENT IN AN ORGANIZED HEALTH CARE EDUCATION/TRAINING PROGRAM

## 2024-04-08 PROCEDURE — 80048 BASIC METABOLIC PNL TOTAL CA: CPT

## 2024-04-08 PROCEDURE — 97166 OT EVAL MOD COMPLEX 45 MIN: CPT

## 2024-04-08 PROCEDURE — 99232 SBSQ HOSP IP/OBS MODERATE 35: CPT | Performed by: PSYCHIATRY & NEUROLOGY

## 2024-04-08 PROCEDURE — 2060000000 HC ICU INTERMEDIATE R&B

## 2024-04-08 PROCEDURE — 85520 HEPARIN ASSAY: CPT

## 2024-04-08 PROCEDURE — 97530 THERAPEUTIC ACTIVITIES: CPT

## 2024-04-08 PROCEDURE — 6370000000 HC RX 637 (ALT 250 FOR IP): Performed by: STUDENT IN AN ORGANIZED HEALTH CARE EDUCATION/TRAINING PROGRAM

## 2024-04-08 RX ORDER — SODIUM CHLORIDE 9 MG/ML
INJECTION, SOLUTION INTRAVENOUS CONTINUOUS
Status: CANCELLED | OUTPATIENT
Start: 2024-04-08

## 2024-04-08 RX ORDER — ROSUVASTATIN CALCIUM 10 MG/1
20 TABLET, COATED ORAL NIGHTLY
Status: DISCONTINUED | OUTPATIENT
Start: 2024-04-08 | End: 2024-04-10 | Stop reason: HOSPADM

## 2024-04-08 RX ADMIN — ACETAMINOPHEN 325MG 650 MG: 325 TABLET ORAL at 08:23

## 2024-04-08 RX ADMIN — ROSUVASTATIN CALCIUM 20 MG: 10 TABLET, COATED ORAL at 20:10

## 2024-04-08 RX ADMIN — SODIUM CHLORIDE, PRESERVATIVE FREE 10 ML: 5 INJECTION INTRAVENOUS at 08:25

## 2024-04-08 RX ADMIN — ASPIRIN 81 MG 81 MG: 81 TABLET ORAL at 08:23

## 2024-04-08 RX ADMIN — POLYETHYLENE GLYCOL 3350 17 G: 17 POWDER, FOR SOLUTION ORAL at 10:30

## 2024-04-08 ASSESSMENT — ENCOUNTER SYMPTOMS
SHORTNESS OF BREATH: 0
PHOTOPHOBIA: 0
VOMITING: 0
NAUSEA: 0
ABDOMINAL PAIN: 0
COLOR CHANGE: 0
COUGH: 0
SORE THROAT: 0

## 2024-04-08 ASSESSMENT — PAIN DESCRIPTION - LOCATION: LOCATION: HAND

## 2024-04-08 ASSESSMENT — PAIN SCALES - GENERAL
PAINLEVEL_OUTOF10: 0
PAINLEVEL_OUTOF10: 0
PAINLEVEL_OUTOF10: 4
PAINLEVEL_OUTOF10: 0

## 2024-04-08 ASSESSMENT — PAIN - FUNCTIONAL ASSESSMENT: PAIN_FUNCTIONAL_ASSESSMENT: ACTIVITIES ARE NOT PREVENTED

## 2024-04-08 ASSESSMENT — PAIN DESCRIPTION - FREQUENCY: FREQUENCY: CONTINUOUS

## 2024-04-08 ASSESSMENT — PAIN DESCRIPTION - DESCRIPTORS: DESCRIPTORS: ACHING;DISCOMFORT

## 2024-04-08 ASSESSMENT — PAIN DESCRIPTION - ORIENTATION: ORIENTATION: LEFT

## 2024-04-08 ASSESSMENT — PAIN DESCRIPTION - ONSET: ONSET: ON-GOING

## 2024-04-08 NOTE — CONSULTS
Department of Endovascular Neurosurgery                                                                                                                      Resident Consult Note  Stroke Alert paged @ 156PM  ER Room # 23  Arrival to patient bedside @ 2PM (ETA 15min)        Reason for Consult:  left arm weakness  Requesting Physician:  ED resident  Endovascular Neurosurgeon:   []Dr. Burgos  []Dr. Hernandez  []Dr. Aldridge   [x]Dr. Anaya    History Obtained From:  patient    CHIEF COMPLAINT:        left arm weakness    HISTORY OF PRESENT ILLNESS:       The patient is a 63 y.o. female who presents with past medical history of arthritis, alcohol abuse, CAD, bladder cancer status post hemotherapy, hypertension, hyperlipidemia, history of mild,, left CCA stenosis status post stent placement, peripheral vascular disease, history of former smoker 22 packs/year presented with complaint of sudden onset left upper extremity weakness and left facial droop and was taken to [facility] with last known well 10AM 4/6/24.    At outside hospital, given significant hemiplegia in the left upper extremity patient was offered TNK as CT head was unremarkable and she was qualifying but after discussion with family and bleeding risk of bleeding she refused.  CTA head and neck prior stent placement in the left CCA with stenosis 75% on the left common carotid and 45% in the left ICA.  No stenosis in the right carotid.  Extracranial and atherosclerosis.  Patient was taken to [facility] and was given renewal on aspirin 81 mg daily.    On arrival to the ED, patient had left upper extremity hemiplegia and left facial droop otherwise no visual deficit or speech deficit.  She did refuse TNK again given risk of bleeding.  Patient was taken for stat MRI brain without contrast that showed DWI and FLAIR changes in multiple territories.    Discussed with 
  Today's Date: 4/6/2024  Patient Name: Daja Espinosa  Date of admission: 4/6/2024  2:09 PM  Patient's age: 63 y.o., 1960  Admission Dx: Acute ischemic stroke (HCC) [I63.9]  Stroke-like symptoms [R29.90]    Reason for Consult: management recommendations  Requesting Physician: Faith Lema MD    CHIEF COMPLAINT:  acute stroke , likely embolic     History Obtained From:  patient    HISTORY OF PRESENT ILLNESS:      The patient is a 63 y.o.   female who is admitted to the hospital for acute left upper extremity weakness and facial droop.  The patient was brought to emergency room where she was found to have acute stroke.  Initial CT scan was unremarkable.  She was offered TNK but she refused.  She was started on aspirin and transitioned from outside hospital to [facility].  Where an MRI showed multiple areas of ischemic strokes.  Vascular evaluation through CTA showed prior stent in the common carotid artery.  However, the stent was deformed with in-stent stenosis leading up to 75%.  More distally, there is a calcified edematous plaque extending into the origin of the internal carotid artery leading to 45% segmental stenosis.  The right system was widely patent.  There was an atheromatous plaque with some ulceration but stenosis was less than 50%.  Additionally, there was mild stenosis less than 50% of the vertebral artery.  The left subclavian artery origin is occluded and its reconstituted proximal to its origin filling retrograde from the left vertebral artery.  Reviewing the MRI, the ischemic areas are in the right cerebral convexity, left occipital lobe and right cerebellar hemisphere.  These are not consistent with artery to artery embolization from the severely stenotic left carotid artery.  The patient is known to us with history of muscle invasive bladder cancer.  Undergoing neoadjuvant chemotherapy.  She also is and ex smoker and has a history of coronary artery and peripheral vascular 
[facility]    Consult                        Today's Date: 4/7/2024  Patient Name: Daja Espinosa  Date of admission: 4/6/2024  2:09 PM  Patient's age: 63 y.o., 1960  Admission Dx: Acute ischemic stroke (HCC) [I63.9]  Stroke-like symptoms [R29.90]    Reason for Consult:  Cardiac evaluation    Requesting Physician: Faith Lema MD    CHIEF COMPLAINT:  left upper extremity weakness and facial droop     History Obtained From:  patient, electronic medical record    HISTORY OF PRESENT ILLNESS:      The patient is a 63 y.o.  female history of arthritis, alcohol abuse, CAD, bladder cancer status post hemotherapy, hypertension, hyperlipidemia, history of mild,, left CCA stenosis status post stent placement, peripheral vascular disease, history of former smoker 22 packs/year  who is admitted to the hospital for  acute left upper extremity weakness and facial droop. MRI showed multiple areas of ischemic strokes . Cardiology was consulted for AYAZ to rule out embolic event  , no afib on tele     Past Medical History:   has a past medical history of Alcohol abuse, Arthritis, Bladder cancer (HCC), CAD (coronary artery disease), Cancer (HCC), Carotid artery occlusion, Carotid artery stenosis, Chronic back pain, History of chemotherapy, Hydronephrosis, Hyperlipidemia, Hypertension, Hypoglycemia, MI (myocardial infarction) (HCC), Peripheral vascular disease (HCC), Takayasu's arteritis (HCC), Tibial fracture, Umbilical hernia, Under care of team, Under care of team, Under care of team, Unspecified cerebral artery occlusion with cerebral infarction, and Wears partial dentures.    Past Surgical History:   has a past surgical history that includes Cardiac catheterization (12/2010); Cardiac surgery (1993); Vulva surgery (2001); vascular surgery; vascular surgery (5/12, 6/12); femoral bypass (5/12, 6/12); other surgical history (04/2016); Bladder surgery (05/17/2016); other surgical history; other surgical 
the  posterior genu of the left cavernous carotid.  3. There is no additional flow-limiting (greater than 50%) stenosis or   occlusion identified in the intracranial circulation otherwise. No definite   aneurysm or vascular malformation.  4. Although no evidence of acute infarction, mass, or hemorrhage is seen, CT is  relatively insensitive for the detection of hypoxia/ischemia within the first  24-48 hours, and an MRI scan may be indicated.    1. Patient is status post prior stent placement within the left common carotid artery with abnormal morphology of the stent and suggestion of in stent  stenosis estimated at up to 75%. There is an approximate 45% stenosis  identified in the left internal carotid artery just past its origin secondary  to partially calcified mixed attenuation atheromatous plaque at the bifurcation  which extends into the origin of this vessel.  2. The right carotid system is widely patent with no flow-limiting stenosis or  occlusion. No dissection or pseudoaneurysm.  3. The codominant vertebral arteries are patent in appearance.  4. The brachiocephalic artery is severely diseased with mixed attenuation  ulcerated partially calcified atheromatous plaques. Of note, the origin of this  vessel was not included in the imaged volume and origin stenosis is not well  evaluated. The diseased segment of this vessel results in approximately 60%  stenosis.  5.The left subclavian artery origin is occluded. The vessel is reconstituted  past its origin and is likely filling in a retrograde fashion from the left  vertebral artery, a potential source of subclavian steal syndrome.    MRI brain limited: 4/6/2024::   1. Multiple acute infarcts in the right cerebral convexity, left occipital  lobe and right cerebellar hemisphere. No mass effect or midline shift.  2. No evidence of an acute intracranial hemorrhage.    ASSESSMENT:     A 66-year-old woman, previously admitted to [facility] ICU for a minor 
vascular malformation. 2. There is intracranial atherosclerosis involving the cavernous carotids bilaterally contributing to an approximate 70% stenosis along the posterior genu of the right cavernous carotid and approximately 50% stenosis along the posterior genu of the left cavernous carotid. 3. There is no additional flow-limiting (greater than 50%) stenosis or occlusion identified in the intracranial circulation otherwise. No definite aneurysm or vascular malformation. 4. Although no evidence of acute infarction, mass, or hemorrhage is seen, CT is relatively insensitive for the detection of hypoxia/ischemia within the first 24-48 hours, and an MRI scan may be indicated.     CT HEAD WO CONTRAST    Result Date: 4/6/2024  EXAMINATION: CT HEAD WO CONTRAST STROKE HISTORY: Stroke, left-sided weakness, facial droop, slurred speech COMPARISON: 6/2/2014 TECHNIQUE: CT examination of the head without IV contrast. Helical acquisition technique was utilized with multiplanar reformatted images obtained. Dose reduction techniques were achieved by using automated exposure control and/or adjustment of mA and/or kV according to patient size and/or use of iterative reconstruction technique. FINDINGS: There is patient motion limiting anatomic resolution. Ventricular system and CSF spaces are age appropriate. The gray and white matter differentiation is preserved. Acute hemorrhage or mass effect is not demonstrated. No abnormal extraaxial fluid collection is seen. Nonspecific white matter changes are noted. The periventricular, external capsule, insular, corona radiata, left greater than right. These findings have worsened from 6/2/2014. Age undetermined. No associated mass effect is evident. Chronic posterior right ethmoid sinus inflammatory changes noted. Mastoid air cells and middle ear are clear. Displaced calvarium fracture is not seen. Calcified atheromatous plaque of the intracavernous internal carotid artery is noted.     1.

## 2024-04-09 ENCOUNTER — APPOINTMENT (OUTPATIENT)
Age: 64
DRG: 045 | End: 2024-04-09
Payer: COMMERCIAL

## 2024-04-09 LAB
ANION GAP SERPL CALCULATED.3IONS-SCNC: 11 MMOL/L (ref 9–16)
ANTI-XA UNFRAC HEPARIN: 0.2 IU/L
ANTI-XA UNFRAC HEPARIN: 0.41 IU/L
BUN SERPL-MCNC: 23 MG/DL (ref 8–23)
CALCIUM SERPL-MCNC: 9 MG/DL (ref 8.6–10.4)
CHLORIDE SERPL-SCNC: 103 MMOL/L (ref 98–107)
CO2 SERPL-SCNC: 22 MMOL/L (ref 20–31)
CREAT SERPL-MCNC: 1.3 MG/DL (ref 0.5–0.9)
ECHO AO ROOT DIAM: 3.2 CM
ECHO AO ROOT INDEX: 2.09 CM/M2
ECHO AV AREA PEAK VELOCITY: 1.8 CM2
ECHO AV AREA VTI: 1.8 CM2
ECHO AV AREA/BSA PEAK VELOCITY: 1.2 CM2/M2
ECHO AV AREA/BSA VTI: 1.2 CM2/M2
ECHO AV MEAN GRADIENT: 3 MMHG
ECHO AV MEAN VELOCITY: 0.8 M/S
ECHO AV PEAK GRADIENT: 8 MMHG
ECHO AV PEAK VELOCITY: 1.4 M/S
ECHO AV VELOCITY RATIO: 0.71
ECHO AV VTI: 26.6 CM
ECHO BSA: 1.53 M2
ECHO EST RA PRESSURE: 8 MMHG
ECHO IVC EXP: 1.3 CM
ECHO LA AREA 2C: 16.4 CM2
ECHO LA AREA 4C: 16.4 CM2
ECHO LA DIAMETER INDEX: 2.35 CM/M2
ECHO LA DIAMETER: 3.6 CM
ECHO LA MAJOR AXIS: 4.9 CM
ECHO LA MINOR AXIS: 4.4 CM
ECHO LA TO AORTIC ROOT RATIO: 1.13
ECHO LA VOL BP: 47 ML (ref 22–52)
ECHO LA VOL MOD A2C: 48 ML (ref 22–52)
ECHO LA VOL MOD A4C: 41 ML (ref 22–52)
ECHO LA VOL/BSA BIPLANE: 31 ML/M2 (ref 16–34)
ECHO LA VOLUME INDEX MOD A2C: 31 ML/M2 (ref 16–34)
ECHO LA VOLUME INDEX MOD A4C: 27 ML/M2 (ref 16–34)
ECHO LV E' LATERAL VELOCITY: 10 CM/S
ECHO LV E' SEPTAL VELOCITY: 6 CM/S
ECHO LV EDV A2C: 47 ML
ECHO LV EDV A4C: 72 ML
ECHO LV EDV INDEX A4C: 47 ML/M2
ECHO LV EDV NDEX A2C: 31 ML/M2
ECHO LV EJECTION FRACTION A2C: 57 %
ECHO LV EJECTION FRACTION A4C: 65 %
ECHO LV EJECTION FRACTION BIPLANE: 60 % (ref 55–100)
ECHO LV ESV A2C: 20 ML
ECHO LV ESV A4C: 25 ML
ECHO LV ESV INDEX A2C: 13 ML/M2
ECHO LV ESV INDEX A4C: 16 ML/M2
ECHO LV GLOBAL LONGITUDINAL STRAIN (GLS): -15 %
ECHO LV INTERNAL DIMENSION DIASTOLE INDEX: 3.07 CM/M2
ECHO LV INTERNAL DIMENSION DIASTOLIC: 4.7 CM (ref 3.9–5.3)
ECHO LV IVSD: 0.6 CM (ref 0.6–0.9)
ECHO LV MASS 2D: 103.1 G (ref 67–162)
ECHO LV MASS INDEX 2D: 67.4 G/M2 (ref 43–95)
ECHO LV POSTERIOR WALL DIASTOLIC: 0.8 CM (ref 0.6–0.9)
ECHO LV RELATIVE WALL THICKNESS RATIO: 0.34
ECHO LVOT AREA: 2.5 CM2
ECHO LVOT AV VTI INDEX: 0.7
ECHO LVOT DIAM: 1.8 CM
ECHO LVOT MEAN GRADIENT: 2 MMHG
ECHO LVOT PEAK GRADIENT: 4 MMHG
ECHO LVOT PEAK VELOCITY: 1 M/S
ECHO LVOT STROKE VOLUME INDEX: 30.8 ML/M2
ECHO LVOT SV: 47.1 ML
ECHO LVOT VTI: 18.5 CM
ECHO MV A VELOCITY: 0.82 M/S
ECHO MV AREA VTI: 2 CM2
ECHO MV E DECELERATION TIME (DT): 242 MS
ECHO MV E VELOCITY: 0.66 M/S
ECHO MV E/A RATIO: 0.8
ECHO MV E/E' LATERAL: 6.6
ECHO MV E/E' RATIO (AVERAGED): 8.8
ECHO MV LVOT VTI INDEX: 1.25
ECHO MV MAX VELOCITY: 1 M/S
ECHO MV MEAN GRADIENT: 2 MMHG
ECHO MV MEAN VELOCITY: 0.6 M/S
ECHO MV PEAK GRADIENT: 4 MMHG
ECHO MV VTI: 23.2 CM
ECHO PV MAX VELOCITY: 0.8 M/S
ECHO PV PEAK GRADIENT: 3 MMHG
ECHO RA AREA 4C: 10 CM2
ECHO RIGHT VENTRICULAR SYSTOLIC PRESSURE (RVSP): 28 MMHG
ECHO RV BASAL DIMENSION: 3.4 CM
ECHO RV MID DIMENSION: 2.5 CM
ECHO RV TAPSE: 1.7 CM (ref 1.7–?)
ECHO TV REGURGITANT MAX VELOCITY: 2.24 M/S
ECHO TV REGURGITANT PEAK GRADIENT: 20 MMHG
GFR SERPL CREATININE-BSD FRML MDRD: 46 ML/MIN/1.73M2
GLUCOSE SERPL-MCNC: 107 MG/DL (ref 74–99)
POTASSIUM SERPL-SCNC: 4.3 MMOL/L (ref 3.7–5.3)
SODIUM SERPL-SCNC: 136 MMOL/L (ref 136–145)

## 2024-04-09 PROCEDURE — 6360000002 HC RX W HCPCS: Performed by: STUDENT IN AN ORGANIZED HEALTH CARE EDUCATION/TRAINING PROGRAM

## 2024-04-09 PROCEDURE — 99232 SBSQ HOSP IP/OBS MODERATE 35: CPT | Performed by: PHYSICAL MEDICINE & REHABILITATION

## 2024-04-09 PROCEDURE — 99232 SBSQ HOSP IP/OBS MODERATE 35: CPT | Performed by: INTERNAL MEDICINE

## 2024-04-09 PROCEDURE — 6370000000 HC RX 637 (ALT 250 FOR IP): Performed by: STUDENT IN AN ORGANIZED HEALTH CARE EDUCATION/TRAINING PROGRAM

## 2024-04-09 PROCEDURE — 36415 COLL VENOUS BLD VENIPUNCTURE: CPT

## 2024-04-09 PROCEDURE — 2060000000 HC ICU INTERMEDIATE R&B

## 2024-04-09 PROCEDURE — 99232 SBSQ HOSP IP/OBS MODERATE 35: CPT | Performed by: PSYCHIATRY & NEUROLOGY

## 2024-04-09 PROCEDURE — 2580000003 HC RX 258: Performed by: STUDENT IN AN ORGANIZED HEALTH CARE EDUCATION/TRAINING PROGRAM

## 2024-04-09 PROCEDURE — 80048 BASIC METABOLIC PNL TOTAL CA: CPT

## 2024-04-09 PROCEDURE — 93356 MYOCRD STRAIN IMG SPCKL TRCK: CPT

## 2024-04-09 PROCEDURE — 85520 HEPARIN ASSAY: CPT

## 2024-04-09 RX ORDER — AMLODIPINE BESYLATE 10 MG/1
10 TABLET ORAL DAILY
Status: DISCONTINUED | OUTPATIENT
Start: 2024-04-09 | End: 2024-04-10 | Stop reason: HOSPADM

## 2024-04-09 RX ORDER — HEPARIN 100 UNIT/ML
300 SYRINGE INTRAVENOUS PRN
Status: DISCONTINUED | OUTPATIENT
Start: 2024-04-09 | End: 2024-04-10 | Stop reason: HOSPADM

## 2024-04-09 RX ADMIN — HEPARIN SODIUM 20 UNITS/KG/HR: 10000 INJECTION, SOLUTION INTRAVENOUS at 00:52

## 2024-04-09 RX ADMIN — AMLODIPINE BESYLATE 10 MG: 10 TABLET ORAL at 10:27

## 2024-04-09 RX ADMIN — APIXABAN 5 MG: 5 TABLET, FILM COATED ORAL at 20:37

## 2024-04-09 RX ADMIN — ROSUVASTATIN CALCIUM 20 MG: 10 TABLET, COATED ORAL at 20:37

## 2024-04-09 RX ADMIN — SODIUM CHLORIDE, PRESERVATIVE FREE 10 ML: 5 INJECTION INTRAVENOUS at 08:39

## 2024-04-09 RX ADMIN — SODIUM CHLORIDE, PRESERVATIVE FREE 10 ML: 5 INJECTION INTRAVENOUS at 20:43

## 2024-04-09 RX ADMIN — ASPIRIN 81 MG 81 MG: 81 TABLET ORAL at 08:38

## 2024-04-09 RX ADMIN — APIXABAN 5 MG: 5 TABLET, FILM COATED ORAL at 10:27

## 2024-04-09 ASSESSMENT — PAIN SCALES - GENERAL
PAINLEVEL_OUTOF10: 0

## 2024-04-09 ASSESSMENT — ENCOUNTER SYMPTOMS
SORE THROAT: 0
PHOTOPHOBIA: 0
SHORTNESS OF BREATH: 0
VOMITING: 0
COLOR CHANGE: 0
ABDOMINAL PAIN: 0
COUGH: 0
NAUSEA: 0

## 2024-04-10 ENCOUNTER — TELEPHONE (OUTPATIENT)
Dept: ONCOLOGY | Age: 64
End: 2024-04-10

## 2024-04-10 VITALS
HEART RATE: 90 BPM | DIASTOLIC BLOOD PRESSURE: 81 MMHG | RESPIRATION RATE: 22 BRPM | OXYGEN SATURATION: 93 % | HEIGHT: 62 IN | SYSTOLIC BLOOD PRESSURE: 136 MMHG | BODY MASS INDEX: 21.7 KG/M2 | TEMPERATURE: 98.6 F | WEIGHT: 117.95 LBS

## 2024-04-10 LAB
ANION GAP SERPL CALCULATED.3IONS-SCNC: 11 MMOL/L (ref 9–16)
BUN SERPL-MCNC: 27 MG/DL (ref 8–23)
CALCIUM SERPL-MCNC: 8.2 MG/DL (ref 8.6–10.4)
CHLORIDE SERPL-SCNC: 103 MMOL/L (ref 98–107)
CO2 SERPL-SCNC: 22 MMOL/L (ref 20–31)
CREAT SERPL-MCNC: 1.3 MG/DL (ref 0.5–0.9)
ERYTHROCYTE [DISTWIDTH] IN BLOOD BY AUTOMATED COUNT: 13.2 % (ref 11.8–14.4)
GFR SERPL CREATININE-BSD FRML MDRD: 47 ML/MIN/1.73M2
GLUCOSE SERPL-MCNC: 100 MG/DL (ref 74–99)
HCT VFR BLD AUTO: 28.5 % (ref 36.3–47.1)
HGB BLD-MCNC: 9.2 G/DL (ref 11.9–15.1)
MCH RBC QN AUTO: 32.2 PG (ref 25.2–33.5)
MCHC RBC AUTO-ENTMCNC: 32.3 G/DL (ref 28.4–34.8)
MCV RBC AUTO: 99.7 FL (ref 82.6–102.9)
NRBC BLD-RTO: 0 PER 100 WBC
PLATELET # BLD AUTO: 721 K/UL (ref 138–453)
PMV BLD AUTO: 9.1 FL (ref 8.1–13.5)
POTASSIUM SERPL-SCNC: 4.3 MMOL/L (ref 3.7–5.3)
RBC # BLD AUTO: 2.86 M/UL (ref 3.95–5.11)
SODIUM SERPL-SCNC: 136 MMOL/L (ref 136–145)
WBC OTHER # BLD: 7.9 K/UL (ref 3.5–11.3)

## 2024-04-10 PROCEDURE — 99231 SBSQ HOSP IP/OBS SF/LOW 25: CPT | Performed by: INTERNAL MEDICINE

## 2024-04-10 PROCEDURE — 6360000002 HC RX W HCPCS

## 2024-04-10 PROCEDURE — 2580000003 HC RX 258: Performed by: STUDENT IN AN ORGANIZED HEALTH CARE EDUCATION/TRAINING PROGRAM

## 2024-04-10 PROCEDURE — 85027 COMPLETE CBC AUTOMATED: CPT

## 2024-04-10 PROCEDURE — 36415 COLL VENOUS BLD VENIPUNCTURE: CPT

## 2024-04-10 PROCEDURE — 99232 SBSQ HOSP IP/OBS MODERATE 35: CPT | Performed by: PHYSICAL MEDICINE & REHABILITATION

## 2024-04-10 PROCEDURE — 80048 BASIC METABOLIC PNL TOTAL CA: CPT

## 2024-04-10 PROCEDURE — 99232 SBSQ HOSP IP/OBS MODERATE 35: CPT | Performed by: PSYCHIATRY & NEUROLOGY

## 2024-04-10 PROCEDURE — 6370000000 HC RX 637 (ALT 250 FOR IP): Performed by: STUDENT IN AN ORGANIZED HEALTH CARE EDUCATION/TRAINING PROGRAM

## 2024-04-10 RX ADMIN — APIXABAN 5 MG: 5 TABLET, FILM COATED ORAL at 08:40

## 2024-04-10 RX ADMIN — Medication 300 UNITS: at 14:28

## 2024-04-10 RX ADMIN — ASPIRIN 81 MG 81 MG: 81 TABLET ORAL at 08:40

## 2024-04-10 RX ADMIN — SODIUM CHLORIDE, PRESERVATIVE FREE 5 ML: 5 INJECTION INTRAVENOUS at 08:44

## 2024-04-10 RX ADMIN — AMLODIPINE BESYLATE 10 MG: 10 TABLET ORAL at 08:40

## 2024-04-10 ASSESSMENT — ENCOUNTER SYMPTOMS
VOMITING: 0
SORE THROAT: 0
COUGH: 0
COLOR CHANGE: 0
NAUSEA: 0
ABDOMINAL PAIN: 0
PHOTOPHOBIA: 0
SHORTNESS OF BREATH: 0

## 2024-04-10 ASSESSMENT — PAIN SCALES - GENERAL
PAINLEVEL_OUTOF10: 0

## 2024-04-10 NOTE — TELEPHONE ENCOUNTER
Name: Daja Espinosa  : 1960  MRN: [id number]    Oncology Navigation Follow-Up Note    Contact Type:  Inpatient    Notes: Chart reviewed.  Daja is currently inpatient after being transferred from [facility] to [facility] for stroke. Pt is being discharged home today per plan of care note.  Pt is scheduled for follow up on 24 with Dr. Shepard.    Electronically signed by Ave Arreaga RN on 4/10/2024 at 3:28 PM

## 2024-04-10 NOTE — DISCHARGE SUMMARY
Patient discharged home with family. Discharge instructions given, all questions answered, all belongings sent with patient.

## 2024-04-10 NOTE — PLAN OF CARE
Problem: Discharge Planning  Goal: Discharge to home or other facility with appropriate resources  4/7/2024 0504 by Kandy Hartman RN  Outcome: Progressing  4/6/2024 1844 by Beth Mayers, RN  Outcome: Progressing  Flowsheets (Taken 4/6/2024 1700)  Discharge to home or other facility with appropriate resources:   Identify barriers to discharge with patient and caregiver   Identify discharge learning needs (meds, wound care, etc)   Arrange for needed discharge resources and transportation as appropriate   Refer to discharge planning if patient needs post-hospital services based on physician order or complex needs related to functional status, cognitive ability or social support system     Problem: Skin/Tissue Integrity  Goal: Absence of new skin breakdown  Description: 1.  Monitor for areas of redness and/or skin breakdown  2.  Assess vascular access sites hourly  3.  Every 4-6 hours minimum:  Change oxygen saturation probe site  4.  Every 4-6 hours:  If on nasal continuous positive airway pressure, respiratory therapy assess nares and determine need for appliance change or resting period.  4/7/2024 0504 by Kandy Hartman RN  Outcome: Progressing  4/6/2024 1844 by Beth Mayers, RN  Outcome: Progressing     Problem: Safety - Adult  Goal: Free from fall injury  4/7/2024 0504 by Kandy Hartman RN  Outcome: Progressing  4/6/2024 1844 by Beth Mayers RN  Outcome: Progressing     Problem: Pain  Goal: Verbalizes/displays adequate comfort level or baseline comfort level  4/7/2024 0504 by Kandy Hartman RN  Outcome: Progressing     Problem: Neurosensory - Adult  Goal: Achieves stable or improved neurological status  4/7/2024 0504 by Kandy Hartman RN  Outcome: Progressing  4/6/2024 1844 by Beth Mayers, RN  Outcome: Progressing     Problem: Neurosensory - Adult  Goal: Achieves maximal functionality and self care  4/7/2024 0504 by Kandy Hartman RN  Outcome: Progressing  4/6/2024 1844 by 
  Problem: Discharge Planning  Goal: Discharge to home or other facility with appropriate resources  4/8/2024 0504 by Kandy Hartman RN  Outcome: Progressing  4/7/2024 1918 by Beth Mayers RN  Outcome: Progressing  Flowsheets (Taken 4/7/2024 0800)  Discharge to home or other facility with appropriate resources:   Identify barriers to discharge with patient and caregiver   Arrange for needed discharge resources and transportation as appropriate   Identify discharge learning needs (meds, wound care, etc)   Refer to discharge planning if patient needs post-hospital services based on physician order or complex needs related to functional status, cognitive ability or social support system     Problem: Skin/Tissue Integrity  Goal: Absence of new skin breakdown  Description: 1.  Monitor for areas of redness and/or skin breakdown  2.  Assess vascular access sites hourly  3.  Every 4-6 hours minimum:  Change oxygen saturation probe site  4.  Every 4-6 hours:  If on nasal continuous positive airway pressure, respiratory therapy assess nares and determine need for appliance change or resting period.  4/8/2024 0504 by Kandy Hartman RN  Outcome: Progressing  4/7/2024 1918 by Beth Mayers, RN  Outcome: Progressing     Problem: Safety - Adult  Goal: Free from fall injury  4/8/2024 0504 by Kandy Hartman RN  Outcome: Progressing  4/7/2024 1918 by Beth Mayers RN  Outcome: Progressing  Flowsheets (Taken 4/7/2024 0718)  Free From Fall Injury: Instruct family/caregiver on patient safety     Problem: Pain  Goal: Verbalizes/displays adequate comfort level or baseline comfort level  4/8/2024 0504 by Kandy Hartman RN  Outcome: Progressing  4/7/2024 1918 by Beth Mayers, RN  Outcome: Progressing     Problem: Neurosensory - Adult  Goal: Achieves stable or improved neurological status  4/8/2024 0504 by Kandy Hartman RN  Outcome: Progressing  4/7/2024 1918 by Beth Mayers, RUBY  Outcome: 
  Problem: Discharge Planning  Goal: Discharge to home or other facility with appropriate resources  4/8/2024 1827 by Clotilde Mcallister, RN  Outcome: Progressing     Problem: Skin/Tissue Integrity  Goal: Absence of new skin breakdown  Description: 1.  Monitor for areas of redness and/or skin breakdown  2.  Assess vascular access sites hourly  3.  Every 4-6 hours minimum:  Change oxygen saturation probe site  4.  Every 4-6 hours:  If on nasal continuous positive airway pressure, respiratory therapy assess nares and determine need for appliance change or resting period.  4/8/2024 1827 by Clotilde Mcallister, RN  Outcome: Progressing     Problem: Safety - Adult  Goal: Free from fall injury  4/8/2024 1827 by Clotilde Mcallister, RN  Outcome: Progressing     
  Problem: Discharge Planning  Goal: Discharge to home or other facility with appropriate resources  4/9/2024 0556 by Sarahi Vincent RN  Outcome: Progressing  Flowsheets (Taken 4/8/2024 2000)  Discharge to home or other facility with appropriate resources: Identify barriers to discharge with patient and caregiver  4/8/2024 1827 by Clotilde Mcallister RN  Outcome: Progressing     Problem: Skin/Tissue Integrity  Goal: Absence of new skin breakdown  Description: 1.  Monitor for areas of redness and/or skin breakdown  2.  Assess vascular access sites hourly  3.  Every 4-6 hours minimum:  Change oxygen saturation probe site  4.  Every 4-6 hours:  If on nasal continuous positive airway pressure, respiratory therapy assess nares and determine need for appliance change or resting period.  4/9/2024 0556 by Sarahi Vincent RN  Outcome: Progressing  4/8/2024 1827 by Clotilde Mcallister RN  Outcome: Progressing     Problem: Safety - Adult  Goal: Free from fall injury  4/9/2024 0556 by Sarahi Vincent RN  Outcome: Progressing  Flowsheets (Taken 4/8/2024 2000)  Free From Fall Injury: Instruct family/caregiver on patient safety  4/8/2024 1827 by Clotilde Mcallister RN  Outcome: Progressing     Problem: Pain  Goal: Verbalizes/displays adequate comfort level or baseline comfort level  4/9/2024 0556 by Sarahi Vincent RN  Outcome: Progressing  4/8/2024 1827 by Clotilde Mcallister RN  Outcome: Progressing     
  Problem: Discharge Planning  Goal: Discharge to home or other facility with appropriate resources  Outcome: Progressing  Flowsheets (Taken 4/9/2024 2000)  Discharge to home or other facility with appropriate resources: Identify barriers to discharge with patient and caregiver     Problem: Skin/Tissue Integrity  Goal: Absence of new skin breakdown  Description: 1.  Monitor for areas of redness and/or skin breakdown  2.  Assess vascular access sites hourly  3.  Every 4-6 hours minimum:  Change oxygen saturation probe site  4.  Every 4-6 hours:  If on nasal continuous positive airway pressure, respiratory therapy assess nares and determine need for appliance change or resting period.  Outcome: Progressing     Problem: Safety - Adult  Goal: Free from fall injury  Outcome: Progressing  Flowsheets (Taken 4/9/2024 2000)  Free From Fall Injury: Instruct family/caregiver on patient safety     Problem: Pain  Goal: Verbalizes/displays adequate comfort level or baseline comfort level  Outcome: Progressing     Problem: Neurosensory - Adult  Goal: Achieves stable or improved neurological status  Outcome: Progressing  Flowsheets (Taken 4/9/2024 2000)  Achieves stable or improved neurological status: Assess for and report changes in neurological status  Goal: Achieves maximal functionality and self care  Outcome: Progressing  Flowsheets (Taken 4/9/2024 2000)  Achieves maximal functionality and self care: Monitor swallowing and airway patency with patient fatigue and changes in neurological status     
mobility to safest level of function  Outcome: Progressing  Goal: Maintain proper alignment of affected body part  Outcome: Progressing  Goal: Return ADL status to a safe level of function  Outcome: Progressing     Problem: Gastrointestinal - Adult  Goal: Maintains or returns to baseline bowel function  Outcome: Progressing  Goal: Maintains adequate nutritional intake  Outcome: Progressing     Problem: Genitourinary - Adult  Goal: Absence of urinary retention  Outcome: Progressing     Problem: Infection - Adult  Goal: Absence of infection at discharge  Outcome: Progressing  Goal: Absence of infection during hospitalization  Outcome: Progressing     Problem: Metabolic/Fluid and Electrolytes - Adult  Goal: Electrolytes maintained within normal limits  Outcome: Progressing  Goal: Hemodynamic stability and optimal renal function maintained  Outcome: Progressing     Problem: Hematologic - Adult  Goal: Maintains hematologic stability  Outcome: Progressing     
and assess for signs and symptoms of volume excess or deficit   Monitor intake, output and patient weight     Problem: Hematologic - Adult  Goal: Maintains hematologic stability  Outcome: Progressing  Flowsheets (Taken 4/7/2024 0800)  Maintains hematologic stability:   Assess for signs and symptoms of bleeding or hemorrhage   Monitor labs for bleeding or clotting disorders     Problem: ABCDS Injury Assessment  Goal: Absence of physical injury  Outcome: Progressing  Flowsheets (Taken 4/7/2024 0718)  Absence of Physical Injury: Implement safety measures based on patient assessment     
Beth Mayers RN  Outcome: Adequate for Discharge  Flowsheets (Taken 4/10/2024 0800)  Absence of infection at discharge:   Assess and monitor for signs and symptoms of infection   Monitor lab/diagnostic results   Monitor all insertion sites i.e., indwelling lines, tubes and drains   Administer medications as ordered   Instruct and encourage patient and family to use good hand hygiene technique   Identify and instruct in appropriate isolation precautions for identified infection/condition  4/10/2024 0601 by Sarahi Vincent RN  Outcome: Progressing  Flowsheets (Taken 4/9/2024 2000)  Absence of infection at discharge: Assess and monitor for signs and symptoms of infection  Goal: Absence of infection during hospitalization  4/10/2024 1443 by Beth Mayers RN  Outcome: Adequate for Discharge  Flowsheets (Taken 4/10/2024 0800)  Absence of infection during hospitalization:   Assess and monitor for signs and symptoms of infection   Monitor lab/diagnostic results   Monitor all insertion sites i.e., indwelling lines, tubes and drains   Administer medications as ordered   Instruct and encourage patient and family to use good hand hygiene technique   Identify and instruct in appropriate isolation precautions for identified infection/condition  4/10/2024 0601 by Sarahi Vincent RN  Outcome: Progressing  Flowsheets (Taken 4/9/2024 2000)  Absence of infection during hospitalization: Assess and monitor for signs and symptoms of infection     Problem: Metabolic/Fluid and Electrolytes - Adult  Goal: Electrolytes maintained within normal limits  4/10/2024 1443 by Beth Mayers RN  Outcome: Adequate for Discharge  Flowsheets (Taken 4/10/2024 0800)  Electrolytes maintained within normal limits:   Monitor labs and assess patient for signs and symptoms of electrolyte imbalances   Administer electrolyte replacement as ordered   Monitor response to electrolyte replacements, including repeat lab results as

## 2024-04-10 NOTE — CARE COORDINATION
Transitional Planning  Spoke to patient and family, discussed transitional plan, has supportive family.  Declines ARU and would like to go to outpatient PT.  Has transportation.      
Transitional Planning:   Spoke with pt/ granddaughter and notified of discharge order. Pt states home with granddaughter to transport and outpt therapy is plan. Pt denies any needs at this timeDischarge Report    [facility]  [facility] Case Management Department  Written by: REBECCA CONWAY    Patient Name: Daja THIBODEAUX Jesús  Attending Provider: Faith Lema MD  Admit Date: 2024  2:09 PM  MRN: [id number]  Account: [id number]                     : 1960  Discharge Date:       Disposition: home    REBECCA CONWAY    
complications  Potential Assistance needed at discharge: (P) Skilled Nursing Facility            Potential DME:    Patient expects to discharge to: (P) House  Plan for transportation at discharge:      Financial    Payor: [facility] / Plan: [facility] MEDICAID / Product Type: *No Product type* /     Does insurance require precert for SNF: Yes    Potential assistance Purchasing Medications: (P) No  Meds-to-Beds request:        [facility] [address] [place] [address] - [address] [facility] [phone] - F [phone]  [address] [facility] [address]  Phone: [phone] Fax: [phone]      Notes:    Factors facilitating achievement of predicted outcomes: Family support, Cooperative, and Pleasant    Barriers to discharge: Medical complications    Additional Case Management Notes: Plan home. Transportation daughter.     The Plan for Transition of Care is related to the following treatment goals of Acute ischemic stroke (HCC) [I63.9]  Stroke-like symptoms [R29.90]    IF APPLICABLE: The Patient and/or patient representative Daja and her family were provided with a choice of provider and agrees with the discharge plan. Freedom of choice list with basic dialogue that supports the patient's individualized plan of care/goals and shares the quality data associated with the providers was provided to:     Patient Representative Name:       The Patient and/or Patient Representative Agree with the Discharge Plan?      MATHIEU FRANCO RN  Case Management Department  Ph: [phone]

## 2024-04-10 NOTE — DISCHARGE INSTRUCTIONS
Continue aspirin, Eliquis    Follow up Dr. Aldridge, Dr. Razo, Dr. Cavanaugh, Dr. Shepard    Outpatient physical therapy, occupational therapy    Tranexamic Acid Counseling:  Patient advised of the small risk of bleeding problems with tranexamic acid. They were also instructed to call if they developed any nausea, vomiting or diarrhea. All of the patient's questions and concerns were addressed.

## 2024-04-11 ENCOUNTER — OFFICE VISIT (OUTPATIENT)
Dept: ONCOLOGY | Age: 64
End: 2024-04-11
Payer: COMMERCIAL

## 2024-04-11 ENCOUNTER — TELEPHONE (OUTPATIENT)
Dept: ONCOLOGY | Age: 64
End: 2024-04-11

## 2024-04-11 VITALS
DIASTOLIC BLOOD PRESSURE: 84 MMHG | HEIGHT: 62 IN | SYSTOLIC BLOOD PRESSURE: 125 MMHG | HEART RATE: 70 BPM | WEIGHT: 116 LBS | TEMPERATURE: 97 F | BODY MASS INDEX: 21.35 KG/M2 | RESPIRATION RATE: 18 BRPM

## 2024-04-11 DIAGNOSIS — C67.9 MALIGNANT NEOPLASM OF URINARY BLADDER, UNSPECIFIED SITE (HCC): Primary | ICD-10-CM

## 2024-04-11 DIAGNOSIS — I63.429 CEREBROVASCULAR ACCIDENT (CVA) DUE TO EMBOLISM OF ANTERIOR CEREBRAL ARTERY, UNSPECIFIED BLOOD VESSEL LATERALITY (HCC): ICD-10-CM

## 2024-04-11 DIAGNOSIS — D75.838 REACTIVE THROMBOCYTOSIS: ICD-10-CM

## 2024-04-11 DIAGNOSIS — T45.1X5A ADVERSE EFFECT OF CHEMOTHERAPY, INITIAL ENCOUNTER: ICD-10-CM

## 2024-04-11 PROCEDURE — 1036F TOBACCO NON-USER: CPT | Performed by: INTERNAL MEDICINE

## 2024-04-11 PROCEDURE — 3079F DIAST BP 80-89 MM HG: CPT | Performed by: INTERNAL MEDICINE

## 2024-04-11 PROCEDURE — 3074F SYST BP LT 130 MM HG: CPT | Performed by: INTERNAL MEDICINE

## 2024-04-11 PROCEDURE — G8420 CALC BMI NORM PARAMETERS: HCPCS | Performed by: INTERNAL MEDICINE

## 2024-04-11 PROCEDURE — 3017F COLORECTAL CA SCREEN DOC REV: CPT | Performed by: INTERNAL MEDICINE

## 2024-04-11 PROCEDURE — 1111F DSCHRG MED/CURRENT MED MERGE: CPT | Performed by: INTERNAL MEDICINE

## 2024-04-11 PROCEDURE — G8427 DOCREV CUR MEDS BY ELIG CLIN: HCPCS | Performed by: INTERNAL MEDICINE

## 2024-04-11 PROCEDURE — 99214 OFFICE O/P EST MOD 30 MIN: CPT | Performed by: INTERNAL MEDICINE

## 2024-04-11 NOTE — PROGRESS NOTES
Endovascular Neurosurgery Progress Note    SUBJECTIVE:     Eating breakfast, watching TV, no complaint overnight    Review of Systems:  CONSTITUTIONAL:  negative for fevers, chills, fatigue and malaise    EYES:  negative for double vision, blurred vision and photophobia     HEENT:  negative for tinnitus, epistaxis and sore throat    RESPIRATORY:  negative for cough, shortness of breath, wheezing    CARDIOVASCULAR:  negative for chest pain, palpitations, syncope, edema    GASTROINTESTINAL:  negative for nausea, vomiting    GENITOURINARY:  negative for incontinence    MUSCULOSKELETAL:  negative for neck or back pain    NEUROLOGICAL:  Negative for weakness and tingling  negative for headaches and dizziness    PSYCHIATRIC:  negative for anxiety      Review of systems otherwise negative.      OBJECTIVE:     Vitals:    04/09/24 1100   BP: (!) 172/90   Pulse: 80   Resp: 20   Temp: 98 °F (36.7 °C)   SpO2: 100%        General:  Gen: normal habitus, NAD  HEENT: NCAT, mucosa moist  Cvs: RRR, S1 S2 normal  Resp: symmetric unlabored breathing  Abd: s/nd/nt  Ext: no edema  Skin: no lesions seen, warm and dry    Neuro:  Gen: awake and alert, oriented x3.   Lang/speech: no aphasia or dysarthria. Follows commands.  CN: PERRL, EOMI, VFF, V1-3 intact, face symmetric, hearing intact, shoulder shrug symmetric, tongue midline  Motor: grossly 5/5 R UE and LE b/l, left arm 4+/5, left hand 4/5  Sense: LT intact in all 4 ext.  Coord: FTN and HTS intact b/l  DTR: deferred  Gait: deferred    NIH Stroke Scale:   1a  Level of consciousness: 0 - alert; keenly responsive   1b. LOC questions:  0 - answers both questions correctly   1c. LOC commands: 0 - performs both tasks correctly   2.  Best Gaze: 0 - normal   3. Visual: 0 - no visual loss   4. Facial Palsy: 0 - normal symmetric movement   5a. Motor left arm: 1 - drift, limb holds 90 (or 45) degrees but drifts down before full 10 seconds: does not hit bed   5b.  Motor right arm: 0 - no drift, 
  [facility]uma Note    PATIENT NAME: Daja Espinosa    Shift date: 04/06/2024  Shift day: Saturday   Shift # 1    Room # 23/23     Name: Daja Espinosa            Age: 63 y.o.  Gender: female          Sikhism: Other   Place of Moravian:     Trauma/Incident type: Stroke Alert  Admit Date & Time: 4/6/2024  2:09 PM  TRAUMA NAME: None    ADVANCE DIRECTIVES IN CHART?  No    NAME OF DECISION MAKER:     RELATIONSHIP OF DECISION MAKER TO PATIENT:     PATIENT/EVENT DESCRIPTION:  Daja Espinosa is a 63 y.o. female who arrived via ground ambulance as stroke alert. Patient was conscious and responsive when  visited. Patient to be admitted to 23/23.       SPIRITUAL ASSESSMENT-INTERVENTION-OUTCOME:  No spiritual assessment was carried out because patient was having a rough time. However, patient was receptive to spiritual care. When asked how she was feeling, patient responded, \"I have had better days.\" Family was present and open to prayer.  provided ministry of presence, offered support and prayed with patient and family. Patient and family expressed appreciation for the spiritual and emotional support they received.      PATIENT BELONGINGS:  No belongings noted    ANY BELONGINGS OF SIGNIFICANT VALUE NOTED:  Unknown    REGISTRATION STAFF NOTIFIED?  Yes    WHAT IS YOUR SPIRITUAL CARE PLAN FOR THIS PATIENT?:  Follow up visits recommended for ongoing assessment of patient's condition and for more spiritual and emotional support.     Electronically signed by Chaplain Del, on 4/6/2024 at 3:15 PM.  [facility]  [phone]    
  Today's Date: 4/8/2024  Patient Name: Daja Espinosa  Date of admission: 4/6/2024  2:09 PM  Patient's age: 63 y.o., 1960  Admission Dx: Acute ischemic stroke (HCC) [I63.9]  Stroke-like symptoms [R29.90]    Reason for Consult: management recommendations  Requesting Physician: Faith Lema MD    CHIEF COMPLAINT:  acute stroke , likely embolic     INTERIM HISTORY  Pt condition is improving  Able to move her left arm better   Plan anticoagulation with DOAC, likely eliquis     HISTORY OF PRESENT ILLNESS:      The patient is a 63 y.o.   female who is admitted to the hospital for acute left upper extremity weakness and facial droop.  The patient was brought to emergency room where she was found to have acute stroke.  Initial CT scan was unremarkable.  She was offered TNK but she refused.  She was started on aspirin and transitioned from outside hospital to [facility].  Where an MRI showed multiple areas of ischemic strokes.  Vascular evaluation through CTA showed prior stent in the common carotid artery.  However, the stent was deformed with in-stent stenosis leading up to 75%.  More distally, there is a calcified edematous plaque extending into the origin of the internal carotid artery leading to 45% segmental stenosis.  The right system was widely patent.  There was an atheromatous plaque with some ulceration but stenosis was less than 50%.  Additionally, there was mild stenosis less than 50% of the vertebral artery.  The left subclavian artery origin is occluded and its reconstituted proximal to its origin filling retrograde from the left vertebral artery.  Reviewing the MRI, the ischemic areas are in the right cerebral convexity, left occipital lobe and right cerebellar hemisphere.  These are not consistent with artery to artery embolization from the severely stenotic left carotid artery.  The patient is known to us with history of muscle invasive bladder cancer.  Undergoing neoadjuvant 
  Today's Date: 4/9/2024  Patient Name: Daja Espinosa  Date of admission: 4/6/2024  2:09 PM  Patient's age: 63 y.o., 1960  Admission Dx: Acute ischemic stroke (HCC) [I63.9]  Stroke-like symptoms [R29.90]    Reason for Consult: management recommendations  Requesting Physician: Faith Lema MD    CHIEF COMPLAINT:  acute stroke , likely embolic     INTERIM HISTORY  Patient seen and examined.  Labs and vitals reviewed.  No CBC to review today  Pt condition is improving  Eliquis started today, aspirin continues  Echo completed today, follow-up on results    HISTORY OF PRESENT ILLNESS:      The patient is a 63 y.o.   female who is admitted to the hospital for acute left upper extremity weakness and facial droop.  The patient was brought to emergency room where she was found to have acute stroke.  Initial CT scan was unremarkable.  She was offered TNK but she refused.  She was started on aspirin and transitioned from outside hospital to [facility].  Where an MRI showed multiple areas of ischemic strokes.  Vascular evaluation through CTA showed prior stent in the common carotid artery.  However, the stent was deformed with in-stent stenosis leading up to 75%.  More distally, there is a calcified edematous plaque extending into the origin of the internal carotid artery leading to 45% segmental stenosis.  The right system was widely patent.  There was an atheromatous plaque with some ulceration but stenosis was less than 50%.  Additionally, there was mild stenosis less than 50% of the vertebral artery.  The left subclavian artery origin is occluded and its reconstituted proximal to its origin filling retrograde from the left vertebral artery.  Reviewing the MRI, the ischemic areas are in the right cerebral convexity, left occipital lobe and right cerebellar hemisphere.  These are not consistent with artery to artery embolization from the severely stenotic left carotid artery.  The patient is known to 
[facility] Neurology   IN-PATIENT SERVICE   [facility]    Progress Note             Date:   4/8/2024  Patient name:  Daja Espinosa  Date of admission:  4/6/2024  2:09 PM  MRN:   [id number]  Account:  [id number]  YOB: 1960  PCP:    Sohail Garcia MD  Room:   [address] [place] [address]  Code Status:    Full Code    Chief Complaint:     Chief Complaint   Patient presents with    Cerebrovascular Accident       Interval hx:     The patient was seen and examined at bedside. Is vitally stable, alert and oriented x 3. No acute events overnight. Patients left upper extremity weakness is continuing to improve proximally. Still has increased weakness to the distal hand strength of the left upper extremity. She does note to prefer coumadin over eliquis but will speak with heme/onc for further recommendations as well.       Brief History of Present Illness:     The patient is a 63 y.o.  Non- / non  female who presents with past medical history of arthritis, alcohol abuse, CAD, bladder cancer status post hemotherapy, hypertension, hyperlipidemia, history of mild,, left CCA stenosis status post stent placement, peripheral vascular disease, history of former smoker 22 packs/year presented with complaint of sudden onset left upper extremity weakness and left facial droop and was taken to [facility] with last known well 10AM 4/6/24.    At outside hospital, given significant hemiplegia in the left upper extremity patient was offered TNK as CT head was unremarkable and she was qualifying but after discussion with family and bleeding risk of bleeding she refused. CTA head and neck prior stent placement in the left CCA with stenosis 75% on the left common carotid and 45% in the left ICA. No stenosis in the right carotid. Extracranial and atherosclerosis. Patient was taken to [facility] and was given renewal on aspirin 81 mg daily.    On arrival to the ED, patient had left upper extremity hemiplegia 
[facility] Neurology   IN-PATIENT SERVICE   [facility]    Progress Note             Date:   4/7/2024  Patient name:  Daja Espinosa  Date of admission:  4/6/2024  2:09 PM  MRN:   [id number]  Account:  [id number]  YOB: 1960  PCP:    Sohail Garcia MD  Room:   [address] [place] [address]  Code Status:    Full Code    Chief Complaint:     Chief Complaint   Patient presents with    Cerebrovascular Accident       Interval hx:     Seen and examined. Exam stable    Started heparin with plans to swtich to oral AC        Brief History of Present Illness:     The patient is a 63 y.o. female who presents with past medical history of arthritis, alcohol abuse, CAD, bladder cancer status post hemotherapy, hypertension, hyperlipidemia, history of mild,, left CCA stenosis status post stent placement, peripheral vascular disease, history of former smoker 22 packs/year presented with complaint of sudden onset left upper extremity weakness and left facial droop and was taken to [facility] with last known well 10AM 4/6/24.    At outside hospital, given significant hemiplegia in the left upper extremity patient was offered TNK as CT head was unremarkable and she was qualifying but after discussion with family and bleeding risk of bleeding she refused. CTA head and neck prior stent placement in the left CCA with stenosis 75% on the left common carotid and 45% in the left ICA. No stenosis in the right carotid. Extracranial and atherosclerosis. Patient was taken to [facility] and was given renewal on aspirin 81 mg daily.    On arrival to the ED, patient had left upper extremity hemiplegia and left facial droop otherwise no visual deficit or speech deficit. She did refuse TNK again given risk of bleeding. Patient was taken for stat MRI brain without contrast that showed DWI and FLAIR changes in multiple territories.    Due to the multivessel territory infarct, 2 show syndrome-tri-vessel territory infarct which is 
I explain MRI results to the patient and the family MRI results to the patient and the family and also explained that this is likely secondary from, and also explained that this is slightly secondary from underlying malignancy and we have hematologist and oncologist following.  Given multiple territory stroke we have to start her on heparin GTT after discussion with oncologist and we will continue to monitor.  Patient and family underst and the risk and agreeable for workup and hospitalization. 
Physical Medicine & Rehabilitation  Progress Note    4/10/2024 9:46 AM     CC: Ambulatory and ADL dysfunction due to multifocal CVA    63-year-old right-handed female with CVA, CAD, peripheral vascular disease, hypertension, hyperlipidemia, bladder cancer s/p resection and on chemo alcohol abuse admitted 4/6 MRI showed right cerebellar convexity left occipital lobe and right cerebellar hemisphere multiple in 4 on heparin drip transition to Eliquis, AYAZ pending    Hematology-appears embolic source hypercoagulable condition most likely related to ongoing malignancy preferred DOAC to warfarin watch anemia likely due to chemo trend hemoglobin resume chemotherapy as outpatient, on Eliquis    Endovascular asymptomatic left ICA continue aspirin heparin drip defer to oncology    Neuro trivessel territory infarct plan to switch to Coumadin or Eliquis when okay with heme-onc, TTE ordered    Subjective:   No complaints.  Feels well.  Daughter present.  They plan for home discharge    ROS:  Denies fevers, chills, sweats.  No chest pain, palpitations, lightheadedness.  Denies coughing, wheezing or shortness of breath.  Denies abdominal pain, nausea, diarrhea or constipation.  No new areas of joint pain.  Denies new areas of numbness or weakness.  Denies new anxiety or depression issues.  No new skin problems.    Rehabilitation:   PT:  Restrictions/Precautions: Up as Tolerated, Fall Risk   Transfers  Sit to Stand: Contact guard assistance  Stand to Sit: Contact guard assistance  Comment: STS performed x3 throughout session. First two attempts performed with use of RW, pt required verbal cueing for proper hand placement and max tactile assistance to maintain LUE on RW due to weakness. Third STS performed with no AD.  Ambulation  Surface: Level tile  Device: Rolling Walker  Assistance: Minimal assistance (Assist provided to maintain LUE on RW and RW progression)  Gait Deviations: Slow Corinna, Decreased head and trunk 
Stroke Follow up Phone Call    Hello this is Cherie from [facility] stroke team calling to see how you are doing since your d/c.       Medication List        START taking these medications      apixaban 5 MG Tabs tablet  Commonly known as: ELIQUIS  Take 1 tablet by mouth 2 times daily            CONTINUE taking these medications      amLODIPine 10 MG tablet  Commonly known as: NORVASC  Take 1 tablet by mouth daily     aspirin 81 MG chewable tablet  Take 1 tablet by mouth daily     atorvastatin 80 MG tablet  Commonly known as: LIPITOR  Take 1 tablet by mouth nightly     losartan 100 MG tablet  Commonly known as: COZAAR  Take 1 tablet by mouth daily     metoprolol tartrate 50 MG tablet  Commonly known as: LOPRESSOR  Take 1 tablet by mouth 2 times daily            ASK your doctor about these medications      lidocaine-prilocaine 2.5-2.5 % cream  Commonly known as: EMLA  Apply topically to port site 60 minutes before access as needed.               Where to Get Your Medications        These medications were sent to [address] -  [phone] - F [phone]  [address] [place] [address]      Phone: [phone]   apixaban 5 MG Tabs tablet         Can you tell me what medications you are taking?  [x]   Yes  []   No    Do you have any questions about your medications?   []   Yes  [x]   No    All medications reviewed with patient    Do you have a follow up apt. With the neurologist?   [x]   Yes  []   No  Provided number to [facility] [phone]    Do you now your risk factors for stroke?  [x]   Yes  []   No    Can you tell me the signs and symptoms of stroke?  [x]   Yes  []   No  BEFAST instructions provided    Do if you know what to do if you were having signs/symptoms of stroke?  [x]   Yes  []   No  Instructions to call 911 provided    Our goal is to provide the very best stroke care, on a scale of 1 to 10 with 10 as the very best who would 
(Right); Cystocopy (Left, 04/02/2024); and Anomalous venous return repair (N/A, 4/2/2024).           Assessment   Performance deficits / Impairments: Decreased functional mobility ;Decreased ADL status;Decreased strength;Decreased high-level IADLs;Decreased balance;Decreased endurance;Decreased fine motor control    Assessment: Pt seated edge of bed upon writer arrival. Pt agreeable for OT. Pt completes functional mobility and transfers with and without 2WW. Pt requires MIN A w/ walker to facilitate improved positioning of L hand, once pt engaging in mobility without walker pt presents CGA. Pt completes toileting, LB ADLS, grooming (Please see ADL LOF). Pt completes dynamic balance without unilateral support required CGA. Pt will benefit from cont OT to address deficits in ADL/IADL, endurance/balance, functional transfers/mobility, strength/ROM, through skilled intervention for safe return to PLOF.    Prognosis: Good  Decision Making: Medium Complexity  REQUIRES OT FOLLOW-UP: Yes  Activity Tolerance  Activity Tolerance: Patient Tolerated treatment well        Plan   Occupational Therapy Plan  Times Per Week: 3-4x/wk  Current Treatment Recommendations: Strengthening, Balance training, ROM, Functional mobility training, Endurance training, Wheelchair mobility training, Equipment evaluation, education, & procurement, Patient/Caregiver education & training, Safety education & training, Home management training, Self-Care / ADL     Restrictions  Restrictions/Precautions  Restrictions/Precautions: Up as Tolerated, Fall Risk  Required Braces or Orthoses?: No    Subjective   General  Patient assessed for rehabilitation services?: Yes  Family / Caregiver Present: Yes (Daughter present)  General Comment  Comments: RN ok'd for OT. Pt agreeable and reports no pain this date. Pt pleasant and agreeable this date.     Social/Functional History  Social/Functional History  Lives With: Spouse  Type of Home: House  Home Layout: 
10:21 AM  
[facility]  [facility] [place]  Electronically signed 4/8/2024 at 9:51 AM  
\"LEUKOCYTESUR\", \"YEAST\", \"GLUCOSEU\", \"BILIRUBINUR\" in the last 72 hours.    ocial/Functional History  Lives With: Spouse  Type of Home: House  Home Layout: Laundry in basement, One level  Home Access: Stairs to enter with rails  Entrance Stairs - Number of Steps: 2  Entrance Stairs - Rails: Left  Bathroom Shower/Tub: Walk-in shower  Bathroom Toilet: Standard  Bathroom Equipment: Shower chair, Grab bars around toilet  Home Equipment: None  ADL Assistance: Independent  Homemaking Assistance: Independent  Homemaking Responsibilities: Yes  Ambulation Assistance: Independent  Transfer Assistance: Independent  Active : Yes  Mode of Transportation: Car  Occupation: Retired  Type of Occupation: \"[employer]\"  Leisure & Hobbies: Taking care of grandchildren; gardening  Additional Comments: Pt reports family 24hr support upon discharge.    Impression:     History of CVA and now with new multifocal CVA-aspirin, Eliquis  Left upper limb weakness  Anemia-hemoglobin 9.6 monitor on Eliquis  CAD  PVD  HTN/HLD-Norvasc, crestor  Bladder cancer s/p resection, on chemo-clarify medication  Alcohol abuse  CKD creatinine 1.3     Recommendations:     Diagnosis:  Multifocal CVA with left upper limb weakness  Therapy: Has PT/OT needs  Medical Necessity: As above  Support: Lives with spouse  Rehab Recommendation: Currently would recommend acute inpatient rehabilitation once medically stable per primary service.  However, she may progress quickly with therapies.  She has a supportive family and is considering discharging to home.  If she does go home, would recommend outpatient PT and OT.  Discussed with neurology and case management  DVT Prophylaxis: On heparin river Mcgee MD       This note is created with the assistance of a speech recognition program.  While intending to generate a document that actually reflects the content of the visit, the document can still have some errors including those of syntax and sound a 
performed with use of RW, pt required verbal cueing for proper hand placement and max tactile assistance to maintain LUE on RW due to weakness. Third STS performed with no AD.  Ambulation  Surface: Level tile  Device: Rolling Walker  Assistance: Minimal assistance (Assist provided to maintain LUE on RW and RW progression)  Gait Deviations: Slow Joanna;Decreased head and trunk rotation  Distance: 50ft  More Ambulation?: Yes  Ambulation 2  Surface - 2: level tile  Device 2:  (Hand held assistance)  Assistance 2: Contact guard assistance  Quality of Gait 2: Slow, however steady joanna; max tactile assistance provided for LUE throughout ambulation  Gait Deviations: Slow Joanna  Distance: 50ft  Stairs/Curb  Stairs?: No     Balance  Posture: Fair  Sitting - Static: Good;-  Sitting - Dynamic: Fair;+  Standing - Static: Fair  Standing - Dynamic: Fair  Comments: standing balance assessed w/ no AD; pt able to sit EOB independently  AM-PAC - Mobility    AM-PAC Basic Mobility - Inpatient   How much help is needed turning from your back to your side while in a flat bed without using bedrails?: None  How much help is needed moving from lying on your back to sitting on the side of a flat bed without using bedrails?: None  How much help is needed moving to and from a bed to a chair?: None  How much help is needed standing up from a chair using your arms?: None  How much help is needed walking in hospital room?: A Little  How much help is needed climbing 3-5 steps with a railing?: A Little  AM-[facility] Inpatient Mobility Raw Score : 22  AM-PAC Inpatient T-Scale Score : 53.28  Mobility Inpatient CMS 0-100% Score: 20.91  Mobility Inpatient CMS G-Code Modifier : CJ    Goals  Short Term Goals  Time Frame for Short Term Goals: 14  Short Term Goal 1: Pt to perform bed mobility independently from flat surface  Short Term Goal 2: Pt to demonstrate functional transfers independently  Short Term Goal 3: Pt to ambulate 300ft w/ no AD 
discussion with the patient and family.  Records were reviewed in details.  Clinical, radiological and laboratory findings were reviewed as well  The patient has a muscle invasive bladder cancer and she is on neoadjuvant chemotherapy  The distribution of the strokes bilaterally suggest an embolic source  Hypercoagulable condition is most likely related to the ongoing malignancy  Eliquis started 4/9/2024  Anemia is likely related to chemotherapy.  We will trend the hemoglobin  Okay to be discharged from hematology oncology standpoint  Patient will need to follow-up with Dr. Shepard tomorrow in [place] at 4:45 PM.  Heme-onc navigator is following.      Discussed with patient and Nurse.      Thank you for asking us to see this patient.    Radha Muniz, APRN - CNP  [facility] Hematology/Oncology  4/10/2024, 9:00 AM   Attending Physician Statement  I have discussed the care of Daja Espinosa and I have examined the patient myselft and taken ros and hpi , including pertinent history and exam findings,  with the author of this note . I have reviewed the key elements of all parts of the encounter with the nurse practitioner/resident.  I agree with the assessment, plan and orders as documented by the above health care provider.  I have made necessary changes as deemed appropriate directly in the note.   doing well on Eiluis  Will see soon as outpatient to determine the timing of resuming chemo   More than 50% of the time was spent taking care of this patient in addition to the nurse practitioner time.  That also included history taking follow-up physical examination and review of system.    Electronically signed by Danni Moran MD       
endovascular. Prefer DOAC to warfarin if possible   We will follow with you.  Please watch hemoglobin and platelets  Anemia is likely related to chemotherapy.  We will exclude bleeding and trend the hemoglobin      Discussed with patient and Nurse.      Thank you for asking us to see this patient.    Danni Moran MD  Hematologist/Medical Oncologist  Cell: [phone]  
    1. Left upper extremity hemiplegia and left facial droop secondary to stroke in anterior and posterior circulation (improving)  2. History of bladder CA undergoing chemotherapy.    Plan:       Trivessel territory infarcts   - MRI brain as above, demonstrating Multiple acute infarcts in the right cerebral convexity, left occipital  lobe and right cerebellar hemisphere. No mass effect or midline shift.  MRI brain w/wo contrast  --CTA head and neck prior stent placement in the left CCA with stenosis 75% on the left common carotid and 45% in the left ICA. No stenosis in the right carotid. Extracranial and atherosclerosis.   -- Will discuss with cardiology the need for AYAZ at this time.   -- Heparin is discontinued and patient started on Eliquis 5 mg twice daily after discussion with patient and heme-onc.  --TTE is ordered   --PM&R consult is also ordered for the patient: Recommend ARU however if she continues to progress and with supportive family can be considered discharging home with outpatient PT OT.    History of bladder CA undergoing chemotherapy.   --Heme-onc following: Recommend DOAC anticoagulation.    Follow-up further recommendations after discussing case with the attending.  The plan was discussed with the patient, patient's family and the medical staff.   Consultations:   IP CONSULT TO HEM/ONC  IP CONSULT TO CARDIOLOGY  IP CONSULT TO ENDOVASCULAR NEUROSURGERY  IP CONSULT TO PHYSICAL MEDICINE REHAB    Frank Ceja M.D.  PGY3 Neurology Resident.   [facility]       
discussing case with the attending.  The plan was discussed with the patient, patient's family and the medical staff.   Consultations:   IP CONSULT TO HEM/ONC  IP CONSULT TO CARDIOLOGY  IP CONSULT TO ENDOVASCULAR NEUROSURGERY  IP CONSULT TO PHYSICAL MEDICINE REHAB    Sofía Razo MD  PGY-2 Neurology Resident

## 2024-04-11 NOTE — TELEPHONE ENCOUNTER
Name: Daja Espinosa  : 1960  MRN: [id number]    Oncology Navigation Follow-Up Note    Contact Type:  Medical Oncology    Notes: Met with Daja and daughter in clinic. Daja states she is doing much better.  Daja is to have a PET scan that is awaiting authorization.  F/u 24      Electronically signed by Ave Arreaga RN on 2024 at 4:57 PM

## 2024-04-11 NOTE — PROGRESS NOTES
improved and slurred speech resolved she still weak usually will start back on chemo within 3 to 4 weeks. Thrombocytosis/reactive  Will obtain a PET/CT/was not done  Reassess condition in 2 weeks            I spent a total of 60 minutes on the date of the service which included preparing to see the patient, face-to-face patient care, completing clinical documentation, obtaining and/or reviewing separately obtained history, performing a medically appropriate examination, counseling and educating the patient/family/caregiver and ordering medications, tests, or procedures.                                 Familia Shepard MD                          [facility]                            This note is created with the assistance of a speech recognition program.  While intending to generate a document that actually reflects the content of the visit, the document can still have some errors including those of syntax and sound a like substitutions which may escape proof reading.  It such instances, actual meaning can be extrapolated by contextual diversion.

## 2024-04-16 ENCOUNTER — HOSPITAL ENCOUNTER (OUTPATIENT)
Dept: VASCULAR LAB | Age: 64
Discharge: HOME | End: 2024-04-18
Attending: SURGERY
Payer: COMMERCIAL

## 2024-04-16 DIAGNOSIS — I65.23 BILATERAL CAROTID ARTERY STENOSIS: ICD-10-CM

## 2024-04-16 LAB
VAS LEFT BULB EDV: 11.5 CM/S
VAS LEFT BULB PSV: 74.9 CM/S
VAS LEFT CCA DIST EDV: 20.6 CM/S
VAS LEFT CCA DIST PSV: 72.3 CM/S
VAS LEFT CCA MID EDV: 20.58 CM/S
VAS LEFT CCA MID PSV: 65.87 CM/S
VAS LEFT CCA PROX EDV: 27.4 CM/S
VAS LEFT CCA PROX PSV: 106.2 CM/S
VAS LEFT ECA EDV: 14.11 CM/S
VAS LEFT ECA PSV: 74.9 CM/S
VAS LEFT ICA DIST EDV: 35.3 CM/S
VAS LEFT ICA DIST PSV: 98.4 CM/S
VAS LEFT ICA MID EDV: 25.4 CM/S
VAS LEFT ICA MID PSV: 118.1 CM/S
VAS LEFT ICA PROX EDV: 52.4 CM/S
VAS LEFT ICA PROX PSV: 153 CM/S
VAS LEFT ICA/CCA PSV: 2.11 NO UNITS
VAS LEFT SUBCLAVIAN DIST EDV: 7.3 CM/S
VAS LEFT SUBCLAVIAN DIST PSV: 52.5 CM/S
VAS LEFT SUBCLAVIAN MID EDV: 12.8 CM/S
VAS LEFT SUBCLAVIAN MID PSV: 81.8 CM/S
VAS LEFT SUBCLAVIAN PROX EDV: 12.8 CM/S
VAS LEFT SUBCLAVIAN PROX PSV: 73.9 CM/S
VAS LEFT VERTEBRAL EDV: 0 CM/S
VAS LEFT VERTEBRAL PSV: 72.5 CM/S
VAS RIGHT BULB EDV: 16.4 CM/S
VAS RIGHT BULB PSV: 59.2 CM/S
VAS RIGHT CCA DIST EDV: 20.8 CM/S
VAS RIGHT CCA DIST PSV: 103.2 CM/S
VAS RIGHT CCA MID EDV: 15.99 CM/S
VAS RIGHT CCA MID PSV: 112.88 CM/S
VAS RIGHT CCA PROX EDV: 20.7 CM/S
VAS RIGHT CCA PROX PSV: 93.8 CM/S
VAS RIGHT ECA EDV: 19.51 CM/S
VAS RIGHT ECA PSV: 133.8 CM/S
VAS RIGHT ICA DIST EDV: 29.4 CM/S
VAS RIGHT ICA DIST PSV: 102.3 CM/S
VAS RIGHT ICA MID EDV: 11.6 CM/S
VAS RIGHT ICA MID PSV: 100.3 CM/S
VAS RIGHT ICA PROX EDV: 17.5 CM/S
VAS RIGHT ICA PROX PSV: 112.2 CM/S
VAS RIGHT ICA/CCA PSV: 1.1 NO UNITS
VAS RIGHT VERTEBRAL EDV: 25.43 CM/S
VAS RIGHT VERTEBRAL PSV: 108.2 CM/S

## 2024-04-16 PROCEDURE — 93880 EXTRACRANIAL BILAT STUDY: CPT | Performed by: SURGERY

## 2024-04-16 PROCEDURE — 93880 EXTRACRANIAL BILAT STUDY: CPT

## 2024-04-17 ENCOUNTER — HOSPITAL ENCOUNTER (OUTPATIENT)
Dept: OCCUPATIONAL THERAPY | Age: 64
Setting detail: THERAPIES SERIES
Discharge: HOME | End: 2024-04-17
Payer: COMMERCIAL

## 2024-04-17 PROCEDURE — 97166 OT EVAL MOD COMPLEX 45 MIN: CPT

## 2024-04-17 NOTE — THERAPY EVALUATION
[facility] and [facility]         Phone: [phone]  Fax: [phone]    Outpatient Occupational Therapy Evaluation    Date: 2024  Patient: Daja Espinosa  : 1960  Account #: [id number]   Referring provider: Sofía Razo MD  Diagnosis: CVA   Treatment Diagnosis: CVA    Additional Pertinent Hx: Referred to OP OT services due to a CVA. Reported that this CVA occurred on 24 and that she was discharged from [facility] (in [place]) on 4/10/24. Reported that she starts chemotherapy for her bladder cancer on Monday, 24 and that this will occur every Monday thereafter for 5 hours. Patient requested to avoid therapy sessions on Monday starting on 24.      Onset date: 2024  Insurance information: [facility]   Total # of visits approved: 30 per physician order  Total # of visits to date: 1     Subjective:     Arrived to therapy session independently. Reported that she has a physical therapy evaluation scheduled for tomorrow, 24.     Pre Treatment Pain: No  Location:  N/A   Pain Rating: (0-10 scale) 0/10  Post Treatment Pain: No  Location:  N/A   Pain Rating: (0-10 scale) 0/10    Objective:     Hand dominance:  Right  Affected extremity: Left    FINE MOTOR COORDINATION   Right (seconds) Left (seconds)   9 Hole Peg Test 21.00 Unable      STRENGTH (AVERAGE SCORE)    Right   (pounds) Left   (pounds)    26.0 2.0      Lateral pinch 5.6 0.0   Corona pinch 5.0 0.6   Tip pinch 4.6 0.0   *bilateral  strength is normally symmetrical or up to 10% stronger on the dominant extremity, depending on the individual's activity level    WRIST/FOREARM        Right ROM (degrees) Left ROM (degrees)   Extension (60-70) WFL WFL   Flexion (70-80) WFL WFL   Radial Deviation (20-25) WFL WFL   Ulnar Deviation (30-40) WFL WFL   Forearm Pronation (80-90) WFL WFL   Forearm Supination (80-90) WFL WFL      ELBOW    Right ROM (degrees) Left ROM (degrees)   Flexion

## 2024-04-17 NOTE — PLAN OF CARE
[facility] and [facility]         Phone: [phone]  Fax: [phone]    Outpatient Occupational Therapy Plan of Care    Date: 2024  Patient: Daja Espinosa  : 1960  Account #: [id number]   Referring provider: Sofía Razo MD  CSN#: [id number]  Diagnosis: CVA    Objective:     Hand dominance:  Right  Affected extremity: Left     FINE MOTOR COORDINATION    Right (seconds) Left (seconds)   9 Hole Peg Test 21.00 Unable      STRENGTH (AVERAGE SCORE)    Right   (pounds) Left   (pounds)    26.0 2.0      Lateral pinch 5.6 0.0   Corona pinch 5.0 0.6   Tip pinch 4.6 0.0   *bilateral  strength is normally symmetrical or up to 10% stronger on the dominant extremity, depending on the individual's activity level     WRIST/FOREARM        Right ROM (degrees) Left ROM (degrees)   Extension (60-70) WFL WFL   Flexion (70-80) WFL WFL   Radial Deviation (20-25) WFL WFL   Ulnar Deviation (30-40) WFL WFL   Forearm Pronation (80-90) WFL WFL   Forearm Supination (80-90) WFL WFL      ELBOW    Right ROM (degrees) Left ROM (degrees)   Flexion (145-150) WFL WFL   Extension (0) WFL WFL       LEFT DIGITS       2nd digit ROM (degrees) 3rd digit ROM (degrees) 4th digit ROM (degrees) 5th digit ROM (degrees)   MCP extension/flexion (0-90) WFL WFL WFL WFL   PIP extension/flexion (0-100) -42/WFL -11/WFL WFL WFL   DIP extension/flexion (0-70) -22/WFL -24/WFL -16/WFL -30/WFL   (+ is used for hyperextension / - is used for extensor lag per ASHT)      RIGHT DIGITS    2nd digit ROM (degrees) 3rd digit ROM (degrees) 4th digit ROM (degrees) 5th digit ROM (degrees)   MCP extension/flexion (0-90) WFL WFL WFL WFL   PIP extension/flexion (0-100) WFL WFL WFL WFL   DIP extension/flexion (0-70) WFL WFL WFL WFL   (+ is used for hyperextension / - is used for extensor lag per ASHT)        THUMB                           Right  (degrees) Left  (degrees)   MCP extension/flexion (0-50) WFL WFL   IP extension/flexion

## 2024-04-18 ENCOUNTER — HOSPITAL ENCOUNTER (OUTPATIENT)
Dept: PHYSICAL THERAPY | Age: 64
Setting detail: THERAPIES SERIES
Discharge: HOME | End: 2024-04-18
Payer: COMMERCIAL

## 2024-04-18 PROCEDURE — 97162 PT EVAL MOD COMPLEX 30 MIN: CPT

## 2024-04-18 PROCEDURE — 97110 THERAPEUTIC EXERCISES: CPT

## 2024-04-18 NOTE — PLAN OF CARE
[facility]       Phone: [phone]   Date: 2024                      Outpatient Physical Therapy  Fax: [phone]    ACCT #: [id number]                     Plan of Care  [facility]#: [id number]  Patient: Daja Espinosa  : 1960    Referring Provider (secondary): Dr. Sofía Razo    Diagnosis: CVA- acute ischemic stroke  Onset Date: 24  Treatment Diagnosis: Generalized weakness    Assessment  Body Structures, Functions, Activity Limitations Requiring Skilled Therapeutic Intervention: Decreased functional mobility , Decreased strength, Decreased balance, Decreased endurance  Assessment: Patient S/P CVA with L sided weakness, with complication of going through chemotherapy for bladder cancer. Completed therex per Doc flow. Plan for therex, HEP, neuro re-ed.  Therapy Prognosis: Good    Treatment Plan   Days: 2 times per week Weeks: 5 weeks Total # of Visits Approved: 10    Patient Education/HEP, Therapeutic Exercise, and Neuro Re-ed     Goals  Short Term Goals  Time Frame for Short Term Goals: 8  Short Term Goal 1: Patient to be educated on and independent with HEP  Short Term Goal 2: Improved endurance with walking x10 min on treadmill (initally 4 min)  Short Term Goal 3: Improve balance L LE with SLS 5 sec 2 of 4 trils  Short Term Goal 4: Increase strength L hip abduction 4+/5 for good dynamic balance  Long Term Goals  Time Frame for Long Term Goals : 10  Long Term Goal 1: Improve functional mobility with LEFS score >45/80 ( from 37/80)     Ellen Agarwal, PT   Date: 2024    ______________________________________ Date: 2024  Physician Signature  By signing above or cosigning electronically, I have reviewed this Plan of Care and certify a need for medically necessary rehabilitation services.

## 2024-04-18 NOTE — THERAPY EVALUATION
Balance  Sitting - Static: Good  Sitting - Dynamic: Good  Standing - Static: Good  Standing - Dynamic: Fair, +    Assessment  Body Structures, Functions, Activity Limitations Requiring Skilled Therapeutic Intervention: Decreased functional mobility , Decreased strength, Decreased balance, Decreased endurance  Assessment: Patient S/P CVA with L sided weakness, with complication of going through chemotherapy for bladder cancer. Completed therex per Doc flow. Plan for therex, HEP, neuro re-ed.  Therapy Prognosis: Good    Clinical Presentation:  Evolving  The Following Comorbities will impact the patient’s progression and Plan of Care:   Stroke, Cardiac Disease/Pacemaker, and Previous Orthopedic Injury/Surgery, Cancer       Medium Complexity    Education: On POC         Goals  Short Term Goals  Time Frame for Short Term Goals: 8  Short Term Goal 1: Patient to be educated on and independent with HEP  Short Term Goal 2: Improved endurance with walking x10 min on treadmill (initally 4 min)  Short Term Goal 3: Improve balance L LE with SLS 5 sec 2 of 4 trils  Short Term Goal 4: Increase strength L hip abduction 4+/5 for good dynamic balance    Long Term Goals  Time Frame for Long Term Goals : 10  Long Term Goal 1: Improve functional mobility with LEFS score >45/80 ( from 37/80)    Patient's Goal: Regain normal strength to be able to do housework and ADLS as before    Timed Code Treatment Minutes: 15 Minutes  Total Treatment Time: 45     Time In: 9:30  Time Out: 10:15    Ellen Agarwal, PT Date: 4/18/2024

## 2024-04-22 ENCOUNTER — TELEPHONE (OUTPATIENT)
Dept: UROLOGY | Age: 64
End: 2024-04-22

## 2024-04-22 NOTE — TELEPHONE ENCOUNTER
Colleen Kuhn from [facility] said Dr. Arellano saw patient today and wants her to follow up here.  I scheduled her 6/17  in the office for a cysto and p[possible stent removal per Dr. Arellano.

## 2024-04-23 ENCOUNTER — APPOINTMENT (OUTPATIENT)
Dept: OCCUPATIONAL THERAPY | Age: 64
End: 2024-04-23
Payer: COMMERCIAL

## 2024-04-24 ENCOUNTER — HOSPITAL ENCOUNTER (OUTPATIENT)
Dept: OCCUPATIONAL THERAPY | Age: 64
Setting detail: THERAPIES SERIES
Discharge: HOME | End: 2024-04-24
Payer: COMMERCIAL

## 2024-04-24 ENCOUNTER — HOSPITAL ENCOUNTER (OUTPATIENT)
Dept: PHYSICAL THERAPY | Age: 64
Setting detail: THERAPIES SERIES
Discharge: HOME | End: 2024-04-24
Payer: COMMERCIAL

## 2024-04-24 ENCOUNTER — APPOINTMENT (OUTPATIENT)
Dept: OCCUPATIONAL THERAPY | Age: 64
End: 2024-04-24
Payer: COMMERCIAL

## 2024-04-24 PROCEDURE — 97140 MANUAL THERAPY 1/> REGIONS: CPT

## 2024-04-24 PROCEDURE — 97112 NEUROMUSCULAR REEDUCATION: CPT

## 2024-04-24 PROCEDURE — 97110 THERAPEUTIC EXERCISES: CPT

## 2024-04-24 NOTE — PROGRESS NOTES
Physical Therapy  [facility]    Date: 2024  Patient Name: Daja THIBODEAUX Jesús        : 1960       Patient is not able to make this appointment.        Rohini S Shock Date: 2024

## 2024-04-24 NOTE — PROGRESS NOTES
[facility] and [facility]         Phone: [phone]  Fax: [phone]    Outpatient Occupational Therapy Daily Note    Date:  2024  Patient Name:  Daja Espinosa      :  1960    MRN: [id number]  Referring provider: Sofía Razo MD    Insurance: [facility]  Diagnosis: CVA   Onset Date: 24   Next  Appt: 24 (vascular surgeon)  Visit# / total visits:      Cancels/No Shows: 0/0    Subjective:     Arrived to therapy session independently. Reported that she has recently noticed a small blue \"nodule\" on the anterior aspect of her left 2nd digit PIP joint and that it feels achy. Encouraged patient to discuss this new symptom with the vascular surgeon tomorrow.     Pre Treatment Pain:    [] Yes  [x] No  Location: N/A      Pain Rating: (0-10 scale) 0/10  Post Treatment Pain:  [] Yes  [x] No  Location: N/A       Pain Rating: (0-10 scale) 0/10    Objective:     N/A    Assessment:     Completed exercises/modalities as documented in Exercise Flowsheet with fair tolerance overall.  Reported that she has noticed increased strength and decreased extensor lags in her left hand/digits since initiating the foam block HEP and implementing her own \"splint\" (popsicle sticks inside a glove to assist with digit extension).  Discussed possible need for an OTC splint for the left 2nd digit PIP joint, but encouraged patient to discuss her \"nodule\" with the vascular surgeon first before proceeding.   Reviewed the resistive foam block HEP (via pink block) with patient requiring moderate verbal/visual cues for the correct technique.   Will continue to address goals and progress patient as able.    [x] Progressing toward goals  [] No change  [] Regressing from goals  [x] Patient would continue to benefit from skilled occupational therapy services in order to increase functional use of her LUE.    Patient Education:     [x] Yes  [] No    Method of Education:   [x] Verbal  [x] Demo  [x]

## 2024-04-24 NOTE — PROGRESS NOTES
Phone: [phone]                 [facility]           Fax: [phone]                            Outpatient Physical Therapy                                                                            Daily Note    Date: 2024  Patient Name: Daja Espinosa        MRN: [id number]   ACCT#:  [id number]  : 1960  (63 y.o.)    Referring Provider (secondary): Dr. Sofía Razo         Diagnosis: CVA- acute ischemic stroke  Treatment Diagnosis: Generalized weakness    Onset Date: 24  PT Insurance Information: [employer]  Total # of Visits Approved: 10 Per Physician Order  Total # of Visits to Date: 2  Plan of Care/Certification Expiration Date: 24    Pre-Treatment Pain:  0/10     Assessment  Assessment: Patient denies pain. Completed therex as outlined on flowsheet for increased strength. Completed neuro as outlined on flowsheet for increased balance and proprioception. Progressed patient to hurdles, with patient having good tolerance. Cues given for glute involvement during amp ball rolls, with patient having good carry over. Plan to progress as tolerated. Patient reports having bed set up in living room and not having to go many places in house. Patient requiring rest break due to increased fatigue. Patient reports increased fatigue and denies pain after therapy this date.    Plan  Continue with current plan of care    Exercises/Modalities/Manual:  See DocFlow Sheet    Education: see assessment           Goals  (Total # of Visits to Date: 2)   Short Term Goals -   Short Term Goals  Time Frame for Short Term Goals: 8  Short Term Goal 1: Patient to be educated on and independent with HEP  Short Term Goal 2: Improved endurance with walking x10 min on treadmill (initally 4 min)  Short Term Goal 3: Improve balance L LE with SLS 5 sec 2 of 4 trils  Short Term Goal 4: Increase strength L hip abduction 4+/5 for good dynamic balance    Long Term Goals -   Long Term Goals  Time Frame for Long

## 2024-04-25 ENCOUNTER — INITIAL CONSULT (OUTPATIENT)
Dept: VASCULAR SURGERY | Age: 64
End: 2024-04-25
Payer: COMMERCIAL

## 2024-04-25 VITALS
RESPIRATION RATE: 18 BRPM | HEIGHT: 62 IN | DIASTOLIC BLOOD PRESSURE: 95 MMHG | HEART RATE: 58 BPM | SYSTOLIC BLOOD PRESSURE: 148 MMHG | WEIGHT: 115 LBS | BODY MASS INDEX: 21.16 KG/M2 | OXYGEN SATURATION: 95 %

## 2024-04-25 DIAGNOSIS — I73.9 PAD (PERIPHERAL ARTERY DISEASE) (HCC): ICD-10-CM

## 2024-04-25 DIAGNOSIS — I65.23 BILATERAL CAROTID ARTERY STENOSIS: ICD-10-CM

## 2024-04-25 DIAGNOSIS — I74.09 AORTOILIAC OCCLUSIVE DISEASE (HCC): Primary | ICD-10-CM

## 2024-04-25 PROCEDURE — 3017F COLORECTAL CA SCREEN DOC REV: CPT | Performed by: SURGERY

## 2024-04-25 PROCEDURE — G8427 DOCREV CUR MEDS BY ELIG CLIN: HCPCS | Performed by: SURGERY

## 2024-04-25 PROCEDURE — 3077F SYST BP >= 140 MM HG: CPT | Performed by: SURGERY

## 2024-04-25 PROCEDURE — G8420 CALC BMI NORM PARAMETERS: HCPCS | Performed by: SURGERY

## 2024-04-25 PROCEDURE — 3080F DIAST BP >= 90 MM HG: CPT | Performed by: SURGERY

## 2024-04-25 PROCEDURE — 1036F TOBACCO NON-USER: CPT | Performed by: SURGERY

## 2024-04-25 PROCEDURE — 99204 OFFICE O/P NEW MOD 45 MIN: CPT | Performed by: SURGERY

## 2024-04-25 PROCEDURE — 1111F DSCHRG MED/CURRENT MED MERGE: CPT | Performed by: SURGERY

## 2024-04-25 NOTE — PROGRESS NOTES
Division of Vascular Surgery        New Consult      Physician Requesting Consult:  Dr.M Arellano    Reason for Consult:   Aortoiliac disease, PAD, carotid disease    Chief Complaint:     Evaluation of vascular disease    History of Present Illness:      Daja Espinosa is a 63 y.o. woman who presents for evaluation of extensive vascular disease.  It appears that she had right carotid surgery with possible innominate artery bypass in '93 at [facility] based on her scars with right cervical incision and median sternotomy.  Dr. Marcos 2011 performed left carotid artery stenting and she had iliac stent in [facility] in 2010.    She was diagnosed with bladder cancer, initially planning for resection but developed recent strokes in April and thus plans have switched to radiation therapy.  She has had bilateral strokes seen on recent  imaging with left sided symptoms and weakness.      She describes severe claudication before, not bothering her now, no rest pain, open wounds or sores on her feet.      Medical History:     Past Medical History:   Diagnosis Date    Alcohol abuse 03/02/2017    Arthritis     Bladder cancer (HCC) 2016    CAD (coronary artery disease)     Cancer ([facility]) 2001    vulvar-    Carotid artery occlusion 1993    [facility] where she had a brachiocephalic aorta bypass.    Carotid artery stenosis     Chronic back pain     History of chemotherapy     Hydronephrosis 02/15/2024    Hyperlipidemia     Hypertension     Hypoglycemia     MI (myocardial infarction) ([facility])     Peripheral vascular disease ([facility])     Takayasu's arteritis ([facility])     Tibial fracture     right    Umbilical hernia     Under care of team 03/29/2024    Cardiologist: Terrell Cortez MD, [place], last visit 2/13/2024    Under care of team 03/29/2024    Oncology: Familia Shepard MD, [place], last visit 3/11/2024    Under care of team 03/29/2024    PCP: Sohail Garcia MD, [place], last visit 3/2024    Unspecified cerebral artery  7

## 2024-04-26 ENCOUNTER — HOSPITAL ENCOUNTER (OUTPATIENT)
Dept: PHYSICAL THERAPY | Age: 64
Setting detail: THERAPIES SERIES
Discharge: HOME | End: 2024-04-26
Payer: COMMERCIAL

## 2024-04-26 ENCOUNTER — HOSPITAL ENCOUNTER (OUTPATIENT)
Dept: OCCUPATIONAL THERAPY | Age: 64
Setting detail: THERAPIES SERIES
Discharge: HOME | End: 2024-04-26
Payer: COMMERCIAL

## 2024-04-26 PROCEDURE — 97112 NEUROMUSCULAR REEDUCATION: CPT

## 2024-04-26 PROCEDURE — 97110 THERAPEUTIC EXERCISES: CPT

## 2024-04-26 PROCEDURE — 97140 MANUAL THERAPY 1/> REGIONS: CPT

## 2024-04-26 NOTE — PROGRESS NOTES
[facility] and [facility]         Phone: [phone]  Fax: [phone]    Outpatient Occupational Therapy Daily Note    Date:  2024  Patient Name:  Daja Espinosa      :  1960    MRN: [id number]  Referring provider: Sofía Razo MD    Insurance: [facility]  Diagnosis: CVA   Onset Date: 24   Next Dr. Appt: 2024 (Dr. Garcia); radiologist on Monday, 24  Visit# / total visits: 3/18     Cancels/No Shows: 0/0    Subjective:     Arrived to therapy session independently.     Pre Treatment Pain:    [] Yes  [x] No  Location: N/A      Pain Rating: (0-10 scale) 0/10  Post Treatment Pain:  [] Yes  [x] No  Location: N/A       Pain Rating: (0-10 scale) 0/10    Objective:     N/A    Assessment:     Completed exercises/modalities as documented in Exercise Flowsheet with good tolerance overall.  Reported that she saw the vascular surgeon yesterday, but that she forgot to ask about the blue \"nodule\" on the anterior aspect of her left 2nd digit PIP joint.   Declined an OTC splint at this time and stated that she is going to explore a more cost-effective option.  Initiated LUE strengthening exercises via a 1 pound weight to increase ease with ADLs.   Required assistance to stabilize left forearm on arm rest during resistive wrist flexion/extension exercises.   Initiated left hand strengthening exercises via purple Hand master, however patient had difficulty wrapping digits around ball.   Increased ease noted with digi-flex device.   Will continue to address goals and progress patient as able.    [x] Progressing toward goals  [] No change  [] Regressing from goals  [x] Patient would continue to benefit from skilled occupational therapy services in order to increase functional use of her LUE.    Patient Education:     [x] Yes  [] No    Method of Education:   [x] Verbal  [x] Demo  [] Written  Re: LUE strengthening exercises at home via 15.5 ounce can or water bottle   Comprehension of

## 2024-04-26 NOTE — PROGRESS NOTES
Phone: [phone]                 [facility]      Fax: [phone]                            Outpatient Physical Therapy                                                                            Daily Note    Date: 2024  Patient Name: Daja Espinosa        MRN: [id number]   ACCT#:  [id number]  : 1960  (63 y.o.)    Referring Provider (secondary): Dr. Sofía Razo         Diagnosis: CVA- acute ischemic stroke  Treatment Diagnosis: Generalized weakness    Onset Date: 24  PT Insurance Information: [employer]  Total # of Visits Approved: 10 Per Physician Order  Total # of Visits to Date: 3  Plan of Care/Certification Expiration Date: 24    Pre-Treatment Pain:  0/10     Assessment  Assessment: Pain 0/10. Completed therex and neuro re-ed per Doc flow. Strength L hip flexion 4+/5, abd 4+/5, knee ext 4+/5, ankle Df 4+/5. Patient's balance improved, good dynamic standing balance. Patient still has decreased endurance, needs rest breaks every 5-10 minutes during ex for SOB.  Continue per plan.    Plan  Continue with current plan of care    Exercises/Modalities/Manual:  See DocFlow Sheet    Education: On ex form       Goals  (Total # of Visits to Date: 3)   Short Term Goals  Time Frame for Short Term Goals: 8  Short Term Goal 1: Patient to be educated on and independent with HEP  Short Term Goal 2: Improved endurance with walking x10 min on treadmill (initally 4 min)  Short Term Goal 3: Improve balance L LE with SLS 5 sec 2 of 4 trils  Short Term Goal 4: Increase strength L hip abduction 4+/5 for good dynamic balance-Met    Long Term Goals  Time Frame for Long Term Goals : 10  Long Term Goal 1: Improve functional mobility with LEFS score >45/80 ( from 37/80)    Post Treatment Pain:  0/10    Time In: 11:10    Time Out : 11;45      Timed Code Treatment Minutes: 35 Minutes  Total Treatment Time: 35 Minutes    Ellen Agarwal, PT     Date: 2024

## 2024-04-29 ENCOUNTER — HOSPITAL ENCOUNTER (OUTPATIENT)
Dept: PHYSICAL THERAPY | Age: 64
Setting detail: THERAPIES SERIES
Discharge: HOME | End: 2024-04-29
Payer: COMMERCIAL

## 2024-05-01 ENCOUNTER — HOSPITAL ENCOUNTER (OUTPATIENT)
Dept: OCCUPATIONAL THERAPY | Age: 64
Setting detail: THERAPIES SERIES
Discharge: HOME | End: 2024-05-01
Payer: COMMERCIAL

## 2024-05-01 ENCOUNTER — HOSPITAL ENCOUNTER (OUTPATIENT)
Dept: PHYSICAL THERAPY | Age: 64
Setting detail: THERAPIES SERIES
Discharge: HOME | End: 2024-05-01
Payer: COMMERCIAL

## 2024-05-01 PROCEDURE — 97110 THERAPEUTIC EXERCISES: CPT

## 2024-05-01 PROCEDURE — 97112 NEUROMUSCULAR REEDUCATION: CPT

## 2024-05-01 NOTE — PROGRESS NOTES
[facility] and [facility]         Phone: [phone]  Fax: [phone]    Outpatient Occupational Therapy Daily Note    Date:  2024  Patient Name:  Daja Espinosa      :  1960    MRN: [id number]  Referring provider: Sofía Razo MD    Insurance: [facility]  Diagnosis: CVA   Onset Date: 24   Next Dr. Appt: 2024 (Dr. Garcia); radiologist on Monday, 24  Visit# / total visits:      Cancels/No Shows: 0/0    Subjective:     Arrived to therapy session independently.     Pre Treatment Pain:    [] Yes  [x] No  Location: N/A      Pain Rating: (0-10 scale) 0/10  Post Treatment Pain:  [] Yes  [x] No  Location: N/A       Pain Rating: (0-10 scale) 0/10    Objective:     N/A    Assessment:     Completed exercises/modalities as documented in Exercise Flowsheet with good tolerance overall.  Tolerated increased repetitions (10 to 15) while completing LUE strengthening exercises to facilitate increased ease with ADLs.  Reported that she initiated these exercises at home after the previous sessions with a water bottle, but that she had difficulty grasping it.  Discussed purchasing a free weight from a local store for increased ease.   Had difficulty maintaining a 2-3 point pinch on graded clothespin while addressing dejesus/tip pinch strength, but declined to downgrade activity.  Reported that she will be undergoing a combination of chemotherapy and radiation starting next week and that she is unsure if she will be able to participate in therapy sessions simultaneously.  Discussed placing OT services on hold following this visit with patient in agreement.  Will continue to address goals and progress patient as able (following hold).    [x] Progressing toward goals  [] No change  [] Regressing from goals  [x] Patient would continue to benefit from skilled occupational therapy services in order to increase functional use of her LUE.    Patient Education:     [x] Yes  [] No

## 2024-05-01 NOTE — PROGRESS NOTES
Phone: [phone]                 [facility]      Fax: [phone]                            Outpatient Physical Therapy                                                                            Daily Note    Date: 2024  Patient Name: Daja Espinosa        MRN: [id number]   ACCT#:  [id number]  : 1960  (63 y.o.)    Referring Provider (secondary): Dr. Sofía Razo         Diagnosis: CVA- acute ischemic stroke  Treatment Diagnosis: Generalized weakness    Onset Date: 24  PT Insurance Information: [employer]  Total # of Visits Approved: 10 Per Physician Order  Total # of Visits to Date: 4  Plan of Care/Certification Expiration Date: 24    Pre-Treatment Pain:  0/10     Assessment  Assessment: Patient denies pain this morning. Following last session patient noted fatigue, but no increased pain. Continued with LE strengthening and balance exercises per flowsheet. Patient required two seated restbreaks throughout today's session due to LE fatigue. Patient required slower speed on treadmill at end of session, but was able to complete x4 minutes. Post session patient denies pain, but notes LE fatigue. Plan to continue x1 visit and then place on hold as patient will be start chemo/radiation treatments and is unsure of the schedule for treatments or how she will feel after.    Plan  Continue with current plan of care    Exercises/Modalities/Manual:  See DocFlow Sheet    Education: POC x1 visit and then holding PT while having chemo/radiation treatments     Goals  (Total # of Visits to Date: 4)   Short Term Goals  Time Frame for Short Term Goals: 8  Short Term Goal 1: Patient to be educated on and independent with HEP  Short Term Goal 2: Improved endurance with walking x10 min on treadmill (initally 4 min)  Short Term Goal 3: Improve balance L LE with SLS 5 sec 2 of 4 trils  Short Term Goal 4: Increase strength L hip abduction 4+/5 for good dynamic balance-Met    Long Term Goals  Time

## 2024-05-03 ENCOUNTER — HOSPITAL ENCOUNTER (OUTPATIENT)
Dept: PHYSICAL THERAPY | Age: 64
Setting detail: THERAPIES SERIES
Discharge: HOME | End: 2024-05-03
Payer: COMMERCIAL

## 2024-05-03 NOTE — PROGRESS NOTES
SLP ALL NOTES  [facility]  Rehab and Wellness    Date: 5/3/2024  Patient Name: Daja THIBODEAUX Jesús        : 1960       Pt Cancelled Appt due to Illness      Shelia Gurrola Date: 5/3/2024

## 2024-05-06 ENCOUNTER — TELEPHONE (OUTPATIENT)
Dept: ONCOLOGY | Age: 64
End: 2024-05-06

## 2024-05-06 ENCOUNTER — OFFICE VISIT (OUTPATIENT)
Dept: ONCOLOGY | Age: 64
End: 2024-05-06
Payer: COMMERCIAL

## 2024-05-06 ENCOUNTER — HOSPITAL ENCOUNTER (OUTPATIENT)
Dept: INFUSION THERAPY | Age: 64
Discharge: HOME | End: 2024-05-06
Payer: COMMERCIAL

## 2024-05-06 VITALS
SYSTOLIC BLOOD PRESSURE: 125 MMHG | TEMPERATURE: 97.6 F | HEART RATE: 60 BPM | RESPIRATION RATE: 18 BRPM | BODY MASS INDEX: 21.64 KG/M2 | HEIGHT: 62 IN | WEIGHT: 117.6 LBS | DIASTOLIC BLOOD PRESSURE: 70 MMHG

## 2024-05-06 VITALS
HEART RATE: 60 BPM | WEIGHT: 117.6 LBS | HEIGHT: 62 IN | TEMPERATURE: 97.6 F | RESPIRATION RATE: 18 BRPM | SYSTOLIC BLOOD PRESSURE: 125 MMHG | BODY MASS INDEX: 21.64 KG/M2 | DIASTOLIC BLOOD PRESSURE: 70 MMHG

## 2024-05-06 DIAGNOSIS — C67.9 MALIGNANT NEOPLASM OF URINARY BLADDER, UNSPECIFIED SITE (HCC): ICD-10-CM

## 2024-05-06 DIAGNOSIS — I21.4 NSTEMI (NON-ST ELEVATED MYOCARDIAL INFARCTION) (HCC): ICD-10-CM

## 2024-05-06 DIAGNOSIS — N13.30 HYDRONEPHROSIS, UNSPECIFIED HYDRONEPHROSIS TYPE: ICD-10-CM

## 2024-05-06 DIAGNOSIS — R29.90 STROKE-LIKE SYMPTOMS: ICD-10-CM

## 2024-05-06 DIAGNOSIS — C68.9 TRANSITIONAL CELL CARCINOMA (HCC): Primary | ICD-10-CM

## 2024-05-06 DIAGNOSIS — C67.2 MALIGNANT NEOPLASM OF LATERAL WALL OF URINARY BLADDER (HCC): ICD-10-CM

## 2024-05-06 DIAGNOSIS — C67.9 MALIGNANT NEOPLASM OF URINARY BLADDER, UNSPECIFIED SITE (HCC): Primary | ICD-10-CM

## 2024-05-06 LAB
ALBUMIN SERPL-MCNC: 3.5 G/DL (ref 3.5–5.2)
ALBUMIN/GLOB SERPL: 1 {RATIO} (ref 1–2.5)
ALP SERPL-CCNC: 95 U/L (ref 35–104)
ALT SERPL-CCNC: 21 U/L (ref 5–33)
ANION GAP SERPL CALCULATED.3IONS-SCNC: 11 MMOL/L (ref 9–17)
AST SERPL-CCNC: 22 U/L
BASOPHILS # BLD: 0.09 K/UL (ref 0–0.2)
BASOPHILS NFR BLD: 1 % (ref 0–2)
BILIRUB SERPL-MCNC: 0.7 MG/DL (ref 0.3–1.2)
BUN SERPL-MCNC: 36 MG/DL (ref 8–23)
BUN/CREAT SERPL: 17 (ref 9–20)
CALCIUM SERPL-MCNC: 9.4 MG/DL (ref 8.6–10.4)
CHLORIDE SERPL-SCNC: 102 MMOL/L (ref 98–107)
CO2 SERPL-SCNC: 23 MMOL/L (ref 20–31)
CREAT SERPL-MCNC: 2.1 MG/DL (ref 0.5–0.9)
EOSINOPHIL # BLD: 1.34 K/UL (ref 0–0.44)
EOSINOPHILS RELATIVE PERCENT: 12 % (ref 1–4)
ERYTHROCYTE [DISTWIDTH] IN BLOOD BY AUTOMATED COUNT: 14.3 % (ref 11.8–14.4)
GFR, ESTIMATED: 26 ML/MIN/1.73M2
GLUCOSE SERPL-MCNC: 110 MG/DL (ref 70–99)
HCT VFR BLD AUTO: 25.9 % (ref 36.3–47.1)
HGB BLD-MCNC: 8.8 G/DL (ref 11.9–15.1)
IMM GRANULOCYTES # BLD AUTO: 0.03 K/UL (ref 0–0.3)
IMM GRANULOCYTES NFR BLD: 0 %
LYMPHOCYTES NFR BLD: 2.47 K/UL (ref 1.1–3.7)
LYMPHOCYTES RELATIVE PERCENT: 22 % (ref 24–43)
MAGNESIUM SERPL-MCNC: 1.6 MG/DL (ref 1.6–2.6)
MCH RBC QN AUTO: 32.6 PG (ref 25.2–33.5)
MCHC RBC AUTO-ENTMCNC: 34 G/DL (ref 28.4–34.8)
MCV RBC AUTO: 95.9 FL (ref 82.6–102.9)
MONOCYTES NFR BLD: 0.93 K/UL (ref 0.1–1.2)
MONOCYTES NFR BLD: 8 % (ref 3–12)
NEUTROPHILS NFR BLD: 57 % (ref 36–65)
NEUTS SEG NFR BLD: 6.49 K/UL (ref 1.5–8.1)
NRBC BLD-RTO: 0 PER 100 WBC
PLATELET # BLD AUTO: 512 K/UL (ref 138–453)
PMV BLD AUTO: 9.9 FL (ref 8.1–13.5)
POTASSIUM SERPL-SCNC: 4.9 MMOL/L (ref 3.7–5.3)
PROT SERPL-MCNC: 6.9 G/DL (ref 6.4–8.3)
RBC # BLD AUTO: 2.7 M/UL (ref 3.95–5.11)
SODIUM SERPL-SCNC: 136 MMOL/L (ref 135–144)
WBC OTHER # BLD: 11.4 K/UL (ref 3.5–11.3)

## 2024-05-06 PROCEDURE — 3017F COLORECTAL CA SCREEN DOC REV: CPT | Performed by: INTERNAL MEDICINE

## 2024-05-06 PROCEDURE — G8428 CUR MEDS NOT DOCUMENT: HCPCS | Performed by: INTERNAL MEDICINE

## 2024-05-06 PROCEDURE — 85025 COMPLETE CBC W/AUTO DIFF WBC: CPT

## 2024-05-06 PROCEDURE — 6360000002 HC RX W HCPCS: Performed by: INTERNAL MEDICINE

## 2024-05-06 PROCEDURE — 1111F DSCHRG MED/CURRENT MED MERGE: CPT | Performed by: INTERNAL MEDICINE

## 2024-05-06 PROCEDURE — 36591 DRAW BLOOD OFF VENOUS DEVICE: CPT

## 2024-05-06 PROCEDURE — 3074F SYST BP LT 130 MM HG: CPT | Performed by: INTERNAL MEDICINE

## 2024-05-06 PROCEDURE — 96361 HYDRATE IV INFUSION ADD-ON: CPT

## 2024-05-06 PROCEDURE — G8420 CALC BMI NORM PARAMETERS: HCPCS | Performed by: INTERNAL MEDICINE

## 2024-05-06 PROCEDURE — 96360 HYDRATION IV INFUSION INIT: CPT

## 2024-05-06 PROCEDURE — 83735 ASSAY OF MAGNESIUM: CPT

## 2024-05-06 PROCEDURE — 2580000003 HC RX 258: Performed by: INTERNAL MEDICINE

## 2024-05-06 PROCEDURE — 1036F TOBACCO NON-USER: CPT | Performed by: INTERNAL MEDICINE

## 2024-05-06 PROCEDURE — 3078F DIAST BP <80 MM HG: CPT | Performed by: INTERNAL MEDICINE

## 2024-05-06 PROCEDURE — 99215 OFFICE O/P EST HI 40 MIN: CPT | Performed by: INTERNAL MEDICINE

## 2024-05-06 PROCEDURE — 80053 COMPREHEN METABOLIC PANEL: CPT

## 2024-05-06 RX ORDER — SODIUM CHLORIDE 0.9 % (FLUSH) 0.9 %
5-40 SYRINGE (ML) INJECTION PRN
OUTPATIENT
Start: 2024-05-06

## 2024-05-06 RX ORDER — HEPARIN 100 UNIT/ML
500 SYRINGE INTRAVENOUS PRN
Status: DISCONTINUED | OUTPATIENT
Start: 2024-05-06 | End: 2024-05-07 | Stop reason: HOSPADM

## 2024-05-06 RX ORDER — HEPARIN 100 UNIT/ML
500 SYRINGE INTRAVENOUS PRN
OUTPATIENT
Start: 2024-05-06

## 2024-05-06 RX ORDER — 0.9 % SODIUM CHLORIDE 0.9 %
1000 INTRAVENOUS SOLUTION INTRAVENOUS ONCE
Status: COMPLETED | OUTPATIENT
Start: 2024-05-06 | End: 2024-05-06

## 2024-05-06 RX ORDER — SODIUM CHLORIDE 0.9 % (FLUSH) 0.9 %
5-40 SYRINGE (ML) INJECTION PRN
Status: DISCONTINUED | OUTPATIENT
Start: 2024-05-06 | End: 2024-05-07 | Stop reason: HOSPADM

## 2024-05-06 RX ADMIN — SODIUM CHLORIDE, PRESERVATIVE FREE 10 ML: 5 INJECTION INTRAVENOUS at 08:12

## 2024-05-06 RX ADMIN — HEPARIN 500 UNITS: 100 SYRINGE at 10:38

## 2024-05-06 RX ADMIN — SODIUM CHLORIDE, PRESERVATIVE FREE 10 ML: 5 INJECTION INTRAVENOUS at 08:11

## 2024-05-06 RX ADMIN — SODIUM CHLORIDE, PRESERVATIVE FREE 10 ML: 5 INJECTION INTRAVENOUS at 10:38

## 2024-05-06 RX ADMIN — SODIUM CHLORIDE 1000 ML: 9 INJECTION, SOLUTION INTRAVENOUS at 09:00

## 2024-05-06 RX ADMIN — SODIUM CHLORIDE, PRESERVATIVE FREE 10 ML: 5 INJECTION INTRAVENOUS at 10:37

## 2024-05-06 NOTE — PROGRESS NOTES
Pt seen and assessed by Dr. Shepard. Discussed plan of care with patient and her daughter. Plan has changed and pt is planning on going for radiation now. Dr. Shepard discussed with patient that a different chemo plan will be given with radiation. Dr stated no chemo for today and will return for a different regime when they are ready to start radiation. Dr reviewed labs and ordered a liter of normal saline for an elevated creatinine level. No other orders given at this time. Informed we will be in touch to make an appointment once we know when radiation will start. Pt and daughter verbalized understanding.

## 2024-05-06 NOTE — PROGRESS NOTES
treatment without unexpected or severe side effects.    During this visit patient's allergy, social, medical, surgical history and medications were reviewed and updated.       Status post 1 cycle of chemotherapy  Creatinine initially rising and in the clinic was improved  Admitted to the hospital for embolic stroke and started on Eliquis and recovering from CVA with a left-sided weakness improved and speech back to normal however she still weak  Reactive thrombocytosis  Status post cystoscopy/stent exchange  With recent stroke discussion with  and suggested definitive concurrent chemo RT as not surgical candidate  Discussed with radiation oncology  Creatinine up to 2.1      Past Medical History:   Diagnosis Date    Alcohol abuse 03/02/2017    Arthritis     Bladder cancer (HCC) 2016    CAD (coronary artery disease)     Cancer (HCC) 2001    vulvar-    Carotid artery occlusion 1993    [facility] where she had a brachiocephalic aorta bypass.    Carotid artery stenosis     Chronic back pain     History of chemotherapy     Hydronephrosis 02/15/2024    Hyperlipidemia     Hypertension     Hypoglycemia     MI (myocardial infarction) (HCC)     Peripheral vascular disease (HCC)     Takayasu's arteritis (HCC)     Tibial fracture     right    Umbilical hernia     Under care of team 03/29/2024    Cardiologist: Terrell Cortez MD, [place], last visit 2/13/2024    Under care of team 03/29/2024    Oncology: Familia Shepard MD, [place], last visit 3/11/2024    Under care of team 03/29/2024    PCP: Sohail Garcia MD, [place], last visit 3/2024    Unspecified cerebral artery occlusion with cerebral infarction 1993    Wears partial dentures     Dentures/top, partial/bottom       Past Surgical History:   Procedure Laterality Date    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

## 2024-05-06 NOTE — PATIENT INSTRUCTIONS
DC chemotherapy  Plan for concurrent chemo RT chemotherapy in the form of gemcitabine 27 mg/m² twice a week with radiation  RTC with chemotherapy  PET/CT

## 2024-05-06 NOTE — TELEPHONE ENCOUNTER
Name: Daja Espinosa  : 1960  MRN: [id number]    Oncology Navigation Follow-Up Note    Contact Type:  Medical Oncology    Notes: Navigator met with Daja and daughter in treatment area.  Daja states she is doing well and is waiting to see what is going to change with her treatment.  Writer was present for physician appointment.  Daja is to have concurrent RT with gemzar twice weekly.  Writer reiterated that chemotherapy is to assist sensitivity of tumor to radiation treatment .  Daja and daughter verbalized understanding.  Daja is getting a liter of fluids today for elevated creatine.     Dr. Shepard inquired about PET scan.  Per Krystina BELTRAN charge nurse PET scan needs peer to peer and physician was notified.  Writer discussed with with Dr. Shepard. Dr. Nguyen also would like PET scan for planning.  Per Dr. Shepard he would do peer to peer or Dr. Nguyen could.  Dr. Shepard informed that he ordered PET so he would have to complete peer to peer.  Charge nurse updated on patient.    Electronically signed by Ave Arreaga RN on 2024 at 9:10 AM

## 2024-05-07 DIAGNOSIS — C67.2 MALIGNANT NEOPLASM OF LATERAL WALL OF URINARY BLADDER (HCC): ICD-10-CM

## 2024-05-07 DIAGNOSIS — C67.9 MALIGNANT NEOPLASM OF URINARY BLADDER, UNSPECIFIED SITE (HCC): Primary | ICD-10-CM

## 2024-05-07 DIAGNOSIS — N13.30 HYDRONEPHROSIS, UNSPECIFIED HYDRONEPHROSIS TYPE: ICD-10-CM

## 2024-05-07 DIAGNOSIS — G89.29 CHRONIC MIDLINE LOW BACK PAIN WITHOUT SCIATICA: ICD-10-CM

## 2024-05-07 DIAGNOSIS — M54.50 CHRONIC MIDLINE LOW BACK PAIN WITHOUT SCIATICA: ICD-10-CM

## 2024-05-16 DIAGNOSIS — C67.9 MALIGNANT NEOPLASM OF URINARY BLADDER, UNSPECIFIED SITE (HCC): Primary | ICD-10-CM

## 2024-05-16 RX ORDER — ACETAMINOPHEN 325 MG/1
650 TABLET ORAL
OUTPATIENT
Start: 2024-05-27

## 2024-05-16 RX ORDER — MEPERIDINE HYDROCHLORIDE 25 MG/ML
12.5 INJECTION INTRAMUSCULAR; INTRAVENOUS; SUBCUTANEOUS PRN
OUTPATIENT
Start: 2024-05-27

## 2024-05-16 RX ORDER — SODIUM CHLORIDE 9 MG/ML
5-250 INJECTION, SOLUTION INTRAVENOUS PRN
OUTPATIENT
Start: 2024-05-27

## 2024-05-16 RX ORDER — HEPARIN 100 UNIT/ML
500 SYRINGE INTRAVENOUS PRN
OUTPATIENT
Start: 2024-05-30

## 2024-05-16 RX ORDER — ALBUTEROL SULFATE 90 UG/1
4 AEROSOL, METERED RESPIRATORY (INHALATION) PRN
OUTPATIENT
Start: 2024-05-27

## 2024-05-16 RX ORDER — MEPERIDINE HYDROCHLORIDE 25 MG/ML
12.5 INJECTION INTRAMUSCULAR; INTRAVENOUS; SUBCUTANEOUS PRN
OUTPATIENT
Start: 2024-05-30

## 2024-05-16 RX ORDER — ACETAMINOPHEN 325 MG/1
650 TABLET ORAL
OUTPATIENT
Start: 2024-05-30

## 2024-05-16 RX ORDER — SODIUM CHLORIDE 9 MG/ML
INJECTION, SOLUTION INTRAVENOUS CONTINUOUS
OUTPATIENT
Start: 2024-05-27

## 2024-05-16 RX ORDER — FAMOTIDINE 10 MG/ML
20 INJECTION, SOLUTION INTRAVENOUS
OUTPATIENT
Start: 2024-05-30

## 2024-05-16 RX ORDER — SODIUM CHLORIDE 9 MG/ML
INJECTION, SOLUTION INTRAVENOUS CONTINUOUS
OUTPATIENT
Start: 2024-05-30

## 2024-05-16 RX ORDER — ONDANSETRON 2 MG/ML
8 INJECTION INTRAMUSCULAR; INTRAVENOUS
OUTPATIENT
Start: 2024-05-30

## 2024-05-16 RX ORDER — ALBUTEROL SULFATE 90 UG/1
4 AEROSOL, METERED RESPIRATORY (INHALATION) PRN
OUTPATIENT
Start: 2024-05-30

## 2024-05-16 RX ORDER — DIPHENHYDRAMINE HYDROCHLORIDE 50 MG/ML
50 INJECTION INTRAMUSCULAR; INTRAVENOUS
OUTPATIENT
Start: 2024-05-27

## 2024-05-16 RX ORDER — DEXTROSE MONOHYDRATE 50 MG/ML
5-250 INJECTION, SOLUTION INTRAVENOUS PRN
OUTPATIENT
Start: 2024-05-27

## 2024-05-16 RX ORDER — DIPHENHYDRAMINE HYDROCHLORIDE 50 MG/ML
50 INJECTION INTRAMUSCULAR; INTRAVENOUS
OUTPATIENT
Start: 2024-05-30

## 2024-05-16 RX ORDER — SODIUM CHLORIDE 0.9 % (FLUSH) 0.9 %
5-40 SYRINGE (ML) INJECTION PRN
OUTPATIENT
Start: 2024-05-30

## 2024-05-16 RX ORDER — EPINEPHRINE 1 MG/ML
0.3 INJECTION, SOLUTION, CONCENTRATE INTRAVENOUS PRN
OUTPATIENT
Start: 2024-05-27

## 2024-05-16 RX ORDER — SODIUM CHLORIDE 9 MG/ML
5-250 INJECTION, SOLUTION INTRAVENOUS PRN
OUTPATIENT
Start: 2024-05-30

## 2024-05-16 RX ORDER — FAMOTIDINE 10 MG/ML
20 INJECTION, SOLUTION INTRAVENOUS
OUTPATIENT
Start: 2024-05-27

## 2024-05-16 RX ORDER — ONDANSETRON 2 MG/ML
8 INJECTION INTRAMUSCULAR; INTRAVENOUS
OUTPATIENT
Start: 2024-05-27

## 2024-05-16 RX ORDER — DEXTROSE MONOHYDRATE 50 MG/ML
5-250 INJECTION, SOLUTION INTRAVENOUS PRN
OUTPATIENT
Start: 2024-05-30

## 2024-05-16 RX ORDER — ONDANSETRON 2 MG/ML
8 INJECTION INTRAMUSCULAR; INTRAVENOUS ONCE
Start: 2024-05-27 | End: 2024-05-27

## 2024-05-16 RX ORDER — ONDANSETRON 2 MG/ML
8 INJECTION INTRAMUSCULAR; INTRAVENOUS ONCE
Start: 2024-05-30 | End: 2024-05-30

## 2024-05-16 RX ORDER — SODIUM CHLORIDE 0.9 % (FLUSH) 0.9 %
5-40 SYRINGE (ML) INJECTION PRN
OUTPATIENT
Start: 2024-05-27

## 2024-05-16 RX ORDER — EPINEPHRINE 1 MG/ML
0.3 INJECTION, SOLUTION, CONCENTRATE INTRAVENOUS PRN
OUTPATIENT
Start: 2024-05-30

## 2024-05-16 RX ORDER — HEPARIN 100 UNIT/ML
500 SYRINGE INTRAVENOUS PRN
OUTPATIENT
Start: 2024-05-27

## 2024-05-22 ENCOUNTER — TELEPHONE (OUTPATIENT)
Dept: ONCOLOGY | Age: 64
End: 2024-05-22

## 2024-05-22 NOTE — TELEPHONE ENCOUNTER
Called and informed Daja that due to a cancellation of PET tomorrow her appointment is moved from 5/30/24 to 5/23/24 with arrival time at 09:45.  Patient also informed to avoid strenuous activity until after test, to eat low carb, high protein, increase water today, and nothing to eat tomorrow as per previous instructions she received.  Patient verbalizes understanding.

## 2024-05-23 ENCOUNTER — HOSPITAL ENCOUNTER (OUTPATIENT)
Dept: PET IMAGING | Age: 64
Discharge: HOME | End: 2024-05-25
Payer: COMMERCIAL

## 2024-05-23 ENCOUNTER — TELEPHONE (OUTPATIENT)
Dept: ONCOLOGY | Age: 64
End: 2024-05-23

## 2024-05-23 DIAGNOSIS — G89.29 CHRONIC MIDLINE LOW BACK PAIN WITHOUT SCIATICA: ICD-10-CM

## 2024-05-23 DIAGNOSIS — C67.2 MALIGNANT NEOPLASM OF LATERAL WALL OF URINARY BLADDER (HCC): ICD-10-CM

## 2024-05-23 DIAGNOSIS — M54.50 CHRONIC MIDLINE LOW BACK PAIN WITHOUT SCIATICA: ICD-10-CM

## 2024-05-23 DIAGNOSIS — N13.30 HYDRONEPHROSIS, UNSPECIFIED HYDRONEPHROSIS TYPE: ICD-10-CM

## 2024-05-23 DIAGNOSIS — C67.9 MALIGNANT NEOPLASM OF URINARY BLADDER, UNSPECIFIED SITE (HCC): ICD-10-CM

## 2024-05-23 PROCEDURE — 3430000000 HC RX DIAGNOSTIC RADIOPHARMACEUTICAL: Performed by: INTERNAL MEDICINE

## 2024-05-23 PROCEDURE — 78815 PET IMAGE W/CT SKULL-THIGH: CPT

## 2024-05-23 PROCEDURE — A9609 HC RX DIAGNOSTIC RADIOPHARMACEUTICAL: HCPCS | Performed by: INTERNAL MEDICINE

## 2024-05-23 RX ORDER — FLUDEOXYGLUCOSE F 18 200 MCI/ML
15 INJECTION, SOLUTION INTRAVENOUS
Status: COMPLETED | OUTPATIENT
Start: 2024-05-23 | End: 2024-05-23

## 2024-05-23 RX ADMIN — FLUDEOXYGLUCOSE F 18 15 MILLICURIE: 200 INJECTION, SOLUTION INTRAVENOUS at 10:13

## 2024-05-23 NOTE — TELEPHONE ENCOUNTER
Name: Daja Espinosa  : 1960  MRN: [id number]    Oncology Navigation Follow-Up Note    Contact Type:  Telephone    Notes: Call made to [facility] radiation department.  Spoke to Maureen and informed her that Daja's PET scan was moved up to today.  Dr. Nguyen wanted to be notified as he wanted results for treatment planning.  Maureen states she will pass this on to radiation team.     Electronically signed by Ave Arreaga RN on 2024 at 10:57 AM

## 2024-06-03 ENCOUNTER — HOSPITAL ENCOUNTER (OUTPATIENT)
Dept: INFUSION THERAPY | Age: 64
Discharge: HOME | End: 2024-06-03
Payer: COMMERCIAL

## 2024-06-03 ENCOUNTER — HOSPITAL ENCOUNTER (EMERGENCY)
Age: 64
Discharge: ANOTHER HOSPITAL | End: 2024-06-03
Attending: EMERGENCY MEDICINE
Payer: COMMERCIAL

## 2024-06-03 ENCOUNTER — HOSPITAL ENCOUNTER (INPATIENT)
Age: 64
LOS: 4 days | Discharge: HOME | End: 2024-06-07
Attending: STUDENT IN AN ORGANIZED HEALTH CARE EDUCATION/TRAINING PROGRAM | Admitting: STUDENT IN AN ORGANIZED HEALTH CARE EDUCATION/TRAINING PROGRAM
Payer: COMMERCIAL

## 2024-06-03 ENCOUNTER — APPOINTMENT (OUTPATIENT)
Dept: CT IMAGING | Age: 64
End: 2024-06-03
Attending: STUDENT IN AN ORGANIZED HEALTH CARE EDUCATION/TRAINING PROGRAM
Payer: COMMERCIAL

## 2024-06-03 VITALS
RESPIRATION RATE: 18 BRPM | DIASTOLIC BLOOD PRESSURE: 76 MMHG | HEIGHT: 62 IN | HEART RATE: 74 BPM | WEIGHT: 116 LBS | SYSTOLIC BLOOD PRESSURE: 156 MMHG | TEMPERATURE: 97.2 F | BODY MASS INDEX: 21.35 KG/M2

## 2024-06-03 VITALS
OXYGEN SATURATION: 99 % | DIASTOLIC BLOOD PRESSURE: 74 MMHG | HEART RATE: 84 BPM | RESPIRATION RATE: 19 BRPM | SYSTOLIC BLOOD PRESSURE: 156 MMHG | TEMPERATURE: 97.5 F

## 2024-06-03 DIAGNOSIS — K92.2 UPPER GI BLEED: Primary | ICD-10-CM

## 2024-06-03 DIAGNOSIS — C67.9 MALIGNANT NEOPLASM OF URINARY BLADDER, UNSPECIFIED SITE (HCC): Primary | ICD-10-CM

## 2024-06-03 DIAGNOSIS — K92.1 MELENA: ICD-10-CM

## 2024-06-03 DIAGNOSIS — R53.83 FATIGUE, UNSPECIFIED TYPE: ICD-10-CM

## 2024-06-03 DIAGNOSIS — D64.9 SYMPTOMATIC ANEMIA: ICD-10-CM

## 2024-06-03 LAB
A1AT SERPL-MCNC: 154 MG/DL (ref 90–200)
AFP SERPL-MCNC: 3.6 UG/L
ALBUMIN SERPL-MCNC: 4 G/DL (ref 3.5–5.2)
ALBUMIN SERPL-MCNC: 4.2 G/DL (ref 3.5–5.2)
ALBUMIN/GLOB SERPL: 1 {RATIO} (ref 1–2.5)
ALBUMIN/GLOB SERPL: 1.3 {RATIO} (ref 1–2.5)
ALP SERPL-CCNC: 74 U/L (ref 35–104)
ALP SERPL-CCNC: 78 U/L (ref 35–104)
ALT SERPL-CCNC: 44 U/L (ref 5–33)
ALT SERPL-CCNC: 52 U/L (ref 10–35)
ANION GAP SERPL CALCULATED.3IONS-SCNC: 10 MMOL/L (ref 9–16)
ANION GAP SERPL CALCULATED.3IONS-SCNC: 11 MMOL/L (ref 9–17)
AST SERPL-CCNC: 51 U/L
AST SERPL-CCNC: 62 U/L (ref 10–35)
BASOPHILS # BLD: 0 K/UL (ref 0–0.2)
BASOPHILS # BLD: 0 K/UL (ref 0–0.2)
BASOPHILS # BLD: 0.06 K/UL (ref 0–0.2)
BASOPHILS NFR BLD: 0 % (ref 0–2)
BASOPHILS NFR BLD: 0 % (ref 0–2)
BASOPHILS NFR BLD: 1 % (ref 0–2)
BILIRUB SERPL-MCNC: 0.4 MG/DL (ref 0.3–1.2)
BILIRUB SERPL-MCNC: 0.8 MG/DL (ref 0–1.2)
BUN SERPL-MCNC: 69 MG/DL (ref 8–23)
BUN SERPL-MCNC: 76 MG/DL (ref 8–23)
BUN/CREAT SERPL: 29 (ref 9–20)
CALCIUM SERPL-MCNC: 9.4 MG/DL (ref 8.6–10.4)
CALCIUM SERPL-MCNC: 9.4 MG/DL (ref 8.6–10.4)
CERULOPLASMIN SERPL-MCNC: 29 MG/DL (ref 16–45)
CHLORIDE SERPL-SCNC: 105 MMOL/L (ref 98–107)
CHLORIDE SERPL-SCNC: 106 MMOL/L (ref 98–107)
CHOLEST SERPL-MCNC: 126 MG/DL (ref 0–199)
CHOLESTEROL/HDL RATIO: 4
CO2 SERPL-SCNC: 19 MMOL/L (ref 20–31)
CO2 SERPL-SCNC: 19 MMOL/L (ref 20–31)
CREAT SERPL-MCNC: 2.2 MG/DL (ref 0.5–0.9)
CREAT SERPL-MCNC: 2.6 MG/DL (ref 0.5–0.9)
DATE, STOOL #1: ABNORMAL
EOSINOPHIL # BLD: 0.66 K/UL (ref 0–0.44)
EOSINOPHIL # BLD: 0.7 K/UL (ref 0–0.44)
EOSINOPHIL # BLD: 0.74 K/UL (ref 0–0.44)
EOSINOPHILS RELATIVE PERCENT: 7 % (ref 1–4)
EOSINOPHILS RELATIVE PERCENT: 7 % (ref 1–4)
EOSINOPHILS RELATIVE PERCENT: 8 % (ref 1–4)
ERYTHROCYTE [DISTWIDTH] IN BLOOD BY AUTOMATED COUNT: 14.9 % (ref 11.8–14.4)
ERYTHROCYTE [DISTWIDTH] IN BLOOD BY AUTOMATED COUNT: 15.7 % (ref 11.8–14.4)
ERYTHROCYTE [DISTWIDTH] IN BLOOD BY AUTOMATED COUNT: 15.8 % (ref 11.8–14.4)
GFR, ESTIMATED: 20 ML/MIN/1.73M2
GFR, ESTIMATED: 25 ML/MIN/1.73M2
GLUCOSE BLD-MCNC: 102 MG/DL (ref 65–105)
GLUCOSE SERPL-MCNC: 110 MG/DL (ref 70–99)
GLUCOSE SERPL-MCNC: 97 MG/DL (ref 74–99)
HAV IGM SERPL QL IA: NONREACTIVE
HBV CORE IGM SERPL QL IA: NONREACTIVE
HBV SURFACE AG SERPL QL IA: NONREACTIVE
HCT VFR BLD AUTO: 15.8 % (ref 36.3–47.1)
HCT VFR BLD AUTO: 16.4 % (ref 36.3–47.1)
HCT VFR BLD AUTO: 24.6 % (ref 36.3–47.1)
HCT VFR BLD AUTO: 26.1 % (ref 36.3–47.1)
HCV AB SERPL QL IA: REACTIVE
HDLC SERPL-MCNC: 34 MG/DL
HEMOCCULT SP1 STL QL: POSITIVE
HGB BLD-MCNC: 5.2 G/DL (ref 11.9–15.1)
HGB BLD-MCNC: 5.4 G/DL (ref 11.9–15.1)
HGB BLD-MCNC: 7.7 G/DL (ref 11.9–15.1)
HGB BLD-MCNC: 8.6 G/DL (ref 11.9–15.1)
IMM GRANULOCYTES # BLD AUTO: 0 K/UL (ref 0–0.3)
IMM GRANULOCYTES # BLD AUTO: 0 K/UL (ref 0–0.3)
IMM GRANULOCYTES # BLD AUTO: 0.03 K/UL (ref 0–0.3)
IMM GRANULOCYTES NFR BLD: 0 %
IRON SATN MFR SERPL: 60 % (ref 20–55)
IRON SERPL-MCNC: 184 UG/DL (ref 37–145)
LDLC SERPL CALC-MCNC: 62 MG/DL (ref 0–100)
LYMPHOCYTES NFR BLD: 2.44 K/UL (ref 1.1–3.7)
LYMPHOCYTES NFR BLD: 2.5 K/UL (ref 1.1–3.7)
LYMPHOCYTES NFR BLD: 2.97 K/UL (ref 1.1–3.7)
LYMPHOCYTES RELATIVE PERCENT: 25 % (ref 24–43)
LYMPHOCYTES RELATIVE PERCENT: 26 % (ref 24–43)
LYMPHOCYTES RELATIVE PERCENT: 32 % (ref 24–43)
MCH RBC QN AUTO: 31.7 PG (ref 25.2–33.5)
MCH RBC QN AUTO: 32.9 PG (ref 25.2–33.5)
MCH RBC QN AUTO: 33.1 PG (ref 25.2–33.5)
MCHC RBC AUTO-ENTMCNC: 31.3 G/DL (ref 28.4–34.8)
MCHC RBC AUTO-ENTMCNC: 32.9 G/DL (ref 28.4–34.8)
MCHC RBC AUTO-ENTMCNC: 32.9 G/DL (ref 28.4–34.8)
MCV RBC AUTO: 100 FL (ref 82.6–102.9)
MCV RBC AUTO: 100.6 FL (ref 82.6–102.9)
MCV RBC AUTO: 101.2 FL (ref 82.6–102.9)
MONOCYTES NFR BLD: 0.56 K/UL (ref 0.1–1.2)
MONOCYTES NFR BLD: 0.6 K/UL (ref 0.1–1.2)
MONOCYTES NFR BLD: 0.7 K/UL (ref 0.1–1.2)
MONOCYTES NFR BLD: 6 % (ref 3–12)
MONOCYTES NFR BLD: 6 % (ref 3–12)
MONOCYTES NFR BLD: 8 % (ref 3–12)
MORPHOLOGY: ABNORMAL
MORPHOLOGY: ABNORMAL
NEUTROPHILS NFR BLD: 52 % (ref 36–65)
NEUTROPHILS NFR BLD: 61 % (ref 36–65)
NEUTROPHILS NFR BLD: 62 % (ref 36–65)
NEUTS SEG NFR BLD: 4.8 K/UL (ref 1.5–8.1)
NEUTS SEG NFR BLD: 5.74 K/UL (ref 1.5–8.1)
NEUTS SEG NFR BLD: 6.2 K/UL (ref 1.5–8.1)
NRBC BLD-RTO: 0 PER 100 WBC
PLATELET # BLD AUTO: 376 K/UL (ref 138–453)
PLATELET # BLD AUTO: 384 K/UL (ref 138–453)
PLATELET # BLD AUTO: 427 K/UL (ref 138–453)
PMV BLD AUTO: 9.7 FL (ref 8.1–13.5)
PMV BLD AUTO: 9.7 FL (ref 8.1–13.5)
PMV BLD AUTO: 9.9 FL (ref 8.1–13.5)
POTASSIUM SERPL-SCNC: 4.4 MMOL/L (ref 3.7–5.3)
POTASSIUM SERPL-SCNC: 4.5 MMOL/L (ref 3.7–5.3)
PROT SERPL-MCNC: 7.1 G/DL (ref 6.4–8.3)
PROT SERPL-MCNC: 7.1 G/DL (ref 6.6–8.7)
RBC # BLD AUTO: 1.58 M/UL (ref 3.95–5.11)
RBC # BLD AUTO: 1.63 M/UL (ref 3.95–5.11)
RBC # BLD AUTO: 2.43 M/UL (ref 3.95–5.11)
RBC # BLD: ABNORMAL 10*6/UL
SODIUM SERPL-SCNC: 135 MMOL/L (ref 135–144)
SODIUM SERPL-SCNC: 135 MMOL/L (ref 136–145)
TIBC SERPL-MCNC: 305 UG/DL (ref 250–450)
TIME, STOOL #1: 1251
TRIGL SERPL-MCNC: 147 MG/DL
UNSATURATED IRON BINDING CAPACITY: 121 UG/DL (ref 112–347)
VLDLC SERPL CALC-MCNC: 29 MG/DL
WBC OTHER # BLD: 10 K/UL (ref 3.5–11.3)
WBC OTHER # BLD: 9.3 K/UL (ref 3.5–11.3)
WBC OTHER # BLD: 9.4 K/UL (ref 3.5–11.3)

## 2024-06-03 PROCEDURE — C9113 INJ PANTOPRAZOLE SODIUM, VIA: HCPCS | Performed by: EMERGENCY MEDICINE

## 2024-06-03 PROCEDURE — 2580000003 HC RX 258: Performed by: NURSE PRACTITIONER

## 2024-06-03 PROCEDURE — 83516 IMMUNOASSAY NONANTIBODY: CPT

## 2024-06-03 PROCEDURE — 85025 COMPLETE CBC W/AUTO DIFF WBC: CPT

## 2024-06-03 PROCEDURE — 99223 1ST HOSP IP/OBS HIGH 75: CPT | Performed by: PHYSICIAN ASSISTANT

## 2024-06-03 PROCEDURE — 36591 DRAW BLOOD OFF VENOUS DEVICE: CPT

## 2024-06-03 PROCEDURE — 82390 ASSAY OF CERULOPLASMIN: CPT

## 2024-06-03 PROCEDURE — 80053 COMPREHEN METABOLIC PANEL: CPT

## 2024-06-03 PROCEDURE — 96365 THER/PROPH/DIAG IV INF INIT: CPT

## 2024-06-03 PROCEDURE — P9016 RBC LEUKOCYTES REDUCED: HCPCS

## 2024-06-03 PROCEDURE — 6360000002 HC RX W HCPCS: Performed by: PHYSICIAN ASSISTANT

## 2024-06-03 PROCEDURE — C9113 INJ PANTOPRAZOLE SODIUM, VIA: HCPCS | Performed by: PHYSICIAN ASSISTANT

## 2024-06-03 PROCEDURE — 80061 LIPID PANEL: CPT

## 2024-06-03 PROCEDURE — 6360000002 HC RX W HCPCS: Performed by: NURSE PRACTITIONER

## 2024-06-03 PROCEDURE — 80074 ACUTE HEPATITIS PANEL: CPT

## 2024-06-03 PROCEDURE — 2580000003 HC RX 258: Performed by: PHYSICIAN ASSISTANT

## 2024-06-03 PROCEDURE — 86850 RBC ANTIBODY SCREEN: CPT

## 2024-06-03 PROCEDURE — A4216 STERILE WATER/SALINE, 10 ML: HCPCS | Performed by: PHYSICIAN ASSISTANT

## 2024-06-03 PROCEDURE — 86900 BLOOD TYPING SEROLOGIC ABO: CPT

## 2024-06-03 PROCEDURE — 6370000000 HC RX 637 (ALT 250 FOR IP): Performed by: PHYSICIAN ASSISTANT

## 2024-06-03 PROCEDURE — 6370000000 HC RX 637 (ALT 250 FOR IP): Performed by: NURSE PRACTITIONER

## 2024-06-03 PROCEDURE — 86038 ANTINUCLEAR ANTIBODIES: CPT

## 2024-06-03 PROCEDURE — 72131 CT LUMBAR SPINE W/O DYE: CPT

## 2024-06-03 PROCEDURE — 82105 ALPHA-FETOPROTEIN SERUM: CPT

## 2024-06-03 PROCEDURE — 2060000000 HC ICU INTERMEDIATE R&B

## 2024-06-03 PROCEDURE — 83550 IRON BINDING TEST: CPT

## 2024-06-03 PROCEDURE — 85014 HEMATOCRIT: CPT

## 2024-06-03 PROCEDURE — 86376 MICROSOMAL ANTIBODY EACH: CPT

## 2024-06-03 PROCEDURE — 85018 HEMOGLOBIN: CPT

## 2024-06-03 PROCEDURE — 82947 ASSAY GLUCOSE BLOOD QUANT: CPT

## 2024-06-03 PROCEDURE — 86901 BLOOD TYPING SEROLOGIC RH(D): CPT

## 2024-06-03 PROCEDURE — 86225 DNA ANTIBODY NATIVE: CPT

## 2024-06-03 PROCEDURE — 99255 IP/OBS CONSLTJ NEW/EST HI 80: CPT | Performed by: INTERNAL MEDICINE

## 2024-06-03 PROCEDURE — 36415 COLL VENOUS BLD VENIPUNCTURE: CPT

## 2024-06-03 PROCEDURE — 82270 OCCULT BLOOD FECES: CPT

## 2024-06-03 PROCEDURE — 6360000002 HC RX W HCPCS: Performed by: EMERGENCY MEDICINE

## 2024-06-03 PROCEDURE — 2580000003 HC RX 258: Performed by: INTERNAL MEDICINE

## 2024-06-03 PROCEDURE — 99285 EMERGENCY DEPT VISIT HI MDM: CPT

## 2024-06-03 PROCEDURE — 93005 ELECTROCARDIOGRAM TRACING: CPT | Performed by: EMERGENCY MEDICINE

## 2024-06-03 PROCEDURE — 82103 ALPHA-1-ANTITRYPSIN TOTAL: CPT

## 2024-06-03 PROCEDURE — 83540 ASSAY OF IRON: CPT

## 2024-06-03 PROCEDURE — 36430 TRANSFUSION BLD/BLD COMPNT: CPT

## 2024-06-03 PROCEDURE — A4216 STERILE WATER/SALINE, 10 ML: HCPCS | Performed by: NURSE PRACTITIONER

## 2024-06-03 PROCEDURE — 86920 COMPATIBILITY TEST SPIN: CPT

## 2024-06-03 PROCEDURE — 2580000003 HC RX 258: Performed by: EMERGENCY MEDICINE

## 2024-06-03 RX ORDER — SODIUM CHLORIDE 9 MG/ML
INJECTION, SOLUTION INTRAVENOUS PRN
Status: DISCONTINUED | OUTPATIENT
Start: 2024-06-03 | End: 2024-06-03 | Stop reason: HOSPADM

## 2024-06-03 RX ORDER — POLYETHYLENE GLYCOL 3350 17 G/17G
17 POWDER, FOR SOLUTION ORAL DAILY PRN
Status: DISCONTINUED | OUTPATIENT
Start: 2024-06-03 | End: 2024-06-07 | Stop reason: HOSPADM

## 2024-06-03 RX ORDER — SODIUM CHLORIDE 0.9 % (FLUSH) 0.9 %
10 SYRINGE (ML) INJECTION PRN
Status: DISCONTINUED | OUTPATIENT
Start: 2024-06-03 | End: 2024-06-07 | Stop reason: HOSPADM

## 2024-06-03 RX ORDER — LOSARTAN POTASSIUM 50 MG/1
100 TABLET ORAL DAILY
Status: DISCONTINUED | OUTPATIENT
Start: 2024-06-04 | End: 2024-06-07

## 2024-06-03 RX ORDER — SODIUM CHLORIDE 0.9 % (FLUSH) 0.9 %
5-40 SYRINGE (ML) INJECTION PRN
Status: CANCELLED | OUTPATIENT
Start: 2024-06-11

## 2024-06-03 RX ORDER — ACETAMINOPHEN 650 MG/1
650 SUPPOSITORY RECTAL EVERY 6 HOURS PRN
Status: DISCONTINUED | OUTPATIENT
Start: 2024-06-03 | End: 2024-06-07 | Stop reason: HOSPADM

## 2024-06-03 RX ORDER — HYDRALAZINE HYDROCHLORIDE 20 MG/ML
10 INJECTION INTRAMUSCULAR; INTRAVENOUS EVERY 6 HOURS PRN
Status: DISCONTINUED | OUTPATIENT
Start: 2024-06-03 | End: 2024-06-07 | Stop reason: HOSPADM

## 2024-06-03 RX ORDER — AMLODIPINE BESYLATE 10 MG/1
10 TABLET ORAL DAILY
Status: DISCONTINUED | OUTPATIENT
Start: 2024-06-04 | End: 2024-06-07 | Stop reason: HOSPADM

## 2024-06-03 RX ORDER — ACETAMINOPHEN 325 MG/1
650 TABLET ORAL EVERY 6 HOURS PRN
Status: DISCONTINUED | OUTPATIENT
Start: 2024-06-03 | End: 2024-06-07 | Stop reason: HOSPADM

## 2024-06-03 RX ORDER — SODIUM CHLORIDE 9 MG/ML
5-250 INJECTION, SOLUTION INTRAVENOUS PRN
Status: DISCONTINUED | OUTPATIENT
Start: 2024-06-03 | End: 2024-06-04 | Stop reason: HOSPADM

## 2024-06-03 RX ORDER — ONDANSETRON 4 MG/1
4 TABLET, ORALLY DISINTEGRATING ORAL EVERY 8 HOURS PRN
Status: DISCONTINUED | OUTPATIENT
Start: 2024-06-03 | End: 2024-06-07 | Stop reason: HOSPADM

## 2024-06-03 RX ORDER — HEPARIN 100 UNIT/ML
500 SYRINGE INTRAVENOUS PRN
Status: DISCONTINUED | OUTPATIENT
Start: 2024-06-03 | End: 2024-06-04 | Stop reason: HOSPADM

## 2024-06-03 RX ORDER — SODIUM CHLORIDE 9 MG/ML
5-250 INJECTION, SOLUTION INTRAVENOUS PRN
Status: CANCELLED | OUTPATIENT
Start: 2024-06-11

## 2024-06-03 RX ORDER — SODIUM CHLORIDE 0.9 % (FLUSH) 0.9 %
5-40 SYRINGE (ML) INJECTION EVERY 12 HOURS SCHEDULED
Status: DISCONTINUED | OUTPATIENT
Start: 2024-06-03 | End: 2024-06-07 | Stop reason: HOSPADM

## 2024-06-03 RX ORDER — SODIUM CHLORIDE 0.9 % (FLUSH) 0.9 %
5-40 SYRINGE (ML) INJECTION PRN
Status: DISCONTINUED | OUTPATIENT
Start: 2024-06-03 | End: 2024-06-04 | Stop reason: HOSPADM

## 2024-06-03 RX ORDER — ONDANSETRON 2 MG/ML
4 INJECTION INTRAMUSCULAR; INTRAVENOUS EVERY 6 HOURS PRN
Status: DISCONTINUED | OUTPATIENT
Start: 2024-06-03 | End: 2024-06-07 | Stop reason: HOSPADM

## 2024-06-03 RX ORDER — SODIUM CHLORIDE 9 MG/ML
INJECTION, SOLUTION INTRAVENOUS CONTINUOUS
Status: DISCONTINUED | OUTPATIENT
Start: 2024-06-03 | End: 2024-06-05

## 2024-06-03 RX ORDER — SODIUM CHLORIDE 9 MG/ML
INJECTION, SOLUTION INTRAVENOUS PRN
Status: DISCONTINUED | OUTPATIENT
Start: 2024-06-03 | End: 2024-06-07 | Stop reason: HOSPADM

## 2024-06-03 RX ORDER — HEPARIN 100 UNIT/ML
500 SYRINGE INTRAVENOUS PRN
Status: CANCELLED | OUTPATIENT
Start: 2024-06-11

## 2024-06-03 RX ORDER — METOPROLOL TARTRATE 50 MG/1
50 TABLET, FILM COATED ORAL 2 TIMES DAILY
Status: DISCONTINUED | OUTPATIENT
Start: 2024-06-03 | End: 2024-06-07 | Stop reason: HOSPADM

## 2024-06-03 RX ORDER — ATORVASTATIN CALCIUM 80 MG/1
80 TABLET, FILM COATED ORAL NIGHTLY
Status: DISCONTINUED | OUTPATIENT
Start: 2024-06-03 | End: 2024-06-07 | Stop reason: HOSPADM

## 2024-06-03 RX ADMIN — Medication 1000 MG: at 20:05

## 2024-06-03 RX ADMIN — PANTOPRAZOLE SODIUM 8 MG/HR: 40 INJECTION, POWDER, FOR SOLUTION INTRAVENOUS at 20:08

## 2024-06-03 RX ADMIN — SODIUM CHLORIDE, PRESERVATIVE FREE 10 ML: 5 INJECTION INTRAVENOUS at 10:14

## 2024-06-03 RX ADMIN — OCTREOTIDE ACETATE 50 MCG/HR: 500 INJECTION, SOLUTION INTRAVENOUS; SUBCUTANEOUS at 21:32

## 2024-06-03 RX ADMIN — METOPROLOL TARTRATE 50 MG: 50 TABLET, FILM COATED ORAL at 20:01

## 2024-06-03 RX ADMIN — PANTOPRAZOLE SODIUM 80 MG: 40 INJECTION, POWDER, FOR SOLUTION INTRAVENOUS at 13:10

## 2024-06-03 RX ADMIN — SODIUM CHLORIDE: 9 INJECTION, SOLUTION INTRAVENOUS at 19:55

## 2024-06-03 RX ADMIN — PANTOPRAZOLE SODIUM 80 MG: 40 INJECTION, POWDER, FOR SOLUTION INTRAVENOUS at 20:22

## 2024-06-03 RX ADMIN — SODIUM CHLORIDE, PRESERVATIVE FREE 10 ML: 5 INJECTION INTRAVENOUS at 10:15

## 2024-06-03 RX ADMIN — ATORVASTATIN CALCIUM 80 MG: 80 TABLET, FILM COATED ORAL at 20:01

## 2024-06-03 RX ADMIN — SODIUM CHLORIDE, PRESERVATIVE FREE 10 ML: 5 INJECTION INTRAVENOUS at 20:22

## 2024-06-03 ASSESSMENT — ENCOUNTER SYMPTOMS
VOMITING: 0
ABDOMINAL DISTENTION: 0
RHINORRHEA: 0
COUGH: 0
SHORTNESS OF BREATH: 0
SORE THROAT: 0
SHORTNESS OF BREATH: 0
BACK PAIN: 0
NAUSEA: 0
ABDOMINAL PAIN: 0
SORE THROAT: 0
GASTROINTESTINAL NEGATIVE: 1

## 2024-06-03 ASSESSMENT — PAIN - FUNCTIONAL ASSESSMENT
PAIN_FUNCTIONAL_ASSESSMENT: NONE - DENIES PAIN
PAIN_FUNCTIONAL_ASSESSMENT: NONE - DENIES PAIN

## 2024-06-03 NOTE — ED PROVIDER NOTES
[facility] ED  EMERGENCY DEPARTMENT ENCOUNTER      Pt Name: Daja Espinosa  MRN: [id number]  Birthdate 1960  Date of evaluation: 6/3/2024  Provider: Zarina Tobar DO    CHIEF COMPLAINT       Chief Complaint   Patient presents with    Fatigue     Patient complains of increased fatigue and suspected dark stool. Patient sent down from oncology where she receives chem for bladder cancer. Patient states dark stool and fatigue started last week after she had her PET scan done on Thursday. Patient had a stroke back on April 6th of this year, deficits in the left hand. Blood work this AM showed Hgb of 5.4 and creatinine of 2.6.         HISTORY OF PRESENT ILLNESS   (Location/Symptom, Timing/Onset, Context/Setting, Quality, Duration, Modifying Factors, Severity)  Note limiting factors.   Daja Espinosa is a 63 y.o. female who presents to the emergency department from her oncologist office for fatigue and dark tarry stools for the past 3 days.  Patient states that she went to family this morning to get radiation treatment for her bladder cancer and was supposed to have her chemotherapy today so she went upstairs but they told her to come down to the ER as her hemoglobin was down to 5.4.  Patient states that she has been having dark stools since Friday.  She is on Eliquis at twice a day ever since she had a stroke in April last month.  She also started taking 400 mg of ibuprofen over the past 3 days due to generalized pain.  Patient denies any previous history of upper GI bleed.  She her only symptom is fatigue.  She denies any shortness of breath.  Her creatinine also was bumped up at 2.6 today.  Patient has a history of bladder cancer and she is currently on chemo and radiation therapy.  Patient has never had an upper GI study done before.  She does admit to being a previous alcoholic and used to drink about a sixpack beer every day and quit drinking in March when she found out that she had cancer.    REVIEW OF

## 2024-06-03 NOTE — PROGRESS NOTES
Pt arrived today to resume chemotherapy. Pt to just get Gemzar and pt states she is getting radiation. She brought her radiation schedule to coordinate her treatment with her radiation times. Pt states she is much more tired and states she is feeling weak. Pt states she had to take 2 breaks on her way when walking up to center. Pt noted to be slight SOB. Upon toxicity assessment when asked about constipation or diarrhea, pt mentions that her stool has been black since Friday. Pt states she had her PET scan on Thursday and then noted on Friday that her stool was black. Pt denies any kind of trauma or event that would have caused her to bleed.   1125 All labs now resulted and Hbg noted to be 5.4. Called Dr Shepard and alerted him to all lab results. He ordered to have pt go to ER for a work up to figure out where she is bleeding from. No further orders at this time.   1147 Called ER to report Dr Shepard wants a work up done for bleeding and that pts Hgb is 5.4. Gave report to Henrique.   1152 Jenna Espinoza MA transported pt per  to ER. Port was left accessed and this was reported to the nurse.

## 2024-06-03 NOTE — ED NOTES
2024 04:59:50 PM   Final diagnoses:   None        Consults:  IP CONSULT TO ONCOLOGY  IP CONSULT TO GI  IP CONSULT TO NEUROLOGY  IP CONSULT TO NEPHROLOGY     Treatment Team:   Treatment Team: Attending Provider: Kelly Connor MD; Consulting Physician: Familia Shepard MD; Consulting Physician: Angelita Oropeza MD; Consulting Physician: Mike Reece MD; Consulting Physician: Charan Qureshi MD; Registered Nurse: Jenny Leiva RN; Physician Assistant: Ely Guevara PA    Treatment:          Skin Assessment:        Pain Score:  Pain Assessment  Pain Assessment: None - Denies Pain      SOCIAL HISTORY       Social History     Socioeconomic History    Marital status:      Spouse name: None    Number of children: None    Years of education: None    Highest education level: None   Tobacco Use    Smoking status: Former     Current packs/day: 0.00     Average packs/day: 1.5 packs/day for 15.0 years (22.5 ttl pk-yrs)     Types: Cigarettes     Start date: 1978     Quit date: 1993     Years since quittin.4    Smokeless tobacco: Never    Tobacco comments:     QUIT    Vaping Use    Vaping Use: Never used   Substance and Sexual Activity    Alcohol use: Yes     Alcohol/week: 6.0 standard drinks of alcohol     Types: 6 Cans of beer per week     Comment: No alcohol since [facility]    Drug use: Yes     Types: Marijuana (Weed)     Comment: a couple times a day- last used yesterday    Sexual activity: Yes     Partners: Male     Social Determinants of Health     Financial Resource Strain: Low Risk  (11/3/2023)    Overall Financial Resource Strain (CARDIA)     Difficulty of Paying Living Expenses: Not hard at all   Food Insecurity: No Food Insecurity (2024)    Hunger Vital Sign     Worried About Running Out of Food in the Last Year: Never true     Ran Out of Food in the Last Year: Never true   Transportation Needs: No Transportation Needs (2024)    PRAPARE - Transportation     Lack of  Antimicrobial Indications     Answer:   Intra-Abdominal Infection     Order Specific Question:   Suspected Organism(s)     Answer:   poss ascities/SBP       SURGICAL HISTORY       Past Surgical History:   Procedure Laterality Date    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    CYSTOSCOPY TUR BLADDER TUMOR, LEFT  STENT EXCHANGE performed by Rob Arellano MD at [facility] OR    BLADDER SURGERY  05/17/2016    cysto with transurethral resection of bladder with mitomycin    CARDIAC CATHETERIZATION  12/2010    CARDIAC SURGERY  1993    aortic bypass graft for right carotid artery obstruction    CAROTID ARTERY ANGIOPLASTY Right     COLONOSCOPY N/A 07/22/2021    COLONOSCOPY POLYPECTOMY HOT BIOPSY WITH INK TATTOO performed by Jose R Jack MD at [facility] ENDOSCOPY    COLONOSCOPY N/A 11/01/2021    COLONOSCOPY POLYPECTOMY HOT BIOPSY performed by Jose R Jack MD at [facility] ENDOSCOPY    CYSTOSCOPY N/A 02/27/2024    CYSTOSCOPY- POSS  TRANSURETHRAL RESECTION OF BLADDER TUMOR,  FULGURATION performed by Tulio Holder MD at [facility] OR    CYSTOSCOPY Left 02/27/2024    CYSTOSCOPY URETERAL STENT INSERTION performed by Tulio Holder MD at [facility] OR    CYSTOSCOPY Left 04/02/2024    CYSTOSCOPY TUR BLADDER TUMOR, LEFT  STENT EXCHANGE (Bladder)    FEMORAL BYPASS  5/12, 6/12    per Dr Ho    HERNIA REPAIR      ventral    IR PORT PLACEMENT < 5 YEARS  03/13/2024    IR PORT PLACEMENT < 5 YEARS 3/13/2024 [facility] CARDIAC CATH/IR LAB    OTHER SURGICAL HISTORY  04/2016    TURBT    OTHER SURGICAL HISTORY      brachiocephalic-bypass    OTHER SURGICAL HISTORY      stenting of left internal carotid artery    TUNNELED CENTRAL VENOUS CATHETER W/ SUBCUTANEOUS PORT Right 03/13/2024    Dr Tran/[facility]    UPPER GASTROINTESTINAL ENDOSCOPY N/A 07/22/2021    EGD POLYP SNARE performed by Jose R Jack MD at [facility] ENDOSCOPY    VASCULAR SURGERY      VASCULAR SURGERY  5/12, 6/12    ken leg vacular surgery.    VENTRAL HERNIA REPAIR N/A 01/05/2022

## 2024-06-03 NOTE — CONSULTS
[facility] NEUROLOGY & NEUROSCIENCE  IN-PATIENT SERVICE    NEUROLOGY CONSULT  NOTE    Date:   6/3/2024  Patient name:  Daja Espinosa  Date of admission:  6/3/2024  YOB: 1960    Chief Complaint:     Chief Complaint   Patient presents with    Rectal Bleeding       Reason for Consult:      Recent CVA on ASA and eliquis presenting with acute anemia    History of Present Illness:     The patient is a right handed, 63 y.o. female with past medical hx of recent stroke, bladder cancer, HTN, HLD presented from oncology appointment after lab findings of acute anemia. The patient stated that she went for her first session of radiation and chemotherapy today with oncology, on routine labs prior to chemo she was found to have a hgb of 5.4, she did undergo radiation, however chemo deferred and patient was sent to [place] ED for further evaluation of anemia, was noted to be FOBT positive, received 2u PRBC and was transferred to [place] for GI evaluation.     The patient stated that over the last week or so she has noticed very dark almost black stools, denied any tarry stools. She stated that she has some residual numbness on her left cheek otherwise has no other deficits from her recent stroke. She stated that she had been compliant with eliquis and ASA. The patient additionally stated that she has lower back pain, has had it for sometime. Mostly notices it when she is walking and it does radiate down her legs. She stated that she felt fatigued, but denied any new numbness/tingling, denied dizziness, denied weakness, denied vision changes, denied any gait changes, does have some difficulty with walkingdue to the pain but is functionally independent at home.    Lab work significant for anemia, 5.4, repeat 7.7 after transfusion, and FOBT positive. Creatinine elevated 2.6.     Past Medical History:     Past Medical History:   Diagnosis Date    Alcohol abuse 03/02/2017    Arthritis     Bladder cancer (HCC)

## 2024-06-03 NOTE — ED NOTES
Pt  to ED via tx from [place] via ground flight. Pt reports she was at her radiation and chemo today when she told them she was having dark stool, pt was also noted to have a hgb of 5.2. pt has been given one unit of RBC and one unit still infusing on arrival. Pt reports she also has hx of bladder cancer. Pt has hx of HTN and had a stroke in April. Pt placed on monitor, RBCs are infusing at 150ml/hr. Call light is in reach

## 2024-06-03 NOTE — H&P
[facility]  Office: [phone]  Deven Louis DO, Rashid Martinez DO, Koko House DO, Jc Wheat DO, Bahman Silva MD, Radha Joseph MD, Volodymyr Abad MD, Karyn Purvis MD,  Siva Helton MD, Jigna Emery MD, Mayur Tinajero MD,  Danni Lamas DO, Gayle Guzmán MD, Micha Sosa MD, Lloyd Louis DO, No Lutz MD,  Ganesh Lee DO, Brittani Francisco MD, Sherrell Hickey MD, Kayla Roberson MD, Macarena Martin MD,  Gerardo Pate MD, Daisy Stephenson MD, Kelly Connor MD, Clau Calderon MD, Aurelio Pérez MD, Eliceo Milligan MD, Talib Rodriguez DO, Yusef Lopez DO, Libna Williamson MD,  Franky Trinh MD, Shirley Waterhouse, CNP,  Savanah Diez, CNP, Arias Lares, CNP,  Brenda Daniels, MALENA, Phoebe Morris, CNP, Morelia Howard, CNP, Isela Montaño CNP, Meaghan Rogers, CNP, Neris Del Toro, CNP, Krystal Morrow, PA-C, Ely Guevara PA-C, Aviva Hartman, CNP, Marj Figueroa, CNS, Keri Clayton, CNP, Anahi Carnes, CNP, Tracy Schwab, CNP         [facility]   IN-PATIENT SERVICE   [facility]    HISTORY AND PHYSICAL EXAMINATION            Date:   6/3/2024  Patient name:  Daja Espinosa  Date of admission:  6/3/2024  4:57 PM  MRN:   [id number]  Account:  [id number]  YOB: 1960  PCP:    Sohail Garcia MD  Room:   02/02  Code Status:    Full Code    Chief Complaint:     Chief Complaint   Patient presents with    Rectal Bleeding       History Obtained From:     patient, electronic medical record    History of Present Illness:     Daja Espinosa is a 63 y.o. Non- / non  female who presents with Rectal Bleeding and is admitted to the hospital for the management of Acute anemia.  Patient has history significant for bladder cancer s/p resections, history CVA, hypertension, and hyperlipidemia.    Patient started on chemotherapy 3/19/2024 s/p 1 cycle. Patient was supposed to undergo another cycle of chemotherapy today, but lab work showed  folic acid as patient received blood products. Trend H&H, transfuse if hemoglobin < 7.     Concern for GI bleed - GI following, recommend Protonix and octreotide drips. CLD now and NPO at midnight for possible EGD tomorrow. Additional lab workup ordered. Continue Rocephin. Monitor H&H and overall clinical status closely.     HEAVENLY - Creatinine rising, currently up to 2.6 from baseline 1.3. Likely secondary to hypovolemia from acute blood loss. Nephrology consulted for further recommendations    Bladder cancer s/p resection - Currently on chemotherapy and RT. Oncology following, appreciate recommendations.     Hypertension - Continue Norvasc 10 mg, Losartan 100 mg, and Lopressor 50 mg. Hydralazine 10 mg PRN for SBP > 160.    Dyslipidemia - Continue Lipitor 80 mg    History CVA - Was on Eliquis and ASA, neurology consulted for further recommendations. Recommend CT lumbar spine.     Plan discussed with patient at bedside, all questions answered.  Likely discharge in the next few days.    Consultations:   IP CONSULT TO ONCOLOGY  IP CONSULT TO GI  IP CONSULT TO NEUROLOGY  IP CONSULT TO NEPHROLOGY    Patient is admitted as inpatient status because of co-morbidities listed above, severity of signs and symptoms as outlined, requirement for current medical therapies and most importantly because of direct risk to patient if care not provided in a hospital setting.  Expected length of stay > 48 hours.    COLEMAN Gomez  6/3/2024  5:31 PM    Copy sent to Sohail Garcia MD

## 2024-06-04 ENCOUNTER — ANESTHESIA (OUTPATIENT)
Dept: OPERATING ROOM | Age: 64
End: 2024-06-04
Payer: COMMERCIAL

## 2024-06-04 ENCOUNTER — APPOINTMENT (OUTPATIENT)
Dept: ULTRASOUND IMAGING | Age: 64
End: 2024-06-04
Attending: STUDENT IN AN ORGANIZED HEALTH CARE EDUCATION/TRAINING PROGRAM
Payer: COMMERCIAL

## 2024-06-04 ENCOUNTER — ANESTHESIA EVENT (OUTPATIENT)
Dept: OPERATING ROOM | Age: 64
End: 2024-06-04
Payer: COMMERCIAL

## 2024-06-04 PROBLEM — R71.0 DROP IN HEMOGLOBIN: Status: ACTIVE | Noted: 2024-06-04

## 2024-06-04 PROBLEM — Z86.73 HISTORY OF STROKE: Status: ACTIVE | Noted: 2024-06-04

## 2024-06-04 PROBLEM — E87.20 METABOLIC ACIDOSIS: Status: ACTIVE | Noted: 2024-06-04

## 2024-06-04 PROBLEM — D50.0 ANEMIA, BLOOD LOSS: Status: ACTIVE | Noted: 2024-06-04

## 2024-06-04 PROBLEM — K92.1 MELENA: Status: ACTIVE | Noted: 2024-06-04

## 2024-06-04 PROBLEM — N17.9 AKI (ACUTE KIDNEY INJURY) (HCC): Status: ACTIVE | Noted: 2024-06-04

## 2024-06-04 PROBLEM — K62.5 RECTAL BLEED: Status: ACTIVE | Noted: 2024-06-04

## 2024-06-04 LAB
A1AT SERPL-MCNC: 147 MG/DL (ref 90–200)
ABO/RH: NORMAL
AFP SERPL-MCNC: 3.3 UG/L
ANA SER QL IA: NEGATIVE
ANION GAP SERPL CALCULATED.3IONS-SCNC: 11 MMOL/L (ref 9–16)
ANTIBODY SCREEN: NEGATIVE
ARM BAND NUMBER: NORMAL
BACTERIA URNS QL MICRO: ABNORMAL
BILIRUB UR QL STRIP: NEGATIVE
BLOOD BANK BLOOD PRODUCT EXPIRATION DATE: NORMAL
BLOOD BANK BLOOD PRODUCT EXPIRATION DATE: NORMAL
BLOOD BANK DISPENSE STATUS: NORMAL
BLOOD BANK ISBT PRODUCT BLOOD TYPE: 600
BLOOD BANK ISBT PRODUCT BLOOD TYPE: 600
BLOOD BANK PRODUCT CODE: NORMAL
BLOOD BANK PRODUCT CODE: NORMAL
BLOOD BANK SAMPLE EXPIRATION: NORMAL
BLOOD BANK UNIT TYPE AND RH: NORMAL
BLOOD BANK UNIT TYPE AND RH: NORMAL
BPU ID: NORMAL
BUN SERPL-MCNC: 56 MG/DL (ref 8–23)
CALCIUM SERPL-MCNC: 8.7 MG/DL (ref 8.6–10.4)
CASTS #/AREA URNS LPF: ABNORMAL /LPF (ref 0–8)
CEA SERPL-MCNC: 4.8 NG/ML (ref 0–3.8)
CERULOPLASMIN SERPL-MCNC: 29 MG/DL (ref 16–45)
CHLORIDE SERPL-SCNC: 109 MMOL/L (ref 98–107)
CHLORIDE UR-SCNC: 97 MMOL/L
CLARITY UR: ABNORMAL
CO2 SERPL-SCNC: 16 MMOL/L (ref 20–31)
COLOR UR: ABNORMAL
COMPONENT: NORMAL
CREAT SERPL-MCNC: 2 MG/DL (ref 0.5–0.9)
CREAT UR-MCNC: 27.7 MG/DL (ref 28–217)
CREAT UR-MCNC: 28.2 MG/DL (ref 28–217)
CROSSMATCH RESULT: NORMAL
DSDNA IGG SER QL IA: 2.2 IU/ML
EKG ATRIAL RATE: 72 BPM
EKG P AXIS: 56 DEGREES
EKG P-R INTERVAL: 164 MS
EKG Q-T INTERVAL: 404 MS
EKG QRS DURATION: 86 MS
EKG QTC CALCULATION (BAZETT): 442 MS
EKG R AXIS: 53 DEGREES
EKG T AXIS: 72 DEGREES
EKG VENTRICULAR RATE: 72 BPM
EPI CELLS #/AREA URNS HPF: ABNORMAL /HPF (ref 0–5)
ERYTHROCYTE [DISTWIDTH] IN BLOOD BY AUTOMATED COUNT: 15.9 % (ref 11.8–14.4)
GFR, ESTIMATED: 27 ML/MIN/1.73M2
GLUCOSE SERPL-MCNC: 139 MG/DL (ref 74–99)
GLUCOSE UR STRIP-MCNC: ABNORMAL MG/DL
HAV IGM SERPL QL IA: NONREACTIVE
HBV CORE IGM SERPL QL IA: NONREACTIVE
HBV SURFACE AG SERPL QL IA: NONREACTIVE
HCT VFR BLD AUTO: 27.6 % (ref 36.3–47.1)
HCT VFR BLD AUTO: 28.1 % (ref 36.3–47.1)
HCT VFR BLD AUTO: 31.1 % (ref 36.3–47.1)
HCV AB SERPL QL IA: REACTIVE
HGB BLD-MCNC: 10.3 G/DL (ref 11.9–15.1)
HGB BLD-MCNC: 8.9 G/DL (ref 11.9–15.1)
HGB BLD-MCNC: 9 G/DL (ref 11.9–15.1)
HGB UR QL STRIP.AUTO: ABNORMAL
INR PPP: 1.3
KETONES UR STRIP-MCNC: NEGATIVE MG/DL
LEUKOCYTE ESTERASE UR QL STRIP: ABNORMAL
MCH RBC QN AUTO: 32.4 PG (ref 25.2–33.5)
MCHC RBC AUTO-ENTMCNC: 32.6 G/DL (ref 28.4–34.8)
MCV RBC AUTO: 99.3 FL (ref 82.6–102.9)
MITOCHONDRIA M2 IGG SER-ACNC: 1.8 U/ML (ref 0–4)
NITRITE UR QL STRIP: NEGATIVE
NRBC BLD-RTO: 0 PER 100 WBC
NUCLEAR IGG SER IA-RTO: 0.2 U/ML
PH UR STRIP: 6.5 [PH] (ref 5–8)
PLATELET # BLD AUTO: 309 K/UL (ref 138–453)
PMV BLD AUTO: 9.9 FL (ref 8.1–13.5)
POTASSIUM SERPL-SCNC: 4.3 MMOL/L (ref 3.7–5.3)
PROT UR STRIP-MCNC: ABNORMAL MG/DL
PROTHROMBIN TIME: 15.7 SEC (ref 11.7–14.9)
RBC # BLD AUTO: 2.78 M/UL (ref 3.95–5.11)
RBC #/AREA URNS HPF: ABNORMAL /HPF (ref 0–4)
SODIUM SERPL-SCNC: 136 MMOL/L (ref 136–145)
SODIUM UR-SCNC: 103 MMOL/L
SP GR UR STRIP: 1.01 (ref 1–1.03)
TOTAL PROTEIN, URINE: 45 MG/DL
TOTAL PROTEIN, URINE: 46 MG/DL
TRANSFUSION STATUS: NORMAL
UNIT DIVISION: 0
UNIT ISSUE DATE/TIME: NORMAL
UNIT ISSUE DATE/TIME: NORMAL
URINE TOTAL PROTEIN CREATININE RATIO: 1.6
UROBILINOGEN UR STRIP-ACNC: NORMAL EU/DL (ref 0–1)
WBC #/AREA URNS HPF: ABNORMAL /HPF (ref 0–5)
WBC OTHER # BLD: 7.5 K/UL (ref 3.5–11.3)

## 2024-06-04 PROCEDURE — 85014 HEMATOCRIT: CPT

## 2024-06-04 PROCEDURE — 82390 ASSAY OF CERULOPLASMIN: CPT

## 2024-06-04 PROCEDURE — 99233 SBSQ HOSP IP/OBS HIGH 50: CPT | Performed by: INTERNAL MEDICINE

## 2024-06-04 PROCEDURE — 99223 1ST HOSP IP/OBS HIGH 75: CPT | Performed by: INTERNAL MEDICINE

## 2024-06-04 PROCEDURE — 93010 ELECTROCARDIOGRAM REPORT: CPT | Performed by: INTERNAL MEDICINE

## 2024-06-04 PROCEDURE — 97166 OT EVAL MOD COMPLEX 45 MIN: CPT

## 2024-06-04 PROCEDURE — 85027 COMPLETE CBC AUTOMATED: CPT

## 2024-06-04 PROCEDURE — 7100000000 HC PACU RECOVERY - FIRST 15 MIN: Performed by: INTERNAL MEDICINE

## 2024-06-04 PROCEDURE — 80048 BASIC METABOLIC PNL TOTAL CA: CPT

## 2024-06-04 PROCEDURE — 83516 IMMUNOASSAY NONANTIBODY: CPT

## 2024-06-04 PROCEDURE — 82570 ASSAY OF URINE CREATININE: CPT

## 2024-06-04 PROCEDURE — 2580000003 HC RX 258: Performed by: PHYSICIAN ASSISTANT

## 2024-06-04 PROCEDURE — 84155 ASSAY OF PROTEIN SERUM: CPT

## 2024-06-04 PROCEDURE — APPNB60 APP NON BILLABLE TIME 46-60 MINS: Performed by: NURSE PRACTITIONER

## 2024-06-04 PROCEDURE — 76705 ECHO EXAM OF ABDOMEN: CPT

## 2024-06-04 PROCEDURE — 84165 PROTEIN E-PHORESIS SERUM: CPT

## 2024-06-04 PROCEDURE — 2500000003 HC RX 250 WO HCPCS: Performed by: NURSE ANESTHETIST, CERTIFIED REGISTERED

## 2024-06-04 PROCEDURE — 2580000003 HC RX 258: Performed by: INTERNAL MEDICINE

## 2024-06-04 PROCEDURE — 1200000000 HC SEMI PRIVATE

## 2024-06-04 PROCEDURE — 0DB68ZX EXCISION OF STOMACH, VIA NATURAL OR ARTIFICIAL OPENING ENDOSCOPIC, DIAGNOSTIC: ICD-10-PCS | Performed by: INTERNAL MEDICINE

## 2024-06-04 PROCEDURE — 2709999900 HC NON-CHARGEABLE SUPPLY: Performed by: INTERNAL MEDICINE

## 2024-06-04 PROCEDURE — 82378 CARCINOEMBRYONIC ANTIGEN: CPT

## 2024-06-04 PROCEDURE — 82105 ALPHA-FETOPROTEIN SERUM: CPT

## 2024-06-04 PROCEDURE — 2580000003 HC RX 258: Performed by: NURSE PRACTITIONER

## 2024-06-04 PROCEDURE — 84166 PROTEIN E-PHORESIS/URINE/CSF: CPT

## 2024-06-04 PROCEDURE — 86160 COMPLEMENT ANTIGEN: CPT

## 2024-06-04 PROCEDURE — 3609012400 HC EGD TRANSORAL BIOPSY SINGLE/MULTIPLE: Performed by: INTERNAL MEDICINE

## 2024-06-04 PROCEDURE — 88305 TISSUE EXAM BY PATHOLOGIST: CPT

## 2024-06-04 PROCEDURE — 82103 ALPHA-1-ANTITRYPSIN TOTAL: CPT

## 2024-06-04 PROCEDURE — 86376 MICROSOMAL ANTIBODY EACH: CPT

## 2024-06-04 PROCEDURE — 6360000002 HC RX W HCPCS: Performed by: NURSE ANESTHETIST, CERTIFIED REGISTERED

## 2024-06-04 PROCEDURE — 6370000000 HC RX 637 (ALT 250 FOR IP): Performed by: INTERNAL MEDICINE

## 2024-06-04 PROCEDURE — 43239 EGD BIOPSY SINGLE/MULTIPLE: CPT | Performed by: INTERNAL MEDICINE

## 2024-06-04 PROCEDURE — 84156 ASSAY OF PROTEIN URINE: CPT

## 2024-06-04 PROCEDURE — 84300 ASSAY OF URINE SODIUM: CPT

## 2024-06-04 PROCEDURE — 97530 THERAPEUTIC ACTIVITIES: CPT

## 2024-06-04 PROCEDURE — 86256 FLUORESCENT ANTIBODY TITER: CPT

## 2024-06-04 PROCEDURE — 86225 DNA ANTIBODY NATIVE: CPT

## 2024-06-04 PROCEDURE — 83521 IG LIGHT CHAINS FREE EACH: CPT

## 2024-06-04 PROCEDURE — 6360000002 HC RX W HCPCS: Performed by: INTERNAL MEDICINE

## 2024-06-04 PROCEDURE — 36415 COLL VENOUS BLD VENIPUNCTURE: CPT

## 2024-06-04 PROCEDURE — 6360000002 HC RX W HCPCS: Performed by: NURSE PRACTITIONER

## 2024-06-04 PROCEDURE — 82436 ASSAY OF URINE CHLORIDE: CPT

## 2024-06-04 PROCEDURE — 7100000001 HC PACU RECOVERY - ADDTL 15 MIN: Performed by: INTERNAL MEDICINE

## 2024-06-04 PROCEDURE — C9113 INJ PANTOPRAZOLE SODIUM, VIA: HCPCS | Performed by: PHYSICIAN ASSISTANT

## 2024-06-04 PROCEDURE — 6360000002 HC RX W HCPCS: Performed by: PHYSICIAN ASSISTANT

## 2024-06-04 PROCEDURE — 99255 IP/OBS CONSLTJ NEW/EST HI 80: CPT | Performed by: INTERNAL MEDICINE

## 2024-06-04 PROCEDURE — 97161 PT EVAL LOW COMPLEX 20 MIN: CPT

## 2024-06-04 PROCEDURE — C9113 INJ PANTOPRAZOLE SODIUM, VIA: HCPCS | Performed by: INTERNAL MEDICINE

## 2024-06-04 PROCEDURE — 2580000003 HC RX 258: Performed by: NURSE ANESTHETIST, CERTIFIED REGISTERED

## 2024-06-04 PROCEDURE — 99222 1ST HOSP IP/OBS MODERATE 55: CPT | Performed by: PSYCHIATRY & NEUROLOGY

## 2024-06-04 PROCEDURE — 0DB98ZX EXCISION OF DUODENUM, VIA NATURAL OR ARTIFICIAL OPENING ENDOSCOPIC, DIAGNOSTIC: ICD-10-PCS | Performed by: INTERNAL MEDICINE

## 2024-06-04 PROCEDURE — 99233 SBSQ HOSP IP/OBS HIGH 50: CPT | Performed by: STUDENT IN AN ORGANIZED HEALTH CARE EDUCATION/TRAINING PROGRAM

## 2024-06-04 PROCEDURE — 3700000000 HC ANESTHESIA ATTENDED CARE: Performed by: INTERNAL MEDICINE

## 2024-06-04 PROCEDURE — 80074 ACUTE HEPATITIS PANEL: CPT

## 2024-06-04 PROCEDURE — 93975 VASCULAR STUDY: CPT

## 2024-06-04 PROCEDURE — 81001 URINALYSIS AUTO W/SCOPE: CPT

## 2024-06-04 PROCEDURE — 85610 PROTHROMBIN TIME: CPT

## 2024-06-04 PROCEDURE — 85018 HEMOGLOBIN: CPT

## 2024-06-04 PROCEDURE — 86038 ANTINUCLEAR ANTIBODIES: CPT

## 2024-06-04 PROCEDURE — 97535 SELF CARE MNGMENT TRAINING: CPT

## 2024-06-04 RX ORDER — SODIUM CHLORIDE 9 MG/ML
INJECTION, SOLUTION INTRAVENOUS PRN
Status: CANCELLED | OUTPATIENT
Start: 2024-06-04

## 2024-06-04 RX ORDER — SODIUM CHLORIDE 0.9 % (FLUSH) 0.9 %
5-40 SYRINGE (ML) INJECTION EVERY 12 HOURS SCHEDULED
Status: DISCONTINUED | OUTPATIENT
Start: 2024-06-04 | End: 2024-06-04 | Stop reason: HOSPADM

## 2024-06-04 RX ORDER — SODIUM CHLORIDE 0.9 % (FLUSH) 0.9 %
5-40 SYRINGE (ML) INJECTION PRN
Status: DISCONTINUED | OUTPATIENT
Start: 2024-06-04 | End: 2024-06-04 | Stop reason: HOSPADM

## 2024-06-04 RX ORDER — LIDOCAINE HYDROCHLORIDE 10 MG/ML
INJECTION, SOLUTION EPIDURAL; INFILTRATION; INTRACAUDAL; PERINEURAL PRN
Status: DISCONTINUED | OUTPATIENT
Start: 2024-06-04 | End: 2024-06-04 | Stop reason: SDUPTHER

## 2024-06-04 RX ORDER — PROPOFOL 10 MG/ML
INJECTION, EMULSION INTRAVENOUS CONTINUOUS PRN
Status: DISCONTINUED | OUTPATIENT
Start: 2024-06-04 | End: 2024-06-04 | Stop reason: SDUPTHER

## 2024-06-04 RX ORDER — SODIUM CHLORIDE 0.9 % (FLUSH) 0.9 %
5-40 SYRINGE (ML) INJECTION PRN
Status: CANCELLED | OUTPATIENT
Start: 2024-06-04

## 2024-06-04 RX ORDER — MIDAZOLAM HYDROCHLORIDE 2 MG/2ML
1 INJECTION, SOLUTION INTRAMUSCULAR; INTRAVENOUS EVERY 10 MIN PRN
Status: CANCELLED | OUTPATIENT
Start: 2024-06-04

## 2024-06-04 RX ORDER — ONDANSETRON 2 MG/ML
4 INJECTION INTRAMUSCULAR; INTRAVENOUS
Status: DISCONTINUED | OUTPATIENT
Start: 2024-06-04 | End: 2024-06-04 | Stop reason: HOSPADM

## 2024-06-04 RX ORDER — SODIUM CHLORIDE 0.9 % (FLUSH) 0.9 %
5-40 SYRINGE (ML) INJECTION EVERY 12 HOURS SCHEDULED
Status: CANCELLED | OUTPATIENT
Start: 2024-06-04

## 2024-06-04 RX ORDER — NALOXONE HYDROCHLORIDE 0.4 MG/ML
INJECTION, SOLUTION INTRAMUSCULAR; INTRAVENOUS; SUBCUTANEOUS PRN
Status: DISCONTINUED | OUTPATIENT
Start: 2024-06-04 | End: 2024-06-04 | Stop reason: HOSPADM

## 2024-06-04 RX ORDER — FENTANYL CITRATE 50 UG/ML
50 INJECTION, SOLUTION INTRAMUSCULAR; INTRAVENOUS EVERY 5 MIN PRN
Status: DISCONTINUED | OUTPATIENT
Start: 2024-06-04 | End: 2024-06-04 | Stop reason: HOSPADM

## 2024-06-04 RX ORDER — FENTANYL CITRATE 50 UG/ML
25 INJECTION, SOLUTION INTRAMUSCULAR; INTRAVENOUS EVERY 5 MIN PRN
Status: DISCONTINUED | OUTPATIENT
Start: 2024-06-04 | End: 2024-06-04 | Stop reason: HOSPADM

## 2024-06-04 RX ORDER — SODIUM CHLORIDE 9 MG/ML
INJECTION, SOLUTION INTRAVENOUS CONTINUOUS PRN
Status: DISCONTINUED | OUTPATIENT
Start: 2024-06-04 | End: 2024-06-04 | Stop reason: SDUPTHER

## 2024-06-04 RX ORDER — SODIUM CHLORIDE 9 MG/ML
INJECTION, SOLUTION INTRAVENOUS PRN
Status: DISCONTINUED | OUTPATIENT
Start: 2024-06-04 | End: 2024-06-04 | Stop reason: HOSPADM

## 2024-06-04 RX ORDER — KETAMINE HCL IN NACL, ISO-OSM 100MG/10ML
SYRINGE (ML) INJECTION PRN
Status: DISCONTINUED | OUTPATIENT
Start: 2024-06-04 | End: 2024-06-04 | Stop reason: SDUPTHER

## 2024-06-04 RX ADMIN — OCTREOTIDE ACETATE 50 MCG/HR: 500 INJECTION, SOLUTION INTRAVENOUS; SUBCUTANEOUS at 06:24

## 2024-06-04 RX ADMIN — METOPROLOL TARTRATE 50 MG: 50 TABLET, FILM COATED ORAL at 21:10

## 2024-06-04 RX ADMIN — Medication 30 MG: at 12:20

## 2024-06-04 RX ADMIN — Medication 1000 MG: at 20:02

## 2024-06-04 RX ADMIN — METOPROLOL TARTRATE 50 MG: 50 TABLET, FILM COATED ORAL at 13:37

## 2024-06-04 RX ADMIN — SODIUM CHLORIDE, PRESERVATIVE FREE 10 ML: 5 INJECTION INTRAVENOUS at 21:10

## 2024-06-04 RX ADMIN — LIDOCAINE HYDROCHLORIDE 50 MG: 10 INJECTION, SOLUTION EPIDURAL; INFILTRATION; INTRACAUDAL; PERINEURAL at 12:22

## 2024-06-04 RX ADMIN — SODIUM CHLORIDE: 9 INJECTION, SOLUTION INTRAVENOUS at 20:06

## 2024-06-04 RX ADMIN — AMLODIPINE BESYLATE 10 MG: 10 TABLET ORAL at 13:36

## 2024-06-04 RX ADMIN — SODIUM CHLORIDE: 9 INJECTION, SOLUTION INTRAVENOUS at 12:08

## 2024-06-04 RX ADMIN — ATORVASTATIN CALCIUM 80 MG: 80 TABLET, FILM COATED ORAL at 21:10

## 2024-06-04 RX ADMIN — PANTOPRAZOLE SODIUM 8 MG/HR: 40 INJECTION, POWDER, FOR SOLUTION INTRAVENOUS at 12:05

## 2024-06-04 RX ADMIN — PROPOFOL 150 MCG/KG/MIN: 10 INJECTION, EMULSION INTRAVENOUS at 12:20

## 2024-06-04 RX ADMIN — PANTOPRAZOLE SODIUM 8 MG/HR: 40 INJECTION, POWDER, FOR SOLUTION INTRAVENOUS at 21:57

## 2024-06-04 RX ADMIN — LOSARTAN POTASSIUM 100 MG: 50 TABLET, FILM COATED ORAL at 13:37

## 2024-06-04 ASSESSMENT — ENCOUNTER SYMPTOMS
ABDOMINAL DISTENTION: 0
RHINORRHEA: 0
SHORTNESS OF BREATH: 0
BACK PAIN: 0
WHEEZING: 0
ABDOMINAL PAIN: 0
COUGH: 0

## 2024-06-04 ASSESSMENT — PAIN - FUNCTIONAL ASSESSMENT: PAIN_FUNCTIONAL_ASSESSMENT: NONE - DENIES PAIN

## 2024-06-04 ASSESSMENT — LIFESTYLE VARIABLES: SMOKING_STATUS: 1

## 2024-06-04 NOTE — PROGRESS NOTES
Physical Therapy  Facility/Department: [facility] OR  Physical Therapy Initial Assessment    Name: Daja Espinosa  : 1960  MRN: [id number]  Date of Service: 2024    Discharge Recommendations:  No therapy recommended at discharge   PT Equipment Recommendations  Equipment Needed: No      Patient Diagnosis(es): There were no encounter diagnoses.  Past Medical History:  has a past medical history of Alcohol abuse, Arthritis, Bladder cancer (HCC), CAD (coronary artery disease), Cancer (HCC), Carotid artery occlusion, Carotid artery stenosis, Chronic back pain, History of chemotherapy, Hydronephrosis, Hyperlipidemia, Hypertension, Hypoglycemia, MI (myocardial infarction) (HCC), Peripheral vascular disease (HCC), Takayasu's arteritis (HCC), Tibial fracture, Umbilical hernia, Under care of team, Under care of team, Under care of team, Unspecified cerebral artery occlusion with cerebral infarction, and Wears partial dentures.  Past Surgical History:  has a past surgical history that includes Cardiac catheterization (2010); Cardiac surgery (); Vulva surgery (); vascular surgery; vascular surgery (, ); femoral bypass (, ); other surgical history (2016); Bladder surgery (2016); other surgical history; other surgical history; Colonoscopy (N/A, 2021); Upper gastrointestinal endoscopy (N/A, 2021); Colonoscopy (N/A, 2021); hernia repair; ventral hernia repair (N/A, 2022); Cystoscopy (N/A, 2024); Cystoscopy (Left, 2024); TUNNELED CENTRAL VENOUS CATHETER W/ SUBCUTANEOUS PORT (Right, 2024); IR PORT PLACEMENT < 5 YEARS (2024); Carotid angioplasty (Right); Cystocopy (Left, 2024); and Anomalous venous return repair (N/A, 2024).    Assessment   Assessment: The pt ambulated 300 ft without a device x SBA. She ambulated safely with no c/o dizziness or fatigue. No further PT intervention is needed at this time  Therapy Prognosis: Good  Decision

## 2024-06-04 NOTE — CONSULTS
Renal Consult Note    Patient :  Daja Espinosa; 63 y.o. MRN# [id number]  Location:  [facility]/NONE  Attending:  Macarena Martin MD  Admit Date:  6/3/2024   Hospital Day: 1    Reason for Consult:     Asked by Macarena Mckeon MD to see for HEAVENLY/Elevated Creatinine.    History Obtained From:     Patient, electronic medical record    History of Present Illness:     Daja Espinosa; 63 y.o. female with past medical history of hypertension, bladder cancer s/p resections (high-grade urothelial carcinoma T2 with muscle invasion)-follows up with Dr. Shepard, history of left-sided hydronephrosis 1/2024 status post cystoscopy and stent and follows up with Dr. Holder, embolic CVA 4/6/2024, and hyperlipidemia. Patient started Cisplatin gemcitabine neoadjuvant on 03/19/2024 and has undergone 1 cycle presented to the hospital with the chief complaint of  rectal bleeding and admitted for acute anemia management.     When she went in for another cycle, hemoglobin was found to be 5.4 and patient was sent to the ED.   Patient reports fatigue and dark tarry stools for a few days. Patient takes Eliquis since stroke in April 2024, also takes ASA and has been taking ibuprofen 400mg for the past 3 days.      In the ED, patient is stable, given 3 units of blood. Initial creatinine of 2.6, elevated from baseline which seems to be around 1.3-1.6 mg/dl. On examination, patient states that she has not been experiencing urinary symptoms, abdominal or flank pain. States she has been seeing some blood in the urine. Patient states that GI is planning for a scope today to further investigate potential GI bleed.  Patient does take losartan 100 mg daily at home with nephrology is consulted due to acute kidney injury.    No history of recent contrast exposure, No h/o nephrolithiasis, No recent skin rashes or arthralgias, No hematuria or pyuria noticed in the recent past. Doesn't report any reduction in the urine output recently. Non report of any    Acute Kidney Injury nonoliguric likely due to ATN from underlying GI bleed requiring blood transfusion and patient does have history of bladder cancer currently on chemo therapy along with further complicated by ACE inhibitor's and NSAID usage at home.  Patient also has history of obstructive uropathy, acute obstruction to be ruled out.  Baseline creatinine seems to be around 1.3-1.6 mg/dl.  Creatinine now started to improve some.  Black tarry stools.  GI bleed.  Anemia/drop in hemoglobin likely due to underlying GI bleed.  Requiring blood transfusion.  bladder cancer s/p resections (high-grade urothelial carcinoma T2 with muscle invasion)-follows up with Dr. Shepard.  Metabolic acidosis.  Occult blood stool positive.  Hypertension.  History of CVA on Eliquis and aspirin at home.    History of left-sided hydronephrosis 1/2024 status post cystoscopy and stent and follows up with Dr. Holder.    Plan:     Continue current IV fluids with normal saline.  Agree with blood transfusion and recommend further transfusion if hemoglobin less than 7.  Follow-up GI plan-getting EGD done this admission.  Monitor strict I's and O's and renal function.  Will Check Renal Ultrasound to r/o element of obstruction and to assess the kidney size/echotexture.  Comprehensive urine testing including Urinalysis, Urine sodium, potassium, chloride, Urine protein and creatinine to quantify the proteinuria if any at all.   Will Order serum and urine protein electrophoresis to r/o element to occult paraprotein disease.  Will order Hepatitis B and C, ISAIAH, Complement levels.  BMP in AM.  Will follow.    Nutrition   Please ensure that patient is on a renal diet/TF. Avoid nephrotoxic drugs/contrast exposure.    Thank you for the consultation. Please do not hesitate to contact us for any further questions/concerns.     Nilda Donaldson, OMS-III  Medical Student  06/04/2024    This note is created with the assistance of a speech-recognition program.

## 2024-06-04 NOTE — PROGRESS NOTES
(SANDOSTATIN) 500 mcg in sodium chloride 0.9 % 100 mL infusion 50 mcg/hr (24)    pantoprazole 8 mg/hr (24)     PRN Meds: sodium chloride flush, sodium chloride, ondansetron **OR** ondansetron, polyethylene glycol, acetaminophen **OR** acetaminophen, hydrALAZINE    Data:     Past Medical History:   has a past medical history of Alcohol abuse, Arthritis, Bladder cancer (HCC), CAD (coronary artery disease), Cancer (HCC), Carotid artery occlusion, Carotid artery stenosis, Chronic back pain, History of chemotherapy, Hydronephrosis, Hyperlipidemia, Hypertension, Hypoglycemia, MI (myocardial infarction) (HCC), Peripheral vascular disease (HCC), Takayasu's arteritis (HCC), Tibial fracture, Umbilical hernia, Under care of team, Under care of team, Under care of team, Unspecified cerebral artery occlusion with cerebral infarction, and Wears partial dentures.    Social History:   reports that she quit smoking about 31 years ago. Her smoking use included cigarettes. She started smoking about 46 years ago. She has a 22.5 pack-year smoking history. She has never used smokeless tobacco. She reports current alcohol use of about 6.0 standard drinks of alcohol per week. She reports current drug use. Drug: Marijuana (Weed).     Family History:   Family History   Problem Relation Age of Onset    Cancer Father         lung    Cancer Mother        Vitals:  /83   Pulse 69   Temp 97.9 °F (36.6 °C) (Oral)   Resp 14   Ht 1.575 m (5' 2\")   Wt 52.6 kg (116 lb)   LMP 2010   SpO2 97%   BMI 21.22 kg/m²   Temp (24hrs), Av.9 °F (36.6 °C), Min:97.2 °F (36.2 °C), Max:98.8 °F (37.1 °C)    Recent Labs     24  2013   POCGLU 102       I/O (24Hr):  No intake or output data in the 24 hours ending 24 0743    Labs:  Hematology:  Recent Labs     24  1253 24  1717 24  2337 24  0629   WBC 9.4 9.3  --  7.5   RBC 1.58* 2.43*  --  2.78*   HGB 5.2* 7.7* 8.6* 9.0*   HCT 15.8* 24.6*  recommendations.     Hypertension - Continue Norvasc 10 mg, Losartan 100 mg, and Lopressor 50 mg. Hydralazine 10 mg PRN for SBP > 160.    Dyslipidemia - Continue Lipitor 80 mg  Recent CVA - Was on Eliquis and ASA, which are held currently due to anemia.  Neurology consulted for further recommendations.   Back pain-CT lumbar spine showed grade 1 anterolisthesis of L4 on L5 and spondylosis causing severe spinal canal and neuroforaminal narrowing.  Consulted neurosurgery as well.    Macarena Martin MD  6/4/2024  7:43 AM

## 2024-06-04 NOTE — PROGRESS NOTES
(Bladder)    FEMORAL BYPASS  5/12, 6/12    per Dr Ho    HERNIA REPAIR      ventral    IR PORT PLACEMENT < 5 YEARS  03/13/2024    IR PORT PLACEMENT < 5 YEARS 3/13/2024 [facility] CARDIAC CATH/IR LAB    OTHER SURGICAL HISTORY  04/2016    TURBT    OTHER SURGICAL HISTORY      brachiocephalic-bypass    OTHER SURGICAL HISTORY      stenting of left internal carotid artery    TUNNELED CENTRAL VENOUS CATHETER W/ SUBCUTANEOUS PORT Right 03/13/2024    Dr Tran/[facility]    UPPER GASTROINTESTINAL ENDOSCOPY N/A 07/22/2021    EGD POLYP SNARE performed by Jose R Jack MD at [facility] ENDOSCOPY    VASCULAR SURGERY      VASCULAR SURGERY  5/12, 6/12    ken leg vacular surgery.    VENTRAL HERNIA REPAIR N/A 01/05/2022    HERNIA VENTRAL REPAIR performed by Jose R Jack MD at [facility] OR    VULVA SURGERY  2001    cancer     Past Family History:   Family History   Problem Relation Age of Onset    Cancer Father         lung    Cancer Mother      Social History:  reports that she quit smoking about 31 years ago. Her smoking use included cigarettes. She started smoking about 46 years ago. She has a 22.5 pack-year smoking history. She has never used smokeless tobacco. She reports current alcohol use of about 6.0 standard drinks of alcohol per week. She reports current drug use. Drug: Marijuana (Weed).    Medications:  Prior to Admission medications    Medication Sig Start Date End Date Taking? Authorizing Provider   apixaban (ELIQUIS) 5 MG TABS tablet Take 1 tablet by mouth 2 times daily 4/10/24   Sofía Razo MD   lidocaine-prilocaine (EMLA) 2.5-2.5 % cream Apply topically to port site 60 minutes before access as needed. 3/11/24   Familia Shepard MD   amLODIPine (NORVASC) 10 MG tablet Take 1 tablet by mouth daily 2/6/24   Sohail Garcia MD   atorvastatin (LIPITOR) 80 MG tablet Take 1 tablet by mouth nightly 2/6/24   Sohail Garcia MD   aspirin 81 MG chewable tablet Take 1 tablet by mouth daily 2/6/24   Jose  Basophils % 1 0 - 2 %    Immature Granulocytes % 0 0 %    Neutrophils Absolute 4.80 1.50 - 8.10 k/uL    Lymphocytes Absolute 2.97 1.10 - 3.70 k/uL    Monocytes Absolute 0.70 0.10 - 1.20 k/uL    Eosinophils Absolute 0.74 (H) 0.00 - 0.44 k/uL    Basophils Absolute 0.06 0.00 - 0.20 k/uL    Immature Granulocytes Absolute 0.03 0.00 - 0.30 k/uL   Comprehensive Metabolic Panel w/ Reflex to MG    Collection Time: 06/03/24  5:17 PM   Result Value Ref Range    Sodium 135 (L) 136 - 145 mmol/L    Potassium 4.4 3.7 - 5.3 mmol/L    Chloride 106 98 - 107 mmol/L    CO2 19 (L) 20 - 31 mmol/L    Anion Gap 10 9 - 16 mmol/L    Glucose 97 74 - 99 mg/dL    BUN 69 (H) 8 - 23 mg/dL    Creatinine 2.2 (H) 0.50 - 0.90 mg/dL    Est, Glom Filt Rate 25 (L) >60 mL/min/1.73m2    Calcium 9.4 8.6 - 10.4 mg/dL    Total Protein 7.1 6.6 - 8.7 g/dL    Albumin 4.2 3.5 - 5.2 g/dL    Albumin/Globulin Ratio 1.0 1.0 - 2.5    Total Bilirubin 0.8 0.00 - 1.20 mg/dL    Alkaline Phosphatase 78 35 - 104 U/L    ALT 52 (H) 10 - 35 U/L    AST 62 (H) 10 - 35 U/L   POC Glucose Fingerstick    Collection Time: 06/03/24  8:13 PM   Result Value Ref Range    POC Glucose 102 65 - 105 mg/dL   AFP Tumor Marker    Collection Time: 06/03/24  8:33 PM   Result Value Ref Range    AFP (Alpha Fetoprotein) 3.6 <8.4 ug/L   Alpha-1-Antitrypsin    Collection Time: 06/03/24  8:33 PM   Result Value Ref Range    A-1 Antitrypsin 154 90 - 200 mg/dL   Ceruloplasmin    Collection Time: 06/03/24  8:33 PM   Result Value Ref Range    Ceruloplasmin 29 16 - 45 mg/dL   Hepatitis Panel, Acute    Collection Time: 06/03/24  8:33 PM   Result Value Ref Range    Hepatitis B Surface Ag NONREACTIVE NONREACTIVE    Hepatitis C Ab REACTIVE (A) NONREACTIVE    Hep B Core Ab, IgM NONREACTIVE NONREACTIVE    Hep A IgM NONREACTIVE NONREACTIVE   Iron and TIBC    Collection Time: 06/03/24  8:33 PM   Result Value Ref Range    Iron 184 (H) 37 - 145 ug/dL    TIBC 305 250 - 450 ug/dL    Iron % Saturation 60 (H) 20 - 55 %

## 2024-06-04 NOTE — ANESTHESIA POSTPROCEDURE EVALUATION
POST- ANESTHESIA EVALUATION       Pt Name: Daja Esipnosa  MRN: [id number]  YOB: 1960  Date of evaluation: 6/4/2024  Time:  2:43 PM      /81 Comment: Simultaneous filing. User may not have seen previous data.  Pulse 69   Temp 98.2 °F (36.8 °C)   Resp 16   Ht 1.575 m (5' 2\")   Wt 52.6 kg (116 lb)   LMP 12/01/2010   SpO2 97% Comment: Simultaneous filing. User may not have seen previous data.  BMI 21.22 kg/m²      Consciousness Level  Awake  Cardiopulmonary Status  Stable  Pain Adequately Treated YES  Nausea / Vomiting  NO  Adequate Hydration  YES  Anesthesia Related Complications NONE      Electronically signed by Drake Wilson MD on 6/4/2024 at 2:43 PM     Department of Anesthesiology  Postprocedure Note    Patient: Daja Espinosa  MRN: [id number]  YOB: 1960  Date of evaluation: 6/4/2024    Procedure Summary       Date: 06/04/24 Room / Location: [address]    Anesthesia Start: 1213 Anesthesia Stop: 1228    Procedure: ESOPHAGOGASTRODUODENOSCOPY BIOPSY Diagnosis:       Melena      (Melena [K92.1])    Surgeons: Angelita Oropeza MD Responsible Provider: Drake Wilson MD    Anesthesia Type: MAC ASA Status: 3            Anesthesia Type: No value filed.    Segundo Phase I: Segundo Score: 10    Segundo Phase II:      Anesthesia Post Evaluation    No notable events documented.

## 2024-06-04 NOTE — PROGRESS NOTES
Occupational Therapy  Facility/Department: [facility] OR  Occupational Therapy Initial Assessment    Name: Daja Espinosa  : 1960  MRN: [id number]  Date of Service: 2024    Discharge Recommendations:   Further therapy recommended at discharge.          Patient Diagnosis(es): There were no encounter diagnoses.  Past Medical History:  has a past medical history of Alcohol abuse, Arthritis, Bladder cancer (HCC), CAD (coronary artery disease), Cancer (HCC), Carotid artery occlusion, Carotid artery stenosis, Chronic back pain, History of chemotherapy, Hydronephrosis, Hyperlipidemia, Hypertension, Hypoglycemia, MI (myocardial infarction) (HCC), Peripheral vascular disease (HCC), Takayasu's arteritis (HCC), Tibial fracture, Umbilical hernia, Under care of team, Under care of team, Under care of team, Unspecified cerebral artery occlusion with cerebral infarction, and Wears partial dentures.  Past Surgical History:  has a past surgical history that includes Cardiac catheterization (2010); Cardiac surgery (); Vulva surgery (); vascular surgery; vascular surgery (, ); femoral bypass (, ); other surgical history (2016); Bladder surgery (2016); other surgical history; other surgical history; Colonoscopy (N/A, 2021); Upper gastrointestinal endoscopy (N/A, 2021); Colonoscopy (N/A, 2021); hernia repair; ventral hernia repair (N/A, 2022); Cystoscopy (N/A, 2024); Cystoscopy (Left, 2024); TUNNELED CENTRAL VENOUS CATHETER W/ SUBCUTANEOUS PORT (Right, 2024); IR PORT PLACEMENT < 5 YEARS (2024); Carotid angioplasty (Right); Cystocopy (Left, 2024); and Anomalous venous return repair (N/A, 2024).           Assessment   Performance deficits / Impairments: Decreased functional mobility ;Decreased ADL status;Decreased endurance;Decreased high-level IADLs;Decreased strength;Decreased fine motor control  Assessment: pt would benefit from continued

## 2024-06-04 NOTE — PLAN OF CARE
Problem: Safety - Adult  Goal: Free from fall injury  Outcome: Progressing     Problem: ABCDS Injury Assessment  Goal: Absence of physical injury  Outcome: Progressing     Problem: Chronic Conditions and Co-morbidities  Goal: Patient's chronic conditions and co-morbidity symptoms are monitored and maintained or improved  Outcome: Progressing     Problem: Gastrointestinal - Adult  Goal: Minimal or absence of nausea and vomiting  Outcome: Progressing     Problem: Infection - Adult  Goal: Absence of infection at discharge  Outcome: Progressing     Problem: Metabolic/Fluid and Electrolytes - Adult  Goal: Electrolytes maintained within normal limits  Outcome: Progressing  Goal: Hemodynamic stability and optimal renal function maintained  Outcome: Progressing     Problem: Hematologic - Adult  Goal: Maintains hematologic stability  Outcome: Progressing     Problem: Discharge Planning  Goal: Discharge to home or other facility with appropriate resources  Outcome: Progressing

## 2024-06-04 NOTE — PROGRESS NOTES
Today's Date: 6/4/2024  Patient Name: Daja Espinosa  Date of admission: 6/3/2024  4:57 PM  Patient's age: 63 y.o., 1960  Admission Dx: Acute anemia [D64.9]    Reason for Consult: Rectal bleed/bladder cancer  Requesting Physician: Macarena Martin MD    CHIEF COMPLAINT: Rectal bleed    History Obtained From:  patient    Interval history  Patient seen and examined  Currently stable and hemoglobin 9 with creatinine of 2  Endoscopy no active bleed  Colonoscopy done as an outpatient      HISTORY OF PRESENT ILLNESS:      The patient is a 63 y.o.  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  Embolic stroke on 4/6/2024  Concurrent chemo RT(plan for continuous neoadjuvant chemotherapy abandoned as patient not surgical candidate)  Chronic kidney disease     Hematology oncology treatment  Cisplatin gemcitabine neoadjuvant started on March 19, 2024 every 3 weeks status post 1 cycle and have stopped and the plan changed to concurrent chemo RT   Tentative concurrent chemo RT with low-dose biweekly gemcitabine 27 mg/m²  Eliquis      Past Medical History:   has a past medical history of Alcohol abuse, Arthritis, Bladder cancer (HCC), CAD (coronary artery disease), Cancer (HCC), Carotid artery occlusion, Carotid artery stenosis, Chronic back pain, History of chemotherapy, Hydronephrosis, Hyperlipidemia, Hypertension, Hypoglycemia, MI (myocardial infarction) (HCC), Peripheral vascular disease (HCC), Takayasu's arteritis (HCC), Tibial

## 2024-06-04 NOTE — OP NOTE
PROCEDURE NOTE    DATE OF PROCEDURE: 6/4/2024     SURGEON: Angelita Oropeza MD  Facility: [facility]  ASSISTANT: None  Anesthesia: MAC  PREOPERATIVE DIAGNOSIS:     Questionable melena few days ago  Patient on chemotherapy    Diagnosis:  Mild gastritis biopsies were taken  Duodenitis with tiny erosions biopsies were taken  No active bleeding  No fresh or old blood in the stomach at this time          POSTOPERATIVE DIAGNOSIS: As described below    OPERATION: Upper GI endoscopy with Biopsy    ANESTHESIA: Moderate Sedation     ESTIMATED BLOOD LOSS: Less than 50 ml    COMPLICATIONS: None.     SPECIMENS:  Was Obtained: As above    HISTORY: The patient is a 63 y.o. year old female with history of above preop diagnosis.  I recommended esophagogastroduodenoscopy with possible biopsy and I explained the risk, benefits, expected outcome, and alternatives to the procedure.  Risks included but are not limited to bleeding, infection, respiratory distress, hypotension, and perforation of the esophagus, stomach, or duodenum.  Patient understands and is in agreement.      The patient was counseled at length about the risks of leila Covid-19 during their perioperative period and any recovery window from their procedure.  The patient was made aware that leila Covid-19  may worsen their prognosis for recovering from their procedure  and lend to a higher morbidity and/or mortality risk.  All material risks, benefits, and reasonable alternatives including postponing the procedure were discussed. The patient does wish to proceed with the procedure at this time.         PROCEDURE: The patient was given IV conscious sedation.  The patient's SPO2 remained above 90% throughout the procedure.The gastroscope was inserted orally and advanced under direct vision through the esophagus, through the stomach, through the pylorus, and into the descending duodenum.      Post sedation note :The patient's SPO2 remained above 90% throughout the

## 2024-06-04 NOTE — CONSULTS
Today's Date: 6/3/2024  Patient Name: Daja Espinosa  Date of admission: 6/3/2024  4:57 PM  Patient's age: 63 y.o., 1960  Admission Dx: Acute anemia [D64.9]    Reason for Consult: Rectal bleed/bladder cancer  Requesting Physician: Kelly Brar Sra, MD    CHIEF COMPLAINT: Rectal bleed    History Obtained From:  patient    HISTORY OF PRESENT ILLNESS:      The patient is a 63 y.o.  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  Embolic stroke on 4/6/2024  Concurrent chemo RT(plan for continuous neoadjuvant chemotherapy abandoned as patient not surgical candidate)  Chronic kidney disease     Hematology oncology treatment  Cisplatin gemcitabine neoadjuvant started on March 19, 2024 every 3 weeks status post 1 cycle and have stopped and the plan changed to concurrent chemo RT   Tentative concurrent chemo RT with low-dose biweekly gemcitabine 27 mg/m²  Eliquis      Past Medical History:   has a past medical history of Alcohol abuse, Arthritis, Bladder cancer (HCC), CAD (coronary artery disease), Cancer (HCC), Carotid artery occlusion, Carotid artery stenosis, Chronic back pain, History of chemotherapy, Hydronephrosis, Hyperlipidemia, Hypertension, Hypoglycemia, MI (myocardial infarction) (HCC), Peripheral vascular disease (HCC), Takayasu's arteritis (HCC), Tibial fracture, Umbilical hernia, Under care of team, Under care of team, Under care of team, Unspecified cerebral artery occlusion with cerebral infarction, and Wears partial    Neck - supple, no significant adenopathy   Lymphatics - no palpable lymphadenopathy, no hepatosplenomegaly   Chest - clear to auscultation, no wheezes, rales or rhonchi, symmetric air entry   Heart - normal rate, regular rhythm, normal S1, S2, no murmurs  Abdomen - soft, nontender, nondistended, no masses or organomegaly   Neurological - alert, oriented, normal speech, no focal findings or movement disorder noted   Musculoskeletal - no joint tenderness, deformity or swelling   Extremities - peripheral pulses normal, no pedal edema, no clubbing or cyanosis   Skin - normal coloration and turgor, no rashes, no suspicious skin lesions noted ,    DATA:    Labs:   CBC:   Recent Labs     06/03/24  1253 06/03/24  1717   WBC 9.4 9.3   HGB 5.2* 7.7*   HCT 15.8* 24.6*    376     BMP:   Recent Labs     06/03/24  1012 06/03/24  1717    135*   K 4.5 4.4   CO2 19* 19*   BUN 76* 69*   CREATININE 2.6* 2.2*   LABGLOM 20* 25*   GLUCOSE 110* 97     PT/INR: No results for input(s): \"PROTIME\", \"INR\" in the last 72 hours.    IMAGING DATA:  US LIVER    (Results Pending)   CT LUMBAR SPINE WO CONTRAST    (Results Pending)       Primary Problem  Acute anemia    Active Hospital Problems    Diagnosis Date Noted    Acute anemia [D64.9] 06/03/2024    Malignant neoplasm of urinary bladder (HCC) [C67.9] 04/14/2016    Essential hypertension [I10] 11/07/2011    Dyslipidemia [E78.5] 11/07/2011         IMPRESSION:   Severe acute blood loss anemia  Muscle invasive bladder cancer (patient deemed high risk for surgery)  Rectal bleed  Severe and extensive history of peripheral vascular disease  On anticoagulation  Back pain    RECOMMENDATIONS:  I personally reviewed results of lab work-up imaging studies,outside records and other relevant clinical data. I had a detailed discussion with the patient.I explained the significance of these abnormalities and possible etiology and management options   64-year-old woman with extensive history of

## 2024-06-04 NOTE — CONSULTS
[facility]astroenterology  Consultation Note     .  Chief Complaint:  Acute anemia    Reason for consult:    UGIB/on chemo    History of present illness: This is a 63 y.o. female with PMH including alcohol misuse, bladder cancer on chemo, CAD, chronic back pain, hydronephrosis, PVD who presented with complaints of rectal bleeding, severe fatigue malaise.   Patient states that on Wednesday and Thursday she was experiencing melena several episodes that has now been associated with severe fatigue, weakness.   Patient presented for chemotherapy, had routine labs drawn and was found to be severely anemic with a hemoglobin of 5.4 g/dL for which she received 2 units of PRBCs and this a.m. is 9.0 g/dL.  No iron deficiency noted  Patient states she is does still occasionally drink but not near as heavy as she did in her previous years  Patient reports smoking, no drug use, no NSAID use, no dysphagia or dyne aphasia, no hematemesis or hematochezia.  Denies abdominal pain  No abdominal imaging completed at this time    Patient was started on PPI drip, octreotide drip, and given 1 g of Rocephin with suspicion of underlying cirrhosis    Summary of current abnormal labs completed at this time:    Metabolic: BUN 56, creatinine 2.0 improved since admission  Hematology: Hemoglobin 5.4-9.0 after 2 units of PRBCs.  MCV 99, platelets 309  Coags: INR 1.3  Liver profile: ALT 44-52, AST 51-62  Cardiac profile: N/A    Patient denies any previous EGD or colonoscopy  Denies personal or family history of GI malignancy  Denies previous history of cirrhosis or hepatitis  Previous GI history:   See above    Past Medical/Social/Family History:  Past Medical History:   Diagnosis Date    Alcohol abuse 03/02/2017    Arthritis     Bladder cancer (HCC) 2016    CAD (coronary artery disease)     Cancer (HCC) 2001    vulvar-    Carotid artery occlusion 1993    [facility] where she had a brachiocephalic aorta bypass.    Carotid artery

## 2024-06-04 NOTE — CONSULTS
Department of Neurosurgery                                                             Consult Note      Reason for Consult:  low back pain  Requesting Physician:  Dr. Early  Neurosurgeon:   [x] Dr. Carrasco  [] Dr. Guerrero  [] Dr. Pizarro  [] Dr. Nichols  [] Dr. Mckinley  [] Dr. Camargo      History Obtained From:  patient, electronic medical record    CHIEF COMPLAINT:         lumbago    HISTORY OF PRESENT ILLNESS:       The patient is a 63 y.o. female hx of chronic back pain, CAD, alcohol abuse, bladder cancer status post chemotherapy, HTN, HLD, left CCA stenosis s/p stent placement, PVD, smoker, alcoholic, hypercoagulability with bilateral embolic stroke (April 2024) started on Eliquis 5 mg twice daily and aspirin 81 mg daily who presents with severe anemia of hemoglobin 5.4, melena.     Neurosurgery consulted for lower back pain with pain radiating down her legs worse while walking.  CT lumbar spine showed grade 1 anterolisthesis of L4 on L5 with due to disc bulge and spondylosis causing severe spinal canal and neuroforaminal narrowing at this level.    To note, patient experienced left upper extremity weakness and left facial droop secondary to stroke in April 2024.  She is almost completely recovered and only has residual left cheek numbness.      PAST MEDICAL HISTORY :       Past Medical History:        Diagnosis Date    Alcohol abuse 03/02/2017    Arthritis     Bladder cancer (HCC) 2016    CAD (coronary artery disease)     Cancer ([facility]) 2001    vulvar-    Carotid artery occlusion 1993    [facility] where she had a brachiocephalic aorta bypass.    Carotid artery stenosis     Chronic back pain     History of chemotherapy     Hydronephrosis 02/15/2024    Hyperlipidemia     Hypertension     Hypoglycemia     MI (myocardial infarction) ([facility])     Peripheral vascular disease ([facility])     Takayasu's arteritis ([facility])     Tibial fracture     right    Umbilical hernia     Under care of team 03/29/2024       ABDOMEN: Soft, non-tender, non-distended with normal active bowel sounds   NEUROLOGIC:  EYE OPENING     Spontaneous - 4 [x]       To voice - 3 []       To pain - 2 []       None - 1 []    VERBAL RESPONSE     Appropriate, oriented - 5 [x]       Dazed or confused - 4 []       Syllables, expletives - 3 []       Grunts - 2 []       None - 1 []    MOTOR RESPONSE     Spontaneous, command - 6 [x]       Localizes pain - 5 []       Withdraws pain - 4 []       Abnormal flexion - 3 []       Abnormal extension - 2 []       None - 1 []            Total GCS: 15    Mental Status:  aao x 3                Cranial Nerves:    cranial nerves II-XII are grossly intact    Motor Exam:    Drift:  absent  Tone:  normal    Motor exam is symmetrical 5 out of 5 all extremities bilaterally    Sensory:    Right Upper Extremity:  normal  Left Upper Extremity:  normal  Right Lower Extremity:  normal  Left Lower Extremity:  normal    Deep Tendon Reflexes:      Right Knee:  3+  Left Knee:  3+    Plantar Response:    Right:  equivocal  Left:  equivocal    Clonus:  absent  Rueda's:  absent    Gait:  normal   SKIN: no rash       LABS AND IMAGING:     CBC with Differential:    Lab Results   Component Value Date/Time    WBC 7.5 06/04/2024 06:29 AM    RBC 2.78 06/04/2024 06:29 AM    RBC 3.36 05/31/2012 03:00 PM    HGB 10.3 06/04/2024 02:22 PM    HCT 31.1 06/04/2024 02:22 PM     06/04/2024 06:29 AM     05/31/2012 03:00 PM    MCV 99.3 06/04/2024 06:29 AM    MCH 32.4 06/04/2024 06:29 AM    MCHC 32.6 06/04/2024 06:29 AM    RDW 15.9 06/04/2024 06:29 AM    LYMPHOPCT 32 06/03/2024 05:17 PM    MONOPCT 8 06/03/2024 05:17 PM    EOSPCT 8 06/03/2024 05:17 PM    BASOPCT 1 06/03/2024 05:17 PM    MONOSABS 0.70 06/03/2024 05:17 PM    LYMPHSABS 2.40 12/02/2022 11:31 AM    EOSABS 0.74 06/03/2024 05:17 PM    BASOSABS 0.06 06/03/2024 05:17 PM    DIFFTYPE YES 12/02/2022 11:31 AM     BMP:    Lab Results   Component Value Date/Time     06/04/2024 06:29

## 2024-06-05 ENCOUNTER — APPOINTMENT (OUTPATIENT)
Dept: MRI IMAGING | Age: 64
End: 2024-06-05
Attending: STUDENT IN AN ORGANIZED HEALTH CARE EDUCATION/TRAINING PROGRAM
Payer: COMMERCIAL

## 2024-06-05 ENCOUNTER — APPOINTMENT (OUTPATIENT)
Dept: ULTRASOUND IMAGING | Age: 64
End: 2024-06-05
Attending: STUDENT IN AN ORGANIZED HEALTH CARE EDUCATION/TRAINING PROGRAM
Payer: COMMERCIAL

## 2024-06-05 DIAGNOSIS — I10 ESSENTIAL HYPERTENSION: ICD-10-CM

## 2024-06-05 LAB
ANION GAP SERPL CALCULATED.3IONS-SCNC: 10 MMOL/L (ref 9–16)
BUN SERPL-MCNC: 39 MG/DL (ref 8–23)
C3 SERPL-MCNC: 130 MG/DL (ref 90–180)
C4 SERPL-MCNC: 19 MG/DL (ref 10–40)
CALCIUM SERPL-MCNC: 8.4 MG/DL (ref 8.6–10.4)
CHLORIDE SERPL-SCNC: 112 MMOL/L (ref 98–107)
CO2 SERPL-SCNC: 15 MMOL/L (ref 20–31)
CREAT SERPL-MCNC: 1.7 MG/DL (ref 0.5–0.9)
ERYTHROCYTE [DISTWIDTH] IN BLOOD BY AUTOMATED COUNT: 16.1 % (ref 11.8–14.4)
FREE KAPPA/LAMBDA RATIO: 1.16 (ref 0.22–1.74)
GFR, ESTIMATED: 33 ML/MIN/1.73M2
GLUCOSE SERPL-MCNC: 113 MG/DL (ref 74–99)
HCT VFR BLD AUTO: 25.7 % (ref 36.3–47.1)
HCT VFR BLD AUTO: 28.1 % (ref 36.3–47.1)
HCT VFR BLD AUTO: 28.3 % (ref 36.3–47.1)
HCT VFR BLD AUTO: 28.8 % (ref 36.3–47.1)
HGB BLD-MCNC: 8.6 G/DL (ref 11.9–15.1)
HGB BLD-MCNC: 8.7 G/DL (ref 11.9–15.1)
HGB BLD-MCNC: 8.8 G/DL (ref 11.9–15.1)
HGB BLD-MCNC: 9 G/DL (ref 11.9–15.1)
INR PPP: 1.1
KAPPA LC FREE SER-MCNC: 57.3 MG/L
LAMBDA LC FREE SERPL-MCNC: 49.2 MG/L (ref 4.2–27.7)
MCH RBC QN AUTO: 31.6 PG (ref 25.2–33.5)
MCHC RBC AUTO-ENTMCNC: 31 G/DL (ref 28.4–34.8)
MCV RBC AUTO: 102.2 FL (ref 82.6–102.9)
NRBC BLD-RTO: 0 PER 100 WBC
PLATELET # BLD AUTO: 318 K/UL (ref 138–453)
PMV BLD AUTO: 9.8 FL (ref 8.1–13.5)
POTASSIUM SERPL-SCNC: 4.5 MMOL/L (ref 3.7–5.3)
PROTHROMBIN TIME: 14.2 SEC (ref 11.7–14.9)
RBC # BLD AUTO: 2.75 M/UL (ref 3.95–5.11)
SODIUM SERPL-SCNC: 137 MMOL/L (ref 136–145)
SURGICAL PATHOLOGY REPORT: NORMAL
WBC OTHER # BLD: 8.9 K/UL (ref 3.5–11.3)

## 2024-06-05 PROCEDURE — 85610 PROTHROMBIN TIME: CPT

## 2024-06-05 PROCEDURE — 2500000003 HC RX 250 WO HCPCS: Performed by: INTERNAL MEDICINE

## 2024-06-05 PROCEDURE — C9113 INJ PANTOPRAZOLE SODIUM, VIA: HCPCS | Performed by: INTERNAL MEDICINE

## 2024-06-05 PROCEDURE — 99232 SBSQ HOSP IP/OBS MODERATE 35: CPT | Performed by: INTERNAL MEDICINE

## 2024-06-05 PROCEDURE — 80048 BASIC METABOLIC PNL TOTAL CA: CPT

## 2024-06-05 PROCEDURE — 6370000000 HC RX 637 (ALT 250 FOR IP)

## 2024-06-05 PROCEDURE — 6360000002 HC RX W HCPCS: Performed by: INTERNAL MEDICINE

## 2024-06-05 PROCEDURE — 2580000003 HC RX 258: Performed by: INTERNAL MEDICINE

## 2024-06-05 PROCEDURE — 99232 SBSQ HOSP IP/OBS MODERATE 35: CPT | Performed by: STUDENT IN AN ORGANIZED HEALTH CARE EDUCATION/TRAINING PROGRAM

## 2024-06-05 PROCEDURE — APPSS30 APP SPLIT SHARED TIME 16-30 MINUTES: Performed by: REGISTERED NURSE

## 2024-06-05 PROCEDURE — 6370000000 HC RX 637 (ALT 250 FOR IP): Performed by: INTERNAL MEDICINE

## 2024-06-05 PROCEDURE — 85027 COMPLETE CBC AUTOMATED: CPT

## 2024-06-05 PROCEDURE — 85018 HEMOGLOBIN: CPT

## 2024-06-05 PROCEDURE — 76775 US EXAM ABDO BACK WALL LIM: CPT

## 2024-06-05 PROCEDURE — 1200000000 HC SEMI PRIVATE

## 2024-06-05 PROCEDURE — 85014 HEMATOCRIT: CPT

## 2024-06-05 PROCEDURE — 36415 COLL VENOUS BLD VENIPUNCTURE: CPT

## 2024-06-05 RX ORDER — METOPROLOL TARTRATE 50 MG/1
50 TABLET, FILM COATED ORAL 2 TIMES DAILY
Qty: 60 TABLET | Refills: 5 | Status: SHIPPED | OUTPATIENT
Start: 2024-06-05 | End: 2024-06-07 | Stop reason: HOSPADM

## 2024-06-05 RX ADMIN — PANTOPRAZOLE SODIUM 8 MG/HR: 40 INJECTION, POWDER, FOR SOLUTION INTRAVENOUS at 20:04

## 2024-06-05 RX ADMIN — PANTOPRAZOLE SODIUM 8 MG/HR: 40 INJECTION, POWDER, FOR SOLUTION INTRAVENOUS at 09:58

## 2024-06-05 RX ADMIN — OCTREOTIDE ACETATE 50 MCG/HR: 500 INJECTION, SOLUTION INTRAVENOUS; SUBCUTANEOUS at 17:03

## 2024-06-05 RX ADMIN — OCTREOTIDE ACETATE 50 MCG/HR: 500 INJECTION, SOLUTION INTRAVENOUS; SUBCUTANEOUS at 04:30

## 2024-06-05 RX ADMIN — SODIUM CHLORIDE: 9 INJECTION, SOLUTION INTRAVENOUS at 13:21

## 2024-06-05 RX ADMIN — AMLODIPINE BESYLATE 10 MG: 10 TABLET ORAL at 09:15

## 2024-06-05 RX ADMIN — METOPROLOL TARTRATE 50 MG: 50 TABLET, FILM COATED ORAL at 09:15

## 2024-06-05 RX ADMIN — APIXABAN 5 MG: 5 TABLET, FILM COATED ORAL at 20:10

## 2024-06-05 RX ADMIN — SODIUM BICARBONATE: 84 INJECTION, SOLUTION INTRAVENOUS at 16:59

## 2024-06-05 RX ADMIN — LOSARTAN POTASSIUM 100 MG: 50 TABLET, FILM COATED ORAL at 09:15

## 2024-06-05 RX ADMIN — APIXABAN 5 MG: 5 TABLET, FILM COATED ORAL at 15:30

## 2024-06-05 RX ADMIN — Medication 1000 MG: at 18:26

## 2024-06-05 RX ADMIN — METOPROLOL TARTRATE 50 MG: 50 TABLET, FILM COATED ORAL at 20:10

## 2024-06-05 RX ADMIN — SODIUM CHLORIDE, PRESERVATIVE FREE 10 ML: 5 INJECTION INTRAVENOUS at 09:15

## 2024-06-05 RX ADMIN — ATORVASTATIN CALCIUM 80 MG: 80 TABLET, FILM COATED ORAL at 20:10

## 2024-06-05 ASSESSMENT — ENCOUNTER SYMPTOMS
ABDOMINAL PAIN: 0
BACK PAIN: 0
RHINORRHEA: 0
WHEEZING: 0
SHORTNESS OF BREATH: 0
COUGH: 0
STRIDOR: 0
ABDOMINAL DISTENTION: 0

## 2024-06-05 NOTE — PLAN OF CARE
Patient refusing MRI lumbar even after explantation was given. States she does not see need for MRI lumbar since she has had same type of pain for past 20 years. Denies any new paresthesias and pain.   MRI lumbar was discontinued.

## 2024-06-05 NOTE — PROGRESS NOTES
facial symmetry  VIII   -     Intact hearing   IX,X -     Symmetrical palate  XI    -     Symmetrical shoulder shrug  XII   -     Midline tongue, no atrophy    MOTOR FUNCTION: RUE: Significant for good strength of grade 5/5 in proximal and distal muscle groups   LUE: Significant for good strength of grade 5/5 in proximal and distal muscle groups   RLE: Significant for good strength of grade 5/5 in proximal and distal muscle groups   LLE: Significant for good strength of grade 5/5 in proximal and distal muscle groups      Normal bulk, normal tone and no involuntary movements, no tremor   SENSORY FUNCTION:  Normal touch, normal pinprick, normal vibration, normal proprioception   CEREBELLAR FUNCTION:  Intact fine motor control over upper limbs and lower limbs   REFLEX FUNCTION:  Symmetric in upper and lower extremities   STATION and GAIT Deferred       Investigations:      Laboratory Testing:  Recent Results (from the past 24 hour(s))   Chloride, Random Urine    Collection Time: 06/04/24  7:52 PM   Result Value Ref Range    Chloride, Ur 97 mmol/L   Creatinine, Random Urine    Collection Time: 06/04/24  7:52 PM   Result Value Ref Range    Creatinine, Ur 27.7 (L) 28.0 - 217.0 mg/dL   Protein Electrophoresis, Urine    Collection Time: 06/04/24  7:52 PM   Result Value Ref Range    Specimen Type .URINE     Volume PENDING     Time (Hours) PENDING     Urine Total Protein 48 mg/dL    Total Protein Excreted, Urine PENDING 30 - 150 mg/24 h    M Dylan 1, Urine PENDING g/dL    M-Dylan 1,% PENDING %    M Dylan 2, Urine PENDING g/dL    M-Dylan 2 % PENDING %    P E Interpretation, U PENDING     Pathologist PENDING    Protein, urine, random    Collection Time: 06/04/24  7:52 PM   Result Value Ref Range    Total Protein, Urine 46 mg/dL   Sodium, urine, random    Collection Time: 06/04/24  7:52 PM   Result Value Ref Range    Sodium,Ur 103 mmol/L   Urinalysis with Microscopic    Collection Time: 06/04/24  7:52 PM   Result Value Ref Range     Color, UA Red (A) Yellow    Turbidity UA Cloudy (A) Clear    Glucose, Ur 1+ (A) NEGATIVE mg/dL    Bilirubin, Urine NEGATIVE NEGATIVE    Ketones, Urine NEGATIVE NEGATIVE mg/dL    Specific Gravity, UA 1.010 1.005 - 1.030    Urine Hgb LARGE (A) NEGATIVE    pH, Urine 6.5 5.0 - 8.0    Protein, UA 1+ (A) NEGATIVE mg/dL    Urobilinogen, Urine Normal 0.0 - 1.0 EU/dL    Nitrite, Urine NEGATIVE NEGATIVE    Leukocyte Esterase, Urine SMALL (A) NEGATIVE    WBC, UA 20 TO 50 0 - 5 /HPF    RBC, UA TOO NUMEROUS TO COUNT 0 - 4 /HPF    Casts UA  0 - 8 /LPF     0 TO 2 HYALINE Reference range defined for non-centrifuged specimen.    Epithelial Cells, UA 2 TO 5 0 - 5 /HPF    Bacteria, UA None None   Protein / creatinine ratio, urine    Collection Time: 06/04/24  7:53 PM   Result Value Ref Range    Total Protein, Urine 45 mg/dL    Creatinine, Ur 28.2 28.0 - 217.0 mg/dL    Urine Total Protein Creatinine Ratio 1.60    Hemoglobin and Hematocrit    Collection Time: 06/04/24 11:45 PM   Result Value Ref Range    Hemoglobin 8.8 (L) 11.9 - 15.1 g/dL    Hematocrit 28.3 (L) 36.3 - 47.1 %   CBC    Collection Time: 06/05/24  7:05 AM   Result Value Ref Range    WBC 8.9 3.5 - 11.3 k/uL    RBC 2.75 (L) 3.95 - 5.11 m/uL    Hemoglobin 8.7 (L) 11.9 - 15.1 g/dL    Hematocrit 28.1 (L) 36.3 - 47.1 %    .2 82.6 - 102.9 fL    MCH 31.6 25.2 - 33.5 pg    MCHC 31.0 28.4 - 34.8 g/dL    RDW 16.1 (H) 11.8 - 14.4 %    Platelets 318 138 - 453 k/uL    MPV 9.8 8.1 - 13.5 fL    NRBC Automated 0.0 0.0 per 100 WBC   Basic Metabolic Panel    Collection Time: 06/05/24  7:05 AM   Result Value Ref Range    Sodium 137 136 - 145 mmol/L    Potassium 4.5 3.7 - 5.3 mmol/L    Chloride 112 (H) 98 - 107 mmol/L    CO2 15 (L) 20 - 31 mmol/L    Anion Gap 10 9 - 16 mmol/L    Glucose 113 (H) 74 - 99 mg/dL    BUN 39 (H) 8 - 23 mg/dL    Creatinine 1.7 (H) 0.50 - 0.90 mg/dL    Est, Glom Filt Rate 33 (L) >60 mL/min/1.73m2    Calcium 8.4 (L) 8.6 - 10.4 mg/dL   Protime-INR    Collection

## 2024-06-05 NOTE — PROGRESS NOTES
Today's Date: 6/5/2024  Patient Name: Daja Espinosa  Date of admission: 6/3/2024  4:57 PM  Patient's age: 63 y.o., 1960  Admission Dx: Acute anemia [D64.9]    Reason for Consult: Rectal bleed/bladder cancer  Requesting Physician: Macarena Martin MD    CHIEF COMPLAINT: Rectal bleed    History Obtained From:  patient    Interval history  Patient seen and examined  Currently stable and hemoglobin 9 with creatinine of 2  Endoscopy no active bleed  Colonoscopy done as an outpatient      HISTORY OF PRESENT ILLNESS:      The patient is a 63 y.o.  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  Embolic stroke on 4/6/2024  Concurrent chemo RT(plan for continuous neoadjuvant chemotherapy abandoned as patient not surgical candidate)  Chronic kidney disease     Hematology oncology treatment  Cisplatin gemcitabine neoadjuvant started on March 19, 2024 every 3 weeks status post 1 cycle and have stopped and the plan changed to concurrent chemo RT   Tentative concurrent chemo RT with low-dose biweekly gemcitabine 27 mg/m²  Eliquis      Past Medical History:   has a past medical history of Alcohol abuse, Arthritis, Bladder cancer (HCC), CAD (coronary artery disease), Cancer (HCC), Carotid artery occlusion, Carotid artery stenosis, Chronic back pain, History of chemotherapy, Hydronephrosis, Hyperlipidemia, Hypertension, Hypoglycemia, MI (myocardial infarction) (HCC), Peripheral vascular disease (HCC), Takayasu's arteritis (HCC), Tibial  extensive history of peripheral vascular disease on Eliquis and aspirin with muscle invasive bladder cancer not surgical candidate plan was to start concurrent chemo RT presented with severe fatigue and acute anemia with hemoglobin 5.4 compatible with a blood loss anemia> hold chemotherapy,  Transfuse to keep hemoglobin above 7  GI evaluation> endoscopy no evidence of active bleed colonoscopy to be done as an outpatient  As hemoglobin is stable okay to start anticoagulation  Neurosurgery evaluation for back pain> MRI of the spine> severe spinal canal and neuroforaminal narrowing.  Neurosurgery following   Follow-up with oncology after discharge to start concurrent chemo RT as definitive treatment for bladder cancer      Discussed with patient and Nurse.      Thank you for asking us to see this patient.                                    Familia Shepard MD                          [facility]                            This note is created with the assistance of a speech recognition program.  While intending to generate a document that actually reflects the content of the visit, the document can still have some errors including those of syntax and sound a like substitutions which may escape proof reading.  It such instances, actual meaning can be extrapolated by contextual diversion.     Hematologist/Medical Oncologist

## 2024-06-05 NOTE — CARE COORDINATION
.Case Management Assessment  Initial Evaluation    Date/Time of Evaluation: 6/5/2024 11:43 AM  Assessment Completed by: Meaghan Clayton RN    If patient is discharged prior to next notation, then this note serves as note for discharge by case management.    Patient Name: Daja Espinosa                   YOB: 1960  Diagnosis: Acute anemia [D64.9]                   Date / Time: 6/3/2024  4:57 PM    Patient Admission Status: Inpatient   Readmission Risk (Low < 19, Mod (19-27), High > 27): Readmission Risk Score: 22.1    Current PCP: Sohail Garcia MD  PCP verified by CM? (P) Yes    Chart Reviewed: Yes      History Provided by: (P) Patient  Patient Orientation: (P) Alert and Oriented    Patient Cognition: (P) Alert    Hospitalization in the last 30 days (Readmission):  No    If yes, Readmission Assessment in  Navigator will be completed.    Advance Directives:      Code Status: Full Code   Patient's Primary Decision Maker is: (P) Legal Next of Kin    Primary Decision Maker: Benji Espinosa - Spouse - [phone]    Discharge Planning:    Patient lives with: (P) Spouse/Significant Other Type of Home: (P) House  Primary Care Giver: (P) Self  Patient Support Systems include: (P) Spouse/Significant Other   Current Financial resources: (P) Medicaid  Current community resources: (P) None  Current services prior to admission: (P) Other (Comment) (OP chemo/radiation 2 x weekly)            Current DME:              Type of Home Care services:  (P) None    ADLS  Prior functional level: (P) Independent in ADLs/IADLs  Current functional level: (P) Independent in ADLs/IADLs    PT AM-PAC: 24 /24  OT AM-PAC: 20 /24    Family can provide assistance at DC: (P) Yes  Would you like Case Management to discuss the discharge plan with any other family members/significant others, and if so, who? (P) Yes  Plans to Return to Present Housing: (P) Yes  Other Identified Issues/Barriers to RETURNING to current housing: none  Potential

## 2024-06-05 NOTE — PROGRESS NOTES
[facility]  Office: [phone]  Deven Louis DO, Rashid Martinez DO, Koko House DO, Jc Wheat DO, Bahman Silva MD, Radha Joseph MD, Volodymyr Abad MD, Karyn Purvis MD,  Siva Helton MD, Jigna Emery MD, Mayur Tinajero MD,  Danni Laams DO, Gayle Guzmán MD, Micha Sosa MD, Lloyd Louis DO, No Lutz MD,  Ganesh Lee DO, Brittani Francisco MD, Sherrell Hickey MD, Kayla Roberson MD, Macarena Martin MD,  Gerardo Pate MD, Daisy Stephenson MD, Kelly Connor MD, Clau Calderon MD, Aurelio Pérez MD, Eliceo Milligan MD, Talib Rodriguez DO, Yusef Lopez DO, Liban Williamson MD,  Franky Trinh MD, Shirley Waterhouse, CNP,  Savanah Diez CNP, Arias Lares, CNP,  Brenda Daniels, DNP, Phoebe Morris, CNP, Morelia Howard, CNP, Meaghan Rogers, CNP, Neris Del Toro, CNP, Krystal Morrow, PA-C, Ely Guevara PA-C, Nancy Tam, CNP, Marcy Simeon, CNP, Cody Steele, CNP, Aviva Hartman, CNP, Isela Montaño, CNP, Marj Figueroa, CNS, Keri Clayton, CNP, Anahi Carnes CNP, Tracy Schwab, CNP         [facility]   IN-PATIENT SERVICE   [facility]    Progress Note    6/5/2024    1:59 PM    Name:   Daja Espinosa  MRN:     [id number]     Acct:      [id number]   Room:   0441/0441-01   Day:  2  Admit Date:  6/3/2024  4:57 PM    PCP:   Sohail Garcia MD  Code Status:  Full Code    Subjective:     C/C:   Chief Complaint   Patient presents with    Rectal Bleeding     Interval History Status: improved   S/p EGD 6/4/2024-mild gastritis and duodenitis with no active bleeding.  Patient seen bedside.  Blood pressure was little high this morning.  Labs reviewed.  Better.  Hemoglobin stable at 9.  Family at bedside.  Hemodynamically stable.  Labs reviewed.  Hemoglobin 9.  Creatinine 2.  Underwent EGD which showed mild gastritis and duodenitis with no active bleeding.  Will continue on PPI.  Outpatient colonoscopy.    Brief History:   Per admitting

## 2024-06-05 NOTE — PLAN OF CARE
Patient admitted for acute anemia, with a history of bladder cancer (started cycle 1 on 3/19 and first dose of radiation 6/3), CVA, HTN, alcohol abuse, and hyperlipidemia. A&Ox4 and ambulates with a one person assist. Denies pain. Right chest port running NS at 125 mL/hr and Protonix at 10 mL/hr. Right forearm running octreotide at 10 mL/hr. Current vitals are as follows: BP (!) 153/86   Pulse 69   Temp 97.7 °F (36.5 °C) (Oral)   Resp 16   Ht 1.575 m (5' 2\")   Wt 52.6 kg (116 lb)   LMP 12/01/2010   SpO2 90%   BMI 21.22 kg/m²  on room air. Patient currently sleeping, RR even and unlabored, no signs of distress, and call light at bedside. Plan of care ongoing.       Problem: Safety - Adult  Goal: Free from fall injury  Outcome: Progressing     Problem: ABCDS Injury Assessment  Goal: Absence of physical injury  Outcome: Progressing  Absence of Physical Injury: Implement safety measures based on patient assessment     Problem: Chronic Conditions and Co-morbidities  Goal: Patient's chronic conditions and co-morbidity symptoms are monitored and maintained or improved  Outcome: Progressing     Problem: Gastrointestinal - Adult  Goal: Minimal or absence of nausea and vomiting  Outcome: Progressing  Minimal or absence of nausea and vomiting: Administer IV fluids as ordered to ensure adequate hydration     Problem: Infection - Adult  Goal: Absence of infection at discharge  Outcome: Progressing     Problem: Metabolic/Fluid and Electrolytes - Adult  Goal: Electrolytes maintained within normal limits  Outcome: Progressing    Goal: Hemodynamic stability and optimal renal function maintained  Outcome: Progressing  Hemodynamic stability and optimal renal function maintained: Monitor intake, output and patient weight     Problem: Hematologic - Adult  Goal: Maintains hematologic stability  Outcome: Progressing     Problem: Discharge Planning  Goal: Discharge to home or other facility with appropriate resources  Outcome:  Progressing  Discharge to home or other facility with appropriate resources: Identify barriers to discharge with patient and caregiver     Gaby Alatorre RN

## 2024-06-05 NOTE — PROGRESS NOTES
Renal Progress Note    Patient :  Daja Espinosa; 63 y.o. MRN# [id number]  Location:  0441/0441-01  Attending:  Macarena Martin MD  Admit Date:  6/3/2024   Hospital Day: 2    Subjective:     Patient was seen and examined.  No new issues reported overnight.  BMP results from today reviewed sodium 137 potassium 4.5, chloride 112, bicarb 15, calcium 8.4, BUN 39, creatinine did improve to 1.7 mg/dl.  Creatinine on admission was 2.6 mg/dl.  Patient mentioned that she feels little better today, did not report any nausea vomiting or diarrhea.  Hemoglobin also improved to 9.0.  Urine output documented as about 1.6 L and x 1 in the last 24 hours.  Patient is currently on normal saline at 125 cc an hour.    Outpatient Medications:     Medications Prior to Admission: apixaban (ELIQUIS) 5 MG TABS tablet, Take 1 tablet by mouth 2 times daily  lidocaine-prilocaine (EMLA) 2.5-2.5 % cream, Apply topically to port site 60 minutes before access as needed.  amLODIPine (NORVASC) 10 MG tablet, Take 1 tablet by mouth daily  atorvastatin (LIPITOR) 80 MG tablet, Take 1 tablet by mouth nightly  aspirin 81 MG chewable tablet, Take 1 tablet by mouth daily  losartan (COZAAR) 100 MG tablet, Take 1 tablet by mouth daily  [DISCONTINUED] metoprolol tartrate (LOPRESSOR) 50 MG tablet, Take 1 tablet by mouth 2 times daily    Current Medications:     Scheduled Meds:    apixaban  5 mg Oral BID    atorvastatin  80 mg Oral Nightly    sodium chloride flush  5-40 mL IntraVENous 2 times per day    amLODIPine  10 mg Oral Daily    losartan  100 mg Oral Daily    metoprolol tartrate  50 mg Oral BID    cefTRIAXone (ROCEPHIN) IV  1,000 mg IntraVENous Q24H     Continuous Infusions:    sodium chloride      sodium chloride 125 mL/hr at 06/05/24 1321    octreotide (SANDOSTATIN) 500 mcg in sodium chloride 0.9 % 100 mL infusion 50 mcg/hr (06/05/24 0430)    pantoprazole 8 mg/hr (06/05/24 0958)     PRN Meds:  sodium chloride flush, sodium chloride, ondansetron **OR**      Assessment:     Acute Kidney Injury nonoliguric likely due to ATN from underlying GI bleed requiring blood transfusion and patient does have history of bladder cancer currently on chemo therapy along with further complicated by ACE inhibitor's and NSAID usage at home.  Patient also has history of obstructive uropathy, acute obstruction to be ruled out.  Baseline creatinine seems to be around 1.3-1.6 mg/dl.  Creatinine now started to improve some.  Black tarry stools.  GI bleed.  Anemia/drop in hemoglobin likely due to underlying GI bleed.  Requiring blood transfusion.  bladder cancer s/p resections (high-grade urothelial carcinoma T2 with muscle invasion)-follows up with Dr. Shepard.  Metabolic acidosis.  Occult blood stool positive.  Hypertension.  History of CVA on Eliquis and aspirin at home.    History of left-sided hydronephrosis 1/2024 status post cystoscopy and stent and follows up with Dr. Holder.     Plan:   Change IV fluids to sodium bicarbonate 75 mEq with half-normal saline in D5W at 75 cc an hour.  Monitor strict I's and O's and renal function.  BMP in AM.  If renal function and electrolytes stable tomorrow then should be able to get discharged.  Will follow.      Nutrition   Please ensure that patient is on a renal diet/TF. Avoid nephrotoxic drugs/contrast exposure.    Alex Silva MD  [facility]     This note is created with the assistance of a speech-recognition program. While intending to generate a document that actually reflects the content of the visit, no guarantees can be provided that every mistake has been identified and corrected by editing.
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
## 2024-06-06 PROBLEM — N18.32 STAGE 3B CHRONIC KIDNEY DISEASE (HCC): Status: ACTIVE | Noted: 2024-06-06

## 2024-06-06 LAB
ALBUMIN PERCENT: 54 % (ref 45–65)
ALBUMIN SERPL-MCNC: 3.5 G/DL (ref 3.2–5.2)
ALPHA 2 PERCENT: 11 % (ref 6–13)
ALPHA1 GLOB SERPL ELPH-MCNC: 0.3 G/DL (ref 0.1–0.4)
ALPHA1 GLOB SERPL ELPH-MCNC: 5 % (ref 3–6)
ALPHA2 GLOB SERPL ELPH-MCNC: 0.7 G/DL (ref 0.5–0.9)
ANA SER QL IA: NEGATIVE
ANION GAP SERPL CALCULATED.3IONS-SCNC: 10 MMOL/L (ref 9–16)
ANION GAP SERPL CALCULATED.3IONS-SCNC: 9 MMOL/L (ref 9–16)
B-GLOBULIN SERPL ELPH-MCNC: 0.7 G/DL (ref 0.5–1.1)
B-GLOBULIN SERPL ELPH-MCNC: 11 % (ref 11–19)
BUN SERPL-MCNC: 35 MG/DL (ref 8–23)
BUN SERPL-MCNC: 35 MG/DL (ref 8–23)
CALCIUM SERPL-MCNC: 8.4 MG/DL (ref 8.6–10.4)
CALCIUM SERPL-MCNC: 8.8 MG/DL (ref 8.6–10.4)
CHLORIDE SERPL-SCNC: 104 MMOL/L (ref 98–107)
CHLORIDE SERPL-SCNC: 111 MMOL/L (ref 98–107)
CO2 SERPL-SCNC: 16 MMOL/L (ref 20–31)
CO2 SERPL-SCNC: 21 MMOL/L (ref 20–31)
CREAT SERPL-MCNC: 2.1 MG/DL (ref 0.5–0.9)
CREAT SERPL-MCNC: 2.2 MG/DL (ref 0.5–0.9)
DSDNA IGG SER QL IA: 1.6 IU/ML
ERYTHROCYTE [DISTWIDTH] IN BLOOD BY AUTOMATED COUNT: 16 % (ref 11.8–14.4)
GAMMA GLOB SERPL ELPH-MCNC: 1.3 G/DL (ref 0.5–1.5)
GAMMA GLOBULIN %: 20 % (ref 9–20)
GFR, ESTIMATED: 24 ML/MIN/1.73M2
GFR, ESTIMATED: 27 ML/MIN/1.73M2
GLUCOSE SERPL-MCNC: 121 MG/DL (ref 74–99)
GLUCOSE SERPL-MCNC: 128 MG/DL (ref 74–99)
HCT VFR BLD AUTO: 27.9 % (ref 36.3–47.1)
HGB BLD-MCNC: 8.9 G/DL (ref 11.9–15.1)
INR PPP: 1.5
MCH RBC QN AUTO: 32 PG (ref 25.2–33.5)
MCHC RBC AUTO-ENTMCNC: 31.9 G/DL (ref 28.4–34.8)
MCV RBC AUTO: 100.4 FL (ref 82.6–102.9)
MITOCHONDRIA M2 IGG SER-ACNC: 1.5 U/ML (ref 0–4)
NRBC BLD-RTO: 0 PER 100 WBC
NUCLEAR IGG SER IA-RTO: 0.3 U/ML
PATHOLOGIST: ABNORMAL
PLATELET # BLD AUTO: 318 K/UL (ref 138–453)
PMV BLD AUTO: 9.7 FL (ref 8.1–13.5)
POTASSIUM SERPL-SCNC: 4 MMOL/L (ref 3.7–5.3)
POTASSIUM SERPL-SCNC: 4.1 MMOL/L (ref 3.7–5.3)
PROT PATTERN SERPL ELPH-IMP: ABNORMAL
PROT SERPL-MCNC: 6.4 G/DL (ref 6.6–8.7)
PROTHROMBIN TIME: 18 SEC (ref 11.7–14.9)
RBC # BLD AUTO: 2.78 M/UL (ref 3.95–5.11)
SMOOTH MUSCLE ANTIBODY: 36 UNITS (ref 0–19)
SODIUM SERPL-SCNC: 134 MMOL/L (ref 136–145)
SODIUM SERPL-SCNC: 137 MMOL/L (ref 136–145)
TOTAL PROT. SUM,%: 101 % (ref 98–102)
TOTAL PROT. SUM: 6.5 G/DL (ref 6.3–8.2)
WBC OTHER # BLD: 8.2 K/UL (ref 3.5–11.3)

## 2024-06-06 PROCEDURE — 2580000003 HC RX 258: Performed by: INTERNAL MEDICINE

## 2024-06-06 PROCEDURE — 85027 COMPLETE CBC AUTOMATED: CPT

## 2024-06-06 PROCEDURE — 6370000000 HC RX 637 (ALT 250 FOR IP): Performed by: INTERNAL MEDICINE

## 2024-06-06 PROCEDURE — 6360000002 HC RX W HCPCS: Performed by: INTERNAL MEDICINE

## 2024-06-06 PROCEDURE — 2500000003 HC RX 250 WO HCPCS: Performed by: INTERNAL MEDICINE

## 2024-06-06 PROCEDURE — 85610 PROTHROMBIN TIME: CPT

## 2024-06-06 PROCEDURE — 80048 BASIC METABOLIC PNL TOTAL CA: CPT

## 2024-06-06 PROCEDURE — 51798 US URINE CAPACITY MEASURE: CPT

## 2024-06-06 PROCEDURE — 6370000000 HC RX 637 (ALT 250 FOR IP)

## 2024-06-06 PROCEDURE — 99232 SBSQ HOSP IP/OBS MODERATE 35: CPT | Performed by: STUDENT IN AN ORGANIZED HEALTH CARE EDUCATION/TRAINING PROGRAM

## 2024-06-06 PROCEDURE — 36415 COLL VENOUS BLD VENIPUNCTURE: CPT

## 2024-06-06 PROCEDURE — 1200000000 HC SEMI PRIVATE

## 2024-06-06 PROCEDURE — 99232 SBSQ HOSP IP/OBS MODERATE 35: CPT | Performed by: INTERNAL MEDICINE

## 2024-06-06 PROCEDURE — C9113 INJ PANTOPRAZOLE SODIUM, VIA: HCPCS | Performed by: INTERNAL MEDICINE

## 2024-06-06 PROCEDURE — 99232 SBSQ HOSP IP/OBS MODERATE 35: CPT | Performed by: PSYCHIATRY & NEUROLOGY

## 2024-06-06 RX ORDER — PANTOPRAZOLE SODIUM 40 MG/1
40 TABLET, DELAYED RELEASE ORAL
Status: DISCONTINUED | OUTPATIENT
Start: 2024-06-07 | End: 2024-06-07 | Stop reason: HOSPADM

## 2024-06-06 RX ORDER — SODIUM CHLORIDE 9 MG/ML
INJECTION, SOLUTION INTRAVENOUS CONTINUOUS
Status: DISCONTINUED | OUTPATIENT
Start: 2024-06-06 | End: 2024-06-07

## 2024-06-06 RX ADMIN — SODIUM CHLORIDE: 9 INJECTION, SOLUTION INTRAVENOUS at 23:48

## 2024-06-06 RX ADMIN — SODIUM BICARBONATE: 84 INJECTION, SOLUTION INTRAVENOUS at 08:48

## 2024-06-06 RX ADMIN — METOPROLOL TARTRATE 50 MG: 50 TABLET, FILM COATED ORAL at 20:04

## 2024-06-06 RX ADMIN — SODIUM BICARBONATE: 84 INJECTION, SOLUTION INTRAVENOUS at 11:23

## 2024-06-06 RX ADMIN — METOPROLOL TARTRATE 50 MG: 50 TABLET, FILM COATED ORAL at 08:50

## 2024-06-06 RX ADMIN — LOSARTAN POTASSIUM 100 MG: 50 TABLET, FILM COATED ORAL at 08:51

## 2024-06-06 RX ADMIN — SODIUM CHLORIDE, PRESERVATIVE FREE 10 ML: 5 INJECTION INTRAVENOUS at 08:51

## 2024-06-06 RX ADMIN — ATORVASTATIN CALCIUM 80 MG: 80 TABLET, FILM COATED ORAL at 20:04

## 2024-06-06 RX ADMIN — ONDANSETRON 4 MG: 2 INJECTION INTRAMUSCULAR; INTRAVENOUS at 20:10

## 2024-06-06 RX ADMIN — PANTOPRAZOLE SODIUM 8 MG/HR: 40 INJECTION, POWDER, FOR SOLUTION INTRAVENOUS at 07:36

## 2024-06-06 RX ADMIN — AMLODIPINE BESYLATE 10 MG: 10 TABLET ORAL at 08:51

## 2024-06-06 RX ADMIN — APIXABAN 5 MG: 5 TABLET, FILM COATED ORAL at 20:04

## 2024-06-06 RX ADMIN — HYDRALAZINE HYDROCHLORIDE 10 MG: 20 INJECTION, SOLUTION INTRAMUSCULAR; INTRAVENOUS at 18:44

## 2024-06-06 RX ADMIN — SODIUM CHLORIDE, PRESERVATIVE FREE 10 ML: 5 INJECTION INTRAVENOUS at 20:04

## 2024-06-06 RX ADMIN — HYDRALAZINE HYDROCHLORIDE 10 MG: 20 INJECTION, SOLUTION INTRAMUSCULAR; INTRAVENOUS at 10:20

## 2024-06-06 RX ADMIN — HYDRALAZINE HYDROCHLORIDE 10 MG: 20 INJECTION, SOLUTION INTRAMUSCULAR; INTRAVENOUS at 03:31

## 2024-06-06 RX ADMIN — SODIUM BICARBONATE: 84 INJECTION, SOLUTION INTRAVENOUS at 18:46

## 2024-06-06 RX ADMIN — OCTREOTIDE ACETATE 50 MCG/HR: 500 INJECTION, SOLUTION INTRAVENOUS; SUBCUTANEOUS at 03:23

## 2024-06-06 RX ADMIN — Medication 1000 MG: at 20:04

## 2024-06-06 RX ADMIN — APIXABAN 5 MG: 5 TABLET, FILM COATED ORAL at 08:51

## 2024-06-06 ASSESSMENT — ENCOUNTER SYMPTOMS
COUGH: 0
ABDOMINAL PAIN: 0
WHEEZING: 0
SHORTNESS OF BREATH: 0
RHINORRHEA: 0
BACK PAIN: 0
ABDOMINAL DISTENTION: 0

## 2024-06-06 NOTE — PROGRESS NOTES
IM NP notified of BP.  No new orders.     Electronically signed by Rebekah Garibay RN on 6/6/2024 at 6:59 AM

## 2024-06-06 NOTE — PROGRESS NOTES
Today's Date: 6/6/2024  Patient Name: Daja Espinosa  Date of admission: 6/3/2024  4:57 PM  Patient's age: 63 y.o., 1960  Admission Dx: Acute anemia [D64.9]    Reason for Consult: Rectal bleed/bladder cancer  Requesting Physician: Macarena Martin MD    CHIEF COMPLAINT: Rectal bleed    History Obtained From:  patient    Interval history  Patient seen and examined  Currently stable and hemoglobin 9 with creatinine of 2  Endoscopy no active bleed  Colonoscopy done as an outpatient      HISTORY OF PRESENT ILLNESS:      The patient is a 63 y.o.  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  Embolic stroke on 4/6/2024  Concurrent chemo RT(plan for continuous neoadjuvant chemotherapy abandoned as patient not surgical candidate)  Chronic kidney disease     Hematology oncology treatment  Cisplatin gemcitabine neoadjuvant started on March 19, 2024 every 3 weeks status post 1 cycle and have stopped and the plan changed to concurrent chemo RT   Tentative concurrent chemo RT with low-dose biweekly gemcitabine 27 mg/m²  Eliquis      Past Medical History:   has a past medical history of Alcohol abuse, Arthritis, Bladder cancer (HCC), CAD (coronary artery disease), Cancer (HCC), Carotid artery occlusion, Carotid artery stenosis, Chronic back pain, History of chemotherapy, Hydronephrosis, Hyperlipidemia, Hypertension, Hypoglycemia, MI (myocardial infarction) (HCC), Peripheral vascular disease (HCC), Takayasu's arteritis (HCC), Tibial  woman with extensive history of peripheral vascular disease on Eliquis and aspirin with muscle invasive bladder cancer not surgical candidate plan was to start concurrent chemo RT presented with severe fatigue and acute anemia with hemoglobin 5.4 compatible with a blood loss anemia> hold chemotherapy,  Transfuse to keep hemoglobin above 7  GI evaluation> endoscopy no evidence of active bleed colonoscopy to be done as an outpatient  As hemoglobin is stable okay to start anticoagulation  Neurosurgery evaluation for back pain> MRI of the spine> severe spinal canal and neuroforaminal narrowing.  Neurosurgery following   Follow-up with oncology after discharge to start concurrent chemo RT as definitive treatment for bladder cancer      Discussed with patient and Nurse.      Thank you for asking us to see this patient.                                    Familia Shepard MD                          [facility]                            This note is created with the assistance of a speech recognition program.  While intending to generate a document that actually reflects the content of the visit, the document can still have some errors including those of syntax and sound a like substitutions which may escape proof reading.  It such instances, actual meaning can be extrapolated by contextual diversion.     Hematologist/Medical Oncologist

## 2024-06-06 NOTE — PLAN OF CARE
Problem: Safety - Adult  Goal: Free from fall injury  Outcome: Progressing     Problem: ABCDS Injury Assessment  Goal: Absence of physical injury  Outcome: Progressing     Problem: Chronic Conditions and Co-morbidities  Goal: Patient's chronic conditions and co-morbidity symptoms are monitored and maintained or improved  Outcome: Progressing     Problem: Gastrointestinal - Adult  Goal: Minimal or absence of nausea and vomiting  Outcome: Progressing     Problem: Infection - Adult  Goal: Absence of infection at discharge  Outcome: Progressing     Problem: Metabolic/Fluid and Electrolytes - Adult  Goal: Electrolytes maintained within normal limits  Outcome: Progressing  Goal: Hemodynamic stability and optimal renal function maintained  Outcome: Progressing     Problem: Hematologic - Adult  Goal: Maintains hematologic stability  Outcome: Progressing

## 2024-06-06 NOTE — PROGRESS NOTES
[facility]  Office: [phone]  Deven Louis DO, Rashid Martienz DO, Koko House DO, Jc Wheat DO, Bahman Silva MD, Radha Joseph MD, Volodymyr Abad MD, Karyn Purvis MD,  Siva Helton MD, Jigna Emery MD, Mayur Tinajero MD,  Danni Lamas DO, Gayle Guzmán MD, Micha Sosa MD, Lloyd Louis DO, No Lutz MD,  Ganesh Lee DO, Brittani Francisco MD, Sherrell Hickey MD, Kayla Roberson MD, Macarena Martin MD,  Gerardo Pate MD, Daisy Stephenson MD, Kelly Connor MD, Clau Calderon MD, Aurelio Pérez MD, Eliceo Milligan MD, Talib Rodriguez DO, Yusef Lopez DO, Liban Williamson MD,  Franky Trinh MD, Shirley Waterhouse, CNP,  Savnaah Diez CNP, Arias Lares, CNP,  Brenda Daniels, DNP, Phoebe Morris, CNP, Morelia Howard, CNP, Meaghan Rogers, CNP, Neris Del Toro, CNP, Krystal Morrow, PA-C, Ely Guevara PA-C, Nancy Tam, CNP, Marcy Simeon, CNP, Cody Steele, CNP, Aviva Hartman, CNP, Isela Montaño, CNP, Marj Figueroa, CNS, Keri Clayton, CNP, Anahi Carnes CNP, Tracy Schwab, CNP         [facility]   IN-PATIENT SERVICE   [facility]    Progress Note    6/6/2024    10:55 AM    Name:   Daja Esipnosa  MRN:     [id number]     Acct:      [id number]   Room:   0441/0441-01   Day:  3  Admit Date:  6/3/2024  4:57 PM    PCP:   Sohail Garcia MD  Code Status:  Full Code    Subjective:     C/C:   Chief Complaint   Patient presents with    Rectal Bleeding     Interval History Status: improved   S/p EGD 6/4/2024-mild gastritis and duodenitis with no active bleeding.  Patient seen bedside.  Blood pressure was little high this morning.  Labs reviewed.  Better.  Hemoglobin 8.9,Cr-2.1  Nephrology adjusting fluids.    Brief History:   Per admitting provider.  Daja Espinosa is a 63 y.o. Non- / non  female who presents with Rectal Bleeding and is admitted to the hospital for the management of Acute anemia.  Patient has history

## 2024-06-06 NOTE — PROGRESS NOTES
IM NP notified of pts bp.  No new orders.    Electronically signed by Rebekah Garibay RN on 6/6/2024 at 12:48 AM

## 2024-06-06 NOTE — PROGRESS NOTES
NEPHROLOGY PROGRESS NOTE      ASSESSMENT     Acute kidney injury nonoliguric secondary to ischemic ATN from blood pressure fluctuations -second hit.  Creatinine up to 2.1 initial HEAVENLY secondary to ischemic ATN from from reduced E ABV related to anemia complicated further by concomitant cisplatin/NSAID use.  Creatinine peaked to 1.8 and was improving  Chronic kidney disease stage III secondary to nephrosclerosis with baseline creatinine 1.3-1.6  Hospitalized with anemia/GI bleed with endoscopy revealing gastritis and duodenitis and colonoscopy being scheduled as outpatient  History of bladder cancer status post resection high-grade urothelial carcinoma and on chemotherapy follows up with Dr. CUETO  History of chronic left sided nephro nephrosis status post cystoscopy and stent placement and follows up with Dr. KAN in [place]  Hypertension    PLAN     IV fluids changed  Hold losartan.  She already got it in the morning  Recheck BMP in the evening  Check PVR      SUBJECTIVE   Presented with a rectal bleed/easy fatigability and noted to be anemic with hemoglobin of 5.5 as diagnosed by hematology oncology on regular follow-up for chemotherapy for bladder cancer.  EGD done shows evidence of gastritis and duodenitis.  Hospital course has been complicated with acute kidney injury which has prompted nephrology consultation.    Creatinine was improving but overnight had blood pressure fluctuations.  Creatinine now 2.1.  Urine output is decent.  Denies any chest pain shortness of breath.  On ARB and hydralazine.  No recent contrast exposure.  Status post EGD/no colonoscopy inpatient    OBJECTIVE     Vitals:    06/06/24 0440 06/06/24 0441 06/06/24 0717 06/06/24 0957   BP: (!) 164/87  (!) 164/86 (!) 177/93   Pulse: 79  81 68   Resp:   20    Temp:   98.6 °F (37 °C)    TempSrc:   Temporal    SpO2: 97%  95% 97%   Weight:  59.1 kg (130 lb 4.7 oz)     Height:         24HR INTAKE/OUTPUT:    Intake/Output Summary (Last 24 hours) at 6/6/2024

## 2024-06-07 VITALS
HEART RATE: 66 BPM | OXYGEN SATURATION: 95 % | DIASTOLIC BLOOD PRESSURE: 89 MMHG | SYSTOLIC BLOOD PRESSURE: 142 MMHG | WEIGHT: 134.48 LBS | TEMPERATURE: 98.4 F | RESPIRATION RATE: 22 BRPM | BODY MASS INDEX: 24.75 KG/M2 | HEIGHT: 62 IN

## 2024-06-07 PROBLEM — F12.10 MARIHUANA ABUSE: Status: RESOLVED | Noted: 2017-03-02 | Resolved: 2024-06-07

## 2024-06-07 PROBLEM — R29.90 STROKE-LIKE SYMPTOMS: Status: RESOLVED | Noted: 2024-04-06 | Resolved: 2024-06-07

## 2024-06-07 PROBLEM — R71.0 DROP IN HEMOGLOBIN: Status: RESOLVED | Noted: 2024-06-04 | Resolved: 2024-06-07

## 2024-06-07 LAB
ANION GAP SERPL CALCULATED.3IONS-SCNC: 11 MMOL/L (ref 9–16)
BASOPHILS # BLD: 0.04 K/UL (ref 0–0.2)
BASOPHILS NFR BLD: 1 % (ref 0–2)
BUN SERPL-MCNC: 31 MG/DL (ref 8–23)
CALCIUM SERPL-MCNC: 8.1 MG/DL (ref 8.6–10.4)
CHLORIDE SERPL-SCNC: 104 MMOL/L (ref 98–107)
CO2 SERPL-SCNC: 21 MMOL/L (ref 20–31)
CREAT SERPL-MCNC: 2.3 MG/DL (ref 0.5–0.9)
EOSINOPHIL # BLD: 0.82 K/UL (ref 0–0.44)
EOSINOPHILS RELATIVE PERCENT: 11 % (ref 1–4)
ERYTHROCYTE [DISTWIDTH] IN BLOOD BY AUTOMATED COUNT: 16.2 % (ref 11.8–14.4)
GFR, ESTIMATED: 24 ML/MIN/1.73M2
GLUCOSE SERPL-MCNC: 104 MG/DL (ref 74–99)
HCT VFR BLD AUTO: 27.3 % (ref 36.3–47.1)
HGB BLD-MCNC: 8.6 G/DL (ref 11.9–15.1)
IMM GRANULOCYTES # BLD AUTO: <0.03 K/UL (ref 0–0.3)
IMM GRANULOCYTES NFR BLD: 0 %
LKM AB TITR SER IF: NORMAL {TITER}
LKM AB TITR SER IF: NORMAL {TITER}
LYMPHOCYTES NFR BLD: 1.65 K/UL (ref 1.1–3.7)
LYMPHOCYTES RELATIVE PERCENT: 23 % (ref 24–43)
MCH RBC QN AUTO: 31.7 PG (ref 25.2–33.5)
MCHC RBC AUTO-ENTMCNC: 31.5 G/DL (ref 28.4–34.8)
MCV RBC AUTO: 100.7 FL (ref 82.6–102.9)
MONOCYTES NFR BLD: 0.58 K/UL (ref 0.1–1.2)
MONOCYTES NFR BLD: 8 % (ref 3–12)
NEUTROPHILS NFR BLD: 57 % (ref 36–65)
NEUTS SEG NFR BLD: 4.21 K/UL (ref 1.5–8.1)
NRBC BLD-RTO: 0 PER 100 WBC
P E INTERPRETATION, U: NORMAL
PATHOLOGIST: NORMAL
PLATELET # BLD AUTO: 303 K/UL (ref 138–453)
PMV BLD AUTO: 9.8 FL (ref 8.1–13.5)
POTASSIUM SERPL-SCNC: 4.1 MMOL/L (ref 3.7–5.3)
RBC # BLD AUTO: 2.71 M/UL (ref 3.95–5.11)
RBC # BLD: ABNORMAL 10*6/UL
SODIUM SERPL-SCNC: 136 MMOL/L (ref 136–145)
SPECIMEN TYPE: NORMAL
URINE TOTAL PROTEIN: 48 MG/DL
WBC OTHER # BLD: 7.3 K/UL (ref 3.5–11.3)

## 2024-06-07 PROCEDURE — 36415 COLL VENOUS BLD VENIPUNCTURE: CPT

## 2024-06-07 PROCEDURE — 6370000000 HC RX 637 (ALT 250 FOR IP)

## 2024-06-07 PROCEDURE — 51798 US URINE CAPACITY MEASURE: CPT

## 2024-06-07 PROCEDURE — 2580000003 HC RX 258: Performed by: INTERNAL MEDICINE

## 2024-06-07 PROCEDURE — 99232 SBSQ HOSP IP/OBS MODERATE 35: CPT | Performed by: INTERNAL MEDICINE

## 2024-06-07 PROCEDURE — 6370000000 HC RX 637 (ALT 250 FOR IP): Performed by: INTERNAL MEDICINE

## 2024-06-07 PROCEDURE — 80048 BASIC METABOLIC PNL TOTAL CA: CPT

## 2024-06-07 PROCEDURE — 6360000002 HC RX W HCPCS: Performed by: STUDENT IN AN ORGANIZED HEALTH CARE EDUCATION/TRAINING PROGRAM

## 2024-06-07 PROCEDURE — 99232 SBSQ HOSP IP/OBS MODERATE 35: CPT | Performed by: STUDENT IN AN ORGANIZED HEALTH CARE EDUCATION/TRAINING PROGRAM

## 2024-06-07 PROCEDURE — 6370000000 HC RX 637 (ALT 250 FOR IP): Performed by: STUDENT IN AN ORGANIZED HEALTH CARE EDUCATION/TRAINING PROGRAM

## 2024-06-07 PROCEDURE — 97535 SELF CARE MNGMENT TRAINING: CPT

## 2024-06-07 PROCEDURE — 85025 COMPLETE CBC W/AUTO DIFF WBC: CPT

## 2024-06-07 RX ORDER — PANTOPRAZOLE SODIUM 40 MG/1
40 TABLET, DELAYED RELEASE ORAL
Qty: 30 TABLET | Refills: 3 | Status: ON HOLD | OUTPATIENT
Start: 2024-06-08

## 2024-06-07 RX ORDER — HEPARIN 100 UNIT/ML
500 SYRINGE INTRAVENOUS PRN
Status: COMPLETED | OUTPATIENT
Start: 2024-06-07 | End: 2024-06-07

## 2024-06-07 RX ORDER — METOPROLOL TARTRATE 50 MG/1
50 TABLET, FILM COATED ORAL 2 TIMES DAILY
Qty: 60 TABLET | Refills: 3 | Status: ON HOLD | OUTPATIENT
Start: 2024-06-07

## 2024-06-07 RX ADMIN — APIXABAN 5 MG: 5 TABLET, FILM COATED ORAL at 08:45

## 2024-06-07 RX ADMIN — METOPROLOL TARTRATE 50 MG: 50 TABLET, FILM COATED ORAL at 08:46

## 2024-06-07 RX ADMIN — SODIUM CHLORIDE, PRESERVATIVE FREE 10 ML: 5 INJECTION INTRAVENOUS at 08:46

## 2024-06-07 RX ADMIN — SODIUM CHLORIDE, PRESERVATIVE FREE 10 ML: 5 INJECTION INTRAVENOUS at 15:33

## 2024-06-07 RX ADMIN — SODIUM CHLORIDE: 9 INJECTION, SOLUTION INTRAVENOUS at 09:50

## 2024-06-07 RX ADMIN — PANTOPRAZOLE SODIUM 40 MG: 40 TABLET, DELAYED RELEASE ORAL at 05:46

## 2024-06-07 RX ADMIN — AMLODIPINE BESYLATE 10 MG: 10 TABLET ORAL at 08:46

## 2024-06-07 RX ADMIN — Medication 500 UNITS: at 15:33

## 2024-06-07 ASSESSMENT — ENCOUNTER SYMPTOMS
WHEEZING: 0
ABDOMINAL PAIN: 0
ABDOMINAL DISTENTION: 0
SHORTNESS OF BREATH: 0
BACK PAIN: 0
RHINORRHEA: 0
COUGH: 0

## 2024-06-07 NOTE — PROGRESS NOTES
NEPHROLOGY PROGRESS NOTE      ASSESSMENT     Acute kidney injury nonoliguric secondary to ischemic ATN from blood pressure vs worsening MARTHA secondary to cisplatin.  Creatinine up to 2.1 initial HEAVENLY secondary to ischemic ATN from from reduced E ABV related to anemia complicated further by concomitant cisplatin/NSAID use.  Creatinine is back up to 2.3, despite significant fluid resuscitation  Chronic kidney disease stage III secondary to nephrosclerosis with baseline creatinine 1.3-1.6, although the patient likely is not to see that baseline again  Hospitalized with anemia/GI bleed with endoscopy revealing gastritis and duodenitis and colonoscopy being scheduled as outpatient  History of bladder cancer status post resection high-grade urothelial carcinoma and on chemotherapy follows up with Dr. CUETO  History of chronic left sided nephro nephrosis status post cystoscopy and stent placement and follows up with Dr. KAN in [place]  Hypertension    PLAN     Discontinue IV fluids  Discontinue losartan  Stable for discharge, now appears to have CKD-4  Follow up with our group in our clinic at [facility]      SUBJECTIVE   Presented with a rectal bleed/easy fatigability and noted to be anemic with hemoglobin of 5.5 as diagnosed by hematology oncology on regular follow-up for chemotherapy for bladder cancer.  EGD done shows evidence of gastritis and duodenitis.  Hospital course has been complicated with acute kidney injury which has prompted nephrology consultation.    Creatinine was improving but overnight had blood pressure fluctuations.  Creatinine now 2.3 mg/dl.  Urine output is decent.  Denies any chest pain shortness of breath.  On ARB and hydralazine.  No recent contrast exposure.  Status post EGD/no colonoscopy inpatient    OBJECTIVE     Vitals:    06/07/24 0330 06/07/24 0548 06/07/24 0829 06/07/24 1236   BP: (!) 148/74  (!) 153/90 (!) 142/89   Pulse: 72  75 66   Resp: 16  17 22   Temp: 98.2 °F (36.8 °C)  98.4 °F  losartan (COZAAR) 100 MG tablet, Take 1 tablet by mouth daily    INVESTIGATIONS     Last 3 CMP:    Recent Labs     06/06/24  0724 06/06/24  1820 06/07/24  0757    134* 136   K 4.1 4.0 4.1   * 104 104   CO2 16* 21 21   BUN 35* 35* 31*   CREATININE 2.1* 2.2* 2.3*   CALCIUM 8.4* 8.8 8.1*       Last 3 CBC:  Recent Labs     06/05/24  0705 06/05/24  1233 06/05/24  1818 06/06/24  0724 06/07/24  0757   WBC 8.9  --   --  8.2 7.3   RBC 2.75*  --   --  2.78* 2.71*   HGB 8.7*   < > 8.6* 8.9* 8.6*   HCT 28.1*   < > 25.7* 27.9* 27.3*   .2  --   --  100.4 100.7   MCH 31.6  --   --  32.0 31.7   MCHC 31.0  --   --  31.9 31.5   RDW 16.1*  --   --  16.0* 16.2*     --   --  318 303   MPV 9.8  --   --  9.7 9.8    < > = values in this interval not displayed.       Please do not hesitate to call with questions    This note is created with the assistance of a speech-recognition program. While intending to generate a document that actually reflects the content of the visit, no guarantees can be provided that every mistake has been identified and corrected by editing    Alok Andrews MD     6/7/2024 3:28 PM  [facility]

## 2024-06-07 NOTE — DISCHARGE SUMMARY
[facility]  Office: [phone]  Deven Louis DO, Rashid Martinez DO, Koko House DO, Jc Wheat DO, Bahman Silva MD, Radha Joseph MD, Volodymyr Abad MD, Karyn Purvis MD,  Siva Helton MD, Jigna Emery MD, Mayur Tinajero MD,  Danni Lamas DO, Gayle Guzmán MD, Micha Sosa MD, Lloyd Louis DO, No Lutz MD,  Ganesh Lee DO, Brittani Francisco MD, Sherrell Hickey MD, Kayla Roberson MD, Macarena Martin MD,  Gerardo Pate MD, Daisy Stephenson MD, Kelly Connor MD, Clau Calderon MD, Aurelio Pérez MD, Eliceo Milligan MD, Talib Rodriguez DO, Yusef Lopez DO, Liban Williamson MD,  Franky Trinh MD, Shirley Waterhouse, CNP,  Savanah Diez CNP, Arias Lares, CNP,  Brenda Daniels, DNP, Phoebe Morris, CNP, Morelia Howard, CNP, Meaghan Rogers CNP, Neris Del Toro, CNP, Krystal Morrow, PA-C, Ely Guevara PA-C, Nancy Tam, CNP, Marcy Simeon, CNP, Cody Steele, CNP, Aviva Hartman, CNP, Isela Montaño, CNP, Marj Figueroa, CNS, Keri Clayton, CNP, Anahi Carnes CNP, Tracy Schwab, CNP         [facility]   IN-PATIENT SERVICE   [facility]    Discharge Summary     Patient ID: Daja Espinosa  :  1960   MRN: [id number]     ACCOUNT:  [id number]   Patient's PCP: Sohail Garcia MD  Admit Date: 6/3/2024   Discharge Date: 2024     Length of Stay: 4  Code Status:  Full Code  Admitting Physician: Macarena Martin MD  Discharge Physician: Macarena Martin MD     Active Discharge Diagnoses:     Hospital Problem Lists:  Principal Problem:    Acute anemia  Active Problems:    Essential hypertension    Dyslipidemia    Malignant neoplasm of urinary bladder (HCC)    Melena    History of stroke    HEAVENLY (acute kidney injury) (HCC)    Metabolic acidosis    Rectal bleed    Anemia, blood loss    Stage 3b chronic kidney disease (HCC)  Resolved Problems:    Drop in hemoglobin      Admission Condition:  fair     Discharged Condition: stable    Hospital  LOPRESSOR  Take 1 tablet by mouth 2 times daily            STOP taking these medications      aspirin 81 MG chewable tablet     losartan 100 MG tablet  Commonly known as: COZAAR               Where to Get Your Medications        These medications were sent to [facility] - [place], [place] - [address] [facility] - P [phone] - F [phone]  [address] [place] [id number]      Phone: [phone]   metoprolol tartrate 50 MG tablet  pantoprazole 40 MG tablet         No discharge procedures on file.    Time Spent on discharge is  40 mins in patient examination, evaluation, counseling as well as medication reconciliation, prescriptions for required medications, discharge plan and follow up.    Electronically signed by   Macarena Martin MD  6/7/2024  2:44 PM      Thank you Sohail Garcia MD for the opportunity to be involved in this patient's care.

## 2024-06-07 NOTE — PLAN OF CARE
Problem: Safety - Adult  Goal: Free from fall injury  6/6/2024 2224 by Rebekah Garibay RN  Outcome: Progressing  6/6/2024 1855 by Malorie Medina RN  Outcome: Progressing     Problem: ABCDS Injury Assessment  Goal: Absence of physical injury  6/6/2024 2224 by Rebekah Garibay RN  Outcome: Progressing  6/6/2024 1855 by Malorie Medina RN  Outcome: Progressing     Problem: Chronic Conditions and Co-morbidities  Goal: Patient's chronic conditions and co-morbidity symptoms are monitored and maintained or improved  6/6/2024 2224 by Rebekah Garibay RN  Outcome: Progressing  6/6/2024 1855 by Malorie Medina RN  Outcome: Progressing     Problem: Gastrointestinal - Adult  Goal: Minimal or absence of nausea and vomiting  6/6/2024 2224 by Rebekah Garibay RN  Outcome: Progressing  6/6/2024 1855 by Malorie Medina RN  Outcome: Progressing     Problem: Infection - Adult  Goal: Absence of infection at discharge  6/6/2024 2224 by Rebekah Garibay RN  Outcome: Progressing  6/6/2024 1855 by Malorie Medina RN  Outcome: Progressing     Problem: Metabolic/Fluid and Electrolytes - Adult  Goal: Electrolytes maintained within normal limits  6/6/2024 2224 by Rbeekah Garibay RN  Outcome: Progressing  6/6/2024 1855 by Malorie Medina RN  Outcome: Progressing  Goal: Hemodynamic stability and optimal renal function maintained  6/6/2024 2224 by Rebekah Garibay RN  Outcome: Progressing  6/6/2024 1855 by Malorie Medina RN  Outcome: Progressing     Problem: Hematologic - Adult  Goal: Maintains hematologic stability  6/6/2024 2224 by Rebekah Garibay RN  Outcome: Progressing  6/6/2024 1855 by Malorie Medina RN  Outcome: Progressing     Problem: Discharge Planning  Goal: Discharge to home or other facility with appropriate resources  Outcome: Progressing

## 2024-06-07 NOTE — PROGRESS NOTES
[facility]  Office: [phone]  Deven Louis DO, Rashid Martinez DO, Koko House DO, Jc Wheat DO, Bahman Silva MD, Radha Joseph MD, Volodymyr Abad MD, Karyn Purvis MD,  Siva Helton MD, Jigna Emery MD, Mayur Tinajero MD,  Danni Lamas DO, Gayle Guzmán MD, Micha Sosa MD, Lloyd Louis DO, No Lutz MD,  Ganesh Lee DO, Brittani Francisco MD, Sherrell Hickey MD, Kayla Roberson MD, Macarena Martin MD,  Gerardo Pate MD, Daisy Stephenson MD, Kelly Connor MD, Clua Calderon MD, Aurelio Pérez MD, Eliceo Milligan MD, Talib Rodriguez DO, Yusef Lopez DO, Liban Williamson MD,  Franky Trinh MD, Shirley Waterhouse, CNP,  Savanah Diez CNP, Arias Lares, CNP,  Brenda Daniels, DNP, Phoebe Morris, CNP, Morelia Howard, CNP, Meaghan Rogers, CNP, Neris Del Toro, CNP, Krystal Morrow, PA-C, Ely Guevara PA-C, Nancy Tam, CNP, Marcy Simeon, CNP, Cody Steele, CNP, Aviva Hartman, CNP, Isela Montaño, CNP, Marj Figueroa, CNS, Keri Clayton, CNP, Anahi Carnes CNP, Tracy Schwab, CNP         [facility]   IN-PATIENT SERVICE   [facility]    Progress Note    6/7/2024    11:59 AM    Name:   Daja Espinosa  MRN:     [id number]     Acct:      [id number]   Room:   0441/0441-01   Day:  4  Admit Date:  6/3/2024  4:57 PM    PCP:   Sohail Garcia MD  Code Status:  Full Code    Subjective:     C/C:   Chief Complaint   Patient presents with    Rectal Bleeding     Interval History Status: improved   S/p EGD 6/4/2024-mild gastritis and duodenitis with no active bleeding.  Patient seen bedside.  Blood pressure was little high this morning.  Labs reviewed.  Creatinine 2.3.  Hemoglobin stable.  Awaited further recommendations from nephrology..    Brief History:   Per admitting provider.  Daja Espinosa is a 63 y.o. Non- / non  female who presents with Rectal Bleeding and is admitted to the hospital for the management of Acute anemia.

## 2024-06-07 NOTE — CARE COORDINATION
TRansitional planning  Spoke to patient about plan for discharge. Plan is home with spouse and family support

## 2024-06-07 NOTE — PROGRESS NOTES
Today's Date: 6/7/2024  Patient Name: Daja Espinosa  Date of admission: 6/3/2024  4:57 PM  Patient's age: 63 y.o., 1960  Admission Dx: Acute anemia [D64.9]    Reason for Consult: Rectal bleed/bladder cancer  Requesting Physician: Macarena Martin MD    CHIEF COMPLAINT: Rectal bleed    History Obtained From:  patient    Interval history  Patient seen and examined  Currently stable and hemoglobin 8.6 with creatinine of 2  Endoscopy no active bleed  Colonoscopy done as an outpatient      HISTORY OF PRESENT ILLNESS:      The patient is a 63 y.o.  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  Embolic stroke on 4/6/2024  Concurrent chemo RT(plan for continuous neoadjuvant chemotherapy abandoned as patient not surgical candidate)  Chronic kidney disease     Hematology oncology treatment  Cisplatin gemcitabine neoadjuvant started on March 19, 2024 every 3 weeks status post 1 cycle and have stopped and the plan changed to concurrent chemo RT   Tentative concurrent chemo RT with low-dose biweekly gemcitabine 27 mg/m²  Eliquis      Past Medical History:   has a past medical history of Alcohol abuse, Arthritis, Bladder cancer (HCC), CAD (coronary artery disease), Cancer (HCC), Carotid artery occlusion, Carotid artery stenosis, Chronic back pain, History of chemotherapy, Hydronephrosis, Hyperlipidemia, Hypertension, Hypoglycemia, MI (myocardial infarction) (HCC), Peripheral vascular disease (HCC), Takayasu's arteritis (HCC), Tibial

## 2024-06-07 NOTE — PLAN OF CARE
Problem: Safety - Adult  Goal: Free from fall injury  Outcome: Adequate for Discharge     Problem: ABCDS Injury Assessment  Goal: Absence of physical injury  Outcome: Adequate for Discharge  Flowsheets (Taken 6/7/2024 1143)  Absence of Physical Injury: Implement safety measures based on patient assessment     Problem: Chronic Conditions and Co-morbidities  Goal: Patient's chronic conditions and co-morbidity symptoms are monitored and maintained or improved  Outcome: Adequate for Discharge     Problem: Gastrointestinal - Adult  Goal: Minimal or absence of nausea and vomiting  Outcome: Adequate for Discharge     Problem: Infection - Adult  Goal: Absence of infection at discharge  Outcome: Adequate for Discharge     Problem: Metabolic/Fluid and Electrolytes - Adult  Goal: Electrolytes maintained within normal limits  Outcome: Adequate for Discharge  Goal: Hemodynamic stability and optimal renal function maintained  Outcome: Adequate for Discharge     Problem: Hematologic - Adult  Goal: Maintains hematologic stability  Outcome: Adequate for Discharge     Problem: Discharge Planning  Goal: Discharge to home or other facility with appropriate resources  Outcome: Adequate for Discharge

## 2024-06-08 LAB — SMOOTH MUSCLE IGG TITR SER: ABNORMAL {TITER}

## 2024-06-09 NOTE — PROGRESS NOTES
CLINICAL PHARMACY NOTE: MEDS TO BEDS    Total # of Prescriptions Filled: 2   The following medications were delivered to the patient:  Metoprolol tart 50mg tabs  Pantoprazole 40mg tabs    Additional Documentation:  Delivered to pt + 1 in room 441 on 6/7 at 5:33P. No copay.

## 2024-06-10 ENCOUNTER — TELEPHONE (OUTPATIENT)
Dept: FAMILY MEDICINE CLINIC | Age: 64
End: 2024-06-10

## 2024-06-10 NOTE — TELEPHONE ENCOUNTER
Care Transitions Initial Follow Up Call    Outreach made within 2 business days of discharge: Yes    Patient: Daja Espinosa Patient : 1960   MRN: [id number]  Reason for Admission: There are no discharge diagnoses documented for the most recent discharge.  Discharge Date: 24       Spoke with: Daja     Discharge department/facility: [facility] Patient Contact:  Was patient able to fill all prescriptions: Yes  Was patient instructed to bring all medications to the follow-up visit: Yes  Is patient taking all medications as directed in the discharge summary? Yes  Does patient understand their discharge instructions: Yes  Does patient have questions or concerns that need addressed prior to 7-14 day follow up office visit: yes - Medication  I stongly advised patient to be seen by PCP patient declined  Scheduled appointment with PCP within 7-14 days    Follow Up  Future Appointments   Date Time Provider Department Center   2024  8:00 AM SCHEDULE, [facility] MED ONC ROOM 7 [facility] system MED ONC [place]   2024  8:00 AM SCHEDULE, Health system MED ONC ROOM 7 [facility] system MED ONC [place]   2024  8:45 AM [facility]   2024  2:15 PM [facility]   2024 12:00 PM Miguel Ángel Aldridge MD Neuro End[facility]   2025 11:00 AM Terrell Cortez MD [facility]       Thania Smith MA

## 2024-06-11 ENCOUNTER — HOSPITAL ENCOUNTER (OUTPATIENT)
Dept: INFUSION THERAPY | Age: 64
Discharge: HOME | End: 2024-06-11
Payer: COMMERCIAL

## 2024-06-11 VITALS
HEIGHT: 62 IN | SYSTOLIC BLOOD PRESSURE: 176 MMHG | BODY MASS INDEX: 23.37 KG/M2 | DIASTOLIC BLOOD PRESSURE: 102 MMHG | WEIGHT: 127 LBS | TEMPERATURE: 97.4 F | HEART RATE: 87 BPM | RESPIRATION RATE: 18 BRPM

## 2024-06-11 DIAGNOSIS — C67.9 MALIGNANT NEOPLASM OF URINARY BLADDER, UNSPECIFIED SITE (HCC): Primary | ICD-10-CM

## 2024-06-11 LAB
ALBUMIN SERPL-MCNC: 3.8 G/DL (ref 3.5–5.2)
ALBUMIN/GLOB SERPL: 1.2 {RATIO} (ref 1–2.5)
ALP SERPL-CCNC: 91 U/L (ref 35–104)
ALT SERPL-CCNC: 39 U/L (ref 5–33)
ANION GAP SERPL CALCULATED.3IONS-SCNC: 11 MMOL/L (ref 9–17)
AST SERPL-CCNC: 51 U/L
BASOPHILS # BLD: 0.04 K/UL (ref 0–0.2)
BASOPHILS NFR BLD: 1 % (ref 0–2)
BILIRUB SERPL-MCNC: 1 MG/DL (ref 0.3–1.2)
BUN SERPL-MCNC: 35 MG/DL (ref 8–23)
BUN/CREAT SERPL: 16 (ref 9–20)
CALCIUM SERPL-MCNC: 8.9 MG/DL (ref 8.6–10.4)
CHLORIDE SERPL-SCNC: 102 MMOL/L (ref 98–107)
CO2 SERPL-SCNC: 23 MMOL/L (ref 20–31)
CREAT SERPL-MCNC: 2.2 MG/DL (ref 0.5–0.9)
EOSINOPHIL # BLD: 0.84 K/UL (ref 0–0.44)
EOSINOPHILS RELATIVE PERCENT: 11 % (ref 1–4)
ERYTHROCYTE [DISTWIDTH] IN BLOOD BY AUTOMATED COUNT: 15.1 % (ref 11.8–14.4)
GFR, ESTIMATED: 25 ML/MIN/1.73M2
GLUCOSE SERPL-MCNC: 117 MG/DL (ref 70–99)
HCT VFR BLD AUTO: 27.9 % (ref 36.3–47.1)
HGB BLD-MCNC: 9.5 G/DL (ref 11.9–15.1)
IMM GRANULOCYTES # BLD AUTO: <0.03 K/UL (ref 0–0.3)
IMM GRANULOCYTES NFR BLD: 0 %
LYMPHOCYTES NFR BLD: 2.11 K/UL (ref 1.1–3.7)
LYMPHOCYTES RELATIVE PERCENT: 27 % (ref 24–43)
MCH RBC QN AUTO: 32.3 PG (ref 25.2–33.5)
MCHC RBC AUTO-ENTMCNC: 34.1 G/DL (ref 28.4–34.8)
MCV RBC AUTO: 94.9 FL (ref 82.6–102.9)
MONOCYTES NFR BLD: 0.59 K/UL (ref 0.1–1.2)
MONOCYTES NFR BLD: 8 % (ref 3–12)
NEUTROPHILS NFR BLD: 54 % (ref 36–65)
NEUTS SEG NFR BLD: 4.21 K/UL (ref 1.5–8.1)
NRBC BLD-RTO: 0 PER 100 WBC
PLATELET # BLD AUTO: 381 K/UL (ref 138–453)
PMV BLD AUTO: 9.2 FL (ref 8.1–13.5)
POTASSIUM SERPL-SCNC: 3.9 MMOL/L (ref 3.7–5.3)
PROT SERPL-MCNC: 7 G/DL (ref 6.4–8.3)
RBC # BLD AUTO: 2.94 M/UL (ref 3.95–5.11)
SODIUM SERPL-SCNC: 136 MMOL/L (ref 135–144)
WBC OTHER # BLD: 7.8 K/UL (ref 3.5–11.3)

## 2024-06-11 PROCEDURE — 6360000002 HC RX W HCPCS: Performed by: INTERNAL MEDICINE

## 2024-06-11 PROCEDURE — 2580000003 HC RX 258: Performed by: INTERNAL MEDICINE

## 2024-06-11 PROCEDURE — 96413 CHEMO IV INFUSION 1 HR: CPT

## 2024-06-11 PROCEDURE — 85025 COMPLETE CBC W/AUTO DIFF WBC: CPT

## 2024-06-11 PROCEDURE — 80053 COMPREHEN METABOLIC PANEL: CPT

## 2024-06-11 PROCEDURE — 96375 TX/PRO/DX INJ NEW DRUG ADDON: CPT

## 2024-06-11 PROCEDURE — 36591 DRAW BLOOD OFF VENOUS DEVICE: CPT

## 2024-06-11 RX ORDER — ONDANSETRON 2 MG/ML
8 INJECTION INTRAMUSCULAR; INTRAVENOUS ONCE
Status: COMPLETED | OUTPATIENT
Start: 2024-06-11 | End: 2024-06-11

## 2024-06-11 RX ORDER — SODIUM CHLORIDE 9 MG/ML
5-250 INJECTION, SOLUTION INTRAVENOUS PRN
Status: DISCONTINUED | OUTPATIENT
Start: 2024-06-11 | End: 2024-06-12 | Stop reason: HOSPADM

## 2024-06-11 RX ORDER — SODIUM CHLORIDE 0.9 % (FLUSH) 0.9 %
5-40 SYRINGE (ML) INJECTION PRN
Status: DISCONTINUED | OUTPATIENT
Start: 2024-06-11 | End: 2024-06-12 | Stop reason: HOSPADM

## 2024-06-11 RX ORDER — HEPARIN 100 UNIT/ML
500 SYRINGE INTRAVENOUS PRN
Status: DISCONTINUED | OUTPATIENT
Start: 2024-06-11 | End: 2024-06-12 | Stop reason: HOSPADM

## 2024-06-11 RX ADMIN — HEPARIN 500 UNITS: 100 SYRINGE at 10:30

## 2024-06-11 RX ADMIN — SODIUM CHLORIDE, PRESERVATIVE FREE 10 ML: 5 INJECTION INTRAVENOUS at 10:30

## 2024-06-11 RX ADMIN — SODIUM CHLORIDE 200 ML/HR: 9 INJECTION, SOLUTION INTRAVENOUS at 09:17

## 2024-06-11 RX ADMIN — SODIUM CHLORIDE, PRESERVATIVE FREE 10 ML: 5 INJECTION INTRAVENOUS at 08:30

## 2024-06-11 RX ADMIN — SODIUM CHLORIDE, PRESERVATIVE FREE 10 ML: 5 INJECTION INTRAVENOUS at 08:29

## 2024-06-11 RX ADMIN — SODIUM CHLORIDE, PRESERVATIVE FREE 10 ML: 5 INJECTION INTRAVENOUS at 10:29

## 2024-06-11 RX ADMIN — ONDANSETRON 8 MG: 2 INJECTION INTRAMUSCULAR; INTRAVENOUS at 09:21

## 2024-06-11 RX ADMIN — GEMCITABINE 41 MG: 38 INJECTION, SOLUTION INTRAVENOUS at 09:52

## 2024-06-11 NOTE — PROGRESS NOTES
Pt arrived for scheduled treatment after having radiation this am. Once labs resulted creatinine noted to be 2.2 with a creat clearance of 21. Plan states to notifiy MD if clearance is below 50. Dr Shepard notified and he ordered to proceed with treatment.

## 2024-06-12 RX ORDER — APIXABAN 5 MG/1
5 TABLET, FILM COATED ORAL 2 TIMES DAILY
Qty: 60 TABLET | Refills: 1 | OUTPATIENT
Start: 2024-06-12

## 2024-06-13 ENCOUNTER — HOSPITAL ENCOUNTER (OUTPATIENT)
Dept: INFUSION THERAPY | Age: 64
Discharge: HOME | End: 2024-06-13
Payer: COMMERCIAL

## 2024-06-13 ENCOUNTER — OFFICE VISIT (OUTPATIENT)
Dept: ONCOLOGY | Age: 64
End: 2024-06-13
Payer: COMMERCIAL

## 2024-06-13 ENCOUNTER — APPOINTMENT (OUTPATIENT)
Dept: GENERAL RADIOLOGY | Age: 64
End: 2024-06-13
Payer: COMMERCIAL

## 2024-06-13 ENCOUNTER — TELEPHONE (OUTPATIENT)
Dept: ONCOLOGY | Age: 64
End: 2024-06-13

## 2024-06-13 ENCOUNTER — HOSPITAL ENCOUNTER (EMERGENCY)
Age: 64
Discharge: ANOTHER HOSPITAL | End: 2024-06-14
Attending: EMERGENCY MEDICINE
Payer: COMMERCIAL

## 2024-06-13 VITALS
WEIGHT: 127 LBS | RESPIRATION RATE: 18 BRPM | DIASTOLIC BLOOD PRESSURE: 88 MMHG | TEMPERATURE: 96 F | BODY MASS INDEX: 23.37 KG/M2 | HEIGHT: 62 IN | HEART RATE: 78 BPM | SYSTOLIC BLOOD PRESSURE: 161 MMHG

## 2024-06-13 VITALS
TEMPERATURE: 97.5 F | WEIGHT: 127 LBS | RESPIRATION RATE: 18 BRPM | BODY MASS INDEX: 23.37 KG/M2 | HEIGHT: 62 IN | SYSTOLIC BLOOD PRESSURE: 161 MMHG | HEART RATE: 78 BPM | DIASTOLIC BLOOD PRESSURE: 88 MMHG

## 2024-06-13 DIAGNOSIS — R06.02 SHORTNESS OF BREATH: ICD-10-CM

## 2024-06-13 DIAGNOSIS — J18.9 PNEUMONIA OF RIGHT LOWER LOBE DUE TO INFECTIOUS ORGANISM: ICD-10-CM

## 2024-06-13 DIAGNOSIS — C67.9 MALIGNANT NEOPLASM OF URINARY BLADDER, UNSPECIFIED SITE (HCC): Primary | ICD-10-CM

## 2024-06-13 DIAGNOSIS — Z85.51 HISTORY OF BLADDER CANCER: ICD-10-CM

## 2024-06-13 DIAGNOSIS — R53.1 GENERALIZED WEAKNESS: Primary | ICD-10-CM

## 2024-06-13 LAB
-: ABNORMAL
ALBUMIN SERPL-MCNC: 3.4 G/DL (ref 3.5–5.2)
ALP SERPL-CCNC: 82 U/L (ref 35–104)
ALT SERPL-CCNC: 35 U/L (ref 5–33)
ANION GAP SERPL CALCULATED.3IONS-SCNC: 18 MMOL/L (ref 9–17)
AST SERPL-CCNC: 42 U/L
BACTERIA URNS QL MICRO: ABNORMAL
BASOPHILS # BLD: 0 K/UL (ref 0–0.2)
BASOPHILS NFR BLD: 0 % (ref 0–2)
BILIRUB SERPL-MCNC: 1.5 MG/DL (ref 0.3–1.2)
BILIRUB UR QL STRIP: NEGATIVE
BUN SERPL-MCNC: 38 MG/DL (ref 8–23)
BUN/CREAT SERPL: 15 (ref 9–20)
CALCIUM SERPL-MCNC: 9.1 MG/DL (ref 8.6–10.4)
CHLORIDE SERPL-SCNC: 99 MMOL/L (ref 98–107)
CLARITY UR: CLEAR
CO2 SERPL-SCNC: 15 MMOL/L (ref 20–31)
COLOR UR: YELLOW
COMMENT: ABNORMAL
CREAT SERPL-MCNC: 2.5 MG/DL (ref 0.5–0.9)
EOSINOPHIL # BLD: 0 K/UL (ref 0–0.4)
EOSINOPHILS RELATIVE PERCENT: 0 % (ref 0–5)
ERYTHROCYTE [DISTWIDTH] IN BLOOD BY AUTOMATED COUNT: 14.9 % (ref 12.1–15.2)
GFR, ESTIMATED: 21 ML/MIN/1.73M2
GLUCOSE SERPL-MCNC: 90 MG/DL (ref 70–99)
GLUCOSE UR STRIP-MCNC: NEGATIVE MG/DL
HCT VFR BLD AUTO: 25.2 % (ref 36–46)
HGB BLD-MCNC: 8.4 G/DL (ref 12–16)
HGB UR QL STRIP.AUTO: ABNORMAL
IMM GRANULOCYTES # BLD AUTO: 0 K/UL (ref 0–0.3)
IMM GRANULOCYTES NFR BLD: 0 % (ref 0–5)
KETONES UR STRIP-MCNC: NEGATIVE MG/DL
LACTATE BLDV-SCNC: 0.9 MMOL/L (ref 0.5–1.9)
LEUKOCYTE ESTERASE UR QL STRIP: ABNORMAL
LYMPHOCYTES NFR BLD: 0.32 K/UL (ref 1–4.8)
LYMPHOCYTES RELATIVE PERCENT: 3 % (ref 15–40)
MCH RBC QN AUTO: 31.9 PG (ref 26–34)
MCHC RBC AUTO-ENTMCNC: 33.3 G/DL (ref 31–37)
MCV RBC AUTO: 95.8 FL (ref 80–100)
MONOCYTES NFR BLD: 0.11 K/UL (ref 0–1)
MONOCYTES NFR BLD: 1 % (ref 4–8)
MORPHOLOGY: ABNORMAL
NEUTROPHILS NFR BLD: 96 % (ref 47–75)
NEUTS SEG NFR BLD: 10.07 K/UL (ref 2.5–7)
NITRITE UR QL STRIP: NEGATIVE
PH UR STRIP: 7 [PH] (ref 5–8)
PLATELET # BLD AUTO: 350 K/UL (ref 140–450)
PMV BLD AUTO: 9.7 FL (ref 6–12)
POTASSIUM SERPL-SCNC: 4 MMOL/L (ref 3.7–5.3)
PROT SERPL-MCNC: 7 G/DL (ref 6.4–8.3)
PROT UR STRIP-MCNC: ABNORMAL MG/DL
RBC # BLD AUTO: 2.63 M/UL (ref 4–5.2)
SODIUM SERPL-SCNC: 132 MMOL/L (ref 135–144)
SP GR UR STRIP: 1.01 (ref 1–1.03)
UROBILINOGEN UR STRIP-ACNC: NORMAL EU/DL (ref 0–1)
WBC #/AREA URNS HPF: ABNORMAL /HPF
WBC OTHER # BLD: 10.5 K/UL (ref 3.5–11)

## 2024-06-13 PROCEDURE — 3017F COLORECTAL CA SCREEN DOC REV: CPT | Performed by: INTERNAL MEDICINE

## 2024-06-13 PROCEDURE — 80053 COMPREHEN METABOLIC PANEL: CPT

## 2024-06-13 PROCEDURE — 85025 COMPLETE CBC W/AUTO DIFF WBC: CPT

## 2024-06-13 PROCEDURE — 6360000002 HC RX W HCPCS: Performed by: INTERNAL MEDICINE

## 2024-06-13 PROCEDURE — 96367 TX/PROPH/DG ADDL SEQ IV INF: CPT

## 2024-06-13 PROCEDURE — 96365 THER/PROPH/DIAG IV INF INIT: CPT

## 2024-06-13 PROCEDURE — 99214 OFFICE O/P EST MOD 30 MIN: CPT | Performed by: INTERNAL MEDICINE

## 2024-06-13 PROCEDURE — 2580000003 HC RX 258: Performed by: INTERNAL MEDICINE

## 2024-06-13 PROCEDURE — 1111F DSCHRG MED/CURRENT MED MERGE: CPT | Performed by: INTERNAL MEDICINE

## 2024-06-13 PROCEDURE — 96413 CHEMO IV INFUSION 1 HR: CPT

## 2024-06-13 PROCEDURE — 3077F SYST BP >= 140 MM HG: CPT | Performed by: INTERNAL MEDICINE

## 2024-06-13 PROCEDURE — 93005 ELECTROCARDIOGRAM TRACING: CPT | Performed by: EMERGENCY MEDICINE

## 2024-06-13 PROCEDURE — 99285 EMERGENCY DEPT VISIT HI MDM: CPT

## 2024-06-13 PROCEDURE — 71045 X-RAY EXAM CHEST 1 VIEW: CPT

## 2024-06-13 PROCEDURE — 3079F DIAST BP 80-89 MM HG: CPT | Performed by: INTERNAL MEDICINE

## 2024-06-13 PROCEDURE — G8428 CUR MEDS NOT DOCUMENT: HCPCS | Performed by: INTERNAL MEDICINE

## 2024-06-13 PROCEDURE — 2580000003 HC RX 258: Performed by: EMERGENCY MEDICINE

## 2024-06-13 PROCEDURE — 87086 URINE CULTURE/COLONY COUNT: CPT

## 2024-06-13 PROCEDURE — G8420 CALC BMI NORM PARAMETERS: HCPCS | Performed by: INTERNAL MEDICINE

## 2024-06-13 PROCEDURE — 96375 TX/PRO/DX INJ NEW DRUG ADDON: CPT

## 2024-06-13 PROCEDURE — 36415 COLL VENOUS BLD VENIPUNCTURE: CPT

## 2024-06-13 PROCEDURE — 6360000002 HC RX W HCPCS: Performed by: EMERGENCY MEDICINE

## 2024-06-13 PROCEDURE — 81001 URINALYSIS AUTO W/SCOPE: CPT

## 2024-06-13 PROCEDURE — 83605 ASSAY OF LACTIC ACID: CPT

## 2024-06-13 PROCEDURE — 1036F TOBACCO NON-USER: CPT | Performed by: INTERNAL MEDICINE

## 2024-06-13 PROCEDURE — 87040 BLOOD CULTURE FOR BACTERIA: CPT

## 2024-06-13 RX ORDER — FAMOTIDINE 10 MG/ML
20 INJECTION, SOLUTION INTRAVENOUS
OUTPATIENT
Start: 2024-06-20

## 2024-06-13 RX ORDER — ONDANSETRON 2 MG/ML
8 INJECTION INTRAMUSCULAR; INTRAVENOUS
OUTPATIENT
Start: 2024-06-26

## 2024-06-13 RX ORDER — ONDANSETRON 2 MG/ML
8 INJECTION INTRAMUSCULAR; INTRAVENOUS
OUTPATIENT
Start: 2024-06-17

## 2024-06-13 RX ORDER — FAMOTIDINE 10 MG/ML
20 INJECTION, SOLUTION INTRAVENOUS
OUTPATIENT
Start: 2024-06-24

## 2024-06-13 RX ORDER — SODIUM CHLORIDE 9 MG/ML
5-250 INJECTION, SOLUTION INTRAVENOUS PRN
Status: DISCONTINUED | OUTPATIENT
Start: 2024-06-13 | End: 2024-06-14 | Stop reason: HOSPADM

## 2024-06-13 RX ORDER — ALBUTEROL SULFATE 90 UG/1
4 AEROSOL, METERED RESPIRATORY (INHALATION) PRN
OUTPATIENT
Start: 2024-06-20

## 2024-06-13 RX ORDER — ALBUTEROL SULFATE 90 UG/1
4 AEROSOL, METERED RESPIRATORY (INHALATION) PRN
OUTPATIENT
Start: 2024-06-24

## 2024-06-13 RX ORDER — 0.9 % SODIUM CHLORIDE 0.9 %
30 INTRAVENOUS SOLUTION INTRAVENOUS ONCE
Status: COMPLETED | OUTPATIENT
Start: 2024-06-13 | End: 2024-06-13

## 2024-06-13 RX ORDER — SODIUM CHLORIDE 9 MG/ML
5-250 INJECTION, SOLUTION INTRAVENOUS PRN
OUTPATIENT
Start: 2024-06-26

## 2024-06-13 RX ORDER — ONDANSETRON 2 MG/ML
8 INJECTION INTRAMUSCULAR; INTRAVENOUS ONCE
Start: 2024-06-20 | End: 2024-06-20

## 2024-06-13 RX ORDER — SODIUM CHLORIDE 0.9 % (FLUSH) 0.9 %
5-40 SYRINGE (ML) INJECTION PRN
OUTPATIENT
Start: 2024-06-20

## 2024-06-13 RX ORDER — DEXTROSE MONOHYDRATE 50 MG/ML
5-250 INJECTION, SOLUTION INTRAVENOUS PRN
OUTPATIENT
Start: 2024-06-20

## 2024-06-13 RX ORDER — EPINEPHRINE 1 MG/ML
0.3 INJECTION, SOLUTION, CONCENTRATE INTRAVENOUS PRN
OUTPATIENT
Start: 2024-06-20

## 2024-06-13 RX ORDER — SODIUM CHLORIDE 9 MG/ML
5-250 INJECTION, SOLUTION INTRAVENOUS PRN
OUTPATIENT
Start: 2024-06-20

## 2024-06-13 RX ORDER — DEXTROSE MONOHYDRATE 50 MG/ML
5-250 INJECTION, SOLUTION INTRAVENOUS PRN
OUTPATIENT
Start: 2024-06-26

## 2024-06-13 RX ORDER — ONDANSETRON 2 MG/ML
8 INJECTION INTRAMUSCULAR; INTRAVENOUS ONCE
Status: COMPLETED | OUTPATIENT
Start: 2024-06-13 | End: 2024-06-13

## 2024-06-13 RX ORDER — ONDANSETRON 2 MG/ML
8 INJECTION INTRAMUSCULAR; INTRAVENOUS ONCE
Start: 2024-06-26 | End: 2024-06-26

## 2024-06-13 RX ORDER — SODIUM CHLORIDE 9 MG/ML
5-250 INJECTION, SOLUTION INTRAVENOUS PRN
OUTPATIENT
Start: 2024-06-17

## 2024-06-13 RX ORDER — SODIUM CHLORIDE 0.9 % (FLUSH) 0.9 %
5-40 SYRINGE (ML) INJECTION PRN
Status: DISCONTINUED | OUTPATIENT
Start: 2024-06-13 | End: 2024-06-14 | Stop reason: HOSPADM

## 2024-06-13 RX ORDER — SODIUM CHLORIDE 9 MG/ML
INJECTION, SOLUTION INTRAVENOUS CONTINUOUS
OUTPATIENT
Start: 2024-06-17

## 2024-06-13 RX ORDER — SODIUM CHLORIDE 9 MG/ML
INJECTION, SOLUTION INTRAVENOUS CONTINUOUS
OUTPATIENT
Start: 2024-06-20

## 2024-06-13 RX ORDER — ACETAMINOPHEN 325 MG/1
650 TABLET ORAL
OUTPATIENT
Start: 2024-06-26

## 2024-06-13 RX ORDER — ONDANSETRON 2 MG/ML
8 INJECTION INTRAMUSCULAR; INTRAVENOUS
OUTPATIENT
Start: 2024-06-20

## 2024-06-13 RX ORDER — ONDANSETRON 2 MG/ML
8 INJECTION INTRAMUSCULAR; INTRAVENOUS ONCE
Start: 2024-06-17 | End: 2024-06-17

## 2024-06-13 RX ORDER — ACETAMINOPHEN 325 MG/1
650 TABLET ORAL
OUTPATIENT
Start: 2024-06-20

## 2024-06-13 RX ORDER — SODIUM CHLORIDE 0.9 % (FLUSH) 0.9 %
5-40 SYRINGE (ML) INJECTION PRN
OUTPATIENT
Start: 2024-06-26

## 2024-06-13 RX ORDER — HEPARIN 100 UNIT/ML
500 SYRINGE INTRAVENOUS PRN
Status: DISCONTINUED | OUTPATIENT
Start: 2024-06-13 | End: 2024-06-14 | Stop reason: HOSPADM

## 2024-06-13 RX ORDER — HEPARIN 100 UNIT/ML
500 SYRINGE INTRAVENOUS PRN
OUTPATIENT
Start: 2024-06-26

## 2024-06-13 RX ORDER — SODIUM CHLORIDE 0.9 % (FLUSH) 0.9 %
5-40 SYRINGE (ML) INJECTION PRN
OUTPATIENT
Start: 2024-06-17

## 2024-06-13 RX ORDER — DIPHENHYDRAMINE HYDROCHLORIDE 50 MG/ML
50 INJECTION INTRAMUSCULAR; INTRAVENOUS
OUTPATIENT
Start: 2024-06-26

## 2024-06-13 RX ORDER — HEPARIN 100 UNIT/ML
500 SYRINGE INTRAVENOUS PRN
OUTPATIENT
Start: 2024-06-17

## 2024-06-13 RX ORDER — EPINEPHRINE 1 MG/ML
0.3 INJECTION, SOLUTION, CONCENTRATE INTRAVENOUS PRN
OUTPATIENT
Start: 2024-06-26

## 2024-06-13 RX ORDER — ACETAMINOPHEN 325 MG/1
650 TABLET ORAL
OUTPATIENT
Start: 2024-06-17

## 2024-06-13 RX ORDER — DIPHENHYDRAMINE HYDROCHLORIDE 50 MG/ML
50 INJECTION INTRAMUSCULAR; INTRAVENOUS
OUTPATIENT
Start: 2024-06-20

## 2024-06-13 RX ORDER — SODIUM CHLORIDE 9 MG/ML
INJECTION, SOLUTION INTRAVENOUS CONTINUOUS
OUTPATIENT
Start: 2024-06-24

## 2024-06-13 RX ORDER — SODIUM CHLORIDE 0.9 % (FLUSH) 0.9 %
5-40 SYRINGE (ML) INJECTION PRN
OUTPATIENT
Start: 2024-06-24

## 2024-06-13 RX ORDER — SODIUM CHLORIDE 9 MG/ML
5-250 INJECTION, SOLUTION INTRAVENOUS PRN
OUTPATIENT
Start: 2024-06-24

## 2024-06-13 RX ORDER — ALBUTEROL SULFATE 90 UG/1
4 AEROSOL, METERED RESPIRATORY (INHALATION) PRN
OUTPATIENT
Start: 2024-06-17

## 2024-06-13 RX ORDER — DEXTROSE MONOHYDRATE 50 MG/ML
5-250 INJECTION, SOLUTION INTRAVENOUS PRN
OUTPATIENT
Start: 2024-06-17

## 2024-06-13 RX ORDER — DEXTROSE MONOHYDRATE 50 MG/ML
5-250 INJECTION, SOLUTION INTRAVENOUS PRN
OUTPATIENT
Start: 2024-06-24

## 2024-06-13 RX ORDER — EPINEPHRINE 1 MG/ML
0.3 INJECTION, SOLUTION, CONCENTRATE INTRAVENOUS PRN
OUTPATIENT
Start: 2024-06-17

## 2024-06-13 RX ORDER — SODIUM CHLORIDE 0.9 % (FLUSH) 0.9 %
5-40 SYRINGE (ML) INJECTION EVERY 12 HOURS SCHEDULED
Status: DISCONTINUED | OUTPATIENT
Start: 2024-06-13 | End: 2024-06-14 | Stop reason: HOSPADM

## 2024-06-13 RX ORDER — FAMOTIDINE 10 MG/ML
20 INJECTION, SOLUTION INTRAVENOUS
OUTPATIENT
Start: 2024-06-17

## 2024-06-13 RX ORDER — HEPARIN 100 UNIT/ML
500 SYRINGE INTRAVENOUS PRN
OUTPATIENT
Start: 2024-06-20

## 2024-06-13 RX ORDER — EPINEPHRINE 1 MG/ML
0.3 INJECTION, SOLUTION, CONCENTRATE INTRAVENOUS PRN
OUTPATIENT
Start: 2024-06-24

## 2024-06-13 RX ORDER — ONDANSETRON 2 MG/ML
8 INJECTION INTRAMUSCULAR; INTRAVENOUS ONCE
Start: 2024-06-24 | End: 2024-06-24

## 2024-06-13 RX ORDER — SODIUM CHLORIDE 9 MG/ML
INJECTION, SOLUTION INTRAVENOUS PRN
Status: DISCONTINUED | OUTPATIENT
Start: 2024-06-13 | End: 2024-06-14 | Stop reason: HOSPADM

## 2024-06-13 RX ORDER — ALBUTEROL SULFATE 90 UG/1
4 AEROSOL, METERED RESPIRATORY (INHALATION) PRN
OUTPATIENT
Start: 2024-06-26

## 2024-06-13 RX ORDER — DIPHENHYDRAMINE HYDROCHLORIDE 50 MG/ML
50 INJECTION INTRAMUSCULAR; INTRAVENOUS
OUTPATIENT
Start: 2024-06-17

## 2024-06-13 RX ORDER — DIPHENHYDRAMINE HYDROCHLORIDE 50 MG/ML
50 INJECTION INTRAMUSCULAR; INTRAVENOUS
OUTPATIENT
Start: 2024-06-24

## 2024-06-13 RX ORDER — SODIUM CHLORIDE 9 MG/ML
INJECTION, SOLUTION INTRAVENOUS CONTINUOUS
OUTPATIENT
Start: 2024-06-26

## 2024-06-13 RX ORDER — FAMOTIDINE 10 MG/ML
20 INJECTION, SOLUTION INTRAVENOUS
OUTPATIENT
Start: 2024-06-26

## 2024-06-13 RX ORDER — ACETAMINOPHEN 325 MG/1
650 TABLET ORAL
OUTPATIENT
Start: 2024-06-24

## 2024-06-13 RX ORDER — ONDANSETRON 2 MG/ML
8 INJECTION INTRAMUSCULAR; INTRAVENOUS
OUTPATIENT
Start: 2024-06-24

## 2024-06-13 RX ORDER — HEPARIN 100 UNIT/ML
500 SYRINGE INTRAVENOUS PRN
OUTPATIENT
Start: 2024-06-24

## 2024-06-13 RX ADMIN — ONDANSETRON 8 MG: 2 INJECTION INTRAMUSCULAR; INTRAVENOUS at 08:29

## 2024-06-13 RX ADMIN — SODIUM CHLORIDE, PRESERVATIVE FREE 10 ML: 5 INJECTION INTRAVENOUS at 09:25

## 2024-06-13 RX ADMIN — SODIUM CHLORIDE, PRESERVATIVE FREE 10 ML: 5 INJECTION INTRAVENOUS at 09:26

## 2024-06-13 RX ADMIN — GEMCITABINE HYDROCHLORIDE 41 MG: 200 INJECTION, SOLUTION INTRAVENOUS at 08:54

## 2024-06-13 RX ADMIN — SODIUM CHLORIDE, PRESERVATIVE FREE 10 ML: 5 INJECTION INTRAVENOUS at 08:27

## 2024-06-13 RX ADMIN — HEPARIN 500 UNITS: 100 SYRINGE at 09:26

## 2024-06-13 RX ADMIN — PIPERACILLIN AND TAZOBACTAM 3375 MG: 3; .375 INJECTION, POWDER, LYOPHILIZED, FOR SOLUTION INTRAVENOUS at 21:36

## 2024-06-13 RX ADMIN — SODIUM CHLORIDE 250 ML/HR: 9 INJECTION, SOLUTION INTRAVENOUS at 08:28

## 2024-06-13 RX ADMIN — SODIUM CHLORIDE 999 ML: 9 INJECTION, SOLUTION INTRAVENOUS at 18:28

## 2024-06-13 RX ADMIN — CEFTRIAXONE SODIUM 1000 MG: 1 INJECTION, POWDER, FOR SOLUTION INTRAMUSCULAR; INTRAVENOUS at 20:10

## 2024-06-13 ASSESSMENT — LIFESTYLE VARIABLES
HOW OFTEN DO YOU HAVE A DRINK CONTAINING ALCOHOL: NEVER
HOW MANY STANDARD DRINKS CONTAINING ALCOHOL DO YOU HAVE ON A TYPICAL DAY: PATIENT DOES NOT DRINK

## 2024-06-13 ASSESSMENT — PAIN - FUNCTIONAL ASSESSMENT: PAIN_FUNCTIONAL_ASSESSMENT: NONE - DENIES PAIN

## 2024-06-13 ASSESSMENT — PAIN DESCRIPTION - PAIN TYPE: TYPE: ACUTE PAIN

## 2024-06-13 NOTE — ED PROVIDER NOTES
(6/3/2024)    Hunger Vital Sign     Worried About Running Out of Food in the Last Year: Never true     Ran Out of Food in the Last Year: Never true   Transportation Needs: No Transportation Needs (6/3/2024)    PRAPARE - Transportation     Lack of Transportation (Medical): No     Lack of Transportation (Non-Medical): No   Housing Stability: Low Risk  (6/3/2024)    Housing Stability Vital Sign     Unable to Pay for Housing in the Last Year: No     Number of Places Lived in the Last Year: 1     Unstable Housing in the Last Year: No           Review of Systems:  Constitutional: Positive for chills and fever eyes:  Denies photophobia or discharge   HENT:  Denies sore throat or ear pain   Respiratory: Chronic shortness of cardiovascular:  Denies chest pain, palpitations or swelling   GI:  Denies abdominal pain, nausea, vomiting, or diarrhea   Musculoskeletal:  Denies back pain   Skin:  Denies rash   Neurologic:  Denies headache, focal weakness or sensory changes   Endocrine:  Denies polyuria or polydypsia   Lymphatic:  Denies swollen glands   Psychiatric:  Denies depression, suicidal ideation or homicidal ideation   All systems negative except as marked.     PHYSICAL EXAM    VITAL SIGNS: BP (!) 148/82   Pulse 97   Temp 98.3 °F (36.8 °C) (Oral)   Resp 18   Ht 1.575 m (5' 2\")   Wt 57.6 kg (127 lb)   LMP 12/01/2010   SpO2 90%   BMI 23.23 kg/m²    Constitutional: Ill-appearing female HENT:  Normocephalic, Atraumatic, Bilateral external ears normal, Oropharynx moist, No oral exudates, Nose normal. Neck- Normal range of motion, No tenderness, Supple, No stridor.   Eyes:  PERRL, EOMI, Conjunctiva normal, No discharge.   Respiratory: Diminished breath sounds bilaterally.  Mildly  labored respiration cardiovascular:  Normal heart rate, Normal rhythm, No murmurs, No rubs, No gallops.   GI:  Bowel sounds normal, Soft, No tenderness, No masses, No pulsatile masses.   : External genitalia appear normal, No masses or lesions.

## 2024-06-13 NOTE — PROGRESS NOTES
[facility]    Nikolay Hernandez presents to the office today for   Chief Complaint   Patient presents with    Cough     3 day    Congestion     Cough and Congestion  Started 3 days ago  Feels short of breath  Fever past few days  No n/v/d  Myalgias  When she lays back chest hurts    Review of Systems     /74   Pulse 80   Temp 97.2 °F (36.2 °C) (Temporal)   Ht 5' 7\" (1.702 m)   Wt 152 lb (68.9 kg) Comment: patient report  SpO2 99%   BMI 23.81 kg/m²   Physical Exam  Constitutional:       Appearance: Normal appearance. HENT:      Head: Normocephalic and atraumatic. Nose: Congestion present. Eyes:      Extraocular Movements: Extraocular movements intact. Conjunctiva/sclera: Conjunctivae normal.   Cardiovascular:      Rate and Rhythm: Normal rate. Heart sounds: Normal heart sounds. Pulmonary:      Effort: Pulmonary effort is normal.      Breath sounds: Normal breath sounds. Skin:     General: Skin is warm. Neurological:      Mental Status: She is alert and oriented to person, place, and time.    Psychiatric:         Mood and Affect: Mood normal.         Behavior: Behavior normal.            Current Outpatient Medications:     predniSONE (DELTASONE) 10 MG tablet, Take 4 tabs po qd x 3 days, then take 3 tabs po qd x 3 days, then take 2 tabs po qd x 3 days, then take 1 tab po qd x 3 days per day, then stop, Disp: 30 tablet, Rfl: 0    hydrOXYzine (ATARAX) 25 MG tablet, take 1 tablet by mouth twice a day if needed for anxiety, Disp: , Rfl:     Multiple Vitamin (MULTI-VITAMIN DAILY PO), Take by mouth, Disp: , Rfl:     Aspirin-Acetaminophen-Caffeine (EXCEDRIN PO), Take by mouth, Disp: , Rfl:     buprenorphine-naloxone (SUBOXONE) 8-2 MG FILM SL film, dissolve 1 and 3/4 FILMS under the tongue once daily, Disp: , Rfl:      Past Medical History:   Diagnosis Date    Bilateral hip pain     Bruising tendency (HCC)     Chronic back pain     Chronic kidney disease 0    Patient arrives ambulating for chemo with daughter. Alert and oriented. States assessment is unchanged from previous with a little more shortness of breath. Dr Shepard in to see patient. Orders received to treat today   Depression     Diverticular disease     Headache     almost daily- 10 out of 10 in severity    Hydronephrosis     Kidney stone     history of    Left leg pain     Neck pain     Numbness and tingling of both legs     & feet    Osteoarthritis     UTI (lower urinary tract infection)        [place] was seen today for cough and congestion. Diagnoses and all orders for this visit:    URI with cough and congestion  -     predniSONE (DELTASONE) 10 MG tablet;  Take 4 tabs po qd x 3 days, then take 3 tabs po qd x 3 days, then take 2 tabs po qd x 3 days, then take 1 tab po qd x 3 days per day, then stop  -     POCT COVID-19, Antigen       Rapid COVID neg  As above    Ana Sanchez MD

## 2024-06-13 NOTE — TELEPHONE ENCOUNTER
Name: Daja Espinosa  : 1960  MRN: [id number]    Oncology Navigation Follow-Up Note    Contact Type:  Medical Oncology    Notes: Navigator met with Daja and daughter in treatment area.  Daja states she is doing okay since her hospital discharge and is receiving treatment today.  Daja notes that she is having bilateral lower extremity edema since hospital stay. Daja states she received a lot of fluids in the hospital.Daja also notes her hands were very swollen but have went back to normal since discharge.  Encouraged compression stockings and elevating feet above the heart when resting.  Daja thanked writer for information.    Daja states she is doing okay with bills.  Pt has 15 trips with [employer] if needed but states she has not had issues with transpiration.  Daja lives in [facility].  Will give contact information for [employer] in [facility] @ [phone].  Pt had left when writer had gone back to treatment to give information.  Will contact by phone.      Electronically signed by Ave Arreaga RN on 2024 at 9:38 AM

## 2024-06-13 NOTE — PROGRESS NOTES
Patient ID: Daja Espinosa, 1960, [id number], 63 y.o.  Referred by :  No ref. 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  Embolic stroke on 4/6/2024  Concurrent chemo RT as a definitive treatment started on Johana 10, 2024 (plan for continuous neoadjuvant chemotherapy abandoned as patient not surgical candidate)  Chronic kidney disease    Hematology oncology treatment  Cisplatin gemcitabine neoadjuvant started on March 19, 2024 every 3 weeks status post 1 cycle and have stopped and the plan changed to concurrent chemo RT   concurrent chemo RT with low-dose biweekly gemcitabine 27 mg/m² started in Johana 10, 2024  Eliquis      HISTORY OF PRESENT ILLNESS:    Oncologic History:    Daja Espinosa is a very pleasant 63 y.o. female.  With history of nonmuscle invasive bladder cancer status post multiple resection she lost to follow-up for almost 5 years, Patient did have a recent CT scan. This does show severe left hydronephrosis.  This is down to the level of the bladder.  The bladder does appear to be thickened on the left side.  Patient has had slight worsening of her creatinine.  Patient has been having some left-sided flank pain.  Patient has no unintentional weight loss or decreased appetite.  She reports no nausea or vomiting.     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   Pain significantly improved she still have intermittent hematuria and she was referred for neoadjuvant chemotherapy  CT chest abdomen pelvis no evidence of metastasis  Did have scattered atherosclerosis and stenosis of the celiac artery  Have severe peripheral artery disease with prior history of carotid bypass  Status post 1 cycle of chemotherapy  Creatinine initially rising and in the clinic was improved  Admitted to the

## 2024-06-14 ENCOUNTER — HOSPITAL ENCOUNTER (INPATIENT)
Age: 64
LOS: 13 days | Discharge: HOME | DRG: 720 | End: 2024-06-27
Attending: FAMILY MEDICINE | Admitting: INTERNAL MEDICINE
Payer: COMMERCIAL

## 2024-06-14 VITALS
DIASTOLIC BLOOD PRESSURE: 83 MMHG | HEART RATE: 97 BPM | WEIGHT: 127 LBS | TEMPERATURE: 98.8 F | BODY MASS INDEX: 23.37 KG/M2 | HEIGHT: 62 IN | RESPIRATION RATE: 18 BRPM | SYSTOLIC BLOOD PRESSURE: 156 MMHG | OXYGEN SATURATION: 88 %

## 2024-06-14 DIAGNOSIS — N17.0 ATN (ACUTE TUBULAR NECROSIS) (HCC): Primary | ICD-10-CM

## 2024-06-14 PROBLEM — J18.9 PNEUMONIA, UNSPECIFIED ORGANISM: Status: ACTIVE | Noted: 2024-06-14

## 2024-06-14 PROBLEM — J15.9 HOSPITAL-ACQUIRED BACTERIAL PNEUMONIA: Status: ACTIVE | Noted: 2024-06-14

## 2024-06-14 LAB
EKG ATRIAL RATE: 100 BPM
EKG P AXIS: 70 DEGREES
EKG P-R INTERVAL: 144 MS
EKG Q-T INTERVAL: 346 MS
EKG QRS DURATION: 76 MS
EKG QTC CALCULATION (BAZETT): 446 MS
EKG R AXIS: 86 DEGREES
EKG T AXIS: 83 DEGREES
EKG VENTRICULAR RATE: 100 BPM

## 2024-06-14 PROCEDURE — 99223 1ST HOSP IP/OBS HIGH 75: CPT | Performed by: STUDENT IN AN ORGANIZED HEALTH CARE EDUCATION/TRAINING PROGRAM

## 2024-06-14 PROCEDURE — 1200000000 HC SEMI PRIVATE

## 2024-06-14 PROCEDURE — 6370000000 HC RX 637 (ALT 250 FOR IP): Performed by: NURSE PRACTITIONER

## 2024-06-14 PROCEDURE — 6360000002 HC RX W HCPCS: Performed by: NURSE PRACTITIONER

## 2024-06-14 PROCEDURE — 93010 ELECTROCARDIOGRAM REPORT: CPT | Performed by: INTERNAL MEDICINE

## 2024-06-14 PROCEDURE — 97165 OT EVAL LOW COMPLEX 30 MIN: CPT

## 2024-06-14 PROCEDURE — 2580000003 HC RX 258: Performed by: NURSE PRACTITIONER

## 2024-06-14 PROCEDURE — 6370000000 HC RX 637 (ALT 250 FOR IP): Performed by: STUDENT IN AN ORGANIZED HEALTH CARE EDUCATION/TRAINING PROGRAM

## 2024-06-14 PROCEDURE — 99255 IP/OBS CONSLTJ NEW/EST HI 80: CPT | Performed by: INTERNAL MEDICINE

## 2024-06-14 PROCEDURE — 97535 SELF CARE MNGMENT TRAINING: CPT

## 2024-06-14 PROCEDURE — 94640 AIRWAY INHALATION TREATMENT: CPT

## 2024-06-14 RX ORDER — SODIUM CHLORIDE 9 MG/ML
INJECTION, SOLUTION INTRAVENOUS CONTINUOUS
OUTPATIENT
Start: 2024-07-04

## 2024-06-14 RX ORDER — DIPHENHYDRAMINE HYDROCHLORIDE 50 MG/ML
50 INJECTION INTRAMUSCULAR; INTRAVENOUS
OUTPATIENT
Start: 2024-07-04

## 2024-06-14 RX ORDER — GUAIFENESIN/DEXTROMETHORPHAN 100-10MG/5
5 SYRUP ORAL EVERY 4 HOURS PRN
Status: DISCONTINUED | OUTPATIENT
Start: 2024-06-14 | End: 2024-06-27 | Stop reason: HOSPADM

## 2024-06-14 RX ORDER — SODIUM CHLORIDE 0.9 % (FLUSH) 0.9 %
5-40 SYRINGE (ML) INJECTION PRN
OUTPATIENT
Start: 2024-07-01

## 2024-06-14 RX ORDER — SODIUM CHLORIDE 9 MG/ML
5-250 INJECTION, SOLUTION INTRAVENOUS PRN
OUTPATIENT
Start: 2024-07-04

## 2024-06-14 RX ORDER — ENOXAPARIN SODIUM 100 MG/ML
30 INJECTION SUBCUTANEOUS DAILY
Status: DISCONTINUED | OUTPATIENT
Start: 2024-06-14 | End: 2024-06-14

## 2024-06-14 RX ORDER — ACETAMINOPHEN 650 MG/1
650 SUPPOSITORY RECTAL EVERY 6 HOURS PRN
Status: DISCONTINUED | OUTPATIENT
Start: 2024-06-14 | End: 2024-06-27 | Stop reason: HOSPADM

## 2024-06-14 RX ORDER — EPINEPHRINE 1 MG/ML
0.3 INJECTION, SOLUTION, CONCENTRATE INTRAVENOUS PRN
OUTPATIENT
Start: 2024-07-04

## 2024-06-14 RX ORDER — DEXTROSE MONOHYDRATE 50 MG/ML
5-250 INJECTION, SOLUTION INTRAVENOUS PRN
OUTPATIENT
Start: 2024-07-01

## 2024-06-14 RX ORDER — DIPHENHYDRAMINE HYDROCHLORIDE 50 MG/ML
50 INJECTION INTRAMUSCULAR; INTRAVENOUS
OUTPATIENT
Start: 2024-07-01

## 2024-06-14 RX ORDER — DEXTROSE MONOHYDRATE 50 MG/ML
5-250 INJECTION, SOLUTION INTRAVENOUS PRN
OUTPATIENT
Start: 2024-07-04

## 2024-06-14 RX ORDER — SODIUM CHLORIDE 9 MG/ML
5-250 INJECTION, SOLUTION INTRAVENOUS PRN
OUTPATIENT
Start: 2024-07-01

## 2024-06-14 RX ORDER — ONDANSETRON 2 MG/ML
8 INJECTION INTRAMUSCULAR; INTRAVENOUS ONCE
Start: 2024-07-04 | End: 2024-07-04

## 2024-06-14 RX ORDER — PANTOPRAZOLE SODIUM 40 MG/1
40 TABLET, DELAYED RELEASE ORAL
Status: DISCONTINUED | OUTPATIENT
Start: 2024-06-15 | End: 2024-06-27 | Stop reason: HOSPADM

## 2024-06-14 RX ORDER — EPINEPHRINE 1 MG/ML
0.3 INJECTION, SOLUTION, CONCENTRATE INTRAVENOUS PRN
OUTPATIENT
Start: 2024-07-01

## 2024-06-14 RX ORDER — ONDANSETRON 2 MG/ML
8 INJECTION INTRAMUSCULAR; INTRAVENOUS
OUTPATIENT
Start: 2024-07-01

## 2024-06-14 RX ORDER — SODIUM CHLORIDE 0.9 % (FLUSH) 0.9 %
5-40 SYRINGE (ML) INJECTION EVERY 12 HOURS SCHEDULED
Status: DISCONTINUED | OUTPATIENT
Start: 2024-06-14 | End: 2024-06-27 | Stop reason: HOSPADM

## 2024-06-14 RX ORDER — SODIUM CHLORIDE 0.9 % (FLUSH) 0.9 %
5-40 SYRINGE (ML) INJECTION PRN
OUTPATIENT
Start: 2024-07-04

## 2024-06-14 RX ORDER — FAMOTIDINE 10 MG/ML
20 INJECTION, SOLUTION INTRAVENOUS
OUTPATIENT
Start: 2024-07-01

## 2024-06-14 RX ORDER — AMLODIPINE BESYLATE 10 MG/1
10 TABLET ORAL DAILY
Status: DISCONTINUED | OUTPATIENT
Start: 2024-06-14 | End: 2024-06-27 | Stop reason: HOSPADM

## 2024-06-14 RX ORDER — IPRATROPIUM BROMIDE AND ALBUTEROL SULFATE 2.5; .5 MG/3ML; MG/3ML
1 SOLUTION RESPIRATORY (INHALATION)
Status: DISCONTINUED | OUTPATIENT
Start: 2024-06-14 | End: 2024-06-16

## 2024-06-14 RX ORDER — ACETAMINOPHEN 325 MG/1
650 TABLET ORAL
OUTPATIENT
Start: 2024-07-04

## 2024-06-14 RX ORDER — ACETAMINOPHEN 325 MG/1
650 TABLET ORAL EVERY 6 HOURS PRN
Status: DISCONTINUED | OUTPATIENT
Start: 2024-06-14 | End: 2024-06-27 | Stop reason: HOSPADM

## 2024-06-14 RX ORDER — SODIUM CHLORIDE 9 MG/ML
INJECTION, SOLUTION INTRAVENOUS CONTINUOUS
Status: DISCONTINUED | OUTPATIENT
Start: 2024-06-14 | End: 2024-06-17

## 2024-06-14 RX ORDER — SODIUM CHLORIDE 0.9 % (FLUSH) 0.9 %
10 SYRINGE (ML) INJECTION PRN
Status: DISCONTINUED | OUTPATIENT
Start: 2024-06-14 | End: 2024-06-27 | Stop reason: HOSPADM

## 2024-06-14 RX ORDER — ALBUTEROL SULFATE 2.5 MG/3ML
2.5 SOLUTION RESPIRATORY (INHALATION)
Status: DISCONTINUED | OUTPATIENT
Start: 2024-06-14 | End: 2024-06-16

## 2024-06-14 RX ORDER — ACETAMINOPHEN 325 MG/1
650 TABLET ORAL
OUTPATIENT
Start: 2024-07-01

## 2024-06-14 RX ORDER — METOPROLOL TARTRATE 50 MG/1
50 TABLET, FILM COATED ORAL 2 TIMES DAILY
Status: DISCONTINUED | OUTPATIENT
Start: 2024-06-14 | End: 2024-06-17

## 2024-06-14 RX ORDER — SODIUM CHLORIDE 9 MG/ML
INJECTION, SOLUTION INTRAVENOUS CONTINUOUS
OUTPATIENT
Start: 2024-07-01

## 2024-06-14 RX ORDER — HEPARIN 100 UNIT/ML
500 SYRINGE INTRAVENOUS PRN
OUTPATIENT
Start: 2024-07-04

## 2024-06-14 RX ORDER — ONDANSETRON 2 MG/ML
4 INJECTION INTRAMUSCULAR; INTRAVENOUS EVERY 6 HOURS PRN
Status: DISCONTINUED | OUTPATIENT
Start: 2024-06-14 | End: 2024-06-27 | Stop reason: HOSPADM

## 2024-06-14 RX ORDER — HEPARIN 100 UNIT/ML
500 SYRINGE INTRAVENOUS PRN
OUTPATIENT
Start: 2024-07-01

## 2024-06-14 RX ORDER — GUAIFENESIN 600 MG/1
600 TABLET, EXTENDED RELEASE ORAL 2 TIMES DAILY
Status: DISCONTINUED | OUTPATIENT
Start: 2024-06-14 | End: 2024-06-16

## 2024-06-14 RX ORDER — ONDANSETRON 4 MG/1
4 TABLET, ORALLY DISINTEGRATING ORAL EVERY 8 HOURS PRN
Status: DISCONTINUED | OUTPATIENT
Start: 2024-06-14 | End: 2024-06-27 | Stop reason: HOSPADM

## 2024-06-14 RX ORDER — ALBUTEROL SULFATE 90 UG/1
4 AEROSOL, METERED RESPIRATORY (INHALATION) PRN
OUTPATIENT
Start: 2024-07-04

## 2024-06-14 RX ORDER — HEPARIN SODIUM 5000 [USP'U]/ML
5000 INJECTION, SOLUTION INTRAVENOUS; SUBCUTANEOUS EVERY 8 HOURS SCHEDULED
Status: DISCONTINUED | OUTPATIENT
Start: 2024-06-14 | End: 2024-06-14

## 2024-06-14 RX ORDER — ATORVASTATIN CALCIUM 80 MG/1
80 TABLET, FILM COATED ORAL NIGHTLY
Status: DISCONTINUED | OUTPATIENT
Start: 2024-06-14 | End: 2024-06-27 | Stop reason: HOSPADM

## 2024-06-14 RX ORDER — ONDANSETRON 2 MG/ML
8 INJECTION INTRAMUSCULAR; INTRAVENOUS ONCE
Start: 2024-07-01 | End: 2024-07-01

## 2024-06-14 RX ORDER — ONDANSETRON 2 MG/ML
8 INJECTION INTRAMUSCULAR; INTRAVENOUS
OUTPATIENT
Start: 2024-07-04

## 2024-06-14 RX ORDER — FAMOTIDINE 10 MG/ML
20 INJECTION, SOLUTION INTRAVENOUS
OUTPATIENT
Start: 2024-07-04

## 2024-06-14 RX ORDER — ALBUTEROL SULFATE 90 UG/1
4 AEROSOL, METERED RESPIRATORY (INHALATION) PRN
OUTPATIENT
Start: 2024-07-01

## 2024-06-14 RX ORDER — SODIUM CHLORIDE 9 MG/ML
INJECTION, SOLUTION INTRAVENOUS PRN
Status: DISCONTINUED | OUTPATIENT
Start: 2024-06-14 | End: 2024-06-27 | Stop reason: HOSPADM

## 2024-06-14 RX ADMIN — APIXABAN 5 MG: 5 TABLET, FILM COATED ORAL at 21:13

## 2024-06-14 RX ADMIN — METOPROLOL TARTRATE 50 MG: 50 TABLET, FILM COATED ORAL at 21:13

## 2024-06-14 RX ADMIN — PIPERACILLIN AND TAZOBACTAM 3375 MG: 3; .375 INJECTION, POWDER, LYOPHILIZED, FOR SOLUTION INTRAVENOUS at 17:24

## 2024-06-14 RX ADMIN — IPRATROPIUM BROMIDE AND ALBUTEROL SULFATE 1 DOSE: .5; 3 SOLUTION RESPIRATORY (INHALATION) at 11:39

## 2024-06-14 RX ADMIN — PIPERACILLIN AND TAZOBACTAM 3375 MG: 3; .375 INJECTION, POWDER, LYOPHILIZED, FOR SOLUTION INTRAVENOUS at 04:47

## 2024-06-14 RX ADMIN — IPRATROPIUM BROMIDE AND ALBUTEROL SULFATE 1 DOSE: .5; 3 SOLUTION RESPIRATORY (INHALATION) at 07:46

## 2024-06-14 RX ADMIN — SODIUM CHLORIDE, PRESERVATIVE FREE 10 ML: 5 INJECTION INTRAVENOUS at 21:14

## 2024-06-14 RX ADMIN — METOPROLOL TARTRATE 50 MG: 50 TABLET, FILM COATED ORAL at 11:27

## 2024-06-14 RX ADMIN — GUAIFENESIN 600 MG: 600 TABLET, EXTENDED RELEASE ORAL at 08:18

## 2024-06-14 RX ADMIN — IPRATROPIUM BROMIDE AND ALBUTEROL SULFATE 1 DOSE: .5; 3 SOLUTION RESPIRATORY (INHALATION) at 20:41

## 2024-06-14 RX ADMIN — APIXABAN 5 MG: 5 TABLET, FILM COATED ORAL at 11:27

## 2024-06-14 RX ADMIN — ATORVASTATIN CALCIUM 80 MG: 80 TABLET, FILM COATED ORAL at 21:13

## 2024-06-14 RX ADMIN — GUAIFENESIN 600 MG: 600 TABLET, EXTENDED RELEASE ORAL at 21:13

## 2024-06-14 RX ADMIN — AMLODIPINE BESYLATE 10 MG: 10 TABLET ORAL at 11:27

## 2024-06-14 RX ADMIN — SODIUM CHLORIDE: 9 INJECTION, SOLUTION INTRAVENOUS at 04:23

## 2024-06-15 ENCOUNTER — APPOINTMENT (OUTPATIENT)
Dept: GENERAL RADIOLOGY | Age: 64
DRG: 720 | End: 2024-06-15
Attending: FAMILY MEDICINE
Payer: COMMERCIAL

## 2024-06-15 PROBLEM — C83.38 DIFFUSE LARGE B-CELL LYMPHOMA OF LYMPH NODES OF MULTIPLE REGIONS (HCC): Status: ACTIVE | Noted: 2024-06-15

## 2024-06-15 LAB
ANION GAP SERPL CALCULATED.3IONS-SCNC: 12 MMOL/L (ref 9–16)
BASOPHILS # BLD: 0 K/UL (ref 0–0.2)
BASOPHILS NFR BLD: 0 % (ref 0–2)
BUN SERPL-MCNC: 41 MG/DL (ref 8–23)
CALCIUM SERPL-MCNC: 8.6 MG/DL (ref 8.6–10.4)
CHLORIDE SERPL-SCNC: 102 MMOL/L (ref 98–107)
CO2 SERPL-SCNC: 19 MMOL/L (ref 20–31)
CREAT SERPL-MCNC: 2.4 MG/DL (ref 0.5–0.9)
EOSINOPHIL # BLD: 0.61 K/UL (ref 0–0.4)
EOSINOPHILS RELATIVE PERCENT: 7 % (ref 1–4)
ERYTHROCYTE [DISTWIDTH] IN BLOOD BY AUTOMATED COUNT: 14.9 % (ref 11.8–14.4)
GFR, ESTIMATED: 22 ML/MIN/1.73M2
GLUCOSE SERPL-MCNC: 132 MG/DL (ref 74–99)
HCT VFR BLD AUTO: 26.1 % (ref 36.3–47.1)
HGB BLD-MCNC: 8 G/DL (ref 11.9–15.1)
IMM GRANULOCYTES # BLD AUTO: 0 K/UL (ref 0–0.3)
IMM GRANULOCYTES NFR BLD: 0 %
LYMPHOCYTES NFR BLD: 0.52 K/UL (ref 1–4.8)
LYMPHOCYTES RELATIVE PERCENT: 6 % (ref 24–44)
MCH RBC QN AUTO: 31.3 PG (ref 25.2–33.5)
MCHC RBC AUTO-ENTMCNC: 30.7 G/DL (ref 28.4–34.8)
MCV RBC AUTO: 102 FL (ref 82.6–102.9)
MONOCYTES NFR BLD: 0.09 K/UL (ref 0.1–0.8)
MONOCYTES NFR BLD: 1 % (ref 1–7)
MORPHOLOGY: NORMAL
NEUTROPHILS NFR BLD: 86 % (ref 36–66)
NEUTS SEG NFR BLD: 7.48 K/UL (ref 1.8–7.7)
NRBC BLD-RTO: 0 PER 100 WBC
PLATELET # BLD AUTO: 311 K/UL (ref 138–453)
PMV BLD AUTO: 9.9 FL (ref 8.1–13.5)
POTASSIUM SERPL-SCNC: 4 MMOL/L (ref 3.7–5.3)
RBC # BLD AUTO: 2.56 M/UL (ref 3.95–5.11)
SODIUM SERPL-SCNC: 133 MMOL/L (ref 136–145)
WBC OTHER # BLD: 8.7 K/UL (ref 3.5–11.3)

## 2024-06-15 PROCEDURE — 1200000000 HC SEMI PRIVATE

## 2024-06-15 PROCEDURE — 94761 N-INVAS EAR/PLS OXIMETRY MLT: CPT

## 2024-06-15 PROCEDURE — 85025 COMPLETE CBC W/AUTO DIFF WBC: CPT

## 2024-06-15 PROCEDURE — 2700000000 HC OXYGEN THERAPY PER DAY

## 2024-06-15 PROCEDURE — 94640 AIRWAY INHALATION TREATMENT: CPT

## 2024-06-15 PROCEDURE — 99232 SBSQ HOSP IP/OBS MODERATE 35: CPT | Performed by: STUDENT IN AN ORGANIZED HEALTH CARE EDUCATION/TRAINING PROGRAM

## 2024-06-15 PROCEDURE — 2580000003 HC RX 258: Performed by: NURSE PRACTITIONER

## 2024-06-15 PROCEDURE — 36415 COLL VENOUS BLD VENIPUNCTURE: CPT

## 2024-06-15 PROCEDURE — 71046 X-RAY EXAM CHEST 2 VIEWS: CPT

## 2024-06-15 PROCEDURE — 6360000002 HC RX W HCPCS: Performed by: STUDENT IN AN ORGANIZED HEALTH CARE EDUCATION/TRAINING PROGRAM

## 2024-06-15 PROCEDURE — 6370000000 HC RX 637 (ALT 250 FOR IP): Performed by: STUDENT IN AN ORGANIZED HEALTH CARE EDUCATION/TRAINING PROGRAM

## 2024-06-15 PROCEDURE — 6370000000 HC RX 637 (ALT 250 FOR IP): Performed by: NURSE PRACTITIONER

## 2024-06-15 PROCEDURE — 80048 BASIC METABOLIC PNL TOTAL CA: CPT

## 2024-06-15 PROCEDURE — 99232 SBSQ HOSP IP/OBS MODERATE 35: CPT | Performed by: INTERNAL MEDICINE

## 2024-06-15 PROCEDURE — 6360000002 HC RX W HCPCS: Performed by: NURSE PRACTITIONER

## 2024-06-15 RX ORDER — FUROSEMIDE 10 MG/ML
20 INJECTION INTRAMUSCULAR; INTRAVENOUS ONCE
Status: COMPLETED | OUTPATIENT
Start: 2024-06-15 | End: 2024-06-15

## 2024-06-15 RX ORDER — FUROSEMIDE 10 MG/ML
20 INJECTION INTRAMUSCULAR; INTRAVENOUS ONCE
Status: DISCONTINUED | OUTPATIENT
Start: 2024-06-15 | End: 2024-06-16

## 2024-06-15 RX ADMIN — IPRATROPIUM BROMIDE AND ALBUTEROL SULFATE 1 DOSE: .5; 3 SOLUTION RESPIRATORY (INHALATION) at 11:25

## 2024-06-15 RX ADMIN — METOPROLOL TARTRATE 50 MG: 50 TABLET, FILM COATED ORAL at 20:07

## 2024-06-15 RX ADMIN — IPRATROPIUM BROMIDE AND ALBUTEROL SULFATE 1 DOSE: .5; 3 SOLUTION RESPIRATORY (INHALATION) at 15:01

## 2024-06-15 RX ADMIN — GUAIFENESIN 600 MG: 600 TABLET, EXTENDED RELEASE ORAL at 20:07

## 2024-06-15 RX ADMIN — ATORVASTATIN CALCIUM 80 MG: 80 TABLET, FILM COATED ORAL at 20:07

## 2024-06-15 RX ADMIN — ACETAMINOPHEN 650 MG: 325 TABLET ORAL at 16:09

## 2024-06-15 RX ADMIN — FUROSEMIDE 20 MG: 10 INJECTION, SOLUTION INTRAMUSCULAR; INTRAVENOUS at 20:06

## 2024-06-15 RX ADMIN — SODIUM CHLORIDE, PRESERVATIVE FREE 10 ML: 5 INJECTION INTRAVENOUS at 20:08

## 2024-06-15 RX ADMIN — APIXABAN 5 MG: 5 TABLET, FILM COATED ORAL at 09:11

## 2024-06-15 RX ADMIN — METOPROLOL TARTRATE 50 MG: 50 TABLET, FILM COATED ORAL at 09:11

## 2024-06-15 RX ADMIN — GUAIFENESIN AND DEXTROMETHORPHAN 5 ML: 100; 10 SYRUP ORAL at 16:09

## 2024-06-15 RX ADMIN — PIPERACILLIN AND TAZOBACTAM 3375 MG: 3; .375 INJECTION, POWDER, LYOPHILIZED, FOR SOLUTION INTRAVENOUS at 16:05

## 2024-06-15 RX ADMIN — IPRATROPIUM BROMIDE AND ALBUTEROL SULFATE 1 DOSE: .5; 3 SOLUTION RESPIRATORY (INHALATION) at 07:48

## 2024-06-15 RX ADMIN — APIXABAN 5 MG: 5 TABLET, FILM COATED ORAL at 20:07

## 2024-06-15 RX ADMIN — GUAIFENESIN AND DEXTROMETHORPHAN 5 ML: 100; 10 SYRUP ORAL at 05:15

## 2024-06-15 RX ADMIN — PANTOPRAZOLE SODIUM 40 MG: 40 TABLET, DELAYED RELEASE ORAL at 05:15

## 2024-06-15 RX ADMIN — GUAIFENESIN 600 MG: 600 TABLET, EXTENDED RELEASE ORAL at 09:11

## 2024-06-15 RX ADMIN — PIPERACILLIN AND TAZOBACTAM 3375 MG: 3; .375 INJECTION, POWDER, LYOPHILIZED, FOR SOLUTION INTRAVENOUS at 02:44

## 2024-06-15 RX ADMIN — AMLODIPINE BESYLATE 10 MG: 10 TABLET ORAL at 09:11

## 2024-06-15 ASSESSMENT — PAIN DESCRIPTION - DESCRIPTORS: DESCRIPTORS: ACHING

## 2024-06-15 ASSESSMENT — PAIN SCALES - GENERAL
PAINLEVEL_OUTOF10: 0
PAINLEVEL_OUTOF10: 3

## 2024-06-15 ASSESSMENT — PAIN DESCRIPTION - ORIENTATION: ORIENTATION: UPPER;MID

## 2024-06-15 ASSESSMENT — PAIN - FUNCTIONAL ASSESSMENT: PAIN_FUNCTIONAL_ASSESSMENT: ACTIVITIES ARE NOT PREVENTED

## 2024-06-15 ASSESSMENT — PAIN SCALES - WONG BAKER: WONGBAKER_NUMERICALRESPONSE: NO HURT

## 2024-06-15 ASSESSMENT — PAIN DESCRIPTION - LOCATION: LOCATION: CHEST

## 2024-06-16 ENCOUNTER — APPOINTMENT (OUTPATIENT)
Dept: GENERAL RADIOLOGY | Age: 64
DRG: 720 | End: 2024-06-16
Attending: FAMILY MEDICINE
Payer: COMMERCIAL

## 2024-06-16 ENCOUNTER — APPOINTMENT (OUTPATIENT)
Dept: CT IMAGING | Age: 64
DRG: 720 | End: 2024-06-16
Attending: FAMILY MEDICINE
Payer: COMMERCIAL

## 2024-06-16 PROBLEM — I48.91 NEW ONSET ATRIAL FIBRILLATION (HCC): Status: ACTIVE | Noted: 2024-06-16

## 2024-06-16 LAB
ANION GAP SERPL CALCULATED.3IONS-SCNC: 17 MMOL/L (ref 9–16)
B PARAP IS1001 DNA NPH QL NAA+NON-PROBE: NOT DETECTED
B PERT DNA SPEC QL NAA+PROBE: NOT DETECTED
BASOPHILS # BLD: 0.04 K/UL (ref 0–0.2)
BASOPHILS NFR BLD: 1 % (ref 0–2)
BODY TEMPERATURE: 37
BUN SERPL-MCNC: 42 MG/DL (ref 8–23)
C PNEUM DNA NPH QL NAA+NON-PROBE: NOT DETECTED
CALCIUM SERPL-MCNC: 9.2 MG/DL (ref 8.6–10.4)
CHLORIDE SERPL-SCNC: 100 MMOL/L (ref 98–107)
CO2 SERPL-SCNC: 19 MMOL/L (ref 20–31)
COHGB MFR BLD: 3.3 % (ref 0–5)
CREAT SERPL-MCNC: 2.2 MG/DL (ref 0.5–0.9)
EOSINOPHIL # BLD: 0.17 K/UL (ref 0–0.44)
EOSINOPHILS RELATIVE PERCENT: 2 % (ref 1–4)
ERYTHROCYTE [DISTWIDTH] IN BLOOD BY AUTOMATED COUNT: 14.6 % (ref 11.8–14.4)
FIO2 ON VENT: ABNORMAL %
FIO2: 6
FLUAV RNA NPH QL NAA+NON-PROBE: NOT DETECTED
FLUBV RNA NPH QL NAA+NON-PROBE: NOT DETECTED
GFR, ESTIMATED: 24 ML/MIN/1.73M2
GLUCOSE SERPL-MCNC: 117 MG/DL (ref 74–99)
HADV DNA NPH QL NAA+NON-PROBE: NOT DETECTED
HCO3 VENOUS: 19 MMOL/L (ref 24–30)
HCOV 229E RNA NPH QL NAA+NON-PROBE: NOT DETECTED
HCOV HKU1 RNA NPH QL NAA+NON-PROBE: NOT DETECTED
HCOV NL63 RNA NPH QL NAA+NON-PROBE: NOT DETECTED
HCOV OC43 RNA NPH QL NAA+NON-PROBE: NOT DETECTED
HCT VFR BLD AUTO: 24.5 % (ref 36.3–47.1)
HGB BLD-MCNC: 7.9 G/DL (ref 11.9–15.1)
HMPV RNA NPH QL NAA+NON-PROBE: NOT DETECTED
HPIV1 RNA NPH QL NAA+NON-PROBE: NOT DETECTED
HPIV2 RNA NPH QL NAA+NON-PROBE: NOT DETECTED
HPIV3 RNA NPH QL NAA+NON-PROBE: NOT DETECTED
HPIV4 RNA NPH QL NAA+NON-PROBE: NOT DETECTED
IMM GRANULOCYTES # BLD AUTO: 0.04 K/UL (ref 0–0.3)
IMM GRANULOCYTES NFR BLD: 1 %
INR PPP: 2
L PNEUMO1 AG UR QL IA.RAPID: NEGATIVE
LACTIC ACID, WHOLE BLOOD: 0.8 MMOL/L (ref 0.7–2.1)
LYMPHOCYTES NFR BLD: 0.74 K/UL (ref 1.1–3.7)
LYMPHOCYTES RELATIVE PERCENT: 9 % (ref 24–43)
M PNEUMO DNA NPH QL NAA+NON-PROBE: NOT DETECTED
MAGNESIUM SERPL-MCNC: 1.8 MG/DL (ref 1.6–2.4)
MCH RBC QN AUTO: 31.2 PG (ref 25.2–33.5)
MCHC RBC AUTO-ENTMCNC: 32.2 G/DL (ref 28.4–34.8)
MCV RBC AUTO: 96.8 FL (ref 82.6–102.9)
MICROORGANISM SPEC CULT: NORMAL
MONOCYTES NFR BLD: 0.28 K/UL (ref 0.1–1.2)
MONOCYTES NFR BLD: 4 % (ref 3–12)
NEGATIVE BASE EXCESS, ART: 2.8 MMOL/L (ref 0–2)
NEGATIVE BASE EXCESS, VEN: 4.2 MMOL/L (ref 0–2)
NEUTROPHILS NFR BLD: 84 % (ref 36–65)
NEUTS SEG NFR BLD: 6.77 K/UL (ref 1.5–8.1)
NRBC BLD-RTO: 0 PER 100 WBC
O2 SAT, VEN: 98.2 % (ref 60–85)
PCO2 VENOUS: 29.4 MM HG (ref 39–55)
PH VENOUS: 7.43 (ref 7.32–7.42)
PLATELET # BLD AUTO: 343 K/UL (ref 138–453)
PMV BLD AUTO: 9.7 FL (ref 8.1–13.5)
PO2 VENOUS: 105 MM HG (ref 30–50)
POC HCO3: 20.9 MMOL/L (ref 21–28)
POC O2 SATURATION: 79 % (ref 94–98)
POC PCO2: 30.9 MM HG (ref 35–48)
POC PH: 7.44 (ref 7.35–7.45)
POC PO2: 41 MM HG (ref 83–108)
POTASSIUM SERPL-SCNC: 3.4 MMOL/L (ref 3.7–5.3)
PROTHROMBIN TIME: 22.2 SEC (ref 11.7–14.9)
RBC # BLD AUTO: 2.53 M/UL (ref 3.95–5.11)
RBC # BLD: ABNORMAL 10*6/UL
RSV RNA NPH QL NAA+NON-PROBE: NOT DETECTED
RV+EV RNA NPH QL NAA+NON-PROBE: NOT DETECTED
S PNEUM AG SPEC QL: NEGATIVE
SARS-COV-2 RNA NPH QL NAA+NON-PROBE: NOT DETECTED
SERVICE CMNT-IMP: NORMAL
SERVICE CMNT-IMP: NORMAL
SODIUM SERPL-SCNC: 136 MMOL/L (ref 136–145)
SPECIMEN DESCRIPTION: NORMAL
SPECIMEN SOURCE: NORMAL
WBC OTHER # BLD: 8 K/UL (ref 3.5–11.3)

## 2024-06-16 PROCEDURE — 94640 AIRWAY INHALATION TREATMENT: CPT

## 2024-06-16 PROCEDURE — 99291 CRITICAL CARE FIRST HOUR: CPT | Performed by: INTERNAL MEDICINE

## 2024-06-16 PROCEDURE — 88305 TISSUE EXAM BY PATHOLOGIST: CPT

## 2024-06-16 PROCEDURE — 88341 IMHCHEM/IMCYTCHM EA ADD ANTB: CPT

## 2024-06-16 PROCEDURE — 6370000000 HC RX 637 (ALT 250 FOR IP): Performed by: STUDENT IN AN ORGANIZED HEALTH CARE EDUCATION/TRAINING PROGRAM

## 2024-06-16 PROCEDURE — 87899 AGENT NOS ASSAY W/OPTIC: CPT

## 2024-06-16 PROCEDURE — 83605 ASSAY OF LACTIC ACID: CPT

## 2024-06-16 PROCEDURE — 71250 CT THORAX DX C-: CPT

## 2024-06-16 PROCEDURE — 6370000000 HC RX 637 (ALT 250 FOR IP): Performed by: NURSE PRACTITIONER

## 2024-06-16 PROCEDURE — 36415 COLL VENOUS BLD VENIPUNCTURE: CPT

## 2024-06-16 PROCEDURE — 0202U NFCT DS 22 TRGT SARS-COV-2: CPT

## 2024-06-16 PROCEDURE — 6360000002 HC RX W HCPCS: Performed by: INTERNAL MEDICINE

## 2024-06-16 PROCEDURE — 88342 IMHCHEM/IMCYTCHM 1ST ANTB: CPT

## 2024-06-16 PROCEDURE — 2580000003 HC RX 258: Performed by: NURSE PRACTITIONER

## 2024-06-16 PROCEDURE — 80048 BASIC METABOLIC PNL TOTAL CA: CPT

## 2024-06-16 PROCEDURE — 85025 COMPLETE CBC W/AUTO DIFF WBC: CPT

## 2024-06-16 PROCEDURE — 6360000002 HC RX W HCPCS

## 2024-06-16 PROCEDURE — 2000000000 HC ICU R&B

## 2024-06-16 PROCEDURE — 99231 SBSQ HOSP IP/OBS SF/LOW 25: CPT | Performed by: INTERNAL MEDICINE

## 2024-06-16 PROCEDURE — 6360000002 HC RX W HCPCS: Performed by: NURSE PRACTITIONER

## 2024-06-16 PROCEDURE — 2500000003 HC RX 250 WO HCPCS: Performed by: INTERNAL MEDICINE

## 2024-06-16 PROCEDURE — 87449 NOS EACH ORGANISM AG IA: CPT

## 2024-06-16 PROCEDURE — 85610 PROTHROMBIN TIME: CPT

## 2024-06-16 PROCEDURE — 36600 WITHDRAWAL OF ARTERIAL BLOOD: CPT

## 2024-06-16 PROCEDURE — 82803 BLOOD GASES ANY COMBINATION: CPT

## 2024-06-16 PROCEDURE — 83735 ASSAY OF MAGNESIUM: CPT

## 2024-06-16 PROCEDURE — 86738 MYCOPLASMA ANTIBODY: CPT

## 2024-06-16 PROCEDURE — 87641 MR-STAPH DNA AMP PROBE: CPT

## 2024-06-16 PROCEDURE — 6360000002 HC RX W HCPCS: Performed by: STUDENT IN AN ORGANIZED HEALTH CARE EDUCATION/TRAINING PROGRAM

## 2024-06-16 PROCEDURE — 93005 ELECTROCARDIOGRAM TRACING: CPT | Performed by: NURSE PRACTITIONER

## 2024-06-16 PROCEDURE — 6370000000 HC RX 637 (ALT 250 FOR IP)

## 2024-06-16 PROCEDURE — 71045 X-RAY EXAM CHEST 1 VIEW: CPT

## 2024-06-16 PROCEDURE — 82805 BLOOD GASES W/O2 SATURATION: CPT

## 2024-06-16 PROCEDURE — 99223 1ST HOSP IP/OBS HIGH 75: CPT | Performed by: INTERNAL MEDICINE

## 2024-06-16 PROCEDURE — 94660 CPAP INITIATION&MGMT: CPT

## 2024-06-16 PROCEDURE — 51702 INSERT TEMP BLADDER CATH: CPT

## 2024-06-16 PROCEDURE — 88112 CYTOPATH CELL ENHANCE TECH: CPT

## 2024-06-16 RX ORDER — METOPROLOL TARTRATE 1 MG/ML
5 INJECTION, SOLUTION INTRAVENOUS EVERY 4 HOURS PRN
Status: DISCONTINUED | OUTPATIENT
Start: 2024-06-16 | End: 2024-06-27 | Stop reason: HOSPADM

## 2024-06-16 RX ORDER — DILTIAZEM HYDROCHLORIDE 5 MG/ML
10 INJECTION INTRAVENOUS ONCE
Status: DISCONTINUED | OUTPATIENT
Start: 2024-06-16 | End: 2024-06-16

## 2024-06-16 RX ORDER — FUROSEMIDE 10 MG/ML
40 INJECTION INTRAMUSCULAR; INTRAVENOUS 2 TIMES DAILY
Status: DISCONTINUED | OUTPATIENT
Start: 2024-06-16 | End: 2024-06-20

## 2024-06-16 RX ORDER — OXYMETAZOLINE HYDROCHLORIDE 0.05 G/100ML
2 SPRAY NASAL 2 TIMES DAILY
Status: DISPENSED | OUTPATIENT
Start: 2024-06-16 | End: 2024-06-19

## 2024-06-16 RX ORDER — BENZONATATE 100 MG/1
200 CAPSULE ORAL 3 TIMES DAILY PRN
Status: DISCONTINUED | OUTPATIENT
Start: 2024-06-16 | End: 2024-06-27 | Stop reason: HOSPADM

## 2024-06-16 RX ORDER — MAGNESIUM SULFATE IN WATER 40 MG/ML
2000 INJECTION, SOLUTION INTRAVENOUS ONCE
Status: COMPLETED | OUTPATIENT
Start: 2024-06-16 | End: 2024-06-16

## 2024-06-16 RX ORDER — FLECAINIDE ACETATE 50 MG/1
50 TABLET ORAL EVERY 12 HOURS SCHEDULED
Status: DISCONTINUED | OUTPATIENT
Start: 2024-06-16 | End: 2024-06-27 | Stop reason: HOSPADM

## 2024-06-16 RX ORDER — FLECAINIDE ACETATE 100 MG/1
100 TABLET ORAL EVERY 12 HOURS SCHEDULED
Status: DISCONTINUED | OUTPATIENT
Start: 2024-06-16 | End: 2024-06-16

## 2024-06-16 RX ORDER — POTASSIUM CHLORIDE 7.45 MG/ML
10 INJECTION INTRAVENOUS
Status: DISCONTINUED | OUTPATIENT
Start: 2024-06-16 | End: 2024-06-16

## 2024-06-16 RX ORDER — LABETALOL HYDROCHLORIDE 5 MG/ML
10 INJECTION, SOLUTION INTRAVENOUS EVERY 4 HOURS PRN
Status: DISCONTINUED | OUTPATIENT
Start: 2024-06-16 | End: 2024-06-27 | Stop reason: HOSPADM

## 2024-06-16 RX ORDER — IPRATROPIUM BROMIDE AND ALBUTEROL SULFATE 2.5; .5 MG/3ML; MG/3ML
1 SOLUTION RESPIRATORY (INHALATION) EVERY 4 HOURS PRN
Status: DISCONTINUED | OUTPATIENT
Start: 2024-06-16 | End: 2024-06-16

## 2024-06-16 RX ORDER — POTASSIUM CHLORIDE 20 MEQ/1
40 TABLET, EXTENDED RELEASE ORAL ONCE
Status: COMPLETED | OUTPATIENT
Start: 2024-06-16 | End: 2024-06-16

## 2024-06-16 RX ORDER — LEVALBUTEROL INHALATION SOLUTION 0.63 MG/3ML
0.63 SOLUTION RESPIRATORY (INHALATION) EVERY 8 HOURS PRN
Status: DISCONTINUED | OUTPATIENT
Start: 2024-06-16 | End: 2024-06-27 | Stop reason: HOSPADM

## 2024-06-16 RX ORDER — ECHINACEA PURPUREA EXTRACT 125 MG
2 TABLET ORAL EVERY 6 HOURS
Status: DISCONTINUED | OUTPATIENT
Start: 2024-06-16 | End: 2024-06-27 | Stop reason: HOSPADM

## 2024-06-16 RX ADMIN — METOPROLOL TARTRATE 50 MG: 50 TABLET, FILM COATED ORAL at 21:22

## 2024-06-16 RX ADMIN — GUAIFENESIN 600 MG: 600 TABLET, EXTENDED RELEASE ORAL at 09:22

## 2024-06-16 RX ADMIN — PIPERACILLIN AND TAZOBACTAM 3375 MG: 3; .375 INJECTION, POWDER, LYOPHILIZED, FOR SOLUTION INTRAVENOUS at 03:26

## 2024-06-16 RX ADMIN — ATORVASTATIN CALCIUM 80 MG: 80 TABLET, FILM COATED ORAL at 21:22

## 2024-06-16 RX ADMIN — SODIUM CHLORIDE, PRESERVATIVE FREE 10 ML: 5 INJECTION INTRAVENOUS at 21:25

## 2024-06-16 RX ADMIN — FLECAINIDE ACETATE 50 MG: 50 TABLET ORAL at 21:22

## 2024-06-16 RX ADMIN — AMLODIPINE BESYLATE 10 MG: 10 TABLET ORAL at 09:22

## 2024-06-16 RX ADMIN — FUROSEMIDE 40 MG: 10 INJECTION, SOLUTION INTRAMUSCULAR; INTRAVENOUS at 21:22

## 2024-06-16 RX ADMIN — POTASSIUM CHLORIDE 40 MEQ: 1500 TABLET, EXTENDED RELEASE ORAL at 09:22

## 2024-06-16 RX ADMIN — PANTOPRAZOLE SODIUM 40 MG: 40 TABLET, DELAYED RELEASE ORAL at 06:31

## 2024-06-16 RX ADMIN — SODIUM CHLORIDE: 9 INJECTION, SOLUTION INTRAVENOUS at 16:51

## 2024-06-16 RX ADMIN — LEVALBUTEROL HYDROCHLORIDE 0.63 MG: 0.63 SOLUTION RESPIRATORY (INHALATION) at 03:20

## 2024-06-16 RX ADMIN — PIPERACILLIN AND TAZOBACTAM 3375 MG: 3; .375 INJECTION, POWDER, LYOPHILIZED, FOR SOLUTION INTRAVENOUS at 16:53

## 2024-06-16 RX ADMIN — MAGNESIUM SULFATE HEPTAHYDRATE 2000 MG: 40 INJECTION, SOLUTION INTRAVENOUS at 09:29

## 2024-06-16 RX ADMIN — METOPROLOL TARTRATE 50 MG: 50 TABLET, FILM COATED ORAL at 09:22

## 2024-06-16 RX ADMIN — FUROSEMIDE 40 MG: 10 INJECTION, SOLUTION INTRAMUSCULAR; INTRAVENOUS at 12:21

## 2024-06-16 RX ADMIN — METOPROLOL TARTRATE 5 MG: 5 INJECTION INTRAVENOUS at 06:18

## 2024-06-16 RX ADMIN — APIXABAN 5 MG: 5 TABLET, FILM COATED ORAL at 09:22

## 2024-06-16 RX ADMIN — LABETALOL HYDROCHLORIDE 10 MG: 5 INJECTION, SOLUTION INTRAVENOUS at 15:10

## 2024-06-16 ASSESSMENT — PAIN SCALES - GENERAL
PAINLEVEL_OUTOF10: 0
PAINLEVEL_OUTOF10: 0

## 2024-06-16 NOTE — CARE COORDINATION
Transitional planning. BiPAP FiO2 40%, IR for Thoracentesis 6/17, Plan is home w/ , family will transport, currently has OP PT/OT in [place], Chemo in [place] and Radiation in [place]. Has established PCP

## 2024-06-16 NOTE — H&P
Critical Care - History and Physical Examination    Patient's name:  Daja Espinosa  Medical Record Number: [id number]  Patient's account/billing number: [id number]  Patient's YOB: 1960  Age: 63 y.o.  Date of Admission: 6/14/2024  3:19 AM  Date of History and Physical Examination: 6/16/2024      Primary Care Physician: Sohail Garcia MD  Attending Physician:     Code Status: Full Code    Chief complaint: No chief complaint on file.        HISTORY OF PRESENT ILLNESS:      History was obtained from the patient.      Daja Espinosa is a 63 y.o. with PMH of   - CAD, hypertension  - Peripheral vascular disease  -Urothelial cancer on chemo radiation ( [facility] Shepard )  - Left sided hydronephrosis s/p cystoscopy and stenting  -CKD 1.1.3-1.9  -Takayasu arteritis  - DVT, Embolic stroke on eliquis     Came in with the chief complaint of shortness of breath. Patient underwent chemo on 6/13/24, post that she was febrile, feeling more fatigued and became increasingly short of breath. CXR showed vascular congestion, LL consolidation, right pleural effusion. She was started on zosyn. Sputum culture was sent. This am she became increasingly short of breath, was put on Bipap, and underwent in sami with RVR. She also was noted to have hemoptysis later, it was deemed to be epistaxis. She received IV lopressor, and continued on eliquis. CT chest was done which showed moderate to large right sided pleural effusion, moderate left pleural infusion.     She quit smoking 31 year, 22.5 smoking history. Drinks 6 standard drinks per week.    Patient did have a recent hospital stay at [facility] for GI bleed where she was transfused with 2 units PRBC and discharged on 6/9/24.     Significant labwork  K 3.4, Cr 2.2, GFR 24, Hb 7.9      VBG ph 7.29, bicarb 20./9, Pco2 30.9 ( Compensated metabolic acidosis )    Home medications  Protonix, lopressor, eliquis, norvasc, lipitor  PAST MEDICAL HISTORY:         Diagnosis Date    
Case Management Assessment  Initial Evaluation    Date/Time of Evaluation: 6/14/2024 11:29 AM  Assessment Completed by: Vernell Rodriges RN    If patient is discharged prior to next notation, then this note serves as note for discharge by case management.    Patient Name: Daja Espinosa                   YOB: 1960  Diagnosis: Pneumonia, unspecified organism [J18.9]  Hospital-acquired bacterial pneumonia [J15.9]                   Date / Time: 6/14/2024  3:19 AM    Patient Admission Status: Inpatient   Readmission Risk (Low < 19, Mod (19-27), High > 27): Readmission Risk Score: 26.5    Current PCP: Sohail Garcia MD  PCP verified by CM? Yes    Chart Reviewed: Yes      History Provided by: Patient  Patient Orientation: Alert and Oriented    Patient Cognition: Alert    Hospitalization in the last 30 days (Readmission):  Yes    If yes, Readmission Assessment in  Navigator will be completed.    Advance Directives:      Code Status: Full Code   Patient's Primary Decision Maker is: Legal Next of Kin    Primary Decision Maker: Benji Espinosa - Spouse - [phone]    Discharge Planning:    Patient lives with: Spouse/Significant Other Type of Home: House  Primary Care Giver: Self  Patient Support Systems include: Spouse/Significant Other   Current Financial resources: Medicaid  Current community resources: Other (Comment) (cancer center)  Current services prior to admission: Other (Comment) (outpt chemo/rad 2 times weekly)            Current DME:              Type of Home Care services:  None    ADLS  Prior functional level: Independent in ADLs/IADLs, Cooking, Housework, Shopping, Mobility  Current functional level: Bathing, Dressing, Toileting, Cooking, Housework, Shopping, Mobility    PT AM-PAC:   /24  OT AM-PAC: 24 /24    Family can provide assistance at DC: Yes  Would you like Case Management to discuss the discharge plan with any other family members/significant others, and if so, who? Yes ()  Plans to 
performed by Jose R Jack MD at [facility] OR    VULVA SURGERY  2001    cancer        Medications Prior to Admission:     Prior to Admission medications    Medication Sig Start Date End Date Taking? Authorizing Provider   pantoprazole (PROTONIX) 40 MG tablet Take 1 tablet by mouth every morning (before breakfast) 6/8/24   Macarena Martin MD   metoprolol tartrate (LOPRESSOR) 50 MG tablet Take 1 tablet by mouth 2 times daily 6/7/24   Macarena Martin MD   apixaban (ELIQUIS) 5 MG TABS tablet Take 1 tablet by mouth 2 times daily 4/10/24   Sofía Razo MD   lidocaine-prilocaine (EMLA) 2.5-2.5 % cream Apply topically to port site 60 minutes before access as needed. 3/11/24   Familia Shepard MD   amLODIPine (NORVASC) 10 MG tablet Take 1 tablet by mouth daily 2/6/24   Sohail Garcia MD   atorvastatin (LIPITOR) 80 MG tablet Take 1 tablet by mouth nightly 2/6/24   Sohail Garcia MD        Allergies:     Patient has no known allergies.    Social History:     Tobacco:    reports that she quit smoking about 31 years ago. Her smoking use included cigarettes. She started smoking about 46 years ago. She has a 22.5 pack-year smoking history. She has never used smokeless tobacco.  Alcohol:      reports current alcohol use of about 6.0 standard drinks of alcohol per week.  Drug Use:  reports current drug use. Drug: Marijuana (Weed).    Family History:     Family History   Problem Relation Age of Onset    Cancer Father         lung    Cancer Mother        Review of Systems:     Positive and Negative as described in HPI.    Review of Systems   Constitutional:  Positive for fever. Negative for chills.   HENT:  Negative for congestion, rhinorrhea and sore throat.    Eyes:  Negative for photophobia and pain.   Respiratory:  Positive for cough and shortness of breath. Negative for wheezing.    Cardiovascular:  Negative for chest pain and palpitations.   Gastrointestinal:  Negative for abdominal pain, nausea and vomiting.

## 2024-06-17 ENCOUNTER — APPOINTMENT (OUTPATIENT)
Dept: GENERAL RADIOLOGY | Age: 64
DRG: 720 | End: 2024-06-17
Attending: FAMILY MEDICINE
Payer: COMMERCIAL

## 2024-06-17 ENCOUNTER — APPOINTMENT (OUTPATIENT)
Dept: ULTRASOUND IMAGING | Age: 64
DRG: 720 | End: 2024-06-17
Attending: FAMILY MEDICINE
Payer: COMMERCIAL

## 2024-06-17 ENCOUNTER — TELEPHONE (OUTPATIENT)
Dept: ONCOLOGY | Age: 64
End: 2024-06-17

## 2024-06-17 ENCOUNTER — HOSPITAL ENCOUNTER (OUTPATIENT)
Dept: INFUSION THERAPY | Age: 64
End: 2024-06-17

## 2024-06-17 PROBLEM — I73.9 PAD (PERIPHERAL ARTERY DISEASE) (HCC): Status: ACTIVE | Noted: 2024-06-17

## 2024-06-17 PROBLEM — I74.09 AORTOILIAC OCCLUSIVE DISEASE (HCC): Status: ACTIVE | Noted: 2024-06-17

## 2024-06-17 PROBLEM — J96.01 ACUTE RESPIRATORY FAILURE WITH HYPOXIA (HCC): Status: ACTIVE | Noted: 2024-06-17

## 2024-06-17 LAB
ANION GAP SERPL CALCULATED.3IONS-SCNC: 19 MMOL/L (ref 9–16)
BASOPHILS # BLD: 0.04 K/UL (ref 0–0.2)
BASOPHILS NFR BLD: 1 % (ref 0–2)
BUN SERPL-MCNC: 47 MG/DL (ref 8–23)
CALCIUM SERPL-MCNC: 9 MG/DL (ref 8.6–10.4)
CASE NUMBER:: NORMAL
CHLORIDE SERPL-SCNC: 100 MMOL/L (ref 98–107)
CO2 SERPL-SCNC: 16 MMOL/L (ref 20–31)
CREAT SERPL-MCNC: 2.4 MG/DL (ref 0.5–0.9)
EKG ATRIAL RATE: 182 BPM
EKG Q-T INTERVAL: 322 MS
EKG QRS DURATION: 80 MS
EKG QTC CALCULATION (BAZETT): 483 MS
EKG R AXIS: 64 DEGREES
EKG T AXIS: 86 DEGREES
EKG VENTRICULAR RATE: 135 BPM
EOSINOPHIL # BLD: 0.22 K/UL (ref 0–0.44)
EOSINOPHILS RELATIVE PERCENT: 4 % (ref 1–4)
ERYTHROCYTE [DISTWIDTH] IN BLOOD BY AUTOMATED COUNT: 14.7 % (ref 11.8–14.4)
GFR, ESTIMATED: 22 ML/MIN/1.73M2
GLUCOSE SERPL-MCNC: 82 MG/DL (ref 74–99)
HCT VFR BLD AUTO: 23.9 % (ref 36.3–47.1)
HCT VFR BLD AUTO: 25.7 % (ref 36.3–47.1)
HGB BLD-MCNC: 7.5 G/DL (ref 11.9–15.1)
HGB BLD-MCNC: 7.7 G/DL (ref 11.9–15.1)
IMM GRANULOCYTES # BLD AUTO: <0.03 K/UL (ref 0–0.3)
IMM GRANULOCYTES NFR BLD: 0 %
INR PPP: 1.6
LYMPHOCYTES NFR BLD: 0.96 K/UL (ref 1.1–3.7)
LYMPHOCYTES RELATIVE PERCENT: 16 % (ref 24–43)
M PNEUMO IGM SER QL IA: 0.21
MCH RBC QN AUTO: 31.3 PG (ref 25.2–33.5)
MCHC RBC AUTO-ENTMCNC: 30 G/DL (ref 28.4–34.8)
MCV RBC AUTO: 104.5 FL (ref 82.6–102.9)
MONOCYTES NFR BLD: 0.29 K/UL (ref 0.1–1.2)
MONOCYTES NFR BLD: 5 % (ref 3–12)
MRSA, DNA, NASAL: NEGATIVE
NEUTROPHILS NFR BLD: 74 % (ref 36–65)
NEUTS SEG NFR BLD: 4.45 K/UL (ref 1.5–8.1)
NRBC BLD-RTO: 0 PER 100 WBC
PH FLUID: 7.5
PLATELET # BLD AUTO: 332 K/UL (ref 138–453)
PMV BLD AUTO: 9.7 FL (ref 8.1–13.5)
POTASSIUM SERPL-SCNC: 3.8 MMOL/L (ref 3.7–5.3)
PROT FLD-MCNC: 2.1 G/DL
PROTHROMBIN TIME: 18.7 SEC (ref 11.7–14.9)
RBC # BLD AUTO: 2.46 M/UL (ref 3.95–5.11)
RBC # BLD: ABNORMAL 10*6/UL
RBC # BLD: ABNORMAL 10*6/UL
SODIUM SERPL-SCNC: 135 MMOL/L (ref 136–145)
SPECIMEN DESCRIPTION: NORMAL
SPECIMEN DESCRIPTION: NORMAL
SPECIMEN TYPE: NORMAL
SPECIMEN TYPE: NORMAL
WBC OTHER # BLD: 6 K/UL (ref 3.5–11.3)

## 2024-06-17 PROCEDURE — 87070 CULTURE OTHR SPECIMN AEROBIC: CPT

## 2024-06-17 PROCEDURE — 83615 LACTATE (LD) (LDH) ENZYME: CPT

## 2024-06-17 PROCEDURE — 84157 ASSAY OF PROTEIN OTHER: CPT

## 2024-06-17 PROCEDURE — 2709999900 US THORACENTESIS

## 2024-06-17 PROCEDURE — 6370000000 HC RX 637 (ALT 250 FOR IP)

## 2024-06-17 PROCEDURE — 2700000000 HC OXYGEN THERAPY PER DAY

## 2024-06-17 PROCEDURE — 87075 CULTR BACTERIA EXCEPT BLOOD: CPT

## 2024-06-17 PROCEDURE — 82945 GLUCOSE OTHER FLUID: CPT

## 2024-06-17 PROCEDURE — 6370000000 HC RX 637 (ALT 250 FOR IP): Performed by: INTERNAL MEDICINE

## 2024-06-17 PROCEDURE — 94660 CPAP INITIATION&MGMT: CPT

## 2024-06-17 PROCEDURE — 6370000000 HC RX 637 (ALT 250 FOR IP): Performed by: NURSE PRACTITIONER

## 2024-06-17 PROCEDURE — 99291 CRITICAL CARE FIRST HOUR: CPT | Performed by: INTERNAL MEDICINE

## 2024-06-17 PROCEDURE — 71045 X-RAY EXAM CHEST 1 VIEW: CPT

## 2024-06-17 PROCEDURE — 94761 N-INVAS EAR/PLS OXIMETRY MLT: CPT

## 2024-06-17 PROCEDURE — 6360000002 HC RX W HCPCS: Performed by: NURSE PRACTITIONER

## 2024-06-17 PROCEDURE — 6370000000 HC RX 637 (ALT 250 FOR IP): Performed by: STUDENT IN AN ORGANIZED HEALTH CARE EDUCATION/TRAINING PROGRAM

## 2024-06-17 PROCEDURE — 51702 INSERT TEMP BLADDER CATH: CPT

## 2024-06-17 PROCEDURE — 6360000002 HC RX W HCPCS

## 2024-06-17 PROCEDURE — 89051 BODY FLUID CELL COUNT: CPT

## 2024-06-17 PROCEDURE — 87205 SMEAR GRAM STAIN: CPT

## 2024-06-17 PROCEDURE — 80048 BASIC METABOLIC PNL TOTAL CA: CPT

## 2024-06-17 PROCEDURE — 2060000000 HC ICU INTERMEDIATE R&B

## 2024-06-17 PROCEDURE — 6360000002 HC RX W HCPCS: Performed by: INTERNAL MEDICINE

## 2024-06-17 PROCEDURE — 85610 PROTHROMBIN TIME: CPT

## 2024-06-17 PROCEDURE — 36415 COLL VENOUS BLD VENIPUNCTURE: CPT

## 2024-06-17 PROCEDURE — 85014 HEMATOCRIT: CPT

## 2024-06-17 PROCEDURE — 99232 SBSQ HOSP IP/OBS MODERATE 35: CPT | Performed by: INTERNAL MEDICINE

## 2024-06-17 PROCEDURE — 2580000003 HC RX 258: Performed by: NURSE PRACTITIONER

## 2024-06-17 PROCEDURE — 99233 SBSQ HOSP IP/OBS HIGH 50: CPT | Performed by: INTERNAL MEDICINE

## 2024-06-17 PROCEDURE — 0W993ZZ DRAINAGE OF RIGHT PLEURAL CAVITY, PERCUTANEOUS APPROACH: ICD-10-PCS | Performed by: RADIOLOGY

## 2024-06-17 PROCEDURE — 85025 COMPLETE CBC W/AUTO DIFF WBC: CPT

## 2024-06-17 PROCEDURE — 83986 ASSAY PH BODY FLUID NOS: CPT

## 2024-06-17 PROCEDURE — 5A09357 ASSISTANCE WITH RESPIRATORY VENTILATION, LESS THAN 24 CONSECUTIVE HOURS, CONTINUOUS POSITIVE AIRWAY PRESSURE: ICD-10-PCS | Performed by: RADIOLOGY

## 2024-06-17 PROCEDURE — 85018 HEMOGLOBIN: CPT

## 2024-06-17 RX ORDER — CARVEDILOL 6.25 MG/1
6.25 TABLET ORAL 2 TIMES DAILY WITH MEALS
Status: DISCONTINUED | OUTPATIENT
Start: 2024-06-17 | End: 2024-06-18

## 2024-06-17 RX ADMIN — PIPERACILLIN AND TAZOBACTAM 3375 MG: 3; .375 INJECTION, POWDER, LYOPHILIZED, FOR SOLUTION INTRAVENOUS at 20:09

## 2024-06-17 RX ADMIN — ATORVASTATIN CALCIUM 80 MG: 80 TABLET, FILM COATED ORAL at 20:10

## 2024-06-17 RX ADMIN — SODIUM CHLORIDE, PRESERVATIVE FREE 10 ML: 5 INJECTION INTRAVENOUS at 20:10

## 2024-06-17 RX ADMIN — SALINE NASAL SPRAY 2 SPRAY: 1.5 SOLUTION NASAL at 20:11

## 2024-06-17 RX ADMIN — FLECAINIDE ACETATE 50 MG: 50 TABLET ORAL at 10:52

## 2024-06-17 RX ADMIN — CARVEDILOL 6.25 MG: 6.25 TABLET, FILM COATED ORAL at 10:09

## 2024-06-17 RX ADMIN — FUROSEMIDE 40 MG: 10 INJECTION, SOLUTION INTRAMUSCULAR; INTRAVENOUS at 10:09

## 2024-06-17 RX ADMIN — PANTOPRAZOLE SODIUM 40 MG: 40 TABLET, DELAYED RELEASE ORAL at 10:09

## 2024-06-17 RX ADMIN — FLECAINIDE ACETATE 50 MG: 50 TABLET ORAL at 20:12

## 2024-06-17 RX ADMIN — ONDANSETRON 4 MG: 4 TABLET, ORALLY DISINTEGRATING ORAL at 21:05

## 2024-06-17 RX ADMIN — LABETALOL HYDROCHLORIDE 10 MG: 5 INJECTION, SOLUTION INTRAVENOUS at 06:10

## 2024-06-17 RX ADMIN — AMLODIPINE BESYLATE 10 MG: 10 TABLET ORAL at 10:09

## 2024-06-17 RX ADMIN — NASAL DECONGESTANT 2 SPRAY: 0.05 SPRAY NASAL at 20:11

## 2024-06-17 RX ADMIN — PIPERACILLIN AND TAZOBACTAM 3375 MG: 3; .375 INJECTION, POWDER, LYOPHILIZED, FOR SOLUTION INTRAVENOUS at 01:19

## 2024-06-17 RX ADMIN — LABETALOL HYDROCHLORIDE 10 MG: 5 INJECTION, SOLUTION INTRAVENOUS at 21:06

## 2024-06-17 RX ADMIN — PIPERACILLIN AND TAZOBACTAM 3375 MG: 3; .375 INJECTION, POWDER, LYOPHILIZED, FOR SOLUTION INTRAVENOUS at 10:15

## 2024-06-17 RX ADMIN — FUROSEMIDE 40 MG: 10 INJECTION, SOLUTION INTRAMUSCULAR; INTRAVENOUS at 20:10

## 2024-06-17 RX ADMIN — CARVEDILOL 6.25 MG: 6.25 TABLET, FILM COATED ORAL at 16:45

## 2024-06-17 ASSESSMENT — ENCOUNTER SYMPTOMS
COLOR CHANGE: 0
GASTROINTESTINAL NEGATIVE: 1
CHEST TIGHTNESS: 0
SHORTNESS OF BREATH: 0
ALLERGIC/IMMUNOLOGIC NEGATIVE: 1

## 2024-06-17 NOTE — BRIEF OP NOTE
Brief Postoperative Note for Thoracentesis    Daja Espinosa  YOB: 1960  [id number]    Pre-operative Diagnosis: right pleural effusion      Post-operative Diagnosis: Same    Procedure: Ultrasound guided thoracentesis     Anesthesia: 1% Lidocaine     Surgeons/Assistants: Oli Day PA-C    Complications: none    EBL: Minimal    Specimens: Were obtained    Ultrasound guided right thoracentesis performed. 1400 ml turbid yellow fluid obtained. Dressing applied.      Electronically signed by COLEMAN Conner on 6/17/2024 at 9:19 AM

## 2024-06-17 NOTE — TELEPHONE ENCOUNTER
Name: Daja Espinosa  : 1960  MRN: [id number]    Oncology Navigation Follow-Up Note    Contact Type:  Chart review    Notes: Referral received from LEATHA Powell.  Pt admitted [facility] for Pneumonia.  Chart reviewed. Will await discharge.  Will need follow up with Dr. Shepard upon discharge.    Electronically signed by Ave Arreaga RN on 2024 at 4:01 PM

## 2024-06-17 NOTE — TRANSFER CENTER NOTE
02/15/2024    Bladder wall thickening 02/15/2024    Hydronephrosis 02/15/2024    History of colon polyps 11/01/2021    Coronary artery disease of native heart with stable angina pectoris (HCC) 03/30/2017    NSTEMI (non-ST elevated myocardial infarction) (HCC) 03/02/2017    Alcohol abuse 03/02/2017    Malignant neoplasm of urinary bladder (HCC) 04/14/2016    Bladder mass 04/04/2016    Umbilical hernia     Peripheral vascular disease (HCC) 06/02/2014    Incisional hernia at bottom of median sternotomy scar 05/09/2012    Low back pain 11/07/2011    Essential hypertension 11/07/2011    Dyslipidemia 11/07/2011     Recommended Follow-up:     Follow-up with thoracocentesis results  Continue with high flow nasal cannula and alternate with BiPAP  Start Eliquis as tolerated  Pulm will follow        Above mentioned assessment and plan was discussed by me with the admitting medicine resident. The medicine team assigned to the patient by medicine admitting resident will be following up the patient from now onwards on the floor.     Electronically signed by Kirsten Fry DO on 6/18/2024 at 12:06 AM    Kirsten Fry DO  Critical Care Resident  [place], [place].  12:06 AM     This note is created with the assistance of a speech-recognition program. While intending to generate a document that actually reflects the content of the visit, no guarantees can be provided that every mistake has been identified and corrected by editing.

## 2024-06-18 ENCOUNTER — APPOINTMENT (OUTPATIENT)
Dept: CT IMAGING | Age: 64
DRG: 720 | End: 2024-06-18
Attending: FAMILY MEDICINE
Payer: COMMERCIAL

## 2024-06-18 ENCOUNTER — APPOINTMENT (OUTPATIENT)
Dept: INTERVENTIONAL RADIOLOGY/VASCULAR | Age: 64
DRG: 720 | End: 2024-06-18
Attending: FAMILY MEDICINE
Payer: COMMERCIAL

## 2024-06-18 PROBLEM — I48.91 NEW ONSET ATRIAL FIBRILLATION (HCC): Status: ACTIVE | Noted: 2024-06-18

## 2024-06-18 LAB
ANION GAP SERPL CALCULATED.3IONS-SCNC: 17 MMOL/L (ref 9–16)
BUN SERPL-MCNC: 55 MG/DL (ref 8–23)
CALCIUM SERPL-MCNC: 8.6 MG/DL (ref 8.6–10.4)
CHLORIDE SERPL-SCNC: 98 MMOL/L (ref 98–107)
CO2 SERPL-SCNC: 17 MMOL/L (ref 20–31)
CREAT SERPL-MCNC: 2.8 MG/DL (ref 0.5–0.9)
ERYTHROCYTE [DISTWIDTH] IN BLOOD BY AUTOMATED COUNT: 14.6 % (ref 11.8–14.4)
GFR, ESTIMATED: 19 ML/MIN/1.73M2
GLUCOSE FLD-MCNC: 87 MG/DL
GLUCOSE SERPL-MCNC: 107 MG/DL (ref 74–99)
HCT VFR BLD AUTO: 21.6 % (ref 36.3–47.1)
HCT VFR BLD AUTO: 21.9 % (ref 36.3–47.1)
HCT VFR BLD AUTO: 24.1 % (ref 36.3–47.1)
HCT VFR BLD AUTO: 24.3 % (ref 36.3–47.1)
HGB BLD-MCNC: 7.1 G/DL (ref 11.9–15.1)
HGB BLD-MCNC: 7.2 G/DL (ref 11.9–15.1)
INR PPP: 1.3
LDH FLD L TO P-CCNC: 120 U/L
MCH RBC QN AUTO: 31.7 PG (ref 25.2–33.5)
MCHC RBC AUTO-ENTMCNC: 29.5 G/DL (ref 28.4–34.8)
MCV RBC AUTO: 107.6 FL (ref 82.6–102.9)
NRBC BLD-RTO: 0.3 PER 100 WBC
PARTIAL THROMBOPLASTIN TIME: 32.9 SEC (ref 23–36.5)
PLATELET # BLD AUTO: 317 K/UL (ref 138–453)
PMV BLD AUTO: 9.7 FL (ref 8.1–13.5)
POTASSIUM SERPL-SCNC: 3.2 MMOL/L (ref 3.7–5.3)
POTASSIUM SERPL-SCNC: 3.2 MMOL/L (ref 3.7–5.3)
PROTHROMBIN TIME: 16.4 SEC (ref 11.7–14.9)
RBC # BLD AUTO: 2.24 M/UL (ref 3.95–5.11)
SODIUM SERPL-SCNC: 132 MMOL/L (ref 136–145)
SPECIMEN TYPE: NORMAL
SPECIMEN TYPE: NORMAL
WBC OTHER # BLD: 5.9 K/UL (ref 3.5–11.3)

## 2024-06-18 PROCEDURE — 2700000000 HC OXYGEN THERAPY PER DAY

## 2024-06-18 PROCEDURE — 85014 HEMATOCRIT: CPT

## 2024-06-18 PROCEDURE — 85730 THROMBOPLASTIN TIME PARTIAL: CPT

## 2024-06-18 PROCEDURE — 85018 HEMOGLOBIN: CPT

## 2024-06-18 PROCEDURE — 6360000002 HC RX W HCPCS

## 2024-06-18 PROCEDURE — 99233 SBSQ HOSP IP/OBS HIGH 50: CPT

## 2024-06-18 PROCEDURE — 2060000000 HC ICU INTERMEDIATE R&B

## 2024-06-18 PROCEDURE — 6370000000 HC RX 637 (ALT 250 FOR IP): Performed by: INTERNAL MEDICINE

## 2024-06-18 PROCEDURE — 85610 PROTHROMBIN TIME: CPT

## 2024-06-18 PROCEDURE — 6360000002 HC RX W HCPCS: Performed by: INTERNAL MEDICINE

## 2024-06-18 PROCEDURE — 2580000003 HC RX 258: Performed by: NURSE PRACTITIONER

## 2024-06-18 PROCEDURE — 99231 SBSQ HOSP IP/OBS SF/LOW 25: CPT | Performed by: INTERNAL MEDICINE

## 2024-06-18 PROCEDURE — 6360000002 HC RX W HCPCS: Performed by: NURSE PRACTITIONER

## 2024-06-18 PROCEDURE — 80048 BASIC METABOLIC PNL TOTAL CA: CPT

## 2024-06-18 PROCEDURE — 94660 CPAP INITIATION&MGMT: CPT

## 2024-06-18 PROCEDURE — 6360000002 HC RX W HCPCS: Performed by: RADIOLOGY

## 2024-06-18 PROCEDURE — 94761 N-INVAS EAR/PLS OXIMETRY MLT: CPT

## 2024-06-18 PROCEDURE — 2709999900 IR GUIDED NEPHROSTOMY CATH PLACEMENT LEFT

## 2024-06-18 PROCEDURE — 6360000004 HC RX CONTRAST MEDICATION: Performed by: INTERNAL MEDICINE

## 2024-06-18 PROCEDURE — 99232 SBSQ HOSP IP/OBS MODERATE 35: CPT | Performed by: NURSE PRACTITIONER

## 2024-06-18 PROCEDURE — 74176 CT ABD & PELVIS W/O CONTRAST: CPT

## 2024-06-18 PROCEDURE — 6370000000 HC RX 637 (ALT 250 FOR IP): Performed by: NURSE PRACTITIONER

## 2024-06-18 PROCEDURE — 6370000000 HC RX 637 (ALT 250 FOR IP)

## 2024-06-18 PROCEDURE — 6370000000 HC RX 637 (ALT 250 FOR IP): Performed by: STUDENT IN AN ORGANIZED HEALTH CARE EDUCATION/TRAINING PROGRAM

## 2024-06-18 PROCEDURE — 0T9130Z DRAINAGE OF LEFT KIDNEY WITH DRAINAGE DEVICE, PERCUTANEOUS APPROACH: ICD-10-PCS | Performed by: RADIOLOGY

## 2024-06-18 PROCEDURE — 85027 COMPLETE CBC AUTOMATED: CPT

## 2024-06-18 PROCEDURE — 36415 COLL VENOUS BLD VENIPUNCTURE: CPT

## 2024-06-18 PROCEDURE — 84132 ASSAY OF SERUM POTASSIUM: CPT

## 2024-06-18 PROCEDURE — 99233 SBSQ HOSP IP/OBS HIGH 50: CPT | Performed by: INTERNAL MEDICINE

## 2024-06-18 PROCEDURE — 50432 PLMT NEPHROSTOMY CATHETER: CPT

## 2024-06-18 RX ORDER — POTASSIUM CHLORIDE 20 MEQ/1
40 TABLET, EXTENDED RELEASE ORAL PRN
Status: DISCONTINUED | OUTPATIENT
Start: 2024-06-18 | End: 2024-06-18

## 2024-06-18 RX ORDER — CARVEDILOL 12.5 MG/1
12.5 TABLET ORAL 2 TIMES DAILY WITH MEALS
Status: DISCONTINUED | OUTPATIENT
Start: 2024-06-18 | End: 2024-06-18

## 2024-06-18 RX ORDER — CARVEDILOL 6.25 MG/1
6.25 TABLET ORAL ONCE
Status: COMPLETED | OUTPATIENT
Start: 2024-06-18 | End: 2024-06-18

## 2024-06-18 RX ORDER — SODIUM BICARBONATE 650 MG/1
1300 TABLET ORAL 2 TIMES DAILY
Status: COMPLETED | OUTPATIENT
Start: 2024-06-18 | End: 2024-06-19

## 2024-06-18 RX ORDER — CARVEDILOL 12.5 MG/1
12.5 TABLET ORAL 2 TIMES DAILY WITH MEALS
Status: DISCONTINUED | OUTPATIENT
Start: 2024-06-18 | End: 2024-06-21

## 2024-06-18 RX ORDER — POTASSIUM CHLORIDE 7.45 MG/ML
10 INJECTION INTRAVENOUS PRN
Status: DISCONTINUED | OUTPATIENT
Start: 2024-06-18 | End: 2024-06-18

## 2024-06-18 RX ORDER — POTASSIUM CHLORIDE 20 MEQ/1
40 TABLET, EXTENDED RELEASE ORAL ONCE
Status: COMPLETED | OUTPATIENT
Start: 2024-06-18 | End: 2024-06-18

## 2024-06-18 RX ORDER — FENTANYL CITRATE 50 UG/ML
INJECTION, SOLUTION INTRAMUSCULAR; INTRAVENOUS PRN
Status: COMPLETED | OUTPATIENT
Start: 2024-06-18 | End: 2024-06-18

## 2024-06-18 RX ORDER — MAGNESIUM SULFATE 1 G/100ML
1000 INJECTION INTRAVENOUS PRN
Status: DISCONTINUED | OUTPATIENT
Start: 2024-06-18 | End: 2024-06-18

## 2024-06-18 RX ADMIN — IOHEXOL 6 ML: 240 INJECTION, SOLUTION INTRATHECAL; INTRAVASCULAR; INTRAVENOUS; ORAL at 16:54

## 2024-06-18 RX ADMIN — SODIUM BICARBONATE 1300 MG: 648 TABLET ORAL at 21:55

## 2024-06-18 RX ADMIN — SODIUM CHLORIDE, PRESERVATIVE FREE 10 ML: 5 INJECTION INTRAVENOUS at 21:56

## 2024-06-18 RX ADMIN — LABETALOL HYDROCHLORIDE 10 MG: 5 INJECTION, SOLUTION INTRAVENOUS at 03:53

## 2024-06-18 RX ADMIN — FUROSEMIDE 40 MG: 10 INJECTION, SOLUTION INTRAMUSCULAR; INTRAVENOUS at 10:22

## 2024-06-18 RX ADMIN — FENTANYL CITRATE 25 MCG: 50 INJECTION, SOLUTION INTRAMUSCULAR; INTRAVENOUS at 16:21

## 2024-06-18 RX ADMIN — AMLODIPINE BESYLATE 10 MG: 10 TABLET ORAL at 10:22

## 2024-06-18 RX ADMIN — PANTOPRAZOLE SODIUM 40 MG: 40 TABLET, DELAYED RELEASE ORAL at 06:35

## 2024-06-18 RX ADMIN — POTASSIUM CHLORIDE 40 MEQ: 1500 TABLET, EXTENDED RELEASE ORAL at 21:55

## 2024-06-18 RX ADMIN — SALINE NASAL SPRAY 2 SPRAY: 1.5 SOLUTION NASAL at 06:35

## 2024-06-18 RX ADMIN — PIPERACILLIN AND TAZOBACTAM 3375 MG: 3; .375 INJECTION, POWDER, LYOPHILIZED, FOR SOLUTION INTRAVENOUS at 11:03

## 2024-06-18 RX ADMIN — ATORVASTATIN CALCIUM 80 MG: 80 TABLET, FILM COATED ORAL at 21:55

## 2024-06-18 RX ADMIN — SODIUM CHLORIDE, PRESERVATIVE FREE 10 ML: 5 INJECTION INTRAVENOUS at 10:23

## 2024-06-18 RX ADMIN — CARVEDILOL 6.25 MG: 6.25 TABLET, FILM COATED ORAL at 10:54

## 2024-06-18 RX ADMIN — FLECAINIDE ACETATE 50 MG: 50 TABLET ORAL at 10:24

## 2024-06-18 RX ADMIN — FLECAINIDE ACETATE 50 MG: 50 TABLET ORAL at 21:55

## 2024-06-18 RX ADMIN — SODIUM BICARBONATE 1300 MG: 648 TABLET ORAL at 10:21

## 2024-06-18 RX ADMIN — SALINE NASAL SPRAY 2 SPRAY: 1.5 SOLUTION NASAL at 14:15

## 2024-06-18 RX ADMIN — SALINE NASAL SPRAY 2 SPRAY: 1.5 SOLUTION NASAL at 01:12

## 2024-06-18 RX ADMIN — FUROSEMIDE 40 MG: 10 INJECTION, SOLUTION INTRAMUSCULAR; INTRAVENOUS at 21:55

## 2024-06-18 RX ADMIN — NASAL DECONGESTANT 2 SPRAY: 0.05 SPRAY NASAL at 10:22

## 2024-06-18 RX ADMIN — CARVEDILOL 6.25 MG: 6.25 TABLET, FILM COATED ORAL at 10:27

## 2024-06-18 RX ADMIN — NASAL DECONGESTANT 2 SPRAY: 0.05 SPRAY NASAL at 21:55

## 2024-06-18 RX ADMIN — CARVEDILOL 12.5 MG: 12.5 TABLET, FILM COATED ORAL at 19:26

## 2024-06-18 RX ADMIN — SALINE NASAL SPRAY 2 SPRAY: 1.5 SOLUTION NASAL at 21:55

## 2024-06-18 RX ADMIN — PIPERACILLIN AND TAZOBACTAM 3375 MG: 3; .375 INJECTION, POWDER, LYOPHILIZED, FOR SOLUTION INTRAVENOUS at 23:18

## 2024-06-18 ASSESSMENT — PULMONARY FUNCTION TESTS
PIF_VALUE: 0
PIF_VALUE: 0

## 2024-06-18 ASSESSMENT — PAIN SCALES - GENERAL: PAINLEVEL_OUTOF10: 0

## 2024-06-18 NOTE — BRIEF OP NOTE
Brief Postoperative Note    Daja Espinosa  YOB: 1960  [id number]    Pre-operative Diagnosis: Left hydronephrosis     Post-operative Diagnosis: Same    Procedure: Left nephrostomy tube    Anesthesia: Local and fentanyl    Surgeons/Assistants: MD Marc    Estimated Blood Loss: less than 50     Complications: None    Specimens: Was Not Obtained    Findings: Successful placement of left 8 fr nephrostomy tube    Electronically signed by COLEMAN Conner on 6/18/2024 at 4:57 PM

## 2024-06-18 NOTE — CARE COORDINATION
Met with patient and family to discuss transitional planning. She is returning home. She denies needs

## 2024-06-19 PROBLEM — E87.6 HYPOKALEMIA: Status: ACTIVE | Noted: 2024-06-19

## 2024-06-19 PROBLEM — R31.0 GROSS HEMATURIA: Status: ACTIVE | Noted: 2024-06-19

## 2024-06-19 PROBLEM — N13.9 OBSTRUCTIVE UROPATHY: Status: ACTIVE | Noted: 2024-06-19

## 2024-06-19 PROBLEM — R06.02 SOB (SHORTNESS OF BREATH): Status: ACTIVE | Noted: 2024-06-19

## 2024-06-19 LAB
ANION GAP SERPL CALCULATED.3IONS-SCNC: 16 MMOL/L (ref 9–16)
BUN SERPL-MCNC: 62 MG/DL (ref 8–23)
CALCIUM SERPL-MCNC: 8.5 MG/DL (ref 8.6–10.4)
CHLORIDE SERPL-SCNC: 96 MMOL/L (ref 98–107)
CO2 SERPL-SCNC: 19 MMOL/L (ref 20–31)
CREAT SERPL-MCNC: 3.5 MG/DL (ref 0.5–0.9)
ERYTHROCYTE [DISTWIDTH] IN BLOOD BY AUTOMATED COUNT: 14.3 % (ref 11.8–14.4)
GFR, ESTIMATED: 14 ML/MIN/1.73M2
GLUCOSE SERPL-MCNC: 110 MG/DL (ref 74–99)
HCT VFR BLD AUTO: 20.9 % (ref 36.3–47.1)
HCT VFR BLD AUTO: 21.5 % (ref 36.3–47.1)
HCT VFR BLD AUTO: 24.8 % (ref 36.3–47.1)
HGB BLD-MCNC: 6.6 G/DL (ref 11.9–15.1)
HGB BLD-MCNC: 6.9 G/DL (ref 11.9–15.1)
HGB BLD-MCNC: 8.4 G/DL (ref 11.9–15.1)
MCH RBC QN AUTO: 31.7 PG (ref 25.2–33.5)
MCHC RBC AUTO-ENTMCNC: 32.1 G/DL (ref 28.4–34.8)
MCV RBC AUTO: 98.6 FL (ref 82.6–102.9)
MICROORGANISM SPEC CULT: NORMAL
NRBC BLD-RTO: 0 PER 100 WBC
PLATELET # BLD AUTO: 297 K/UL (ref 138–453)
PMV BLD AUTO: 9.8 FL (ref 8.1–13.5)
POTASSIUM SERPL-SCNC: 3.5 MMOL/L (ref 3.7–5.3)
RBC # BLD AUTO: 2.18 M/UL (ref 3.95–5.11)
SERVICE CMNT-IMP: NORMAL
SODIUM SERPL-SCNC: 131 MMOL/L (ref 136–145)
SPECIMEN DESCRIPTION: NORMAL
WBC OTHER # BLD: 6.6 K/UL (ref 3.5–11.3)

## 2024-06-19 PROCEDURE — 80048 BASIC METABOLIC PNL TOTAL CA: CPT

## 2024-06-19 PROCEDURE — P9016 RBC LEUKOCYTES REDUCED: HCPCS

## 2024-06-19 PROCEDURE — 85018 HEMOGLOBIN: CPT

## 2024-06-19 PROCEDURE — 99232 SBSQ HOSP IP/OBS MODERATE 35: CPT | Performed by: NURSE PRACTITIONER

## 2024-06-19 PROCEDURE — 6360000002 HC RX W HCPCS: Performed by: INTERNAL MEDICINE

## 2024-06-19 PROCEDURE — 85027 COMPLETE CBC AUTOMATED: CPT

## 2024-06-19 PROCEDURE — 36430 TRANSFUSION BLD/BLD COMPNT: CPT

## 2024-06-19 PROCEDURE — 2580000003 HC RX 258: Performed by: NURSE PRACTITIONER

## 2024-06-19 PROCEDURE — 2700000000 HC OXYGEN THERAPY PER DAY

## 2024-06-19 PROCEDURE — 6370000000 HC RX 637 (ALT 250 FOR IP)

## 2024-06-19 PROCEDURE — 82436 ASSAY OF URINE CHLORIDE: CPT

## 2024-06-19 PROCEDURE — 6360000002 HC RX W HCPCS

## 2024-06-19 PROCEDURE — 2060000000 HC ICU INTERMEDIATE R&B

## 2024-06-19 PROCEDURE — 82570 ASSAY OF URINE CREATININE: CPT

## 2024-06-19 PROCEDURE — 99233 SBSQ HOSP IP/OBS HIGH 50: CPT | Performed by: NURSE PRACTITIONER

## 2024-06-19 PROCEDURE — 99223 1ST HOSP IP/OBS HIGH 75: CPT | Performed by: INTERNAL MEDICINE

## 2024-06-19 PROCEDURE — 6370000000 HC RX 637 (ALT 250 FOR IP): Performed by: STUDENT IN AN ORGANIZED HEALTH CARE EDUCATION/TRAINING PROGRAM

## 2024-06-19 PROCEDURE — 86900 BLOOD TYPING SEROLOGIC ABO: CPT

## 2024-06-19 PROCEDURE — 99232 SBSQ HOSP IP/OBS MODERATE 35: CPT | Performed by: INTERNAL MEDICINE

## 2024-06-19 PROCEDURE — 86920 COMPATIBILITY TEST SPIN: CPT

## 2024-06-19 PROCEDURE — 6370000000 HC RX 637 (ALT 250 FOR IP): Performed by: NURSE PRACTITIONER

## 2024-06-19 PROCEDURE — 36415 COLL VENOUS BLD VENIPUNCTURE: CPT

## 2024-06-19 PROCEDURE — 99233 SBSQ HOSP IP/OBS HIGH 50: CPT | Performed by: INTERNAL MEDICINE

## 2024-06-19 PROCEDURE — 84300 ASSAY OF URINE SODIUM: CPT

## 2024-06-19 PROCEDURE — 86901 BLOOD TYPING SEROLOGIC RH(D): CPT

## 2024-06-19 PROCEDURE — 6360000002 HC RX W HCPCS: Performed by: NURSE PRACTITIONER

## 2024-06-19 PROCEDURE — 85014 HEMATOCRIT: CPT

## 2024-06-19 PROCEDURE — 94761 N-INVAS EAR/PLS OXIMETRY MLT: CPT

## 2024-06-19 PROCEDURE — 84156 ASSAY OF PROTEIN URINE: CPT

## 2024-06-19 PROCEDURE — 86850 RBC ANTIBODY SCREEN: CPT

## 2024-06-19 RX ORDER — SODIUM CHLORIDE 9 MG/ML
INJECTION, SOLUTION INTRAVENOUS CONTINUOUS
Status: DISCONTINUED | OUTPATIENT
Start: 2024-06-19 | End: 2024-06-20

## 2024-06-19 RX ORDER — SODIUM CHLORIDE 9 MG/ML
INJECTION, SOLUTION INTRAVENOUS PRN
Status: DISCONTINUED | OUTPATIENT
Start: 2024-06-19 | End: 2024-06-27 | Stop reason: HOSPADM

## 2024-06-19 RX ADMIN — FUROSEMIDE 40 MG: 10 INJECTION, SOLUTION INTRAMUSCULAR; INTRAVENOUS at 09:06

## 2024-06-19 RX ADMIN — FLECAINIDE ACETATE 50 MG: 50 TABLET ORAL at 09:06

## 2024-06-19 RX ADMIN — SODIUM CHLORIDE: 9 INJECTION, SOLUTION INTRAVENOUS at 18:57

## 2024-06-19 RX ADMIN — SODIUM CHLORIDE, PRESERVATIVE FREE 10 ML: 5 INJECTION INTRAVENOUS at 09:11

## 2024-06-19 RX ADMIN — SODIUM BICARBONATE 1300 MG: 648 TABLET ORAL at 09:06

## 2024-06-19 RX ADMIN — ACETAMINOPHEN 650 MG: 325 TABLET ORAL at 09:06

## 2024-06-19 RX ADMIN — CARVEDILOL 12.5 MG: 12.5 TABLET, FILM COATED ORAL at 16:21

## 2024-06-19 RX ADMIN — PIPERACILLIN AND TAZOBACTAM 3375 MG: 3; .375 INJECTION, POWDER, LYOPHILIZED, FOR SOLUTION INTRAVENOUS at 10:06

## 2024-06-19 RX ADMIN — LABETALOL HYDROCHLORIDE 10 MG: 5 INJECTION, SOLUTION INTRAVENOUS at 04:21

## 2024-06-19 RX ADMIN — PANTOPRAZOLE SODIUM 40 MG: 40 TABLET, DELAYED RELEASE ORAL at 09:06

## 2024-06-19 RX ADMIN — PIPERACILLIN AND TAZOBACTAM 3375 MG: 3; .375 INJECTION, POWDER, LYOPHILIZED, FOR SOLUTION INTRAVENOUS at 22:27

## 2024-06-19 RX ADMIN — AMLODIPINE BESYLATE 10 MG: 10 TABLET ORAL at 09:06

## 2024-06-19 RX ADMIN — CARVEDILOL 12.5 MG: 12.5 TABLET, FILM COATED ORAL at 09:06

## 2024-06-19 RX ADMIN — SODIUM BICARBONATE 1300 MG: 648 TABLET ORAL at 21:37

## 2024-06-19 RX ADMIN — ATORVASTATIN CALCIUM 80 MG: 80 TABLET, FILM COATED ORAL at 21:37

## 2024-06-19 RX ADMIN — ONDANSETRON 4 MG: 2 INJECTION INTRAMUSCULAR; INTRAVENOUS at 21:26

## 2024-06-19 RX ADMIN — FLECAINIDE ACETATE 50 MG: 50 TABLET ORAL at 21:37

## 2024-06-19 RX ADMIN — POTASSIUM BICARBONATE 40 MEQ: 782 TABLET, EFFERVESCENT ORAL at 10:22

## 2024-06-19 RX ADMIN — SALINE NASAL SPRAY 2 SPRAY: 1.5 SOLUTION NASAL at 09:06

## 2024-06-19 ASSESSMENT — PAIN DESCRIPTION - DESCRIPTORS: DESCRIPTORS: NUMBNESS

## 2024-06-19 ASSESSMENT — PAIN SCALES - GENERAL: PAINLEVEL_OUTOF10: 0

## 2024-06-19 ASSESSMENT — PAIN DESCRIPTION - LOCATION: LOCATION: BUTTOCKS

## 2024-06-19 ASSESSMENT — PULMONARY FUNCTION TESTS
PIF_VALUE: 0
PIF_VALUE: 0

## 2024-06-19 ASSESSMENT — PAIN DESCRIPTION - ORIENTATION: ORIENTATION: MID

## 2024-06-19 NOTE — CONSENT
Informed Consent for Blood Component Transfusion Note    I have discussed with the patient the rationale for blood component transfusion; its benefits in treating or preventing fatigue, organ damage, or death; and its risk which includes mild transfusion reactions, rare risk of blood borne infection, or more serious but rare reactions. I have discussed the alternatives to transfusion, including the risk and consequences of not receiving transfusion. The patient had an opportunity to ask questions and had agreed to proceed with transfusion of blood components.    Electronically signed by MARGARITA Cotter NP on 6/19/24 at 9:39 AM EDT

## 2024-06-19 NOTE — CARE COORDINATION
Update given to Daja LO from [facility] [phone]. She is requesting AVS at discharge faxed to [phone]

## 2024-06-19 NOTE — CONSULTS
PULMONARY CONSULT NOTE      Patient:  Daja Espinosa  MRN: [id number]    Consulting Physician: Dr Lee  Reason for Consult: concern for HAP  Primacy Care Physician: Sohail Garcia MD    HISTORY OF PRESENT ILLNESS:   The patient is a 63 y.o. female past medical history of hypertension, CAD, CVA/embolic stroke on Eliquis, high-grade urothelial carcinoma currently on concurrent chemoradiation with biweekly gemcitabine last chemo on 6/13, left hydronephrosis s/p cystoscopy and stenting CKD.  Home medications include Protonix, Lopressor, Eliquis, Norvasc, Lipitor    Presented to the hospital with fever and shortness of breath going on for 1 week.  Underwent chemo on 6/13 presented to [facility] the same day.  Reports that on Thursday this past week she was febrile at 103, having productive cough greenish sputum so decided to present to the ER after her chemoradiation  Recently hospitalized at [facility] for GI bleed, EGD showed gastritis and duodenitis.  Patient was found to be hypoxic at the outside ER, chest x-ray showed vascular congestion and right-sided pleural effusion and lower lobe consolidation.  Patient was admitted for the management of pneumonia started on IV Zosyn.  Oncology was also consulted.  Sputum culture and Gram stain were ordered which are still pending, started on bronchodilators.    Earlier this morning patient complained of acute shortness of breath with new onset A-fib with RVR, concern for hemoptysis deemed it was nosebleeds.  Patient transferred to stepdown, given 1 dose of IV Lopressor, chest x-ray was done which showed worsening right-sided infiltrates, CT chest was done which shows moderate to large right-sided pleural effusion, moderate left-sided pleural effusion, worsening infiltrates of the right lung.  Currently on Zosyn, Eliquis    Past Medical History:        Diagnosis Date    Alcohol abuse 03/02/2017    Arthritis     Bladder cancer (HCC) 2016    CAD (coronary artery disease)     
[facility]    Consult               Today's Date: 6/16/2024  Patient Name: Daja Espinosa  Date of admission: 6/14/2024  3:19 AM  Patient's age: 63 y.o., 1960  Admission Dx: Pneumonia, unspecified organism [J18.9]  Hospital-acquired bacterial pneumonia [J15.9]    Requesting Physician: Gilberto Garcia MD    Cardiac Evaluation Reason:  new onset afib     History Obtained From: patient and chart review     History of Present Illness:    This patient 63 y.o. years old with past medical history of HTN, diagnosis of urothelial cancer currently undergoing radiation/chemo, embolic stroke in April 2024, and history of dvt presents with shortness of breath  for the past week. She has been receiving chemotherapy with cisplatin and gemcitabine outpatient. Most recent treated was on 6/13. She has been experiencing SOB over the past week. Not on home oxygen. Also had febrile episode this week. [facility] Found to have vascular congestion and right sided pneumonia associated with effusion. Transferred to [facility] for higher level of care.     She did experience tachycardia this morning with supposed afib with rvr on EKG.     Past Medical History:   has a past medical history of Alcohol abuse, Arthritis, Bladder cancer (HCC), CAD (coronary artery disease), Cancer (HCC), Carotid artery occlusion, Carotid artery stenosis, Chronic back pain, History of chemotherapy, Hydronephrosis, Hyperlipidemia, Hypertension, Hypoglycemia, MI (myocardial infarction) (HCC), Peripheral vascular disease (HCC), Takayasu's arteritis (HCC), Tibial fracture, Umbilical hernia, Under care of team, Under care of team, Under care of team, Unspecified cerebral artery occlusion with cerebral infarction, and Wears partial dentures.    Past Surgical History:   has a past surgical history that includes Cardiac catheterization (12/2010); Cardiac surgery (1993); Vulva surgery (2001); vascular surgery; vascular surgery 
6.0, Hgb 7.5, Cr 2.4 (baseline 1.3-1.6). Renal US from 6/5/2024 showed dilated left collecting system with partially imaged ureteral stent. CT lumbar from 6/3/2024 showed left hydronephrosis with stent in place.     Patient's old records, notes and chart reviewed and summarized above.    Past Medical History:    Past Medical History:   Diagnosis Date    Alcohol abuse 03/02/2017    Arthritis     Bladder cancer (HCC) 2016    CAD (coronary artery disease)     Cancer (HCC) 2001    vulvar-    Carotid artery occlusion 1993    [facility] where she had a brachiocephalic aorta bypass.    Carotid artery stenosis     Chronic back pain     History of chemotherapy     Hydronephrosis 02/15/2024    Hyperlipidemia     Hypertension     Hypoglycemia     MI (myocardial infarction) (HCC)     Peripheral vascular disease (HCC)     Takayasu's arteritis (HCC)     Tibial fracture     right    Umbilical hernia     Under care of team 03/29/2024    Cardiologist: Terrell Cortez MD, [place], last visit 2/13/2024    Under care of team 03/29/2024    Oncology: Familia Shepard MD, [place], last visit 3/11/2024    Under care of team 03/29/2024    PCP: Sohail Garcia MD, [facility], last visit 3/2024    Unspecified cerebral artery occlusion with cerebral infarction 1993    Wears partial dentures     Dentures/top, partial/bottom       Past Surgical History:    Past Surgical History:   Procedure Laterality Date    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 OR    BLADDER SURGERY  05/17/2016    cysto with transurethral resection of bladder with mitomycin    CARDIAC CATHETERIZATION  12/2010    CARDIAC SURGERY  1993    aortic bypass graft for right carotid artery obstruction    CAROTID ARTERY ANGIOPLASTY Right     COLONOSCOPY N/A 07/22/2021    COLONOSCOPY POLYPECTOMY HOT BIOPSY WITH INK TATTOO performed by Jose R Jack MD at [facility] ENDOSCOPY    
hesitate to contact us for any further questions/concerns.     Alex Silva MD  [facility]     This note is created with the assistance of a speech-recognition program. While intending to generate a document that actually reflects the content of the visit, no guarantees can be provided that every mistake has been identified and corrected by editing.    
key elements of all parts of the encounter with the nurse practitioner/resident.  I agree with the assessment, plan and orders as documented by the above health care provider.  I have made necessary changes as deemed appropriate directly in the note.  More than 50% of the time was spent taking care of this patient in addition to the nurse practitioner time/ resident.  That also included history taking follow-up physical examination and review of system.                            MD [facility]                            This note is created with the assistance of a speech recognition program.  While intending to generate a document that actually reflects the content of the visit, the document can still have some errors including those of syntax and sound a like substitutions which may escape proof reading.  It such instances, actual meaning can be extrapolated by contextual diversion.

## 2024-06-20 ENCOUNTER — APPOINTMENT (OUTPATIENT)
Dept: GENERAL RADIOLOGY | Age: 64
DRG: 720 | End: 2024-06-20
Attending: FAMILY MEDICINE
Payer: COMMERCIAL

## 2024-06-20 ENCOUNTER — HOSPITAL ENCOUNTER (OUTPATIENT)
Dept: INFUSION THERAPY | Age: 64
End: 2024-06-20

## 2024-06-20 ENCOUNTER — TELEPHONE (OUTPATIENT)
Dept: ONCOLOGY | Age: 64
End: 2024-06-20

## 2024-06-20 ENCOUNTER — APPOINTMENT (OUTPATIENT)
Dept: ULTRASOUND IMAGING | Age: 64
DRG: 720 | End: 2024-06-20
Attending: FAMILY MEDICINE
Payer: COMMERCIAL

## 2024-06-20 PROBLEM — N17.0 ATN (ACUTE TUBULAR NECROSIS) (HCC): Status: ACTIVE | Noted: 2024-06-20

## 2024-06-20 LAB
ABO/RH: NORMAL
ANION GAP SERPL CALCULATED.3IONS-SCNC: 15 MMOL/L (ref 9–16)
ANTIBODY SCREEN: NEGATIVE
ARM BAND NUMBER: NORMAL
BLOOD BANK BLOOD PRODUCT EXPIRATION DATE: NORMAL
BLOOD BANK DISPENSE STATUS: NORMAL
BLOOD BANK ISBT PRODUCT BLOOD TYPE: 600
BLOOD BANK PRODUCT CODE: NORMAL
BLOOD BANK SAMPLE EXPIRATION: NORMAL
BLOOD BANK UNIT TYPE AND RH: NORMAL
BPU ID: NORMAL
BUN SERPL-MCNC: 68 MG/DL (ref 8–23)
CALCIUM SERPL-MCNC: 8.7 MG/DL (ref 8.6–10.4)
CHLORIDE SERPL-SCNC: 94 MMOL/L (ref 98–107)
CHLORIDE UR-SCNC: 84 MMOL/L
CO2 SERPL-SCNC: 20 MMOL/L (ref 20–31)
COMPONENT: NORMAL
CREAT SERPL-MCNC: 4.5 MG/DL (ref 0.5–0.9)
CREAT UR-MCNC: 16.3 MG/DL (ref 28–217)
CROSSMATCH RESULT: NORMAL
ERYTHROCYTE [DISTWIDTH] IN BLOOD BY AUTOMATED COUNT: 14.7 % (ref 11.8–14.4)
GFR, ESTIMATED: 11 ML/MIN/1.73M2
GLUCOSE SERPL-MCNC: 112 MG/DL (ref 74–99)
HCT VFR BLD AUTO: 24.5 % (ref 36.3–47.1)
HGB BLD-MCNC: 8.2 G/DL (ref 11.9–15.1)
MCH RBC QN AUTO: 32 PG (ref 25.2–33.5)
MCHC RBC AUTO-ENTMCNC: 33.5 G/DL (ref 28.4–34.8)
MCV RBC AUTO: 95.7 FL (ref 82.6–102.9)
MICROORGANISM SPEC CULT: NORMAL
NRBC BLD-RTO: 0 PER 100 WBC
PLATELET # BLD AUTO: 296 K/UL (ref 138–453)
PMV BLD AUTO: 10 FL (ref 8.1–13.5)
POTASSIUM SERPL-SCNC: 3.5 MMOL/L (ref 3.7–5.3)
RBC # BLD AUTO: 2.56 M/UL (ref 3.95–5.11)
SERVICE CMNT-IMP: NORMAL
SODIUM SERPL-SCNC: 129 MMOL/L (ref 136–145)
SODIUM UR-SCNC: 96 MMOL/L
SPECIMEN DESCRIPTION: NORMAL
SURGICAL PATHOLOGY REPORT: NORMAL
TOTAL PROTEIN, URINE: 710 MG/DL
TRANSFUSION STATUS: NORMAL
UNIT DIVISION: 0
UNIT ISSUE DATE/TIME: NORMAL
URINE TOTAL PROTEIN CREATININE RATIO: 43.56 (ref 0–0.2)
WBC OTHER # BLD: 7.5 K/UL (ref 3.5–11.3)

## 2024-06-20 PROCEDURE — 99232 SBSQ HOSP IP/OBS MODERATE 35: CPT | Performed by: INTERNAL MEDICINE

## 2024-06-20 PROCEDURE — 71045 X-RAY EXAM CHEST 1 VIEW: CPT

## 2024-06-20 PROCEDURE — 94660 CPAP INITIATION&MGMT: CPT

## 2024-06-20 PROCEDURE — 85027 COMPLETE CBC AUTOMATED: CPT

## 2024-06-20 PROCEDURE — 99233 SBSQ HOSP IP/OBS HIGH 50: CPT | Performed by: NURSE PRACTITIONER

## 2024-06-20 PROCEDURE — 2060000000 HC ICU INTERMEDIATE R&B

## 2024-06-20 PROCEDURE — 2580000003 HC RX 258: Performed by: NURSE PRACTITIONER

## 2024-06-20 PROCEDURE — 6360000002 HC RX W HCPCS: Performed by: INTERNAL MEDICINE

## 2024-06-20 PROCEDURE — 99233 SBSQ HOSP IP/OBS HIGH 50: CPT | Performed by: INTERNAL MEDICINE

## 2024-06-20 PROCEDURE — 6370000000 HC RX 637 (ALT 250 FOR IP)

## 2024-06-20 PROCEDURE — 97530 THERAPEUTIC ACTIVITIES: CPT

## 2024-06-20 PROCEDURE — 6370000000 HC RX 637 (ALT 250 FOR IP): Performed by: NURSE PRACTITIONER

## 2024-06-20 PROCEDURE — 36415 COLL VENOUS BLD VENIPUNCTURE: CPT

## 2024-06-20 PROCEDURE — 6370000000 HC RX 637 (ALT 250 FOR IP): Performed by: STUDENT IN AN ORGANIZED HEALTH CARE EDUCATION/TRAINING PROGRAM

## 2024-06-20 PROCEDURE — 76770 US EXAM ABDO BACK WALL COMP: CPT

## 2024-06-20 PROCEDURE — 99231 SBSQ HOSP IP/OBS SF/LOW 25: CPT | Performed by: INTERNAL MEDICINE

## 2024-06-20 PROCEDURE — 80048 BASIC METABOLIC PNL TOTAL CA: CPT

## 2024-06-20 PROCEDURE — 6360000002 HC RX W HCPCS

## 2024-06-20 PROCEDURE — 97162 PT EVAL MOD COMPLEX 30 MIN: CPT

## 2024-06-20 PROCEDURE — 99232 SBSQ HOSP IP/OBS MODERATE 35: CPT | Performed by: NURSE PRACTITIONER

## 2024-06-20 PROCEDURE — 6360000002 HC RX W HCPCS: Performed by: NURSE PRACTITIONER

## 2024-06-20 RX ORDER — POTASSIUM CHLORIDE 20 MEQ/1
40 TABLET, EXTENDED RELEASE ORAL ONCE
Status: COMPLETED | OUTPATIENT
Start: 2024-06-20 | End: 2024-06-20

## 2024-06-20 RX ORDER — FUROSEMIDE 10 MG/ML
40 INJECTION INTRAMUSCULAR; INTRAVENOUS ONCE
Status: COMPLETED | OUTPATIENT
Start: 2024-06-20 | End: 2024-06-20

## 2024-06-20 RX ADMIN — CARVEDILOL 12.5 MG: 12.5 TABLET, FILM COATED ORAL at 08:07

## 2024-06-20 RX ADMIN — SODIUM CHLORIDE, PRESERVATIVE FREE 10 ML: 5 INJECTION INTRAVENOUS at 21:07

## 2024-06-20 RX ADMIN — FLECAINIDE ACETATE 50 MG: 50 TABLET ORAL at 22:16

## 2024-06-20 RX ADMIN — LABETALOL HYDROCHLORIDE 10 MG: 5 INJECTION, SOLUTION INTRAVENOUS at 00:04

## 2024-06-20 RX ADMIN — SALINE NASAL SPRAY 2 SPRAY: 1.5 SOLUTION NASAL at 00:04

## 2024-06-20 RX ADMIN — POTASSIUM CHLORIDE 40 MEQ: 1500 TABLET, EXTENDED RELEASE ORAL at 10:49

## 2024-06-20 RX ADMIN — PANTOPRAZOLE SODIUM 40 MG: 40 TABLET, DELAYED RELEASE ORAL at 08:07

## 2024-06-20 RX ADMIN — ATORVASTATIN CALCIUM 80 MG: 80 TABLET, FILM COATED ORAL at 21:07

## 2024-06-20 RX ADMIN — FLECAINIDE ACETATE 50 MG: 50 TABLET ORAL at 08:07

## 2024-06-20 RX ADMIN — GUAIFENESIN AND DEXTROMETHORPHAN 5 ML: 100; 10 SYRUP ORAL at 00:13

## 2024-06-20 RX ADMIN — PIPERACILLIN AND TAZOBACTAM 3375 MG: 3; .375 INJECTION, POWDER, LYOPHILIZED, FOR SOLUTION INTRAVENOUS at 09:51

## 2024-06-20 RX ADMIN — SODIUM CHLORIDE: 9 INJECTION, SOLUTION INTRAVENOUS at 22:59

## 2024-06-20 RX ADMIN — FUROSEMIDE 40 MG: 10 INJECTION, SOLUTION INTRAMUSCULAR; INTRAVENOUS at 14:21

## 2024-06-20 RX ADMIN — ONDANSETRON 4 MG: 2 INJECTION INTRAMUSCULAR; INTRAVENOUS at 08:07

## 2024-06-20 RX ADMIN — CARVEDILOL 12.5 MG: 12.5 TABLET, FILM COATED ORAL at 15:55

## 2024-06-20 RX ADMIN — ONDANSETRON 4 MG: 2 INJECTION INTRAMUSCULAR; INTRAVENOUS at 14:12

## 2024-06-20 RX ADMIN — AMLODIPINE BESYLATE 10 MG: 10 TABLET ORAL at 08:07

## 2024-06-20 RX ADMIN — LABETALOL HYDROCHLORIDE 10 MG: 5 INJECTION, SOLUTION INTRAVENOUS at 15:56

## 2024-06-20 RX ADMIN — PIPERACILLIN AND TAZOBACTAM 3375 MG: 3; .375 INJECTION, POWDER, LYOPHILIZED, FOR SOLUTION INTRAVENOUS at 23:02

## 2024-06-20 ASSESSMENT — PAIN SCALES - GENERAL
PAINLEVEL_OUTOF10: 0

## 2024-06-20 NOTE — CARE COORDINATION
Transitional planning    Spoke to patient and her  at bedside about plan for discharge. Plan is home with . Per RN patient is very SOB with activity. PS to primary service requesting new PT/OT orders as patient has declined with tolerance with activity since admission.     1247 new orders placed for PT/OT

## 2024-06-20 NOTE — TELEPHONE ENCOUNTER
Name: Daja Espinosa  : 1960  MRN: [id number]    Oncology Navigation Follow-Up Note    Contact Type:  Inpatient    Notes: Chart reviewed.  Pt remains admitted.  Pt continues to receive antibiotics. Pt using BiPAP at night and on 4L oxygen per NC. S/p thoracentesis 24.  Awaiting pathology.  S/p Left-sided PCN tube.  To follow up with urology as outpatient for stent removal. To follow up with Dr. Shepard after discharge.  Will await discharge.     Electronically signed by Ave Arreaga RN on 2024 at 8:34 AM

## 2024-06-21 ENCOUNTER — APPOINTMENT (OUTPATIENT)
Dept: INTERVENTIONAL RADIOLOGY/VASCULAR | Age: 64
DRG: 720 | End: 2024-06-21
Attending: FAMILY MEDICINE
Payer: COMMERCIAL

## 2024-06-21 PROBLEM — N17.0 ACUTE RENAL FAILURE WITH TUBULAR NECROSIS (HCC): Status: ACTIVE | Noted: 2024-06-04

## 2024-06-21 LAB
ANION GAP SERPL CALCULATED.3IONS-SCNC: 17 MMOL/L (ref 9–16)
BODY FLD TYPE: NORMAL
BUN SERPL-MCNC: 73 MG/DL (ref 8–23)
CALCIUM SERPL-MCNC: 9.1 MG/DL (ref 8.6–10.4)
CHLORIDE SERPL-SCNC: 93 MMOL/L (ref 98–107)
CO2 SERPL-SCNC: 19 MMOL/L (ref 20–31)
CREAT SERPL-MCNC: 5.6 MG/DL (ref 0.5–0.9)
ERYTHROCYTE [DISTWIDTH] IN BLOOD BY AUTOMATED COUNT: 15 % (ref 11.8–14.4)
GFR, ESTIMATED: 8 ML/MIN/1.73M2
GLUCOSE SERPL-MCNC: 107 MG/DL (ref 74–99)
HCT VFR BLD AUTO: 26.1 % (ref 36.3–47.1)
HGB BLD-MCNC: 8.7 G/DL (ref 11.9–15.1)
LYMPHOCYTES NFR FLD: 30 %
MCH RBC QN AUTO: 32.1 PG (ref 25.2–33.5)
MCHC RBC AUTO-ENTMCNC: 33.3 G/DL (ref 28.4–34.8)
MCV RBC AUTO: 96.3 FL (ref 82.6–102.9)
NEUTROPHILS NFR FLD: 38 %
NRBC BLD-RTO: 0 PER 100 WBC
PLATELET # BLD AUTO: 328 K/UL (ref 138–453)
PMV BLD AUTO: 9.9 FL (ref 8.1–13.5)
POTASSIUM SERPL-SCNC: 3.9 MMOL/L (ref 3.7–5.3)
RBC # BLD AUTO: 2.71 M/UL (ref 3.95–5.11)
RBC # FLD: <3000 CELLS/UL
SODIUM SERPL-SCNC: 129 MMOL/L (ref 136–145)
UNIDENT CELLS NFR FLD: NORMAL %
WBC # FLD: 471 CELLS/UL
WBC OTHER # BLD: 8.1 K/UL (ref 3.5–11.3)

## 2024-06-21 PROCEDURE — 6370000000 HC RX 637 (ALT 250 FOR IP): Performed by: STUDENT IN AN ORGANIZED HEALTH CARE EDUCATION/TRAINING PROGRAM

## 2024-06-21 PROCEDURE — 86317 IMMUNOASSAY INFECTIOUS AGENT: CPT

## 2024-06-21 PROCEDURE — C1894 INTRO/SHEATH, NON-LASER: HCPCS

## 2024-06-21 PROCEDURE — 90935 HEMODIALYSIS ONE EVALUATION: CPT

## 2024-06-21 PROCEDURE — 80048 BASIC METABOLIC PNL TOTAL CA: CPT

## 2024-06-21 PROCEDURE — 99232 SBSQ HOSP IP/OBS MODERATE 35: CPT | Performed by: INTERNAL MEDICINE

## 2024-06-21 PROCEDURE — 6370000000 HC RX 637 (ALT 250 FOR IP)

## 2024-06-21 PROCEDURE — 02H633Z INSERTION OF INFUSION DEVICE INTO RIGHT ATRIUM, PERCUTANEOUS APPROACH: ICD-10-PCS | Performed by: RADIOLOGY

## 2024-06-21 PROCEDURE — 36415 COLL VENOUS BLD VENIPUNCTURE: CPT

## 2024-06-21 PROCEDURE — 6360000002 HC RX W HCPCS: Performed by: PHYSICIAN ASSISTANT

## 2024-06-21 PROCEDURE — 6360000002 HC RX W HCPCS: Performed by: INTERNAL MEDICINE

## 2024-06-21 PROCEDURE — 6370000000 HC RX 637 (ALT 250 FOR IP): Performed by: NURSE PRACTITIONER

## 2024-06-21 PROCEDURE — 6360000002 HC RX W HCPCS

## 2024-06-21 PROCEDURE — 76937 US GUIDE VASCULAR ACCESS: CPT

## 2024-06-21 PROCEDURE — 99232 SBSQ HOSP IP/OBS MODERATE 35: CPT | Performed by: PHYSICIAN ASSISTANT

## 2024-06-21 PROCEDURE — 5A1D70Z PERFORMANCE OF URINARY FILTRATION, INTERMITTENT, LESS THAN 6 HOURS PER DAY: ICD-10-PCS | Performed by: STUDENT IN AN ORGANIZED HEALTH CARE EDUCATION/TRAINING PROGRAM

## 2024-06-21 PROCEDURE — 77001 FLUOROGUIDE FOR VEIN DEVICE: CPT

## 2024-06-21 PROCEDURE — 2060000000 HC ICU INTERMEDIATE R&B

## 2024-06-21 PROCEDURE — 85027 COMPLETE CBC AUTOMATED: CPT

## 2024-06-21 PROCEDURE — 6360000002 HC RX W HCPCS: Performed by: NURSE PRACTITIONER

## 2024-06-21 PROCEDURE — 6370000000 HC RX 637 (ALT 250 FOR IP): Performed by: INTERNAL MEDICINE

## 2024-06-21 PROCEDURE — 2580000003 HC RX 258: Performed by: NURSE PRACTITIONER

## 2024-06-21 PROCEDURE — 99233 SBSQ HOSP IP/OBS HIGH 50: CPT | Performed by: INTERNAL MEDICINE

## 2024-06-21 PROCEDURE — 36556 INSERT NON-TUNNEL CV CATH: CPT

## 2024-06-21 RX ORDER — HEPARIN SODIUM 1000 [USP'U]/ML
INJECTION, SOLUTION INTRAVENOUS; SUBCUTANEOUS PRN
Status: COMPLETED | OUTPATIENT
Start: 2024-06-21 | End: 2024-06-21

## 2024-06-21 RX ORDER — CARVEDILOL 25 MG/1
25 TABLET ORAL 2 TIMES DAILY WITH MEALS
Status: DISCONTINUED | OUTPATIENT
Start: 2024-06-21 | End: 2024-06-27 | Stop reason: HOSPADM

## 2024-06-21 RX ORDER — HEPARIN SODIUM 1000 [USP'U]/ML
1400 INJECTION, SOLUTION INTRAVENOUS; SUBCUTANEOUS PRN
Status: DISCONTINUED | OUTPATIENT
Start: 2024-06-21 | End: 2024-06-27 | Stop reason: HOSPADM

## 2024-06-21 RX ORDER — HEPARIN SODIUM 1000 [USP'U]/ML
1500 INJECTION, SOLUTION INTRAVENOUS; SUBCUTANEOUS PRN
Status: DISCONTINUED | OUTPATIENT
Start: 2024-06-21 | End: 2024-06-27 | Stop reason: HOSPADM

## 2024-06-21 RX ADMIN — SODIUM CHLORIDE, PRESERVATIVE FREE 10 ML: 5 INJECTION INTRAVENOUS at 20:30

## 2024-06-21 RX ADMIN — FLECAINIDE ACETATE 50 MG: 50 TABLET ORAL at 14:29

## 2024-06-21 RX ADMIN — HEPARIN SODIUM 1500 UNITS: 1000 INJECTION INTRAVENOUS; SUBCUTANEOUS at 10:45

## 2024-06-21 RX ADMIN — BENZONATATE 200 MG: 100 CAPSULE ORAL at 20:30

## 2024-06-21 RX ADMIN — ATORVASTATIN CALCIUM 80 MG: 80 TABLET, FILM COATED ORAL at 20:30

## 2024-06-21 RX ADMIN — SODIUM CHLORIDE, PRESERVATIVE FREE 10 ML: 5 INJECTION INTRAVENOUS at 18:46

## 2024-06-21 RX ADMIN — LABETALOL HYDROCHLORIDE 10 MG: 5 INJECTION, SOLUTION INTRAVENOUS at 12:46

## 2024-06-21 RX ADMIN — ONDANSETRON 4 MG: 4 TABLET, ORALLY DISINTEGRATING ORAL at 17:03

## 2024-06-21 RX ADMIN — PANTOPRAZOLE SODIUM 40 MG: 40 TABLET, DELAYED RELEASE ORAL at 12:43

## 2024-06-21 RX ADMIN — PIPERACILLIN AND TAZOBACTAM 3375 MG: 3; .375 INJECTION, POWDER, LYOPHILIZED, FOR SOLUTION INTRAVENOUS at 22:21

## 2024-06-21 RX ADMIN — HEPARIN SODIUM 1500 UNITS: 1000 INJECTION, SOLUTION INTRAVENOUS; SUBCUTANEOUS at 09:04

## 2024-06-21 RX ADMIN — CARVEDILOL 25 MG: 25 TABLET, FILM COATED ORAL at 17:03

## 2024-06-21 RX ADMIN — HEPARIN SODIUM 1400 UNITS: 1000 INJECTION, SOLUTION INTRAVENOUS; SUBCUTANEOUS at 09:03

## 2024-06-21 RX ADMIN — FLECAINIDE ACETATE 50 MG: 50 TABLET ORAL at 20:30

## 2024-06-21 RX ADMIN — HEPARIN SODIUM 1400 UNITS: 1000 INJECTION INTRAVENOUS; SUBCUTANEOUS at 10:45

## 2024-06-21 RX ADMIN — SODIUM CHLORIDE: 9 INJECTION, SOLUTION INTRAVENOUS at 14:30

## 2024-06-21 RX ADMIN — ACETAMINOPHEN 650 MG: 325 TABLET ORAL at 17:03

## 2024-06-21 RX ADMIN — AMLODIPINE BESYLATE 10 MG: 10 TABLET ORAL at 12:43

## 2024-06-21 RX ADMIN — PIPERACILLIN AND TAZOBACTAM 3375 MG: 3; .375 INJECTION, POWDER, LYOPHILIZED, FOR SOLUTION INTRAVENOUS at 14:30

## 2024-06-21 ASSESSMENT — PAIN SCALES - GENERAL
PAINLEVEL_OUTOF10: 3
PAINLEVEL_OUTOF10: 0

## 2024-06-21 ASSESSMENT — PAIN DESCRIPTION - ORIENTATION: ORIENTATION: LEFT

## 2024-06-21 ASSESSMENT — PAIN DESCRIPTION - DESCRIPTORS: DESCRIPTORS: DISCOMFORT

## 2024-06-21 ASSESSMENT — PAIN DESCRIPTION - LOCATION: LOCATION: CHEST

## 2024-06-21 NOTE — BRIEF OP NOTE
Brief Postoperative Note Non Tunneled HD Catheter    Daja Espinosa  YOB: 1960  [id number]    Pre-operative Diagnosis: Acute Renal Failure      Post-operative Diagnosis: Same    Procedure: Non-Tunneled Dialysis Catheter    Anesthesia: 1% Lidocaine     Surgeons/Assistants: Oli Day PA-C    Estimated Blood Loss: Minimal    Complications: none    13 Fr x 20 cm non tunneled HD Catheter placed via Site:  Left Internal Jugular Vein.  Sutured in place and sterile dressing applied. Locked with Heparin  May use catheter.    Electronically signed by COLEMAN Conner on 6/21/2024 at 9:04 AM

## 2024-06-21 NOTE — CARE COORDINATION
Hemodialysis Initial Assessment    Information provided by: Pt/Pt's daughter    New or Current with HD: New    Unit:  [facility]    Schedule: Pt reports preference of MWF in the afternoons     Physician:  CHESTER    Transportation:  self/family    Insurance:  [employer]    DME:  rolling walker    Discharge Plan: Home with      SW met with pt regarding potential need for outpatient dialysis. Pt and daughter report pt attends chemotherapy every Monday and Thursday, as well as has radiation daily in the mornings. They requested MWF midday/afternoon for placement preferences. SW placed referral at [facility]. Awaiting acceptance. SW to continue to update.

## 2024-06-22 LAB
ANION GAP SERPL CALCULATED.3IONS-SCNC: 15 MMOL/L (ref 9–16)
BUN SERPL-MCNC: 42 MG/DL (ref 8–23)
CALCIUM SERPL-MCNC: 8.8 MG/DL (ref 8.6–10.4)
CHLORIDE SERPL-SCNC: 91 MMOL/L (ref 98–107)
CO2 SERPL-SCNC: 25 MMOL/L (ref 20–31)
CREAT SERPL-MCNC: 4 MG/DL (ref 0.5–0.9)
ERYTHROCYTE [DISTWIDTH] IN BLOOD BY AUTOMATED COUNT: 15.1 % (ref 11.8–14.4)
GFR, ESTIMATED: 12 ML/MIN/1.73M2
GLUCOSE SERPL-MCNC: 122 MG/DL (ref 74–99)
HBV SURFACE AB SERPL IA-ACNC: 135 MIU/ML
HCT VFR BLD AUTO: 23.2 % (ref 36.3–47.1)
HGB BLD-MCNC: 7.9 G/DL (ref 11.9–15.1)
MCH RBC QN AUTO: 32.1 PG (ref 25.2–33.5)
MCHC RBC AUTO-ENTMCNC: 34.1 G/DL (ref 28.4–34.8)
MCV RBC AUTO: 94.3 FL (ref 82.6–102.9)
MICROORGANISM SPEC CULT: NORMAL
MICROORGANISM/AGENT SPEC: NORMAL
NRBC BLD-RTO: 0 PER 100 WBC
PLATELET # BLD AUTO: 361 K/UL (ref 138–453)
PMV BLD AUTO: 10.1 FL (ref 8.1–13.5)
POTASSIUM SERPL-SCNC: 3.3 MMOL/L (ref 3.7–5.3)
RBC # BLD AUTO: 2.46 M/UL (ref 3.95–5.11)
SERVICE CMNT-IMP: NORMAL
SODIUM SERPL-SCNC: 131 MMOL/L (ref 136–145)
SPECIMEN DESCRIPTION: NORMAL
WBC OTHER # BLD: 8.2 K/UL (ref 3.5–11.3)

## 2024-06-22 PROCEDURE — 90935 HEMODIALYSIS ONE EVALUATION: CPT

## 2024-06-22 PROCEDURE — 6370000000 HC RX 637 (ALT 250 FOR IP)

## 2024-06-22 PROCEDURE — 6370000000 HC RX 637 (ALT 250 FOR IP): Performed by: NURSE PRACTITIONER

## 2024-06-22 PROCEDURE — 6370000000 HC RX 637 (ALT 250 FOR IP): Performed by: STUDENT IN AN ORGANIZED HEALTH CARE EDUCATION/TRAINING PROGRAM

## 2024-06-22 PROCEDURE — 80048 BASIC METABOLIC PNL TOTAL CA: CPT

## 2024-06-22 PROCEDURE — 2580000003 HC RX 258: Performed by: NURSE PRACTITIONER

## 2024-06-22 PROCEDURE — 99233 SBSQ HOSP IP/OBS HIGH 50: CPT | Performed by: INTERNAL MEDICINE

## 2024-06-22 PROCEDURE — 99232 SBSQ HOSP IP/OBS MODERATE 35: CPT | Performed by: INTERNAL MEDICINE

## 2024-06-22 PROCEDURE — 6360000002 HC RX W HCPCS: Performed by: INTERNAL MEDICINE

## 2024-06-22 PROCEDURE — 85027 COMPLETE CBC AUTOMATED: CPT

## 2024-06-22 PROCEDURE — 2060000000 HC ICU INTERMEDIATE R&B

## 2024-06-22 PROCEDURE — 99233 SBSQ HOSP IP/OBS HIGH 50: CPT

## 2024-06-22 PROCEDURE — 36415 COLL VENOUS BLD VENIPUNCTURE: CPT

## 2024-06-22 PROCEDURE — 90935 HEMODIALYSIS ONE EVALUATION: CPT | Performed by: INTERNAL MEDICINE

## 2024-06-22 RX ADMIN — GUAIFENESIN AND DEXTROMETHORPHAN 5 ML: 100; 10 SYRUP ORAL at 20:58

## 2024-06-22 RX ADMIN — SODIUM CHLORIDE, PRESERVATIVE FREE 10 ML: 5 INJECTION INTRAVENOUS at 08:30

## 2024-06-22 RX ADMIN — CARVEDILOL 25 MG: 25 TABLET, FILM COATED ORAL at 17:32

## 2024-06-22 RX ADMIN — FLECAINIDE ACETATE 50 MG: 50 TABLET ORAL at 20:58

## 2024-06-22 RX ADMIN — AMLODIPINE BESYLATE 10 MG: 10 TABLET ORAL at 08:16

## 2024-06-22 RX ADMIN — HEPARIN SODIUM 1500 UNITS: 1000 INJECTION INTRAVENOUS; SUBCUTANEOUS at 13:56

## 2024-06-22 RX ADMIN — HEPARIN SODIUM 1400 UNITS: 1000 INJECTION INTRAVENOUS; SUBCUTANEOUS at 13:56

## 2024-06-22 RX ADMIN — ATORVASTATIN CALCIUM 80 MG: 80 TABLET, FILM COATED ORAL at 20:57

## 2024-06-22 RX ADMIN — PANTOPRAZOLE SODIUM 40 MG: 40 TABLET, DELAYED RELEASE ORAL at 08:16

## 2024-06-22 RX ADMIN — CARVEDILOL 25 MG: 25 TABLET, FILM COATED ORAL at 08:16

## 2024-06-22 RX ADMIN — SODIUM CHLORIDE, PRESERVATIVE FREE 10 ML: 5 INJECTION INTRAVENOUS at 20:58

## 2024-06-22 RX ADMIN — FLECAINIDE ACETATE 50 MG: 50 TABLET ORAL at 17:32

## 2024-06-22 RX ADMIN — GUAIFENESIN AND DEXTROMETHORPHAN 5 ML: 100; 10 SYRUP ORAL at 08:16

## 2024-06-23 LAB
ANION GAP SERPL CALCULATED.3IONS-SCNC: 10 MMOL/L (ref 9–16)
BUN SERPL-MCNC: 19 MG/DL (ref 8–23)
CALCIUM SERPL-MCNC: 8.6 MG/DL (ref 8.6–10.4)
CHLORIDE SERPL-SCNC: 93 MMOL/L (ref 98–107)
CO2 SERPL-SCNC: 27 MMOL/L (ref 20–31)
CREAT SERPL-MCNC: 2.4 MG/DL (ref 0.5–0.9)
ERYTHROCYTE [DISTWIDTH] IN BLOOD BY AUTOMATED COUNT: 15.4 % (ref 11.8–14.4)
GFR, ESTIMATED: 22 ML/MIN/1.73M2
GLUCOSE SERPL-MCNC: 104 MG/DL (ref 74–99)
HCT VFR BLD AUTO: 23.5 % (ref 36.3–47.1)
HGB BLD-MCNC: 7.6 G/DL (ref 11.9–15.1)
MCH RBC QN AUTO: 31.5 PG (ref 25.2–33.5)
MCHC RBC AUTO-ENTMCNC: 32.3 G/DL (ref 28.4–34.8)
MCV RBC AUTO: 97.5 FL (ref 82.6–102.9)
NRBC BLD-RTO: 0 PER 100 WBC
PLATELET # BLD AUTO: 354 K/UL (ref 138–453)
PMV BLD AUTO: 10.3 FL (ref 8.1–13.5)
POTASSIUM SERPL-SCNC: 3.6 MMOL/L (ref 3.7–5.3)
RBC # BLD AUTO: 2.41 M/UL (ref 3.95–5.11)
SODIUM SERPL-SCNC: 130 MMOL/L (ref 136–145)
WBC OTHER # BLD: 8.2 K/UL (ref 3.5–11.3)

## 2024-06-23 PROCEDURE — 6370000000 HC RX 637 (ALT 250 FOR IP)

## 2024-06-23 PROCEDURE — 6370000000 HC RX 637 (ALT 250 FOR IP): Performed by: STUDENT IN AN ORGANIZED HEALTH CARE EDUCATION/TRAINING PROGRAM

## 2024-06-23 PROCEDURE — 80048 BASIC METABOLIC PNL TOTAL CA: CPT

## 2024-06-23 PROCEDURE — 97166 OT EVAL MOD COMPLEX 45 MIN: CPT

## 2024-06-23 PROCEDURE — 97535 SELF CARE MNGMENT TRAINING: CPT

## 2024-06-23 PROCEDURE — 94761 N-INVAS EAR/PLS OXIMETRY MLT: CPT

## 2024-06-23 PROCEDURE — 99232 SBSQ HOSP IP/OBS MODERATE 35: CPT | Performed by: INTERNAL MEDICINE

## 2024-06-23 PROCEDURE — 6370000000 HC RX 637 (ALT 250 FOR IP): Performed by: NURSE PRACTITIONER

## 2024-06-23 PROCEDURE — 99233 SBSQ HOSP IP/OBS HIGH 50: CPT

## 2024-06-23 PROCEDURE — 36415 COLL VENOUS BLD VENIPUNCTURE: CPT

## 2024-06-23 PROCEDURE — 99233 SBSQ HOSP IP/OBS HIGH 50: CPT | Performed by: INTERNAL MEDICINE

## 2024-06-23 PROCEDURE — 6360000002 HC RX W HCPCS

## 2024-06-23 PROCEDURE — 85027 COMPLETE CBC AUTOMATED: CPT

## 2024-06-23 PROCEDURE — 2060000000 HC ICU INTERMEDIATE R&B

## 2024-06-23 PROCEDURE — 97530 THERAPEUTIC ACTIVITIES: CPT

## 2024-06-23 PROCEDURE — 2580000003 HC RX 258: Performed by: NURSE PRACTITIONER

## 2024-06-23 PROCEDURE — 2700000000 HC OXYGEN THERAPY PER DAY

## 2024-06-23 RX ORDER — ENOXAPARIN SODIUM 100 MG/ML
1 INJECTION SUBCUTANEOUS 2 TIMES DAILY
Status: DISCONTINUED | OUTPATIENT
Start: 2024-06-23 | End: 2024-06-27 | Stop reason: HOSPADM

## 2024-06-23 RX ORDER — DOCUSATE SODIUM 100 MG/1
100 CAPSULE, LIQUID FILLED ORAL 2 TIMES DAILY PRN
Status: DISCONTINUED | OUTPATIENT
Start: 2024-06-23 | End: 2024-06-27 | Stop reason: HOSPADM

## 2024-06-23 RX ORDER — HYDRALAZINE HYDROCHLORIDE 50 MG/1
25 TABLET, FILM COATED ORAL EVERY 8 HOURS SCHEDULED
Status: DISCONTINUED | OUTPATIENT
Start: 2024-06-23 | End: 2024-06-27 | Stop reason: HOSPADM

## 2024-06-23 RX ADMIN — CARVEDILOL 25 MG: 25 TABLET, FILM COATED ORAL at 17:27

## 2024-06-23 RX ADMIN — ATORVASTATIN CALCIUM 80 MG: 80 TABLET, FILM COATED ORAL at 20:09

## 2024-06-23 RX ADMIN — SODIUM CHLORIDE, PRESERVATIVE FREE 10 ML: 5 INJECTION INTRAVENOUS at 08:25

## 2024-06-23 RX ADMIN — DOCUSATE SODIUM 100 MG: 100 CAPSULE, LIQUID FILLED ORAL at 20:55

## 2024-06-23 RX ADMIN — HYDRALAZINE HYDROCHLORIDE 25 MG: 50 TABLET, FILM COATED ORAL at 14:36

## 2024-06-23 RX ADMIN — LABETALOL HYDROCHLORIDE 10 MG: 5 INJECTION, SOLUTION INTRAVENOUS at 04:46

## 2024-06-23 RX ADMIN — ONDANSETRON 4 MG: 4 TABLET, ORALLY DISINTEGRATING ORAL at 21:22

## 2024-06-23 RX ADMIN — GUAIFENESIN AND DEXTROMETHORPHAN 5 ML: 100; 10 SYRUP ORAL at 20:10

## 2024-06-23 RX ADMIN — PANTOPRAZOLE SODIUM 40 MG: 40 TABLET, DELAYED RELEASE ORAL at 08:25

## 2024-06-23 RX ADMIN — AMLODIPINE BESYLATE 10 MG: 10 TABLET ORAL at 08:25

## 2024-06-23 RX ADMIN — FLECAINIDE ACETATE 50 MG: 50 TABLET ORAL at 20:08

## 2024-06-23 RX ADMIN — HYDRALAZINE HYDROCHLORIDE 25 MG: 50 TABLET, FILM COATED ORAL at 20:08

## 2024-06-23 RX ADMIN — CARVEDILOL 25 MG: 25 TABLET, FILM COATED ORAL at 08:25

## 2024-06-23 RX ADMIN — FLECAINIDE ACETATE 50 MG: 50 TABLET ORAL at 08:25

## 2024-06-23 RX ADMIN — SODIUM CHLORIDE, PRESERVATIVE FREE 10 ML: 5 INJECTION INTRAVENOUS at 20:11

## 2024-06-23 ASSESSMENT — PAIN SCALES - GENERAL
PAINLEVEL_OUTOF10: 0

## 2024-06-23 NOTE — CARE COORDINATION
06/23/24 1756   Readmission Assessment   Number of Days since last admission? 8-30 days   Previous Disposition Home with Family   Who is being Interviewed Patient   What was the patient's/caregiver's perception as to why they think they needed to return back to the hospital? Other (Comment)  (fever)   Did you visit your Primary Care Physician after you left the hospital, before you returned this time? Yes   Did you see a specialist, such as Cardiac, Pulmonary, Orthopedic Physician, etc. after you left the hospital? Yes   Who advised the patient to return to the hospital? Self-referral   Does the patient report anything that got in the way of taking their medications? No   In our efforts to provide the best possible care to you and others like you, can you think of anything that we could have done to help you after you left the hospital the first time, so that you might not have needed to return so soon? Other (Comment)  (no)

## 2024-06-23 NOTE — CARE COORDINATION
Met with patient to discuss transitional planning. She is returning home with her . She denies needs

## 2024-06-24 LAB
ANION GAP SERPL CALCULATED.3IONS-SCNC: 15 MMOL/L (ref 9–16)
BUN SERPL-MCNC: 27 MG/DL (ref 8–23)
CALCIUM SERPL-MCNC: 8.6 MG/DL (ref 8.6–10.4)
CHLORIDE SERPL-SCNC: 91 MMOL/L (ref 98–107)
CO2 SERPL-SCNC: 21 MMOL/L (ref 20–31)
CREAT SERPL-MCNC: 3.2 MG/DL (ref 0.5–0.9)
ERYTHROCYTE [DISTWIDTH] IN BLOOD BY AUTOMATED COUNT: 15.7 % (ref 11.8–14.4)
GFR, ESTIMATED: 16 ML/MIN/1.73M2
GLUCOSE SERPL-MCNC: 100 MG/DL (ref 74–99)
HCT VFR BLD AUTO: 26 % (ref 36.3–47.1)
HGB BLD-MCNC: 7.6 G/DL (ref 11.9–15.1)
MCH RBC QN AUTO: 31.8 PG (ref 25.2–33.5)
MCHC RBC AUTO-ENTMCNC: 29.2 G/DL (ref 28.4–34.8)
MCV RBC AUTO: 108.8 FL (ref 82.6–102.9)
NRBC BLD-RTO: 0 PER 100 WBC
PLATELET # BLD AUTO: 318 K/UL (ref 138–453)
PMV BLD AUTO: 10.6 FL (ref 8.1–13.5)
POTASSIUM SERPL-SCNC: 3.9 MMOL/L (ref 3.7–5.3)
RBC # BLD AUTO: 2.39 M/UL (ref 3.95–5.11)
SODIUM SERPL-SCNC: 127 MMOL/L (ref 136–145)
WBC OTHER # BLD: 9.3 K/UL (ref 3.5–11.3)

## 2024-06-24 PROCEDURE — 6360000002 HC RX W HCPCS: Performed by: INTERNAL MEDICINE

## 2024-06-24 PROCEDURE — 85027 COMPLETE CBC AUTOMATED: CPT

## 2024-06-24 PROCEDURE — 6370000000 HC RX 637 (ALT 250 FOR IP): Performed by: INTERNAL MEDICINE

## 2024-06-24 PROCEDURE — 6360000002 HC RX W HCPCS

## 2024-06-24 PROCEDURE — 99232 SBSQ HOSP IP/OBS MODERATE 35: CPT | Performed by: INTERNAL MEDICINE

## 2024-06-24 PROCEDURE — 90935 HEMODIALYSIS ONE EVALUATION: CPT

## 2024-06-24 PROCEDURE — 2580000003 HC RX 258: Performed by: NURSE PRACTITIONER

## 2024-06-24 PROCEDURE — 2060000000 HC ICU INTERMEDIATE R&B

## 2024-06-24 PROCEDURE — 6370000000 HC RX 637 (ALT 250 FOR IP): Performed by: STUDENT IN AN ORGANIZED HEALTH CARE EDUCATION/TRAINING PROGRAM

## 2024-06-24 PROCEDURE — 90935 HEMODIALYSIS ONE EVALUATION: CPT | Performed by: INTERNAL MEDICINE

## 2024-06-24 PROCEDURE — 94761 N-INVAS EAR/PLS OXIMETRY MLT: CPT

## 2024-06-24 PROCEDURE — 6370000000 HC RX 637 (ALT 250 FOR IP)

## 2024-06-24 PROCEDURE — 36415 COLL VENOUS BLD VENIPUNCTURE: CPT

## 2024-06-24 PROCEDURE — 80048 BASIC METABOLIC PNL TOTAL CA: CPT

## 2024-06-24 PROCEDURE — 2700000000 HC OXYGEN THERAPY PER DAY

## 2024-06-24 PROCEDURE — 6360000002 HC RX W HCPCS: Performed by: NURSE PRACTITIONER

## 2024-06-24 PROCEDURE — 6370000000 HC RX 637 (ALT 250 FOR IP): Performed by: NURSE PRACTITIONER

## 2024-06-24 RX ORDER — HEPARIN SODIUM 1000 [USP'U]/ML
1400 INJECTION, SOLUTION INTRAVENOUS; SUBCUTANEOUS ONCE
Status: DISCONTINUED | OUTPATIENT
Start: 2024-06-24 | End: 2024-06-24

## 2024-06-24 RX ORDER — PROCHLORPERAZINE EDISYLATE 5 MG/ML
10 INJECTION INTRAMUSCULAR; INTRAVENOUS ONCE
Status: COMPLETED | OUTPATIENT
Start: 2024-06-24 | End: 2024-06-24

## 2024-06-24 RX ORDER — HEPARIN SODIUM 1000 [USP'U]/ML
1500 INJECTION, SOLUTION INTRAVENOUS; SUBCUTANEOUS ONCE
Status: DISCONTINUED | OUTPATIENT
Start: 2024-06-24 | End: 2024-06-24

## 2024-06-24 RX ADMIN — ENOXAPARIN SODIUM 60 MG: 100 INJECTION SUBCUTANEOUS at 19:46

## 2024-06-24 RX ADMIN — HEPARIN SODIUM 1400 UNITS: 1000 INJECTION INTRAVENOUS; SUBCUTANEOUS at 14:05

## 2024-06-24 RX ADMIN — FLECAINIDE ACETATE 50 MG: 50 TABLET ORAL at 19:47

## 2024-06-24 RX ADMIN — ONDANSETRON 4 MG: 2 INJECTION INTRAMUSCULAR; INTRAVENOUS at 13:43

## 2024-06-24 RX ADMIN — HYDRALAZINE HYDROCHLORIDE 25 MG: 50 TABLET, FILM COATED ORAL at 16:20

## 2024-06-24 RX ADMIN — PANTOPRAZOLE SODIUM 40 MG: 40 TABLET, DELAYED RELEASE ORAL at 05:59

## 2024-06-24 RX ADMIN — HYDRALAZINE HYDROCHLORIDE 25 MG: 50 TABLET, FILM COATED ORAL at 22:05

## 2024-06-24 RX ADMIN — ATORVASTATIN CALCIUM 80 MG: 80 TABLET, FILM COATED ORAL at 19:43

## 2024-06-24 RX ADMIN — LABETALOL HYDROCHLORIDE 10 MG: 5 INJECTION, SOLUTION INTRAVENOUS at 04:11

## 2024-06-24 RX ADMIN — PROCHLORPERAZINE EDISYLATE 10 MG: 5 INJECTION INTRAMUSCULAR; INTRAVENOUS at 23:29

## 2024-06-24 RX ADMIN — FLECAINIDE ACETATE 50 MG: 50 TABLET ORAL at 09:59

## 2024-06-24 RX ADMIN — AMLODIPINE BESYLATE 10 MG: 10 TABLET ORAL at 09:59

## 2024-06-24 RX ADMIN — CARVEDILOL 25 MG: 25 TABLET, FILM COATED ORAL at 09:59

## 2024-06-24 RX ADMIN — CARVEDILOL 25 MG: 25 TABLET, FILM COATED ORAL at 16:20

## 2024-06-24 RX ADMIN — ENOXAPARIN SODIUM 60 MG: 100 INJECTION SUBCUTANEOUS at 09:59

## 2024-06-24 RX ADMIN — BENZONATATE 200 MG: 100 CAPSULE ORAL at 19:43

## 2024-06-24 RX ADMIN — ONDANSETRON 4 MG: 4 TABLET, ORALLY DISINTEGRATING ORAL at 19:42

## 2024-06-24 RX ADMIN — SODIUM CHLORIDE, PRESERVATIVE FREE 10 ML: 5 INJECTION INTRAVENOUS at 23:29

## 2024-06-24 RX ADMIN — DOCUSATE SODIUM 100 MG: 100 CAPSULE, LIQUID FILLED ORAL at 19:42

## 2024-06-24 RX ADMIN — HEPARIN SODIUM 1500 UNITS: 1000 INJECTION INTRAVENOUS; SUBCUTANEOUS at 14:05

## 2024-06-24 RX ADMIN — SODIUM CHLORIDE, PRESERVATIVE FREE 10 ML: 5 INJECTION INTRAVENOUS at 16:22

## 2024-06-24 RX ADMIN — SODIUM CHLORIDE, PRESERVATIVE FREE 10 ML: 5 INJECTION INTRAVENOUS at 19:43

## 2024-06-24 RX ADMIN — HYDRALAZINE HYDROCHLORIDE 25 MG: 50 TABLET, FILM COATED ORAL at 05:59

## 2024-06-24 ASSESSMENT — PAIN SCALES - GENERAL
PAINLEVEL_OUTOF10: 0

## 2024-06-24 NOTE — CARE COORDINATION
Received call from Lora, [facility], stating their unit is closing. She stated the next closest units for pt would be [facility] or [facility]. Advised Lora will discuss with pt these 2 options in addition to [facility] in [place].  Received call from Thania in dialysis and pt is wanting to go to [facility] ref cancelled.  Ref made to [facility]

## 2024-06-25 ENCOUNTER — APPOINTMENT (OUTPATIENT)
Dept: INTERVENTIONAL RADIOLOGY/VASCULAR | Age: 64
DRG: 720 | End: 2024-06-25
Attending: FAMILY MEDICINE
Payer: COMMERCIAL

## 2024-06-25 LAB
ANION GAP SERPL CALCULATED.3IONS-SCNC: 13 MMOL/L (ref 9–16)
BUN SERPL-MCNC: 15 MG/DL (ref 8–23)
CALCIUM SERPL-MCNC: 8.5 MG/DL (ref 8.6–10.4)
CHLORIDE SERPL-SCNC: 93 MMOL/L (ref 98–107)
CO2 SERPL-SCNC: 23 MMOL/L (ref 20–31)
CREAT SERPL-MCNC: 2.4 MG/DL (ref 0.5–0.9)
GFR, ESTIMATED: 23 ML/MIN/1.73M2
GLUCOSE SERPL-MCNC: 164 MG/DL (ref 74–99)
POTASSIUM SERPL-SCNC: 3.7 MMOL/L (ref 3.7–5.3)
SODIUM SERPL-SCNC: 129 MMOL/L (ref 136–145)

## 2024-06-25 PROCEDURE — 0JH63XZ INSERTION OF TUNNELED VASCULAR ACCESS DEVICE INTO CHEST SUBCUTANEOUS TISSUE AND FASCIA, PERCUTANEOUS APPROACH: ICD-10-PCS | Performed by: RADIOLOGY

## 2024-06-25 PROCEDURE — 2580000003 HC RX 258: Performed by: RADIOLOGY

## 2024-06-25 PROCEDURE — 6370000000 HC RX 637 (ALT 250 FOR IP): Performed by: STUDENT IN AN ORGANIZED HEALTH CARE EDUCATION/TRAINING PROGRAM

## 2024-06-25 PROCEDURE — 6360000002 HC RX W HCPCS: Performed by: RADIOLOGY

## 2024-06-25 PROCEDURE — 6360000002 HC RX W HCPCS: Performed by: PHYSICIAN ASSISTANT

## 2024-06-25 PROCEDURE — C1769 GUIDE WIRE: HCPCS

## 2024-06-25 PROCEDURE — 36415 COLL VENOUS BLD VENIPUNCTURE: CPT

## 2024-06-25 PROCEDURE — 99232 SBSQ HOSP IP/OBS MODERATE 35: CPT | Performed by: INTERNAL MEDICINE

## 2024-06-25 PROCEDURE — 2580000003 HC RX 258: Performed by: NURSE PRACTITIONER

## 2024-06-25 PROCEDURE — 6360000002 HC RX W HCPCS: Performed by: INTERNAL MEDICINE

## 2024-06-25 PROCEDURE — 36558 INSERT TUNNELED CV CATH: CPT

## 2024-06-25 PROCEDURE — 2060000000 HC ICU INTERMEDIATE R&B

## 2024-06-25 PROCEDURE — 02H633Z INSERTION OF INFUSION DEVICE INTO RIGHT ATRIUM, PERCUTANEOUS APPROACH: ICD-10-PCS | Performed by: RADIOLOGY

## 2024-06-25 PROCEDURE — 80048 BASIC METABOLIC PNL TOTAL CA: CPT

## 2024-06-25 PROCEDURE — 6370000000 HC RX 637 (ALT 250 FOR IP)

## 2024-06-25 PROCEDURE — 77001 FLUOROGUIDE FOR VEIN DEVICE: CPT

## 2024-06-25 PROCEDURE — 6370000000 HC RX 637 (ALT 250 FOR IP): Performed by: NURSE PRACTITIONER

## 2024-06-25 RX ORDER — HEPARIN SODIUM 1000 [USP'U]/ML
INJECTION, SOLUTION INTRAVENOUS; SUBCUTANEOUS PRN
Status: COMPLETED | OUTPATIENT
Start: 2024-06-25 | End: 2024-06-25

## 2024-06-25 RX ADMIN — FLECAINIDE ACETATE 50 MG: 50 TABLET ORAL at 21:11

## 2024-06-25 RX ADMIN — ACETAMINOPHEN 650 MG: 325 TABLET ORAL at 16:10

## 2024-06-25 RX ADMIN — HYDRALAZINE HYDROCHLORIDE 25 MG: 50 TABLET, FILM COATED ORAL at 21:11

## 2024-06-25 RX ADMIN — CARVEDILOL 25 MG: 25 TABLET, FILM COATED ORAL at 09:27

## 2024-06-25 RX ADMIN — FLECAINIDE ACETATE 50 MG: 50 TABLET ORAL at 09:26

## 2024-06-25 RX ADMIN — PANTOPRAZOLE SODIUM 40 MG: 40 TABLET, DELAYED RELEASE ORAL at 06:02

## 2024-06-25 RX ADMIN — CARVEDILOL 25 MG: 25 TABLET, FILM COATED ORAL at 18:12

## 2024-06-25 RX ADMIN — HYDRALAZINE HYDROCHLORIDE 25 MG: 50 TABLET, FILM COATED ORAL at 06:02

## 2024-06-25 RX ADMIN — ATORVASTATIN CALCIUM 80 MG: 80 TABLET, FILM COATED ORAL at 21:11

## 2024-06-25 RX ADMIN — CEFAZOLIN SODIUM 1000 MG: 500 INJECTION, POWDER, FOR SOLUTION INTRAMUSCULAR; INTRAVENOUS at 08:37

## 2024-06-25 RX ADMIN — HEPARIN SODIUM 2100 UNITS: 1000 INJECTION, SOLUTION INTRAVENOUS; SUBCUTANEOUS at 08:58

## 2024-06-25 RX ADMIN — ENOXAPARIN SODIUM 60 MG: 100 INJECTION SUBCUTANEOUS at 21:13

## 2024-06-25 RX ADMIN — ENOXAPARIN SODIUM 60 MG: 100 INJECTION SUBCUTANEOUS at 09:27

## 2024-06-25 RX ADMIN — SODIUM CHLORIDE: 9 INJECTION, SOLUTION INTRAVENOUS at 07:38

## 2024-06-25 RX ADMIN — AMLODIPINE BESYLATE 10 MG: 10 TABLET ORAL at 09:26

## 2024-06-25 RX ADMIN — HYDRALAZINE HYDROCHLORIDE 25 MG: 50 TABLET, FILM COATED ORAL at 14:14

## 2024-06-25 RX ADMIN — SODIUM CHLORIDE, PRESERVATIVE FREE 10 ML: 5 INJECTION INTRAVENOUS at 09:27

## 2024-06-25 RX ADMIN — SODIUM CHLORIDE, PRESERVATIVE FREE 10 ML: 5 INJECTION INTRAVENOUS at 21:13

## 2024-06-25 ASSESSMENT — PAIN DESCRIPTION - LOCATION: LOCATION: BUTTOCKS

## 2024-06-25 ASSESSMENT — PAIN SCALES - GENERAL
PAINLEVEL_OUTOF10: 0
PAINLEVEL_OUTOF10: 2
PAINLEVEL_OUTOF10: 0
PAINLEVEL_OUTOF10: 5
PAINLEVEL_OUTOF10: 7
PAINLEVEL_OUTOF10: 4

## 2024-06-25 NOTE — CARE COORDINATION
SHANTHI following for dialysis. Per [facility] portal, awaiting clinical clearance for pt at [place] location. Awaiting acceptance. Will continue to update.

## 2024-06-25 NOTE — DISCHARGE INSTR - COC
cell carcinoma (HCC) C68.9    Bladder wall thickening N32.89    Hydronephrosis N13.30    Acute ischemic stroke (HCC) I63.9    Bilateral carotid artery stenosis I65.23    Stroke (HCC) I63.9    Hypercoagulable state (HCC) D68.59    Chemotherapy adverse reaction T45.1X5A    Reactive thrombocytosis D75.838    Anemia D64.9    Melena K92.1    History of stroke Z86.73    Acute renal failure with tubular necrosis (HCC) N17.0    Metabolic acidosis E87.20    Rectal bleed K62.5    Anemia, blood loss D50.0    Stage 3b chronic kidney disease (HCC) N18.32    Pneumonia, unspecified organism J18.9    Hospital-acquired bacterial pneumonia J15.9    Diffuse large B-cell lymphoma of lymph nodes of multiple regions (HCC) C83.38    Atrial fibrillation with rapid ventricular response ([facility]) I48.91    Aortoiliac occlusive disease (HCC) I74.09    PAD (peripheral artery disease) (HCC) I73.9    Acute respiratory failure with hypoxia ([facility]) J96.01    New onset atrial fibrillation (HCC) I48.91    SOB (shortness of breath) R06.02    Gross hematuria R31.0    Obstructive uropathy N13.9    Hypokalemia E87.6    ATN (acute tubular necrosis) (HCC) N17.0       Isolation/Infection:   Isolation            No Isolation          Patient Infection Status       None to display                     Nurse Assessment:  Last Vital Signs: /77   Pulse 79   Temp 97.7 °F (36.5 °C) (Oral)   Resp 19   Ht 1.575 m (5' 2\")   Wt 58 kg (127 lb 13.9 oz)   LMP 12/01/2010   SpO2 91%   BMI 23.39 kg/m²     Last documented pain score (0-10 scale): Pain Level: 0  Last Weight:   Wt Readings from Last 1 Encounters:   06/25/24 58 kg (127 lb 13.9 oz)     Mental Status:  {IP PT MENTAL STATUS:17028}    IV Access:  {[facility] [place] ACCESS:177893835}    Nursing Mobility/ADLs:  Walking   {CHP DME ADLs:831095707}  Transfer  {CHP DME ADLs:438682227}  Bathing  {CHP DME ADLs:010795188}  Dressing  {CHP DME ADLs:435488295}  Toileting  {CHP DME ADLs:965973079}  Feeding  {JIMMY DENNIS

## 2024-06-25 NOTE — CARE COORDINATION
Hemodialysis Initial Assessment    Information provided by: pt/chart    New or Current with HD: new    [facility]  [facility]    Schedule:  MWF 11:30am; please arrive at 10:45am for first appointment, start date 6/28/24.     Physician:  [facility]    Transportation:  family/self    Insurance[facility]  [facility]    DME:  rolling walker    Discharge Plan: Home with     SW provided pt and family with treatment schedule for outpatient dialysis. No questions at this time. SW to continue to follow as needed.

## 2024-06-25 NOTE — BRIEF OP NOTE
Brief Postoperative Note    Daja Espinosa  YOB: 1960  [id number]    Pre-operative Diagnosis: Acute Renal Failure      Post-operative Diagnosis: Same    Procedure: Tunneled Dialysis Catheter    Medication Given: none    Anesthesia: 1%Lidocaine     Surgeons/Assistants: COLEMAN Conner    Estimated Blood Loss: Minimal    Complications: none    14 Fr x 28  cm tip to cuff palindrome tunneled HD Catheter placed successfully via the Site:  Left Internal Jugular Vein.  Catheter secured to skin, dressing applied.  Catheter locked with Heparin.  May use catheter.    Electronically signed by COLEMAN Conner on 6/25/2024 at 9:16 AM

## 2024-06-26 ENCOUNTER — TELEPHONE (OUTPATIENT)
Dept: ONCOLOGY | Age: 64
End: 2024-06-26

## 2024-06-26 PROBLEM — R60.0 BILATERAL LOWER EXTREMITY EDEMA: Status: ACTIVE | Noted: 2024-06-26

## 2024-06-26 PROBLEM — Z85.51 HISTORY OF BLADDER CANCER: Status: ACTIVE | Noted: 2024-06-26

## 2024-06-26 PROBLEM — N18.9 ACUTE KIDNEY INJURY SUPERIMPOSED ON CKD (HCC): Status: ACTIVE | Noted: 2024-06-04

## 2024-06-26 LAB
ANION GAP SERPL CALCULATED.3IONS-SCNC: 9 MMOL/L (ref 9–16)
BASOPHILS # BLD: 0.05 K/UL (ref 0–0.2)
BASOPHILS NFR BLD: 1 % (ref 0–2)
BUN SERPL-MCNC: 24 MG/DL (ref 8–23)
CALCIUM SERPL-MCNC: 9.1 MG/DL (ref 8.6–10.4)
CHLORIDE SERPL-SCNC: 93 MMOL/L (ref 98–107)
CO2 SERPL-SCNC: 27 MMOL/L (ref 20–31)
CREAT SERPL-MCNC: 2.7 MG/DL (ref 0.5–0.9)
EOSINOPHIL # BLD: 0.23 K/UL (ref 0–0.44)
EOSINOPHILS RELATIVE PERCENT: 3 % (ref 1–4)
ERYTHROCYTE [DISTWIDTH] IN BLOOD BY AUTOMATED COUNT: 16.3 % (ref 11.8–14.4)
GFR, ESTIMATED: 19 ML/MIN/1.73M2
GLUCOSE SERPL-MCNC: 95 MG/DL (ref 74–99)
HCT VFR BLD AUTO: 22.4 % (ref 36.3–47.1)
HGB BLD-MCNC: 7.4 G/DL (ref 11.9–15.1)
IMM GRANULOCYTES # BLD AUTO: 0.06 K/UL (ref 0–0.3)
IMM GRANULOCYTES NFR BLD: 1 %
LYMPHOCYTES NFR BLD: 1.57 K/UL (ref 1.1–3.7)
LYMPHOCYTES RELATIVE PERCENT: 19 % (ref 24–43)
MAGNESIUM SERPL-MCNC: 1.7 MG/DL (ref 1.6–2.4)
MCH RBC QN AUTO: 32 PG (ref 25.2–33.5)
MCHC RBC AUTO-ENTMCNC: 33 G/DL (ref 28.4–34.8)
MCV RBC AUTO: 97 FL (ref 82.6–102.9)
MONOCYTES NFR BLD: 0.89 K/UL (ref 0.1–1.2)
MONOCYTES NFR BLD: 11 % (ref 3–12)
NEUTROPHILS NFR BLD: 66 % (ref 36–65)
NEUTS SEG NFR BLD: 5.39 K/UL (ref 1.5–8.1)
NRBC BLD-RTO: 0 PER 100 WBC
PLATELET # BLD AUTO: 457 K/UL (ref 138–453)
PMV BLD AUTO: 10.1 FL (ref 8.1–13.5)
POTASSIUM SERPL-SCNC: 3.7 MMOL/L (ref 3.7–5.3)
RBC # BLD AUTO: 2.31 M/UL (ref 3.95–5.11)
RBC # BLD: ABNORMAL 10*6/UL
SODIUM SERPL-SCNC: 129 MMOL/L (ref 136–145)
WBC OTHER # BLD: 8.2 K/UL (ref 3.5–11.3)

## 2024-06-26 PROCEDURE — 99232 SBSQ HOSP IP/OBS MODERATE 35: CPT | Performed by: INTERNAL MEDICINE

## 2024-06-26 PROCEDURE — 83735 ASSAY OF MAGNESIUM: CPT

## 2024-06-26 PROCEDURE — 6370000000 HC RX 637 (ALT 250 FOR IP): Performed by: STUDENT IN AN ORGANIZED HEALTH CARE EDUCATION/TRAINING PROGRAM

## 2024-06-26 PROCEDURE — 99233 SBSQ HOSP IP/OBS HIGH 50: CPT | Performed by: INTERNAL MEDICINE

## 2024-06-26 PROCEDURE — 36415 COLL VENOUS BLD VENIPUNCTURE: CPT

## 2024-06-26 PROCEDURE — 6370000000 HC RX 637 (ALT 250 FOR IP)

## 2024-06-26 PROCEDURE — 94761 N-INVAS EAR/PLS OXIMETRY MLT: CPT

## 2024-06-26 PROCEDURE — 2700000000 HC OXYGEN THERAPY PER DAY

## 2024-06-26 PROCEDURE — 97530 THERAPEUTIC ACTIVITIES: CPT

## 2024-06-26 PROCEDURE — 6360000002 HC RX W HCPCS: Performed by: INTERNAL MEDICINE

## 2024-06-26 PROCEDURE — 2580000003 HC RX 258: Performed by: NURSE PRACTITIONER

## 2024-06-26 PROCEDURE — 97110 THERAPEUTIC EXERCISES: CPT

## 2024-06-26 PROCEDURE — 6370000000 HC RX 637 (ALT 250 FOR IP): Performed by: NURSE PRACTITIONER

## 2024-06-26 PROCEDURE — 80048 BASIC METABOLIC PNL TOTAL CA: CPT

## 2024-06-26 PROCEDURE — 2060000000 HC ICU INTERMEDIATE R&B

## 2024-06-26 PROCEDURE — 99233 SBSQ HOSP IP/OBS HIGH 50: CPT | Performed by: NURSE PRACTITIONER

## 2024-06-26 PROCEDURE — 85025 COMPLETE CBC W/AUTO DIFF WBC: CPT

## 2024-06-26 RX ORDER — BUMETANIDE 0.25 MG/ML
2 INJECTION INTRAMUSCULAR; INTRAVENOUS ONCE
Status: COMPLETED | OUTPATIENT
Start: 2024-06-26 | End: 2024-06-26

## 2024-06-26 RX ORDER — OXYCODONE HYDROCHLORIDE 5 MG/1
5 TABLET ORAL ONCE
Status: COMPLETED | OUTPATIENT
Start: 2024-06-26 | End: 2024-06-26

## 2024-06-26 RX ADMIN — PANTOPRAZOLE SODIUM 40 MG: 40 TABLET, DELAYED RELEASE ORAL at 08:12

## 2024-06-26 RX ADMIN — FLECAINIDE ACETATE 50 MG: 50 TABLET ORAL at 08:12

## 2024-06-26 RX ADMIN — HYDRALAZINE HYDROCHLORIDE 25 MG: 50 TABLET, FILM COATED ORAL at 07:36

## 2024-06-26 RX ADMIN — HYDRALAZINE HYDROCHLORIDE 25 MG: 50 TABLET, FILM COATED ORAL at 13:56

## 2024-06-26 RX ADMIN — BUMETANIDE 2 MG: 0.25 INJECTION INTRAMUSCULAR; INTRAVENOUS at 11:03

## 2024-06-26 RX ADMIN — ATORVASTATIN CALCIUM 80 MG: 80 TABLET, FILM COATED ORAL at 19:24

## 2024-06-26 RX ADMIN — ACETAMINOPHEN 650 MG: 325 TABLET ORAL at 19:25

## 2024-06-26 RX ADMIN — SODIUM CHLORIDE, PRESERVATIVE FREE 10 ML: 5 INJECTION INTRAVENOUS at 21:10

## 2024-06-26 RX ADMIN — ACETAMINOPHEN 650 MG: 325 TABLET ORAL at 07:34

## 2024-06-26 RX ADMIN — ENOXAPARIN SODIUM 60 MG: 100 INJECTION SUBCUTANEOUS at 08:14

## 2024-06-26 RX ADMIN — SODIUM CHLORIDE, PRESERVATIVE FREE 10 ML: 5 INJECTION INTRAVENOUS at 08:14

## 2024-06-26 RX ADMIN — CARVEDILOL 25 MG: 25 TABLET, FILM COATED ORAL at 16:40

## 2024-06-26 RX ADMIN — FLECAINIDE ACETATE 50 MG: 50 TABLET ORAL at 19:25

## 2024-06-26 RX ADMIN — ENOXAPARIN SODIUM 60 MG: 100 INJECTION SUBCUTANEOUS at 21:10

## 2024-06-26 RX ADMIN — HYDRALAZINE HYDROCHLORIDE 25 MG: 50 TABLET, FILM COATED ORAL at 21:10

## 2024-06-26 RX ADMIN — OXYCODONE 5 MG: 5 TABLET ORAL at 11:03

## 2024-06-26 ASSESSMENT — PAIN DESCRIPTION - LOCATION
LOCATION: CHEST
LOCATION: SHOULDER

## 2024-06-26 ASSESSMENT — PAIN DESCRIPTION - DESCRIPTORS
DESCRIPTORS: ACHING
DESCRIPTORS: ACHING

## 2024-06-26 ASSESSMENT — PAIN SCALES - GENERAL
PAINLEVEL_OUTOF10: 0
PAINLEVEL_OUTOF10: 7
PAINLEVEL_OUTOF10: 3
PAINLEVEL_OUTOF10: 3
PAINLEVEL_OUTOF10: 0

## 2024-06-26 ASSESSMENT — PAIN DESCRIPTION - ORIENTATION
ORIENTATION: LEFT
ORIENTATION: LEFT

## 2024-06-26 NOTE — CARE COORDINATION
TRANSITIONAL CARE/DISCHARGE PLANNING ONGOING EVALUATION      Reason for Admission: Pneumonia, unspecified organism [J18.9]  Hospital-acquired bacterial pneumonia [J15.9]     Hospital day:12    Patient goals/Transitional Plan:    Spoke with patient about transition and discharge planning, plan is home with , declines home care at this time, new HD arranged through SW.  Is requesting walker at discharge. Does not have home oxygen will need home O2 eval if needed.         Readmission Risk              Risk of Unplanned Readmission:  37

## 2024-06-26 NOTE — CARE COORDINATION
SHANTHI received call from [employer] requesting updated labs, dialysis orders, and treatment sheets to be faxed for clinical review prior to pt's first treatment. SHANTHI faxed updated information to [phone]

## 2024-06-26 NOTE — TELEPHONE ENCOUNTER
Name: Daja Espinosa  : 1960  MRN: [id number]    Oncology Navigation Follow-Up Note    Contact Type:   Chart review    Notes: Chart reviewed.  Pt remains admitted at [facility]  To have Dialysis outpatient at [facility] in [place] on Monday, Wednesday, and Friday.  Pt to f/u with cardiologist Dr. Cortez in 2-3 weeks.   Plan to discharge home tomorrow 24 with spouse.  Follow up with Dr. Shepard schedule 24      Electronically signed by Ave Arreaga RN on 2024 at 1:29 PM

## 2024-06-27 VITALS
SYSTOLIC BLOOD PRESSURE: 119 MMHG | BODY MASS INDEX: 23.53 KG/M2 | OXYGEN SATURATION: 95 % | RESPIRATION RATE: 16 BRPM | TEMPERATURE: 98.7 F | WEIGHT: 127.87 LBS | HEIGHT: 62 IN | DIASTOLIC BLOOD PRESSURE: 76 MMHG | HEART RATE: 65 BPM

## 2024-06-27 LAB
ANION GAP SERPL CALCULATED.3IONS-SCNC: 14 MMOL/L (ref 9–16)
BUN SERPL-MCNC: 27 MG/DL (ref 8–23)
CALCIUM SERPL-MCNC: 8.9 MG/DL (ref 8.6–10.4)
CHLORIDE SERPL-SCNC: 91 MMOL/L (ref 98–107)
CO2 SERPL-SCNC: 25 MMOL/L (ref 20–31)
CREAT SERPL-MCNC: 2.6 MG/DL (ref 0.5–0.9)
GFR, ESTIMATED: 20 ML/MIN/1.73M2
GLUCOSE SERPL-MCNC: 137 MG/DL (ref 74–99)
MAGNESIUM SERPL-MCNC: 1.6 MG/DL (ref 1.6–2.4)
POTASSIUM SERPL-SCNC: 3.5 MMOL/L (ref 3.7–5.3)
SODIUM SERPL-SCNC: 130 MMOL/L (ref 136–145)

## 2024-06-27 PROCEDURE — 80048 BASIC METABOLIC PNL TOTAL CA: CPT

## 2024-06-27 PROCEDURE — 99232 SBSQ HOSP IP/OBS MODERATE 35: CPT | Performed by: INTERNAL MEDICINE

## 2024-06-27 PROCEDURE — 99233 SBSQ HOSP IP/OBS HIGH 50: CPT | Performed by: INTERNAL MEDICINE

## 2024-06-27 PROCEDURE — 6370000000 HC RX 637 (ALT 250 FOR IP): Performed by: NURSE PRACTITIONER

## 2024-06-27 PROCEDURE — 6360000002 HC RX W HCPCS: Performed by: INTERNAL MEDICINE

## 2024-06-27 PROCEDURE — 6370000000 HC RX 637 (ALT 250 FOR IP): Performed by: STUDENT IN AN ORGANIZED HEALTH CARE EDUCATION/TRAINING PROGRAM

## 2024-06-27 PROCEDURE — 2580000003 HC RX 258: Performed by: NURSE PRACTITIONER

## 2024-06-27 PROCEDURE — 83735 ASSAY OF MAGNESIUM: CPT

## 2024-06-27 PROCEDURE — 36415 COLL VENOUS BLD VENIPUNCTURE: CPT

## 2024-06-27 PROCEDURE — 6370000000 HC RX 637 (ALT 250 FOR IP)

## 2024-06-27 PROCEDURE — 51798 US URINE CAPACITY MEASURE: CPT

## 2024-06-27 RX ORDER — POTASSIUM CHLORIDE 20 MEQ/1
20 TABLET, EXTENDED RELEASE ORAL ONCE
Status: COMPLETED | OUTPATIENT
Start: 2024-06-27 | End: 2024-06-27

## 2024-06-27 RX ORDER — CARVEDILOL 25 MG/1
25 TABLET ORAL 2 TIMES DAILY WITH MEALS
Qty: 60 TABLET | Refills: 3 | Status: SHIPPED | OUTPATIENT
Start: 2024-06-27

## 2024-06-27 RX ORDER — HYDRALAZINE HYDROCHLORIDE 25 MG/1
25 TABLET, FILM COATED ORAL EVERY 8 HOURS SCHEDULED
Qty: 90 TABLET | Refills: 3 | Status: SHIPPED | OUTPATIENT
Start: 2024-06-27

## 2024-06-27 RX ORDER — FLECAINIDE ACETATE 50 MG/1
50 TABLET ORAL EVERY 12 HOURS SCHEDULED
Qty: 60 TABLET | Refills: 3 | Status: SHIPPED | OUTPATIENT
Start: 2024-06-27

## 2024-06-27 RX ADMIN — ACETAMINOPHEN 650 MG: 325 TABLET ORAL at 05:11

## 2024-06-27 RX ADMIN — ACETAMINOPHEN 650 MG: 325 TABLET ORAL at 13:42

## 2024-06-27 RX ADMIN — CARVEDILOL 25 MG: 25 TABLET, FILM COATED ORAL at 09:28

## 2024-06-27 RX ADMIN — AMLODIPINE BESYLATE 10 MG: 10 TABLET ORAL at 09:28

## 2024-06-27 RX ADMIN — POTASSIUM CHLORIDE 20 MEQ: 1500 TABLET, EXTENDED RELEASE ORAL at 13:42

## 2024-06-27 RX ADMIN — PANTOPRAZOLE SODIUM 40 MG: 40 TABLET, DELAYED RELEASE ORAL at 09:28

## 2024-06-27 RX ADMIN — FLECAINIDE ACETATE 50 MG: 50 TABLET ORAL at 09:28

## 2024-06-27 RX ADMIN — HYDRALAZINE HYDROCHLORIDE 25 MG: 50 TABLET, FILM COATED ORAL at 13:41

## 2024-06-27 RX ADMIN — ENOXAPARIN SODIUM 60 MG: 100 INJECTION SUBCUTANEOUS at 09:28

## 2024-06-27 RX ADMIN — SODIUM CHLORIDE, PRESERVATIVE FREE 10 ML: 5 INJECTION INTRAVENOUS at 09:29

## 2024-06-27 ASSESSMENT — PAIN - FUNCTIONAL ASSESSMENT: PAIN_FUNCTIONAL_ASSESSMENT: ACTIVITIES ARE NOT PREVENTED

## 2024-06-27 ASSESSMENT — PAIN DESCRIPTION - ORIENTATION: ORIENTATION: LEFT;ANTERIOR

## 2024-06-27 ASSESSMENT — PAIN DESCRIPTION - LOCATION
LOCATION: CHEST
LOCATION: INCISION

## 2024-06-27 ASSESSMENT — PAIN DESCRIPTION - DESCRIPTORS
DESCRIPTORS: ACHING
DESCRIPTORS: TENDER

## 2024-06-27 ASSESSMENT — PAIN SCALES - GENERAL
PAINLEVEL_OUTOF10: 1
PAINLEVEL_OUTOF10: 3
PAINLEVEL_OUTOF10: 7

## 2024-06-27 NOTE — PLAN OF CARE
Problem: Discharge Planning  Goal: Discharge to home or other facility with appropriate resources  6/14/2024 1618 by Paula Fernandez, RN  Outcome: Progressing  6/14/2024 0535 by Zahraa Matthew  Outcome: Progressing     Problem: Skin/Tissue Integrity  Goal: Absence of new skin breakdown  Description: 1.  Monitor for areas of redness and/or skin breakdown  2.  Assess vascular access sites hourly  3.  Every 4-6 hours minimum:  Change oxygen saturation probe site  4.  Every 4-6 hours:  If on nasal continuous positive airway pressure, respiratory therapy assess nares and determine need for appliance change or resting period.  6/14/2024 1618 by Paula Fernandez, RN  Outcome: Progressing  6/14/2024 0535 by Zahraa Matthew  Outcome: Progressing     Problem: Safety - Adult  Goal: Free from fall injury  6/14/2024 1618 by Paula Fernandez, RN  Outcome: Progressing  6/14/2024 0535 by Zahraa Matthew  Outcome: Progressing     Problem: ABCDS Injury Assessment  Goal: Absence of physical injury  6/14/2024 1618 by Paula Fernandez, RN  Outcome: Progressing  6/14/2024 0535 by Zahraa Matthew  Outcome: Progressing     
  Problem: Discharge Planning  Goal: Discharge to home or other facility with appropriate resources  6/19/2024 0202 by Bernie Viera RN  Outcome: Progressing  6/18/2024 1215 by Yoan Taylor RN  Outcome: Progressing     Problem: Skin/Tissue Integrity  Goal: Absence of new skin breakdown  Description: 1.  Monitor for areas of redness and/or skin breakdown  2.  Assess vascular access sites hourly  3.  Every 4-6 hours minimum:  Change oxygen saturation probe site  4.  Every 4-6 hours:  If on nasal continuous positive airway pressure, respiratory therapy assess nares and determine need for appliance change or resting period.  6/19/2024 0202 by Bernie Viera RN  Outcome: Progressing  6/18/2024 1215 by Yoan Taylor RN  Outcome: Progressing     Problem: Safety - Adult  Goal: Free from fall injury  6/19/2024 0202 by Bernie Viera RN  Outcome: Progressing  6/18/2024 1215 by Yoan Taylor RN  Outcome: Progressing     Problem: ABCDS Injury Assessment  Goal: Absence of physical injury  6/19/2024 0202 by Bernie Viera RN  Outcome: Progressing  6/18/2024 1215 by Yoan Taylor RN  Outcome: Progressing     Problem: Pain  Goal: Verbalizes/displays adequate comfort level or baseline comfort level  6/19/2024 0202 by Bernie Viera RN  Outcome: Progressing  6/18/2024 1215 by Yoan Taylor RN  Outcome: Progressing  Flowsheets (Taken 6/18/2024 0735)  Verbalizes/displays adequate comfort level or baseline comfort level: Encourage patient to monitor pain and request assistance     Problem: Respiratory - Adult  Goal: Achieves optimal ventilation and oxygenation  6/19/2024 0202 by Bernie Viera RN  Outcome: Progressing  6/18/2024 1215 by Yoan Taylor RN  Outcome: Progressing  Flowsheets (Taken 6/18/2024 0800)  Achieves optimal ventilation and oxygenation: Assess for changes in respiratory status     Problem: Chronic Conditions and Co-morbidities  Goal: Patient's chronic conditions and co-morbidity symptoms are monitored and 
  Problem: Discharge Planning  Goal: Discharge to home or other facility with appropriate resources  6/20/2024 2231 by Garrett Marques RN  Outcome: Progressing     Problem: Skin/Tissue Integrity  Goal: Absence of new skin breakdown  Description: 1.  Monitor for areas of redness and/or skin breakdown  2.  Assess vascular access sites hourly  3.  Every 4-6 hours minimum:  Change oxygen saturation probe site  4.  Every 4-6 hours:  If on nasal continuous positive airway pressure, respiratory therapy assess nares and determine need for appliance change or resting period.  6/20/2024 2231 by Garrett Marques RN  Outcome: Progressing     Problem: Safety - Adult  Goal: Free from fall injury  6/20/2024 2231 by Garrett Marques RN  Outcome: Progressing     Problem: ABCDS Injury Assessment  Goal: Absence of physical injury  6/20/2024 2231 by Garrett Marques RN  Outcome: Progressing     Problem: Pain  Goal: Verbalizes/displays adequate comfort level or baseline comfort level  6/20/2024 2231 by Garrett Marques RN  Outcome: Progressing     Problem: Respiratory - Adult  Goal: Achieves optimal ventilation and oxygenation  6/20/2024 2231 by Garrett Marques RN  Outcome: Progressing     Problem: Chronic Conditions and Co-morbidities  Goal: Patient's chronic conditions and co-morbidity symptoms are monitored and maintained or improved  6/20/2024 2231 by Garrett Marques RN  Outcome: Progressing     
  Problem: Discharge Planning  Goal: Discharge to home or other facility with appropriate resources  6/27/2024 0223 by Ganesh Chin RN  Outcome: Progressing  Flowsheets (Taken 6/26/2024 2000)  Discharge to home or other facility with appropriate resources:   Identify barriers to discharge with patient and caregiver   Arrange for needed discharge resources and transportation as appropriate   Identify discharge learning needs (meds, wound care, etc)  6/26/2024 1548 by Sheryl Crabtree RN  Outcome: Progressing     Problem: Skin/Tissue Integrity  Goal: Absence of new skin breakdown  Description: 1.  Monitor for areas of redness and/or skin breakdown  2.  Assess vascular access sites hourly  3.  Every 4-6 hours minimum:  Change oxygen saturation probe site  4.  Every 4-6 hours:  If on nasal continuous positive airway pressure, respiratory therapy assess nares and determine need for appliance change or resting period.  6/27/2024 0223 by Ganesh Chin RN  Outcome: Progressing  6/26/2024 1548 by Sheryl Crabtree RN  Outcome: Progressing     Problem: Safety - Adult  Goal: Free from fall injury  6/27/2024 0223 by Ganesh Chin RN  Outcome: Progressing  6/26/2024 1548 by Sheryl Crabtree RN  Outcome: Progressing     Problem: ABCDS Injury Assessment  Goal: Absence of physical injury  6/27/2024 0223 by Ganesh Chin RN  Outcome: Progressing  Flowsheets (Taken 6/26/2024 2000)  Absence of Physical Injury: Implement safety measures based on patient assessment  6/26/2024 1548 by Sheryl Crabtree RN  Outcome: Progressing     Problem: Pain  Goal: Verbalizes/displays adequate comfort level or baseline comfort level  6/27/2024 0223 by Ganesh Chin RN  Outcome: Progressing  6/26/2024 1548 by Sheryl Crabtree RN  Outcome: Progressing     Problem: Respiratory - Adult  Goal: Achieves optimal ventilation and oxygenation  6/27/2024 0223 by Ganesh Chin RN  Outcome: Progressing  6/26/2024 
  Problem: Discharge Planning  Goal: Discharge to home or other facility with appropriate resources  Outcome: Progressing     Problem: Skin/Tissue Integrity  Goal: Absence of new skin breakdown  Description: 1.  Monitor for areas of redness and/or skin breakdown  2.  Assess vascular access sites hourly  3.  Every 4-6 hours minimum:  Change oxygen saturation probe site  4.  Every 4-6 hours:  If on nasal continuous positive airway pressure, respiratory therapy assess nares and determine need for appliance change or resting period.  Outcome: Progressing     Problem: Safety - Adult  Goal: Free from fall injury  Outcome: Progressing     Problem: ABCDS Injury Assessment  Goal: Absence of physical injury  Outcome: Progressing     
  Problem: Discharge Planning  Goal: Discharge to home or other facility with appropriate resources  Outcome: Progressing     Problem: Skin/Tissue Integrity  Goal: Absence of new skin breakdown  Description: 1.  Monitor for areas of redness and/or skin breakdown  2.  Assess vascular access sites hourly  3.  Every 4-6 hours minimum:  Change oxygen saturation probe site  4.  Every 4-6 hours:  If on nasal continuous positive airway pressure, respiratory therapy assess nares and determine need for appliance change or resting period.  Outcome: Progressing     Problem: Safety - Adult  Goal: Free from fall injury  Outcome: Progressing     Problem: ABCDS Injury Assessment  Goal: Absence of physical injury  Outcome: Progressing     
  Problem: Discharge Planning  Goal: Discharge to home or other facility with appropriate resources  Outcome: Progressing     Problem: Skin/Tissue Integrity  Goal: Absence of new skin breakdown  Description: 1.  Monitor for areas of redness and/or skin breakdown  2.  Assess vascular access sites hourly  3.  Every 4-6 hours minimum:  Change oxygen saturation probe site  4.  Every 4-6 hours:  If on nasal continuous positive airway pressure, respiratory therapy assess nares and determine need for appliance change or resting period.  Outcome: Progressing     Problem: Safety - Adult  Goal: Free from fall injury  Outcome: Progressing     Problem: ABCDS Injury Assessment  Goal: Absence of physical injury  Outcome: Progressing     Problem: Pain  Goal: Verbalizes/displays adequate comfort level or baseline comfort level  Outcome: Progressing     
  Problem: Discharge Planning  Goal: Discharge to home or other facility with appropriate resources  Outcome: Progressing     Problem: Skin/Tissue Integrity  Goal: Absence of new skin breakdown  Description: 1.  Monitor for areas of redness and/or skin breakdown  2.  Assess vascular access sites hourly  3.  Every 4-6 hours minimum:  Change oxygen saturation probe site  4.  Every 4-6 hours:  If on nasal continuous positive airway pressure, respiratory therapy assess nares and determine need for appliance change or resting period.  Outcome: Progressing     Problem: Safety - Adult  Goal: Free from fall injury  Outcome: Progressing     Problem: ABCDS Injury Assessment  Goal: Absence of physical injury  Outcome: Progressing     Problem: Pain  Goal: Verbalizes/displays adequate comfort level or baseline comfort level  Outcome: Progressing     Problem: Respiratory - Adult  Goal: Achieves optimal ventilation and oxygenation  6/26/2024 1548 by Sheryl Crabtree RN  Outcome: Progressing  6/26/2024 1447 by Meredith Santillan, RCP  Outcome: Progressing     
  Problem: Discharge Planning  Goal: Discharge to home or other facility with appropriate resources  Outcome: Progressing     Problem: Skin/Tissue Integrity  Goal: Absence of new skin breakdown  Description: 1.  Monitor for areas of redness and/or skin breakdown  2.  Assess vascular access sites hourly  3.  Every 4-6 hours minimum:  Change oxygen saturation probe site  4.  Every 4-6 hours:  If on nasal continuous positive airway pressure, respiratory therapy assess nares and determine need for appliance change or resting period.  Outcome: Progressing     Problem: Safety - Adult  Goal: Free from fall injury  Outcome: Progressing     Problem: ABCDS Injury Assessment  Goal: Absence of physical injury  Outcome: Progressing     Problem: Pain  Goal: Verbalizes/displays adequate comfort level or baseline comfort level  Outcome: Progressing     Problem: Respiratory - Adult  Goal: Achieves optimal ventilation and oxygenation  Outcome: Progressing     Problem: Chronic Conditions and Co-morbidities  Goal: Patient's chronic conditions and co-morbidity symptoms are monitored and maintained or improved  Outcome: Progressing     
  Problem: Discharge Planning  Goal: Discharge to home or other facility with appropriate resources  Outcome: Progressing     Problem: Skin/Tissue Integrity  Goal: Absence of new skin breakdown  Description: 1.  Monitor for areas of redness and/or skin breakdown  2.  Assess vascular access sites hourly  3.  Every 4-6 hours minimum:  Change oxygen saturation probe site  4.  Every 4-6 hours:  If on nasal continuous positive airway pressure, respiratory therapy assess nares and determine need for appliance change or resting period.  Outcome: Progressing     Problem: Safety - Adult  Goal: Free from fall injury  Outcome: Progressing     Problem: ABCDS Injury Assessment  Goal: Absence of physical injury  Outcome: Progressing     Problem: Pain  Goal: Verbalizes/displays adequate comfort level or baseline comfort level  Outcome: Progressing     Problem: Respiratory - Adult  Goal: Achieves optimal ventilation and oxygenation  Outcome: Progressing     Problem: Chronic Conditions and Co-morbidities  Goal: Patient's chronic conditions and co-morbidity symptoms are monitored and maintained or improved  Outcome: Progressing     Problem: Nutrition Deficit:  Goal: Optimize nutritional status  6/25/2024 1724 by Sasha Robledo RN  Outcome: Progressing  6/25/2024 1405 by Zahraa Youngblood RD  Outcome: Progressing  Flowsheets (Taken 6/25/2024 1354)  Nutrient intake appropriate for improving, restoring, or maintaining nutritional needs:   Assess nutritional status and recommend course of action   Monitor oral intake, labs, and treatment plans   Recommend appropriate diets, oral nutritional supplements, and vitamin/mineral supplements     
  Problem: Discharge Planning  Goal: Discharge to home or other facility with appropriate resources  Outcome: Progressing     Problem: Skin/Tissue Integrity  Goal: Absence of new skin breakdown  Description: 1.  Monitor for areas of redness and/or skin breakdown  2.  Assess vascular access sites hourly  3.  Every 4-6 hours minimum:  Change oxygen saturation probe site  4.  Every 4-6 hours:  If on nasal continuous positive airway pressure, respiratory therapy assess nares and determine need for appliance change or resting period.  Outcome: Progressing     Problem: Safety - Adult  Goal: Free from fall injury  Outcome: Progressing     Problem: ABCDS Injury Assessment  Goal: Absence of physical injury  Outcome: Progressing  Flowsheets (Taken 6/20/2024 1214)  Absence of Physical Injury: Implement safety measures based on patient assessment     Problem: Pain  Goal: Verbalizes/displays adequate comfort level or baseline comfort level  Outcome: Progressing     Problem: Respiratory - Adult  Goal: Achieves optimal ventilation and oxygenation  Outcome: Progressing     Problem: Chronic Conditions and Co-morbidities  Goal: Patient's chronic conditions and co-morbidity symptoms are monitored and maintained or improved  Outcome: Progressing     
  Problem: Discharge Planning  Goal: Discharge to home or other facility with appropriate resources  Outcome: Progressing  Flowsheets (Taken 6/16/2024 1500 by Lyudmila Vega, RN)  Discharge to home or other facility with appropriate resources:   Identify barriers to discharge with patient and caregiver   Refer to discharge planning if patient needs post-hospital services based on physician order or complex needs related to functional status, cognitive ability or social support system     Problem: Skin/Tissue Integrity  Goal: Absence of new skin breakdown  Description: 1.  Monitor for areas of redness and/or skin breakdown  2.  Assess vascular access sites hourly  3.  Every 4-6 hours minimum:  Change oxygen saturation probe site  4.  Every 4-6 hours:  If on nasal continuous positive airway pressure, respiratory therapy assess nares and determine need for appliance change or resting period.  Outcome: Progressing     Problem: Safety - Adult  Goal: Free from fall injury  Outcome: Progressing  Flowsheets (Taken 6/16/2024 2149)  Free From Fall Injury: Instruct family/caregiver on patient safety     Problem: ABCDS Injury Assessment  Goal: Absence of physical injury  Outcome: Progressing  Flowsheets (Taken 6/16/2024 2149)  Absence of Physical Injury: Implement safety measures based on patient assessment     Problem: Pain  Goal: Verbalizes/displays adequate comfort level or baseline comfort level  Outcome: Progressing     Problem: Respiratory - Adult  Goal: Achieves optimal ventilation and oxygenation  6/17/2024 0212 by Apolonia Gallegos RN  Outcome: Progressing     Problem: Chronic Conditions and Co-morbidities  Goal: Patient's chronic conditions and co-morbidity symptoms are monitored and maintained or improved  Outcome: Progressing  Flowsheets (Taken 6/16/2024 1500 by Lyudmila Vega, RN)  Care Plan - Patient's Chronic Conditions and Co-Morbidity Symptoms are Monitored and Maintained or Improved:   Monitor and assess patient's 
  Problem: Discharge Planning  Goal: Discharge to home or other facility with appropriate resources  Outcome: Progressing  Flowsheets (Taken 6/17/2024 0800 by Lyudmila Vega, RN)  Discharge to home or other facility with appropriate resources:   Identify barriers to discharge with patient and caregiver   Refer to discharge planning if patient needs post-hospital services based on physician order or complex needs related to functional status, cognitive ability or social support system     Problem: Skin/Tissue Integrity  Goal: Absence of new skin breakdown  Description: 1.  Monitor for areas of redness and/or skin breakdown  2.  Assess vascular access sites hourly  3.  Every 4-6 hours minimum:  Change oxygen saturation probe site  4.  Every 4-6 hours:  If on nasal continuous positive airway pressure, respiratory therapy assess nares and determine need for appliance change or resting period.  Outcome: Progressing     Problem: Safety - Adult  Goal: Free from fall injury  Outcome: Progressing     Problem: ABCDS Injury Assessment  Goal: Absence of physical injury  Outcome: Progressing     Problem: Pain  Goal: Verbalizes/displays adequate comfort level or baseline comfort level  Outcome: Progressing     Problem: Respiratory - Adult  Goal: Achieves optimal ventilation and oxygenation  6/17/2024 2126 by Pearl Gill, RN  Outcome: Progressing  6/17/2024 2013 by Bernadette Washington RCP  Outcome: Progressing  Flowsheets (Taken 6/17/2024 0800 by Lyudmila Vega, RN)  Achieves optimal ventilation and oxygenation:   Assess for changes in respiratory status   Assess for changes in mentation and behavior   Position to facilitate oxygenation and minimize respiratory effort   Oxygen supplementation based on oxygen saturation or arterial blood gases   Assess and instruct to report shortness of breath or any respiratory difficulty   Respiratory therapy support as indicated     Problem: Chronic Conditions and Co-morbidities  Goal: 
  Problem: Discharge Planning  Goal: Discharge to home or other facility with appropriate resources  Outcome: Progressing  Flowsheets (Taken 6/21/2024 0800 by Jazzy Matos, RN)  Discharge to home or other facility with appropriate resources:   Identify barriers to discharge with patient and caregiver   Arrange for needed discharge resources and transportation as appropriate   Identify discharge learning needs (meds, wound care, etc)     Problem: Skin/Tissue Integrity  Goal: Absence of new skin breakdown  Description: 1.  Monitor for areas of redness and/or skin breakdown  2.  Assess vascular access sites hourly  3.  Every 4-6 hours minimum:  Change oxygen saturation probe site  4.  Every 4-6 hours:  If on nasal continuous positive airway pressure, respiratory therapy assess nares and determine need for appliance change or resting period.  Outcome: Progressing     Problem: Safety - Adult  Goal: Free from fall injury  Outcome: Progressing     Problem: ABCDS Injury Assessment  Goal: Absence of physical injury  Outcome: Progressing     Problem: Pain  Goal: Verbalizes/displays adequate comfort level or baseline comfort level  Outcome: Progressing     Problem: Respiratory - Adult  Goal: Achieves optimal ventilation and oxygenation  Outcome: Progressing     Problem: Chronic Conditions and Co-morbidities  Goal: Patient's chronic conditions and co-morbidity symptoms are monitored and maintained or improved  Outcome: Progressing  Flowsheets (Taken 6/21/2024 0800 by Jazzy Matos, RN)  Care Plan - Patient's Chronic Conditions and Co-Morbidity Symptoms are Monitored and Maintained or Improved:   Monitor and assess patient's chronic conditions and comorbid symptoms for stability, deterioration, or improvement   Collaborate with multidisciplinary team to address chronic and comorbid conditions and prevent exacerbation or deterioration     Problem: Nutrition Deficit:  Goal: Optimize nutritional status  Outcome: Progressing   
  Problem: Discharge Planning  Goal: Discharge to home or other facility with appropriate resources  Outcome: Progressing  Flowsheets (Taken 6/22/2024 0800 by Jazzy Matos, RN)  Discharge to home or other facility with appropriate resources:   Identify barriers to discharge with patient and caregiver   Arrange for needed discharge resources and transportation as appropriate   Identify discharge learning needs (meds, wound care, etc)     Problem: Skin/Tissue Integrity  Goal: Absence of new skin breakdown  Description: 1.  Monitor for areas of redness and/or skin breakdown  2.  Assess vascular access sites hourly  3.  Every 4-6 hours minimum:  Change oxygen saturation probe site  4.  Every 4-6 hours:  If on nasal continuous positive airway pressure, respiratory therapy assess nares and determine need for appliance change or resting period.  Outcome: Progressing     Problem: Safety - Adult  Goal: Free from fall injury  Outcome: Progressing  Flowsheets (Taken 6/22/2024 0800 by Jazzy Matos, RN)  Free From Fall Injury: Instruct family/caregiver on patient safety     Problem: ABCDS Injury Assessment  Goal: Absence of physical injury  Outcome: Progressing  Flowsheets (Taken 6/22/2024 0800 by Jazzy Matos, RN)  Absence of Physical Injury: Implement safety measures based on patient assessment     Problem: Pain  Goal: Verbalizes/displays adequate comfort level or baseline comfort level  Outcome: Progressing     Problem: Respiratory - Adult  Goal: Achieves optimal ventilation and oxygenation  Outcome: Progressing     Problem: Chronic Conditions and Co-morbidities  Goal: Patient's chronic conditions and co-morbidity symptoms are monitored and maintained or improved  Outcome: Progressing  Flowsheets (Taken 6/22/2024 0800 by Jazzy Matos, RN)  Care Plan - Patient's Chronic Conditions and Co-Morbidity Symptoms are Monitored and Maintained or Improved:   Monitor and assess patient's chronic conditions and comorbid 
  Problem: Respiratory - Adult  Goal: Achieves optimal ventilation and oxygenation  6/16/2024 1953 by Bernadette Washington RCP  Outcome: Progressing  NONINVASIVE VENTILATION    PROVIDE OPTIMAL VENTILATION/ACCEPTABLE SPO2   IMPLEMENT NONINVASIVE VENTILATION PROTOCOL   MAINTAIN ACCEPTABLE SPO2   ASSESS SKIN INTEGRITY/BREAKDOWN SCORE   PATIENT EDUCATION AS NEEDED   BIPAP AS NEEDED      PROVIDE ADEQUATE OXYGENATION WITH ACCEPTABLE SP02/ABG'S    [x]  IDENTIFY APPROPRIATE OXYGEN THERAPY  [x]   MONITOR SP02/ABG'S AS NEEDED   [x]   PATIENT EDUCATION AS NEEDED    
  Problem: Respiratory - Adult  Goal: Achieves optimal ventilation and oxygenation  6/20/2024 0149 by Arron Real RCP  Outcome: Progressing   NON INVASIVE VENTILATION  PROVIDE OPTIMAL VENTILATION/ACCEPTABLE SP02  IMPLEMENT NON INVASIVE VENTILATION PROTOCOL  ASSESSMENT SKIN INTEGRITY  PATIENT EDUCATION AS NEEDED  BIPAP AS NEEDED  
  Problem: Respiratory - Adult  Goal: Achieves optimal ventilation and oxygenation  Flowsheets (Taken 6/16/2024 0921)  Achieves optimal ventilation and oxygenation:   Assess for changes in respiratory status   Respiratory therapy support as indicated   Assess for changes in mentation and behavior   Oxygen supplementation based on oxygen saturation or arterial blood gases   Encourage broncho-pulmonary hygiene including cough, deep breathe, incentive spirometry   Assess and instruct to report shortness of breath or any respiratory difficulty     
  Problem: Respiratory - Adult  Goal: Achieves optimal ventilation and oxygenation  Outcome: Progressing     
  Problem: Respiratory - Adult  Goal: Achieves optimal ventilation and oxygenation  Outcome: Progressing  PROVIDE ADEQUATE OXYGENATION WITH ACCEPTABLE SP02/ABG'S    [x]  IDENTIFY APPROPRIATE OXYGEN THERAPY  [x]   MONITOR SP02/ABG'S AS NEEDED   [x]   PATIENT EDUCATION AS NEEDED       
BRONCHOSPASM/BRONCHOCONSTRICTION     [x]         IMPROVE AERATION/BREATH SOUNDS  [x]   ADMINISTER BRONCHODILATOR THERAPY AS APPROPRIATE  [x]   ASSESS BREATH SOUNDS  []   IMPLEMENT AEROSOL/MDI PROTOCOL  [x]   PATIENT EDUCATION AS NEEDED      NON INVASIVE VENTILATION  PROVIDE OPTIMAL VENTILATION/ACCEPTABLE SP02  IMPLEMENT NON INVASIVE VENTILATION PROTOCOL  ASSESSMENT SKIN INTEGRITY  PATIENT EDUCATION AS NEEDED  BIPAP AS NEEDED      PROVIDE ADEQUATE OXYGENATION WITH ACCEPTABLE SP02/ABG'S    [x]  IDENTIFY APPROPRIATE OXYGEN THERAPY  [x]   MONITOR SP02/ABG'S AS NEEDED   [x]   PATIENT EDUCATION AS NEEDED    
PROVIDE ADEQUATE OXYGENATION WITH ACCEPTABLE SP02/ABG'S    [x]  IDENTIFY APPROPRIATE OXYGEN THERAPY  [x]   MONITOR SP02/ABG'S AS NEEDED   [x]   PATIENT EDUCATION AS NEEDED      
PROVIDE ADEQUATE OXYGENATION WITH ACCEPTABLE SP02/ABG'S    [x]  IDENTIFY APPROPRIATE OXYGEN THERAPY  [x]   MONITOR SP02/ABG'S AS NEEDED   [x]   PATIENT EDUCATION AS NEEDED    NONINVASIVE VENTILATION    PROVIDE OPTIMAL VENTILATION/ACCEPTABLE SPO2   IMPLEMENT NONINVASIVE VENTILATION PROTOCOL   MAINTAIN ACCEPTABLE SPO2   ASSESS SKIN INTEGRITY/BREAKDOWN SCORE   PATIENT EDUCATION AS NEEDED   BIPAP AS NEEDED        
Patient was evaluated this morning. Good output from nephrostomy. Renal ultrasound shows now hydronephrosis. Increase in creatinine is not urological in nature. Patient to follow-up with Dr. Pardo as an outpatient to manage her bladder cancer.    Thank you for allowing us to take part in Daja Espinosa's care. Please notify urology with any additional concerns.  
Problem: Discharge Planning  Goal: Discharge to home or other facility with appropriate resources  Outcome: Progressing     Problem: Skin/Tissue Integrity  Goal: Absence of new skin breakdown  Description: 1.  Monitor for areas of redness and/or skin breakdown  2.  Assess vascular access sites hourly  3.  Every 4-6 hours minimum:  Change oxygen saturation probe site  4.  Every 4-6 hours:  If on nasal continuous positive airway pressure, respiratory therapy assess nares and determine need for appliance change or resting period.  Outcome: Progressing     Problem: Safety - Adult  Goal: Free from fall injury  Outcome: Progressing     Problem: ABCDS Injury Assessment  Goal: Absence of physical injury  Outcome: Progressing     Problem: Pain  Goal: Verbalizes/displays adequate comfort level or baseline comfort level  Outcome: Progressing     Problem: Respiratory - Adult  Goal: Achieves optimal ventilation and oxygenation  Outcome: Progressing     Problem: Chronic Conditions and Co-morbidities  Goal: Patient's chronic conditions and co-morbidity symptoms are monitored and maintained or improved  Outcome: Progressing     
1915)  Verbalizes/displays adequate comfort level or baseline comfort level:   Encourage patient to monitor pain and request assistance   Assess pain using appropriate pain scale   Administer analgesics based on type and severity of pain and evaluate response   Implement non-pharmacological measures as appropriate and evaluate response   Notify Licensed Independent Practitioner if interventions unsuccessful or patient reports new pain     Problem: Respiratory - Adult  Goal: Achieves optimal ventilation and oxygenation  Outcome: Progressing  Flowsheets (Taken 6/23/2024 2000)  Achieves optimal ventilation and oxygenation:   Assess for changes in respiratory status   Assess for changes in mentation and behavior   Position to facilitate oxygenation and minimize respiratory effort     Problem: Chronic Conditions and Co-morbidities  Goal: Patient's chronic conditions and co-morbidity symptoms are monitored and maintained or improved  Outcome: Progressing  Flowsheets  Taken 6/23/2024 2000 by Yasmin Winslow RN  Care Plan - Patient's Chronic Conditions and Co-Morbidity Symptoms are Monitored and Maintained or Improved:   Monitor and assess patient's chronic conditions and comorbid symptoms for stability, deterioration, or improvement   Collaborate with multidisciplinary team to address chronic and comorbid conditions and prevent exacerbation or deterioration   Update acute care plan with appropriate goals if chronic or comorbid symptoms are exacerbated and prevent overall improvement and discharge  Taken 6/23/2024 0800 by Jazzy Matos RN  Care Plan - Patient's Chronic Conditions and Co-Morbidity Symptoms are Monitored and Maintained or Improved:   Monitor and assess patient's chronic conditions and comorbid symptoms for stability, deterioration, or improvement   Collaborate with multidisciplinary team to address chronic and comorbid conditions and prevent exacerbation or deterioration     Problem: Nutrition 
Adult  Goal: Achieves optimal ventilation and oxygenation  6/27/2024 1130 by Yoan Taylor RN  Outcome: Completed  6/27/2024 0223 by Ganesh Chin RN  Outcome: Progressing     Problem: Chronic Conditions and Co-morbidities  Goal: Patient's chronic conditions and co-morbidity symptoms are monitored and maintained or improved  6/27/2024 1130 by Yoan Taylor RN  Outcome: Completed  6/27/2024 0223 by Ganesh Chin RN  Outcome: Progressing  Flowsheets (Taken 6/26/2024 2000)  Care Plan - Patient's Chronic Conditions and Co-Morbidity Symptoms are Monitored and Maintained or Improved:   Monitor and assess patient's chronic conditions and comorbid symptoms for stability, deterioration, or improvement   Collaborate with multidisciplinary team to address chronic and comorbid conditions and prevent exacerbation or deterioration   Update acute care plan with appropriate goals if chronic or comorbid symptoms are exacerbated and prevent overall improvement and discharge     Problem: Nutrition Deficit:  Goal: Optimize nutritional status  6/27/2024 1130 by Yoan Taylor RN  Outcome: Completed  6/27/2024 0223 by Ganesh Chin RN  Outcome: Progressing     
conditions and prevent exacerbation or deterioration   Update acute care plan with appropriate goals if chronic or comorbid symptoms are exacerbated and prevent overall improvement and discharge     Problem: Nutrition Deficit:  Goal: Optimize nutritional status  Outcome: Progressing

## 2024-06-27 NOTE — DISCHARGE INSTRUCTIONS
Follow up with your PCP in 3 days. Call for an appointment as soon as possible.  - Obtain BMP on on Monday and Thursday for the next 2 weeks and have the lab send results to Dr. Alex Silva.   Follow-up with specialist as instructed.  Call for an appointment as soon as possible.  Medications as instructed.  Return to the emergency department immediately for any new or worsening concerns.    Call [employer] [phone] when you get home to have walker and oxygen concentrator delivered to your home.

## 2024-06-27 NOTE — DISCHARGE SUMMARY
[facility]  Office: [phone]  Deven Louis DO, Rashid Martinez DO, Koko House DO, Jc Wheat DO, Bahman Silva MD, Radha Joseph MD, Volodymyr Abad MD, Karyn Purvis MD,  Siva Helton MD, Jigna Emery MD, Mayur Tinajero MD,  Danni Lamas DO, Gayle Guzmán MD, Micha Sosa MD, Lloyd Louis DO, No Lutz MD,  Ganesh Lee DO, Brittani Francisco MD, Sherrell Hickey MD, Kayla Roberson MD, Macarena Martin MD,  Gerardo Pate MD, Daisy Stephenson MD, Kelly Connor MD, Clau Calderon MD, Aurelio Pérez MD, Eliceo Milligan MD, Talib Rodriguez DO, Yusef Lopez DO, Liban Williamson MD,  Franky Trinh MD, Shirley Waterhouse, CNP,  Savanah Diez CNP, Arias Lares, CNP,  Brenda Daniels, DNP, Phoebe Morris, CNP, Morelia Howard, CNP, Meaghan Rogers CNP, Neris Del Toro, CNP, Krystal Morrow, PA-C, Ely Guevara PA-C, Nancy Tam, CNP, Marcy Simeon, CNP, Cody Steele, CNP, Aviva Hartman, CNP, Isela Montaño, CNP, Marj Figueroa, CNS, Keri Clayton, CNP, Anahi Carnes CNP, Tracy Schwab, CNP         [facility]   IN-PATIENT SERVICE   [facility]    Discharge Summary     Patient ID: Daja Espinosa  :  1960   MRN: [id number]     ACCOUNT:  [id number]   Patient's PCP: Sohail Garcia MD  Admit Date: 2024   Discharge Date: 2024     Length of Stay: 13  Code Status:  Full Code  Admitting Physician: Gilberto Garcia MD  Discharge Physician: Ganesh  Jesus, DO     Active Discharge Diagnoses:     Hospital Problem Lists:  Principal Problem:    Pneumonia, unspecified organism  Active Problems:    Atrial fibrillation with rapid ventricular response (HCC)    Hypertension, essential    Hydronephrosis of left kidney    Anemia    Acute renal failure with tubular necrosis (HCC)    Hospital-acquired bacterial pneumonia    Diffuse large B-cell lymphoma of lymph nodes of multiple regions (HCC)    Acute respiratory failure with hypoxia (HCC)    New

## 2024-06-27 NOTE — CARE COORDINATION
Transitional planning    Called SW/HD  [phone] and requested call back.     1121  told Dr Lee patient needs RW order and face to face. He relates patient has had nephrostomy tube before. Nephrology says no HD tomorrow, numbers improving-re evaluate Monday.     1125 Spoke to patient and her  at bedside about plan for discharge. Plan is home with . She declines home care. Just wants education on how to take care of nephrostomy tube herself. Patient needs home oxygen evaluation. Patient needs walker. She said okay to have delivered to her home. Her  will help her home and into house. Patient confirms [facility] is place that SW told her for HD.     1130 Khadra returned phone call and left  that Patient is set with HD , patient has been educated.     1406 Spoke to patient and told her home oxygen eval does qualify her for oxygen. Discussed options for dme company.     1408 Called Donato with [facility] to be sure they can deliver to patient home in [employer] and he said yes. DMe order for Walker, and oxygen along with face sheet and face to face faxed to [facility] [phone] 1420 patient given portable oxygen tank from  office (from [facility]). Instructed her to call [facility] when she gets home to get walker and oxygen tank delivered.     6506 Jagdeep from [facility] called and he said they do not have a walker at [place] office and cannot deliver a walker to her home today. May be able to deliver tomorrow or Monday.    1530 CM updated patient about walker delivery. She verbalized understanding.

## 2024-06-27 NOTE — CARE COORDINATION
SHANTHI received call from WALLY Lopez RN to confirm pt dialysis spot. SHANTHI called back and left VM stating pt is scheduled and confirmed to begin outpatient dialysis at discharge. SHANTHI provided pt and family treatment schedule on 6/25/24.

## 2024-06-27 NOTE — PROGRESS NOTES
PULMONARY PROGRESS NOTE      Patient:  Daja Espinosa  YOB: 1960    MRN: [id number]     Acct: [id number]     Admit date: 6/14/2024    REASON FOR CONSULT:-   Bilateral pleural effusion s/p thoracentesis on the right with removal of 1.4 L  Multifocal pneumonia    Pt seen and Chart reviewed.    On 3 L of oxygen by nasal cannula  Oxygen saturation is 93%  Did not use any BiPAP last night  Shortness of breath has resolved  Denied any orthopnea  Not much cough or sputum production  No fever  Hemodynamics stable  No chest pain or pressure  No hemoptysis  Fluid balance - 7.1 L  No significant blood-streaked sputum      Subjective:   Review of Systems -   General ROS: Completed and except as mentioned above were negative   Psychological ROS:  Completed and except as mentioned above were negative  Allergy and Immunology ROS:  Completed and except as mentioned above were negative  Hematological and Lymphatic ROS:  Completed and except as mentioned above were negative  Respiratory ROS:  Completed and except as mentioned above were negative  Cardiovascular ROS:  Completed and except as mentioned above were negative  Gastrointestinal ROS: Completed and except as mentioned above were negative  Genito-Urinary ROS:  Completed and except as mentioned above were negative  Musculoskeletal ROS:  Completed and except as mentioned above were negative  Neurological ROS:  Completed and except as mentioned above were negative  Dermatological ROS:  Completed and except as mentioned above were negative      Diet:  ADULT DIET; Regular  ADULT ORAL NUTRITION SUPPLEMENT; Breakfast, Lunch, Dinner; Standard High Calorie/High Protein Oral Supplement      Medications:Current Inpatient    Scheduled Meds:   hydrALAZINE  25 mg Oral 3 times per day    enoxaparin  1 mg/kg SubCUTAneous BID    carvedilol  25 mg Oral BID WC    sodium chloride  2 spray Each Nostril Q6H 
                                                                             PULMONARY PROGRESS NOTE      Patient:  Daja Espinosa  YOB: 1960    MRN: [id number]     Acct: [id number]     Admit date: 6/14/2024    REASON FOR CONSULT:-   Bilateral pleural effusion s/p thoracentesis on the right with removal of 1.4 L  Multifocal pneumonia    Pt seen and Chart reviewed.    On 3 L of oxygen by nasal cannula  Oxygen saturation is 93%  Shortness of breath has resolved  Denied any orthopnea  Not much cough or sputum production  No fever  Hemodynamics stable  No chest pain or pressure  No hemoptysis  Fluid balance -6.7 L  Had a left nephrostomy tube placed on 6/18/2024  No significant blood-streaked sputum      Subjective:   Review of Systems -   General ROS: Completed and except as mentioned above were negative   Psychological ROS:  Completed and except as mentioned above were negative  Allergy and Immunology ROS:  Completed and except as mentioned above were negative  Hematological and Lymphatic ROS:  Completed and except as mentioned above were negative  Respiratory ROS:  Completed and except as mentioned above were negative  Cardiovascular ROS:  Completed and except as mentioned above were negative  Gastrointestinal ROS: Completed and except as mentioned above were negative  Genito-Urinary ROS:  Completed and except as mentioned above were negative  Musculoskeletal ROS:  Completed and except as mentioned above were negative  Neurological ROS:  Completed and except as mentioned above were negative  Dermatological ROS:  Completed and except as mentioned above were negative      Diet:  ADULT DIET; Regular  ADULT ORAL NUTRITION SUPPLEMENT; Breakfast, Lunch, Dinner; Standard High Calorie/High Protein Oral Supplement      Medications:Current Inpatient    Scheduled Meds:   carvedilol  25 mg Oral BID WC    sodium chloride  2 spray Each Nostril Q6H    flecainide  50 mg Oral 2 times per day    sodium chloride flush  
                                                                             PULMONARY PROGRESS NOTE      Patient:  Daja Espinosa  YOB: 1960    MRN: [id number]     Acct: [id number]     Admit date: 6/14/2024    REASON FOR CONSULT:-   Bilateral pleural effusion s/p thoracentesis on the right with removal of 1.4 L  Multifocal pneumonia    Pt seen and Chart reviewed.    On 3 L of oxygen by nasal cannula  Oxygen saturation is 97%  Shortness of breath has resolved  Denied any orthopnea  Not much cough or sputum production  No fever  Hemodynamics stable  No chest pain or pressure  No hemoptysis  Fluid balance -6.2 L  Had a left nephrostomy tube placed on 6/18/2024  Has occasional blood-streaked sputum, better than yesterday    Subjective:   Review of Systems -   General ROS: Completed and except as mentioned above were negative   Psychological ROS:  Completed and except as mentioned above were negative  Allergy and Immunology ROS:  Completed and except as mentioned above were negative  Hematological and Lymphatic ROS:  Completed and except as mentioned above were negative  Respiratory ROS:  Completed and except as mentioned above were negative  Cardiovascular ROS:  Completed and except as mentioned above were negative  Gastrointestinal ROS: Completed and except as mentioned above were negative  Genito-Urinary ROS:  Completed and except as mentioned above were negative  Musculoskeletal ROS:  Completed and except as mentioned above were negative  Neurological ROS:  Completed and except as mentioned above were negative  Dermatological ROS:  Completed and except as mentioned above were negative      Diet:  ADULT DIET; Regular  ADULT ORAL NUTRITION SUPPLEMENT; Breakfast, Lunch, Dinner; Standard High Calorie/High Protein Oral Supplement      Medications:Current Inpatient    Scheduled Meds:   carvedilol  25 mg Oral BID WC    sodium chloride  2 spray Each Nostril Q6H    flecainide  50 mg Oral 2 times per day    
                                                                             PULMONARY PROGRESS NOTE      Patient:  Daja Espinosa  YOB: 1960    MRN: [id number]     Acct: [id number]     Admit date: 6/14/2024    REASON FOR CONSULT:-   Bilateral pleural effusion s/p thoracentesis on the right with removal of 1.4 L  Multifocal pneumonia    Pt seen and Chart reviewed.    On 2 L of oxygen by nasal cannula  Oxygen saturation is 93 %  Shortness of breath has resolved  Denied any orthopnea  Not much cough or sputum production  No fever  Hemodynamics stable  No chest pain or pressure  No hemoptysis  Fluid balance -10.1 L      Subjective:   Review of Systems -   General ROS: Completed and except as mentioned above were negative   Psychological ROS:  Completed and except as mentioned above were negative  Allergy and Immunology ROS:  Completed and except as mentioned above were negative  Hematological and Lymphatic ROS:  Completed and except as mentioned above were negative  Respiratory ROS:  Completed and except as mentioned above were negative  Cardiovascular ROS:  Completed and except as mentioned above were negative  Gastrointestinal ROS: Completed and except as mentioned above were negative  Genito-Urinary ROS:  Completed and except as mentioned above were negative  Musculoskeletal ROS:  Completed and except as mentioned above were negative  Neurological ROS:  Completed and except as mentioned above were negative  Dermatological ROS:  Completed and except as mentioned above were negative      Diet:  ADULT DIET; Regular  ADULT ORAL NUTRITION SUPPLEMENT; Breakfast, Lunch, Dinner; Standard High Calorie/High Protein Oral Supplement      Medications:Current Inpatient    Scheduled Meds:   hydrALAZINE  25 mg Oral 3 times per day    enoxaparin  1 mg/kg SubCUTAneous BID    carvedilol  25 mg Oral BID WC    sodium chloride  2 spray Each Nostril Q6H    flecainide  50 mg Oral 2 times per day    sodium chloride flush  5-40 
                                                                             PULMONARY PROGRESS NOTE      Patient:  Daja Espinosa  YOB: 1960    MRN: [id number]     Acct: [id number]     Admit date: 6/14/2024    REASON FOR CONSULT:-   Bilateral pleural effusion s/p thoracentesis on the right with removal of 1.4 L  Multifocal pneumonia    Pt seen and Chart reviewed.  Patient seen in dialysis  On 3 L of oxygen by nasal cannula, improvement since yesterday  Shortness of breath has resolved  Denied any orthopnea  Not much cough or sputum production  No fever  Hemodynamics stable  Oxygen saturation is 100 %  No chest pain or pressure  No hemoptysis  Fluid balance - 5.1 L  Had a left nephrostomy tube placed on 6/18/2024  Has occasional blood-streaked sputum    Subjective:   Review of Systems -   General ROS: Completed and except as mentioned above were negative   Psychological ROS:  Completed and except as mentioned above were negative  Allergy and Immunology ROS:  Completed and except as mentioned above were negative  Hematological and Lymphatic ROS:  Completed and except as mentioned above were negative  Respiratory ROS:  Completed and except as mentioned above were negative  Cardiovascular ROS:  Completed and except as mentioned above were negative  Gastrointestinal ROS: Completed and except as mentioned above were negative  Genito-Urinary ROS:  Completed and except as mentioned above were negative  Musculoskeletal ROS:  Completed and except as mentioned above were negative  Neurological ROS:  Completed and except as mentioned above were negative  Dermatological ROS:  Completed and except as mentioned above were negative      Diet:  ADULT DIET; Regular      Medications:Current Inpatient    Scheduled Meds:   carvedilol  12.5 mg Oral BID WC    piperacillin-tazobactam  3,375 mg IntraVENous Q12H    sodium chloride  2 spray Each Nostril Q6H    flecainide  50 mg Oral 2 times per day    sodium chloride flush  
                                                                           PULMONARY PROGRESS NOTE      Patient:  Daja Espinosa  YOB: 1960  MRN: [id number]     Acct: [id number]     Admit date: 6/14/2024    REASON FOR CONSULT:-   Bilateral pleural effusion s/p thoracentesis on the right with removal of 1.4 L  Multifocal pneumonia    Pt seen and Chart reviewed.  Afebrile  Hemodynamically stable  On 3 L nasal cannula  No overnight issues reported    Subjective:   Review of Systems -   General ROS: Completed and except as mentioned above were negative   Psychological ROS:  Completed and except as mentioned above were negative  Allergy and Immunology ROS:  Completed and except as mentioned above were negative  Hematological and Lymphatic ROS:  Completed and except as mentioned above were negative  Respiratory ROS:  Completed and except as mentioned above were negative  Cardiovascular ROS:  Completed and except as mentioned above were negative  Gastrointestinal ROS: Completed and except as mentioned above were negative  Genito-Urinary ROS:  Completed and except as mentioned above were negative  Musculoskeletal ROS:  Completed and except as mentioned above were negative  Neurological ROS:  Completed and except as mentioned above were negative  Dermatological ROS:  Completed and except as mentioned above were negative      Diet:  ADULT DIET; Regular  ADULT ORAL NUTRITION SUPPLEMENT; Breakfast, Lunch, Dinner; Standard High Calorie/High Protein Oral Supplement      Medications:Current Inpatient    Scheduled Meds:   hydrALAZINE  25 mg Oral 3 times per day    enoxaparin  1 mg/kg SubCUTAneous BID    carvedilol  25 mg Oral BID WC    sodium chloride  2 spray Each Nostril Q6H    flecainide  50 mg Oral 2 times per day    sodium chloride flush  5-40 mL IntraVENous 2 times per day    amLODIPine  10 mg Oral Daily    atorvastatin  80 mg Oral Nightly    pantoprazole  40 mg Oral QAM AC    [Held by provider] apixaban  5 mg 
          Pharmacy Note     Renal Dose Adjustment    Daja Espinosa is a 63 y.o. female. Pharmacist assessment of renally cleared medications.    Recent Labs     06/11/24  0829 06/13/24  1834   BUN 35* 38*       Recent Labs     06/11/24  0829 06/13/24  1834   CREATININE 2.2* 2.5*       Estimated Creatinine Clearance: 18 mL/min (A) (based on SCr of 2.5 mg/dL (H)).      Height:   Ht Readings from Last 1 Encounters:   06/13/24 1.575 m (5' 2\")     Weight:  Wt Readings from Last 1 Encounters:   06/13/24 57.6 kg (127 lb)       The following medication dose has been adjusted based upon renal function per P&T Guidelines:             Zosyn changed to 3.375 grams IVPB q12h (extended infusion)  Lovenox 30 mg daily changed to heparin 5000 units SQ TID      
          Pharmacy Note     Renal Dose Adjustment    Daja Espinosa is a 63 y.o. female. Pharmacist assessment of renally cleared medications.    Recent Labs     06/15/24  0538 06/16/24  0614   BUN 41* 42*       Recent Labs     06/15/24  0538 06/16/24  0614   CREATININE 2.4* 2.2*       Estimated Creatinine Clearance: 21 mL/min (A) (based on SCr of 2.2 mg/dL (H)).    Height:   Ht Readings from Last 1 Encounters:   06/14/24 1.575 m (5' 2\")     Weight:  Wt Readings from Last 1 Encounters:   06/15/24 57.6 kg (126 lb 15.8 oz)       The following medication dose has been adjusted based upon renal function per P&T Guidelines:             Zosyn 3.375 g IV q12h --> Zosyn 3.375 g IV q8h     Olga Gonzalez, RadhaD, [facility] 6/16/2024 4:01 PM     
      Physical Therapy Cancel Note      DATE: 2024    NAME: Daja Espinosa  MRN: [id number]   : 1960      Patient not seen this date for Physical Therapy due to:    Patient independent with functional mobility. Will defer PT evaluation at this time. Please reorder PT if future needs arise.       Electronically signed by Jose Ga PT on 2024 at 11:24 AM      
    Today's Date: 6/18/2024  Patient Name: Daja Espinosa  Date of admission: 6/14/2024  3:19 AM  Patient's age: 63 y.o., 1960  Admission Dx: Pneumonia, unspecified organism [J18.9]  Hospital-acquired bacterial pneumonia [J15.9]    Reason for Consult: Bladder cancer, on chemo/rad  Requesting Physician: Gilberto Garcia MD    Chief Complaint: Fever, shortness of breath    INTERIM HISTORY  Patient is seen and evaluated, labs and vitals reviewed.  Afebrile  She remains on high flow nasal cannula, states decreased shortness of breath  Hemoglobin 7.1, .6  Thoracentesis pathology pending  Creatinine worsening, 2.8 today  Plan for left nephrostomy tube placement with IR today  Eliquis remains on hold.  States she had a very large bowel movement today and is having abdominal discomfort now- no signs of bleeding per patient.    History of Present Illness:      The patient is a 63 y.o. female who is admitted to the hospital for management of pneumonia.  She received chemoradiation yesterday for high-grade urothelial carcinoma and developed a fever at home up to 103.8F.  She reports ongoing shortness of breath for the past week.    She was recently hospitalized at this facility for GI bleed with hemoglobin down to 5.4 and creatinine up to 2.6.  She received 2 units PRBC and GI workup showed gastritis and duodenitis with a colonoscopy planned for outpatient, discharged 6/9/2024.    There are no labs to review today, however labs completed yesterday show creatinine 2.5, total bili 1.5, hemoglobin 8.4 (9.52 days prior), blood cultures and urine culture pending.  Chest x-ray showed mild cardiomegaly with bilateral pleural effusions, concern for pneumonia.  She is currently on 4 L nasal cannula.    She is a known patient of Dr. 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 
    Today's Date: 6/20/2024  Patient Name: Daja Espinosa  Date of admission: 6/14/2024  3:19 AM  Patient's age: 63 y.o., 1960  Admission Dx: Pneumonia, unspecified organism [J18.9]  Hospital-acquired bacterial pneumonia [J15.9]    Reason for Consult: Bladder cancer, on chemo/rad  Requesting Physician: Gilberto Garcia MD    Chief Complaint: Fever, shortness of breath    INTERIM HISTORY  Patient is seen and evaluated, labs and vitals reviewed.  Afebrile  Renal function worsening, nephrology following  She is tolerating 3L NC, feeling much better.    Fluid cytology from thoracentesis is still pending  Hematuria in dennis resolving but remains in nephrostomy tube  Hemoglobin 8.2 S/P 1 unit PRBC yesterday  States she just does not feel as well today as she did yesterday, decreased appetite and increased shortness of breath    History of Present Illness:      The patient is a 63 y.o. female who is admitted to the hospital for management of pneumonia.  She received chemoradiation yesterday for high-grade urothelial carcinoma and developed a fever at home up to 103.8F.  She reports ongoing shortness of breath for the past week.    She was recently hospitalized at this facility for GI bleed with hemoglobin down to 5.4 and creatinine up to 2.6.  She received 2 units PRBC and GI workup showed gastritis and duodenitis with a colonoscopy planned for outpatient, discharged 6/9/2024.    There are no labs to review today, however labs completed yesterday show creatinine 2.5, total bili 1.5, hemoglobin 8.4 (9.52 days prior), blood cultures and urine culture pending.  Chest x-ray showed mild cardiomegaly with bilateral pleural effusions, concern for pneumonia.  She is currently on 4 L nasal cannula.    She is a known patient of Dr. 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 
    Today's Date: 6/22/2024  Patient Name: Daja Espinosa  Date of admission: 6/14/2024  3:19 AM  Patient's age: 63 y.o., 1960  Admission Dx: Pneumonia, unspecified organism [J18.9]  Hospital-acquired bacterial pneumonia [J15.9]    Reason for Consult: Bladder cancer, on chemo/rad  Requesting Physician: Gilberto Garcia MD    Chief Complaint: Fever, shortness of breath    INTERIM HISTORY      Interval history:    Patient seen and examined  Laboratory reviewed  Patient started on dialysis.  Hemoglobin 7.9.  Platelet count 261.  Next dialysis being planned for Monday      History of Present Illness:      The patient is a 63 y.o. female who is admitted to the hospital for management of pneumonia.  She received chemoradiation yesterday for high-grade urothelial carcinoma and developed a fever at home up to 103.8F.  She reports ongoing shortness of breath for the past week.    She was recently hospitalized at this facility for GI bleed with hemoglobin down to 5.4 and creatinine up to 2.6.  She received 2 units PRBC and GI workup showed gastritis and duodenitis with a colonoscopy planned for outpatient, discharged 6/9/2024.    There are no labs to review today, however labs completed yesterday show creatinine 2.5, total bili 1.5, hemoglobin 8.4 (9.52 days prior), blood cultures and urine culture pending.  Chest x-ray showed mild cardiomegaly with bilateral pleural effusions, concern for pneumonia.  She is currently on 4 L nasal cannula.    She is a known patient of Dr. 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 
    Today's Date: 6/25/2024  Patient Name: Daja Espinosa  Date of admission: 6/14/2024  3:19 AM  Patient's age: 63 y.o., 1960  Admission Dx: Pneumonia, unspecified organism [J18.9]  Hospital-acquired bacterial pneumonia [J15.9]    Reason for Consult: Bladder cancer, on chemo/rad  Requesting Physician: Gilberto Garcia MD    Chief Complaint: Fever, shortness of breath    Interval history:    Patient seen and examined, labs and vitals reviewed. Tolerating 4L NC  No CBC to review today, will order for tommorrow  Patient resting comfortably, feels much better today than yesterday  Patient had a tunnel dialysis catheter placed today  Patient on therapeutic dose Lovenox and tolerating well.  Hematuria resolved in dennis and neph tube  Discharge planning in process    History of Present Illness:      The patient is a 63 y.o. female who is admitted to the hospital for management of pneumonia.  She received chemoradiation yesterday for high-grade urothelial carcinoma and developed a fever at home up to 103.8F.  She reports ongoing shortness of breath for the past week.    She was recently hospitalized at this facility for GI bleed with hemoglobin down to 5.4 and creatinine up to 2.6.  She received 2 units PRBC and GI workup showed gastritis and duodenitis with a colonoscopy planned for outpatient, discharged 6/9/2024.    There are no labs to review today, however labs completed yesterday show creatinine 2.5, total bili 1.5, hemoglobin 8.4 (9.52 days prior), blood cultures and urine culture pending.  Chest x-ray showed mild cardiomegaly with bilateral pleural effusions, concern for pneumonia.  She is currently on 4 L nasal cannula.    She is a known patient of Dr. 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 
    Today's Date: 6/27/2024  Patient Name: Daja Espinosa  Date of admission: 6/14/2024  3:19 AM  Patient's age: 63 y.o., 1960  Admission Dx: Pneumonia, unspecified organism [J18.9]  Hospital-acquired bacterial pneumonia [J15.9]    Reason for Consult: Bladder cancer, on chemo/rad  Requesting Physician: Gilberto Garcia MD    Chief Complaint: Fever, shortness of breath    Interval history:    Patient seen and examined, labs and vitals reviewed.  Afebrile, tolerating 3L NC  Hemoglobin 7.4, platelets 457  Patient resting comfortably in a chair, feels much better today than yesterday  Patient on therapeutic dose Lovenox and tolerating well.  Okay from hematology oncology standpoint to restart her home Eliquis  Discharge planning in process, possibly tomorrow after reviewing labs as patient did not receive hemodialysis today due to plateauing creatinine and increasing urine output          Interval history:    Patient seen and examined  Labs are reviewed  Hematuria has resolved  No new complaint on culture liver  Patient resting comfortably  Hoping to be discharged today  H&H stable  Sodium 130              Okay to discharge patient from oncology standpoint        History of Present Illness:      The patient is a 63 y.o. female who is admitted to the hospital for management of pneumonia.  She received chemoradiation yesterday for high-grade urothelial carcinoma and developed a fever at home up to 103.8F.  She reports ongoing shortness of breath for the past week.    She was recently hospitalized at this facility for GI bleed with hemoglobin down to 5.4 and creatinine up to 2.6.  She received 2 units PRBC and GI workup showed gastritis and duodenitis with a colonoscopy planned for outpatient, discharged 6/9/2024.    There are no labs to review today, however labs completed yesterday show creatinine 2.5, total bili 1.5, hemoglobin 8.4 (9.52 days prior), blood cultures and urine culture pending.  Chest x-ray showed mild 
 Received a critical value from lab. Patient's creatnine was 5.6. COLEMAN Guevara notified.   
[facility]  Office: [phone]  Deven Louis, DO, Rashid Martinez, DO, Koko House DO, Jc Wheat, DO, Bahman Silva MD, Radha Joseph MD, Volodymyr Abad MD, Karyn Purvis MD,  Siva eHlton MD, Jigna Emery MD, Mayur Tinajero MD,  Danni Lamas DO, Gayle Guzmán MD, Micha Sosa MD, Lloyd Louis DO, No Lutz MD,  Ganesh Lee DO, Brittani Francisco MD, Sherrell Hickey MD, Kayla Roberson MD, Macarena Martin MD,  Gerardo Pate MD, Daisy Stephenson MD, Kelly Connor MD, Clau Calderon MD, Aurelio Pérez MD, Eliceo Milligan MD, Talib Rodriguez DO, Yusef Lopez DO, Liban Williamson MD,  Franky Trinh MD, Shirley Waterhouse, CNP,  Savanah Diez CNP, Arias Lares, CNP,  Brenda Daniels, DNP, Phoebe Morris, CNP, Morelia Howard, CNP, Meaghan Rogers, CNP, Neris Del Toro, CNP, Krystal Morrow, PA-C, Ely Guevara, PA-C, Nancy Tam, CNP, Marcy Simeon, CNP, Cody Steele, CNP, Aviva Hartman, CNP, Isela Montaño, CNP, Marj Figueroa, CNS, Keri Clayton, CNP, Anahi Carnes CNP, Tracy Schwab, CNP         [facility]   IN-PATIENT SERVICE   [facility]    Progress Note    6/21/2024    10:44 AM    Name:   Daja Espinosa  MRN:     [id number]     Acct:      [id number]   Room:   2007/2007-01  IP Day:  7  Admit Date:  6/14/2024  3:19 AM    PCP:   Sohail Garcia MD  Code Status:  Full Code    Subjective:     C/C: SOB with fever and fatigue  Interval History Status: unchanged    Renal function worsening, underwent dialysis today  Patient reports improvement of shortness of breath with fatigue, left PCN tube still bloody drainage, Anguiano catheter showing improvement, pink urine noted    Brief History:     Daja Espinosa is a 63 y.o. female with PMHx of HTN, HLD, PVD, bladder cancer, CAD and CVA on Eliquis who presents with shortness of breath and fever and is admitted to the hospital for the management of Pneumonia, unspecified organism.  Patient was receiving 
CLINICAL PHARMACY NOTE: MEDS TO BEDS    Total # of Prescriptions Filled: 4   The following medications were delivered to the patient:  Eliquis  Carvedilol  Flecainide  hydralazine    Additional Documentation:    
Comprehensive Nutrition Assessment    Type and Reason for Visit:  Initial, RD Nutrition Re-Screen/LOS    Nutrition Recommendations/Plan:   Send standard high calorie, high protein ONS TID with meals, chocolate per Pt preference     Malnutrition Assessment:  Malnutrition Status:  Moderate malnutrition (06/21/24 1438)    Context:  Acute Illness     Findings of the 6 clinical characteristics of malnutrition:  Energy Intake:  75% or less of estimated energy requirements for 7 or more days  Weight Loss:  No significant weight loss     Body Fat Loss:  Mild body fat loss Fat Overlying Ribs, Triceps   Muscle Mass Loss:  Mild muscle mass loss Calf (gastrocnemius)  Fluid Accumulation:  Mild Extremities   Strength:  Not Performed    Nutrition Assessment:    Pt seen for LOS, admitted with pneumonia, HEAVENLY and hydronephrosis with Hx bladder CA with chemo, alcohol abuse, CAD, PVD and CVA.  Pt currently on Regular diet, though Pt and family members report today is the first day she has been able to really eat since admission 7 days ago.  Pt did undergo dialysis this morning, which is new to patient this admission.  Pt states that after dialysis her appetite was vastly improved, as she has had early satiety and nausea after eating only a few bites this admission.  At home, Pt normally eats quite well, though does occasionally drinkg chocolate Ensure for increased kcal and protein intake.  Will send chocolate Ensure TID while here for increased kcal and protein intake.  Encouraged family to bring in outside food if desired.  Weight records show stable weight with minimal changes over the past 4 months, which Pt confirms.    Nutrition Related Findings:    Meds/Labs reviewed.  Na 129 Wound Type: None       Current Nutrition Intake & Therapies:    Average Meal Intake: 1-25% (estimated)  Average Supplements Intake: None Ordered  ADULT DIET; Regular    Anthropometric Measures:  Height: 157.5 cm (5' 2.01\")  Ideal Body Weight (IBW): 110 
Comprehensive Nutrition Assessment    Type and Reason for Visit:  Reassess    Nutrition Recommendations/Plan:   Continue regular diet  Continue Ensure Enlive TID (chocolate)  Monitor intakes, ONS, weight, labs, fluid status.  Provide Ensure coupons as able.     Malnutrition Assessment:  Malnutrition Status:  Moderate malnutrition (06/21/24 1438)    Context:  Acute Illness     Findings of the 6 clinical characteristics of malnutrition:  Energy Intake:  75% or less of estimated energy requirements for 7 or more days  Weight Loss:  No significant weight loss     Body Fat Loss:  Mild body fat loss Fat Overlying Ribs, Triceps   Muscle Mass Loss:  Mild muscle mass loss Calf (gastrocnemius)  Fluid Accumulation:  Mild Extremities   Strength:  Not Performed    Nutrition Assessment:    Continues with dialysis. Per nephro, no signs of renal recovery. Pt sleeping at time of visit, spoke with family at bedside. Reported pt consumed 100% of breakfast - cereal, milk, yogurt, 100% of dinner - tomato soup and grilled cheese. Pt drinking some ONS (prefers chocolate) noted several unopened on bedside table. Pt's family also brought Ensures (350 kcal, 16 gm protein). Family reports poor appetite and nausea, stating she has \"some good days, some bad\" Discussed importance of consumption, particularly protein, given current medical condition. Provided menu. Labs: Na 127 mmol/L, Cl 97 mmol/L, BUN 27 mg/dL, Cr 3.2 mg/dL. Glu 100 mg/dL.    Nutrition Related Findings:    Meds/labs reviewed. LBM 6/22. Trace extremitites edema Wound Type: None       Current Nutrition Intake & Therapies:    Average Meal Intake: 51-75%  Average Supplements Intake: 26-50%  ADULT DIET; Regular  ADULT ORAL NUTRITION SUPPLEMENT; Breakfast, Lunch, Dinner; Standard High Calorie/High Protein Oral Supplement  Additional Calorie Sources:  None    Anthropometric Measures:  Height: 157.5 cm (5' 2\")  Ideal Body Weight (IBW): 110 lbs (50 kg)    Admission Body Weight: 57.7 
Daja Espinosa was evaluated today and a DME order was entered for a wheeled walker because she requires this to successfully complete daily living tasks of eating, bathing, toileting, personal cares, ambulating, grooming, hygiene, dressing upper body, dressing lower body, meal preparation, and taking own medications.  A wheeled walker is necessary due to the patient's unsteady gait, upper body weakness, and inability to  an ambulation device; and she can ambulate only by pushing a walker instead of a lesser assistive device such as a cane, crutch, or standard walker.  The need for this equipment was discussed with the patient and she understands and is in agreement.     Ganesh Lee DO  [facility], [place], [place]  6/27/2024 11:28 AM          
[facility] [place]    Shift date: 6/15/2024  Shift day: Saturday   Shift # 3    Room # 0328/0328-02   Name: Daja Espinosa                Pentecostalism: Unknown   Place of Yazidi: Unknown    Referral: Rapid Response    Admit Date & Time: 6/14/2024  3:19 AM    Assessment:  Daja Espinosa is a 63 y.o. female in the hospital because of a \"High Heart Rate and Shortness of Breath.\" Upon entering the room writer observes patient sitting up and talking with response team.    Intervention:   responded to page and gathered patient information outside room.  visited with patient after response team left room.  introduced herself to patient and spouse, who was present in recliner at bedside.  learned that patient has been undergoing \"chemotherapy\" and \"radiation.\" Per patient, she was recently discharged from the hospital. Patient and spouse indicated no needs at time of visit.     Outcome:  Patient thanked  for visit.     Plan:  Chaplains will remain available to offer spiritual and emotional support as needed.       06/16/24 0550   Encounter Summary   Encounter Overview/Reason Crisis   Service Provided For Patient and family together   Referral/Consult From Multi-disciplinary team  (RRT Alert)   Support System Spouse   Last Encounter  06/15/24   Complexity of Encounter Moderate   Begin Time 0550   End Time  0620   Total Time Calculated 30 min   Crisis   Type Rapid Response   Spiritual/Emotional needs   Type Spiritual Support   Assessment/Intervention/Outcome   Assessment Powerlessness  (Experiencing Physical Discomfort)   Intervention Discussed illness injury and it’s impact;Sustaining Presence/Ministry of presence   Outcome Receptive   Plan and Referrals   Plan/Referrals Continue to visit, (comment)  (as needed)     Electronically signed by Chaplain Rehan, on 6/16/2024 at 6:41 AM.  [facility]  [phone]      
Dialysis Post Treatment Note  Patient tolerated treatment well. Denies complaints at time of discharge.   Vitals:    06/21/24 1200   BP: (!) 180/90   Pulse:    Resp:    Temp:    SpO2:      Pre-Weight = 56.2 kg   Post-weight = Weight - Scale: 55.2 kg (121 lb 11.1 oz)  Total Liters Processed = Blood Volume Processed (Liters): 36.81 L  Rinseback Volume (mL) = Rinseback Volume (ml): 270 ml  Net Removal (mL) = 0 mL   Length of treatment=2.5 hours   Access: Left IJ bebeto catheter     Pt tolerated first tx very well. No complaints. Report given to floor RN and sent back to room 2007.     
Dialysis Post Treatment Note  Vitals:    06/22/24 1424   BP: (!) 166/94   Pulse: 80   Resp: 18   Temp: 98 °F (36.7 °C)   SpO2: 97%     Pre-Weight = 56.0 kg  Post-weight = Weight - Scale: 56 kg (123 lb 7.3 oz)  Total Liters Processed = Blood Volume Processed (Liters): 53.84 L  Rinseback Volume (mL) = Rinseback Volume (ml): 300 ml  Net Removal (mL) =  0  Patient's dry weight= TBD  Type of access used= Left temp cath  Length of treatment=180    Tolerated second tx w/o issues. Pt stated she did get \"a little crampy\" towards the  end of HD. No fluid removed. VSS throughout tx. Will call report to primary RN  
Dialysis Post Treatment Note  Vitals:    06/24/24 1423   BP: (!) 157/91   Pulse: 71   Resp: 18   Temp: 97.8   SpO2:      Pre-Weight = 57.8 kg  Post-weight = Weight - Scale: 56.8 kg  Total Liters Processed = Blood Volume Processed (Liters): 53.84 L  Rinseback Volume (mL) = Rinseback Volume (ml): 300 ml  Net Removal (mL) =  1000 ML  Patient's dry weight=  Type of access used= Left IJ Temp  Length of treatment=3.5 Hours    Pt tolerated tx well, no distress noted, temp IJ worked, 1 Liter removed, pt received Zofran with HD for nausea (SEE MAR). Report given to covering RN on Floor Alley.     
Dialysis Time Out  To be done by RN and tech or 2 RNs  Staff Names Chris BURNS RN and Stacey LAYNE RN)    [x]  Identity of the patient using 2 patient identifiers  [x]  Consent for treatment  [x]  Equipment-proper machine and dialyzer  [x]  B-Hep B status (Antigen Negative 6/4/2024)  [x]  Orders- to include bath, blood flow, dialyzer, time and fluid removal  [x]  Access-Correct site and in working order  [x]  Time for patient to ask questions.    
Dialysis Time Out  To be done by RN and tech or 2 RNs  Staff Names Stacey RN and Jacinta BATRES    [x]  Identity of the patient using 2 patient identifiers  [x]  Consent for treatment  [x]  Equipment-proper machine and dialyzer  [x]  B-Hep B status (hep ag neg 6/4/24)  [x]  Orders- to include bath, blood flow, dialyzer, time and fluid removal  [x]  Access-Correct site and in working order  [x]  Time for patient to ask questions.   
Discussed case with pulmonology/critical care.  Due to patient's acute respiratory distress and possible need for intubation recommendations made to transfer patient to medical ICU.  Will transfer care to critical care.    Ganesh Lee DO  [facility], [place], [place]  6/16/2024 12:13 PM    
Home Oxygen Evaluation    Home Oxygen Evaluation completed.    Patient is on 2 liters per minute via NC.  Resting SpO2 = 92%  Resting SpO2 on room air = 90%    SpO2 on room air with exercise = 88%  SpO2 on oxygen as above with exercise = 91%    Nocturnal Oximetry with patient on room air is recommended is SpO2 is between 89% and 95% (requires additional order).    DAYSI WELCH RCP  1:07 PM  
[facility]   Progress Note                   Date:   6/17/2024  Patient name: Daja Espinosa  Date of admission:  6/14/2024  3:19 AM  MRN:   [id number]  YOB: 1960  PCP: Sohail Garcia MD    Reason for Admission:     Subjective:     Patient states she is doing better today with her breathing    Was using HFNC, but voluntarily wanted to wear BiPAP last night   Currently in NSR. No tachycardic events overnight.   Experiencing hypertensive urgency. Required labetalol x 2 over the past 24 hours   Cr/bun remain at baseline     Medications:   Scheduled Meds:   oxymetazoline  2 spray Each Nostril BID    sodium chloride  2 spray Each Nostril Q6H    furosemide  40 mg IntraVENous BID    flecainide  50 mg Oral 2 times per day    piperacillin-tazobactam  3,375 mg IntraVENous Q8H    sodium chloride flush  5-40 mL IntraVENous 2 times per day    amLODIPine  10 mg Oral Daily    atorvastatin  80 mg Oral Nightly    metoprolol tartrate  50 mg Oral BID    pantoprazole  40 mg Oral QAM AC    [Held by provider] apixaban  5 mg Oral BID     Continuous Infusions:   [Held by provider] sodium chloride 75 mL/hr at 06/14/24 0423    sodium chloride 10 mL/hr at 06/17/24 0646     CBC:   Recent Labs     06/15/24  0538 06/16/24  0614 06/17/24  0350   WBC 8.7 8.0 6.0   HGB 8.0* 7.9* 7.7*    343 332     BMP:    Recent Labs     06/15/24  0538 06/16/24  0614 06/17/24  0350   * 136 135*   K 4.0 3.4* 3.8    100 100   CO2 19* 19* 16*   BUN 41* 42* 47*   CREATININE 2.4* 2.2* 2.4*   GLUCOSE 132* 117* 82     Hepatic: No results for input(s): \"AST\", \"ALT\", \"BILITOT\", \"ALKPHOS\" in the last 72 hours.    Invalid input(s): \"ALB\"  Troponin: No results for input(s): \"TROPONINI\" in the last 72 hours.  BNP: No results for input(s): \"BNP\" in the last 72 hours.  Lipids: No results for input(s): \"CHOL\", \"HDL\" in the last 72 hours.    Invalid input(s): \"LDLCALCU\"  INR:   Recent Labs     06/16/24  1158 06/17/24  0350   INR 
[facility]   Progress Note                   Date:   6/18/2024  Patient name: Daja Espinosa  Date of admission:  6/14/2024  3:19 AM  MRN:   [id number]  YOB: 1960  PCP: Sohail Garcia MD    Reason for Admission:     Subjective:     Patient seen and examined in room. Denies CP . HFNC - reports breathing is improving. No acute CV events overnight. Labs, vitals, and tele reviewed. Family present at bedside    Medications:   Scheduled Meds:   sodium bicarbonate  1,300 mg Oral BID    carvedilol  6.25 mg Oral BID WC    piperacillin-tazobactam  3,375 mg IntraVENous Q12H    oxymetazoline  2 spray Each Nostril BID    sodium chloride  2 spray Each Nostril Q6H    furosemide  40 mg IntraVENous BID    flecainide  50 mg Oral 2 times per day    sodium chloride flush  5-40 mL IntraVENous 2 times per day    amLODIPine  10 mg Oral Daily    atorvastatin  80 mg Oral Nightly    pantoprazole  40 mg Oral QAM AC    [Held by provider] apixaban  5 mg Oral BID     Continuous Infusions:   sodium chloride 10 mL/hr at 06/17/24 0646     CBC:   Recent Labs     06/16/24  0614 06/17/24  0350 06/17/24  1758 06/18/24  0048 06/18/24  0725   WBC 8.0 6.0  --  5.9  --    HGB 7.9* 7.7* 7.5* 7.1*  7.2* 7.1*    332  --  317  --        BMP:    Recent Labs     06/16/24  0614 06/17/24  0350 06/18/24  0048    135* 132*   K 3.4* 3.8 3.2*    100 98   CO2 19* 16* 17*   BUN 42* 47* 55*   CREATININE 2.2* 2.4* 2.8*   GLUCOSE 117* 82 107*       Hepatic: No results for input(s): \"AST\", \"ALT\", \"BILITOT\", \"ALKPHOS\" in the last 72 hours.    Invalid input(s): \"ALB\"  Troponin: No results for input(s): \"TROPONINI\" in the last 72 hours.  BNP: No results for input(s): \"BNP\" in the last 72 hours.  Lipids: No results for input(s): \"CHOL\", \"HDL\" in the last 72 hours.    Invalid input(s): \"LDLCALCU\"  INR:   Recent Labs     06/16/24  1158 06/17/24  0350   INR 2.0 1.6         Objective:   Vitals: BP (!) 163/104   Pulse 85   Temp 
[facility]   Progress Note                   Date:   6/19/2024  Patient name: Daja Espinosa  Date of admission:  6/14/2024  3:19 AM  MRN:   [id number]  YOB: 1960  PCP: Sohail Garcia MD    Reason for Admission:     Subjective:     Patient seen and examined in room. Denies CP breathing improved.  HGB dropped overnight.  Remains off AC.  No acute CV events overnight. Labs, vitals, and tele reviewed. Family present at bedside.  Remains NSR     Medications:   Scheduled Meds:   sodium bicarbonate  1,300 mg Oral BID    carvedilol  12.5 mg Oral BID WC    piperacillin-tazobactam  3,375 mg IntraVENous Q12H    sodium chloride  2 spray Each Nostril Q6H    furosemide  40 mg IntraVENous BID    flecainide  50 mg Oral 2 times per day    sodium chloride flush  5-40 mL IntraVENous 2 times per day    amLODIPine  10 mg Oral Daily    atorvastatin  80 mg Oral Nightly    pantoprazole  40 mg Oral QAM AC    [Held by provider] apixaban  5 mg Oral BID     Continuous Infusions:   sodium chloride      sodium chloride      sodium chloride 10 mL/hr at 06/17/24 0646     CBC:   Recent Labs     06/17/24  0350 06/17/24  1758 06/18/24  0048 06/18/24  0725 06/18/24  1336 06/19/24  0533 06/19/24  1058   WBC 6.0  --  5.9  --   --  6.6  --    HGB 7.7*   < > 7.1*  7.2*   < > 7.1* 6.9* 6.6*     --  317  --   --  297  --     < > = values in this interval not displayed.       BMP:    Recent Labs     06/17/24  0350 06/18/24  0048 06/18/24  1336 06/19/24  0533   * 132*  --  131*   K 3.8 3.2* 3.2* 3.5*    98  --  96*   CO2 16* 17*  --  19*   BUN 47* 55*  --  62*   CREATININE 2.4* 2.8*  --  3.5*   GLUCOSE 82 107*  --  110*       Hepatic: No results for input(s): \"AST\", \"ALT\", \"BILITOT\", \"ALKPHOS\" in the last 72 hours.    Invalid input(s): \"ALB\"  Troponin: No results for input(s): \"TROPONINI\" in the last 72 hours.  BNP: No results for input(s): \"BNP\" in the last 72 hours.  Lipids: No results for input(s): 
[facility]   Progress Note                   Date:   6/23/2024  Patient name: Daja Espinosa  Date of admission:  6/14/2024  3:19 AM  MRN:   [id number]  YOB: 1960  PCP: Sohail Garcia MD    Reason for Admission:     Subjective:     Pt seen and examined in the room.  Patient resting in bed with family at bedside. Pt denies any CP or sob.  Labs, vitals and tele reviewed- continued HTN. Medication changes below.       Medications:   Scheduled Meds:   hydrALAZINE  25 mg Oral 3 times per day    carvedilol  25 mg Oral BID WC    sodium chloride  2 spray Each Nostril Q6H    flecainide  50 mg Oral 2 times per day    sodium chloride flush  5-40 mL IntraVENous 2 times per day    amLODIPine  10 mg Oral Daily    atorvastatin  80 mg Oral Nightly    pantoprazole  40 mg Oral QAM AC    [Held by provider] apixaban  5 mg Oral BID     Continuous Infusions:   sodium chloride      sodium chloride 5 mL/hr at 06/21/24 1430     CBC:   Recent Labs     06/21/24  0611 06/22/24  0820 06/23/24  0547   WBC 8.1 8.2 8.2   HGB 8.7* 7.9* 7.6*    361 354       BMP:    Recent Labs     06/21/24  0611 06/22/24  0820 06/23/24  0547   * 131* 130*   K 3.9 3.3* 3.6*   CL 93* 91* 93*   CO2 19* 25 27   BUN 73* 42* 19   CREATININE 5.6* 4.0* 2.4*   GLUCOSE 107* 122* 104*       Hepatic: No results for input(s): \"AST\", \"ALT\", \"BILITOT\", \"ALKPHOS\" in the last 72 hours.    Invalid input(s): \"ALB\"  Troponin: No results for input(s): \"TROPONINI\" in the last 72 hours.  BNP: No results for input(s): \"BNP\" in the last 72 hours.  Lipids: No results for input(s): \"CHOL\", \"HDL\" in the last 72 hours.    Invalid input(s): \"LDLCALCU\"  INR:   No results for input(s): \"INR\" in the last 72 hours.    Objective:   Vitals: BP (!) 181/95   Pulse 74   Temp 97.7 °F (36.5 °C) (Tympanic)   Resp 23   Ht 1.575 m (5' 2\")   Wt 56 kg (123 lb 7.3 oz)   LMP 12/01/2010   SpO2 95%   BMI 22.58 kg/m²     Physical Exam  Constitutional:       
[facility]   Progress Note                   Date:   6/26/2024  Patient name: Daja Espinosa  Date of admission:  6/14/2024  3:19 AM  MRN:   [id number]  YOB: 1960  PCP: Sohail Garcia MD    Reason for Admission:     Subjective:     Stable from CV standpoint. Denies any chest pain or pressure. Labs, vitals, & tele reviewed. Remains on NC oxygen. HGB low but stable. Had some bleeding from tunneled cath site overnight. Remains SR      Medications:   Scheduled Meds:   hydrALAZINE  25 mg Oral 3 times per day    enoxaparin  1 mg/kg SubCUTAneous BID    carvedilol  25 mg Oral BID WC    sodium chloride  2 spray Each Nostril Q6H    flecainide  50 mg Oral 2 times per day    sodium chloride flush  5-40 mL IntraVENous 2 times per day    amLODIPine  10 mg Oral Daily    atorvastatin  80 mg Oral Nightly    pantoprazole  40 mg Oral QAM AC    [Held by provider] apixaban  5 mg Oral BID     Continuous Infusions:   sodium chloride      sodium chloride 20 mL/hr at 06/25/24 0738     CBC:   Recent Labs     06/24/24  0544 06/26/24  0541   WBC 9.3 8.2   HGB 7.6* 7.4*    457*       BMP:    Recent Labs     06/24/24  0544 06/25/24  1124 06/26/24  0541   * 129* 129*   K 3.9 3.7 3.7   CL 91* 93* 93*   CO2 21 23 27   BUN 27* 15 24*   CREATININE 3.2* 2.4* 2.7*   GLUCOSE 100* 164* 95       Hepatic: No results for input(s): \"AST\", \"ALT\", \"BILITOT\", \"ALKPHOS\" in the last 72 hours.    Invalid input(s): \"ALB\"  Troponin: No results for input(s): \"TROPONINI\" in the last 72 hours.  BNP: No results for input(s): \"BNP\" in the last 72 hours.  Lipids: No results for input(s): \"CHOL\", \"HDL\" in the last 72 hours.    Invalid input(s): \"LDLCALCU\"  INR:   No results for input(s): \"INR\" in the last 72 hours.    Objective:   Vitals: /67   Pulse 74   Temp 98.2 °F (36.8 °C) (Oral)   Resp 20   Ht 1.575 m (5' 2\")   Wt 58 kg (127 lb 13.9 oz)   LMP 12/01/2010   SpO2 96%   BMI 23.39 kg/m²     Physical 
Nephrology Progress Note        Subjective: patient evaluated on dialysis. Pt is stable on dialysis. Access cannulated without problems. No new issues overnite. Stable hemodynamics.   Tolerating treatment well.  Right IJ vein temporary dialysis catheter works well.  Having good urine output from her left PCN.  Urine output from Anguiano catheter is minimal.  Hemodynamically stable off all pressors.  Zosyn continues.  Antihypertensives resumed  Denies any shortness of breath or orthopnea.  Labs today showed a sodium of 131 potassium 3.3 chloride 91 bicarb 25 BUN 42 creatinine 4 glucose 122 calcium 8.8 hemoglobin 7.9 white count 8.2 platelets 361    History reviewed  Known history of bladder cancer first diagnosed many years ago treated with BCG treatment.  Subsequently was in remission up until March this year when she had recurrence of her bladder cancer diagnosed incidentally.  Was planned for cystectomy however then had a CVA and the procedure was postponed.  Prior to that did start chemotherapy and radiation therapy.  Received 1 dose of cisplatin now has been on for gemcitabine.  Came to the hospital with complaints of weakness shortness of breath and fluid gain.  She was found to have a fever of 103 as well.  Developed acute kidney injury creatinine has gone up to 5.6.  Nephrology was consulted.  Imaging studies showed left hydronephrosis.  Left ureteral stent has been placed in the past.  Did have PCN done this admission.  She has developed kidney disease over time.  Creatinine used to be in the 1.2-1.4 range until April 2024 subsequently to be in the 2-2.5 range.  More recently had an episode of ATN from blood loss in June when creatinine went up to 2.5.  Creatinine on presentation at this time was 2.4.  Thoracentesis was done unfortunately based on lab sample is hard to determine if this is a transudative or exudative fluid.  Cultures have been negative.  Empiric antibiotics continue.    Objective:  CURRENT 
Nephrology Progress Note      SUBJECTIVE       Pt was seen and examined. No acute issues overnite. Stable hemodynamics .      Patient started dialysis 6/21 for acute renal failure   Labs today showed a sodium of 127 potassium 3.9 chloride 91 bicarb 21 BUN 27 creatinine 3.2 glucose 100 calcium 8.6 hemoglobin 7.6 white count 9.3 platelets 318   Creatinine    Latest Reference Range & Units 06/21/24 06:11 06/22/24 08:20 06/23/24 05:47 06/24/24 05:44   Creatinine 0.50 - 0.90 mg/dL 5.6 (HH) 4.0 (H) 2.4 (H) 3.2 (H)     Patient states that she has been experiencing nausea and retching throughout the night   States that she is fatigued and experiencing decreased appetite  Dialysis planned for today   Anguiano catheter in place with no hematuria     History reviewed  Known history of bladder cancer first diagnosed many years ago treated with BCG treatment.  Subsequently was in remission up until March this year when she had recurrence of her bladder cancer diagnosed incidentally.  Was planned for cystectomy however then had a CVA and the procedure was postponed.  Prior to that did start chemotherapy and radiation therapy.  Received 1 dose of cisplatin now has been on for gemcitabine.  Came to the hospital with complaints of weakness shortness of breath and fluid gain.  She was found to have a fever of 103 as well.  Developed acute kidney injury creatinine has gone up to 5.6.  Nephrology was consulted.  Imaging studies showed left hydronephrosis.  Left ureteral stent has been placed in the past.  Did have PCN done this admission.  She has developed kidney disease over time.  Creatinine used to be in the 1.2-1.4 range until April 2024 subsequently to be in the 2-2.5 range.  More recently had an episode of ATN from blood loss in June when creatinine went up to 2.5.  Creatinine on presentation at this time was 2.4.  Thoracentesis was done unfortunately based on lab sample is hard to determine if this is a transudative or exudative 
Nephrology Progress Note      SUBJECTIVE      Pt was seen and examined. No acute issues overnite. Stable hemodynamics .   This morning, patient states that she experienced pain and bleeding from tunnel catheter site. It has since been cleaned and re-bandaged. There is minimal bleeding on examination, and patient states that it is no longer painful  Otherwise, patient states that she is feeling clinically improved      Patient started dialysis 6/21 for acute renal failure.   Patient had a tunneled catheter placed yesterday   Labs today showed a sodium of 130 potassium 3.5 chloride 91 bicarb 25 BUN 27 creatinine 2.6 glucose 137 calcium 8.9 hemoglobin 7.4 white count 8.2 platelets 457   Creatinine 3.4-->2.4-->2.7-->2.6    Hematuria has resolved from Dennis and nephrostomy tube outlets with 1.2L out from nephrostomy and 375mL out from the dennis  No further plan for dialysis as creatinine has continued to decrease and urine output remains adequate         History reviewed  Known history of bladder cancer first diagnosed many years ago treated with BCG treatment.  Subsequently was in remission up until March this year when she had recurrence of her bladder cancer diagnosed incidentally.  Was planned for cystectomy however then had a CVA and the procedure was postponed.  Prior to that did start chemotherapy and radiation therapy.  Received 1 dose of cisplatin now has been on for gemcitabine.  Came to the hospital with complaints of weakness shortness of breath and fluid gain.  She was found to have a fever of 103 as well.  Developed acute kidney injury creatinine has gone up to 5.6.  Nephrology was consulted.  Imaging studies showed left hydronephrosis.  Left ureteral stent has been placed in the past.  Did have PCN done this admission.  She has developed kidney disease over time.  Creatinine used to be in the 1.2-1.4 range until April 2024 subsequently to be in the 2-2.5 range.  More recently had an episode of ATN from 
Occupational Therapy    [facility]  Occupational Therapy Not Seen Note    DATE: 2024    NAME: Daja Espinosa  MRN: [id number]   : 1960      Patient not seen this date for Occupational Therapy due to:    Surgery/Procedure: IR    Next Scheduled Treatment:     Electronically signed by MARYAN Loaiza on 2024 at 8:35 AM   
Occupational Therapy  Facility/Department: [facility] CAR 2- STEPDOWN  Occupational Therapy Initial Assessment    Name: Daja Espinosa  : 1960  MRN: [id number]  Date of Service: 2024    Discharge Recommendations:  Patient would benefit from continued therapy after discharge  OT Equipment Recommendations  Equipment Needed:  (CTA as pt progresses)     Patient Diagnosis(es): There were no encounter diagnoses.  Past Medical History:  has a past medical history of Alcohol abuse, Arthritis, Bladder cancer (HCC), CAD (coronary artery disease), Cancer (HCC), Carotid artery occlusion, Carotid artery stenosis, Chronic back pain, History of chemotherapy, Hydronephrosis, Hyperlipidemia, Hypertension, Hypoglycemia, MI (myocardial infarction) (HCC), Peripheral vascular disease (HCC), Takayasu's arteritis (HCC), Tibial fracture, Umbilical hernia, Under care of team, Under care of team, Under care of team, Unspecified cerebral artery occlusion with cerebral infarction, and Wears partial dentures.  Past Surgical History:  has a past surgical history that includes Cardiac catheterization (2010); Cardiac surgery (); Vulva surgery (); vascular surgery; vascular surgery (, ); femoral bypass (, ); other surgical history (2016); Bladder surgery (2016); other surgical history; other surgical history; Colonoscopy (N/A, 2021); Upper gastrointestinal endoscopy (N/A, 2021); Colonoscopy (N/A, 2021); hernia repair; ventral hernia repair (N/A, 2022); Cystoscopy (N/A, 2024); Cystoscopy (Left, 2024); TUNNELED CENTRAL VENOUS CATHETER W/ SUBCUTANEOUS PORT (Right, 2024); IR PORT PLACEMENT < 5 YEARS (2024); Carotid angioplasty (Right); Cystocopy (Left, 2024); Anomalous venous return repair (N/A, 2024); Esophagogastroduodenoscopy (2024); Upper gastrointestinal endoscopy (N/A, 2024); IR GUIDED NEPHROSTOMY CATH PLACEMENT LEFT (2024); and IR 
Occupational Therapy  Facility/Department: [facility] RENAL//MED SURG  Occupational Therapy Initial Assessment    Name: Daja Espinosa  : 1960  MRN: [id number]  Date of Service: 2024    Discharge Recommendations:   No therapy recommended at discharge.  OT Equipment Recommendations  Equipment Needed: No       Patient Diagnosis(es): There were no encounter diagnoses.  Past Medical History:  has a past medical history of Alcohol abuse, Arthritis, Bladder cancer (HCC), CAD (coronary artery disease), Cancer (HCC), Carotid artery occlusion, Carotid artery stenosis, Chronic back pain, History of chemotherapy, Hydronephrosis, Hyperlipidemia, Hypertension, Hypoglycemia, MI (myocardial infarction) (HCC), Peripheral vascular disease (HCC), Takayasu's arteritis (HCC), Tibial fracture, Umbilical hernia, Under care of team, Under care of team, Under care of team, Unspecified cerebral artery occlusion with cerebral infarction, and Wears partial dentures.  Past Surgical History:  has a past surgical history that includes Cardiac catheterization (2010); Cardiac surgery (); Vulva surgery (); vascular surgery; vascular surgery (, ); femoral bypass (, ); other surgical history (2016); Bladder surgery (2016); other surgical history; other surgical history; Colonoscopy (N/A, 2021); Upper gastrointestinal endoscopy (N/A, 2021); Colonoscopy (N/A, 2021); hernia repair; ventral hernia repair (N/A, 2022); Cystoscopy (N/A, 2024); Cystoscopy (Left, 2024); TUNNELED CENTRAL VENOUS CATHETER W/ SUBCUTANEOUS PORT (Right, 2024); IR PORT PLACEMENT < 5 YEARS (2024); Carotid angioplasty (Right); Cystocopy (Left, 2024); Anomalous venous return repair (N/A, 2024); Esophagogastroduodenoscopy (2024); and Upper gastrointestinal endoscopy (N/A, 2024).           Assessment   Assessment: No acute OT deficits noted this date. Pt in agreeable to 
PATIENT REFUSES TO WEAR BIPAP     [x] Risks and benefits explained to patient   [x] Patient refuses to wear Bipap stating breathing fime-will call if needed   [x] Patient verbalizes understanding of information presented.   
PHARMACY NOTE:    The electrolyte replacement protocol for potassium/magnesium has been discontinued per P&T guidelines because the patient has reduced renal function (CrCl < 30 mL/min).      The patient's most recent potassium & magnesium levels are:  Recent Labs     06/16/24  0614 06/17/24  0350 06/18/24  0048   K 3.4* 3.8 3.2*   MG 1.8  --   --      Estimated Creatinine Clearance: 16 mL/min (A) (based on SCr of 2.8 mg/dL (H)).    For patients with decreased renal function (below 30ml/min) needing potassium/magnesium supplementation, please order individual bolus doses with appropriate monitoring.      Please contact the inpatient pharmacy with any concerns.  Thank you.  Antoinette Shepherd, Pharm.D.  6/18/2024  9:08 AM    
Patient O2 increased to 6LPM to maintain SpO2 > 90%. Care ongoing.  
Patient c/o SOB. Vitals are stable. Patient would like to give bipap a chance. Call Respiratory x2 to place pt on bipap. Waiting for respiratory to come to bedside. Charge is currently wrapping up with a rapid response per staff. Care ongoing  
Patient refuses to wear bipap. Patient states \" I do not like the machine and its uncomfortable. My breathing feels better. If I feel like I need it, I will call you\"    Patient, daughter, and  at the bedside educated on risk and benefits for wearing bipap. Care ongoing  
Patient to US for right thoracentesis.  JR CEE and MIKE RDMS at bedside.  Site prepped and draped, area numbed with lidocaine.  1.4L of slightly turbid yellow fluid drained.  Specimen collected.  Dry sterile drsg with tegaderm placed to site.  Patient tolerated well and is transported back to room with ICU RN.  
Patient transported to ICU with family and belongings.   
Patient was evaluated today for the diagnosis of  Acute hypoxic respiratory failure secondary to hospital-acquired pneumonia .  I entered a DME order for home oxygen at 2 lpm because the diagnosis and testing require the patient to have supplemental oxygen.  Condition will improve or be benefited by oxygen use.  The patient is  able to perform good mobility in a home setting and therefore does require the use of a portable oxygen system.  The need for this equipment was discussed with the patient and she understands and is in agreement.     Ganesh Lee DO  [facility], [place], [place]  6/27/2024 1:51 PM      
Patient was given discharge instructions, which were reviewed with the patient, and all questions were answered. Patient was discharged with all of their belongings and Meds to Beds. Patient was ambulated off the unit in a wheelchair.  
Per Colleen in I/R ordered temp cath will be placed tomorrow (6/21/24) instead of today; writer informed Dr Ortiz via [employer]  
Physical Therapy        Physical Therapy Cancel Note      DATE: 2024    NAME: Daja Espinosa  MRN: [id number]   : 1960      Patient not seen this date for Physical Therapy due to:    Surgery/Procedure: Pt is out of room for IR will pursue as able.       Electronically signed by Arpan Fong PTA on 2024 at 8:55 AM      
Physical Therapy        Physical Therapy Cancel Note      DATE: 2024    NAME: Daja Espinosa  MRN: [id number]   : 1960      Patient not seen this date for Physical Therapy due to:    Surgery/Procedure: Pt is currently out of room in IR for port placement will pursue as able.       Electronically signed by Arpan Fong PTA on 2024 at 10:47 AM      
Physical Therapy  Facility/Department: [facility] CAR 2- STEPDOWN  Physical Therapy   Name: Daja Espinosa  : 1960  MRN: [id number]  Date of Service: 2024    Discharge Recommendations:  Patient would benefit from continued therapy after discharge      Patient Diagnosis(es): There were no encounter diagnoses.  Past Medical History:  has a past medical history of Alcohol abuse, Arthritis, Bladder cancer (HCC), CAD (coronary artery disease), Cancer (HCC), Carotid artery occlusion, Carotid artery stenosis, Chronic back pain, History of chemotherapy, Hydronephrosis, Hyperlipidemia, Hypertension, Hypoglycemia, MI (myocardial infarction) (HCC), Peripheral vascular disease (HCC), Takayasu's arteritis (HCC), Tibial fracture, Umbilical hernia, Under care of team, Under care of team, Under care of team, Unspecified cerebral artery occlusion with cerebral infarction, and Wears partial dentures.  Past Surgical History:  has a past surgical history that includes Cardiac catheterization (2010); Cardiac surgery (); Vulva surgery (); vascular surgery; vascular surgery (, ); femoral bypass (, ); other surgical history (2016); Bladder surgery (2016); other surgical history; other surgical history; Colonoscopy (N/A, 2021); Upper gastrointestinal endoscopy (N/A, 2021); Colonoscopy (N/A, 2021); hernia repair; ventral hernia repair (N/A, 2022); Cystoscopy (N/A, 2024); Cystoscopy (Left, 2024); TUNNELED CENTRAL VENOUS CATHETER W/ SUBCUTANEOUS PORT (Right, 2024); IR PORT PLACEMENT < 5 YEARS (2024); Carotid angioplasty (Right); Cystocopy (Left, 2024); Anomalous venous return repair (N/A, 2024); Esophagogastroduodenoscopy (2024); Upper gastrointestinal endoscopy (N/A, 2024); IR GUIDED NEPHROSTOMY CATH PLACEMENT LEFT (2024); and IR NONTUNNELED VASCULAR CATHETER > 5 YEARS (2024).    Assessment   Assessment: pt amb 120 ft with RW 
Physical Therapy  Facility/Department: [facility] CAR 2- STEPDOWN  Physical Therapy Treatment Note    Name: Daja Espinosa  : 1960  MRN: [id number]  Date of Service: 2024    Discharge Recommendations:  Patient would benefit from continued therapy after discharge   PT Equipment Recommendations  Equipment Needed: Yes  Walker: Rolling      Patient Diagnosis(es): There were no encounter diagnoses.  Past Medical History:  has a past medical history of Alcohol abuse, Arthritis, Bladder cancer (HCC), CAD (coronary artery disease), Cancer (HCC), Carotid artery occlusion, Carotid artery stenosis, Chronic back pain, History of chemotherapy, Hydronephrosis, Hyperlipidemia, Hypertension, Hypoglycemia, MI (myocardial infarction) (HCC), Peripheral vascular disease (HCC), Takayasu's arteritis (HCC), Tibial fracture, Umbilical hernia, Under care of team, Under care of team, Under care of team, Unspecified cerebral artery occlusion with cerebral infarction, and Wears partial dentures.  Past Surgical History:  has a past surgical history that includes Cardiac catheterization (2010); Cardiac surgery (); Vulva surgery (); vascular surgery; vascular surgery (, ); femoral bypass (, ); other surgical history (2016); Bladder surgery (2016); other surgical history; other surgical history; Colonoscopy (N/A, 2021); Upper gastrointestinal endoscopy (N/A, 2021); Colonoscopy (N/A, 2021); hernia repair; ventral hernia repair (N/A, 2022); Cystoscopy (N/A, 2024); Cystoscopy (Left, 2024); TUNNELED CENTRAL VENOUS CATHETER W/ SUBCUTANEOUS PORT (Right, 2024); IR PORT PLACEMENT < 5 YEARS (2024); Carotid angioplasty (Right); Cystocopy (Left, 2024); Anomalous venous return repair (N/A, 2024); Esophagogastroduodenoscopy (2024); Upper gastrointestinal endoscopy (N/A, 2024); IR GUIDED NEPHROSTOMY CATH PLACEMENT LEFT (2024); IR NONTUNNELED 
Received a critical value from lab; hemoglobin was 6.6. DIONY Daniels notified.   
[facility]  Office: [phone]  Deven Louis DO, Rashid Martinez, DO, Koko House DO, Jc Wheat, DO, Bahman Silva MD, Radha Joseph MD, Volodymyr Abad MD, Karyn Purvis MD,  Siva Helton MD, Jigna Emery MD, Mayur Tinajero MD,  Danni Lamas DO, Gayle Guzmán MD, Micha Sosa MD, Lloyd Louis DO, No Lutz MD,  Ganesh Lee DO, Brittani Francisco MD, Sherrell Hickey MD, Kayla Roberson MD, Macarena Martin MD,  Gerardo Pate MD, Daisy Stephenson MD, Kelly Connor MD, Clau Calderon MD, Aurelio Pérez MD, Eliceo Milligan MD, Talib Rodriguez DO, Yusef Lopez DO, Liban Williamson MD,  Franky Trinh MD, Shirley Waterhouse, CNP,  Savanah Diez CNP, Arias Lares, CNP,  Brenda Daniels, DNP, Phoebe Morris, CNP, Morelia Howard, CNP, Meaghan Rogers, CNP, Neris Del Toro, CNP, Krystal Morrow, PA-C, Ely Guevara, PA-C, Nancy Tam, CNP, Marcy Simeon, CNP, Cody Steele, CNP, Aviva Hartman, CNP, Isela Montaño, CNP, Marj Figueroa, CNS, Keri Clayton, CNP, Anahi Carnes CNP, Tracy Schwab, CNP         [facility]   IN-PATIENT SERVICE   [facility]    Progress Note    6/25/2024    8:02 AM    Name:   Daja Espinosa  MRN:     [id number]     Acct:      [id number]   Room:   2007/2007-01  IP Day:  11  Admit Date:  6/14/2024  3:19 AM    PCP:   Sohail Garcia MD  Code Status:  Full Code    Subjective:     C/C: Shortness of breath with associated fever.    Interval History Status: improved.     Vitals reviewed, afebrile and hemodynamically stable.  Saturating well on 3 L nasal cannula.  Labs reviewed, BMP pending  Overnight patient had no significant events.    On examination patient resting comfortably in bed.  Just returned from tunnel catheter placement.  Drowsy but alert and oriented to person place and time.  Plan for hemodialysis seat.  Discharged to follow-up.    Brief History:     This is a 63-year-old female with a significant past medical 
[facility]  Office: [phone]  Deven Louis DO, Rashid Martinez DO, Koko House DO, Jc Wheat, DO, Bahman Silva MD, Radha Joseph MD, Volodymyr Abad MD, Karyn Purvis MD,  Siva Helton MD, Jigna Emery MD, Mayur Tinajero MD,  Danni Lamas DO, Gayle Guzmán MD, Micha Sosa MD, Lloyd Louis DO, No Lutz MD,  Ganesh Lee DO, Brittani Francisco MD, Sherrell Hickey MD, Kayla Roberson MD, Macarena Martin MD,  Gerardo Pate MD, Daisy Stephenson MD, Kelly Connor MD, Clau Calderon MD, Aurelio Pérez MD, Eliceo Milligan MD, Talib Rodriguez DO, Yusef Lopez DO, Liban Williamson MD,  Franky Trinh MD, Shirley Waterhouse, CNP,  Savanah Diez CNP, Arias Lares, CNP,  Brenda Daniels, DNP, Phoebe Morris, CNP, Mroelia Howard, CNP, Meaghan Rogers CNP, Neris Del Toro, CNP, Krystal Morrow, PA-C, Ely Guevara PA-C, Nancy Tam, CNP, Marcy Simeon, CNP, Cody Steele, CNP, Aviva Hartman, CNP, Isela Montaño, CNP, Marj Figueroa, CNS, Keri Clayton, CNP, Anahi Carnes CNP, Tracy Schwab, CNP         [facility]   IN-PATIENT SERVICE   [facility]    Progress Note    6/22/2024    3:57 PM    Name:   Daja Espinosa  MRN:     [id number]     Acct:      [id number]   Room:   2007/2007-01  IP Day:  8  Admit Date:  6/14/2024  3:19 AM    PCP:   Sohail Garcia MD  Code Status:  Full Code    Subjective:     Still weak appearing on oxygen has some shortness of breath. Urine appears to be more yellow less bleeding. She feels better today than yesterday     She did have some difficulty with HD yesterday. She says it wore her out      Brief History:     Daja Espinosa is a 63 y.o. female with PMHx of HTN, HLD, PVD, bladder cancer, CAD and CVA on Eliquis who presents with shortness of breath and fever and is admitted to the hospital for the management of Pneumonia, unspecified organism.  Patient was receiving chemo/radiation treatment for her high-grade urothelial 
[facility]  Speech Language Pathology    Date: 6/17/2024  Patient Name: Daja Espinosa  YOB: 1960   AGE: 63 y.o.  MRN: [id number]        Patient Not Available for Speech Therapy     Due to:  [] Testing  [] Hemodialysis  [] Cancelled by RN  [] Surgery   [] Intubation/Sedation/Pain Medication  [] Medical instability  [x] Other: Spoke with RN, no need for ST bedside swallow study at this time.  States pt. Is tolerating water and pills.      Next scheduled treatment: re-consult if necessary  Completed by: DICK LEWIS, SLP, M.A. CCC-SLP    
[facility]  Office: [phone]  Deven Louis DO, Rashid Martinez DO, Koko House DO, Jc Wheat DO, Bahman Silva MD, Radha Joseph MD, Volodymyr Abad MD, Karyn Purvis MD,  Siva Helton MD, Jigna Emery MD, Mayur Tinajero MD,  Danni Lamas DO, Gayle Guzmán MD, Micha Sosa MD, Lloyd Louis DO, No Lutz MD,  Ganesh Lee DO, Brittani Francisco MD, Sherrell Hickey MD, Kayla Roberson MD, Macarena Martin MD,  Gerardo Pate MD, Daisy Stephenson MD, Kelly Connor MD, Clau Calderon MD, Aurelio Pérez MD, Eliceo Milligan MD, Talib Rodriguez DO, Yusef Lopez DO, Liban Williamson MD,  Franky Trinh MD, Shirley Waterhouse, CNP,  Savanah Diez CNP, Arias Lares, CNP,  Brenda Daniels, DNP, Phoebe Morris, CNP, Morelia Howard, CNP, Meaghan Rogers CNP, Neris Del Toro, CNP, Krystal Morrow, PA-C, Ely Guevara PA-C, Nancy Tam, CNP, Marcy Simeon, CNP, Cody Steele, CNP, Aviva Hartman, CNP, Isela Montaño, CNP, Marj Figueroa, CNS, Keri Clayton, CNP, Anahi Carnes CNP, Tracy Schwab, CNP         [facility]   IN-PATIENT SERVICE   [facility]    Progress Note    6/24/2024    12:19 PM    Name:   Daja Espinosa  MRN:     [id number]     Acct:      [id number]   Room:   2007/2007-01  IP Day:  10  Admit Date:  6/14/2024  3:19 AM    PCP:   Sohail Garcia MD  Code Status:  Full Code    Subjective:   Feels more nauseated today. Has no abdominal pain. Had diarrhea yesterday but that has resolved. She says she has no appetite. Denies any flank pain. She also has a headache and some pain in neck which she says feels like a muscle ache.       Brief History:     Daja Espinosa is a 63 y.o. female with PMHx of HTN, HLD, PVD, bladder cancer, CAD and CVA on Eliquis who presents with shortness of breath and fever and is admitted to the hospital for the management of Pneumonia, unspecified organism.  Patient was receiving chemo/radiation treatment for her high-grade 
[facility]  Office: [phone]  Deven Louis DO, Rashid Martinez, DO, Koko House DO, Jc Wheat, DO, Bahman Silva MD, Radha Joseph MD, Volodymyr Abad MD, Karyn Purvis MD,  Siva Helton MD, Jigna Emery MD, Mayur Tinajero MD,  Danni Lamas DO, Gayle Guzmán MD, Micha Sosa MD, Lloyd Louis DO, No Lutz MD,  Ganesh Lee DO, Brittani Francisco MD, Sherrell Hickey MD, Kayla Roberson MD, Macarena Martin MD,  Gerardo Pate MD, Daisy Stephenson MD, Kelly Connor MD, Clau Calderon MD, Aurelio Pérez MD, Eliceo Milligan MD, Talib Rodriguez DO, Yusef Lopez DO, Liban Williamson MD,  Franky Trinh MD, Shirley Waterhouse, CNP,  Savanah Diez CNP, Arias Lares, CNP,  Brenda Daniels, DNP, Phoebe Morris, CNP, Morelia Howard, CNP, Meaghan Rogers, CNP, Neris Del Toro, CNP, Krystal Morrow, PA-C, Ely Guevara, PA-C, Nancy Tam, CNP, Marcy Simeon, CNP, Cody Steele, CNP, Aviva Hartman, CNP, Isela Montaño, CNP, Marj Figueroa, CNS, Keri Clayton, CNP, Anahi Carnes CNP, Tracy Schwab, CNP         [facility]   IN-PATIENT SERVICE   [facility]    Progress Note    6/27/2024    8:10 AM    Name:   Daja Espinosa  MRN:     [id number]     Acct:      [id number]   Room:   2007/2007-01  IP Day:  13  Admit Date:  6/14/2024  3:19 AM    PCP:   Sohail Garcia MD  Code Status:  Full Code    Subjective:     C/C: Shortness of breath with associated fever.    Interval History Status: improved.     Vitals reviewed, afebrile and hemodynamically stable.  Saturating well on 2 L nasal cannula.  Labs reviewed, mild hyponatremia 130, hypokalemia 3.5, elevated BUN and creatinine 27 and 2.6 respectively.  Overnight patient required increased oxygen but back to 2 L this morning.    On examination patient resting comfortably in bed. Admits to mild pain at tunnel cath site but much improved.  Discharge today if cleared by nephrology.    Brief History:     This is a 
[facility]  Speech Language Pathology    Date: 6/16/2024  Patient Name: Daja Espinosa  YOB: 1960   AGE: 63 y.o.  MRN: [id number]        Patient Not Available for Speech Therapy     Due to:  [] Testing  [] Hemodialysis  [] Cancelled by RN  [] Surgery   [] Intubation/Sedation/Pain Medication  [] Medical instability  [x] Other: Pt currently requiring CPAP.    Next scheduled treatment: as medically appropriate this PM or 6/17/24  Completed by: Mirlande Parry, SLP  
Urology Progress Note      Subjective: Daja Espinosa is a 63 y.o. female.  His/Her current Diet is: ADULT DIET; Regular.    Since the previous note, the patient reports the following:  No fevers, chills, nausea, vomiting  Denies any flank pain    WBC 6.6 (5.9)  Hgb 6.9 (7.1)  Cr 3.5 (2.8)    Left nephrostomy tube 550 mL/12h bloody  Anguiano UOP 1050 mL/24h see through red      Vitals and Labs:  Vitals:    06/19/24 0415 06/19/24 0605 06/19/24 0752 06/19/24 0811   BP: (!) 170/82 (!) 162/88 (!) 161/91    Pulse: 78 77 80 80   Resp: 22 (!) 0 22 16   Temp: 97.4 °F (36.3 °C)  97.3 °F (36.3 °C)    TempSrc: Axillary  Oral    SpO2: 98%  96% 99%   Weight:       Height:         I/O last 3 completed shifts:  In: 305 [P.O.:300; I.V.:5]  Out: 1870 [Urine:1870]    Recent Labs     06/17/24  0350 06/17/24  1758 06/18/24  0048 06/18/24  0725 06/18/24  1336 06/19/24  0533   WBC 6.0  --  5.9  --   --  6.6   HGB 7.7*   < > 7.1*  7.2* 7.1* 7.1* 6.9*   HCT 25.7*   < > 24.1*  24.3* 21.9* 21.6* 21.5*   .5*  --  107.6*  --   --  98.6     --  317  --   --  297    < > = values in this interval not displayed.       Recent Labs     06/17/24  0350 06/18/24  0048 06/18/24  1336 06/19/24  0533   * 132*  --  131*   K 3.8 3.2* 3.2* 3.5*    98  --  96*   CO2 16* 17*  --  19*   BUN 47* 55*  --  63*   CREATININE 2.4* 2.8*  --  3.5*         No results for input(s): \"COLORU\", \"PHUR\", \"LABCAST\", \"WBCUA\", \"RBCUA\", \"MUCUS\", \"TRICHOMONAS\", \"YEAST\", \"BACTERIA\", \"CLARITYU\", \"SPECGRAV\", \"LEUKOCYTESUR\", \"UROBILINOGEN\", \"BILIRUBINUR\", \"BLOODU\" in the last 72 hours.    Invalid input(s): \"NITRATE\", \"GLUCOSEUKETONESUAMORPHOUS\"    Physical Exam:  NAD  A/O x 3  RRR  No accessory muscles of inspiration  Abdomen soft, non-tender, non-distended. No CVA tenderness.  Anguiano in place. Clear yellow UOP.  No calf pain.  EPCs on. Machine turned on.    Impression:  62 yo female with  Gross hematuria  Muscle invasive bladder cancer with squamous 
Urology Progress Note      Subjective: Daja Espinosa is a 63 y.o. female.  His/Her current Diet is: Diet NPO Exceptions are: Sips of Water with Meds.    Since the previous note, the patient reports the following:  No fevers, chills, nausea, vomiting  Denies any flank pain    Anguiano urine clear yellow      Vitals and Labs:  Vitals:    06/17/24 2239 06/18/24 0020 06/18/24 0259 06/18/24 0300   BP:  (!) 163/104     Pulse: 82  82 85   Resp: 23 22     Temp:  98.4 °F (36.9 °C)     TempSrc:  Oral     SpO2: 97%  94% 93%   Weight:       Height:         I/O last 3 completed shifts:  In: 208.2 [I.V.:58.2; IV Piggyback:149.9]  Out: 1455 [Urine:1455]    Recent Labs     06/16/24  0614 06/17/24  0350 06/17/24  1758 06/18/24  0048   WBC 8.0 6.0  --  5.9   HGB 7.9* 7.7* 7.5* 7.1*  7.2*   HCT 24.5* 25.7* 23.9* 24.1*  24.3*   MCV 96.8 104.5*  --  107.6*    332  --  317     Recent Labs     06/16/24  0614 06/17/24  0350 06/18/24  0048    135* 132*   K 3.4* 3.8 3.2*    100 98   CO2 19* 16* 17*   BUN 42* 47* 55*   CREATININE 2.2* 2.4* 2.8*       No results for input(s): \"COLORU\", \"PHUR\", \"LABCAST\", \"WBCUA\", \"RBCUA\", \"MUCUS\", \"TRICHOMONAS\", \"YEAST\", \"BACTERIA\", \"CLARITYU\", \"SPECGRAV\", \"LEUKOCYTESUR\", \"UROBILINOGEN\", \"BILIRUBINUR\", \"BLOODU\" in the last 72 hours.    Invalid input(s): \"NITRATE\", \"GLUCOSEUKETONESUAMORPHOUS\"    Physical Exam:  NAD  A/O x 3  RRR  No accessory muscles of inspiration  Abdomen soft, non-tender, non-distended. No CVA tenderness.  Anguiano in place. Clear yellow UOP.  No calf pain.  EPCs on. Machine turned on.    Impression:  62 yo female with  Gross hematuria  Muscle invasive bladder cancer with squamous differentiation, on chemoradiation  Left hydronephrosis, has indwelling left ureteral stent  Acute kidney injury    Plan:  NPO  Consult IR for left nephrostomy tube placement today  Anguiano catheter per primary  Monitor urine color  Keep holding eliquis due to IR procedure  Follow up outpatient with  
[facility]  Office: [phone]  Deven Louis DO, Rashid Martinez, DO, Koko House DO, Jc Wheat, DO, Bahman Silva MD, Radha Joseph MD, Volodymyr Abad MD, Karyn Purvis MD,  Siva Helton MD, Jigna Emery MD, Mayur Tinajero MD,  Danni Lamas DO, Gayle Guzmán MD, Micha Sosa MD, Lloyd Louis DO, No Lutz MD,  Ganesh Lee DO, Brittani Francisco MD, Sherrell Hickey MD, Kayla Roberson MD, Macarena Martin MD,  Gerardo Pate MD, Daisy Stephenson MD, Kelly Connor MD, Clau Calderon MD, Aurelio Pérez MD, Eliceo Milligan MD, Talib Rodriguez DO, Yusef Lopez DO, Liban Williamson MD,  Franky Trinh MD, Shirley Waterhouse, CNP,  Savanah Diez CNP, Arias Lares, CNP,  Brenda Daniels, DNP, Phoebe Morris, CNP, Morelia Howard, CNP, Meaghan Rogers, CNP, Neris Del Toro, CNP, Krystal Morrow, PA-C, Ely Guevara, PA-C, Nancy Tam, CNP, Marcy Simeon, CNP, Cody Steele, CNP, Aviva Hartman, CNP, Isela Montaño, CNP, Marj Figueroa, CNS, Keri Clayton, CNP, Anahi Carnes CNP, Tracy Schwab, CNP         [facility]   IN-PATIENT SERVICE   [facility]    Progress Note    6/26/2024    8:11 AM    Name:   Daaj Espinosa  MRN:     [id number]     Acct:      [id number]   Room:   2007/2007-01  IP Day:  12  Admit Date:  6/14/2024  3:19 AM    PCP:   Sohail Garcia MD  Code Status:  Full Code    Subjective:     C/C: Shortness of breath with associated fever.    Interval History Status: improved.     Vitals reviewed, afebrile and hemodynamically stable.  Saturating well on 3 L nasal cannula.  Labs reviewed, mild hyponatremia 129, elevated BUN and creatinine 24 and 2.7 respectively.  Hemoglobin stable 7.4.  Overnight patient had bleeding from tunneled catheter site.    On examination patient resting comfortably in bed.  Nephrology evaluated patient today and patient has good urine output from nephrostomy tube as well as Anguiano catheter.  Nephrology would like to 
Writer messaged NP on call patients potassium was 3.2 this morning- i guess orders were placed earlier to replace but pharmacy left a note stating- \"For patients with decreased renal function (below 30ml/min) needing potassium/magnesium supplementation, please order individual bolus doses with appropriate monitoring. \" would you like to readjust orders for replacment? thank you. NP on call responded Sure :-) I will place new orders- orders placed. Will continue to monitor.   
Writer messaged NP on call stating FYI- patients hgb this morning is 6.9 and potassium is 3.5 . NP on call stated Please reach out to dayshift rounding provider after 7 to discuss. It doesn’t look like any consent has been obtained. Writer responded Thank you. Will continue to monitor.   
Writer messaged urology resident on call stating  i just wanted to update that patients left nephrostomy tube she got placed today is having some very red tinged output- it was more yellow/red tinged earlier but now just bright red i just wanted to make aware. Resident responded ok thanks. Will continue to monitor.   
  Vitals: BP (!) 159/99   Pulse 69   Temp 97.7 °F (36.5 °C) (Oral)   Resp 22   Ht 1.575 m (5' 2\")   Wt 58.8 kg (129 lb 10.1 oz)   LMP 12/01/2010   SpO2 91%   BMI 23.71 kg/m²     Physical Exam  Constitutional:       General: She is not in acute distress.     Appearance: Normal appearance. She is not ill-appearing.   Cardiovascular:      Rate and Rhythm: Normal rate and regular rhythm.      Pulses: Normal pulses.      Heart sounds: No murmur heard.  Pulmonary:      Effort: Pulmonary effort is normal. No respiratory distress.      Breath sounds: Rales (bibasilar) present. No wheezing.   Abdominal:      General: Bowel sounds are normal. There is no distension.      Palpations: There is no mass.      Tenderness: There is no abdominal tenderness.   Neurological:      General: No focal deficit present.      Mental Status: She is alert and oriented to person, place, and time.      Cranial Nerves: No cranial nerve deficit.      Sensory: No sensory deficit.      Motor: No weakness.          ECHO:      4/9/24    Left Ventricle: Normal left ventricular systolic function. EF by 2D Simpsons Biplane is 60%. Left ventricle size is normal. Normal wall thickness. Normal wall motion. Global longitudinal strain is -15.0%.    Aortic Valve: Trileaflet valve.    Mitral Valve: Mild regurgitation.    Tricuspid Valve: The estimated RVSP is 28 mmHg.    Interatrial Septum: Agitated saline study was negative with and without provocation.    Image quality is good.     Stress Test:   12/15/21  IMPRESSION:  1.  Largely normal myocardial perfusion imaging with soft tissue  artifact but without evidence of significant myocardial ischemia or  infarction.  2.  Global left ventricular systolic function was normal without  regional wall motion abnormalities.     Overall these results are most consistent with a low risk for  significant coronary artery disease.     Although the patient's results were not completely normal, unless  clinical 
epistaxis.  -Wads of old Kleenex is reviewed.  Clinically appears more consistent with epistaxis with flecks of blood with blowing nose/coughing.      Discussed with nursing, critical care.  Discussed with patient and  at bedside .  Transfer to intermediate.    Status post one-time dose of IV Lopressor.  Will check labs, VBG.  Stat chest x-ray similar with bibasilar airspace disease.  Check bedside swallow, SLP ??  Occult aspiration.  Continue to encourage I-S, flutter valve treatment.       Unable to obtain CT angio PE study with CKD, progressive renal insufficiency over the past 1 to 2 months.  Check CT without contrast.  Suspect hemoptysis more likely related to nasal irritation with epistaxis, trial of nasal saline with Afrin.      Due to the immediate potential for life-threatening deterioration due to A-fib with RVR, I spent 48 minutes providing critical care.  This time is excluding time spent performing procedures.      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 
overnight  Patient remained afebrile, blood pressure remained slightly    TODAY:  Patient examined at bedside  Labs and chart reviewed  Vitals reviewed    She is currently afebrile, heart rate 83/min and normal sinus rhythm, blood pressure is slightly high systolic 161/90.  Patient has been tolerating BiPAP with acute high flow nasal cannula    Labs reviewed, sodium 135, potassium 3.8, chloride 100, bicarb 16, BUN 47, creatinine 2.4, anion gap 19, EGFR 22, calcium 9.0  WBC 6.0, hemoglobin 7.7, platelet count 332    Infectious workup updated and all negative, patient currently is on Zosyn for hospital-acquired pneumonia    She has total urine output 1.5 L in last 24 hours    Cardiology: Following patient for A-fib with RVR, started patient on Coreg 6.25 mg twice daily.  Eliquis is on hold considering her IR guided thoracocentesis      AWAKE & FOLLOWING COMMANDS:  [] No   [x] Yes    CURRENT VENTILATION STATUS:     [] Ventilator  [x] BIPAP  [x] Nasal Cannula [] Room Air        SECRETIONS Amount:  [] Small [] Moderate  [] Large  [x] None  Color:     [] White [] Colored  [] Bloody    SEDATION:  RAAS Score:  [] Propofol gtt  [] Versed gtt  [] Ativan gtt   [x] No Sedation    PARALYZED:  [x] No    [] Yes    DIARRHEA:                [x] No                [] Yes  (C. Difficile status: [] positive                                                                                                                       [] negative                                                                                                                     [] pending)    VASOPRESSORS:  [x] No    [] Yes    If yes -   [] Levophed       [] Dopamine     [] Vasopressin       [] Dobutamine  [] Phenylephrine         [] Epinephrine    CENTRAL LINES:     [x] No   [] Yes   (Date of Insertion:   )           If yes -     [] Right IJ     [] Left IJ [] Right Femoral [] Left Femoral                   [] Right Subclavian [] Left Subclavian       HE'S 
time was 2.4 which then went up to 5.6.  Hemodialysis started 6/20/2024 for clearances.  Thoracentesis was done unfortunately based on lab sample is hard to determine if this is a transudative or exudative fluid.  Cultures have been negative.  Empiric antibiotics continue.    Outpatient Medications:     Medications Prior to Admission: pantoprazole (PROTONIX) 40 MG tablet, Take 1 tablet by mouth every morning (before breakfast)  metoprolol tartrate (LOPRESSOR) 50 MG tablet, Take 1 tablet by mouth 2 times daily  apixaban (ELIQUIS) 5 MG TABS tablet, Take 1 tablet by mouth 2 times daily  lidocaine-prilocaine (EMLA) 2.5-2.5 % cream, Apply topically to port site 60 minutes before access as needed.  amLODIPine (NORVASC) 10 MG tablet, Take 1 tablet by mouth daily  atorvastatin (LIPITOR) 80 MG tablet, Take 1 tablet by mouth nightly    Current Medications:     Scheduled Meds:    carvedilol  25 mg Oral BID WC    piperacillin-tazobactam  3,375 mg IntraVENous Q12H    sodium chloride  2 spray Each Nostril Q6H    flecainide  50 mg Oral 2 times per day    sodium chloride flush  5-40 mL IntraVENous 2 times per day    amLODIPine  10 mg Oral Daily    atorvastatin  80 mg Oral Nightly    pantoprazole  40 mg Oral QAM AC    [Held by provider] apixaban  5 mg Oral BID     Continuous Infusions:    sodium chloride      sodium chloride 5 mL/hr at 06/21/24 1430     PRN Meds:  heparin (porcine), heparin (porcine), sodium chloride, levalbuterol, metoprolol, benzonatate, labetalol, sodium chloride flush, sodium chloride, ondansetron **OR** ondansetron, magnesium hydroxide, acetaminophen **OR** acetaminophen, guaiFENesin-dextromethorphan    Input/Output:       I/O last 3 completed shifts:  In: -   Out: 1495 [Urine:1495].    Patient Vitals for the past 96 hrs (Last 3 readings):   Weight   06/21/24 1155 55.2 kg (121 lb 11.1 oz)   06/21/24 0922 56.2 kg (123 lb 14.4 oz)   06/19/24 0347 58.8 kg (129 lb 10.1 oz)       Vital Signs:   Temperature:  Temp: 
   sodium chloride  2 spray Each Nostril Q6H    furosemide  40 mg IntraVENous BID    flecainide  50 mg Oral 2 times per day    sodium chloride flush  5-40 mL IntraVENous 2 times per day    amLODIPine  10 mg Oral Daily    atorvastatin  80 mg Oral Nightly    pantoprazole  40 mg Oral QAM AC    [Held by provider] apixaban  5 mg Oral BID     Continuous Infusions:    sodium chloride 10 mL/hr at 24 0646     PRN Meds: levalbuterol, metoprolol, benzonatate, labetalol, sodium chloride flush, sodium chloride, ondansetron **OR** ondansetron, magnesium hydroxide, acetaminophen **OR** acetaminophen, guaiFENesin-dextromethorphan    Data:     Past Medical History:   has a past medical history of Alcohol abuse, Arthritis, Bladder cancer (HCC), CAD (coronary artery disease), Cancer (HCC), Carotid artery occlusion, Carotid artery stenosis, Chronic back pain, History of chemotherapy, Hydronephrosis, Hyperlipidemia, Hypertension, Hypoglycemia, MI (myocardial infarction) (HCC), Peripheral vascular disease (HCC), Takayasu's arteritis (HCC), Tibial fracture, Umbilical hernia, Under care of team, Under care of team, Under care of team, Unspecified cerebral artery occlusion with cerebral infarction, and Wears partial dentures.    Social History:   reports that she quit smoking about 31 years ago. Her smoking use included cigarettes. She started smoking about 46 years ago. She has a 22.5 pack-year smoking history. She has never used smokeless tobacco. She reports current alcohol use of about 6.0 standard drinks of alcohol per week. She reports current drug use. Drug: Marijuana (Weed).     Family History:   Family History   Problem Relation Age of Onset    Cancer Father         lung    Cancer Mother        Vitals:  BP (!) 163/104   Pulse 85   Temp 98.4 °F (36.9 °C) (Oral)   Resp 22   Ht 1.575 m (5' 2\")   Wt 57.6 kg (126 lb 15.8 oz)   LMP 2010   SpO2 94%   BMI 23.23 kg/m²   Temp (24hrs), Av.3 °F (36.8 °C), Min:98.2 °F 
PRN  ondansetron (ZOFRAN-ODT) disintegrating tablet 4 mg, Q8H PRN   Or  ondansetron (ZOFRAN) injection 4 mg, Q6H PRN  magnesium hydroxide (MILK OF MAGNESIA) 400 MG/5ML suspension 30 mL, Daily PRN  acetaminophen (TYLENOL) tablet 650 mg, Q6H PRN   Or  acetaminophen (TYLENOL) suppository 650 mg, Q6H PRN  guaiFENesin-dextromethorphan (ROBITUSSIN DM) 100-10 MG/5ML syrup 5 mL, Q4H PRN  amLODIPine (NORVASC) tablet 10 mg, Daily  atorvastatin (LIPITOR) tablet 80 mg, Nightly  pantoprazole (PROTONIX) tablet 40 mg, QAM AC  [Held by provider] apixaban (ELIQUIS) tablet 5 mg, BID          LABS      CBC:   Recent Labs     06/23/24  0547 06/24/24  0544   WBC 8.2 9.3   RBC 2.41* 2.39*   HGB 7.6* 7.6*   HCT 23.5* 26.0*   MCV 97.5 108.8*   MCH 31.5 31.8   MCHC 32.3 29.2   RDW 15.4* 15.7*    318   MPV 10.3 10.6      BMP:   Recent Labs     06/23/24  0547 06/24/24  0544   * 127*   K 3.6* 3.9   CL 93* 91*   CO2 27 21   BUN 19 27*   CREATININE 2.4* 3.2*   GLUCOSE 104* 100*   CALCIUM 8.6 8.6        IRON:    Lab Results   Component Value Date/Time    IRON 184 06/03/2024 08:33 PM   TIBC:    Lab Results   Component Value Date/Time    TIBC 305 06/03/2024 08:33 PM     ISAIAH:   Lab Results   Component Value Date    ISAIAH NEGATIVE 06/04/2024       SPEP:   Lab Results   Component Value Date/Time    PROT 7.1 03/13/2013 09:53 AM    ALBCAL 3.5 06/04/2024 06:20 PM    ALBPCT 54 06/04/2024 06:20 PM    LABALPH 0.3 06/04/2024 06:20 PM    LABALPH 0.7 06/04/2024 06:20 PM    A1PCT 5 06/04/2024 06:20 PM    A2PCT 11 06/04/2024 06:20 PM    BETAPCT 11 06/04/2024 06:20 PM    GAMGLOB 1.3 06/04/2024 06:20 PM    GGPCT 20 06/04/2024 06:20 PM    PATH ELECTRONICALLY SIGNED. OSMAR MONTES MD 06/04/2024 07:52 PM     UPEP:   Lab Results   Component Value Date/Time    TPU 48 06/04/2024 07:52 PM      HEPBSAG:  Lab Results   Component Value Date/Time    HEPBSAG NONREACTIVE 06/04/2024 06:20 PM     HEPCAB:  Lab Results   Component Value Date/Time    HEPCAB REACTIVE 
dullness to percussion.  Breath sounds bilaterally were clear to auscultation. There were no wheezes, rhonchi or rales. There is no intercostal retraction or use of accessory muscles.  Cardiovascular: Regular without murmur, clicks, gallops or rubs.   Abdomen: Slightly rounded and soft without organomegaly. No rebound tenderness, rigidity or guarding was appreciated.    Lymphatic: No lymphadenopathy.  Extremities:  The patient does not lower extremity edema, ulcerations, tenderness, varicosities or erythema.  No involuntary movements are noted.     Skin:  Warm and dry.  Good color, turgor and pigmentation. No lesions or scars.  No cyanosis or clubbing  Neurological/Psychiatric: The patient's general behavior, level of consciousness, thought content and emotional status is normal.          CBC:   Recent Labs     06/17/24  0350 06/17/24  1758 06/18/24  0048 06/18/24  0725 06/18/24  1336 06/19/24  0533   WBC 6.0  --  5.9  --   --  6.6   HGB 7.7*   < > 7.1*  7.2* 7.1* 7.1* 6.9*     --  317  --   --  297    < > = values in this interval not displayed.       BMP:    Recent Labs     06/17/24  0350 06/18/24  0048 06/18/24  1336 06/19/24  0533   * 132*  --  131*   K 3.8 3.2* 3.2* 3.5*    98  --  96*   CO2 16* 17*  --  19*   BUN 47* 55*  --  62*   CREATININE 2.4* 2.8*  --  3.5*   GLUCOSE 82 107*  --  110*       Calcium:  Recent Labs     06/19/24  0533   CALCIUM 8.5*       Ionized Calcium:Invalid input(s): \"IONCA\"  Magnesium:  No results for input(s): \"MG\" in the last 72 hours.    Phosphorus:No results for input(s): \"PHOS\" in the last 72 hours.  BNP:No results for input(s): \"BNP\" in the last 72 hours.  Glucose:No results for input(s): \"POCGLU\" in the last 72 hours.  HgbA1C: No results for input(s): \"LABA1C\" in the last 72 hours.  INR:   Recent Labs     06/18/24  1336   INR 1.3       Hepatic: No results for input(s): \"ALKPHOS\", \"ALT\", \"AST\", \"PROT\", \"BILITOT\", \"BILIDIR\", \"LABALBU\" in the last 72 
innominate artery.   Procedure Summary      Attempted cardiac cath was unsuccessful.   After right femoral access unable to cross due to right common iliac   occlusion.   After right brachial access unable to cross due to right innominate artery   occlusion.   Left iliac and left subclavian arteries are know to be occluded.      3/2/17  Indications:    - NSTEMI      Conclusions      Procedure Summary      Attempted cath unsuccessful, unable to obtain access.   Will re-attempt in am.    Assessment:   New onset afib with rvr in normal sinus rhythm   History of DVT  On eliquis outpatient   Active high grade urothelial cancer receiving chemo/radiation   On cisplatin and gemcitabine   Left lower lobe pneumonia  Acute hypoxic respiratory failure   CKD  Hx of embolic stroke      [id number] Score for Atrial Fibrillation Stroke Risk   Risk   Factors  Component Value   C CHF No 0   H HTN Yes 1   A2 Age >= 75 No,  (63 y.o.) 0   D DM No 0   S2 Prior Stroke/TIA Yes 2   V Vascular Disease Yes 1   A Age 65-74 No,  (63 y.o.) 0   Sc Sex female 1    [id number]  Score  5   Score last updated 6/19/24 12:27 PM EDT    Click here for a link to the UpToDate guideline \"Atrial Fibrillation: Anticoagulation therapy to prevent embolization    Disclaimer: Risk Score calculation is dependent on accuracy of patient problem list and past encounter diagnosis.   Plan:   Continue flecainide 50mg BID   Continue coreg and Norvasc for BP control. Increase Coreg today. BP elevated.   Stable HGB this. Transfuse per primary.  AC on hold d/t Hematuria/line placement.  Resume Eliquis when cleared by all.   Keep K >4 and Mg >2 while inpt  Continue statin  Jensen placed per Nephro - Initiation of HD. Volume management per HD/Nephro.   OK for D/c from CV standpoint. Follow up with [facility] in 2-3 weeks from D/c     MARGARITA Ambrosio - CNP   [facility]  [email or web address]  [phone]                
is no intercostal retraction or use of accessory muscles. No egophony noted.   Cardiovascular: Regular without murmur, clicks, gallops or rubs.   Abdomen: Slightly rounded and soft without organomegaly. No rebound tenderness, rigidity or guarding was appreciated.    Lymphatic: No lymphadenopathy.  Musculoskeletal: Normal curvature of the spine.  No gross muscle weakness.    Extremities:  The patient does not lower extremity edema, ulcerations, tenderness, varicosities or erythema.  Muscle size, tone and strength are normal.  No involuntary movements are noted.     Skin:  Warm and dry.  Good color, turgor and pigmentation. No lesions or scars.  No cyanosis or clubbing  Neurological/Psychiatric: The patient's general behavior, level of consciousness, thought content and emotional status is normal.          CBC:   Recent Labs     06/16/24  0614 06/17/24  0350 06/17/24  1758 06/18/24  0048 06/18/24  0725   WBC 8.0 6.0  --  5.9  --    HGB 7.9* 7.7* 7.5* 7.1*  7.2* 7.1*    332  --  317  --      BMP:    Recent Labs     06/16/24  0614 06/17/24  0350 06/18/24  0048    135* 132*   K 3.4* 3.8 3.2*    100 98   CO2 19* 16* 17*   BUN 42* 47* 55*   CREATININE 2.2* 2.4* 2.8*   GLUCOSE 117* 82 107*     Calcium:  Recent Labs     06/18/24  0048   CALCIUM 8.6     Ionized Calcium:Invalid input(s): \"IONCA\"  Magnesium:  Recent Labs     06/16/24  0614   MG 1.8     Phosphorus:No results for input(s): \"PHOS\" in the last 72 hours.  BNP:No results for input(s): \"BNP\" in the last 72 hours.  Glucose:No results for input(s): \"POCGLU\" in the last 72 hours.  HgbA1C: No results for input(s): \"LABA1C\" in the last 72 hours.  INR:   Recent Labs     06/17/24  0350   INR 1.6     Hepatic: No results for input(s): \"ALKPHOS\", \"ALT\", \"AST\", \"PROT\", \"BILITOT\", \"BILIDIR\", \"LABALBU\" in the last 72 hours.  Amylase and Lipase:No results for input(s): \"LACTA\", \"AMYLASE\" in the last 72 hours.  Lactic Acid: No results for input(s): \"LACTA\" in the 
06/17/2024 11:25 AM       Radiology:  IR NONTUNNELED VASCULAR CATHETER > 5 YEARS    Result Date: 6/22/2024  Successful ultrasound and fluoroscopy guided non-tunneled hemodialysis catheter placement via the left internal jugular vein.  Okay to use.     XR CHEST PORTABLE    Result Date: 6/20/2024  Similar findings with scattered infiltrates and small effusions and probable bibasilar atelectasis.  Mild vascular congestion a consideration.     US RENAL COMPLETE    Result Date: 6/20/2024  Simple left renal cyst. No obstructive uropathy with a visualized left ureteral stent. Trace fluid adjacent the left kidney and in the right upper quadrant. Right-sided pleural effusion.     IR GUIDED NEPHROSTOMY CATH PLACEMENT LEFT    Result Date: 6/18/2024  Technically successful left percutaneous nephrostomy catheter placement.     CT ABDOMEN PELVIS WO CONTRAST Additional Contrast? None    Result Date: 6/18/2024  1. Interstitial/airspace infiltrate in the central visualized right lower lobe. Small to moderate bilateral pleural effusions. 2. Unchanged marked left hydronephrosis and hydroureter with ureteral stent in place. 3. The bladder is not well evaluated as it is nearly empty. There may be diffuse wall thickening most pronounced posterolaterally on the left. 4. Moderate/marked descending and sigmoid colon diverticulosis. 5. Minimal biliary sludge in an otherwise normal-appearing gallbladder. 6. Mild anasarca.     US THORACENTESIS Which side should the procedure be performed? Right    Result Date: 6/17/2024  Successful ultrasound guided right thoracentesis.     XR CHEST PORTABLE    Result Date: 6/17/2024  Bibasilar effusions with scattered infiltrates.     XR CHEST PORTABLE    Result Date: 6/16/2024  Stable bilateral pleural effusions.  Airspace disease right mid and upper lung likely pneumonia.     CT CHEST WO CONTRAST    Result Date: 6/16/2024  1. Study limited by motion artifact. 2. Moderate to large right and small to moderate 
5 mg  5 mg IntraVENous Q4H PRN No Lutz MD   5 mg at 06/16/24 0618    benzonatate (TESSALON) capsule 200 mg  200 mg Oral TID PRN No Lutz MD   200 mg at 06/21/24 2030    sodium chloride (OCEAN) 0.65 % nasal spray 2 spray  2 spray Each Nostril Q6H No Lutz MD   2 spray at 06/24/24 0959    labetalol (NORMODYNE;TRANDATE) injection 10 mg  10 mg IntraVENous Q4H PRN Leslee Guzmán MD   10 mg at 06/24/24 0411    flecainide (TAMBOCOR) tablet 50 mg  50 mg Oral 2 times per day Ganesh Carney MD   50 mg at 06/24/24 0959    sodium chloride flush 0.9 % injection 5-40 mL  5-40 mL IntraVENous 2 times per day Meaghan Rogers APRN - CNP   10 mL at 06/23/24 2011    sodium chloride flush 0.9 % injection 10 mL  10 mL IntraVENous PRN Meaghan Rogers APRN - CNP        0.9 % sodium chloride infusion   IntraVENous PRN Meaghan Rogers APRN - CNP 5 mL/hr at 06/21/24 1430 New Bag at 06/21/24 1430    ondansetron (ZOFRAN-ODT) disintegrating tablet 4 mg  4 mg Oral Q8H PRN Meaghan Rogers APRN - CNP   4 mg at 06/23/24 2122    Or    ondansetron (ZOFRAN) injection 4 mg  4 mg IntraVENous Q6H PRN Meaghan Rogers APRN - CNP   4 mg at 06/24/24 1343    magnesium hydroxide (MILK OF MAGNESIA) 400 MG/5ML suspension 30 mL  30 mL Oral Daily PRN Meaghan Rogers APRN - CNP        acetaminophen (TYLENOL) tablet 650 mg  650 mg Oral Q6H PRN Meaghan Rogers APRN - CNP   650 mg at 06/21/24 1703    Or    acetaminophen (TYLENOL) suppository 650 mg  650 mg Rectal Q6H PRN Meaghan Rogers APRN - CNP        guaiFENesin-dextromethorphan (ROBITUSSIN DM) 100-10 MG/5ML syrup 5 mL  5 mL Oral Q4H PRN Meaghan Rogers APRN - CNP   5 mL at 06/23/24 2010    amLODIPine (NORVASC) tablet 10 mg  10 mg Oral Daily Eliceo Milligan MD   10 mg at 06/24/24 0959    atorvastatin (LIPITOR) tablet 80 mg  80 mg Oral Nightly Eliceo Milligan MD   80 mg at 06/23/24 2009    pantoprazole (PROTONIX) tablet 40 mg  40 mg Oral QAM AC Eliceo Milligan MD   40 mg at 
\"[id number]\", \"O2SAT\", \"FIO2\"  Lab Results   Component Value Date/Time    SPECIAL 10ML LH 06/13/2024 06:38 PM     Lab Results   Component Value Date/Time    CULTURE NO GROWTH 1 DAY 06/13/2024 06:38 PM       Radiology:  XR CHEST PORTABLE    Result Date: 6/13/2024  Mild cardiomegaly with bilateral pleural effusions, right greater than left. Although there is likely a component of atelectasis at the lung bases, the appearance on the right is concerning for pneumonia.       Physical Examination:        General appearance:  alert, cooperative and no distress  Mental Status:  oriented to person, place and time and normal affect  Lungs:  diminished breath sounds bilaterally, normal effort  Heart:  regular rate and rhythm, no murmur  Abdomen:  soft, nontender, nondistended, normal bowel sounds, no masses, hepatomegaly, splenomegaly  Extremities:  no edema, redness, tenderness in the calves  Skin:  no gross lesions, rashes, induration    Assessment:        Hospital Problems             Last Modified POA    * (Principal) Pneumonia, unspecified organism 6/14/2024 Yes    Hospital-acquired bacterial pneumonia 6/14/2024 Yes       Plan:        Hypoxia with possible right-sided pneumonia  Continue on IV Zosyn  Resting on 8 L O2 HFNC  CXR ordered for today   Continue DuoNeb breathing treatments and albuterol as needed  Continue Robitussin  Follow-up sputum Gram stain and respiratory culture     High-grade urothelial carcinoma  Follows with Dr. Shepard outpatient   Is getting treatment with Chemo and Radiation  Heme-Onc following, no inpatient treatment, will follow in the clinic      HTN/HLD  Continue Norvasc 10 mg daily and Lopressor 50 mg twice daily  Continue Lipitor 80 mg nightly    Eliceo Milligan MD  6/15/2024  8:35 AM     
last 72 hours.  CARDIAC ENZYMES:No results for input(s): \"CKTOTAL\", \"CKMB\", \"CKMBINDEX\", \"TROPONINI\" in the last 72 hours.  BNP: No results for input(s): \"BNP\" in the last 72 hours.  Lipids: No results for input(s): \"CHOL\", \"TRIG\", \"HDL\", \"LDL\" in the last 72 hours.    Invalid input(s): \"LDLCALC\"  ABGs: No results found for: \"PH\", \"PCO2\", \"PO2\", \"HCO3\", \"O2SAT\"  Thyroid:   Lab Results   Component Value Date/Time    TSH 2.34 11/09/2023 09:22 AM      Urinalysis: No results for input(s): \"BACTERIA\", \"BLOODU\", \"CLARITYU\", \"COLORU\", \"PHUR\", \"PROTEINU\", \"RBCUA\", \"SPECGRAV\", \"BILIRUBINUR\", \"NITRU\", \"WBCUA\", \"LEUKOCYTESUR\", \"GLUCOSEU\" in the last 72 hours.    CULTURES:          Assessment:    Principal Problem:    Pneumonia, unspecified organism  Active Problems:    Atrial fibrillation with rapid ventricular response (HCC)    Hypertension, essential    Anemia    Acute renal failure with tubular necrosis (HCC)    Hospital-acquired bacterial pneumonia    Diffuse large B-cell lymphoma of lymph nodes of multiple regions (HCC)    Acute respiratory failure with hypoxia (HCC)    New onset atrial fibrillation (HCC)    SOB (shortness of breath)    Gross hematuria    Obstructive uropathy    Hypokalemia    ATN (acute tubular necrosis) ([facility])  Resolved Problems:    * No resolved hospital problems. *  Bilateral pleural effusion s/p thoracentesis on the right with removal of 1.4 L  Multifocal pneumonia  Left hydronephrosis s/p cystoscopy and stenting  Takayasu arteritis  DVT with history of embolic stroke, on Eliquis  Urothelial malignancy, on chemo and radiation therapy  PAD  CAD  Essential hypertension  Hyponatremia, slightly worse  Acute kidney injury, ending up needing hemodialysis  Anemia, stable    Plan:  Meds reviewed- changes made as appropriate.  Coreg  Lovenox  Patient had dialysis started 6/21/2024  Increase activity as permitted and tolerated.  Questions patient had were answered to her satisfaction.  Continue supplemental 
Little  How much help is needed moving to and from a bed to a chair?: A Little  How much help is needed standing up from a chair using your arms?: A Little  How much help is needed walking in hospital room?: A Little  How much help is needed climbing 3-5 steps with a railing?: A Lot  AM-PAC Inpatient Mobility Raw Score : 18  AM-PAC Inpatient T-Scale Score : 43.63  Mobility Inpatient CMS 0-100% Score: 46.58  Mobility Inpatient CMS G-Code Modifier : CK       Goals  Short Term Goals  Time Frame for Short Term Goals: 14 visits  Short Term Goal 1: Pt will be IND in all bed mobility tasks  Short Term Goal 2: Pt will be IND in transfers  Short Term Goal 3: Pt will amb 300' IND  Short Term Goal 4: Pt will negotiate 1 flight of steps IND no rail use.       Education  Patient Education  Education Given To: Patient  Education Provided: Role of Therapy;Plan of Care  Education Method: Demonstration;Verbal  Barriers to Learning: None  Education Outcome: Verbalized understanding;Demonstrated understanding      Therapy Time   Individual Concurrent Group Co-treatment   Time In 1530         Time Out 1610         Minutes 40         Timed Code Treatment Minutes: 30 Minutes       Arpan Dupont, PT         
bilateral pleural effusions. 2. Unchanged marked left hydronephrosis and hydroureter with ureteral stent in place. 3. The bladder is not well evaluated as it is nearly empty. There may be diffuse wall thickening most pronounced posterolaterally on the left. 4. Moderate/marked descending and sigmoid colon diverticulosis. 5. Minimal biliary sludge in an otherwise normal-appearing gallbladder. 6. Mild anasarca.     US THORACENTESIS Which side should the procedure be performed? Right    Result Date: 6/17/2024  Successful ultrasound guided right thoracentesis.     XR CHEST PORTABLE    Result Date: 6/17/2024  Bibasilar effusions with scattered infiltrates.     XR CHEST PORTABLE    Result Date: 6/16/2024  Stable bilateral pleural effusions.  Airspace disease right mid and upper lung likely pneumonia.     CT CHEST WO CONTRAST    Result Date: 6/16/2024  1. Study limited by motion artifact. 2. Moderate to large right and small to moderate left pleural effusion new from January 2024 CT. 3. Right upper lobe and superior segment lower lobe areas of consolidation with air bronchograms compatible with pneumonia. 4. More dense consolidations with air bronchograms in the basal segments of right lower lobe and adjacent middle lobe compatible with compressive atelectasis with or without component of pneumonia. 5. Cardiomegaly.  Probable pulmonary edema but difficult to assess due to large amount of motion artifact. 6. Small sliding hiatal hernia.     XR CHEST (2 VW)    Result Date: 6/15/2024  Progressive bilateral infiltrates and bilateral pleural effusions, greater on the right when compared to the prior exam     XR CHEST PORTABLE    Result Date: 6/13/2024  Mild cardiomegaly with bilateral pleural effusions, right greater than left. Although there is likely a component of atelectasis at the lung bases, the appearance on the right is concerning for pneumonia.       Physical Examination:        General appearance:  alert, cooperative 
questions/concerns.     Duke Ortiz MD  [facility]     This note is created with the assistance of a speech-recognition program. While intending to generate a document that actually reflects the content of the visit, no guarantees can be provided that every mistake has been identified and corrected by editing.  
satisfaction.  Continue supplemental oxygen  Patient was informed of the need for using oxygen.  Patient was educated on the importance of compliance and the benefits of oxygen use.  Complications of oxygen use, including dryness of the nostrils, epistaxis and also the fire hazard were explained to the patient.  Patient willingly accepts to use  the oxygen as recommended  BiPAP data reviewed. Changes made as needed.  Pleural fluid data so far negative microbiologically  Follow-up on the pleural fluid micro and pathology data  Cxr reviewed.  On 6/17/2024 showed-  Bibasilar effusions with scattered infiltrates.   Follow-up on the chest x-ray from today  Diet reviewed  Discussed with primary team  Thank you for having us involved in the care of your patient. Please call us if you have any questions or concerns.    Ismael Mckeon MD, MD      6/20/2024, 7:33 AM    Pulmonary & Critical Care    
bilateral pleural effusions, right greater than left. Although there is likely a component of atelectasis at the lung bases, the appearance on the right is concerning for pneumonia.       Physical Examination:        General appearance:  alert, cooperative and no distress  Mental Status:  oriented to person, place and time and normal affect  Lungs:  clear to auscultation bilaterally, normal effort, 4 L high flow nasal cannula bubbler  Heart:  regular rate and rhythm, no murmur  Abdomen:  soft, nontender, nondistended, normal bowel sounds, no masses, hepatomegaly, splenomegaly  : Hematuria present in Anguiano catheter and left PCN tube  Extremities:  no edema, redness, tenderness in the calves  Skin:  no gross lesions, rashes, induration    Assessment:        Hospital Problems             Last Modified POA    * (Principal) Pneumonia, unspecified organism 6/14/2024 Yes    New onset atrial fibrillation (HCC) 6/16/2024 Yes    Hospital-acquired bacterial pneumonia 6/14/2024 Yes    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  Pulmonary managing  Acute hypoxic respiratory failure: Continue high flow support, wean as tolerated  Right pleural effusion: Status postthoracentesis with 1.4 L of fluid removed  New onset atrial fibrillation: Cardiology following, continue beta-blocker, flecainide, resume Eliquis after PCN tube placed and hematuria clears  Hematuria: Urology following for stent removal, left-sided PCN tube placed on 6/18/2024  Large B cell lymphoma: On chemoradiation for high-grade urothelial CA  Obstructive uropathy: Patient has history of left hydronephrosis and bladder mass underwent TURBT and left ureteral stent placement in February 2024 but was lost to follow-up, was supposed to undergo cystoscopy but subsequently had a CVA.  Needs 
examined with the resident this morning  Detailed discussion with patient and family conducted at bedside.  She is noticing improving urinary output both from the PCN and Anguiano at this time.  She does have a little edema in the lower extremities around ankles but otherwise she does not appear to be short of breath.  Labs reviewed, creatinine is 2.7.  I would like to hold hemodialysis today, give her 1 dose of IV Bumex and see how her labs evolve over the next 24 hours.  Did let the family know that if labs are better than hopefully she may not need dialysis but on the contrary if they continue to rise then we will get her back on dialysis and monitor her creatinine as an outpatient in which case she potentially can be discharged tomorrow in as much as she already has a Monday Wednesday Friday spot at [facility] dialysis in [place].    Jarocho Mchugh MD , MD      
review. Adjustment of mA and/or kV according to patient size was utilized.  Automated exposure control, iterative reconstruction, and/or weight based adjustment of the mA/kV was utilized to reduce the radiation dose to as low as reasonably achievable. COMPARISON: Transitional anatomy HISTORY: ORDERING SYSTEM PROVIDED HISTORY: concern for severe L1 compression fracture TECHNOLOGIST PROVIDED HISTORY: concern for severe L1 compression fracture Reason for Exam: concern for severe L1 compression fracture FINDINGS: Transitional anatomy T12  small ribs.  For the purposes of counting, the last squared type vertebral body is considered as L5 therefore L5-S1 disc space is seen on axial image 78 of 110. BONES/ALIGNMENT: There is normal alignment of the spine. The vertebral body heights are maintained. No osseous destructive lesion is seen. DEGENERATIVE CHANGES: Severe multilevel spondylosis with grade 1 anterolisthesis of L4 on L5 on degenerative basis along with pseudo disc bulging, ligamentum flavum thickening and facet arthropathy causing severe spinal canal and neural foraminal narrowing. SOFT TISSUES/RETROPERITONEUM: No paraspinal mass is seen.     Transitional anatomy T12 small ribs. For the purposes of counting, the last squared type vertebral body is considered as L5 therefore L5-S1 disc space is seen on axial image 78 of 110. 1. No evidence of acute fracture of the lumbar spine. 2. Grade 1 anterolisthesis of L4 on L5 on a degenerative basis with pseudo disc bulge, and spondylosis causing severe spinal canal and neural foraminal narrowing at this level.     PET CT SKULL BASE TO MID THIGH    Result Date: 5/23/2024  EXAMINATION: WHOLE BODY PET/CT 5/23/2024 TECHNIQUE: Following IV injection of 13.4 mCi of F-18 FDG, PET  tumor imaging was acquired from the base of the skull to the mid thighs.  Computed tomography was used for purposes of attenuation correction and anatomic localization. Fusion imaging was utilized for 
PROVIDED HISTORY:  Malignant neoplasm of urinary bladder, unspecified site (HCC). FINDINGS: HEAD/NECK: No metabolically active cervical adenopathy. CHEST: Radiotracer uptake in the great vessels and left ventricular myocardium is within physiologic range.  Small accumulation of radiotracer within the esophageal lumen noted likely due to reflux or low-grade inflammation.  No metabolically active pulmonary nodules or masses.  No metabolically active axillary adenopathy. ABDOMEN/PELVIS: Uptake of radiotracer in the liver, spleen, and bowel is within physiologic range.  Uptake of radiotracer within the kidney is unremarkable with the exception of a photopenic area in the left kidney consistent with a cyst of 6.1 cm.  No metabolically active intra-abdominal adenopathy. Evaluation of the bladder is limited due to the nature of the study and intense radiotracer activity in the urine.  Irregular nodular bladder wall thickening is noted with scattered areas of increased radiotracer uptake consistent with known bladder carcinoma.  No evidence of metabolically active pelvic or inguinal adenopathy. BONES/SOFT TISSUE: No abnormal radiotracer uptake in the skeletal structures or subcutaneous soft tissues.  The patient has radiotracer uptake in the muscular structures about the hips right greater than left and perispinal muscles likely physiologic. INCIDENTAL CT FINDINGS: Infusion port is buried under the right upper chest with catheter extending into distal SVC.  Moderate calcification of the aorta, iliac arteries and major branches is noted as well calcification of the coronary arteries.  Prior median sternotomy.  A left double pigtail ureteral stent is noted.  Scattered colonic diverticula are present. Multilevel degenerative changes in the spine without destruction.     1. No evidence of metabolically active metastatic disease. 2. Irregular nodular bladder wall thickening with scattered areas of increased radiotracer uptake 
or masses.  No metabolically active axillary adenopathy. ABDOMEN/PELVIS: Uptake of radiotracer in the liver, spleen, and bowel is within physiologic range.  Uptake of radiotracer within the kidney is unremarkable with the exception of a photopenic area in the left kidney consistent with a cyst of 6.1 cm.  No metabolically active intra-abdominal adenopathy. Evaluation of the bladder is limited due to the nature of the study and intense radiotracer activity in the urine.  Irregular nodular bladder wall thickening is noted with scattered areas of increased radiotracer uptake consistent with known bladder carcinoma.  No evidence of metabolically active pelvic or inguinal adenopathy. BONES/SOFT TISSUE: No abnormal radiotracer uptake in the skeletal structures or subcutaneous soft tissues.  The patient has radiotracer uptake in the muscular structures about the hips right greater than left and perispinal muscles likely physiologic. INCIDENTAL CT FINDINGS: Infusion port is buried under the right upper chest with catheter extending into distal SVC.  Moderate calcification of the aorta, iliac arteries and major branches is noted as well calcification of the coronary arteries.  Prior median sternotomy.  A left double pigtail ureteral stent is noted.  Scattered colonic diverticula are present. Multilevel degenerative changes in the spine without destruction.     1. No evidence of metabolically active metastatic disease. 2. Irregular nodular bladder wall thickening with scattered areas of increased radiotracer uptake consistent with known bladder carcinoma. Representative SUV maximum of 3.3. 3. Left double pigtail ureteral stent. 4. Left renal cyst. 5. Atherosclerotic disease. 6. Colonic diverticulosis. 7. Infusion port buried under the right upper chest with catheter extending into distal SVC. 8. Small accumulation of radiotracer within the esophageal lumen likely due to reflux or low-grade inflammation.        Impression: 
radiotracer uptake in the muscular structures about the hips right greater than left and perispinal muscles likely physiologic. INCIDENTAL CT FINDINGS: Infusion port is buried under the right upper chest with catheter extending into distal SVC.  Moderate calcification of the aorta, iliac arteries and major branches is noted as well calcification of the coronary arteries.  Prior median sternotomy.  A left double pigtail ureteral stent is noted.  Scattered colonic diverticula are present. Multilevel degenerative changes in the spine without destruction.     1. No evidence of metabolically active metastatic disease. 2. Irregular nodular bladder wall thickening with scattered areas of increased radiotracer uptake consistent with known bladder carcinoma. Representative SUV maximum of 3.3. 3. Left double pigtail ureteral stent. 4. Left renal cyst. 5. Atherosclerotic disease. 6. Colonic diverticulosis. 7. Infusion port buried under the right upper chest with catheter extending into distal SVC. 8. Small accumulation of radiotracer within the esophageal lumen likely due to reflux or low-grade inflammation.        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 I explained the significance of these abnormalities and possible etiology and management options.  -No plan for inpatient chemotherapy  -Supportive care with antibiotics  -Continue to monitor CBC and transfuse for hemoglobin less than 7  -Okay to 
documented by the above health care provider.  I have made necessary changes as deemed appropriate directly in the note.     More than 50% of the time was spent taking care of this patient in addition to the nurse practitioner time.  That also included history taking follow-up physical examination and review of system.  MDM  Will follow on pleural fluid, malignancy needs to be excluded     Electronically signed by Danni Moran MD       This note is created with the assistance of a speech recognition program.  While intending to generate a document that actually reflects the content of the visit, the document can still have some errors including those of syntax and sound a like substitutions which may escape proof reading.  It such instances, actual meaning can be extrapolated by contextual diversion.                             
will need to follow-up with Dr. Shepard in our [place] office upon discharge.  Hematology oncology nurse navigator is following.  Patient just started concurrent chemoradiation with gemcitabine    Electronically signed by UZMA AMOS MD         This note is created with the assistance of a speech recognition program.  While intending to generate a document that actually reflects the content of the visit, the document can still have some errors including those of syntax and sound a like substitutions which may escape proof reading.  It such instances, actual meaning can be extrapolated by contextual diversion.

## 2024-06-28 ENCOUNTER — CARE COORDINATION (OUTPATIENT)
Dept: CARE COORDINATION | Age: 64
End: 2024-06-28

## 2024-06-28 DIAGNOSIS — J18.9 PNEUMONIA OF RIGHT LUNG DUE TO INFECTIOUS ORGANISM, UNSPECIFIED PART OF LUNG: Primary | ICD-10-CM

## 2024-06-28 NOTE — CARE COORDINATION
Care Transitions Note    Initial Call - Call within 2 business days of discharge: Yes    Patient Current Location:  Home: [address] [place], [place] [id number]    Care Transition Nurse contacted the patient by telephone to perform post hospital discharge assessment, verified name and  as identifiers. Provided introduction to self, and explanation of the Care Transition Nurse role.     Patient: Daja Espinosa    Patient : 1960   MRN: [id number]    Reason for Admission: ATN/pneumonia/Afib with RVR  Discharge Date: 24  RURS: Readmission Risk Score: 28.5      Last Discharge Facility       Date Complaint Diagnosis Description Type Department Provider    24  ATN (acute tubular necrosis) (HCC) Admission (Discharged) [facility] Ganesh Lee, DO            Was this an external facility discharge? No    Additional needs identified to be addressed with provider   No needs identified             Method of communication with provider: none.    Patients top risk factors for readmission: functional physical ability and medical condition-ATN/pneumonia/Afib RVR    Interventions to address risk factors:   Had medication transferred to pt's pharmcy    Care Summary Note: Was able to contact Daja for transitional outreach.  She stated that she was doing well.  She said that she does have some WATKINS, no cough, is weak, no fever/chills, is eating, no chest pain/palpitations and no dizziness.  She said her nephrostomy tube is draining clear yellow urine , but she has no been urinating much.  She said the urine is only trickling out. She does still have the tunneling cath in for dialysis.  She was told to leave it in, just in case that she may need it.   She stated that she had all her medications, but will need refills.  She will be speaking with her PCP about that on Monday.  She said that they will be delivering her oxygen today and was wondering about the walker.  Read the CM note and explained that the walker will be

## 2024-06-28 NOTE — CARE COORDINATION
SHANTHI received call from [facility] wondering if pt is still admitted and will still be requiring dialysis. Per most recent nephrology note, pt renal function is improving, but they are leaving the perm cath in for now and getting her BMP checked every Monday and Thursday outpatient to continue to monitor renal functioning. SHANTHI informed [facility] of update and they requested most recent nephrology note and discharge note faxed to [phone]. SHANTHI faxed documentation.

## 2024-07-01 ENCOUNTER — OFFICE VISIT (OUTPATIENT)
Dept: FAMILY MEDICINE CLINIC | Age: 64
End: 2024-07-01
Payer: COMMERCIAL

## 2024-07-01 ENCOUNTER — TELEPHONE (OUTPATIENT)
Dept: ONCOLOGY | Age: 64
End: 2024-07-01

## 2024-07-01 ENCOUNTER — HOSPITAL ENCOUNTER (OUTPATIENT)
Age: 64
Discharge: HOME | End: 2024-07-01
Payer: COMMERCIAL

## 2024-07-01 ENCOUNTER — TELEMEDICINE (OUTPATIENT)
Dept: ONCOLOGY | Age: 64
End: 2024-07-01
Payer: COMMERCIAL

## 2024-07-01 VITALS
SYSTOLIC BLOOD PRESSURE: 120 MMHG | RESPIRATION RATE: 18 BRPM | HEIGHT: 62 IN | OXYGEN SATURATION: 96 % | DIASTOLIC BLOOD PRESSURE: 68 MMHG | BODY MASS INDEX: 22.82 KG/M2 | HEART RATE: 68 BPM | WEIGHT: 124 LBS

## 2024-07-01 DIAGNOSIS — N18.5 ANEMIA DUE TO STAGE 5 CHRONIC KIDNEY DISEASE, NOT ON CHRONIC DIALYSIS (HCC): ICD-10-CM

## 2024-07-01 DIAGNOSIS — D64.9 ANEMIA, UNSPECIFIED TYPE: ICD-10-CM

## 2024-07-01 DIAGNOSIS — N17.9 AKI (ACUTE KIDNEY INJURY) (HCC): ICD-10-CM

## 2024-07-01 DIAGNOSIS — Z09 HOSPITAL DISCHARGE FOLLOW-UP: ICD-10-CM

## 2024-07-01 DIAGNOSIS — N18.32 STAGE 3B CHRONIC KIDNEY DISEASE (HCC): ICD-10-CM

## 2024-07-01 DIAGNOSIS — N18.9 ACUTE KIDNEY INJURY SUPERIMPOSED ON CKD (HCC): ICD-10-CM

## 2024-07-01 DIAGNOSIS — I48.91 ATRIAL FIBRILLATION WITH RAPID VENTRICULAR RESPONSE (HCC): ICD-10-CM

## 2024-07-01 DIAGNOSIS — N17.9 ACUTE KIDNEY INJURY SUPERIMPOSED ON CKD (HCC): ICD-10-CM

## 2024-07-01 DIAGNOSIS — J15.9 HOSPITAL-ACQUIRED BACTERIAL PNEUMONIA: ICD-10-CM

## 2024-07-01 DIAGNOSIS — C67.9 MALIGNANT NEOPLASM OF URINARY BLADDER, UNSPECIFIED SITE (HCC): Primary | ICD-10-CM

## 2024-07-01 DIAGNOSIS — D63.8 ANEMIA DUE TO CHRONIC ILLNESS: ICD-10-CM

## 2024-07-01 DIAGNOSIS — D63.1 ANEMIA DUE TO STAGE 5 CHRONIC KIDNEY DISEASE, NOT ON CHRONIC DIALYSIS (HCC): ICD-10-CM

## 2024-07-01 DIAGNOSIS — I10 HYPERTENSION, ESSENTIAL: Primary | ICD-10-CM

## 2024-07-01 DIAGNOSIS — C67.9 UROTHELIAL CARCINOMA OF BLADDER (HCC): ICD-10-CM

## 2024-07-01 DIAGNOSIS — N17.0 ATN (ACUTE TUBULAR NECROSIS) (HCC): ICD-10-CM

## 2024-07-01 PROBLEM — I48.0 PAROXYSMAL ATRIAL FIBRILLATION (HCC): Status: ACTIVE | Noted: 2024-06-18

## 2024-07-01 LAB
ANION GAP SERPL CALCULATED.3IONS-SCNC: 12 MMOL/L (ref 9–17)
BUN SERPL-MCNC: 25 MG/DL (ref 8–23)
BUN/CREAT SERPL: 15 (ref 9–20)
CALCIUM SERPL-MCNC: 9.4 MG/DL (ref 8.6–10.4)
CHLORIDE SERPL-SCNC: 97 MMOL/L (ref 98–107)
CO2 SERPL-SCNC: 25 MMOL/L (ref 20–31)
CREAT SERPL-MCNC: 1.7 MG/DL (ref 0.5–0.9)
GFR, ESTIMATED: 34 ML/MIN/1.73M2
GLUCOSE SERPL-MCNC: 134 MG/DL (ref 70–99)
POTASSIUM SERPL-SCNC: 3.5 MMOL/L (ref 3.7–5.3)
SODIUM SERPL-SCNC: 134 MMOL/L (ref 135–144)

## 2024-07-01 PROCEDURE — 3078F DIAST BP <80 MM HG: CPT | Performed by: INTERNAL MEDICINE

## 2024-07-01 PROCEDURE — 3074F SYST BP LT 130 MM HG: CPT | Performed by: INTERNAL MEDICINE

## 2024-07-01 PROCEDURE — 1036F TOBACCO NON-USER: CPT | Performed by: INTERNAL MEDICINE

## 2024-07-01 PROCEDURE — 36415 COLL VENOUS BLD VENIPUNCTURE: CPT

## 2024-07-01 PROCEDURE — 3017F COLORECTAL CA SCREEN DOC REV: CPT | Performed by: INTERNAL MEDICINE

## 2024-07-01 PROCEDURE — 99214 OFFICE O/P EST MOD 30 MIN: CPT | Performed by: INTERNAL MEDICINE

## 2024-07-01 PROCEDURE — G8420 CALC BMI NORM PARAMETERS: HCPCS | Performed by: INTERNAL MEDICINE

## 2024-07-01 PROCEDURE — 1111F DSCHRG MED/CURRENT MED MERGE: CPT | Performed by: INTERNAL MEDICINE

## 2024-07-01 PROCEDURE — G8427 DOCREV CUR MEDS BY ELIG CLIN: HCPCS | Performed by: INTERNAL MEDICINE

## 2024-07-01 PROCEDURE — 80048 BASIC METABOLIC PNL TOTAL CA: CPT

## 2024-07-01 RX ORDER — POTASSIUM CHLORIDE 750 MG/1
10 TABLET, EXTENDED RELEASE ORAL DAILY
Qty: 14 TABLET | Refills: 0 | Status: SHIPPED | OUTPATIENT
Start: 2024-07-01 | End: 2024-07-02 | Stop reason: ALTCHOICE

## 2024-07-01 RX ORDER — APIXABAN 2.5 MG/1
2.5 TABLET, FILM COATED ORAL 2 TIMES DAILY
COMMUNITY
Start: 2024-06-27

## 2024-07-01 ASSESSMENT — ENCOUNTER SYMPTOMS
SORE THROAT: 0
NAUSEA: 0
ABDOMINAL PAIN: 0
COUGH: 0
SHORTNESS OF BREATH: 0
BACK PAIN: 1

## 2024-07-01 NOTE — PATIENT INSTRUCTIONS
Hold chemotherapy  Please check with radiation when the plan to restart radiation after discharge from the hospital  RTC in 2 weeks

## 2024-07-01 NOTE — TELEPHONE ENCOUNTER
Name: Daja Espinosa  : 1960  MRN: [id number]    Oncology Navigation Follow-Up Note    Contact Type:  Telephone    Notes: Daja called stating she was unable to make it today as she needed to go to [place] to get labs drawn to see if she needed to have dialysis.  Pt notes she has urostomy tube on left side with clear urine.  Pt notes she has no urge to urinate but attempts with \"a trickle\" of urine that \"may not even hit the water.  Pt was instructed to contact nephrology ASAP.  Pt discussed with Krystina SAEZ RN charge nurse.  Pt will be made VV to discuss future treatment plan with Dr. Shepard.  Daja asks that someone be able to walk her through virtual visit as she has never done it before. Reassured Daja that someone will call her with process.        Electronically signed by Ave Arreaga RN on 2024 at 8:10 AM

## 2024-07-01 NOTE — TELEPHONE ENCOUNTER
Daja called stating her virtual visit was successful.  Pt states she needs a follow up appointment.  Daja informed that someone will be contacting her for follow up. Per Krystina BELTRAN.  PT will be restarting RT on 7/2/24.    F/u  7/15/24 in [place].

## 2024-07-01 NOTE — PROGRESS NOTES
was evaluated through a synchronous (real-time) audio-video encounter. The patient (or guardian if applicable) is aware that this is a billable service, which includes applicable co-pays. This Virtual Visit was conducted with patient's (and/or legal guardian's) consent. Patient identification was verified, and a caregiver was present when appropriate.   The patient was located at home   [facility] [place] [facility]   Total time spent for this encounter: Not billed by time  An electronic signature was used to authenticate this note.        Patient ID: Daja Espinosa, 1960, [id number], 63 y.o.  Referred by :  No ref. 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  Embolic stroke on 4/6/2024  Concurrent chemo RT as a definitive treatment started on Johana 10, 2024 (plan for continuous neoadjuvant chemotherapy abandoned as patient not surgical candidate)  Chronic kidney disease  Recurrent admission to the hospital for GI bleed, HEAVENLY, pneumonia, AVR    Hematology oncology treatment  Cisplatin gemcitabine neoadjuvant started on March 19, 2024 every 3 weeks status post 1 cycle and have stopped and the plan changed to concurrent chemo RT   concurrent chemo RT with low-dose biweekly gemcitabine 27 mg/m² started in Johana 10, 2024  Eliquis      HISTORY OF PRESENT ILLNESS:    Oncologic History:    Daja Espinosa is a very pleasant 63 y.o. female.  With history of nonmuscle invasive bladder cancer status post multiple resection she lost to follow-up for almost 5 years, Patient did have a recent CT scan. This does show severe left hydronephrosis.  This is down to the level of the bladder.  The bladder does appear to be thickened on the left side.  Patient has had slight worsening of her creatinine.  Patient has been having some left-sided flank pain.  Patient has no unintentional weight

## 2024-07-01 NOTE — PATIENT INSTRUCTIONS
SURVEY:    You may be receiving a survey from [facility] regarding your visit today.    Please complete the survey to enable us to provide the highest quality of care to you and your family.    If you cannot score us a very good on any question, please call the office to discuss how we could have made your experience a very good one.    Thank you.  [facility]  MD Joaquina Teixeira, MD Jalil Darby, DO  Jc Valentine, MD Stacey Steele, APRN-CNP  Ayaka Beaulieu, Practice Manager  Lyudmila, CMA  Thania, CCMA  Nitin, CMA  Sridevi, CMA  Lance, PSC   Nani, LPN

## 2024-07-01 NOTE — PROGRESS NOTES
[facility] Notes    Name: Daja Espinosa  : 1960         Chief Complaint:     Chief Complaint   Patient presents with    Follow-Up from Hospital       History of Present Illness:        HPI    Daja presents to office to follow-up for multiple chronic medical problems.  Extensive EMR records reviewed.     She has a history of invasive bladder cancer with resection who lost to follow-up for about 5 years.  She came for follow-up for uncontrolled hypertension on 2024 and complaint of a large cyst in her left groin area.  She was referred to urology  and underwent TURBT  with left ureteral stent placement on 2024.  Pathology revealed high-grade urothelial carcinoma with squamous differentiation  invading detrusor muscle.    She was referred to oncology for her bladder tumor.  Was evaluated by Dr. Shepard and started on chemotherapy and radiation therapy    She developed sudden onset left-sided weakness and facial droop on 2024 and went to [place] ER for evaluation.  Was found to have embolic stroke and was flown to [place].  She was discharged on 4/10/2024.  Due to the multivessel territory infarct she was started on Eliquis in addition to aspirin.    Unfortunately she developed fatigue and black stools for which she went to [facility] ER on 6/3/2024.  She was found to have hemoglobin of 5.2 with hematocrit 15.8.  She was sent back to [facility].  She received 3 units of PRBC resulting in hemoglobin improvement.  EGD on  revealed mild gastritis and duodenitis and no sources of active bleeding.  She clinically improved and was discharged home on 2024 and advised to continue on Eliquis    She went back to [facility] emergency room on 2024 complaining of fever of 103.8, shortness of breath that she developed after chemotherapy.  Her chest x-ray showed cardiomegaly with bilateral pleural effusions and findings concerning for pneumonia.  She was also found to

## 2024-07-01 NOTE — TELEPHONE ENCOUNTER
Call received back from [facility] and they state that Dr. Nguyen indicated will resume radiation tomorrow for Daja.  This was answering a question we had for them per Dr. Shepard on when to resume radiation.  Informed Dr. Shepard of this and he then states we will start chemotherapy as well.  He states to get one dose of chemotherapy this week and to get back onto the two day per week cycle with radiation next week.    Will notify treatment nurses so they can schedule patient for treatment and will also ask that they notify Dr. Nguyen office as it is now after hours.

## 2024-07-03 ENCOUNTER — TELEPHONE (OUTPATIENT)
Dept: INFUSION THERAPY | Age: 64
End: 2024-07-03

## 2024-07-03 ENCOUNTER — CARE COORDINATION (OUTPATIENT)
Dept: CARE COORDINATION | Age: 64
End: 2024-07-03

## 2024-07-03 ENCOUNTER — TELEPHONE (OUTPATIENT)
Dept: UROLOGY | Age: 64
End: 2024-07-03

## 2024-07-03 DIAGNOSIS — C67.9 UROTHELIAL CARCINOMA OF BLADDER WITH INVASION OF MUSCLE (HCC): Primary | ICD-10-CM

## 2024-07-03 NOTE — TELEPHONE ENCOUNTER
We did speak with patient on the phone    Per discharge summary from her most recent hospitalization she had a left nephrostomy tube placement on 6/18/2024.  Patient briefly required dialysis.  Patient states that prior to her discharge from the hospital on 6/27/2024 they did remove her catheter.  She states that up to that point her Anguiano catheter bag was filling.  She states that her left nephrostomy tube bag is emptied multiple times during the day.  She is having no pain or discomfort.  She did have lab work 2 days ago which showed an increase proven in her kidney function.    We do have some concern that she is not urinating spontaneously.  We do recommend that she goes to the emergency room to have a bladder scan.  She states that she is going to the emergency room tomorrow morning to get labs drawn she will have them scan her bladder at that time.  We did recommend that if she develop suprapubic pain or if her nephrostomy tube stops draining that she needs to go the emergency room immediately.  She voiced understanding.    Keep appointment 7/22/2024 and less issues come up in the meantime.

## 2024-07-03 NOTE — TELEPHONE ENCOUNTER
Yu called this afternoon, Ms Espinosa was at [facility] from 06.14 through 06.27 with pneumonia. She had a nephrostomy tube replaced on 06.18.2024 and a dennis catheter placed. Yu said Ms Espinosa had good urine output and they were able to remove the catheter.     Her right kidney is producing urine, it is going in to the bladder, but there is no urine out put from the right kidney. Yu said this patient has bladder wall thickening and perhaps that could be the cause. Ms Espinosa is going to [place] on Friday. Yu wanted to know if Ms Espinosa could stop in the office on Friday and have her bladder scanned. Writer did tell her there would be no provider in the office on Friday. Yu stated, \"can one of the nurses do a bladder scan on Friday and possibly place a dennis if needed?\" I told Yu I would send a message and after speaking with provider in the office today. There is only one staff member in the office on Friday and there is no provider here to assist.     Writer spoke with Ms Espinosa; I told her the provider wanted her to go the [facility] to be evaluated. She stated she verbalized understanding and will go today.

## 2024-07-03 NOTE — TELEPHONE ENCOUNTER
Called pt to schedule appointment for resuming chemotherapy. Pt states she was told by Dr. Shepard that she was going to have 2 weeks off. She states she saw Dr. Nguyen today and he agreed, she should take 2 weeks to recover from having pneumonia. Pt states she sees Dr. Shepard on 7/15 and she will discuss with him then about resuming chemo. Instructed pt to call if anything changes. She verbalized understanding.

## 2024-07-03 NOTE — CARE COORDINATION
Care Transitions Note    Follow Up Call     Patient Current Location:  Home: [address] [address]    Care Transition Nurse contacted the patient by telephone. Verified name and  as identifiers.    Additional needs identified to be addressed with provider   High priority: need order for walker  Called urologist office to see if pt can have her bladder scanned                 Method of communication with provider: phone.    Care Summary Note: Was able to contact Daja.  She stated that she was doing \"ok\".  She denied any fever/chills, has WATKINS, no cough, no s/s of UTI and no swelling.  She does say that her stomach feels hard and last night it felt uncomfortable.  She did see her PCP and nephrologist.  Nephrologist is going to have her tunneling cath removed and started her on potassium bicarb for a low K+ level.  She said that she did mentioned to both providers about her low output from voiding (Rt kidney).  She is having good urine output to her Lt nephrostomy tube and is was clear yellow.  She will be starting radiation this week.  She watkins not have a urology appointment until .  She said that she was going to be in town, Friday and will stop by the urology office.  Explained that maybe they could do a bladder scan and insert a dennis if needed.  Writer did call the office and they said that no provider would be in on Friday.  Asked if provider could give an order to the nurse and have her scan the pt and insert dennis if needed.  She would message the provider.  Daja said that the urology office called and stated that she would have to go to the ED today and have the scan and dennis insertion if needed.   She also complained of the small oxygen tanks not lasting long.  She said that she goes to radiation and she has to travel 1 hour there and 1 hour back.  Encouraged her to order larger tanks at her next order.  Checked in to a portable oxygen concentrator, but she would have to use 8 tanks a month

## 2024-07-04 ENCOUNTER — HOSPITAL ENCOUNTER (OUTPATIENT)
Age: 64
Discharge: HOME | End: 2024-07-04
Payer: COMMERCIAL

## 2024-07-04 DIAGNOSIS — N18.9 ACUTE KIDNEY INJURY SUPERIMPOSED ON CKD (HCC): ICD-10-CM

## 2024-07-04 DIAGNOSIS — N18.32 STAGE 3B CHRONIC KIDNEY DISEASE (HCC): ICD-10-CM

## 2024-07-04 DIAGNOSIS — N17.9 ACUTE KIDNEY INJURY SUPERIMPOSED ON CKD (HCC): ICD-10-CM

## 2024-07-04 LAB
ANION GAP SERPL CALCULATED.3IONS-SCNC: 17 MMOL/L (ref 9–17)
BUN SERPL-MCNC: 23 MG/DL (ref 8–23)
BUN/CREAT SERPL: 16 (ref 9–20)
CALCIUM SERPL-MCNC: 9.1 MG/DL (ref 8.6–10.4)
CHLORIDE SERPL-SCNC: 96 MMOL/L (ref 98–107)
CO2 SERPL-SCNC: 18 MMOL/L (ref 20–31)
CREAT SERPL-MCNC: 1.4 MG/DL (ref 0.5–0.9)
GFR, ESTIMATED: 43 ML/MIN/1.73M2
GLUCOSE SERPL-MCNC: 99 MG/DL (ref 70–99)
POTASSIUM SERPL-SCNC: 3.6 MMOL/L (ref 3.7–5.3)
SODIUM SERPL-SCNC: 131 MMOL/L (ref 135–144)

## 2024-07-04 PROCEDURE — 80048 BASIC METABOLIC PNL TOTAL CA: CPT

## 2024-07-04 PROCEDURE — 36415 COLL VENOUS BLD VENIPUNCTURE: CPT

## 2024-07-05 ENCOUNTER — TELEPHONE (OUTPATIENT)
Dept: GASTROENTEROLOGY | Age: 64
End: 2024-07-05

## 2024-07-05 NOTE — TELEPHONE ENCOUNTER
Patient has been back to the ER a few times since discharge. Patient following up with oncology due to recent dx of bladder CA.  Medical clearances requested  Patient recently placed on Eliquis , will schedule office visit before scheduling outpatient colonoscopy.

## 2024-07-05 NOTE — TELEPHONE ENCOUNTER
----- Message -----   From: Bubba Moralez APRN - CNP   Sent: 6/4/2024  12:37 PM EDT   To: Daja Diallo   Subject: follow up OP CN                                  Pt will need follow up w/[place] and OP colonoscopy.   DC few days

## 2024-07-08 ENCOUNTER — TELEPHONE (OUTPATIENT)
Dept: CARDIOLOGY CLINIC | Age: 64
End: 2024-07-08

## 2024-07-08 ENCOUNTER — HOSPITAL ENCOUNTER (OUTPATIENT)
Age: 64
Discharge: HOME | End: 2024-07-08
Payer: COMMERCIAL

## 2024-07-08 DIAGNOSIS — N17.0 ATN (ACUTE TUBULAR NECROSIS) (HCC): ICD-10-CM

## 2024-07-08 LAB
ANION GAP SERPL CALCULATED.3IONS-SCNC: 8 MMOL/L (ref 9–17)
BUN SERPL-MCNC: 21 MG/DL (ref 8–23)
BUN/CREAT SERPL: 14 (ref 9–20)
CALCIUM SERPL-MCNC: 9.1 MG/DL (ref 8.6–10.4)
CHLORIDE SERPL-SCNC: 100 MMOL/L (ref 98–107)
CO2 SERPL-SCNC: 23 MMOL/L (ref 20–31)
CREAT SERPL-MCNC: 1.5 MG/DL (ref 0.5–0.9)
GFR, ESTIMATED: 39 ML/MIN/1.73M2
GLUCOSE SERPL-MCNC: 128 MG/DL (ref 70–99)
POTASSIUM SERPL-SCNC: 4.2 MMOL/L (ref 3.7–5.3)
SODIUM SERPL-SCNC: 131 MMOL/L (ref 135–144)

## 2024-07-08 PROCEDURE — 36415 COLL VENOUS BLD VENIPUNCTURE: CPT

## 2024-07-08 PROCEDURE — 80048 BASIC METABOLIC PNL TOTAL CA: CPT

## 2024-07-08 RX ORDER — HYDRALAZINE HYDROCHLORIDE 25 MG/1
25 TABLET, FILM COATED ORAL 2 TIMES DAILY
Qty: 60 TABLET | Refills: 3 | Status: ON HOLD | OUTPATIENT
Start: 2024-07-08

## 2024-07-08 NOTE — TELEPHONE ENCOUNTER
Patient stopped by office.  She was most recently hospitalized in [facility]. 6/27/24.  They changed her medications while she was there. They stopped her Metoprolol and started her on Carvedilol, Flecainide, Hydralazine.  Since she has been home she has been so dizzy, drowsy and her feet are swelling.  She does not have an appointment to see Dr Cortez until next year but does need to do a hospital follow up sometime.. She was wondering if she could switch back to her Metoprolol?  Please advise what she should do and when should she be seen for follow up appointment?

## 2024-07-09 ENCOUNTER — CARE COORDINATION (OUTPATIENT)
Dept: PRIMARY CARE CLINIC | Age: 64
End: 2024-07-09

## 2024-07-09 ENCOUNTER — TELEPHONE (OUTPATIENT)
Dept: FAMILY MEDICINE CLINIC | Age: 64
End: 2024-07-09

## 2024-07-09 ENCOUNTER — CARE COORDINATION (OUTPATIENT)
Dept: CARE COORDINATION | Age: 64
End: 2024-07-09

## 2024-07-09 DIAGNOSIS — G89.29 CHRONIC MIDLINE LOW BACK PAIN WITHOUT SCIATICA: Primary | ICD-10-CM

## 2024-07-09 DIAGNOSIS — R53.1 GENERALIZED WEAKNESS: ICD-10-CM

## 2024-07-09 DIAGNOSIS — J18.9 PNEUMONIA OF RIGHT LUNG DUE TO INFECTIOUS ORGANISM, UNSPECIFIED PART OF LUNG: Primary | ICD-10-CM

## 2024-07-09 DIAGNOSIS — M54.50 CHRONIC MIDLINE LOW BACK PAIN WITHOUT SCIATICA: Primary | ICD-10-CM

## 2024-07-09 NOTE — CARE COORDINATION
Care Transitions Note    Follow Up Call     Patient Current Location:  Home: [address] [address]    Care Transition Nurse contacted the patient by telephone. Verified name and  as identifiers.    Additional needs identified to be addressed with provider   High priority: rolling walker order from PCP and nephrologist tunneling cath and lab work                 Method of communication with provider: phone.    Care Summary Note: Was able to contact Daja for transitional outreach.  He stated that she was doing well.  She said that she does continue to wear the oxygen and did well with it off for 1/2 hour at rest, but uses it when up due to shortness of breath.  She said that her feet are swollen and her abdomen was bloated and swelling.  She said that she read the side effects of the new HCTZ that was ordered and it said that it would cause these symptoms.  She said that she would be dizzy and sleepy after taking it.  She did call her cardiologist and he would not dc it, but decreased the frequency from TID to BID.  She said that during the time her urine color changed to brown, but it was yellow this am.  She said that she is voiding a little bit more and is having out put from her nephrostomy tube.  She did follow urology's advise and went to the [facility] ED to have her bladder scanned, but they said that they did not have someone there to do an ultra sound, so she left.  Encouraged her to call the urologist office and see if she can go to the office and have her bladder scanned for any urine retention.  She said that she will stop by tomorrow.  Reminded that a physician needs to be in the office before they will scan her and encouraged her to call to make sure a physician will be there. JIAN.      She stated that she still has not received the walker and writer did call the PCP office and request an order to be sent to [facility].  They said that they will speak with the provider.    She said that she had no

## 2024-07-09 NOTE — CARE COORDINATION
Remote Patient Kit Ordering Note      Date/Time:  7/9/2024 1:54 PM      CCSS placed phone call to patient/family today to notify of RPM kit order; patient/family was available; discussed the following topics below and all questions answered.    [x] CCSS confirmed patient shipping address  [x] Patient will receive package over the next 1-3 business days. Someone 21 years or older must be present to sign for UPS delivery.  [x] HRS will contact patient within 24 hours, an HRS  will call the patient directly: If the patient does not answer, HRS will follow up with the clinical team notifying them about the unsuccessful attempt to contact the patient. HRS will make three call attempts to the patient.Provide patient with [facility] Virtual install number is: [phone].  [x] LPN will contact patient once equipment is active to welcome them to the program.                                                         [x] Hours of RPM monitoring - Monday-Friday 6028-9763; encourage patient to get vitals entered by Noon each day to have the alert addressed same day.  [[facility]                      LPN made aware the RPM kit has been ordered.

## 2024-07-09 NOTE — TELEPHONE ENCOUNTER
Yu from transitional care called stating the hospital was to order a rolling walker for pt from [employer], but did not. Yu is asking if Dr. HANEY would be willing to place an order for a rolling walker so the patient can have one. Pt was seen on 7/1 for hospital fu. [employer] fax is [phone].

## 2024-07-10 ENCOUNTER — APPOINTMENT (OUTPATIENT)
Dept: GENERAL RADIOLOGY | Age: 64
End: 2024-07-10
Payer: COMMERCIAL

## 2024-07-10 ENCOUNTER — HOSPITAL ENCOUNTER (INPATIENT)
Age: 64
LOS: 2 days | Discharge: ANOTHER HOSPITAL | End: 2024-07-12
Attending: EMERGENCY MEDICINE | Admitting: STUDENT IN AN ORGANIZED HEALTH CARE EDUCATION/TRAINING PROGRAM
Payer: COMMERCIAL

## 2024-07-10 ENCOUNTER — TELEPHONE (OUTPATIENT)
Dept: UROLOGY | Age: 64
End: 2024-07-10

## 2024-07-10 ENCOUNTER — APPOINTMENT (OUTPATIENT)
Dept: CT IMAGING | Age: 64
End: 2024-07-10
Payer: COMMERCIAL

## 2024-07-10 DIAGNOSIS — R06.02 SHORTNESS OF BREATH: Primary | ICD-10-CM

## 2024-07-10 DIAGNOSIS — J18.9 PNEUMONIA DUE TO INFECTIOUS ORGANISM, UNSPECIFIED LATERALITY, UNSPECIFIED PART OF LUNG: ICD-10-CM

## 2024-07-10 DIAGNOSIS — R79.89 ELEVATED BRAIN NATRIURETIC PEPTIDE (BNP) LEVEL: ICD-10-CM

## 2024-07-10 DIAGNOSIS — J90 PLEURAL EFFUSION: ICD-10-CM

## 2024-07-10 PROBLEM — R09.02 HYPOXIA: Status: ACTIVE | Noted: 2024-07-10

## 2024-07-10 LAB
ALBUMIN SERPL-MCNC: 3.5 G/DL (ref 3.5–5.2)
ALBUMIN/GLOB SERPL: 1.1 {RATIO} (ref 1–2.5)
ALP SERPL-CCNC: 109 U/L (ref 35–104)
ALT SERPL-CCNC: 18 U/L (ref 5–33)
ANION GAP SERPL CALCULATED.3IONS-SCNC: 11 MMOL/L (ref 9–17)
ARTERIAL PATENCY WRIST A: YES
AST SERPL-CCNC: 19 U/L
BASOPHILS # BLD: 0.04 K/UL (ref 0–0.2)
BASOPHILS NFR BLD: 1 % (ref 0–2)
BDY SITE: ABNORMAL
BILIRUB SERPL-MCNC: 0.6 MG/DL (ref 0.3–1.2)
BNP SERPL-MCNC: 9339 PG/ML
BODY TEMPERATURE: 37
BUN SERPL-MCNC: 30 MG/DL (ref 8–23)
BUN/CREAT SERPL: 18 (ref 9–20)
CALCIUM SERPL-MCNC: 8.9 MG/DL (ref 8.6–10.4)
CHLORIDE SERPL-SCNC: 97 MMOL/L (ref 98–107)
CO2 SERPL-SCNC: 20 MMOL/L (ref 20–31)
CREAT SERPL-MCNC: 1.7 MG/DL (ref 0.5–0.9)
CRP SERPL HS-MCNC: 3.5 MG/L (ref 0–5)
EOSINOPHIL # BLD: 0.25 K/UL (ref 0–0.44)
EOSINOPHILS RELATIVE PERCENT: 4 % (ref 1–4)
ERYTHROCYTE [DISTWIDTH] IN BLOOD BY AUTOMATED COUNT: 15.4 % (ref 11.8–14.4)
FIO2 ON VENT: 100 %
GAS FLOW.O2 O2 DELIVERY SYS: ABNORMAL L/MIN
GFR, ESTIMATED: 34 ML/MIN/1.73M2
GLUCOSE SERPL-MCNC: 127 MG/DL (ref 70–99)
HCO3 VENOUS: 19.9 MMOL/L (ref 24–30)
HCT VFR BLD AUTO: 26.2 % (ref 36.3–47.1)
HGB BLD-MCNC: 8.4 G/DL (ref 11.9–15.1)
IMM GRANULOCYTES # BLD AUTO: 0.04 K/UL (ref 0–0.3)
IMM GRANULOCYTES NFR BLD: 1 %
LACTATE BLDV-SCNC: 1.8 MMOL/L (ref 0.5–2.2)
LYMPHOCYTES NFR BLD: 0.74 K/UL (ref 1.1–3.7)
LYMPHOCYTES RELATIVE PERCENT: 10 % (ref 24–43)
MCH RBC QN AUTO: 31.8 PG (ref 25.2–33.5)
MCHC RBC AUTO-ENTMCNC: 32.1 G/DL (ref 28.4–34.8)
MCV RBC AUTO: 99.2 FL (ref 82.6–102.9)
MONOCYTES NFR BLD: 0.57 K/UL (ref 0.1–1.2)
MONOCYTES NFR BLD: 8 % (ref 3–12)
NEGATIVE BASE EXCESS, VEN: 2.1 MMOL/L (ref 0–2)
NEUTROPHILS NFR BLD: 76 % (ref 36–65)
NEUTS SEG NFR BLD: 5.52 K/UL (ref 1.5–8.1)
NRBC BLD-RTO: 0 PER 100 WBC
O2 SAT, VEN: 97.4 % (ref 60–85)
PCO2 VENOUS: 27.4 MM HG (ref 39–55)
PCO2, VEN, TEMP ADJ: 27.4 MMHG (ref 39–55)
PH VENOUS: 7.48 (ref 7.32–7.42)
PH, VEN, TEMP ADJ: 7.48 (ref 7.32–7.42)
PLATELET # BLD AUTO: 431 K/UL (ref 138–453)
PMV BLD AUTO: 9.5 FL (ref 8.1–13.5)
PO2 VENOUS: 88.8 MM HG (ref 30–50)
PO2, VEN, TEMP ADJ: 88.8 MMHG (ref 30–50)
POTASSIUM SERPL-SCNC: 4.8 MMOL/L (ref 3.7–5.3)
PROT SERPL-MCNC: 6.6 G/DL (ref 6.4–8.3)
RBC # BLD AUTO: 2.64 M/UL (ref 3.95–5.11)
SARS-COV-2 RDRP RESP QL NAA+PROBE: NOT DETECTED
SODIUM SERPL-SCNC: 128 MMOL/L (ref 135–144)
SPECIMEN DESCRIPTION: NORMAL
TROPONIN I SERPL HS-MCNC: 42 NG/L (ref 0–14)
TROPONIN I SERPL HS-MCNC: 43 NG/L (ref 0–14)
WBC OTHER # BLD: 7.2 K/UL (ref 3.5–11.3)

## 2024-07-10 PROCEDURE — 2580000003 HC RX 258: Performed by: EMERGENCY MEDICINE

## 2024-07-10 PROCEDURE — 82805 BLOOD GASES W/O2 SATURATION: CPT

## 2024-07-10 PROCEDURE — 87040 BLOOD CULTURE FOR BACTERIA: CPT

## 2024-07-10 PROCEDURE — 94640 AIRWAY INHALATION TREATMENT: CPT

## 2024-07-10 PROCEDURE — 94761 N-INVAS EAR/PLS OXIMETRY MLT: CPT

## 2024-07-10 PROCEDURE — 6360000002 HC RX W HCPCS: Performed by: STUDENT IN AN ORGANIZED HEALTH CARE EDUCATION/TRAINING PROGRAM

## 2024-07-10 PROCEDURE — 94664 DEMO&/EVAL PT USE INHALER: CPT

## 2024-07-10 PROCEDURE — 80053 COMPREHEN METABOLIC PANEL: CPT

## 2024-07-10 PROCEDURE — 6370000000 HC RX 637 (ALT 250 FOR IP): Performed by: STUDENT IN AN ORGANIZED HEALTH CARE EDUCATION/TRAINING PROGRAM

## 2024-07-10 PROCEDURE — 93005 ELECTROCARDIOGRAM TRACING: CPT | Performed by: EMERGENCY MEDICINE

## 2024-07-10 PROCEDURE — 84484 ASSAY OF TROPONIN QUANT: CPT

## 2024-07-10 PROCEDURE — 5A0935A ASSISTANCE WITH RESPIRATORY VENTILATION, LESS THAN 24 CONSECUTIVE HOURS, HIGH NASAL FLOW/VELOCITY: ICD-10-PCS | Performed by: STUDENT IN AN ORGANIZED HEALTH CARE EDUCATION/TRAINING PROGRAM

## 2024-07-10 PROCEDURE — 2700000000 HC OXYGEN THERAPY PER DAY

## 2024-07-10 PROCEDURE — 85025 COMPLETE CBC W/AUTO DIFF WBC: CPT

## 2024-07-10 PROCEDURE — 99285 EMERGENCY DEPT VISIT HI MDM: CPT

## 2024-07-10 PROCEDURE — 94669 MECHANICAL CHEST WALL OSCILL: CPT

## 2024-07-10 PROCEDURE — 6370000000 HC RX 637 (ALT 250 FOR IP): Performed by: EMERGENCY MEDICINE

## 2024-07-10 PROCEDURE — 71250 CT THORAX DX C-: CPT

## 2024-07-10 PROCEDURE — 2580000003 HC RX 258: Performed by: STUDENT IN AN ORGANIZED HEALTH CARE EDUCATION/TRAINING PROGRAM

## 2024-07-10 PROCEDURE — 87635 SARS-COV-2 COVID-19 AMP PRB: CPT

## 2024-07-10 PROCEDURE — 83880 ASSAY OF NATRIURETIC PEPTIDE: CPT

## 2024-07-10 PROCEDURE — 99222 1ST HOSP IP/OBS MODERATE 55: CPT | Performed by: INTERNAL MEDICINE

## 2024-07-10 PROCEDURE — 96365 THER/PROPH/DIAG IV INF INIT: CPT

## 2024-07-10 PROCEDURE — 2000000000 HC ICU R&B

## 2024-07-10 PROCEDURE — 83605 ASSAY OF LACTIC ACID: CPT

## 2024-07-10 PROCEDURE — 86140 C-REACTIVE PROTEIN: CPT

## 2024-07-10 PROCEDURE — 6360000002 HC RX W HCPCS: Performed by: EMERGENCY MEDICINE

## 2024-07-10 PROCEDURE — 36415 COLL VENOUS BLD VENIPUNCTURE: CPT

## 2024-07-10 PROCEDURE — 36600 WITHDRAWAL OF ARTERIAL BLOOD: CPT

## 2024-07-10 PROCEDURE — 84145 PROCALCITONIN (PCT): CPT

## 2024-07-10 PROCEDURE — 71045 X-RAY EXAM CHEST 1 VIEW: CPT

## 2024-07-10 RX ORDER — SODIUM CHLORIDE 0.9 % (FLUSH) 0.9 %
5-40 SYRINGE (ML) INJECTION EVERY 12 HOURS SCHEDULED
Status: DISCONTINUED | OUTPATIENT
Start: 2024-07-10 | End: 2024-07-12 | Stop reason: HOSPADM

## 2024-07-10 RX ORDER — LEVALBUTEROL 1.25 MG/.5ML
1.25 SOLUTION, CONCENTRATE RESPIRATORY (INHALATION) EVERY 8 HOURS PRN
Status: DISCONTINUED | OUTPATIENT
Start: 2024-07-10 | End: 2024-07-10

## 2024-07-10 RX ORDER — LEVALBUTEROL 1.25 MG/.5ML
1.25 SOLUTION, CONCENTRATE RESPIRATORY (INHALATION) 4 TIMES DAILY
Status: DISCONTINUED | OUTPATIENT
Start: 2024-07-10 | End: 2024-07-12 | Stop reason: HOSPADM

## 2024-07-10 RX ORDER — SODIUM CHLORIDE 0.9 % (FLUSH) 0.9 %
5-40 SYRINGE (ML) INJECTION PRN
Status: DISCONTINUED | OUTPATIENT
Start: 2024-07-10 | End: 2024-07-12 | Stop reason: HOSPADM

## 2024-07-10 RX ORDER — AMLODIPINE BESYLATE 10 MG/1
10 TABLET ORAL DAILY
Status: DISCONTINUED | OUTPATIENT
Start: 2024-07-11 | End: 2024-07-12 | Stop reason: HOSPADM

## 2024-07-10 RX ORDER — ONDANSETRON 4 MG/1
4 TABLET, ORALLY DISINTEGRATING ORAL EVERY 8 HOURS PRN
Status: DISCONTINUED | OUTPATIENT
Start: 2024-07-10 | End: 2024-07-11

## 2024-07-10 RX ORDER — ATORVASTATIN CALCIUM 40 MG/1
80 TABLET, FILM COATED ORAL NIGHTLY
Status: DISCONTINUED | OUTPATIENT
Start: 2024-07-10 | End: 2024-07-12 | Stop reason: HOSPADM

## 2024-07-10 RX ORDER — SODIUM CHLORIDE 9 MG/ML
INJECTION, SOLUTION INTRAVENOUS PRN
Status: DISCONTINUED | OUTPATIENT
Start: 2024-07-10 | End: 2024-07-12 | Stop reason: HOSPADM

## 2024-07-10 RX ORDER — ACETAMINOPHEN 325 MG/1
650 TABLET ORAL EVERY 6 HOURS PRN
Status: DISCONTINUED | OUTPATIENT
Start: 2024-07-10 | End: 2024-07-12 | Stop reason: HOSPADM

## 2024-07-10 RX ORDER — POLYETHYLENE GLYCOL 3350 17 G/17G
17 POWDER, FOR SOLUTION ORAL DAILY PRN
Status: DISCONTINUED | OUTPATIENT
Start: 2024-07-10 | End: 2024-07-12 | Stop reason: HOSPADM

## 2024-07-10 RX ORDER — HYDRALAZINE HYDROCHLORIDE 25 MG/1
25 TABLET, FILM COATED ORAL 2 TIMES DAILY
Status: DISCONTINUED | OUTPATIENT
Start: 2024-07-11 | End: 2024-07-12 | Stop reason: HOSPADM

## 2024-07-10 RX ORDER — SODIUM CHLORIDE FOR INHALATION 0.9 %
3 VIAL, NEBULIZER (ML) INHALATION EVERY 8 HOURS PRN
Status: DISCONTINUED | OUTPATIENT
Start: 2024-07-10 | End: 2024-07-12 | Stop reason: HOSPADM

## 2024-07-10 RX ORDER — IPRATROPIUM BROMIDE AND ALBUTEROL SULFATE 2.5; .5 MG/3ML; MG/3ML
SOLUTION RESPIRATORY (INHALATION)
Status: DISPENSED
Start: 2024-07-10 | End: 2024-07-11

## 2024-07-10 RX ORDER — CARVEDILOL 25 MG/1
25 TABLET ORAL 2 TIMES DAILY WITH MEALS
Status: DISCONTINUED | OUTPATIENT
Start: 2024-07-10 | End: 2024-07-12 | Stop reason: HOSPADM

## 2024-07-10 RX ORDER — IPRATROPIUM BROMIDE AND ALBUTEROL SULFATE 2.5; .5 MG/3ML; MG/3ML
1 SOLUTION RESPIRATORY (INHALATION) EVERY 4 HOURS PRN
Status: DISCONTINUED | OUTPATIENT
Start: 2024-07-10 | End: 2024-07-10

## 2024-07-10 RX ORDER — GUAIFENESIN/DEXTROMETHORPHAN 100-10MG/5
5 SYRUP ORAL EVERY 4 HOURS PRN
Status: DISCONTINUED | OUTPATIENT
Start: 2024-07-10 | End: 2024-07-12 | Stop reason: HOSPADM

## 2024-07-10 RX ORDER — PANTOPRAZOLE SODIUM 40 MG/1
40 TABLET, DELAYED RELEASE ORAL
Status: DISCONTINUED | OUTPATIENT
Start: 2024-07-11 | End: 2024-07-12 | Stop reason: HOSPADM

## 2024-07-10 RX ORDER — SODIUM CHLORIDE FOR INHALATION 0.9 %
VIAL, NEBULIZER (ML) INHALATION
Status: DISPENSED
Start: 2024-07-10 | End: 2024-07-11

## 2024-07-10 RX ORDER — ALBUTEROL SULFATE 2.5 MG/3ML
2.5 SOLUTION RESPIRATORY (INHALATION)
Status: DISCONTINUED | OUTPATIENT
Start: 2024-07-11 | End: 2024-07-10

## 2024-07-10 RX ORDER — LEVALBUTEROL 1.25 MG/.5ML
1.25 SOLUTION, CONCENTRATE RESPIRATORY (INHALATION) EVERY 4 HOURS PRN
Status: DISCONTINUED | OUTPATIENT
Start: 2024-07-10 | End: 2024-07-12 | Stop reason: HOSPADM

## 2024-07-10 RX ORDER — ALBUTEROL SULFATE 2.5 MG/3ML
2.5 SOLUTION RESPIRATORY (INHALATION)
Status: DISCONTINUED | OUTPATIENT
Start: 2024-07-10 | End: 2024-07-10

## 2024-07-10 RX ORDER — FLECAINIDE ACETATE 50 MG/1
50 TABLET ORAL EVERY 12 HOURS SCHEDULED
Status: DISCONTINUED | OUTPATIENT
Start: 2024-07-10 | End: 2024-07-12 | Stop reason: HOSPADM

## 2024-07-10 RX ORDER — ONDANSETRON 2 MG/ML
4 INJECTION INTRAMUSCULAR; INTRAVENOUS EVERY 6 HOURS PRN
Status: DISCONTINUED | OUTPATIENT
Start: 2024-07-10 | End: 2024-07-11

## 2024-07-10 RX ORDER — IPRATROPIUM BROMIDE AND ALBUTEROL SULFATE 2.5; .5 MG/3ML; MG/3ML
1 SOLUTION RESPIRATORY (INHALATION) ONCE
Status: DISCONTINUED | OUTPATIENT
Start: 2024-07-10 | End: 2024-07-11

## 2024-07-10 RX ORDER — ACETAMINOPHEN 650 MG/1
650 SUPPOSITORY RECTAL EVERY 6 HOURS PRN
Status: DISCONTINUED | OUTPATIENT
Start: 2024-07-10 | End: 2024-07-12 | Stop reason: HOSPADM

## 2024-07-10 RX ORDER — FUROSEMIDE 10 MG/ML
20 INJECTION INTRAMUSCULAR; INTRAVENOUS ONCE
Status: COMPLETED | OUTPATIENT
Start: 2024-07-10 | End: 2024-07-10

## 2024-07-10 RX ADMIN — CARVEDILOL 25 MG: 25 TABLET, FILM COATED ORAL at 20:38

## 2024-07-10 RX ADMIN — FLECAINIDE ACETATE 50 MG: 50 TABLET ORAL at 20:38

## 2024-07-10 RX ADMIN — APIXABAN 2.5 MG: 2.5 TABLET, FILM COATED ORAL at 20:38

## 2024-07-10 RX ADMIN — PIPERACILLIN AND TAZOBACTAM 3375 MG: 3; .375 INJECTION, POWDER, LYOPHILIZED, FOR SOLUTION INTRAVENOUS at 17:33

## 2024-07-10 RX ADMIN — ATORVASTATIN CALCIUM 80 MG: 40 TABLET, FILM COATED ORAL at 20:38

## 2024-07-10 RX ADMIN — LEVALBUTEROL 1.25 MG: 1.25 SOLUTION, CONCENTRATE RESPIRATORY (INHALATION) at 19:08

## 2024-07-10 RX ADMIN — SODIUM CHLORIDE, PRESERVATIVE FREE 10 ML: 5 INJECTION INTRAVENOUS at 20:39

## 2024-07-10 RX ADMIN — FUROSEMIDE 20 MG: 10 INJECTION, SOLUTION INTRAMUSCULAR; INTRAVENOUS at 18:50

## 2024-07-10 RX ADMIN — MEROPENEM 1000 MG: 1 INJECTION INTRAVENOUS at 23:41

## 2024-07-10 RX ADMIN — ISODIUM CHLORIDE 3 ML: 0.03 SOLUTION RESPIRATORY (INHALATION) at 19:08

## 2024-07-10 ASSESSMENT — PAIN - FUNCTIONAL ASSESSMENT: PAIN_FUNCTIONAL_ASSESSMENT: NONE - DENIES PAIN

## 2024-07-10 ASSESSMENT — ENCOUNTER SYMPTOMS: SHORTNESS OF BREATH: 1

## 2024-07-10 NOTE — TELEPHONE ENCOUNTER
Noted, we have attempted multiple times for patient to be evaluated with a bladder scan including instructed her to go to the emergency room plus being seen in our office for a bladder scan.

## 2024-07-10 NOTE — PROGRESS NOTES
Remote Patient Monitoring Treatment Plan    Received request from [facility]/Yu Anguiano RN   to order remote patient monitoring for in home monitoring of Pneumonia; Condition managed by PCP.  and order completed.     Patient will be monitoring blood pressure   pulse ox   temperature  weight  survey questions.      Patient will engage in Remote Patient Monitoring each day to develop the skills necessary for self management.       RPM Care Team Responsibilities:   Alerts will be reviewed daily and addressed within 2-4 hours during operational hours (Monday -Friday 9 am-4 pm)  Alert response and intervention documented in patient medical record  Alert response escalated to PCP per protocol and documented in patient medical record  Patient monitored over approximately  days  Discharge from program based on self-management readiness    See care coordination encounters for additional details.

## 2024-07-10 NOTE — ED NOTES
Upon arrival to ED patient was seen at 76% on 4L NC on patients own O2.  Patient placed on 6L NC via ED O2, patient's O2 saturation did not rise above 79%, patient then placed on non-rebreather at 15L and patient's O2 gradually increased to 90%.

## 2024-07-10 NOTE — TELEPHONE ENCOUNTER
[facility] cancer center called to let the office know the patient is currently still at their office. She has a 145 today, I will cancel this appointment today. The cancer center stated she is not doing well today and they will be sending her to the ER.

## 2024-07-10 NOTE — ED PROVIDER NOTES
and V2 no ST segment elevation similar compared to previous    Repeat EKG performed at 1450-the ventricular rate is 63 sinus rhythm with atrial premature complex no ST segment elevation there is nonspecific ST changes noted lead I and aVL  EKG: All EKG's are interpreted by the Emergency Department Physician who either signs or Co-signs this chart in the   absence of a cardiologist.    Imaging that is independently reviewed and interpreted by me as:    CT CHEST WO CONTRAST   Preliminary Result   1. Interval improvement in multifocal pneumonia when compared to 06/16/2024.   Mild residual areas of ground-glass attenuation persists predominately in the   right upper lobe with a few subtle subcentimeter nodular opacities.  A   short-term follow-up CT is recommended to ensure complete resolution.   2. Improving mediastinal adenopathy.   3. Persistent moderate right with small to moderate left pleural effusions.   4. Anasarca.         XR CHEST PORTABLE   Final Result   1. Mild cardiomegaly with mild vascular congestive changes.  Mild   interstitial edema.   2. Small to moderate bilateral pleural effusions, with dense atelectasis at   the lung bases.   3. Findings are similar on previous exam.           Laboratory studies   Labs Reviewed   CBC WITH AUTO DIFFERENTIAL - Abnormal; Notable for the following components:       Result Value    RBC 2.64 (*)     Hemoglobin 8.4 (*)     Hematocrit 26.2 (*)     RDW 15.4 (*)     Neutrophils % 76 (*)     Lymphocytes % 10 (*)     Immature Granulocytes % 1 (*)     Lymphocytes Absolute 0.74 (*)     All other components within normal limits   COMPREHENSIVE METABOLIC PANEL - Abnormal; Notable for the following components:    Sodium 128 (*)     Chloride 97 (*)     Glucose 127 (*)     BUN 30 (*)     Creatinine 1.7 (*)     Est, Glom Filt Rate 34 (*)     Alkaline Phosphatase 109 (*)     All other components within normal limits   TROPONIN - Abnormal; Notable for the following components:     Course as of 07/10/24 1904   Wed Jul 10, 2024   1511 VBG's which appear more like ABGs-pH 7.4 7 8, pCO2 27.4, pO2 88.8 use her on a nonrebreather [RS]   1512 COVID-19, Rapid:    Specimen Description .NASOPHARYNGEAL SWAB   SARS-CoV-2, Rapid Not Detected  COVID negative [RS]   1512 WBC 7.2/hemoglobin 8.4 [RS]      ED Course User Index  [RS] Luis Teague MD         Disposition discussion with patient/family:     De escalation of care (Code status)          All questions answered and addressed    Code Status Discussion: Full code                CRITICAL CARE TIME   Total Critical Care time was minutes, excluding separately reportable procedures.  There was a high probability of clinically significant/life threatening deterioration in the patient's condition which required my urgent intervention.             PROCEDURES:    Procedures       ED BEDSIDE ULTRASOUND:   Performed by ED Physician - none      FINAL IMPRESSION     1. Shortness of breath    2. Pneumonia due to infectious organism, unspecified laterality, unspecified part of lung    3. Elevated brain natriuretic peptide (BNP) level    4. Pleural effusion          DISPOSITION/PLAN   DISPOSITION Admitted 07/10/2024 05:49:50 PM      PATIENT REFERRED TO:  No follow-up provider specified.    DISCHARGE MEDICATIONS:  New Prescriptions    No medications on file     Controlled Substances Monitoring:          No data to display                (Please note that portions of this note were completed with a voice recognition program.  Efforts were made to edit the dictations but occasionally words are mis-transcribed.)    Luis Teague MD (electronically signed)  Attending Emergency Physician            Luis Teague MD  07/10/24 1905

## 2024-07-11 ENCOUNTER — APPOINTMENT (OUTPATIENT)
Dept: ULTRASOUND IMAGING | Age: 64
End: 2024-07-11
Payer: COMMERCIAL

## 2024-07-11 ENCOUNTER — TELEPHONE (OUTPATIENT)
Dept: ONCOLOGY | Age: 64
End: 2024-07-11

## 2024-07-11 PROBLEM — J96.21 ACUTE ON CHRONIC RESPIRATORY FAILURE WITH HYPOXIA (HCC): Status: ACTIVE | Noted: 2024-06-17

## 2024-07-11 PROBLEM — J96.20 ACUTE-ON-CHRONIC RESPIRATORY FAILURE (HCC): Status: ACTIVE | Noted: 2024-07-11

## 2024-07-11 LAB
ALBUMIN SERPL-MCNC: 3.1 G/DL (ref 3.5–5.2)
ALBUMIN/GLOB SERPL: 1.1 {RATIO} (ref 1–2.5)
ALP SERPL-CCNC: 93 U/L (ref 35–104)
ALT SERPL-CCNC: 14 U/L (ref 5–33)
ANION GAP SERPL CALCULATED.3IONS-SCNC: 11 MMOL/L (ref 9–17)
AST SERPL-CCNC: 16 U/L
BASOPHILS # BLD: 0.05 K/UL (ref 0–0.2)
BASOPHILS NFR BLD: 1 % (ref 0–2)
BILIRUB SERPL-MCNC: 0.7 MG/DL (ref 0.3–1.2)
BUN SERPL-MCNC: 31 MG/DL (ref 8–23)
BUN/CREAT SERPL: 16 (ref 9–20)
CALCIUM SERPL-MCNC: 8.6 MG/DL (ref 8.6–10.4)
CHLORIDE SERPL-SCNC: 98 MMOL/L (ref 98–107)
CO2 SERPL-SCNC: 21 MMOL/L (ref 20–31)
CREAT SERPL-MCNC: 1.9 MG/DL (ref 0.5–0.9)
CRP SERPL HS-MCNC: 3.7 MG/L (ref 0–5)
EKG ATRIAL RATE: 55 BPM
EKG ATRIAL RATE: 56 BPM
EKG P AXIS: 109 DEGREES
EKG P AXIS: 45 DEGREES
EKG P-R INTERVAL: 188 MS
EKG P-R INTERVAL: 208 MS
EKG Q-T INTERVAL: 514 MS
EKG Q-T INTERVAL: 530 MS
EKG QRS DURATION: 96 MS
EKG QRS DURATION: 98 MS
EKG QTC CALCULATION (BAZETT): 496 MS
EKG QTC CALCULATION (BAZETT): 507 MS
EKG R AXIS: 63 DEGREES
EKG R AXIS: 86 DEGREES
EKG T AXIS: 23 DEGREES
EKG T AXIS: 49 DEGREES
EKG VENTRICULAR RATE: 55 BPM
EKG VENTRICULAR RATE: 56 BPM
EOSINOPHIL # BLD: 0.2 K/UL (ref 0–0.44)
EOSINOPHILS RELATIVE PERCENT: 3 % (ref 1–4)
ERYTHROCYTE [DISTWIDTH] IN BLOOD BY AUTOMATED COUNT: 15.6 % (ref 11.8–14.4)
GFR, ESTIMATED: 29 ML/MIN/1.73M2
GLUCOSE SERPL-MCNC: 96 MG/DL (ref 70–99)
HCT VFR BLD AUTO: 22.4 % (ref 36.3–47.1)
HGB BLD-MCNC: 7.5 G/DL (ref 11.9–15.1)
IMM GRANULOCYTES # BLD AUTO: <0.03 K/UL (ref 0–0.3)
IMM GRANULOCYTES NFR BLD: 0 %
LDH SERPL-CCNC: 320 U/L (ref 135–214)
LYMPHOCYTES NFR BLD: 0.66 K/UL (ref 1.1–3.7)
LYMPHOCYTES RELATIVE PERCENT: 10 % (ref 24–43)
MCH RBC QN AUTO: 32.2 PG (ref 25.2–33.5)
MCHC RBC AUTO-ENTMCNC: 33.5 G/DL (ref 28.4–34.8)
MCV RBC AUTO: 96.1 FL (ref 82.6–102.9)
MONOCYTES NFR BLD: 0.5 K/UL (ref 0.1–1.2)
MONOCYTES NFR BLD: 8 % (ref 3–12)
NEUTROPHILS NFR BLD: 78 % (ref 36–65)
NEUTS SEG NFR BLD: 4.99 K/UL (ref 1.5–8.1)
NRBC BLD-RTO: 0 PER 100 WBC
PLATELET # BLD AUTO: 378 K/UL (ref 138–453)
PMV BLD AUTO: 9.7 FL (ref 8.1–13.5)
POTASSIUM SERPL-SCNC: 4.2 MMOL/L (ref 3.7–5.3)
PROCALCITONIN SERPL-MCNC: 0.1 NG/ML (ref 0–0.09)
PROCALCITONIN SERPL-MCNC: 0.11 NG/ML (ref 0–0.09)
PROT SERPL-MCNC: 5.8 G/DL (ref 6.4–8.3)
RBC # BLD AUTO: 2.33 M/UL (ref 3.95–5.11)
SODIUM SERPL-SCNC: 130 MMOL/L (ref 135–144)
WBC OTHER # BLD: 6.4 K/UL (ref 3.5–11.3)

## 2024-07-11 PROCEDURE — 83615 LACTATE (LD) (LDH) ENZYME: CPT

## 2024-07-11 PROCEDURE — 80053 COMPREHEN METABOLIC PANEL: CPT

## 2024-07-11 PROCEDURE — 2000000000 HC ICU R&B

## 2024-07-11 PROCEDURE — 97162 PT EVAL MOD COMPLEX 30 MIN: CPT

## 2024-07-11 PROCEDURE — 6370000000 HC RX 637 (ALT 250 FOR IP): Performed by: STUDENT IN AN ORGANIZED HEALTH CARE EDUCATION/TRAINING PROGRAM

## 2024-07-11 PROCEDURE — 2580000003 HC RX 258: Performed by: STUDENT IN AN ORGANIZED HEALTH CARE EDUCATION/TRAINING PROGRAM

## 2024-07-11 PROCEDURE — 99223 1ST HOSP IP/OBS HIGH 75: CPT | Performed by: INTERNAL MEDICINE

## 2024-07-11 PROCEDURE — 94664 DEMO&/EVAL PT USE INHALER: CPT

## 2024-07-11 PROCEDURE — 2700000000 HC OXYGEN THERAPY PER DAY

## 2024-07-11 PROCEDURE — 6370000000 HC RX 637 (ALT 250 FOR IP)

## 2024-07-11 PROCEDURE — 76604 US EXAM CHEST: CPT

## 2024-07-11 PROCEDURE — 97166 OT EVAL MOD COMPLEX 45 MIN: CPT

## 2024-07-11 PROCEDURE — 94640 AIRWAY INHALATION TREATMENT: CPT

## 2024-07-11 PROCEDURE — 97110 THERAPEUTIC EXERCISES: CPT

## 2024-07-11 PROCEDURE — 99232 SBSQ HOSP IP/OBS MODERATE 35: CPT | Performed by: INTERNAL MEDICINE

## 2024-07-11 PROCEDURE — 6360000002 HC RX W HCPCS

## 2024-07-11 PROCEDURE — 36415 COLL VENOUS BLD VENIPUNCTURE: CPT

## 2024-07-11 PROCEDURE — 51702 INSERT TEMP BLADDER CATH: CPT

## 2024-07-11 PROCEDURE — 6360000002 HC RX W HCPCS: Performed by: STUDENT IN AN ORGANIZED HEALTH CARE EDUCATION/TRAINING PROGRAM

## 2024-07-11 PROCEDURE — 86140 C-REACTIVE PROTEIN: CPT

## 2024-07-11 PROCEDURE — 94761 N-INVAS EAR/PLS OXIMETRY MLT: CPT

## 2024-07-11 PROCEDURE — 94669 MECHANICAL CHEST WALL OSCILL: CPT

## 2024-07-11 PROCEDURE — 93010 ELECTROCARDIOGRAM REPORT: CPT | Performed by: INTERNAL MEDICINE

## 2024-07-11 PROCEDURE — 84145 PROCALCITONIN (PCT): CPT

## 2024-07-11 PROCEDURE — 85025 COMPLETE CBC W/AUTO DIFF WBC: CPT

## 2024-07-11 RX ORDER — SODIUM CHLORIDE FOR INHALATION 0.9 %
VIAL, NEBULIZER (ML) INHALATION
Status: DISPENSED
Start: 2024-07-11 | End: 2024-07-11

## 2024-07-11 RX ORDER — DOCUSATE SODIUM 100 MG/1
100 CAPSULE, LIQUID FILLED ORAL 2 TIMES DAILY PRN
Status: DISCONTINUED | OUTPATIENT
Start: 2024-07-12 | End: 2024-07-12 | Stop reason: HOSPADM

## 2024-07-11 RX ORDER — DOCUSATE SODIUM 100 MG/1
100 CAPSULE, LIQUID FILLED ORAL 2 TIMES DAILY
Status: DISCONTINUED | OUTPATIENT
Start: 2024-07-11 | End: 2024-07-11

## 2024-07-11 RX ORDER — SENNOSIDES A AND B 8.6 MG/1
1 TABLET, FILM COATED ORAL NIGHTLY PRN
Status: DISCONTINUED | OUTPATIENT
Start: 2024-07-11 | End: 2024-07-12 | Stop reason: HOSPADM

## 2024-07-11 RX ORDER — DOCUSATE SODIUM 100 MG/1
CAPSULE, LIQUID FILLED ORAL
Status: COMPLETED
Start: 2024-07-11 | End: 2024-07-11

## 2024-07-11 RX ORDER — SODIUM CHLORIDE FOR INHALATION 0.9 %
VIAL, NEBULIZER (ML) INHALATION
Status: DISPENSED
Start: 2024-07-11 | End: 2024-07-12

## 2024-07-11 RX ORDER — SENNOSIDES A AND B 8.6 MG/1
1 TABLET, FILM COATED ORAL NIGHTLY
Status: DISCONTINUED | OUTPATIENT
Start: 2024-07-11 | End: 2024-07-11

## 2024-07-11 RX ORDER — BISACODYL 5 MG/1
5 TABLET, DELAYED RELEASE ORAL ONCE
Status: DISCONTINUED | OUTPATIENT
Start: 2024-07-11 | End: 2024-07-12 | Stop reason: HOSPADM

## 2024-07-11 RX ORDER — SODIUM CHLORIDE FOR INHALATION 0.9 %
VIAL, NEBULIZER (ML) INHALATION
Status: COMPLETED
Start: 2024-07-11 | End: 2024-07-11

## 2024-07-11 RX ADMIN — LEVALBUTEROL 1.25 MG: 1.25 SOLUTION, CONCENTRATE RESPIRATORY (INHALATION) at 20:32

## 2024-07-11 RX ADMIN — APIXABAN 2.5 MG: 2.5 TABLET, FILM COATED ORAL at 08:02

## 2024-07-11 RX ADMIN — POLYETHYLENE GLYCOL 3350 17 G: 17 POWDER, FOR SOLUTION ORAL at 20:58

## 2024-07-11 RX ADMIN — ATORVASTATIN CALCIUM 80 MG: 40 TABLET, FILM COATED ORAL at 20:58

## 2024-07-11 RX ADMIN — LEVALBUTEROL 1.25 MG: 1.25 SOLUTION, CONCENTRATE RESPIRATORY (INHALATION) at 05:37

## 2024-07-11 RX ADMIN — CARVEDILOL 25 MG: 25 TABLET, FILM COATED ORAL at 17:04

## 2024-07-11 RX ADMIN — SODIUM CHLORIDE, PRESERVATIVE FREE 10 ML: 5 INJECTION INTRAVENOUS at 08:03

## 2024-07-11 RX ADMIN — HYDRALAZINE HYDROCHLORIDE 25 MG: 25 TABLET, FILM COATED ORAL at 17:04

## 2024-07-11 RX ADMIN — FLECAINIDE ACETATE 50 MG: 50 TABLET ORAL at 20:58

## 2024-07-11 RX ADMIN — LEVALBUTEROL 1.25 MG: 1.25 SOLUTION, CONCENTRATE RESPIRATORY (INHALATION) at 10:13

## 2024-07-11 RX ADMIN — HYDRALAZINE HYDROCHLORIDE 25 MG: 25 TABLET, FILM COATED ORAL at 08:02

## 2024-07-11 RX ADMIN — MEROPENEM 1000 MG: 1 INJECTION INTRAVENOUS at 23:40

## 2024-07-11 RX ADMIN — MEROPENEM 1000 MG: 1 INJECTION INTRAVENOUS at 12:24

## 2024-07-11 RX ADMIN — FLECAINIDE ACETATE 50 MG: 50 TABLET ORAL at 08:03

## 2024-07-11 RX ADMIN — LEVALBUTEROL 1.25 MG: 1.25 SOLUTION, CONCENTRATE RESPIRATORY (INHALATION) at 14:32

## 2024-07-11 RX ADMIN — SODIUM CHLORIDE, PRESERVATIVE FREE 10 ML: 5 INJECTION INTRAVENOUS at 21:05

## 2024-07-11 RX ADMIN — PANTOPRAZOLE SODIUM 40 MG: 40 TABLET, DELAYED RELEASE ORAL at 08:02

## 2024-07-11 RX ADMIN — Medication 3 ML: at 05:37

## 2024-07-11 RX ADMIN — ISODIUM CHLORIDE 3 ML: 0.03 SOLUTION RESPIRATORY (INHALATION) at 20:32

## 2024-07-11 RX ADMIN — AMLODIPINE BESYLATE 10 MG: 10 TABLET ORAL at 08:02

## 2024-07-11 RX ADMIN — DOCUSATE SODIUM 100 MG: 100 CAPSULE, LIQUID FILLED ORAL at 20:59

## 2024-07-11 RX ADMIN — CARVEDILOL 25 MG: 25 TABLET, FILM COATED ORAL at 08:03

## 2024-07-11 ASSESSMENT — ENCOUNTER SYMPTOMS
COUGH: 0
GASTROINTESTINAL NEGATIVE: 1
SHORTNESS OF BREATH: 1
WHEEZING: 0

## 2024-07-11 NOTE — H&P
History and Physical    Patient:  Daja Espinosa  MRN: [id number]    Chief Complaint: Shortness of breath    History Obtained From:  patient, electronic medical record    PCP: Sohail Garcia MD    History of Present Illness:   The patient is a 63 y.o. female who presents with shortness of breath.  Patient states this started last night.  She is chronically been on oxygen at 2-1/2 to 3 L following a recent hospital admission.  Last night she had increase her oxygen to 4 L and still felt short of breath.  She states that her arms, legs, and abdomen have been more swollen than normal.  She denies any cough, chest pain or wheezing.  SpO2 on arrival to the hospital today was 72% on 6 L per nasal cannula.  Patient was recently discharged from [place] following an ICU admission for pneumonia.  During that admission, she went into atrial fibrillation with RVR and her respiratory status worsen.  She did undergo a thoracentesis as well as hemodialysis secondary to ischemic ATN from sepsis.  She did have a left nephrostomy tube placed, which is still present.  Her creatinine has been steadily improving and she is no longer on dialysis.  She is currently undergoing radiation for bladder cancer, however her chemotherapy has been held since her recent hospitalization.  Other past medical history includes MI, aortic bypass, hypertension, PAD, PVD, CKD, and stroke.  She is currently anticoagulated with Eliquis.  Workup today revealed a sodium of 128, chloride 97, BUN 30, creatinine 1.7, troponin 43 and 42, proBNP 9339, no leukocytosis.  VBG's were pH of 7.478, CO2 27.4, pO2 88.8, HCO3 19.9, negative base excess 2.1.  CT of the chest showed interval improvement in multifocal pneumonia when compared to Johana studies, improving mediastinal adenopathy, persistent moderate right with small to moderate left pleural effusion and anasarca.  Patient is currently dyspneic with exertion.  Oxygen was changed to heated high flow nasal cannula  CYSTOSCOPY URETERAL STENT INSERTION performed by Tulio Holder MD at [facility] OR    CYSTOSCOPY Left 04/02/2024    CYSTOSCOPY TUR BLADDER TUMOR, LEFT  STENT EXCHANGE (Bladder)    ESOPHAGOGASTRODUODENOSCOPY  06/04/2024    ESOPHAGOGASTRODUODENOSCOPY Biopsy    FEMORAL BYPASS  5/12, 6/12    per Dr Ho    HERNIA REPAIR      ventral    IR NEPHROSTOMY PERCUTANEOUS LEFT  6/18/2024    IR NEPHROSTOMY PERCUTANEOUS LEFT 6/18/2024 Lloyd Tran MD [facility] SPECIAL PROCEDURES    IR NONTUNNELED VASCULAR CATHETER  6/21/2024    IR NONTUNNELED VASCULAR CATHETER 6/21/2024 Lloyd Tran MD [facility] SPECIAL PROCEDURES    IR PORT PLACEMENT < 5 YEARS  03/13/2024    IR PORT PLACEMENT < 5 YEARS 3/13/2024 [facility] CARDIAC CATH/IR LAB    IR TUNNELED CATHETER PLACEMENT GREATER THAN 5 YEARS  6/25/2024    IR TUNNELED CATHETER PLACEMENT GREATER THAN 5 YEARS 6/25/2024 Oli Faria PA [facility] SPECIAL PROCEDURES    OTHER SURGICAL HISTORY  04/2016    TURBT    OTHER SURGICAL HISTORY      brachiocephalic-bypass    OTHER SURGICAL HISTORY      stenting of left internal carotid artery    TUNNELED CENTRAL VENOUS CATHETER W/ SUBCUTANEOUS PORT Right 03/13/2024    Dr Tran/[facility]    UPPER GASTROINTESTINAL ENDOSCOPY N/A 07/22/2021    EGD POLYP SNARE performed by Jose R Jack MD at [facility] ENDOSCOPY    UPPER GASTROINTESTINAL ENDOSCOPY N/A 6/4/2024    ESOPHAGOGASTRODUODENOSCOPY BIOPSY performed by Angelita Oropeza MD at [facility] OR    VASCULAR SURGERY      VASCULAR SURGERY  5/12, 6/12    ken leg vacular surgery.    VENTRAL HERNIA REPAIR N/A 01/05/2022    HERNIA VENTRAL REPAIR performed by Jose R Jack MD at [facility] OR    VULVA SURGERY  2001    cancer       Medications Prior to Admission:    Prior to Admission medications    Medication Sig Start Date End Date Taking? Authorizing Provider   hydrALAZINE (APRESOLINE) 25 MG tablet Take 1 tablet by mouth 2 times daily at 0800 and 1400 7/8/24   Terrell Cortez MD   potassium bicarbonate (EFFER-K)

## 2024-07-11 NOTE — PLAN OF CARE
Problem: Discharge Planning  Goal: Discharge to home or other facility with appropriate resources  7/11/2024 1922 by Thania Mahoney RN  Outcome: Progressing  Flowsheets (Taken 7/11/2024 0439 by Eliel Rawls, RN)  Discharge to home or other facility with appropriate resources:   Identify barriers to discharge with patient and caregiver   Identify discharge learning needs (meds, wound care, etc)     Problem: Safety - Adult  Goal: Free from fall injury  7/11/2024 1922 by Thania Mahoney RN  Outcome: Progressing  Flowsheets (Taken 7/11/2024 0439 by Eliel Rawls, RN)  Free From Fall Injury: Instruct family/caregiver on patient safety     Problem: Nutrition Deficit:  Goal: Optimize nutritional status  7/11/2024 1922 by Thania Mahoney RN  Outcome: Progressing  Flowsheets (Taken 7/11/2024 0807 by Benji Ortiz, RD, LD)  Nutrient intake appropriate for improving, restoring, or maintaining nutritional needs:   Monitor oral intake, labs, and treatment plans   Recommend appropriate diets, oral nutritional supplements, and vitamin/mineral supplements     Problem: Chronic Conditions and Co-morbidities  Goal: Patient's chronic conditions and co-morbidity symptoms are monitored and maintained or improved  Outcome: Progressing  Flowsheets (Taken 7/11/2024 1922)  Care Plan - Patient's Chronic Conditions and Co-Morbidity Symptoms are Monitored and Maintained or Improved: Monitor and assess patient's chronic conditions and comorbid symptoms for stability, deterioration, or improvement

## 2024-07-11 NOTE — PROGRESS NOTES
Spiritual Services Interventions  I308/I308-01   7/11/2024  Logan Shanks    Dajamaggy Espinosa  63 y.o. year old female    Encounter Summary  Encounter Overview/Reason: (P) Initial Encounter  Service Provided For: (P) Patient  Referral/Consult From: (P) Rounding  Last Encounter : (P) 07/11/24  Complexity of Encounter: (P) Moderate  Begin Time: (P) 1500  End Time : (P) 1515  Total Time Calculated: (P) 15 min     Spiritual/Emotional needs  Type: (P) Spiritual Support                    Assessment/Intervention/Outcome  Assessment: (P) Calm  Intervention: (P) Discussed belief system/Anabaptism practices/barbara, Discussed illness injury and it’s impact  Outcome: (P) Engaged in conversation, Encouraged

## 2024-07-11 NOTE — PROGRESS NOTES
Pt arrived to  via cart from ED. Pt able to pivot from cart to bed without assistance. Pt states she recently started needing a walker to get around. Vitals and assessment completed as charted, pt alert and oriented x4. Pt short of breath at rest and with exertion. States after being discharged recently from [facility] she has required home oxygen at 2.5L. Pt currently on 6L nasal cannula with o2 sat of 91%. Pt denies any pain or current needs at this time. Call light is within reach, bed alarm on, care ongoing.

## 2024-07-11 NOTE — PLAN OF CARE
Problem: Discharge Planning  Goal: Discharge to home or other facility with appropriate resources  7/11/2024 0730 by Darline Joshi RN  Outcome: Progressing  7/11/2024 0439 by Eliel Rawls RN  Flowsheets (Taken 7/11/2024 0439)  Discharge to home or other facility with appropriate resources:   Identify barriers to discharge with patient and caregiver   Identify discharge learning needs (meds, wound care, etc)     Problem: Safety - Adult  Goal: Free from fall injury  7/11/2024 0730 by Darline Joshi, RN  Outcome: Progressing  7/11/2024 0439 by Eliel Rawls RN  Flowsheets (Taken 7/11/2024 0439)  Free From Fall Injury: Instruct family/caregiver on patient safety

## 2024-07-11 NOTE — CARE COORDINATION
07/11/24 1029   Readmission Assessment   Number of Days since last admission? 8-30 days   Previous Disposition Home with Family   Who is being Interviewed Patient   What was the patient's/caregiver's perception as to why they think they needed to return back to the hospital? Other (Comment)  (SOB)   Did you see a specialist, such as Cardiac, Pulmonary, Orthopedic Physician, etc. after you left the hospital? Yes   Who advised the patient to return to the hospital? Self-referral   Does the patient report anything that got in the way of taking their medications? No   In our efforts to provide the best possible care to you and others like you, can you think of anything that we could have done to help you after you left the hospital the first time, so that you might not have needed to return so soon? Other (Comment)  (no)     [facility]st [facility]- home with oxygen & walker.  Still waiting for the walker and the telehealth equipment.  2nd- [facility] SOB & pneumonia. Plan is home.  Patient is receiving radiation & chemo as an outpatient    CHINTAN Miranda

## 2024-07-11 NOTE — CONSULTS
[facility]          [address]        [address]        [place], [place] [id number] [phone]           Inpatient Initial consult note         7/10/2024 9:28 PM      Patient Name:   Daja Espinosa  YOB: 1960  Primary Care Physician:  Sohail Garcia MD   Admit Date:    7/10/2024  2:06 PM    Consultation requested by : Star Ruiz MD      TELEHEALTH EVALUATION -- Audio/Visual (During COVID-19 public health emergency)     Telehealth service was provided with the patient at his room [address] [facility] and myself the physician in my home lima and RUBY Julian who has initiated the visit.    Pursuant to the emergency declaration under the [place] Act and the National Emergencies Act, 1135 waiver authority and the Coronavirus Preparedness and Response Supplemental Appropriations Act, this Virtual  Visit was conducted, with patient's consent, to reduce the patient's risk of exposure to COVID-19 and provide continuity of care for an established patient.     Services were provided through a video synchronous discussion virtually to substitute for in-person clinic visit.       Reason for Consult : Nephrology following for Adonay/CKD and recent nephrostomy tube placement .    History of presenting illness :Daja Espinosa is a 63 y.o.   female with Past Medical History as stated below presented with a chief complaint of Shortness of Breath (Pt. Reports SOB starting yesterday. [facility]. D/c end of June on 2.5L O2. O2 down in 70s at radiation just PTA. Pt. Reports increasing O2 to 4L with no improvement. )   on 7/10/2024 .    Patient presented with chief complaints of shortness of breath which has been worsening within the last 24 hours she normally uses 2 L of oxygen at home which she has been increasing to 4 L upon arrival to the ED she was having a saturation of 72% on 4 L.  Denies any fever or chills no chest pain no nausea vomiting diarrhea no dizziness

## 2024-07-11 NOTE — TELEPHONE ENCOUNTER
Name: Daja Espinosa  : 1960  MRN: [id number]    Oncology Navigation Follow-Up Note    Contact Type:  Inpatient    Notes: Message received that patient was admitted to [facility] .  Chart reviewed.  Pt sent to ER from [facility] with shortness of breath.  Will await discharge.      Current follow up scheduled for Dr. Shepard on 7/15/24.      Electronically signed by Ave Arreaga RN on 2024 at 7:51 AM

## 2024-07-11 NOTE — RT PROTOCOL NOTE
RESPIRATORY ASSESSMENT PROTOCOL                                                                                              Patient Name: Daja Espinosa Room#: I308/I308-01 : 1960     Admitting diagnosis: Shortness of breath [R06.02]  Pleural effusion [J90]  Hypoxia [R09.02]  Elevated brain natriuretic peptide (BNP) level [R79.89]  Pneumonia due to infectious organism, unspecified laterality, unspecified part of lung [J18.9]       Medical History:   Past Medical History:   Diagnosis Date    Alcohol abuse 2017    Arthritis     Bladder cancer (HCC)     CAD (coronary artery disease)     Cancer (HCC)     vulvar-    Carotid artery occlusion     [facility] where she had a brachiocephalic aorta bypass.    Carotid artery stenosis     Chronic back pain     History of chemotherapy     Hydronephrosis 02/15/2024    Hyperlipidemia     Hypertension     Hypoglycemia     MI (myocardial infarction) (HCC)     Peripheral vascular disease (HCC)     Takayasu's arteritis (HCC)     Tibial fracture     right    Umbilical hernia     Under care of team 2024    Cardiologist: Terrell Cortez MD, [place], last visit 2024    Under care of team 2024    Oncology: Familia Shepard MD, [place], last visit 3/11/2024    Under care of team 2024    PCP: Sohail Garcia MD [place], last visit 3/2024    Unspecified cerebral artery occlusion with cerebral infarction     Wears partial dentures     Dentures/top, partial/bottom       PATIENT ASSESSMENT    LABORATORY DATA  Hematology:   Lab Results   Component Value Date/Time    WBC 7.2 07/10/2024 02:14 PM    RBC 2.64 07/10/2024 02:14 PM    RBC 3.36 2012 03:00 PM    HGB 8.4 07/10/2024 02:14 PM    HCT 26.2 07/10/2024 02:14 PM     07/10/2024 02:14 PM     2012 03:00 PM     Chemistry:    Lab Results   Component Value Date/Time    NBEA 2.8 2024 06:44 AM       VITALS  Pulse: 58   Respirations: 22  BP: 128/79  SpO2: 92 % O2  of the following:    [x] BILATERAL BREATH SOUNDS (BBS)     [x] PULMONARY HISTORY (PULM HX)  [x] Respiratory Rate  (RR)    Goal: Improve respiratory functions in patients with airway disease and decrease WOB    [x] AEROSOL PROTOCOL    Total Acuity:   14-28  []  Secondary Assessment in 24 hrs Total Acuity:  9-13  [x]  Secondary Assessment in 24 hrs Total Acuity:  4-8  []  Secondary Assessment in 24 hrs Total Acuity:  0-3  []  Secondary Assessment in 48 hrs   HHN AEROSOL THERAPY with  [physician-ordered bronchodilator(s)] q 4 & Albuterol PRN q2 hrs.   Breath-Actuated Neb if BBS Acuity = 4, and pt. can use MP. Notify physician if condition deteriorates.  HHN AEROSOL THERAPY with  [physician-ordered bronchodilator(s)]  QID and Albuterol PRN q4 hrs.   Breath-Actuated Neb if BBS Acuity = 4, and pt. can use MP.  Notify physician if condition deteriorates. MDI THERAPY with  2 actuations of [physician-ordered bronchodilator(s)] via spacer TID Albuterol and PRN q4 hrs.   If unable to utilize MDI: HHN [physician-ordered bronchodilator(s)] TID and Albuterol PRN q4 hrs.   Notify physician if condition deteriorates. MDI THERAPY with  [physician-ordered bronchodilator(s)] via spacer TID PRN.   If unable to utilize MDI: HHN [physician-ordered bronchodilator(s)] TID PRN.   Notify physician if condition deteriorates.         If Acuity Level is 2, 3, or 4 in any of the following:    [] COUGH     [] SURGICAL HISTORY (SURG HX)  [x] CHEST XRAY (CXR)    Goal: Improvement in sputum mobilization in patients with ineffective airway clearance. Reverse atelectasis.    [x] Bronchopulmonary Hygiene Protocol      Total Acuity:   14-28  []  Secondary Assessment in 24 hrs Total Acuity:  9-13  [x]  Secondary Assessment in 24 hrs Total Acuity:  4-8  []  Secondary Assessment in 24 hrs Total Acuity:  0-3  []  Secondary Assessment in 48 hrs   METANEB QID with [physician-ordered bronchodilator(s)] if CXR Acuity = 4; otherwise:  PD&P, Oscillatory Therapy,

## 2024-07-11 NOTE — PROGRESS NOTES
[facility]   Nephrology progress note  7/11/2024, 9:27 AM      Pt Name:    Daja Espinosa  MRN:     [id number]     YOB: 1960  Admit Date:    7/10/2024  2:06 PM    TELEHEALTH EVALUATION -- Audio/Visual (During COVID-19 public health emergency)     Telehealth service was provided with the patient at his room [address] [facility] and myself the physician in my home lima and RN Eliel who has initiated the visit.     Pursuant to the emergency declaration under the [place] Act and the National Emergencies Act, 1135 waiver authority and the Coronavirus Preparedness and Response Supplemental Appropriations Act, this Virtual  Visit was conducted, with patient's consent, to reduce the patient's risk of exposure to COVID-19 and provide continuity of care for an established patient.     Services were provided through a video synchronous discussion virtually to substitute for in-person clinic visit.    Chief Complaint: Nephrology following for acute kidney injury.    Subjective:  Patient seen and examined  No chest pain still some shortness of breath on high flow oxygen  Says she is eating and drinking well  Making some urine    Objective:  24HR INTAKE/OUTPUT:    Intake/Output Summary (Last 24 hours) at 7/11/2024 0927  Last data filed at 7/11/2024 0829  Gross per 24 hour   Intake 540 ml   Output 650 ml   Net -110 ml      Admission weight: 57.6 kg (127 lb)  Wt Readings from Last 3 Encounters:   07/10/24 62.1 kg (136 lb 14.5 oz)   07/02/24 57.6 kg (127 lb)   07/01/24 56.2 kg (124 lb)        Vitals :   Vitals:    07/11/24 0715 07/11/24 0739 07/11/24 0823 07/11/24 0900   BP: (!) 141/65  (!) 121/58 130/84   Pulse: 58  66 56   Resp: 28  20 16   Temp: 98 °F (36.7 °C)      TempSrc: Temporal      SpO2: 97%  96% 94%   Weight:       Height:  1.575 m (5' 2\")         Physical examination  General Appearance: Ill-appearing mild respiratory distress  Neck: No accessory muscle use  Lungs:  Breath sounds:  Reasonably clear per nursing staff  CNS grossly intact  Psych not agitated  Musculoskeletal:  Edema -no edema noted    Medications:  Infusion:    sodium chloride       Meds:    sodium chloride nebulizer        amLODIPine  10 mg Oral Daily    atorvastatin  80 mg Oral Nightly    carvedilol  25 mg Oral BID WC    flecainide  50 mg Oral 2 times per day    hydrALAZINE  25 mg Oral BID    pantoprazole  40 mg Oral QAM AC    apixaban  2.5 mg Oral BID    sodium chloride flush  5-40 mL IntraVENous 2 times per day    meropenem  1,000 mg IntraVENous Q12H    levalbuterol  1.25 mg Nebulization 4x Daily       Lab Data :  CBC:   Recent Labs     07/10/24  1414 07/11/24  0510   WBC 7.2 6.4   HGB 8.4* 7.5*   HCT 26.2* 22.4*    378     CMP:  Recent Labs     07/08/24  1246 07/10/24  1414 07/11/24  0510   * 128* 130*   K 4.2 4.8 4.2    97* 98   CO2 23 20 21   BUN 21 30* 31*   CREATININE 1.5* 1.7* 1.9*   GLUCOSE 128* 127* 96   CALCIUM 9.1 8.9 8.6     Hepatic:   Recent Labs     07/10/24  1414 07/11/24  0510   AST 19 16   ALT 18 14   BILITOT 0.6 0.7   ALKPHOS 109* 93       Assessment and Plan:  Renal -acute kidney injury due to recent sepsis and also requiring dialysis.  Overall creatinine has been improving since her previous dialysis we will have to see where it stabilizes  Let us post Lasix yesterday creatinine has slightly worsened to 1.9 but stable recent  Closely follow renal function at this time avoid any further nephrotoxins  If continues to get worse may check another ultrasound scan to look for any worsening hydronephrosis  Electrolytes -hyponatremia possibly secondary to renal dysfunction continue fluid restriction and follow  Mild acidosis  Essential hypertension  History of left-sided hydronephrosis with nephrostomy tube in place  Shortness of breath being evaluated by primary team currently on high flow oxygen  Meds reviewed and discussed with patient and nursing staff    Valente Thurman MD  [facility]

## 2024-07-11 NOTE — PROGRESS NOTES
Physical Therapy  Facility/Department: [facility] ICU  Daily Treatment Note  NAME: Daja Espinosa  : 1960  MRN: [id number]    Date of Service: 2024    Discharge Recommendations:  Continue to assess pending progress     Patient Diagnosis(es): The primary encounter diagnosis was Shortness of breath. Diagnoses of Pneumonia due to infectious organism, unspecified laterality, unspecified part of lung, Elevated brain natriuretic peptide (BNP) level, and Pleural effusion were also pertinent to this visit.    Assessment   Assessment: Pt. completed reclined seated BLE therex x10-15 in all available planes of motion. Multiple RB needed throughout treatment d/t fatigue and SOB. Pt. SpO2 ranged from 87%-95%--requring increased time to improve oxygen levels. Pt. declined ambulation at this time but reported that she would attempt ambulation tomorrow with therapy d/t being fatigue.  Activity Tolerance: Patient tolerated evaluation without incident  Equipment Needed: No     Plan    Physical Therapy Plan  General Plan: 2 times a day 7 days a week  Specific Instructions for Next Treatment: 1x/ daily on weekends  Current Treatment Recommendations: Strengthening;ROM;Balance training;Functional mobility training;IADL training;Patient/Caregiver education & training;Transfer training;ADL/Self-care training;Endurance training;Gait training;Stair training;Neuromuscular re-education;Manual;Home exercise program;Safety education & training;Equipment evaluation, education, & procurement;Positioning;Therapeutic activities     Restrictions  Restrictions/Precautions  Restrictions/Precautions: Fall Risk, General Precautions  Required Braces or Orthoses?: No     Subjective    Subjective  Subjective: Pt. in chair upon arrival, family present, agreeable to therapy at this time  Pain: denies  Orientation  Overall Orientation Status: Within Functional Limits  Orientation Level: Oriented X4  Cognition  Overall Cognitive Status: WFL     Objective    Bed Mobility Training  Bed Mobility Training: No  Transfer Training  Transfer Training: No  Gait  Gait Training: No  PT Exercises  Exercise Treatment: reclined seated BLE therex x10-15 in all avaialble planes of motion  Safety Devices  Type of Devices: All fall risk precautions in place;Call light within reach;Chair alarm in place;Left in chair  Restraints  Restraints Initially in Place: No       Goals  Short Term Goals  Time Frame for Short Term Goals: 10 days  Short Term Goal 1: Patient will ambulate 50' with supervision, without LOB  Short Term Goal 2: Patient will perform transfers and bed mobility with MI  Short Term Goal 3: Patient will tolerate 20-30 minutes of therex/act to improve endurance for ADLs.  Patient Goals   Patient Goals : Feel better    Education  Patient Education  Education Given To: Patient  Education Provided: Role of Therapy;Plan of Care  Education Method: Verbal  Barriers to Learning: None  Education Outcome: Verbalized understanding    Therapy Time   Individual Concurrent Group Co-treatment   Time In 1254         Time Out 1317         Minutes 23         Timed Code Treatment Minutes: 14 Minutes      Nancy Harmon, PTA

## 2024-07-11 NOTE — PROGRESS NOTES
Physical Therapy  Facility/Department: [facility] ICU  Physical Therapy Initial Assessment    Name: Daja Espinosa  : 1960  MRN: [id number]  Date of Service: 2024    Discharge Recommendations:  Continue to assess pending progress   PT Equipment Recommendations  Equipment Needed: No      Patient Diagnosis(es): The primary encounter diagnosis was Shortness of breath. Diagnoses of Pneumonia due to infectious organism, unspecified laterality, unspecified part of lung, Elevated brain natriuretic peptide (BNP) level, and Pleural effusion were also pertinent to this visit.  Past Medical History:  has a past medical history of Alcohol abuse, Arthritis, Bladder cancer (HCC), CAD (coronary artery disease), Cancer (HCC), Carotid artery occlusion, Carotid artery stenosis, Chronic back pain, History of chemotherapy, Hydronephrosis, Hyperlipidemia, Hypertension, Hypoglycemia, MI (myocardial infarction) (HCC), Peripheral vascular disease (HCC), Takayasu's arteritis (HCC), Tibial fracture, Umbilical hernia, Under care of team, Under care of team, Under care of team, Unspecified cerebral artery occlusion with cerebral infarction, and Wears partial dentures.  Past Surgical History:  has a past surgical history that includes Cardiac catheterization (2010); Cardiac surgery (); Vulva surgery (); vascular surgery; vascular surgery (, ); femoral bypass (, ); other surgical history (2016); Bladder surgery (2016); other surgical history; other surgical history; Colonoscopy (N/A, 2021); Upper gastrointestinal endoscopy (N/A, 2021); Colonoscopy (N/A, 2021); hernia repair; ventral hernia repair (N/A, 2022); Cystoscopy (N/A, 2024); Cystoscopy (Left, 2024); TUNNELED CENTRAL VENOUS CATHETER W/ SUBCUTANEOUS PORT (Right, 2024); IR PORT PLACEMENT < 5 YEARS (2024); Carotid angioplasty (Right); Cystocopy (Left, 2024); Anomalous venous return repair (N/A,  04/02/2024); Esophagogastroduodenoscopy (06/04/2024); Upper gastrointestinal endoscopy (N/A, 6/4/2024); IR GUIDED NEPHROSTOMY CATH PLACEMENT LEFT (6/18/2024); IR NONTUNNELED VASCULAR CATHETER > 5 YEARS (6/21/2024); and IR TUNNELED CVC PLACE WO SQ PORT/PUMP > 5 YEARS (6/25/2024).    Assessment   Body Structures, Functions, Activity Limitations Requiring Skilled Therapeutic Intervention: Decreased functional mobility ;Decreased ADL status;Decreased strength;Decreased endurance;Decreased balance;Decreased vision/visual deficit;Decreased high-level IADLs;Decreased posture;Increased pain  Assessment: The patient is a 63 y.o. female who was admitted due to shortness of breath.  She demonstates minor LE weakness, decreased activity endurance, and impaired balance.  She would benefit from skilled PT to address her deficits to improve functional mobility.  Treatment Diagnosis: Decreased activity endurance  Therapy Prognosis: Good  Decision Making: Medium Complexity  Requires PT Follow-Up: Yes  Activity Tolerance  Activity Tolerance: Patient tolerated evaluation without incident     Plan   Physical Therapy Plan  General Plan: 2 times a day 7 days a week (1x/day on weekends)  Current Treatment Recommendations: Strengthening, ROM, Balance training, Functional mobility training, IADL training, Patient/Caregiver education & training, Transfer training, ADL/Self-care training, Endurance training, Gait training, Stair training, Neuromuscular re-education, Manual, Home exercise program, Safety education & training, Equipment evaluation, education, & procurement, Positioning, Therapeutic activities  Safety Devices  Type of Devices: All fall risk precautions in place  Restraints  Restraints Initially in Place: No     Restrictions  Restrictions/Precautions  Restrictions/Precautions: Fall Risk, General Precautions  Required Braces or Orthoses?: No     Subjective   General  Chart Reviewed: Yes  Patient assessed for rehabilitation

## 2024-07-11 NOTE — CONSULTS
Personal, Social, and Family History  Marital Status:   Living situation:with family:  spouse  Psychological Distress: mild  Does patient understand diagnosis/treatment? yes  Does caregiver understand diagnosis/treatment? yes    Assessment        Symptom management/ pain control   Pain Assessment:  The patient is not having any pain.  Anxiety:  none  Dyspnea:  acute dyspnea  Fatigue:  exercise intolerance  Other: Denies    Palliative Performance Scale:     [] 100% Full ambulation; normal activity and work; no evidence of disease; able to do own self care; normal intake; fully conscious  [] 90% Full ambulation; normal activity and work; some evidence of disease; able to do own self care; normal intake; fully conscious  [] 80% Full ambulation; normal activity with effort; some evidence of disease; able to do own self care; normal or reduced intake; fully conscious  [] 70% Ambulation reduced; unable to perform normal job/work; significant disease; able to do own self care; normal or reduced intake; fully conscious  [x] 60%  Ambulation reduced; cannot do hobbies/housework; significant disease; occasional assist; intake normal or reduced; fully conscious/some confusion  [] 50%  Mainly sit/lie; can't do any work; extensive disease; considerable assist; intake normal or reduced; fully conscious/some confusion  [] 40%  Mainly in bed; extensive disease; mainly assist; intake normal or reduced; fully conscious/ some confusion   [] 30%  Bed bound; extensive disease; total care; intake reduced; fully conscious/some confusion  [] 20%  Bed bound; extensive disease; total care; intake minimal; drowsy/coma  [] 10%  Bed bound; extensive disease; total care; mouth care only; drowsy/coma  [] 0       Death     Readmission Risk Score: 30%    Plan      Palliative Interaction: Daja is resting in the chair for our discussion. She lives in [place] with her spouse. She has four daughters that are supportive. Daja manages her own  medications and ADL's. He  Benji completes house hold chores and drives. Daja has oxygen at home, a walker and telehealth unit are supposed to be delivered this week. She currently has no services.     Daja shares about her recent string of illnesses. She was diagnosed with bladder cancer in 2016. She underwent surgery and treatment and then was lost to follow up for years. Daja states that she lost her insurance and was not able to seek medical help. In February of this year she was diagnosed with bladder cancer invading the detrusor muscle. Daja's daughter states that the original plan was to have chemo and radiation followed by removal of the bladder but the plan has changed and surgery is no longer being offered. Daja has had several admissions over the past few months and her daughter feels that every time she has chemo she ends up at [facility]. She was admitted there 4/6/24-4/10/24 with a stroke, 6/3/24-6/7/24 with a rectal bleed and 6/14/24-6/17/24 with pneumonia. Daja states that her chemo has been put on hold until she recovers from pneumonia. Daja states that she has not had pain and is not having issues with anxiety or depression. States that her family has been very supportive. Discussed OP PC programs and the support they can provide. Brochure provided.     Discussed advanced directives. Daja does not have documents completed. We discuss their purpose and a copy of documents as well as written instructions on how to follow up as an OP are left with her and her family. Discussed code status. Daja would be open to intubation but would not want to be in it long term. She is unsure about CPR. Discussed code status options and written explanation left with her and her family. All deny further questions or needs at this time.     Education/support to family  Education/support to patient  Providing support for coping/adaptation/distress of family  Providing support for coping/adaptation/distress of

## 2024-07-11 NOTE — CARE COORDINATION
Case Management Assessment  Initial Evaluation    Date/Time of Evaluation: 7/11/2024 10:21 AM  Assessment Completed by: CHINTAN Miranda    If patient is discharged prior to next notation, then this note serves as note for discharge by case management.    Patient Name: Daja Espinosa                   YOB: 1960  Diagnosis: Shortness of breath [R06.02]  Pleural effusion [J90]  Hypoxia [R09.02]  Elevated brain natriuretic peptide (BNP) level [R79.89]  Pneumonia due to infectious organism, unspecified laterality, unspecified part of lung [J18.9]                   Date / Time: 7/10/2024  2:06 PM    Patient Admission Status: Inpatient   Readmission Risk (Low < 19, Mod (19-27), High > 27): Readmission Risk Score: 29.9    Current PCP: Sohail Garcia MD  PCP verified by CM? Yes    Chart Reviewed: Yes      History Provided by: Patient  Patient Orientation: Alert and Oriented, Person, Place, Situation, Self    Patient Cognition: Alert    Hospitalization in the last 30 days (Readmission):  Yes    If yes, Readmission Assessment in  Navigator will be completed.    Advance Directives:      Code Status: Full Code   Patient's Primary Decision Maker is: Legal Next of Kin    Primary Decision Maker: Benji Espinosa - Spouse - [phone]    Discharge Planning:    Patient lives with: Spouse/Significant Other Type of Home: House  Primary Care Giver: Self  Patient Support Systems include: Spouse/Significant Other, Family Members   Current Financial resources: Medicaid  Current community resources: Other (Comment) (chemo / radiation treatment)  Current services prior to admission: Oxygen Therapy, Durable Medical Equipment            Current DME: Oxygen Therapy (Comment)            Type of Home Care services:  None    ADLS  Prior functional level: Assistance with the following:, Housework, Shopping  Current functional level: Assistance with the following:, Housework, Shopping    PT AM-PAC:   /24  OT AM-PAC:

## 2024-07-11 NOTE — PROGRESS NOTES
RESPIRATORY ASSESSMENT PROTOCOL                                                                                              Patient Name: Daja Espinosa Room#: I308/I308-01 : 1960     Admitting diagnosis: Shortness of breath [R06.02]  Pleural effusion [J90]  Hypoxia [R09.02]  Elevated brain natriuretic peptide (BNP) level [R79.89]  Pneumonia due to infectious organism, unspecified laterality, unspecified part of lung [J18.9]       Medical History:   Past Medical History:   Diagnosis Date    Alcohol abuse 2017    Arthritis     Bladder cancer (HCC)     CAD (coronary artery disease)     Cancer (HCC)     vulvar-    Carotid artery occlusion     [facility] where she had a brachiocephalic aorta bypass.    Carotid artery stenosis     Chronic back pain     History of chemotherapy     Hydronephrosis 02/15/2024    Hyperlipidemia     Hypertension     Hypoglycemia     MI (myocardial infarction) (HCC)     Peripheral vascular disease (HCC)     Takayasu's arteritis (HCC)     Tibial fracture     right    Umbilical hernia     Under care of team 2024    Cardiologist: Terrell Cortez MD, [place], last visit 2024    Under care of team 2024    Oncology: Familia Shepard MD, [place], last visit 3/11/2024    Under care of team 2024    PCP: Sohail Garcia MD [place], last visit 3/2024    Unspecified cerebral artery occlusion with cerebral infarction     Wears partial dentures     Dentures/top, partial/bottom       PATIENT ASSESSMENT    LABORATORY DATA  Hematology:   Lab Results   Component Value Date/Time    WBC 6.4 2024 05:10 AM    RBC 2.33 2024 05:10 AM    RBC 3.36 2012 03:00 PM    HGB 7.5 2024 05:10 AM    HCT 22.4 2024 05:10 AM     2024 05:10 AM     2012 03:00 PM     Chemistry:    Lab Results   Component Value Date/Time    NBEA 2.8 2024 06:44 AM       VITALS  Pulse: 59   Respirations: 22  BP: (!) 103/50  SpO2: 92 %  O2 Device: Heated high flow cannula  Temp: 98 °F (36.7 °C)    SKIN COLOR  [x] Normal  [] Pale  [] Dusky  [] Cyanotic    RESPIRATORY PATTERN  [x] Normal  [] Dyspnea  [] Cheyne-Lucia  [] Kussmaul  [] Biots    AMBULATORY  [] Yes  [] No  [x] With Assistance    PEAK FLOW  Predicted:     Personal Best:            Patient Acuity 0 1 2 3 4 Score   Level of Consciousness (LOC) [x]  Alert & Oriented or Pt normal LOC []  Confused;follows directions []  Confused & uncooper-ative []  Obtunded []  Comatose 0   Respiratory Rate  (RR) []  Reg. rate & pattern. 12 - 20 bpm  []  Increased RR. Greater than 20 bpm   [x]  SOB w/ exertion or RR greater than 24 bpm []  Access- ory muscle use at rest. Abn.  resp. []  SOB at rest.   2   Bilateral Breath Sounds (BBS) []  Clear []  Diminish-ed bases  [x]  Diminish-ed t/o, or rales   []  Sporadic, scattered wheezes or rhonchi []  Persistentwheezes and, or absent BBS 2   Cough [x]  Strong, effective, & non-prod. []  Effective & prod. Less than 25 ml (2 TBSP) over past 24 hrs []  Ineffective & non-prod to less than 25 ML over past 24 hrs []  Ineffective and, or greater than 25 ml sputum prod. past 24 hrs. []  Nonspon- taneous; Requires suctioning 0   Pulmonary History  (PULM HX) []  No smoking and no chronic pulmonary history []  Former smoker. Quit over 12 mos. ago []  Current smoker or quit w/ in 12 mos []  Pulm. History and, or 20 pk/yr smoking hx [x]  Admitted w/ acute pulm. dx and, or has been admitted w/ pulm. dx 2 or more times over past 12 mos 4   Surgical History this Admit  (SURG HX) [x]  No surgery []  General surgery []  Lower abdominal []  Thoracic or upper abdominal   []  Thoracic w/ pulm. disease 0   Chest X-Ray (CXR)/CT Scan []  Clear or not applicable []  Not available []  Atelectasis or pleural effusions [x]  Localized infiltrate or pulm. edema []  Con-solidated Infiltrates, bilateral, or in more than 1 lobe 3   TOTAL ACUITY: 11       CARE PLAN    If Acuity Level is 2, 3, or 4

## 2024-07-11 NOTE — PROGRESS NOTES
Anguiano catheter placed. No urine return. Patient stated she has only urinated once since nephrostomy tube placement. Vijaya Reynolds NP, made aware.

## 2024-07-11 NOTE — FLOWSHEET NOTE
Sitting up in chair at bedside . Vitals checked and reassessed. Informed of new consults to pulmonary and infectious disease. No needs at present time. Call light within reach. Continue to monitor

## 2024-07-11 NOTE — PROGRESS NOTES
INTENSIVE CARE UNIT   APRN - Progress Note    Patient - Daja Espinosa  Date of Admission -  7/10/2024  2:06 PM  Date of Evaluation -  2024  Hospital Day - 1      SUBJECTIVE:     The Daja Espinosa is a 63 y.o. female who is seen for follow up in the ICU.  Per nursing report and notes, overnight events include: no significant events.  She resting in bed no distress. Continues to be on HF 45L/61% FIO2.  Patient reported since leaving [facility] she has not been urinating except for a trickle.  She stated she did give herself Dulcolax for constipation at 1 point and did have some urination.  She stated since that time she only has some drainage out of the urostomy tube.    ROS:   Constitutional: negative  for fevers, and negative for chills.  Respiratory: positive for shortness of breath, negative for cough, and negative for wheezing  Cardiovascular: negative for chest pain, and negative for palpitations  Gastrointestinal: negative for abdominal pain, negative for nausea,negative for vomiting, negative for diarrhea, and negative for constipation    All other systems were reviewed with the patient and are negative unless otherwise stated in HPI.      OBJECTIVE:     VITAL SIGNS:  Patient Vitals for the past 8 hrs:   BP Temp Temp src Pulse Resp SpO2   24 0537 -- -- -- 61 21 97 %   24 0500 128/77 -- -- 58 20 95 %   24 0400 137/64 97.3 °F (36.3 °C) Temporal 59 16 98 %   24 0300 134/83 -- -- 58 16 97 %   24 0200 127/78 -- -- 57 16 92 %   24 0107 -- -- -- -- -- 93 %   24 0104 -- -- -- 56 23 92 %   24 0100 109/73 -- -- 55 17 94 %   24 0046 -- -- -- 56 16 (!) 84 %   24 0000 116/73 -- -- 63 15 94 %   07/10/24 2347 -- 97 °F (36.1 °C) Temporal -- 16 --   07/10/24 2300 113/73 -- -- 54 16 92 %         Temp: 97.3 °F (36.3 °C)  Temp range:    Temp  Av.3 °F (36.3 °C)  Min: 97 °F (36.1 °C)  Max: 97.6 °F (36.4 °C)    BP: 128/77  BP Range:      Systolic (24hrs),

## 2024-07-11 NOTE — PROGRESS NOTES
Pt is sitting up in chair, family at bedside. Pt is a/o x4 and denies any pain or sob. HHF in place and tolerating well. Anguiano in place with no output noted. L flank neph tube in place and draining red clear fluid. Pt states that her feet are itching, bilateral feet are normal color and no skin issues noted. Pt states she is constipated. Informed that medications will be reviewed and np will be notified. Verbalized understanding. Call light is in reach. Called RT d/t oxyen at %86, at bedside and adjusting HHF and cannula at this time.

## 2024-07-11 NOTE — PROGRESS NOTES
Assessment completed at this time. Nephrostomy tube dressing changed and emptied. Urine with red output. Vijaya Reynolds NP, aware of color of urine. Patient stating she has shortness of breath with exertion and states her breathing feels better when she is leaning over and forward. Call light within reach.

## 2024-07-11 NOTE — PROGRESS NOTES
Comprehensive Nutrition Assessment    Type and Reason for Visit:  Initial    Nutrition Recommendations/Plan:   Encourage oral intakes  4 oz Ensure tid  Avoid added salt.      Malnutrition Assessment:  Malnutrition Status:  At risk for malnutrition (Comment) (07/11/24 6952)    Context:  Acute Illness     Findings of the 6 clinical characteristics of malnutrition:  Energy Intake:  Mild decrease in energy intake (Comment) (pta)  Weight Loss:  No significant weight loss     Body Fat Loss:  No significant body fat loss     Muscle Mass Loss:  No significant muscle mass loss    Fluid Accumulation:  Mild Extremities   Strength:  Not Performed    Nutrition Assessment:    Predicted suboptimal nutrient intakes r/t altered respiratory function, AEB SOB pta requiring increased home O2. Recent acute hospitalization for [facility] where she required acute dialysis with improved kidney function per bun/creat. Using one Ensure at home (uncertain of variety, but chocolate in flavor). Undergoing radiation for bladder CA without diarrhea but some constipation. On f/r of 1500 and encouraged moisture rich foods which may aid bowels. With her anasarca, would recommend lower intakes of sodium which she states she's been \"kind of watching\". Will resume Ensure 4 oz tid (chocolate).    Nutrition Related Findings:    trace BLE edema. active b/s. Wound Type: None       Current Nutrition Intake & Therapies:    Average Meal Intake: Unable to assess (no PO Records)  Average Supplements Intake: None Ordered  ADULT DIET; Regular; 1500 ml  ADULT ORAL NUTRITION SUPPLEMENT; Breakfast, Lunch, Dinner; Standard High Calorie/High Protein Oral Supplement    Anthropometric Measures:  Height: 157.5 cm (5' 2\")  Ideal Body Weight (IBW): 110 lbs (50 kg)    Admission Body Weight: 57.6 kg (127 lb)  Current Body Weight: 62.1 kg (136 lb 14.5 oz), 124.5 % IBW. Weight Source: Bed Scale  Current BMI (kg/m2): 25  Usual Body Weight: 56.7 kg (125 lb)  % Weight Change

## 2024-07-11 NOTE — PROGRESS NOTES
Pt's SpO2 dropped to 84% on heated high flow. Writer increased FiO2 from 57% to 68%, SpO2 then increased to 92%. Will continue to monitor.

## 2024-07-11 NOTE — VIRTUAL HEALTH
Lungs/pleura: Moderate right with small to moderate left pleural effusions similar to prior exam.  Passive areas of atelectasis in the lower lobes. Bilateral areas of consolidation ground-glass opacity seen on prior exams have significantly improved.  There is mild residual ground-glass attenuation in the right upper lobe with a few residual nodular opacities, the largest of which measures up to 6 mm in the right upper lobe (image 25).  There is diffuse bronchial wall thickening.  There is near complete right middle lobar atelectasis, but also somewhat improved from the prior exam.  The central airways are otherwise patent.  No pneumothorax. Upper Abdomen: Small volume ascites.  Small hiatal hernia. Soft Tissues/Bones: Subcutaneous edema.  Status post median sternotomy. Right chest port with tip terminating in the distal SVC.  There is a left internal jugular central venous catheter with tip terminating in the right atrium.  No acute osseous abnormality.     1. Interval improvement in multifocal pneumonia when compared to 06/16/2024. Mild residual areas of ground-glass attenuation persists predominately in the right upper lobe with a few subtle subcentimeter nodular opacities.  A short-term follow-up CT is recommended to ensure complete resolution. 2. Improving mediastinal adenopathy. 3. Persistent moderate right with small to moderate left pleural effusions. 4. Anasarca.     XR CHEST PORTABLE    Result Date: 7/10/2024  EXAMINATION: ONE XRAY VIEW OF THE CHEST 7/10/2024 3:07 pm COMPARISON: June 20, 2024 HISTORY: ORDERING SYSTEM PROVIDED HISTORY: Shortness of breath TECHNOLOGIST PROVIDED HISTORY: Shortness of breath FINDINGS: The cardiomediastinal silhouette is mildly enlarged, stable.  Right-sided port is stable in position. There is a new left-sided dialysis catheter with distal tip extending into the low right atrium. Mild vascular congestive changes with mild interstitial edema pattern.  Small to moderate  l  Improved right pulmonary consolidation   RML atelectasis   Hematuria as per primary  On Eliquis  Chronic anemia  Plan:   Will recommend right diagnostic and therapeutic thoracentesis .  Order for analysis placed   Diuresis / HD as per nephrology   Wean to keep saturation greater than 90%.  Continue Xopenex  Discussed with patient and nursing staff             ===  Total time spent on this encounter: Not billed by time    --JOSE DOW MD on 7/11/2024 at 12:29 PM    An electronic signature was used to authenticate this note.

## 2024-07-11 NOTE — FLOWSHEET NOTE
Awake, resting in bed. Vitals checked and assessment completed. Denies pain this morning. Continues on high flow oxygen. C/o SOB with activity. No needs at present time. Call light within reach. Continue to monitor

## 2024-07-11 NOTE — PROGRESS NOTES
Occupational Therapy  Facility/Department: [facility] ICU  Occupational Therapy Initial Assessment    Name: Daja Espinosa  : 1960  MRN: [id number]  Date of Service: 2024    Discharge Recommendations:  Continue to assess pending progress          Patient Diagnosis(es): The primary encounter diagnosis was Shortness of breath. Diagnoses of Pneumonia due to infectious organism, unspecified laterality, unspecified part of lung, Elevated brain natriuretic peptide (BNP) level, and Pleural effusion were also pertinent to this visit.  Past Medical History:  has a past medical history of Alcohol abuse, Arthritis, Bladder cancer (HCC), CAD (coronary artery disease), Cancer (HCC), Carotid artery occlusion, Carotid artery stenosis, Chronic back pain, History of chemotherapy, Hydronephrosis, Hyperlipidemia, Hypertension, Hypoglycemia, MI (myocardial infarction) (HCC), Peripheral vascular disease (HCC), Takayasu's arteritis (HCC), Tibial fracture, Umbilical hernia, Under care of team, Under care of team, Under care of team, Unspecified cerebral artery occlusion with cerebral infarction, and Wears partial dentures.  Past Surgical History:  has a past surgical history that includes Cardiac catheterization (2010); Cardiac surgery (); Vulva surgery (); vascular surgery; vascular surgery (, ); femoral bypass (, ); other surgical history (2016); Bladder surgery (2016); other surgical history; other surgical history; Colonoscopy (N/A, 2021); Upper gastrointestinal endoscopy (N/A, 2021); Colonoscopy (N/A, 2021); hernia repair; ventral hernia repair (N/A, 2022); Cystoscopy (N/A, 2024); Cystoscopy (Left, 2024); TUNNELED CENTRAL VENOUS CATHETER W/ SUBCUTANEOUS PORT (Right, 2024); IR PORT PLACEMENT < 5 YEARS (2024); Carotid angioplasty (Right); Cystocopy (Left, 2024); Anomalous venous return repair (N/A, 2024); Esophagogastroduodenoscopy  and taking off regular lower body clothing?: A Little  How much help is needed for bathing (which includes washing, rinsing, drying)?: A Little  How much help is needed for toileting (which includes using toilet, bedpan, or urinal)?: A Little  How much help is needed for putting on and taking off regular upper body clothing?: A Little  How much help is needed for taking care of personal grooming?: A Little  How much help for eating meals?: None  AM-[facility] Inpatient Daily Activity Raw Score: 19  AM-PAC Inpatient ADL T-Scale Score : 40.22  ADL Inpatient CMS 0-100% Score: 42.8  ADL Inpatient CMS G-Code Modifier : CK           Goals  Short Term Goals  Time Frame for Short Term Goals: 21 visits  Short Term Goal 1: Patient to stand for 3 minutes while maintaining O2 stats while performing functional activity of choice  Short Term Goal 2: Patient to be educated on d/c folder, AE/DME, home safety and EC/WS techniques to improve ADL tolerance and independence.  Short Term Goal 3: Patient to complete ADL routine c Mod I utilizing EC/WS techniques and adaptive strategies to maintain O2 stats as needed to ensure safe and indep return home       Therapy Time   Individual Concurrent Group Co-treatment   Time In 0913         Time Out 0928         Minutes 15                 Shelia Lawrence OTR/L

## 2024-07-11 NOTE — PLAN OF CARE
Problem: Discharge Planning  Goal: Discharge to home or other facility with appropriate resources  Flowsheets (Taken 7/11/2024 0439)  Discharge to home or other facility with appropriate resources:   Identify barriers to discharge with patient and caregiver   Identify discharge learning needs (meds, wound care, etc)     Problem: Safety - Adult  Goal: Free from fall injury  Flowsheets (Taken 7/11/2024 0439)  Free From Fall Injury: Instruct family/caregiver on patient safety

## 2024-07-12 ENCOUNTER — HOSPITAL ENCOUNTER (INPATIENT)
Age: 64
LOS: 6 days | Discharge: HOME | DRG: 139 | End: 2024-07-18
Attending: STUDENT IN AN ORGANIZED HEALTH CARE EDUCATION/TRAINING PROGRAM | Admitting: STUDENT IN AN ORGANIZED HEALTH CARE EDUCATION/TRAINING PROGRAM
Payer: COMMERCIAL

## 2024-07-12 ENCOUNTER — APPOINTMENT (OUTPATIENT)
Dept: ULTRASOUND IMAGING | Age: 64
DRG: 139 | End: 2024-07-12
Attending: STUDENT IN AN ORGANIZED HEALTH CARE EDUCATION/TRAINING PROGRAM
Payer: COMMERCIAL

## 2024-07-12 VITALS
HEIGHT: 62 IN | RESPIRATION RATE: 26 BRPM | TEMPERATURE: 97.8 F | HEART RATE: 57 BPM | SYSTOLIC BLOOD PRESSURE: 121 MMHG | WEIGHT: 134.92 LBS | DIASTOLIC BLOOD PRESSURE: 79 MMHG | OXYGEN SATURATION: 93 % | BODY MASS INDEX: 24.83 KG/M2

## 2024-07-12 PROBLEM — J96.20 ACUTE-ON-CHRONIC RESPIRATORY FAILURE (HCC): Status: RESOLVED | Noted: 2024-07-11 | Resolved: 2024-07-12

## 2024-07-12 PROBLEM — I50.82 BIVENTRICULAR CONGESTIVE HEART FAILURE (HCC): Status: ACTIVE | Noted: 2024-07-12

## 2024-07-12 PROBLEM — C67.9 MALIGNANT NEOPLASM OF URINARY BLADDER (HCC): Status: ACTIVE | Noted: 2024-07-12

## 2024-07-12 PROBLEM — J96.20 ACUTE-ON-CHRONIC RESPIRATORY FAILURE (HCC): Status: ACTIVE | Noted: 2024-07-12

## 2024-07-12 PROBLEM — J96.20 ACUTE ON CHRONIC RESPIRATORY FAILURE (HCC): Status: ACTIVE | Noted: 2024-07-12

## 2024-07-12 PROBLEM — J96.01 ACUTE HYPOXIC RESPIRATORY FAILURE (HCC): Status: ACTIVE | Noted: 2024-07-12

## 2024-07-12 LAB
ALBUMIN SERPL-MCNC: 3.4 G/DL (ref 3.5–5.2)
ALBUMIN/GLOB SERPL: 1.3 {RATIO} (ref 1–2.5)
ALP SERPL-CCNC: 91 U/L (ref 35–104)
ALT SERPL-CCNC: 14 U/L (ref 5–33)
ANION GAP SERPL CALCULATED.3IONS-SCNC: 9 MMOL/L (ref 9–17)
AST SERPL-CCNC: 17 U/L
BACTERIA URNS QL MICRO: ABNORMAL
BASOPHILS # BLD: <0.03 K/UL (ref 0–0.2)
BASOPHILS NFR BLD: 0 % (ref 0–2)
BILIRUB SERPL-MCNC: 0.6 MG/DL (ref 0.3–1.2)
BILIRUB UR QL STRIP: NEGATIVE
BUN SERPL-MCNC: 39 MG/DL (ref 8–23)
BUN/CREAT SERPL: 17 (ref 9–20)
CALCIUM SERPL-MCNC: 8.6 MG/DL (ref 8.6–10.4)
CASTS #/AREA URNS LPF: ABNORMAL /LPF (ref 0–8)
CHLORIDE SERPL-SCNC: 102 MMOL/L (ref 98–107)
CLARITY UR: ABNORMAL
CO2 SERPL-SCNC: 21 MMOL/L (ref 20–31)
COLOR UR: YELLOW
CREAT SERPL-MCNC: 2.3 MG/DL (ref 0.5–0.9)
CRP SERPL HS-MCNC: 5.9 MG/L (ref 0–5)
EOSINOPHIL # BLD: 0.21 K/UL (ref 0–0.44)
EOSINOPHILS RELATIVE PERCENT: 4 % (ref 1–4)
EPI CELLS #/AREA URNS HPF: ABNORMAL /HPF (ref 0–5)
ERYTHROCYTE [DISTWIDTH] IN BLOOD BY AUTOMATED COUNT: 15.7 % (ref 11.8–14.4)
GFR, ESTIMATED: 23 ML/MIN/1.73M2
GLUCOSE SERPL-MCNC: 104 MG/DL (ref 70–99)
GLUCOSE UR STRIP-MCNC: NEGATIVE MG/DL
HCT VFR BLD AUTO: 23.3 % (ref 36.3–47.1)
HGB BLD-MCNC: 7.9 G/DL (ref 11.9–15.1)
HGB UR QL STRIP.AUTO: ABNORMAL
IMM GRANULOCYTES # BLD AUTO: <0.03 K/UL (ref 0–0.3)
IMM GRANULOCYTES NFR BLD: 0 %
KETONES UR STRIP-MCNC: NEGATIVE MG/DL
LEUKOCYTE ESTERASE UR QL STRIP: ABNORMAL
LYMPHOCYTES NFR BLD: 0.61 K/UL (ref 1.1–3.7)
LYMPHOCYTES RELATIVE PERCENT: 10 % (ref 24–43)
MCH RBC QN AUTO: 32.5 PG (ref 25.2–33.5)
MCHC RBC AUTO-ENTMCNC: 33.9 G/DL (ref 28.4–34.8)
MCV RBC AUTO: 95.9 FL (ref 82.6–102.9)
MONOCYTES NFR BLD: 0.56 K/UL (ref 0.1–1.2)
MONOCYTES NFR BLD: 9 % (ref 3–12)
NEUTROPHILS NFR BLD: 77 % (ref 36–65)
NEUTS SEG NFR BLD: 4.64 K/UL (ref 1.5–8.1)
NITRITE UR QL STRIP: NEGATIVE
NRBC BLD-RTO: 0 PER 100 WBC
OSMOLALITY UR: 384 MOSM/KG (ref 80–1300)
PH UR STRIP: 6 [PH] (ref 5–8)
PLATELET # BLD AUTO: 360 K/UL (ref 138–453)
PMV BLD AUTO: 9.3 FL (ref 8.1–13.5)
POTASSIUM SERPL-SCNC: 4.5 MMOL/L (ref 3.7–5.3)
PROCALCITONIN SERPL-MCNC: 0.12 NG/ML (ref 0–0.09)
PROT SERPL-MCNC: 6.1 G/DL (ref 6.4–8.3)
PROT UR STRIP-MCNC: ABNORMAL MG/DL
RBC # BLD AUTO: 2.43 M/UL (ref 3.95–5.11)
RBC #/AREA URNS HPF: ABNORMAL /HPF (ref 0–4)
SODIUM SERPL-SCNC: 132 MMOL/L (ref 135–144)
SODIUM UR-SCNC: <20 MMOL/L
SP GR UR STRIP: 1.01 (ref 1–1.03)
UROBILINOGEN UR STRIP-ACNC: NORMAL EU/DL (ref 0–1)
WBC #/AREA URNS HPF: ABNORMAL /HPF (ref 0–5)
WBC OTHER # BLD: 6.1 K/UL (ref 3.5–11.3)
YEAST URNS QL MICRO: ABNORMAL

## 2024-07-12 PROCEDURE — 88305 TISSUE EXAM BY PATHOLOGIST: CPT

## 2024-07-12 PROCEDURE — 76770 US EXAM ABDO BACK WALL COMP: CPT

## 2024-07-12 PROCEDURE — 99223 1ST HOSP IP/OBS HIGH 75: CPT | Performed by: PHYSICIAN ASSISTANT

## 2024-07-12 PROCEDURE — 51702 INSERT TEMP BLADDER CATH: CPT

## 2024-07-12 PROCEDURE — 2700000000 HC OXYGEN THERAPY PER DAY

## 2024-07-12 PROCEDURE — 99223 1ST HOSP IP/OBS HIGH 75: CPT | Performed by: INTERNAL MEDICINE

## 2024-07-12 PROCEDURE — 6360000002 HC RX W HCPCS: Performed by: INTERNAL MEDICINE

## 2024-07-12 PROCEDURE — 6370000000 HC RX 637 (ALT 250 FOR IP)

## 2024-07-12 PROCEDURE — 86140 C-REACTIVE PROTEIN: CPT

## 2024-07-12 PROCEDURE — 2580000003 HC RX 258: Performed by: PHYSICIAN ASSISTANT

## 2024-07-12 PROCEDURE — 94664 DEMO&/EVAL PT USE INHALER: CPT

## 2024-07-12 PROCEDURE — 84300 ASSAY OF URINE SODIUM: CPT

## 2024-07-12 PROCEDURE — 6360000002 HC RX W HCPCS

## 2024-07-12 PROCEDURE — 81001 URINALYSIS AUTO W/SCOPE: CPT

## 2024-07-12 PROCEDURE — 6370000000 HC RX 637 (ALT 250 FOR IP): Performed by: STUDENT IN AN ORGANIZED HEALTH CARE EDUCATION/TRAINING PROGRAM

## 2024-07-12 PROCEDURE — 83935 ASSAY OF URINE OSMOLALITY: CPT

## 2024-07-12 PROCEDURE — 87075 CULTR BACTERIA EXCEPT BLOOD: CPT

## 2024-07-12 PROCEDURE — 2060000000 HC ICU INTERMEDIATE R&B

## 2024-07-12 PROCEDURE — 6360000002 HC RX W HCPCS: Performed by: STUDENT IN AN ORGANIZED HEALTH CARE EDUCATION/TRAINING PROGRAM

## 2024-07-12 PROCEDURE — 94660 CPAP INITIATION&MGMT: CPT

## 2024-07-12 PROCEDURE — 2580000003 HC RX 258: Performed by: STUDENT IN AN ORGANIZED HEALTH CARE EDUCATION/TRAINING PROGRAM

## 2024-07-12 PROCEDURE — 85025 COMPLETE CBC W/AUTO DIFF WBC: CPT

## 2024-07-12 PROCEDURE — 97530 THERAPEUTIC ACTIVITIES: CPT

## 2024-07-12 PROCEDURE — 94669 MECHANICAL CHEST WALL OSCILL: CPT

## 2024-07-12 PROCEDURE — 88112 CYTOPATH CELL ENHANCE TECH: CPT

## 2024-07-12 PROCEDURE — 87205 SMEAR GRAM STAIN: CPT

## 2024-07-12 PROCEDURE — 80053 COMPREHEN METABOLIC PANEL: CPT

## 2024-07-12 PROCEDURE — 87070 CULTURE OTHR SPECIMN AEROBIC: CPT

## 2024-07-12 PROCEDURE — 97535 SELF CARE MNGMENT TRAINING: CPT

## 2024-07-12 PROCEDURE — 36415 COLL VENOUS BLD VENIPUNCTURE: CPT

## 2024-07-12 PROCEDURE — 5A09457 ASSISTANCE WITH RESPIRATORY VENTILATION, 24-96 CONSECUTIVE HOURS, CONTINUOUS POSITIVE AIRWAY PRESSURE: ICD-10-PCS | Performed by: INTERNAL MEDICINE

## 2024-07-12 PROCEDURE — 6370000000 HC RX 637 (ALT 250 FOR IP): Performed by: PHYSICIAN ASSISTANT

## 2024-07-12 PROCEDURE — 84145 PROCALCITONIN (PCT): CPT

## 2024-07-12 PROCEDURE — 94640 AIRWAY INHALATION TREATMENT: CPT

## 2024-07-12 PROCEDURE — 6370000000 HC RX 637 (ALT 250 FOR IP): Performed by: EMERGENCY MEDICINE

## 2024-07-12 PROCEDURE — 6360000002 HC RX W HCPCS: Performed by: NURSE PRACTITIONER

## 2024-07-12 PROCEDURE — 94761 N-INVAS EAR/PLS OXIMETRY MLT: CPT

## 2024-07-12 PROCEDURE — 99232 SBSQ HOSP IP/OBS MODERATE 35: CPT | Performed by: INTERNAL MEDICINE

## 2024-07-12 RX ORDER — MAGNESIUM SULFATE IN WATER 40 MG/ML
2000 INJECTION, SOLUTION INTRAVENOUS PRN
Status: DISCONTINUED | OUTPATIENT
Start: 2024-07-12 | End: 2024-07-15

## 2024-07-12 RX ORDER — SODIUM CHLORIDE 9 MG/ML
INJECTION, SOLUTION INTRAVENOUS PRN
Status: DISCONTINUED | OUTPATIENT
Start: 2024-07-12 | End: 2024-07-18 | Stop reason: HOSPADM

## 2024-07-12 RX ORDER — PANTOPRAZOLE SODIUM 40 MG/1
40 TABLET, DELAYED RELEASE ORAL
Status: DISCONTINUED | OUTPATIENT
Start: 2024-07-13 | End: 2024-07-18 | Stop reason: HOSPADM

## 2024-07-12 RX ORDER — AMLODIPINE BESYLATE 10 MG/1
10 TABLET ORAL DAILY
Status: DISCONTINUED | OUTPATIENT
Start: 2024-07-13 | End: 2024-07-18 | Stop reason: HOSPADM

## 2024-07-12 RX ORDER — ONDANSETRON 4 MG/1
4 TABLET, ORALLY DISINTEGRATING ORAL EVERY 8 HOURS PRN
Status: DISCONTINUED | OUTPATIENT
Start: 2024-07-12 | End: 2024-07-18 | Stop reason: HOSPADM

## 2024-07-12 RX ORDER — SODIUM CHLORIDE FOR INHALATION 0.9 %
VIAL, NEBULIZER (ML) INHALATION
Status: COMPLETED
Start: 2024-07-12 | End: 2024-07-12

## 2024-07-12 RX ORDER — HYDRALAZINE HYDROCHLORIDE 50 MG/1
25 TABLET, FILM COATED ORAL 2 TIMES DAILY
Status: DISCONTINUED | OUTPATIENT
Start: 2024-07-13 | End: 2024-07-18 | Stop reason: HOSPADM

## 2024-07-12 RX ORDER — LEVALBUTEROL INHALATION SOLUTION 1.25 MG/3ML
1.25 SOLUTION RESPIRATORY (INHALATION) 4 TIMES DAILY
Status: DISCONTINUED | OUTPATIENT
Start: 2024-07-13 | End: 2024-07-15

## 2024-07-12 RX ORDER — SODIUM CHLORIDE 0.9 % (FLUSH) 0.9 %
5-40 SYRINGE (ML) INJECTION PRN
Status: DISCONTINUED | OUTPATIENT
Start: 2024-07-12 | End: 2024-07-18 | Stop reason: HOSPADM

## 2024-07-12 RX ORDER — POTASSIUM CHLORIDE 7.45 MG/ML
10 INJECTION INTRAVENOUS PRN
Status: DISCONTINUED | OUTPATIENT
Start: 2024-07-12 | End: 2024-07-15

## 2024-07-12 RX ORDER — SODIUM CHLORIDE FOR INHALATION 0.9 %
VIAL, NEBULIZER (ML) INHALATION
Status: DISCONTINUED
Start: 2024-07-12 | End: 2024-07-12 | Stop reason: HOSPADM

## 2024-07-12 RX ORDER — ONDANSETRON 2 MG/ML
4 INJECTION INTRAMUSCULAR; INTRAVENOUS EVERY 6 HOURS PRN
Status: DISCONTINUED | OUTPATIENT
Start: 2024-07-12 | End: 2024-07-12 | Stop reason: HOSPADM

## 2024-07-12 RX ORDER — ATORVASTATIN CALCIUM 80 MG/1
80 TABLET, FILM COATED ORAL NIGHTLY
Status: DISCONTINUED | OUTPATIENT
Start: 2024-07-12 | End: 2024-07-18 | Stop reason: HOSPADM

## 2024-07-12 RX ORDER — SODIUM CHLORIDE 0.9 % (FLUSH) 0.9 %
5-40 SYRINGE (ML) INJECTION EVERY 12 HOURS SCHEDULED
Status: DISCONTINUED | OUTPATIENT
Start: 2024-07-12 | End: 2024-07-18 | Stop reason: HOSPADM

## 2024-07-12 RX ORDER — ONDANSETRON 2 MG/ML
4 INJECTION INTRAMUSCULAR; INTRAVENOUS EVERY 6 HOURS PRN
Status: DISCONTINUED | OUTPATIENT
Start: 2024-07-12 | End: 2024-07-18 | Stop reason: HOSPADM

## 2024-07-12 RX ORDER — ACETAMINOPHEN 325 MG/1
650 TABLET ORAL EVERY 6 HOURS PRN
Status: DISCONTINUED | OUTPATIENT
Start: 2024-07-12 | End: 2024-07-18 | Stop reason: HOSPADM

## 2024-07-12 RX ORDER — CARVEDILOL 25 MG/1
25 TABLET ORAL 2 TIMES DAILY WITH MEALS
Status: DISCONTINUED | OUTPATIENT
Start: 2024-07-13 | End: 2024-07-18 | Stop reason: HOSPADM

## 2024-07-12 RX ORDER — BUMETANIDE 0.25 MG/ML
1 INJECTION INTRAMUSCULAR; INTRAVENOUS ONCE
Status: COMPLETED | OUTPATIENT
Start: 2024-07-12 | End: 2024-07-12

## 2024-07-12 RX ORDER — POTASSIUM CHLORIDE 20 MEQ/1
40 TABLET, EXTENDED RELEASE ORAL PRN
Status: DISCONTINUED | OUTPATIENT
Start: 2024-07-12 | End: 2024-07-15

## 2024-07-12 RX ORDER — POLYETHYLENE GLYCOL 3350 17 G/17G
17 POWDER, FOR SOLUTION ORAL DAILY PRN
Status: DISCONTINUED | OUTPATIENT
Start: 2024-07-12 | End: 2024-07-18 | Stop reason: HOSPADM

## 2024-07-12 RX ORDER — PSEUDOEPHEDRINE HCL 30 MG
100 TABLET ORAL 2 TIMES DAILY PRN
Qty: 60 CAPSULE | Refills: 3 | Status: ON HOLD | OUTPATIENT
Start: 2024-07-12

## 2024-07-12 RX ORDER — FLECAINIDE ACETATE 50 MG/1
50 TABLET ORAL EVERY 12 HOURS SCHEDULED
Status: DISCONTINUED | OUTPATIENT
Start: 2024-07-12 | End: 2024-07-18 | Stop reason: HOSPADM

## 2024-07-12 RX ORDER — SENNOSIDES A AND B 8.6 MG/1
1 TABLET, FILM COATED ORAL NIGHTLY PRN
Qty: 30 TABLET | Refills: 3 | Status: ON HOLD | OUTPATIENT
Start: 2024-07-12 | End: 2024-11-09

## 2024-07-12 RX ORDER — ACETAMINOPHEN 650 MG/1
650 SUPPOSITORY RECTAL EVERY 6 HOURS PRN
Status: DISCONTINUED | OUTPATIENT
Start: 2024-07-12 | End: 2024-07-18 | Stop reason: HOSPADM

## 2024-07-12 RX ADMIN — FLECAINIDE ACETATE 50 MG: 50 TABLET ORAL at 07:55

## 2024-07-12 RX ADMIN — LEVALBUTEROL 1.25 MG: 1.25 SOLUTION, CONCENTRATE RESPIRATORY (INHALATION) at 10:28

## 2024-07-12 RX ADMIN — SODIUM CHLORIDE, PRESERVATIVE FREE 10 ML: 5 INJECTION INTRAVENOUS at 22:20

## 2024-07-12 RX ADMIN — LEVALBUTEROL 1.25 MG: 1.25 SOLUTION, CONCENTRATE RESPIRATORY (INHALATION) at 04:54

## 2024-07-12 RX ADMIN — Medication: at 05:48

## 2024-07-12 RX ADMIN — FLECAINIDE ACETATE 50 MG: 50 TABLET ORAL at 22:20

## 2024-07-12 RX ADMIN — Medication 3 ML: at 10:28

## 2024-07-12 RX ADMIN — CARVEDILOL 25 MG: 25 TABLET, FILM COATED ORAL at 07:55

## 2024-07-12 RX ADMIN — ONDANSETRON 4 MG: 2 INJECTION INTRAMUSCULAR; INTRAVENOUS at 13:32

## 2024-07-12 RX ADMIN — BUMETANIDE 1 MG: 0.25 INJECTION INTRAMUSCULAR; INTRAVENOUS at 10:37

## 2024-07-12 RX ADMIN — ISODIUM CHLORIDE 3 ML: 0.03 SOLUTION RESPIRATORY (INHALATION) at 04:54

## 2024-07-12 RX ADMIN — LEVALBUTEROL 1.25 MG: 1.25 SOLUTION, CONCENTRATE RESPIRATORY (INHALATION) at 16:04

## 2024-07-12 RX ADMIN — SODIUM CHLORIDE, PRESERVATIVE FREE 10 ML: 5 INJECTION INTRAVENOUS at 10:37

## 2024-07-12 RX ADMIN — SODIUM CHLORIDE, PRESERVATIVE FREE 10 ML: 5 INJECTION INTRAVENOUS at 07:55

## 2024-07-12 RX ADMIN — HYDRALAZINE HYDROCHLORIDE 25 MG: 25 TABLET, FILM COATED ORAL at 07:55

## 2024-07-12 RX ADMIN — ATORVASTATIN CALCIUM 80 MG: 80 TABLET, FILM COATED ORAL at 22:21

## 2024-07-12 RX ADMIN — Medication 3 ML: at 16:04

## 2024-07-12 RX ADMIN — Medication 2000 MG: at 22:20

## 2024-07-12 RX ADMIN — SODIUM CHLORIDE, PRESERVATIVE FREE 10 ML: 5 INJECTION INTRAVENOUS at 13:32

## 2024-07-12 RX ADMIN — PANTOPRAZOLE SODIUM 40 MG: 40 TABLET, DELAYED RELEASE ORAL at 07:55

## 2024-07-12 RX ADMIN — AMLODIPINE BESYLATE 10 MG: 10 TABLET ORAL at 07:55

## 2024-07-12 RX ADMIN — MEROPENEM 1000 MG: 1 INJECTION INTRAVENOUS at 11:53

## 2024-07-12 NOTE — PROGRESS NOTES
RESPIRATORY ASSESSMENT PROTOCOL                                                                                              Patient Name: Daja Espinosa Room#: I308/I308-01 : 1960     Admitting diagnosis: Shortness of breath [R06.02]  Pleural effusion [J90]  Hypoxia [R09.02]  Elevated brain natriuretic peptide (BNP) level [R79.89]  Pneumonia due to infectious organism, unspecified laterality, unspecified part of lung [J18.9]       Medical History:   Past Medical History:   Diagnosis Date    Alcohol abuse 2017    Arthritis     Bladder cancer (HCC)     CAD (coronary artery disease)     Cancer (HCC)     vulvar-    Carotid artery occlusion     [facility] where she had a brachiocephalic aorta bypass.    Carotid artery stenosis     Chronic back pain     History of chemotherapy     Hydronephrosis 02/15/2024    Hyperlipidemia     Hypertension     Hypoglycemia     MI (myocardial infarction) (HCC)     Peripheral vascular disease (HCC)     Takayasu's arteritis (HCC)     Tibial fracture     right    Umbilical hernia     Under care of team 2024    Cardiologist: Terrell Cortez MD, [place], last visit 2024    Under care of team 2024    Oncology: Familia Shepard MD, [place], last visit 3/11/2024    Under care of team 2024    PCP: Sohail Garcia MD, [place], last visit 3/2024    Unspecified cerebral artery occlusion with cerebral infarction     Wears partial dentures     Dentures/top, partial/bottom       PATIENT ASSESSMENT    LABORATORY DATA  Hematology:   Lab Results   Component Value Date/Time    WBC 6.1 2024 05:19 AM    RBC 2.43 2024 05:19 AM    RBC 3.36 2012 03:00 PM    HGB 7.9 2024 05:19 AM    HCT 23.3 2024 05:19 AM     2024 05:19 AM     2012 03:00 PM     Chemistry:    Lab Results   Component Value Date/Time    NBEA 2.8 2024 06:44 AM       VITALS  Pulse: 60   Respirations: 18  BP: (!) 124/93  SpO2: 97 %

## 2024-07-12 NOTE — PROGRESS NOTES
[facility]   Nephrology progress note  7/12/2024, 9:13 AM      Pt Name:    Daja Espinosa  MRN:     [id number]     YOB: 1960  Admit Date:    7/10/2024  2:06 PM    TELEHEALTH EVALUATION -- Audio/Visual (During COVID-19 public health emergency)     Telehealth service was provided with the patient at his room [address] [facility] and myself the physician in my home santosh and RN Maurice has initiated the visit.     Pursuant to the emergency declaration under the [place] Act and the National Emergencies Act, 1135 waiver authority and the Coronavirus Preparedness and Response Supplemental Appropriations Act, this Virtual  Visit was conducted, with patient's consent, to reduce the patient's risk of exposure to COVID-19 and provide continuity of care for an established patient.     Services were provided through a video synchronous discussion virtually to substitute for in-person clinic visit.    Chief Complaint: Nephrology following for acute kidney injury.    Subjective:  Patient seen and examined  No chest pain still some shortness of breath on high flow oxygen not much improvement  Making some urine but not a whole lot    Objective:  24HR INTAKE/OUTPUT:    Intake/Output Summary (Last 24 hours) at 7/12/2024 0913  Last data filed at 7/12/2024 0832  Gross per 24 hour   Intake 1120 ml   Output 475 ml   Net 645 ml        Admission weight: 57.6 kg (127 lb)  Wt Readings from Last 3 Encounters:   07/12/24 61.2 kg (134 lb 14.7 oz)   07/02/24 57.6 kg (127 lb)   07/01/24 56.2 kg (124 lb)        Vitals :   Vitals:    07/12/24 0600 07/12/24 0700 07/12/24 0800 07/12/24 0900   BP: 127/81 138/62 (!) 158/144 123/73   Pulse: 58 58 61 59   Resp: 16 15 20 23   Temp:  97.9 °F (36.6 °C)     TempSrc:  Temporal     SpO2: 94% 97% 90% 91%   Weight:       Height:           Physical examination  General Appearance: Ill-appearing mild respiratory distress  Neck: No accessory muscle use  Lungs:  Breath sounds: Somewhat  evaluated by primary team currently on high flow oxygen  Meds reviewed and discussed with patient and nursing staff    Patient in process of getting transferred to [place].    Valente Thurman MD  [facility]    This report has been created using voice recognition software. It may contain minor errors which are inherent in voice recognition technology

## 2024-07-12 NOTE — PROGRESS NOTES
Dr. Ruiz notified of constipation and blood in nephrostomy collection device. New orders received.

## 2024-07-12 NOTE — DISCHARGE SUMMARY
Discharge Summary    Daja Espinosa  :  1960  MRN:  [id number]    Admit date:  7/10/2024      Discharge date: 2024     Admitting Physician:  Star Ruiz MD    Discharge Diagnoses:    Principal Problem:    Acute on chronic respiratory failure with hypoxia (HCC)  Active Problems:    Urothelial carcinoma of bladder (HCC)    Acute kidney injury superimposed on CKD (HCC)    Paroxysmal atrial fibrillation (HCC)    ATN (acute tubular necrosis) (HCC)    Bilateral lower extremity edema  Resolved Problems:    * No resolved hospital problems. *      Hospital Course:   Daja Espinosa is a 63 y.o. female admitted with acute on chronic respiratory failure with hypoxia.  She presented with complaints of shortness of breath.  Patient at baseline wears 2-1/2-3 L of oxygen routinely following recent hospitalization at [facility].  She reported that she had increase her oxygen to 4 L and still felt short of breath.  She reported that her arms, legs and abdomens have been more swollen than normal.  She denied chest pain or pressure.  She denied cough or wheezing.  SpO2 initially upon arrival to the ER was 72% on 6 L per nasal cannula.  She recently was hospitalized and discharged from [facility] following an ICU admission for pneumonia and subsequently went into atrial fibrillation with RVR with worsening respiratory failure.  At that time patient underwent thoracentesis as well as hemodialysis secondary to ischemic ATN from sepsis.  She did have a left nephrostomy tube placed which is still present.  During this hospitalization patient did have an episode of hematuria coming from that area and Eliquis was held and subsequently has cleared up.  Patient reported she had a Anguiano catheter and was removed prior to discharge and only has been trickling urine since that discharge.  Patient's creatinine had been slowly improving and no longer was on dialysis.  Patient also is currently undergoing radiation for bladder

## 2024-07-12 NOTE — FLOWSHEET NOTE
Not able to have a BM on the commode. Dressing to nephrology drain changed with 4x4s and paper tape. To chair at bedside.

## 2024-07-12 NOTE — PROGRESS NOTES
Occupational Therapy  Facility/Department: [facility] ICU  Daily Treatment Note  NAME: Daja Espinosa  : 1960  MRN: [id number]    Date of Service: 2024    Discharge Recommendations:  Continue to assess pending progress         Patient Diagnosis(es): The primary encounter diagnosis was Shortness of breath. Diagnoses of Pneumonia due to infectious organism, unspecified laterality, unspecified part of lung, Elevated brain natriuretic peptide (BNP) level, and Pleural effusion were also pertinent to this visit.     Assessment    Activity Tolerance: Patient tolerated treatment well;Patient limited by fatigue;Patient limited by endurance  Discharge Recommendations: Continue to assess pending progress      Plan   Occupational Therapy Plan  Times Per Day: Once a day  Days Per Week: 7 Days  Current Treatment Recommendations: Strengthening;Balance training;Functional mobility training;Endurance training;Equipment evaluation, education, & procurement;Patient/Caregiver education & training;Safety education & training;Self-Care / ADL     Restrictions   General Fall Risk    Subjective   Subjective  Subjective: Patient sitting on BSC upon entry, agreeable to sitting in chair.  Pain: Denying Pain, but states feeling very SOB. Nursing made aware that pt's SPO2 levels dropped to 72% while seated on BSC. Pt on High Flow O2  Orientation  Overall Orientation Status: Within Functional Limits  Orientation Level: Oriented X4  Pain: denies           Objective    Vitals     Bed Mobility Training  Bed Mobility Training: No  Transfer Training  Transfer Training: Yes  Overall Level of Assistance: Minimum assistance;Assist X2;Contact-guard assistance  Interventions: Verbal cues;Safety awareness training  Sit to Stand: Minimum assistance;Assist X2;Contact-guard assistance  Stand to Sit: Minimum assistance;Assist X2;Contact-guard assistance  Stand Pivot Transfers: Minimum assistance;Assist X2;Additional time  Toilet Transfer: Minimum

## 2024-07-12 NOTE — FLOWSHEET NOTE
Resting in bed with eyes closed. Awake for vitals and assessment. Denies pain this morning. States breathing is no change from yesterday. Continue to wait for a bed available at [facility] . Call light within reach. Continue to monitor

## 2024-07-12 NOTE — H&P
[facility]  Office: [phone]  Deven Louis DO, Rashid Martinez DO, Koko House DO, Jc Wheat DO, Bahman Silva MD, Radha Joseph MD, Volodymyr Abad MD, Karyn Purvis MD,  Siva Helton MD, Jigna Emery MD, Mayur Tinajero MD,  Danni Lamas DO, Gayle Guzmán MD, Micha Sosa MD, Lloyd Louis DO, No Lutz MD,  Ganesh Lee DO, Brittani Francisco MD, Sherrell Hickey MD, Kayla Roberson MD, Macarena Martin MD,  Gerardo Pate MD, Daisy Stephenson MD, Kelly Connor MD, Clau Calderon MD, Aurelio Pérez MD, Eliceo Milligan MD, Talib Rodriguez DO, Yusef Lopez DO, Liban Williamson MD,  Franky Trinh MD, Shirley Waterhouse, CNP,  Savanah Diez, CNP, Arias Lares, CNP,  Brenda Daniels, DNP, Phoebe Morris, CNP, Morelia Howard, CNP, Isela Montaño CNP, Meaghan Rogers, CNP, Neris Del Toro, CNP, Krystal Morrow, PA-C, Ely Guevara PA-C, Aviva Hartman, CNP, Marj Figueroa, CNS, Keri Clayton, CNP, Anahi Carnes, CNP, Tracy Schwab, CNP         [facility]   IN-PATIENT SERVICE   [facility]    HISTORY AND PHYSICAL EXAMINATION            Date:   7/12/2024  Patient name:  Daja Espinosa  Date of admission:  7/12/2024  6:59 PM  MRN:   [id number]  Account:  [id number]  YOB: 1960  PCP:    Sohail Garcia MD  Room:   0104/0104-01  Code Status:    Prior    Chief Complaint:     Shortness of breath    History Obtained From:     patient, electronic medical record    History of Present Illness:     Daja Espinosa is a 63 y.o. Non- / non  female who presents with shortness of breath and is admitted to the hospital for the management of Acute on chronic respiratory failure (HCC).  She has a history significant for bladder cancer, atrial fibrillation, hypertension, hyperlipidemia, CAD, and CVA on Eliquis.    Patient was admitted to [facility] 6/14/2024 for shortness of breath. She was initially started on Zosyn for concern for HAP. She

## 2024-07-12 NOTE — PROGRESS NOTES
Spoke with Fany Merritt and states she called [facility] for an update and they are d/c dependant.    [Negative] : Heme/Lymph [de-identified] : postnasal drip

## 2024-07-12 NOTE — FLOWSHEET NOTE
Attempted to call report to nurse at [facility]. Nurse unable to take report. Will call back.

## 2024-07-12 NOTE — PROGRESS NOTES
[facility]  Inpatient/Observation/Outpatient Rehabilitation    Date: 2024  Patient Name: aDja Espinosa       [x] Inpatient Acute/Observation       []  Outpatient  : 1960     Plan of Care/Recert ends    [] Pt no showed for scheduled appointment    [] As a reminder, pt was contacted/attempted contact via phone of upcoming appointments.    [x] Pt refused/declined therapy at this time due to:  Spoke with RN and stated that pt. is to transfer to [facility] and waiting on bed. Pt. resting SpO2 ranged from 89%-91%. Pt. stating that she is tired and very SOB this morning and requested to come back later this afternoon. RN made aware.         [] Pt cancelled due to:  [] No Reason Given   [] Sick/ill   [] Other:    [] Evaluation held by RN/Provider due to:    [] High Heart Rate   [] High Blood Pressure   [] Orthopedic Consult   [] Hgb < 7   [] Other:    [] Pt does not require skilled services due to:      Therapist/Assistant will attempt to see this patient, at our earliest opportunity.       Nancy Harmon, PTA Date: 2024

## 2024-07-12 NOTE — PLAN OF CARE
Informed by ICU staff pt bumped to 830 PM to transport another pt. Writer called to inform transfer center pt's O2 demand increasing HHF at 50 L. Transfer center stated will pick ICU pt first around [phone].

## 2024-07-12 NOTE — PROGRESS NOTES
Pt uses call light, HHF out of nostril, oxygen at %83 and pt is sob and using accessory muscles. HHF placed and pt recovers quickly. Cannula readjusted and pt states it feels more secure. VS and assessment complete. Bed alarm on and call light in reach.

## 2024-07-12 NOTE — PROGRESS NOTES
Objective   Bed Mobility Training  Bed Mobility Training: No  Transfer Training  Transfer Training: Yes  Overall Level of Assistance: Minimum assistance;Assist X2;Contact-guard assistance  Interventions: Verbal cues;Safety awareness training  Sit to Stand: Minimum assistance;Assist X2;Contact-guard assistance  Stand to Sit: Minimum assistance;Assist X2;Contact-guard assistance  Stand Pivot Transfers: Minimum assistance;Assist X2;Additional time  Toilet Transfer: Minimum assistance;Assist X2;Contact-guard assistance  Gait  Gait Training: No  Safety Devices  Type of Devices: All fall risk precautions in place;Call light within reach;Chair alarm in place;Left in chair;Nurse notified       Goals  Short Term Goals  Time Frame for Short Term Goals: 10 days  Short Term Goal 1: Patient will ambulate 50' with supervision, without LOB  Short Term Goal 2: Patient will perform transfers and bed mobility with MI  Short Term Goal 3: Patient will tolerate 20-30 minutes of therex/act to improve endurance for ADLs.  Patient Goals   Patient Goals : Feel better    Education  Patient Education  Education Given To: Patient  Education Provided: Role of Therapy;Plan of Care  Education Method: Verbal  Barriers to Learning: None  Education Outcome: Verbalized understanding    Therapy Time   Individual Concurrent Group Co-treatment   Time In 1338         Time Out 1353         Minutes 15           Nancy Harmon, [facility]

## 2024-07-12 NOTE — FLOWSHEET NOTE
Sitting up in recliner. Patient reassessed. No change from earlier . No needs at present time. Continue to monitor

## 2024-07-12 NOTE — FLOWSHEET NOTE
[facility] here to transport patient. Report given. Patient on stretcher. Switched from High Flow oxygen to BiPAP per orders. Belongings given to patient. Transferred to [facility] per [facility].

## 2024-07-12 NOTE — FLOWSHEET NOTE
C/o nausea. Had a small amount of emesis. States she thinks she ate too much lunch. Zofran IVP given. On BSC.

## 2024-07-12 NOTE — PROGRESS NOTES
Physician Progress Note      PATIENT:               OTILIA KO  [facility] #:                  [id number]  :                       1960  ADMIT DATE:       7/10/2024 2:06 PM  DISCH DATE:  RESPONDING  PROVIDER #:        Star Faulkner MD          QUERY TEXT:    Pt admitted with acute on chronic hypoxic respiratory failure. Pt noted to   have bilateral pleural effusion, greater on the right. If possible, please   document in progress notes and discharge summary if you are evaluating and/or   treating any of the following:    The medical record reflects the following:  Risk Factors: bilateral pleural effusions  Clinical Indicators: presented with acute on chronic hypoxic respiratory   failure, imaging shows bilateral pleural effusion, left greater than right and   mild vascular congestive changes with mild interstitial edema; dyspnea at   rest and with exertion; ECHO from 2024 shows normal left ventricular   systolic function with EF 60%; BNP 9,339  Treatment: Labs, imaging, monitoring, IV Lasix x1 dose  Options provided:  -- Acute diastolic CHF  -- Acute CHF has been ruled out, pleural effusions only.  -- Other - I will add my own diagnosis  -- Disagree - Not applicable / Not valid  -- Disagree - Clinically unable to determine / Unknown  -- Refer to Clinical Documentation Reviewer    PROVIDER RESPONSE TEXT:    This patient has acute diastolic CHF.    Query created by: Aminata Patel on 2024 1:48 PM      Electronically signed by:  Star Faulkner MD 2024 8:02 PM

## 2024-07-12 NOTE — FLOWSHEET NOTE
Bed available at [facility]. [facility] called concerning no availability of high flow O2 with transport. Per Marcell NP patient may be transported with BiPAP . [facility] aware of the same.

## 2024-07-12 NOTE — FLOWSHEET NOTE
Attempted to call report to [facility]. Staff sated nurse is passing medication and will call back when she is available

## 2024-07-12 NOTE — PLAN OF CARE
Problem: Discharge Planning  Goal: Discharge to home or other facility with appropriate resources  7/12/2024 0716 by Darline Joshi RN  Outcome: Progressing  7/11/2024 1922 by Thania Mahoney RN  Outcome: Progressing  Flowsheets (Taken 7/11/2024 0439 by Eliel Rawls RN)  Discharge to home or other facility with appropriate resources:   Identify barriers to discharge with patient and caregiver   Identify discharge learning needs (meds, wound care, etc)     Problem: Safety - Adult  Goal: Free from fall injury  7/12/2024 0716 by Darline Joshi RN  Outcome: Progressing  7/11/2024 1922 by Thania Mahoney RN  Outcome: Progressing  Flowsheets (Taken 7/11/2024 0439 by Eliel Rawls RN)  Free From Fall Injury: Instruct family/caregiver on patient safety     Problem: Nutrition Deficit:  Goal: Optimize nutritional status  7/12/2024 0716 by Darline Joshi RN  Outcome: Progressing  7/11/2024 1922 by Thania Mahoney RN  Outcome: Progressing  Flowsheets (Taken 7/11/2024 0807 by Benji Ortiz, RD, LD)  Nutrient intake appropriate for improving, restoring, or maintaining nutritional needs:   Monitor oral intake, labs, and treatment plans   Recommend appropriate diets, oral nutritional supplements, and vitamin/mineral supplements     Problem: Chronic Conditions and Co-morbidities  Goal: Patient's chronic conditions and co-morbidity symptoms are monitored and maintained or improved  7/12/2024 0716 by Darline Joshi RN  Outcome: Progressing  7/11/2024 1922 by Thania Mahoney RN  Outcome: Progressing  Flowsheets (Taken 7/11/2024 1922)  Care Plan - Patient's Chronic Conditions and Co-Morbidity Symptoms are Monitored and Maintained or Improved: Monitor and assess patient's chronic conditions and comorbid symptoms for stability, deterioration, or improvement

## 2024-07-12 NOTE — PROGRESS NOTES
support systems, review of data including imaging and labs, discussions with other team members and physicians at least 0 minutes so far today, excluding separately billable procedures.    [facility] Advanced Care Planning documentation:  [x] I have confirmed that the patient's Advance Care Plan is present, Code Status is documented, or surrogate decision maker is listed in the patient's medical record  [If \"yes\", STOP HERE]     [] The patient's Advance Care Plan is NOT present because:    []  I confirmed today that the patient does not wish or was not able to name a   surrogate decision maker or provide and advance care plan.    [] Hospice care is currently being provided or has been provided within the   calendar year.    []  I did NOT confirm today the presence of an Advance Care Plan or surrogate   decision maker documented within the patient's medical record.   [DOES NOT SATISFY [facility] PERFORMANCE]      Vijaya Robbins, MARGARITA - CNP , APROLIVA-NP-C  7/12/2024  4:31 AM

## 2024-07-13 ENCOUNTER — APPOINTMENT (OUTPATIENT)
Dept: GENERAL RADIOLOGY | Age: 64
DRG: 139 | End: 2024-07-13
Attending: STUDENT IN AN ORGANIZED HEALTH CARE EDUCATION/TRAINING PROGRAM
Payer: COMMERCIAL

## 2024-07-13 PROBLEM — N18.9 CHRONIC KIDNEY DISEASE: Status: ACTIVE | Noted: 2024-07-13

## 2024-07-13 PROBLEM — D53.9 MACROCYTIC ANEMIA: Status: ACTIVE | Noted: 2024-06-03

## 2024-07-13 PROBLEM — J90 PLEURAL EFFUSION, BILATERAL: Status: ACTIVE | Noted: 2024-07-13

## 2024-07-13 PROBLEM — N13.39 OTHER HYDRONEPHROSIS: Status: ACTIVE | Noted: 2024-07-13

## 2024-07-13 PROBLEM — J90 PLEURAL EFFUSION ON RIGHT: Status: ACTIVE | Noted: 2024-07-13

## 2024-07-13 PROBLEM — J81.0 ACUTE PULMONARY EDEMA (HCC): Status: ACTIVE | Noted: 2024-07-13

## 2024-07-13 PROBLEM — Z93.6 NEPHROSTOMY STATUS (HCC): Status: ACTIVE | Noted: 2024-07-13

## 2024-07-13 PROBLEM — N18.4 STAGE 4 CHRONIC KIDNEY DISEASE (HCC): Status: ACTIVE | Noted: 2024-07-13

## 2024-07-13 LAB
ANION GAP SERPL CALCULATED.3IONS-SCNC: 12 MMOL/L (ref 9–16)
BASOPHILS # BLD: 0.04 K/UL (ref 0–0.2)
BASOPHILS NFR BLD: 1 % (ref 0–2)
BUN SERPL-MCNC: 41 MG/DL (ref 8–23)
CALCIUM SERPL-MCNC: 8.9 MG/DL (ref 8.6–10.4)
CHLORIDE SERPL-SCNC: 102 MMOL/L (ref 98–107)
CO2 SERPL-SCNC: 20 MMOL/L (ref 20–31)
CREAT SERPL-MCNC: 2.2 MG/DL (ref 0.5–0.9)
CRP SERPL HS-MCNC: 5.4 MG/L (ref 0–5)
EOSINOPHIL # BLD: 0.27 K/UL (ref 0–0.44)
EOSINOPHILS RELATIVE PERCENT: 4 % (ref 1–4)
ERYTHROCYTE [DISTWIDTH] IN BLOOD BY AUTOMATED COUNT: 15.9 % (ref 11.8–14.4)
GFR, ESTIMATED: 24 ML/MIN/1.73M2
GLUCOSE SERPL-MCNC: 104 MG/DL (ref 74–99)
HCT VFR BLD AUTO: 25.1 % (ref 36.3–47.1)
HGB BLD-MCNC: 7.8 G/DL (ref 11.9–15.1)
IMM GRANULOCYTES # BLD AUTO: <0.03 K/UL (ref 0–0.3)
IMM GRANULOCYTES NFR BLD: 0 %
LYMPHOCYTES NFR BLD: 0.82 K/UL (ref 1.1–3.7)
LYMPHOCYTES RELATIVE PERCENT: 13 % (ref 24–43)
MCH RBC QN AUTO: 31.7 PG (ref 25.2–33.5)
MCHC RBC AUTO-ENTMCNC: 31.1 G/DL (ref 28.4–34.8)
MCV RBC AUTO: 102 FL (ref 82.6–102.9)
MONOCYTES NFR BLD: 0.58 K/UL (ref 0.1–1.2)
MONOCYTES NFR BLD: 9 % (ref 3–12)
NEUTROPHILS NFR BLD: 73 % (ref 36–65)
NEUTS SEG NFR BLD: 4.56 K/UL (ref 1.5–8.1)
NRBC BLD-RTO: 0 PER 100 WBC
PLATELET # BLD AUTO: 387 K/UL (ref 138–453)
PMV BLD AUTO: 9.5 FL (ref 8.1–13.5)
POTASSIUM SERPL-SCNC: 4.2 MMOL/L (ref 3.7–5.3)
RBC # BLD AUTO: 2.46 M/UL (ref 3.95–5.11)
RBC # BLD: ABNORMAL 10*6/UL
SODIUM SERPL-SCNC: 134 MMOL/L (ref 136–145)
WBC OTHER # BLD: 6.3 K/UL (ref 3.5–11.3)

## 2024-07-13 PROCEDURE — 94761 N-INVAS EAR/PLS OXIMETRY MLT: CPT

## 2024-07-13 PROCEDURE — 6370000000 HC RX 637 (ALT 250 FOR IP): Performed by: PHYSICIAN ASSISTANT

## 2024-07-13 PROCEDURE — 99291 CRITICAL CARE FIRST HOUR: CPT | Performed by: INTERNAL MEDICINE

## 2024-07-13 PROCEDURE — 87086 URINE CULTURE/COLONY COUNT: CPT

## 2024-07-13 PROCEDURE — 86140 C-REACTIVE PROTEIN: CPT

## 2024-07-13 PROCEDURE — 85025 COMPLETE CBC W/AUTO DIFF WBC: CPT

## 2024-07-13 PROCEDURE — 6370000000 HC RX 637 (ALT 250 FOR IP): Performed by: INTERNAL MEDICINE

## 2024-07-13 PROCEDURE — 6360000002 HC RX W HCPCS: Performed by: INTERNAL MEDICINE

## 2024-07-13 PROCEDURE — 94640 AIRWAY INHALATION TREATMENT: CPT

## 2024-07-13 PROCEDURE — 94660 CPAP INITIATION&MGMT: CPT

## 2024-07-13 PROCEDURE — 80048 BASIC METABOLIC PNL TOTAL CA: CPT

## 2024-07-13 PROCEDURE — 99222 1ST HOSP IP/OBS MODERATE 55: CPT | Performed by: INTERNAL MEDICINE

## 2024-07-13 PROCEDURE — 99232 SBSQ HOSP IP/OBS MODERATE 35: CPT | Performed by: INTERNAL MEDICINE

## 2024-07-13 PROCEDURE — 2580000003 HC RX 258: Performed by: PHYSICIAN ASSISTANT

## 2024-07-13 PROCEDURE — 6360000002 HC RX W HCPCS: Performed by: PHYSICIAN ASSISTANT

## 2024-07-13 PROCEDURE — 36415 COLL VENOUS BLD VENIPUNCTURE: CPT

## 2024-07-13 PROCEDURE — 71045 X-RAY EXAM CHEST 1 VIEW: CPT

## 2024-07-13 PROCEDURE — 2060000000 HC ICU INTERMEDIATE R&B

## 2024-07-13 PROCEDURE — 99255 IP/OBS CONSLTJ NEW/EST HI 80: CPT | Performed by: INTERNAL MEDICINE

## 2024-07-13 PROCEDURE — 2700000000 HC OXYGEN THERAPY PER DAY

## 2024-07-13 RX ORDER — LACTULOSE 10 G/15ML
20 SOLUTION ORAL DAILY
Status: DISCONTINUED | OUTPATIENT
Start: 2024-07-13 | End: 2024-07-18 | Stop reason: HOSPADM

## 2024-07-13 RX ORDER — FUROSEMIDE 10 MG/ML
40 INJECTION INTRAMUSCULAR; INTRAVENOUS 2 TIMES DAILY
Status: DISCONTINUED | OUTPATIENT
Start: 2024-07-13 | End: 2024-07-14

## 2024-07-13 RX ORDER — FUROSEMIDE 10 MG/ML
40 INJECTION INTRAMUSCULAR; INTRAVENOUS 2 TIMES DAILY
Status: CANCELLED | OUTPATIENT
Start: 2024-07-13

## 2024-07-13 RX ORDER — BISACODYL 10 MG
10 SUPPOSITORY, RECTAL RECTAL DAILY PRN
Status: DISCONTINUED | OUTPATIENT
Start: 2024-07-13 | End: 2024-07-18 | Stop reason: HOSPADM

## 2024-07-13 RX ADMIN — CARVEDILOL 25 MG: 25 TABLET, FILM COATED ORAL at 09:35

## 2024-07-13 RX ADMIN — ATORVASTATIN CALCIUM 80 MG: 80 TABLET, FILM COATED ORAL at 20:24

## 2024-07-13 RX ADMIN — Medication 2000 MG: at 22:58

## 2024-07-13 RX ADMIN — HYDRALAZINE HYDROCHLORIDE 25 MG: 50 TABLET ORAL at 09:36

## 2024-07-13 RX ADMIN — SODIUM CHLORIDE, PRESERVATIVE FREE 10 ML: 5 INJECTION INTRAVENOUS at 20:24

## 2024-07-13 RX ADMIN — FUROSEMIDE 40 MG: 10 INJECTION, SOLUTION INTRAMUSCULAR; INTRAVENOUS at 17:36

## 2024-07-13 RX ADMIN — LEVALBUTEROL HYDROCHLORIDE 1.25 MG: 1.25 SOLUTION RESPIRATORY (INHALATION) at 16:53

## 2024-07-13 RX ADMIN — LACTULOSE 20 G: 20 SOLUTION ORAL at 17:36

## 2024-07-13 RX ADMIN — FLECAINIDE ACETATE 50 MG: 50 TABLET ORAL at 09:35

## 2024-07-13 RX ADMIN — AMLODIPINE BESYLATE 10 MG: 10 TABLET ORAL at 09:35

## 2024-07-13 RX ADMIN — LEVALBUTEROL HYDROCHLORIDE 1.25 MG: 1.25 SOLUTION RESPIRATORY (INHALATION) at 12:07

## 2024-07-13 RX ADMIN — CARVEDILOL 25 MG: 25 TABLET, FILM COATED ORAL at 17:36

## 2024-07-13 RX ADMIN — SODIUM CHLORIDE, PRESERVATIVE FREE 10 ML: 5 INJECTION INTRAVENOUS at 09:36

## 2024-07-13 RX ADMIN — LEVALBUTEROL HYDROCHLORIDE 1.25 MG: 1.25 SOLUTION RESPIRATORY (INHALATION) at 20:42

## 2024-07-13 RX ADMIN — PANTOPRAZOLE SODIUM 40 MG: 40 TABLET, DELAYED RELEASE ORAL at 09:35

## 2024-07-13 RX ADMIN — LEVALBUTEROL HYDROCHLORIDE 1.25 MG: 1.25 SOLUTION RESPIRATORY (INHALATION) at 10:05

## 2024-07-13 RX ADMIN — HYDRALAZINE HYDROCHLORIDE 25 MG: 50 TABLET ORAL at 17:36

## 2024-07-13 RX ADMIN — BISACODYL 10 MG: 10 SUPPOSITORY RECTAL at 17:36

## 2024-07-13 RX ADMIN — FLECAINIDE ACETATE 50 MG: 50 TABLET ORAL at 20:24

## 2024-07-13 ASSESSMENT — PAIN SCALES - WONG BAKER
WONGBAKER_NUMERICALRESPONSE: NO HURT

## 2024-07-13 ASSESSMENT — PAIN SCALES - GENERAL: PAINLEVEL_OUTOF10: 0

## 2024-07-13 NOTE — CARE COORDINATION
Case Management Assessment  Initial Evaluation    Date/Time of Evaluation: 7/13/2024 4:38 PM  Assessment Completed by: Faby Sunshine    If patient is discharged prior to next notation, then this note serves as note for discharge by case management.    Patient Name: Daja Espinosa                   YOB: 1960  Diagnosis: Acute-on-chronic respiratory failure (HCC) [J96.20]  Acute on chronic respiratory failure (HCC) [J96.20]                   Date / Time: 7/12/2024  6:59 PM    Patient Admission Status: Inpatient   Readmission Risk (Low < 19, Mod (19-27), High > 27): Readmission Risk Score: 31.1    Current PCP: Sohail Garcia MD  PCP verified by CM? (P) Yes (Dr Sohail Garcia)    Chart Reviewed: Yes      History Provided by: (P) Patient, Spouse  Patient Orientation: (P) Alert and Oriented, Person, Place, Situation, Self    Patient Cognition: (P) Alert    Hospitalization in the last 30 days (Readmission):  Yes    If yes, Readmission Assessment in  Navigator will be completed.    Advance Directives:      Code Status: Prior   Patient's Primary Decision Maker is: (P) Legal Next of Kin    Primary Decision Maker: Benji Espinosa - Spouse - [phone]    Discharge Planning:    Patient lives with: (P) Spouse/Significant Other Type of Home: (P) House  Primary Care Giver:    Patient Support Systems include: (P) Spouse/Significant Other, Children, Family Members   Current Financial resources: (P) Medicaid  Current community resources: (P) None  Current services prior to admission: (P) Durable Medical Equipment, Oxygen Therapy            Current DME: (P) Shower Chair, Oxygen Therapy (Comment) (has an elevated toilet seat, bathroom grab bars)            Type of Home Care services:  (P) None    ADLS  Prior functional level: (P) Assistance with the following:, Bathing, Dressing, Toileting, Cooking, Housework, Shopping, Mobility, Other (see comment) (needs assistance d/t weakness from chemo, radiation and

## 2024-07-13 NOTE — PLAN OF CARE
Problem: Safety - Adult  Goal: Free from fall injury  Outcome: Progressing     Problem: Chronic Conditions and Co-morbidities  Goal: Patient's chronic conditions and co-morbidity symptoms are monitored and maintained or improved  Outcome: Progressing     Problem: Discharge Planning  Goal: Discharge to home or other facility with appropriate resources  Outcome: Progressing     Problem: ABCDS Injury Assessment  Goal: Absence of physical injury  Outcome: Progressing  Flowsheets (Taken 7/12/2024 2046)  Absence of Physical Injury: Implement safety measures based on patient assessment

## 2024-07-13 NOTE — PLAN OF CARE
Problem: Safety - Adult  Goal: Free from fall injury  7/13/2024 0739 by Milligan, Matthew, RN  Outcome: Progressing  Flowsheets (Taken 7/13/2024 0739)  Free From Fall Injury: Instruct family/caregiver on patient safety  7/13/2024 0530 by Prabhu Spence RN  Outcome: Progressing     Problem: Chronic Conditions and Co-morbidities  Goal: Patient's chronic conditions and co-morbidity symptoms are monitored and maintained or improved  7/13/2024 0739 by Milligan, Matthew, RN  Outcome: Progressing  Flowsheets (Taken 7/13/2024 0739)  Care Plan - Patient's Chronic Conditions and Co-Morbidity Symptoms are Monitored and Maintained or Improved:   Monitor and assess patient's chronic conditions and comorbid symptoms for stability, deterioration, or improvement   Collaborate with multidisciplinary team to address chronic and comorbid conditions and prevent exacerbation or deterioration   Update acute care plan with appropriate goals if chronic or comorbid symptoms are exacerbated and prevent overall improvement and discharge  7/13/2024 0530 by Prabhu Spence RN  Outcome: Progressing     Problem: Discharge Planning  Goal: Discharge to home or other facility with appropriate resources  7/13/2024 0739 by Milligan, Matthew, RN  Outcome: Progressing  Flowsheets (Taken 7/13/2024 0739)  Discharge to home or other facility with appropriate resources:   Identify barriers to discharge with patient and caregiver   Identify discharge learning needs (meds, wound care, etc)   Refer to discharge planning if patient needs post-hospital services based on physician order or complex needs related to functional status, cognitive ability or social support system   Arrange for needed discharge resources and transportation as appropriate  7/13/2024 0530 by Prabhu Spence RN  Outcome: Progressing     Problem: ABCDS Injury Assessment  Goal: Absence of physical injury  7/13/2024 0739 by Milligan, Matthew, RN  Outcome: Progressing  Flowsheets

## 2024-07-14 LAB
ANION GAP SERPL CALCULATED.3IONS-SCNC: 14 MMOL/L (ref 9–16)
BASOPHILS # BLD: 0.04 K/UL (ref 0–0.2)
BASOPHILS NFR BLD: 1 % (ref 0–2)
BUN SERPL-MCNC: 37 MG/DL (ref 8–23)
CALCIUM SERPL-MCNC: 8.8 MG/DL (ref 8.6–10.4)
CHLORIDE SERPL-SCNC: 99 MMOL/L (ref 98–107)
CO2 SERPL-SCNC: 21 MMOL/L (ref 20–31)
CREAT SERPL-MCNC: 2.1 MG/DL (ref 0.5–0.9)
EOSINOPHIL # BLD: 0.33 K/UL (ref 0–0.44)
EOSINOPHILS RELATIVE PERCENT: 5 % (ref 1–4)
ERYTHROCYTE [DISTWIDTH] IN BLOOD BY AUTOMATED COUNT: 15.6 % (ref 11.8–14.4)
GFR, ESTIMATED: 26 ML/MIN/1.73M2
GLUCOSE SERPL-MCNC: 104 MG/DL (ref 74–99)
HCT VFR BLD AUTO: 25.3 % (ref 36.3–47.1)
HGB BLD-MCNC: 7.9 G/DL (ref 11.9–15.1)
IMM GRANULOCYTES # BLD AUTO: <0.03 K/UL (ref 0–0.3)
IMM GRANULOCYTES NFR BLD: 0 %
LYMPHOCYTES NFR BLD: 0.8 K/UL (ref 1.1–3.7)
LYMPHOCYTES RELATIVE PERCENT: 13 % (ref 24–43)
MAGNESIUM SERPL-MCNC: 2.3 MG/DL (ref 1.6–2.4)
MCH RBC QN AUTO: 31.6 PG (ref 25.2–33.5)
MCHC RBC AUTO-ENTMCNC: 31.2 G/DL (ref 28.4–34.8)
MCV RBC AUTO: 101.2 FL (ref 82.6–102.9)
MICROORGANISM SPEC CULT: NORMAL
MICROORGANISM SPEC CULT: NORMAL
MONOCYTES NFR BLD: 0.55 K/UL (ref 0.1–1.2)
MONOCYTES NFR BLD: 9 % (ref 3–12)
NEUTROPHILS NFR BLD: 72 % (ref 36–65)
NEUTS SEG NFR BLD: 4.38 K/UL (ref 1.5–8.1)
NRBC BLD-RTO: 0 PER 100 WBC
PLATELET # BLD AUTO: 398 K/UL (ref 138–453)
PMV BLD AUTO: 9.4 FL (ref 8.1–13.5)
POTASSIUM SERPL-SCNC: 3.5 MMOL/L (ref 3.7–5.3)
RBC # BLD AUTO: 2.5 M/UL (ref 3.95–5.11)
RBC # BLD: ABNORMAL 10*6/UL
SERVICE CMNT-IMP: NORMAL
SERVICE CMNT-IMP: NORMAL
SODIUM SERPL-SCNC: 134 MMOL/L (ref 136–145)
SPECIMEN DESCRIPTION: NORMAL
SPECIMEN DESCRIPTION: NORMAL
WBC OTHER # BLD: 6.1 K/UL (ref 3.5–11.3)

## 2024-07-14 PROCEDURE — 94660 CPAP INITIATION&MGMT: CPT

## 2024-07-14 PROCEDURE — 99232 SBSQ HOSP IP/OBS MODERATE 35: CPT | Performed by: INTERNAL MEDICINE

## 2024-07-14 PROCEDURE — 6360000002 HC RX W HCPCS: Performed by: INTERNAL MEDICINE

## 2024-07-14 PROCEDURE — 94761 N-INVAS EAR/PLS OXIMETRY MLT: CPT

## 2024-07-14 PROCEDURE — 94640 AIRWAY INHALATION TREATMENT: CPT

## 2024-07-14 PROCEDURE — 2060000000 HC ICU INTERMEDIATE R&B

## 2024-07-14 PROCEDURE — 2580000003 HC RX 258: Performed by: PHYSICIAN ASSISTANT

## 2024-07-14 PROCEDURE — 80048 BASIC METABOLIC PNL TOTAL CA: CPT

## 2024-07-14 PROCEDURE — 99233 SBSQ HOSP IP/OBS HIGH 50: CPT | Performed by: INTERNAL MEDICINE

## 2024-07-14 PROCEDURE — 6370000000 HC RX 637 (ALT 250 FOR IP): Performed by: INTERNAL MEDICINE

## 2024-07-14 PROCEDURE — 83735 ASSAY OF MAGNESIUM: CPT

## 2024-07-14 PROCEDURE — 6370000000 HC RX 637 (ALT 250 FOR IP): Performed by: NURSE PRACTITIONER

## 2024-07-14 PROCEDURE — 6370000000 HC RX 637 (ALT 250 FOR IP): Performed by: PHYSICIAN ASSISTANT

## 2024-07-14 PROCEDURE — 36415 COLL VENOUS BLD VENIPUNCTURE: CPT

## 2024-07-14 PROCEDURE — 85025 COMPLETE CBC W/AUTO DIFF WBC: CPT

## 2024-07-14 PROCEDURE — 6360000002 HC RX W HCPCS: Performed by: PHYSICIAN ASSISTANT

## 2024-07-14 PROCEDURE — 2700000000 HC OXYGEN THERAPY PER DAY

## 2024-07-14 RX ORDER — FUROSEMIDE 10 MG/ML
80 INJECTION INTRAMUSCULAR; INTRAVENOUS ONCE
Status: COMPLETED | OUTPATIENT
Start: 2024-07-14 | End: 2024-07-14

## 2024-07-14 RX ORDER — FUROSEMIDE 10 MG/ML
80 INJECTION INTRAMUSCULAR; INTRAVENOUS 2 TIMES DAILY
Status: DISCONTINUED | OUTPATIENT
Start: 2024-07-14 | End: 2024-07-14

## 2024-07-14 RX ORDER — POTASSIUM CHLORIDE 20 MEQ/1
40 TABLET, EXTENDED RELEASE ORAL 2 TIMES DAILY WITH MEALS
Status: DISCONTINUED | OUTPATIENT
Start: 2024-07-14 | End: 2024-07-17

## 2024-07-14 RX ORDER — HEPARIN SODIUM 5000 [USP'U]/ML
5000 INJECTION, SOLUTION INTRAVENOUS; SUBCUTANEOUS 2 TIMES DAILY
Status: DISCONTINUED | OUTPATIENT
Start: 2024-07-14 | End: 2024-07-15

## 2024-07-14 RX ORDER — FUROSEMIDE 40 MG/1
80 TABLET ORAL 2 TIMES DAILY
Status: DISCONTINUED | OUTPATIENT
Start: 2024-07-15 | End: 2024-07-16

## 2024-07-14 RX ADMIN — POTASSIUM CHLORIDE 40 MEQ: 1500 TABLET, EXTENDED RELEASE ORAL at 17:38

## 2024-07-14 RX ADMIN — CARVEDILOL 25 MG: 25 TABLET, FILM COATED ORAL at 08:42

## 2024-07-14 RX ADMIN — HYDRALAZINE HYDROCHLORIDE 25 MG: 50 TABLET ORAL at 17:38

## 2024-07-14 RX ADMIN — HEPARIN SODIUM 5000 UNITS: 5000 INJECTION INTRAVENOUS; SUBCUTANEOUS at 12:13

## 2024-07-14 RX ADMIN — FLECAINIDE ACETATE 50 MG: 50 TABLET ORAL at 21:16

## 2024-07-14 RX ADMIN — CARVEDILOL 25 MG: 25 TABLET, FILM COATED ORAL at 17:38

## 2024-07-14 RX ADMIN — PANTOPRAZOLE SODIUM 40 MG: 40 TABLET, DELAYED RELEASE ORAL at 08:42

## 2024-07-14 RX ADMIN — FUROSEMIDE 80 MG: 10 INJECTION, SOLUTION INTRAMUSCULAR; INTRAVENOUS at 17:38

## 2024-07-14 RX ADMIN — SODIUM CHLORIDE, PRESERVATIVE FREE 10 ML: 5 INJECTION INTRAVENOUS at 08:42

## 2024-07-14 RX ADMIN — HYDRALAZINE HYDROCHLORIDE 25 MG: 50 TABLET ORAL at 08:42

## 2024-07-14 RX ADMIN — FLECAINIDE ACETATE 50 MG: 50 TABLET ORAL at 08:44

## 2024-07-14 RX ADMIN — LEVALBUTEROL HYDROCHLORIDE 1.25 MG: 1.25 SOLUTION RESPIRATORY (INHALATION) at 20:49

## 2024-07-14 RX ADMIN — ATORVASTATIN CALCIUM 80 MG: 80 TABLET, FILM COATED ORAL at 21:16

## 2024-07-14 RX ADMIN — SODIUM CHLORIDE, PRESERVATIVE FREE 10 ML: 5 INJECTION INTRAVENOUS at 21:17

## 2024-07-14 RX ADMIN — AMLODIPINE BESYLATE 10 MG: 10 TABLET ORAL at 08:42

## 2024-07-14 RX ADMIN — Medication 2000 MG: at 21:16

## 2024-07-14 RX ADMIN — LEVALBUTEROL HYDROCHLORIDE 1.25 MG: 1.25 SOLUTION RESPIRATORY (INHALATION) at 11:24

## 2024-07-14 RX ADMIN — LACTULOSE 20 G: 20 SOLUTION ORAL at 08:42

## 2024-07-14 RX ADMIN — FUROSEMIDE 40 MG: 10 INJECTION, SOLUTION INTRAMUSCULAR; INTRAVENOUS at 08:42

## 2024-07-14 RX ADMIN — HEPARIN SODIUM 5000 UNITS: 5000 INJECTION INTRAVENOUS; SUBCUTANEOUS at 21:16

## 2024-07-14 ASSESSMENT — PAIN SCALES - WONG BAKER

## 2024-07-14 NOTE — PLAN OF CARE
Problem: Respiratory - Adult  Goal: Achieves optimal ventilation and oxygenation  7/14/2024 1830 by Kerline Calero RCP  Outcome: Progressing   BRONCHOSPASM/BRONCHOCONSTRICTION     [x]         IMPROVE AERATION/BREATH SOUNDS  [x]   ADMINISTER BRONCHODILATOR THERAPY AS APPROPRIATE  [x]   ASSESS BREATH SOUNDS  []   IMPLEMENT AEROSOL/MDI PROTOCOL  [x]   PATIENT EDUCATION AS NEEDED  PROVIDE ADEQUATE OXYGENATION WITH ACCEPTABLE SP02/ABG'S    [x]  IDENTIFY APPROPRIATE OXYGEN THERAPY  [x]   MONITOR SP02/ABG'S AS NEEDED   [x]   PATIENT EDUCATION AS NEEDED

## 2024-07-14 NOTE — PLAN OF CARE
Problem: Respiratory - Adult  Goal: Achieves optimal ventilation and oxygenation  7/14/2024 0054 by Rebecca Galeano RCP  Outcome: Progressing

## 2024-07-14 NOTE — PLAN OF CARE
Problem: Safety - Adult  Goal: Free from fall injury  7/14/2024 1857 by Milligan, Matthew, RN  Outcome: Progressing  Flowsheets (Taken 7/14/2024 1102)  Free From Fall Injury: Instruct family/caregiver on patient safety  7/14/2024 1102 by Milligan, Matthew, RN  Outcome: Progressing  Flowsheets (Taken 7/14/2024 1102)  Free From Fall Injury: Instruct family/caregiver on patient safety     Problem: Chronic Conditions and Co-morbidities  Goal: Patient's chronic conditions and co-morbidity symptoms are monitored and maintained or improved  7/14/2024 1857 by Milligan, Matthew, RN  Outcome: Progressing  Flowsheets (Taken 7/13/2024 0739)  Care Plan - Patient's Chronic Conditions and Co-Morbidity Symptoms are Monitored and Maintained or Improved:   Monitor and assess patient's chronic conditions and comorbid symptoms for stability, deterioration, or improvement   Collaborate with multidisciplinary team to address chronic and comorbid conditions and prevent exacerbation or deterioration   Update acute care plan with appropriate goals if chronic or comorbid symptoms are exacerbated and prevent overall improvement and discharge  7/14/2024 1102 by Milligan, Matthew, RN  Outcome: Progressing  Flowsheets (Taken 7/13/2024 0739)  Care Plan - Patient's Chronic Conditions and Co-Morbidity Symptoms are Monitored and Maintained or Improved:   Monitor and assess patient's chronic conditions and comorbid symptoms for stability, deterioration, or improvement   Collaborate with multidisciplinary team to address chronic and comorbid conditions and prevent exacerbation or deterioration   Update acute care plan with appropriate goals if chronic or comorbid symptoms are exacerbated and prevent overall improvement and discharge     Problem: Discharge Planning  Goal: Discharge to home or other facility with appropriate resources  7/14/2024 1857 by Milligan, Matthew, RN  Outcome: Progressing  Flowsheets (Taken 7/13/2024 0739)  Discharge to home

## 2024-07-14 NOTE — PLAN OF CARE
Problem: Safety - Adult  Goal: Free from fall injury  7/14/2024 1102 by Milligan, Matthew, RN  Outcome: Progressing  Flowsheets (Taken 7/14/2024 1102)  Free From Fall Injury: Instruct family/caregiver on patient safety  7/14/2024 0304 by Jillian Nguyen RN  Outcome: Progressing     Problem: Chronic Conditions and Co-morbidities  Goal: Patient's chronic conditions and co-morbidity symptoms are monitored and maintained or improved  7/14/2024 1102 by Milligan, Matthew, RN  Outcome: Progressing  Flowsheets (Taken 7/13/2024 0739)  Care Plan - Patient's Chronic Conditions and Co-Morbidity Symptoms are Monitored and Maintained or Improved:   Monitor and assess patient's chronic conditions and comorbid symptoms for stability, deterioration, or improvement   Collaborate with multidisciplinary team to address chronic and comorbid conditions and prevent exacerbation or deterioration   Update acute care plan with appropriate goals if chronic or comorbid symptoms are exacerbated and prevent overall improvement and discharge  7/14/2024 0304 by Jillian Nguyen RN  Outcome: Progressing     Problem: Discharge Planning  Goal: Discharge to home or other facility with appropriate resources  7/14/2024 1102 by Milligan, Matthew, RN  Outcome: Progressing  Flowsheets (Taken 7/13/2024 0739)  Discharge to home or other facility with appropriate resources:   Identify barriers to discharge with patient and caregiver   Identify discharge learning needs (meds, wound care, etc)   Refer to discharge planning if patient needs post-hospital services based on physician order or complex needs related to functional status, cognitive ability or social support system   Arrange for needed discharge resources and transportation as appropriate  7/14/2024 0304 by Jillian Nguyen, RN  Outcome: Progressing     Problem: ABCDS Injury Assessment  Goal: Absence of physical injury  7/14/2024 1102 by Milligan, Matthew, RN  Outcome: Progressing  Flowsheets (Taken

## 2024-07-14 NOTE — CARE COORDINATION
07/13/24 1524   Readmission Assessment   Number of Days since last admission? 8-30 days   Previous Disposition Home with Family   Who is being Interviewed Patient;Caregiver   What was the patient's/caregiver's perception as to why they think they needed to return back to the hospital? Other (Comment)  (Pneumonia came back)   Did you visit your Primary Care Physician after you left the hospital, before you returned this time? Yes   Did you see a specialist, such as Cardiac, Pulmonary, Orthopedic Physician, etc. after you left the hospital? No   Who advised the patient to return to the hospital? Physician   Does the patient report anything that got in the way of taking their medications? No   In our efforts to provide the best possible care to you and others like you, can you think of anything that we could have done to help you after you left the hospital the first time, so that you might not have needed to return so soon? Other (Comment)  (no, the pneumonia came back again)

## 2024-07-14 NOTE — CONSULTS
PULMONARY CONSULT NOTE      Patient:  Daja Espinosa  YOB: 1960    MRN: [id number]     Acct: [id number]     Admit date: 7/12/2024    REASON FOR CONSULT:- Acute on chronic hypoxic respiratory failure    Pt seen and Chart reviewed.    Subjective:     64 yo F with past medical h/o hypertension, COPD, bladder cancer received BC treatments was in remission till March 2024 she is currently undergoing chemotherapy    She presented on [facility] facility with SOB, CT chest showed moderate right sided pneumonia with moderate left pleural effusion.  She was transferred to [facility] for IR evaluation for thoracentesis.     She was also admitted recently for respiratory failure last month, where she was started on Zosyn for pneumonia, which was further complicated by A-fib with RVR, show pleural effusion, HEAVENLY on CKD, left nephrostomy tube placement and she also required.    She is on 2 L oxygen at home which she started last night. She is on rocephin    Echo on 4/2024 shows an EF 60%    Review of Systems -   Review of Systems   Constitutional:  Positive for fatigue. Negative for fever.   Respiratory:  Positive for shortness of breath. Negative for cough.    Cardiovascular:  Negative for chest pain and leg swelling.   Gastrointestinal:  Negative for abdominal pain, diarrhea, nausea and vomiting.   Musculoskeletal:  Negative for arthralgias and myalgias.   Neurological:  Positive for weakness. Negative for dizziness.           Physical Exam:  Vitals: BP (!) 140/81   Pulse 60   Temp 98.4 °F (36.9 °C) (Oral)   Resp 16   LMP 12/01/2010   SpO2 100%   24 hour intake/output:  Intake/Output Summary (Last 24 hours) at 7/13/2024 1002  Last data filed at 7/12/2024 2220  Gross per 24 hour   Intake --   Output 300 ml   Net -300 ml     Last 3 weights:  Wt Readings from Last 3 Encounters:   07/12/24 61.2 kg (134 lb 14.7 oz)   07/02/24 57.6 kg (127 lb)   07/01/24 56.2 kg (124 lb)       General appearance: alert and cooperative 
[facility] - Initial Consult Note  Today's Date and Time: 7/12/2024, 8:50 PM    Impression :   Acute on chronic respiratory failure with hypoxia (HCC)    Urothelial carcinoma of bladder (HCC)    Acute kidney injury superimposed on CKD (HCC)    Paroxysmal atrial fibrillation (HCC)    ATN (acute tubular necrosis) (HCC)    Bilateral lower extremity edema  Possible RLL pneumonia    Recommendations:     On meropenem. Will D/c  Ceftriaxone 2 gm IV q 24 h pending data    Medical Decision Making/Summary/Discussion:7/12/2024     Daily Elements of Decision Making provided by Consulting Physician:    Note: I have independently performed the steps listed below as part of the medical decision making and evaluation.    Review of current Problems:  Today I am seeing the patient for the following problems:  Acute on chronic respiratory failure with hypoxia (HCC)    Urothelial carcinoma of bladder (HCC)    Acute kidney injury superimposed on CKD (HCC)    Paroxysmal atrial fibrillation (HCC)    ATN (acute tubular necrosis) (HCC)    Bilateral lower extremity edema  Possible RLL pneumonia  Evaluation of Patient:  Patient with CHF  Acute hypoxic respiratory failure  Evaluated for concern possible RLL pneumonia  Please see daily details in Interval Changes Section  Changes in physical exam:  Acute hypoxic resp failure  CHF  Please see daily details in Physical Exam section and in Interval Changes Section  Changes in ROS:  Unchanged  Please see daily details in Review of Symptoms section and in Interval Changes Section  Discussion with Referring Physician or Service  Dr. Calderon  Laboratory and Radiologic Studies with personal review of radiologic studies  Laboratory  Radiology  Microbiology  Please see Details in daily Interval Changes Section devoted to Lab, Micro and Radiology and in Medical Decision Laboratory, Imaging and Cultures sections.  Review of Infection Control and Prevention 
Renal Consult Note    Patient :  Daja Espinosa; 63 y.o. MRN# [id number]  Location:  0104/0104-01  Attending:  Brittani Francisco MD  Admit Date:  7/12/2024   Hospital Day: 1    Reason for Consult:     Asked by Brittani Call MD to see for HEAVENLY/Elevated Creatinine.    History Obtained From:     patient, electronic medical record    History of Present Illness:     Daja Espinosa; 63 y.o. female with past medical history as mentioned below.  Hypertension, COPD, bladder cancer which was diagnosed many years ago initially receiving BCG treatments and then the cancer was in remission until March 2024.  Diagnosed with recurrent bladder tumor.  Initial plan for cystectomy however had a CVA and the procedure was postponed.  Chemotherapy with cisplatin and radiation therapy.  Received 1 dose of cisplatin and then chemo switched to gemcitabine.  Therefore developed chronic kidney disease.    [facility] with shortness of breath.  Increasing oxygen requirement at home.  CT of the chest noted persistent moderate right side pneumonia with moderate left pleural effusion.  Patient transferred to [facility] from [place] for further management possible IR for thoracentesis.    Patient recently admitted to [facility] 6/14/2024 for shortness of breath.  Patient normally on 2 L of nasal cannula at home and had increased to 4 L and was noted to only have a saturation of 72% on 4 L in the emergency department.  Was started on Zosyn for hospital-acquired pneumonia and ultimately required ICU admission secondary to A-fib with RVR and worsening respiratory distress.  She did undergo thoracentesis on that admission.  We are consulted for HEAVENLY and CKD due to possible ischemic ATN caused by sepsis ultimately requiring hemodialysis.  And patient received a left nephrostomy tube placement on 6/18/2024.  Recent increase in baseline creatinine 2-2 0.2-2.4.  Upon last admission it was up to 5.6.  
Chronic back pain, History of chemotherapy, Hydronephrosis, Hyperlipidemia, Hypertension, Hypoglycemia, MI (myocardial infarction) (HCC), Peripheral vascular disease (HCC), Takayasu's arteritis (HCC), Tibial fracture, Umbilical hernia, Under care of team, Under care of team, Under care of team, Unspecified cerebral artery occlusion with cerebral infarction, and Wears partial dentures.    Past Surgical History:   has a past surgical history that includes Cardiac catheterization (12/2010); Cardiac surgery (1993); Vulva surgery (2001); vascular surgery; vascular surgery (5/12, 6/12); femoral bypass (5/12, 6/12); other surgical history (04/2016); Bladder surgery (05/17/2016); other surgical history; other surgical history; Colonoscopy (N/A, 07/22/2021); Upper gastrointestinal endoscopy (N/A, 07/22/2021); Colonoscopy (N/A, 11/01/2021); hernia repair; ventral hernia repair (N/A, 01/05/2022); Cystoscopy (N/A, 02/27/2024); Cystoscopy (Left, 02/27/2024); TUNNELED CENTRAL VENOUS CATHETER W/ SUBCUTANEOUS PORT (Right, 03/13/2024); IR PORT PLACEMENT < 5 YEARS (03/13/2024); Carotid angioplasty (Right); Cystocopy (Left, 04/02/2024); Anomalous venous return repair (N/A, 04/02/2024); Esophagogastroduodenoscopy (06/04/2024); Upper gastrointestinal endoscopy (N/A, 6/4/2024); IR GUIDED NEPHROSTOMY CATH PLACEMENT LEFT (6/18/2024); IR NONTUNNELED VASCULAR CATHETER > 5 YEARS (6/21/2024); and IR TUNNELED CVC PLACE WO SQ PORT/PUMP > 5 YEARS (6/25/2024).     Medications:    Prior to Admission medications    Medication Sig Start Date End Date Taking? Authorizing Provider   senna (SENOKOT) 8.6 MG tablet Take 1 tablet by mouth nightly as needed (for constipation) 7/12/24 11/9/24  Vijaya Robbins APRN - CNP   docusate sodium (COLACE, DULCOLAX) 100 MG CAPS Take 100 mg by mouth 2 times daily as needed for Constipation 7/12/24   Vijaya Robbins, MARGARITA - CNP   hydrALAZINE (APRESOLINE) 25 MG tablet Take 1 tablet by mouth 2 times 
of free fluid. 3. Possible right pleural effusion.       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

## 2024-07-15 ENCOUNTER — APPOINTMENT (OUTPATIENT)
Dept: GENERAL RADIOLOGY | Age: 64
DRG: 139 | End: 2024-07-15
Attending: STUDENT IN AN ORGANIZED HEALTH CARE EDUCATION/TRAINING PROGRAM
Payer: COMMERCIAL

## 2024-07-15 ENCOUNTER — APPOINTMENT (OUTPATIENT)
Dept: ULTRASOUND IMAGING | Age: 64
DRG: 139 | End: 2024-07-15
Attending: STUDENT IN AN ORGANIZED HEALTH CARE EDUCATION/TRAINING PROGRAM
Payer: COMMERCIAL

## 2024-07-15 PROBLEM — J18.9 PNEUMONIA OF RIGHT LOWER LOBE DUE TO INFECTIOUS ORGANISM: Status: ACTIVE | Noted: 2024-07-15

## 2024-07-15 LAB
ALBUMIN SERPL-MCNC: 3.2 G/DL (ref 3.5–5.2)
ALBUMIN/GLOB SERPL: 1 {RATIO} (ref 1–2.5)
ALP SERPL-CCNC: 84 U/L (ref 35–104)
ALT SERPL-CCNC: 13 U/L (ref 10–35)
ANION GAP SERPL CALCULATED.3IONS-SCNC: 14 MMOL/L (ref 9–16)
APPEARANCE FLD: CLEAR
AST SERPL-CCNC: 19 U/L (ref 10–35)
BASOPHILS # BLD: 0.03 K/UL (ref 0–0.2)
BASOPHILS NFR BLD: 1 % (ref 0–2)
BILIRUB DIRECT SERPL-MCNC: <0.2 MG/DL (ref 0–0.2)
BILIRUB INDIRECT SERPL-MCNC: ABNORMAL MG/DL (ref 0–1)
BILIRUB SERPL-MCNC: 0.3 MG/DL (ref 0–1.2)
BODY FLD TYPE: NORMAL
BUN SERPL-MCNC: 36 MG/DL (ref 8–23)
CALCIUM SERPL-MCNC: 8.5 MG/DL (ref 8.6–10.4)
CASE NUMBER:: NORMAL
CHLORIDE SERPL-SCNC: 101 MMOL/L (ref 98–107)
CLOT CHECK: NORMAL
CO2 SERPL-SCNC: 22 MMOL/L (ref 20–31)
COLOR FLD: YELLOW
CREAT SERPL-MCNC: 1.9 MG/DL (ref 0.5–0.9)
EOSINOPHIL # BLD: 0.35 K/UL (ref 0–0.44)
EOSINOPHILS RELATIVE PERCENT: 6 % (ref 1–4)
ERYTHROCYTE [DISTWIDTH] IN BLOOD BY AUTOMATED COUNT: 15.4 % (ref 11.8–14.4)
GFR, ESTIMATED: 29 ML/MIN/1.73M2
GLOBULIN SER CALC-MCNC: 2.8 G/DL
GLUCOSE FLD-MCNC: 117 MG/DL
GLUCOSE SERPL-MCNC: 93 MG/DL (ref 74–99)
HCT VFR BLD AUTO: 23.8 % (ref 36.3–47.1)
HGB BLD-MCNC: 7.5 G/DL (ref 11.9–15.1)
IMM GRANULOCYTES # BLD AUTO: <0.03 K/UL (ref 0–0.3)
IMM GRANULOCYTES NFR BLD: 0 %
INR PPP: 1.1
INTERVENTION: NORMAL
LDH FLD L TO P-CCNC: 73 U/L
LDH SERPL-CCNC: 169 U/L (ref 135–214)
LYMPHOCYTES NFR BLD: 0.82 K/UL (ref 1.1–3.7)
LYMPHOCYTES NFR FLD: 80 %
LYMPHOCYTES RELATIVE PERCENT: 15 % (ref 24–43)
MAGNESIUM SERPL-MCNC: 2 MG/DL (ref 1.6–2.4)
MCH RBC QN AUTO: 31.1 PG (ref 25.2–33.5)
MCHC RBC AUTO-ENTMCNC: 31.5 G/DL (ref 28.4–34.8)
MCV RBC AUTO: 98.8 FL (ref 82.6–102.9)
MESOTHELIAL CELLS BODY FLUID: 4 %
MICROORGANISM SPEC CULT: ABNORMAL
MICROORGANISM SPEC CULT: NORMAL
MICROORGANISM SPEC CULT: NORMAL
MONOCYTES NFR BLD: 0.63 K/UL (ref 0.1–1.2)
MONOCYTES NFR BLD: 12 % (ref 3–12)
MONOCYTES NFR FLD: 11 %
NEUTROPHILS NFR BLD: 66 % (ref 36–65)
NEUTROPHILS NFR FLD: 5 %
NEUTS SEG NFR BLD: 3.63 K/UL (ref 1.5–8.1)
NRBC BLD-RTO: 0 PER 100 WBC
NUC CELL # FLD: 148 CELLS/UL
PLATELET # BLD AUTO: 382 K/UL (ref 138–453)
PMV BLD AUTO: 9.4 FL (ref 8.1–13.5)
POTASSIUM SERPL-SCNC: 3 MMOL/L (ref 3.7–5.3)
POTASSIUM SERPL-SCNC: 3.9 MMOL/L (ref 3.7–5.3)
PROT FLD-MCNC: 2 G/DL
PROT SERPL-MCNC: 6 G/DL (ref 6.6–8.7)
PROTHROMBIN TIME: 14.5 SEC (ref 11.7–14.9)
RBC # BLD AUTO: 2.41 M/UL (ref 3.95–5.11)
RBC # BLD: ABNORMAL 10*6/UL
RBC # FLD: <3000 CELLS/UL
SERVICE CMNT-IMP: ABNORMAL
SERVICE CMNT-IMP: NORMAL
SERVICE CMNT-IMP: NORMAL
SODIUM SERPL-SCNC: 137 MMOL/L (ref 136–145)
SPECIMEN DESCRIPTION: ABNORMAL
SPECIMEN DESCRIPTION: NORMAL
SPECIMEN DESCRIPTION: NORMAL
SPECIMEN TYPE: NORMAL
WBC OTHER # BLD: 5.5 K/UL (ref 3.5–11.3)

## 2024-07-15 PROCEDURE — 2060000000 HC ICU INTERMEDIATE R&B

## 2024-07-15 PROCEDURE — 89051 BODY FLUID CELL COUNT: CPT

## 2024-07-15 PROCEDURE — 82945 GLUCOSE OTHER FLUID: CPT

## 2024-07-15 PROCEDURE — 99232 SBSQ HOSP IP/OBS MODERATE 35: CPT | Performed by: INTERNAL MEDICINE

## 2024-07-15 PROCEDURE — 6370000000 HC RX 637 (ALT 250 FOR IP): Performed by: PHYSICIAN ASSISTANT

## 2024-07-15 PROCEDURE — 71045 X-RAY EXAM CHEST 1 VIEW: CPT

## 2024-07-15 PROCEDURE — 2700000000 HC OXYGEN THERAPY PER DAY

## 2024-07-15 PROCEDURE — 6360000002 HC RX W HCPCS: Performed by: HOSPITALIST

## 2024-07-15 PROCEDURE — 2709999900 US THORACENTESIS

## 2024-07-15 PROCEDURE — 0W993ZZ DRAINAGE OF RIGHT PLEURAL CAVITY, PERCUTANEOUS APPROACH: ICD-10-PCS | Performed by: RADIOLOGY

## 2024-07-15 PROCEDURE — 94640 AIRWAY INHALATION TREATMENT: CPT

## 2024-07-15 PROCEDURE — 83615 LACTATE (LD) (LDH) ENZYME: CPT

## 2024-07-15 PROCEDURE — 84132 ASSAY OF SERUM POTASSIUM: CPT

## 2024-07-15 PROCEDURE — 83735 ASSAY OF MAGNESIUM: CPT

## 2024-07-15 PROCEDURE — 94660 CPAP INITIATION&MGMT: CPT

## 2024-07-15 PROCEDURE — 85025 COMPLETE CBC W/AUTO DIFF WBC: CPT

## 2024-07-15 PROCEDURE — 6360000002 HC RX W HCPCS: Performed by: PHYSICIAN ASSISTANT

## 2024-07-15 PROCEDURE — 6370000000 HC RX 637 (ALT 250 FOR IP): Performed by: INTERNAL MEDICINE

## 2024-07-15 PROCEDURE — 99233 SBSQ HOSP IP/OBS HIGH 50: CPT | Performed by: HOSPITALIST

## 2024-07-15 PROCEDURE — 85610 PROTHROMBIN TIME: CPT

## 2024-07-15 PROCEDURE — 84157 ASSAY OF PROTEIN OTHER: CPT

## 2024-07-15 PROCEDURE — 36415 COLL VENOUS BLD VENIPUNCTURE: CPT

## 2024-07-15 PROCEDURE — 80076 HEPATIC FUNCTION PANEL: CPT

## 2024-07-15 PROCEDURE — 80048 BASIC METABOLIC PNL TOTAL CA: CPT

## 2024-07-15 PROCEDURE — 6370000000 HC RX 637 (ALT 250 FOR IP): Performed by: HOSPITALIST

## 2024-07-15 PROCEDURE — 51798 US URINE CAPACITY MEASURE: CPT

## 2024-07-15 PROCEDURE — 6370000000 HC RX 637 (ALT 250 FOR IP): Performed by: NURSE PRACTITIONER

## 2024-07-15 PROCEDURE — 6360000002 HC RX W HCPCS: Performed by: INTERNAL MEDICINE

## 2024-07-15 PROCEDURE — 99233 SBSQ HOSP IP/OBS HIGH 50: CPT | Performed by: INTERNAL MEDICINE

## 2024-07-15 PROCEDURE — 51702 INSERT TEMP BLADDER CATH: CPT

## 2024-07-15 PROCEDURE — 2580000003 HC RX 258: Performed by: PHYSICIAN ASSISTANT

## 2024-07-15 PROCEDURE — 94761 N-INVAS EAR/PLS OXIMETRY MLT: CPT

## 2024-07-15 RX ORDER — FLUCONAZOLE 200 MG/1
200 TABLET ORAL DAILY
Status: COMPLETED | OUTPATIENT
Start: 2024-07-15 | End: 2024-07-17

## 2024-07-15 RX ORDER — LEVALBUTEROL INHALATION SOLUTION 1.25 MG/3ML
1.25 SOLUTION RESPIRATORY (INHALATION)
Status: DISCONTINUED | OUTPATIENT
Start: 2024-07-15 | End: 2024-07-18 | Stop reason: HOSPADM

## 2024-07-15 RX ORDER — POTASSIUM CHLORIDE 20 MEQ/1
40 TABLET, EXTENDED RELEASE ORAL ONCE
Status: COMPLETED | OUTPATIENT
Start: 2024-07-15 | End: 2024-07-15

## 2024-07-15 RX ADMIN — FLECAINIDE ACETATE 50 MG: 50 TABLET ORAL at 20:44

## 2024-07-15 RX ADMIN — AMLODIPINE BESYLATE 10 MG: 10 TABLET ORAL at 09:01

## 2024-07-15 RX ADMIN — SODIUM CHLORIDE, PRESERVATIVE FREE 10 ML: 5 INJECTION INTRAVENOUS at 22:09

## 2024-07-15 RX ADMIN — LEVALBUTEROL HYDROCHLORIDE 1.25 MG: 1.25 SOLUTION RESPIRATORY (INHALATION) at 19:41

## 2024-07-15 RX ADMIN — LEVALBUTEROL HYDROCHLORIDE 1.25 MG: 1.25 SOLUTION RESPIRATORY (INHALATION) at 07:12

## 2024-07-15 RX ADMIN — SODIUM CHLORIDE, PRESERVATIVE FREE 10 ML: 5 INJECTION INTRAVENOUS at 09:02

## 2024-07-15 RX ADMIN — FUROSEMIDE 80 MG: 40 TABLET ORAL at 09:00

## 2024-07-15 RX ADMIN — Medication 2000 MG: at 22:09

## 2024-07-15 RX ADMIN — APIXABAN 2.5 MG: 5 TABLET, FILM COATED ORAL at 20:44

## 2024-07-15 RX ADMIN — CARVEDILOL 25 MG: 25 TABLET, FILM COATED ORAL at 09:02

## 2024-07-15 RX ADMIN — ATORVASTATIN CALCIUM 80 MG: 80 TABLET, FILM COATED ORAL at 20:44

## 2024-07-15 RX ADMIN — POTASSIUM CHLORIDE 40 MEQ: 1500 TABLET, EXTENDED RELEASE ORAL at 17:32

## 2024-07-15 RX ADMIN — CARVEDILOL 25 MG: 25 TABLET, FILM COATED ORAL at 17:33

## 2024-07-15 RX ADMIN — FLUCONAZOLE 200 MG: 200 TABLET ORAL at 10:44

## 2024-07-15 RX ADMIN — LACTULOSE 20 G: 20 SOLUTION ORAL at 09:02

## 2024-07-15 RX ADMIN — FLECAINIDE ACETATE 50 MG: 50 TABLET ORAL at 09:03

## 2024-07-15 RX ADMIN — HYDRALAZINE HYDROCHLORIDE 25 MG: 50 TABLET ORAL at 17:32

## 2024-07-15 RX ADMIN — POTASSIUM CHLORIDE 40 MEQ: 1500 TABLET, EXTENDED RELEASE ORAL at 13:38

## 2024-07-15 RX ADMIN — HYDRALAZINE HYDROCHLORIDE 25 MG: 50 TABLET ORAL at 09:01

## 2024-07-15 RX ADMIN — PANTOPRAZOLE SODIUM 40 MG: 40 TABLET, DELAYED RELEASE ORAL at 09:02

## 2024-07-15 RX ADMIN — POTASSIUM CHLORIDE 40 MEQ: 1500 TABLET, EXTENDED RELEASE ORAL at 09:00

## 2024-07-15 ASSESSMENT — ENCOUNTER SYMPTOMS
ABDOMINAL PAIN: 0
DIARRHEA: 0
VOMITING: 0
WHEEZING: 0
NAUSEA: 0
SHORTNESS OF BREATH: 1
COUGH: 0

## 2024-07-15 ASSESSMENT — PAIN SCALES - GENERAL
PAINLEVEL_OUTOF10: 0

## 2024-07-15 NOTE — PLAN OF CARE
Problem: Chronic Conditions and Co-morbidities  Goal: Patient's chronic conditions and co-morbidity symptoms are monitored and maintained or improved  7/14/2024 1857 by Milligan, Matthew, RN  Outcome: Progressing  Flowsheets (Taken 7/13/2024 0739)  Care Plan - Patient's Chronic Conditions and Co-Morbidity Symptoms are Monitored and Maintained or Improved:   Monitor and assess patient's chronic conditions and comorbid symptoms for stability, deterioration, or improvement   Collaborate with multidisciplinary team to address chronic and comorbid conditions and prevent exacerbation or deterioration   Update acute care plan with appropriate goals if chronic or comorbid symptoms are exacerbated and prevent overall improvement and discharge     Problem: Discharge Planning  Goal: Discharge to home or other facility with appropriate resources  7/14/2024 1857 by Milligan, Matthew, RN  Outcome: Progressing  Flowsheets (Taken 7/13/2024 0720)  Discharge to home or other facility with appropriate resources:   Identify barriers to discharge with patient and caregiver   Identify discharge learning needs (meds, wound care, etc)   Refer to discharge planning if patient needs post-hospital services based on physician order or complex needs related to functional status, cognitive ability or social support system   Arrange for needed discharge resources and transportation as appropriate     Problem: Respiratory - Adult  Goal: Achieves optimal ventilation and oxygenation  7/14/2024 1857 by Milligan, Matthew, RN  Outcome: Progressing  Flowsheets (Taken 7/14/2024 1102)  Achieves optimal ventilation and oxygenation: Assess for changes in respiratory status  7/14/2024 1830 by Kerline Calero RCP  Outcome: Progressing

## 2024-07-15 NOTE — BRIEF OP NOTE
Brief Postoperative Note for Thoracentesis    Daja Espinosa  YOB: 1960  [id number]    Pre-operative Diagnosis: right pleural effusion      Post-operative Diagnosis: Same    Procedure: Ultrasound guided thoracentesis     Anesthesia: 1% Lidocaine     Surgeons/Assistants: Oli Day PA-C    Complications: none    EBL: Minimal    Specimens: Were obtained    Ultrasound guided right thoracentesis performed. 850 ml clear yellow fluid obtained. Dressing applied.      Electronically signed by COLEMAN Conner on 7/15/2024 at 12:08 PM

## 2024-07-15 NOTE — RT PROTOCOL NOTE
RT Nebulizer Bronchodilator Protocol Note    There is a bronchodilator order in the chart from a provider indicating to follow the RT Bronchodilator Protocol and there is an “Initiate RT Bronchodilator Protocol” order as well (see protocol at bottom of note).    CXR Findings:  XR CHEST PORTABLE    Result Date: 7/15/2024  No evidence of pneumothorax status post right thoracentesis       The findings from the last RT Protocol Assessment were as follows:  Smoking: Smoker 15 pack years or more  Respiratory Pattern: Dyspnea on exertion or RR 21-25 bpm  Breath Sounds: Slightly diminished and/or crackles  Cough: Strong, spontaneous, non-productive  Indication for Bronchodilator Therapy:    Bronchodilator Assessment Score: 5    Aerosolized bronchodilator medication orders have been revised according to the RT Nebulizer Bronchodilator Protocol below.    Respiratory Therapist to perform RT Therapy Protocol Assessment initially then follow the protocol.  Repeat RT Therapy Protocol Assessment PRN for score 0-3 or on second treatment, BID, and PRN for scores above 3.    No Indications - adjust the frequency to every 6 hours PRN wheezing or bronchospasm, if no treatments needed after 48 hours then discontinue using Per Protocol order mode.     If indication present, adjust the RT bronchodilator orders based on the Bronchodilator Assessment Score as indicated below.  If a patient is on this medication at home then do not decrease Frequency below that used at home.    0-3 - enter or revise RT bronchodilator order(s) to equivalent RT Bronchodilator order with Frequency of every 4 hours PRN for wheezing or increased work of breathing using Per Protocol order mode.       4-6 - enter or revise RT Bronchodilator order(s) to two equivalent RT bronchodilator orders with one order with BID Frequency and one order with Frequency of every 4 hours PRN wheezing or increased work of breathing using Per Protocol order mode.         7-10 - enter

## 2024-07-15 NOTE — PLAN OF CARE
Problem: Respiratory - Adult  Goal: Achieves optimal ventilation and oxygenation  7/15/2024 0714 by Lyssa Real RCP  Outcome: Progressing  Flowsheets (Taken 7/15/2024 0712)  Achieves optimal ventilation and oxygenation:   Assess for changes in respiratory status   Respiratory therapy support as indicated

## 2024-07-15 NOTE — CARE COORDINATION
Transitional Planning  Called Donato at [facility], states patient left hospital before walker was delivered.  Will need new order and face to face.      1207 pm PS Dr. Pate requested order for walker and face to face.     245 pm Spoke with patient, states that she does not want to wait for a walker to be deliver to hospital. Would like order and she will get as an outpatient.

## 2024-07-16 LAB
ANION GAP SERPL CALCULATED.3IONS-SCNC: 12 MMOL/L (ref 9–16)
BUN SERPL-MCNC: 32 MG/DL (ref 8–23)
CALCIUM SERPL-MCNC: 9 MG/DL (ref 8.6–10.4)
CASE NUMBER:: NORMAL
CHLORIDE SERPL-SCNC: 101 MMOL/L (ref 98–107)
CO2 SERPL-SCNC: 23 MMOL/L (ref 20–31)
CREAT SERPL-MCNC: 1.8 MG/DL (ref 0.5–0.9)
GFR, ESTIMATED: 31 ML/MIN/1.73M2
GLUCOSE SERPL-MCNC: 117 MG/DL (ref 74–99)
POTASSIUM SERPL-SCNC: 4 MMOL/L (ref 3.7–5.3)
SODIUM SERPL-SCNC: 136 MMOL/L (ref 136–145)
SPECIMEN DESCRIPTION: NORMAL
SURGICAL PATHOLOGY REPORT: NORMAL

## 2024-07-16 PROCEDURE — 94660 CPAP INITIATION&MGMT: CPT

## 2024-07-16 PROCEDURE — 51798 US URINE CAPACITY MEASURE: CPT

## 2024-07-16 PROCEDURE — 99232 SBSQ HOSP IP/OBS MODERATE 35: CPT | Performed by: INTERNAL MEDICINE

## 2024-07-16 PROCEDURE — 6370000000 HC RX 637 (ALT 250 FOR IP): Performed by: PHYSICIAN ASSISTANT

## 2024-07-16 PROCEDURE — 36415 COLL VENOUS BLD VENIPUNCTURE: CPT

## 2024-07-16 PROCEDURE — 6370000000 HC RX 637 (ALT 250 FOR IP): Performed by: NURSE PRACTITIONER

## 2024-07-16 PROCEDURE — 2060000000 HC ICU INTERMEDIATE R&B

## 2024-07-16 PROCEDURE — 2580000003 HC RX 258: Performed by: PHYSICIAN ASSISTANT

## 2024-07-16 PROCEDURE — 6360000002 HC RX W HCPCS: Performed by: INTERNAL MEDICINE

## 2024-07-16 PROCEDURE — 6370000000 HC RX 637 (ALT 250 FOR IP): Performed by: INTERNAL MEDICINE

## 2024-07-16 PROCEDURE — 94640 AIRWAY INHALATION TREATMENT: CPT

## 2024-07-16 PROCEDURE — 99233 SBSQ HOSP IP/OBS HIGH 50: CPT | Performed by: INTERNAL MEDICINE

## 2024-07-16 PROCEDURE — 6360000002 HC RX W HCPCS: Performed by: HOSPITALIST

## 2024-07-16 PROCEDURE — 99232 SBSQ HOSP IP/OBS MODERATE 35: CPT | Performed by: HOSPITALIST

## 2024-07-16 PROCEDURE — 80048 BASIC METABOLIC PNL TOTAL CA: CPT

## 2024-07-16 RX ORDER — FUROSEMIDE 40 MG/1
40 TABLET ORAL 2 TIMES DAILY
Status: DISCONTINUED | OUTPATIENT
Start: 2024-07-16 | End: 2024-07-17

## 2024-07-16 RX ADMIN — POTASSIUM CHLORIDE 40 MEQ: 1500 TABLET, EXTENDED RELEASE ORAL at 08:43

## 2024-07-16 RX ADMIN — APIXABAN 2.5 MG: 5 TABLET, FILM COATED ORAL at 21:10

## 2024-07-16 RX ADMIN — FLECAINIDE ACETATE 50 MG: 50 TABLET ORAL at 22:05

## 2024-07-16 RX ADMIN — FUROSEMIDE 40 MG: 40 TABLET ORAL at 21:10

## 2024-07-16 RX ADMIN — ATORVASTATIN CALCIUM 80 MG: 80 TABLET, FILM COATED ORAL at 21:10

## 2024-07-16 RX ADMIN — HYDRALAZINE HYDROCHLORIDE 25 MG: 50 TABLET ORAL at 08:44

## 2024-07-16 RX ADMIN — APIXABAN 2.5 MG: 5 TABLET, FILM COATED ORAL at 08:45

## 2024-07-16 RX ADMIN — HYDRALAZINE HYDROCHLORIDE 25 MG: 50 TABLET ORAL at 18:58

## 2024-07-16 RX ADMIN — SODIUM CHLORIDE, PRESERVATIVE FREE 10 ML: 5 INJECTION INTRAVENOUS at 21:11

## 2024-07-16 RX ADMIN — PANTOPRAZOLE SODIUM 40 MG: 40 TABLET, DELAYED RELEASE ORAL at 08:44

## 2024-07-16 RX ADMIN — CARVEDILOL 25 MG: 25 TABLET, FILM COATED ORAL at 18:58

## 2024-07-16 RX ADMIN — SODIUM CHLORIDE, PRESERVATIVE FREE 10 ML: 5 INJECTION INTRAVENOUS at 08:45

## 2024-07-16 RX ADMIN — FLUCONAZOLE 200 MG: 200 TABLET ORAL at 08:47

## 2024-07-16 RX ADMIN — FLECAINIDE ACETATE 50 MG: 50 TABLET ORAL at 08:47

## 2024-07-16 RX ADMIN — Medication 2000 MG: at 21:11

## 2024-07-16 RX ADMIN — AMLODIPINE BESYLATE 10 MG: 10 TABLET ORAL at 08:45

## 2024-07-16 RX ADMIN — CARVEDILOL 25 MG: 25 TABLET, FILM COATED ORAL at 08:44

## 2024-07-16 RX ADMIN — LEVALBUTEROL HYDROCHLORIDE 1.25 MG: 1.25 SOLUTION RESPIRATORY (INHALATION) at 21:51

## 2024-07-16 RX ADMIN — LACTULOSE 20 G: 20 SOLUTION ORAL at 08:43

## 2024-07-16 ASSESSMENT — ENCOUNTER SYMPTOMS
WHEEZING: 0
DIARRHEA: 0
VOMITING: 0
SHORTNESS OF BREATH: 1
COUGH: 0
ABDOMINAL PAIN: 0
NAUSEA: 0

## 2024-07-16 ASSESSMENT — PAIN SCALES - GENERAL: PAINLEVEL_OUTOF10: 0

## 2024-07-16 NOTE — PLAN OF CARE
Problem: Safety - Adult  Goal: Free from fall injury  7/16/2024 1143 by Alexey Mirza RN  Outcome: Progressing  7/16/2024 0534 by Micha Arciniega RN  Outcome: Progressing  Flowsheets (Taken 7/15/2024 1900)  Free From Fall Injury: Instruct family/caregiver on patient safety     Problem: Chronic Conditions and Co-morbidities  Goal: Patient's chronic conditions and co-morbidity symptoms are monitored and maintained or improved  7/16/2024 1143 by Alexey Mirza RN  Outcome: Progressing  7/16/2024 0534 by Micha Arciniega RN  Outcome: Progressing     Problem: Discharge Planning  Goal: Discharge to home or other facility with appropriate resources  7/16/2024 1143 by Alexey Mirza RN  Outcome: Progressing  7/16/2024 0534 by Micha Acriniega RN  Outcome: Progressing     Problem: ABCDS Injury Assessment  Goal: Absence of physical injury  7/16/2024 1143 by Alexey Mirza RN  Outcome: Progressing  7/16/2024 0534 by Micha Arciniega RN  Outcome: Progressing  Flowsheets (Taken 7/15/2024 1900)  Absence of Physical Injury: Implement safety measures based on patient assessment     Problem: Respiratory - Adult  Goal: Achieves optimal ventilation and oxygenation  Outcome: Progressing

## 2024-07-16 NOTE — PLAN OF CARE
Problem: Safety - Adult  Goal: Free from fall injury  Outcome: Progressing  Flowsheets (Taken 7/15/2024 1900)  Free From Fall Injury: Instruct family/caregiver on patient safety     Problem: Chronic Conditions and Co-morbidities  Goal: Patient's chronic conditions and co-morbidity symptoms are monitored and maintained or improved  Outcome: Progressing     Problem: Discharge Planning  Goal: Discharge to home or other facility with appropriate resources  Outcome: Progressing     Problem: Respiratory - Adult  Goal: Achieves optimal ventilation and oxygenation  Recent Flowsheet Documentation  Taken 7/15/2024 1941 by Brando Rios RCP  Achieves optimal ventilation and oxygenation:   Respiratory therapy support as indicated   Assess and instruct to report shortness of breath or any respiratory difficulty   Assess the need for suctioning and aspirate as needed   Encourage broncho-pulmonary hygiene including cough, deep breathe, incentive spirometry   Initiate smoking cessation protocol as indicated   Oxygen supplementation based on oxygen saturation or arterial blood gases   Position to facilitate oxygenation and minimize respiratory effort   Assess for changes in mentation and behavior   Assess for changes in respiratory status

## 2024-07-17 ENCOUNTER — APPOINTMENT (OUTPATIENT)
Dept: INTERVENTIONAL RADIOLOGY/VASCULAR | Age: 64
DRG: 139 | End: 2024-07-17
Attending: STUDENT IN AN ORGANIZED HEALTH CARE EDUCATION/TRAINING PROGRAM
Payer: COMMERCIAL

## 2024-07-17 LAB
ANION GAP SERPL CALCULATED.3IONS-SCNC: 13 MMOL/L (ref 9–16)
BASOPHILS # BLD: 0.03 K/UL (ref 0–0.2)
BASOPHILS NFR BLD: 1 % (ref 0–2)
BUN SERPL-MCNC: 34 MG/DL (ref 8–23)
CALCIUM SERPL-MCNC: 8.7 MG/DL (ref 8.6–10.4)
CHLORIDE SERPL-SCNC: 100 MMOL/L (ref 98–107)
CO2 SERPL-SCNC: 21 MMOL/L (ref 20–31)
CREAT SERPL-MCNC: 1.8 MG/DL (ref 0.5–0.9)
EOSINOPHIL # BLD: 0.38 K/UL (ref 0–0.44)
EOSINOPHILS RELATIVE PERCENT: 7 % (ref 1–4)
ERYTHROCYTE [DISTWIDTH] IN BLOOD BY AUTOMATED COUNT: 15.2 % (ref 11.8–14.4)
GFR, ESTIMATED: 32 ML/MIN/1.73M2
GLUCOSE SERPL-MCNC: 106 MG/DL (ref 74–99)
HCT VFR BLD AUTO: 24.7 % (ref 36.3–47.1)
HGB BLD-MCNC: 7.8 G/DL (ref 11.9–15.1)
IMM GRANULOCYTES # BLD AUTO: <0.03 K/UL (ref 0–0.3)
IMM GRANULOCYTES NFR BLD: 0 %
LYMPHOCYTES NFR BLD: 0.77 K/UL (ref 1.1–3.7)
LYMPHOCYTES RELATIVE PERCENT: 13 % (ref 24–43)
MAGNESIUM SERPL-MCNC: 1.8 MG/DL (ref 1.6–2.4)
MCH RBC QN AUTO: 32.1 PG (ref 25.2–33.5)
MCHC RBC AUTO-ENTMCNC: 31.6 G/DL (ref 28.4–34.8)
MCV RBC AUTO: 101.6 FL (ref 82.6–102.9)
MONOCYTES NFR BLD: 0.57 K/UL (ref 0.1–1.2)
MONOCYTES NFR BLD: 10 % (ref 3–12)
NEUTROPHILS NFR BLD: 69 % (ref 36–65)
NEUTS SEG NFR BLD: 3.99 K/UL (ref 1.5–8.1)
NRBC BLD-RTO: 0 PER 100 WBC
PLATELET # BLD AUTO: 365 K/UL (ref 138–453)
PMV BLD AUTO: 9.5 FL (ref 8.1–13.5)
POTASSIUM SERPL-SCNC: 3.3 MMOL/L (ref 3.7–5.3)
RBC # BLD AUTO: 2.43 M/UL (ref 3.95–5.11)
RBC # BLD: ABNORMAL 10*6/UL
SODIUM SERPL-SCNC: 134 MMOL/L (ref 136–145)
WBC OTHER # BLD: 5.8 K/UL (ref 3.5–11.3)

## 2024-07-17 PROCEDURE — 80048 BASIC METABOLIC PNL TOTAL CA: CPT

## 2024-07-17 PROCEDURE — 6370000000 HC RX 637 (ALT 250 FOR IP): Performed by: INTERNAL MEDICINE

## 2024-07-17 PROCEDURE — 99232 SBSQ HOSP IP/OBS MODERATE 35: CPT | Performed by: INTERNAL MEDICINE

## 2024-07-17 PROCEDURE — 2060000000 HC ICU INTERMEDIATE R&B

## 2024-07-17 PROCEDURE — 6360000002 HC RX W HCPCS: Performed by: INTERNAL MEDICINE

## 2024-07-17 PROCEDURE — 6370000000 HC RX 637 (ALT 250 FOR IP): Performed by: PHYSICIAN ASSISTANT

## 2024-07-17 PROCEDURE — 6370000000 HC RX 637 (ALT 250 FOR IP): Performed by: NURSE PRACTITIONER

## 2024-07-17 PROCEDURE — 99232 SBSQ HOSP IP/OBS MODERATE 35: CPT | Performed by: HOSPITALIST

## 2024-07-17 PROCEDURE — 36415 COLL VENOUS BLD VENIPUNCTURE: CPT

## 2024-07-17 PROCEDURE — 2580000003 HC RX 258: Performed by: PHYSICIAN ASSISTANT

## 2024-07-17 PROCEDURE — 83735 ASSAY OF MAGNESIUM: CPT

## 2024-07-17 PROCEDURE — 85025 COMPLETE CBC W/AUTO DIFF WBC: CPT

## 2024-07-17 RX ORDER — POTASSIUM CHLORIDE 20 MEQ/1
40 TABLET, EXTENDED RELEASE ORAL ONCE
Status: COMPLETED | OUTPATIENT
Start: 2024-07-17 | End: 2024-07-17

## 2024-07-17 RX ORDER — FUROSEMIDE 40 MG/1
40 TABLET ORAL DAILY
Status: DISCONTINUED | OUTPATIENT
Start: 2024-07-18 | End: 2024-07-18 | Stop reason: HOSPADM

## 2024-07-17 RX ORDER — MAGNESIUM SULFATE IN WATER 40 MG/ML
2000 INJECTION, SOLUTION INTRAVENOUS ONCE
Status: COMPLETED | OUTPATIENT
Start: 2024-07-17 | End: 2024-07-17

## 2024-07-17 RX ADMIN — SODIUM CHLORIDE, PRESERVATIVE FREE 10 ML: 5 INJECTION INTRAVENOUS at 20:55

## 2024-07-17 RX ADMIN — LACTULOSE 20 G: 20 SOLUTION ORAL at 08:12

## 2024-07-17 RX ADMIN — ATORVASTATIN CALCIUM 80 MG: 80 TABLET, FILM COATED ORAL at 20:51

## 2024-07-17 RX ADMIN — Medication 2000 MG: at 20:55

## 2024-07-17 RX ADMIN — HYDRALAZINE HYDROCHLORIDE 25 MG: 50 TABLET ORAL at 08:11

## 2024-07-17 RX ADMIN — FLECAINIDE ACETATE 50 MG: 50 TABLET ORAL at 20:51

## 2024-07-17 RX ADMIN — PANTOPRAZOLE SODIUM 40 MG: 40 TABLET, DELAYED RELEASE ORAL at 08:11

## 2024-07-17 RX ADMIN — FUROSEMIDE 40 MG: 40 TABLET ORAL at 08:14

## 2024-07-17 RX ADMIN — HYDRALAZINE HYDROCHLORIDE 25 MG: 50 TABLET ORAL at 16:18

## 2024-07-17 RX ADMIN — CARVEDILOL 25 MG: 25 TABLET, FILM COATED ORAL at 16:18

## 2024-07-17 RX ADMIN — APIXABAN 2.5 MG: 5 TABLET, FILM COATED ORAL at 20:52

## 2024-07-17 RX ADMIN — AMLODIPINE BESYLATE 10 MG: 10 TABLET ORAL at 08:11

## 2024-07-17 RX ADMIN — MAGNESIUM SULFATE HEPTAHYDRATE 2000 MG: 40 INJECTION, SOLUTION INTRAVENOUS at 11:54

## 2024-07-17 RX ADMIN — SODIUM CHLORIDE, PRESERVATIVE FREE 10 ML: 5 INJECTION INTRAVENOUS at 08:12

## 2024-07-17 RX ADMIN — POTASSIUM CHLORIDE 40 MEQ: 1500 TABLET, EXTENDED RELEASE ORAL at 11:49

## 2024-07-17 RX ADMIN — FLUCONAZOLE 200 MG: 200 TABLET ORAL at 08:14

## 2024-07-17 RX ADMIN — APIXABAN 2.5 MG: 5 TABLET, FILM COATED ORAL at 08:11

## 2024-07-17 RX ADMIN — CARVEDILOL 25 MG: 25 TABLET, FILM COATED ORAL at 08:11

## 2024-07-17 RX ADMIN — FLECAINIDE ACETATE 50 MG: 50 TABLET ORAL at 08:14

## 2024-07-17 ASSESSMENT — ENCOUNTER SYMPTOMS
WHEEZING: 0
SHORTNESS OF BREATH: 1
DIARRHEA: 0
COUGH: 0
NAUSEA: 0
ABDOMINAL PAIN: 0
VOMITING: 0

## 2024-07-17 NOTE — CARE COORDINATION
Transitional Planning  Call from Liyah Bonds CM update provided. Would like dc summary faxed to [phone].  Telephone number [phone].  Plan is home with spouse, need script for walker, patient plans on getting it outpt.

## 2024-07-17 NOTE — PLAN OF CARE
Problem: Safety - Adult  Goal: Free from fall injury  Outcome: Progressing     Problem: Chronic Conditions and Co-morbidities  Goal: Patient's chronic conditions and co-morbidity symptoms are monitored and maintained or improved  Outcome: Progressing     Problem: Discharge Planning  Goal: Discharge to home or other facility with appropriate resources  Outcome: Progressing     Problem: ABCDS Injury Assessment  Goal: Absence of physical injury  Outcome: Progressing     Problem: Respiratory - Adult  Goal: Achieves optimal ventilation and oxygenation  Outcome: Progressing     Problem: Pain  Goal: Verbalizes/displays adequate comfort level or baseline comfort level  Outcome: Progressing

## 2024-07-18 ENCOUNTER — TELEPHONE (OUTPATIENT)
Dept: ONCOLOGY | Age: 64
End: 2024-07-18

## 2024-07-18 VITALS
RESPIRATION RATE: 21 BRPM | HEART RATE: 73 BPM | DIASTOLIC BLOOD PRESSURE: 83 MMHG | WEIGHT: 138.89 LBS | TEMPERATURE: 98.1 F | OXYGEN SATURATION: 97 % | BODY MASS INDEX: 25.4 KG/M2 | SYSTOLIC BLOOD PRESSURE: 139 MMHG

## 2024-07-18 LAB
ANION GAP SERPL CALCULATED.3IONS-SCNC: 12 MMOL/L (ref 9–16)
BASOPHILS # BLD: 0.03 K/UL (ref 0–0.2)
BASOPHILS NFR BLD: 1 % (ref 0–2)
BUN SERPL-MCNC: 37 MG/DL (ref 8–23)
CALCIUM SERPL-MCNC: 8.7 MG/DL (ref 8.6–10.4)
CHLORIDE SERPL-SCNC: 99 MMOL/L (ref 98–107)
CO2 SERPL-SCNC: 22 MMOL/L (ref 20–31)
CREAT SERPL-MCNC: 1.7 MG/DL (ref 0.5–0.9)
EOSINOPHIL # BLD: 0.44 K/UL (ref 0–0.44)
EOSINOPHILS RELATIVE PERCENT: 7 % (ref 1–4)
ERYTHROCYTE [DISTWIDTH] IN BLOOD BY AUTOMATED COUNT: 15.1 % (ref 11.8–14.4)
GFR, ESTIMATED: 34 ML/MIN/1.73M2
GLUCOSE SERPL-MCNC: 96 MG/DL (ref 74–99)
HCT VFR BLD AUTO: 23.8 % (ref 36.3–47.1)
HGB BLD-MCNC: 7.4 G/DL (ref 11.9–15.1)
IMM GRANULOCYTES # BLD AUTO: <0.03 K/UL (ref 0–0.3)
IMM GRANULOCYTES NFR BLD: 0 %
LYMPHOCYTES NFR BLD: 0.89 K/UL (ref 1.1–3.7)
LYMPHOCYTES RELATIVE PERCENT: 15 % (ref 24–43)
MCH RBC QN AUTO: 31.1 PG (ref 25.2–33.5)
MCHC RBC AUTO-ENTMCNC: 31.1 G/DL (ref 28.4–34.8)
MCV RBC AUTO: 100 FL (ref 82.6–102.9)
MONOCYTES NFR BLD: 0.69 K/UL (ref 0.1–1.2)
MONOCYTES NFR BLD: 11 % (ref 3–12)
NEUTROPHILS NFR BLD: 66 % (ref 36–65)
NEUTS SEG NFR BLD: 4.06 K/UL (ref 1.5–8.1)
NRBC BLD-RTO: 0 PER 100 WBC
PLATELET # BLD AUTO: 351 K/UL (ref 138–453)
PMV BLD AUTO: 9.4 FL (ref 8.1–13.5)
POTASSIUM SERPL-SCNC: 3.7 MMOL/L (ref 3.7–5.3)
RBC # BLD AUTO: 2.38 M/UL (ref 3.95–5.11)
RBC # BLD: ABNORMAL 10*6/UL
SODIUM SERPL-SCNC: 133 MMOL/L (ref 136–145)
WBC OTHER # BLD: 6.1 K/UL (ref 3.5–11.3)

## 2024-07-18 PROCEDURE — 99232 SBSQ HOSP IP/OBS MODERATE 35: CPT | Performed by: INTERNAL MEDICINE

## 2024-07-18 PROCEDURE — 94761 N-INVAS EAR/PLS OXIMETRY MLT: CPT

## 2024-07-18 PROCEDURE — 6360000002 HC RX W HCPCS: Performed by: NURSE PRACTITIONER

## 2024-07-18 PROCEDURE — 6370000000 HC RX 637 (ALT 250 FOR IP): Performed by: PHYSICIAN ASSISTANT

## 2024-07-18 PROCEDURE — 2580000003 HC RX 258: Performed by: PHYSICIAN ASSISTANT

## 2024-07-18 PROCEDURE — 6360000002 HC RX W HCPCS: Performed by: HOSPITALIST

## 2024-07-18 PROCEDURE — 94640 AIRWAY INHALATION TREATMENT: CPT

## 2024-07-18 PROCEDURE — 36415 COLL VENOUS BLD VENIPUNCTURE: CPT

## 2024-07-18 PROCEDURE — 6370000000 HC RX 637 (ALT 250 FOR IP): Performed by: INTERNAL MEDICINE

## 2024-07-18 PROCEDURE — 85025 COMPLETE CBC W/AUTO DIFF WBC: CPT

## 2024-07-18 PROCEDURE — 2700000000 HC OXYGEN THERAPY PER DAY

## 2024-07-18 PROCEDURE — 80048 BASIC METABOLIC PNL TOTAL CA: CPT

## 2024-07-18 PROCEDURE — 99239 HOSP IP/OBS DSCHRG MGMT >30: CPT | Performed by: HOSPITALIST

## 2024-07-18 RX ORDER — FUROSEMIDE 40 MG/1
40 TABLET ORAL DAILY
Qty: 60 TABLET | Refills: 3 | Status: SHIPPED | OUTPATIENT
Start: 2024-07-19

## 2024-07-18 RX ADMIN — SODIUM CHLORIDE, PRESERVATIVE FREE 10 ML: 5 INJECTION INTRAVENOUS at 08:05

## 2024-07-18 RX ADMIN — LEVALBUTEROL HYDROCHLORIDE 1.25 MG: 1.25 SOLUTION RESPIRATORY (INHALATION) at 09:47

## 2024-07-18 RX ADMIN — APIXABAN 2.5 MG: 5 TABLET, FILM COATED ORAL at 08:05

## 2024-07-18 RX ADMIN — CARVEDILOL 25 MG: 25 TABLET, FILM COATED ORAL at 08:05

## 2024-07-18 RX ADMIN — LACTULOSE 20 G: 20 SOLUTION ORAL at 08:05

## 2024-07-18 RX ADMIN — AMLODIPINE BESYLATE 10 MG: 10 TABLET ORAL at 08:05

## 2024-07-18 RX ADMIN — Medication 2000 MG: at 14:31

## 2024-07-18 RX ADMIN — HYDRALAZINE HYDROCHLORIDE 25 MG: 50 TABLET ORAL at 08:05

## 2024-07-18 RX ADMIN — FLECAINIDE ACETATE 50 MG: 50 TABLET ORAL at 08:07

## 2024-07-18 RX ADMIN — PANTOPRAZOLE SODIUM 40 MG: 40 TABLET, DELAYED RELEASE ORAL at 05:27

## 2024-07-18 RX ADMIN — FUROSEMIDE 40 MG: 40 TABLET ORAL at 08:07

## 2024-07-18 ASSESSMENT — PAIN SCALES - GENERAL
PAINLEVEL_OUTOF10: 0
PAINLEVEL_OUTOF10: 0

## 2024-07-18 NOTE — DISCHARGE SUMMARY
[facility]  Office: [phone]  Deven Louis DO, Rashid Martinez DO, Koko House DO, Jc Wheat DO, Bahman Silva MD, Radha Joseph MD, Volodymyr Abad MD, Karyn Purvis MD,  Siva Helton MD, Jigna Emery MD, Yusef Lopez DO, Mayur Tinajero MD,  Danni Lamas DO, Gayle Guzmán MD, Micha Sosa MD, Lloyd Louis DO, No Lutz MD, Franky Trinh MD, Ganesh Lee DO, Brittani Francisco MD, Sherrell Hickey MD, Kayla Roberson MD, Macarena Martin MD,  Talib Rodriguez DO, Liban Williamson MD,  Shirley Waterhouse, CNP,  Savanah Diez, CNP, Aviva Hartman, CNP, Arias Lares, CNP,  Brenda Daniels, MALENA, Phoebe Morris, CNP, Morelia Howard, CNP, Isela Montaño, CNP, Meaghan Rogers, CNP, Neris Del Toro, CNP, Jeanmarie Petersen PA-C, Marj Figueroa, CNS, Keri Clayton, CNP, Anahi Carnes, CNP         [facility]   IN-PATIENT SERVICE   [facility]    Discharge Summary     Patient ID: Daja Espinosa  :  1960   MRN: [id number]     ACCOUNT:  [id number]   Patient's PCP: Sohail Garcia MD  Admit Date: 2024   Discharge Date: 2024     Length of Stay: 6  Code Status:  Prior  Admitting Physician: No admitting provider for patient encounter.  Discharge Physician: ANEESH MISTRY MD     Active Discharge Diagnoses:     Hospital Problem Lists:  Principal Problem:    Acute on chronic respiratory failure (HCC)  Active Problems:    Atrial fibrillation with rapid ventricular response (HCC)    Hypertension, essential    Dyslipidemia    Hyponatremia    Urothelial carcinoma of bladder (HCC)    Coronary artery disease of native heart with stable angina pectoris (HCC)    Macrocytic anemia    Acute kidney injury superimposed on CKD (HCC)    Community acquired pneumonia of right lower lobe of lung    Paroxysmal atrial fibrillation (HCC)    Obstructive uropathy    Biventricular congestive heart failure (HCC)    Malignant neoplasm of urinary bladder

## 2024-07-18 NOTE — PLAN OF CARE
Problem: Safety - Adult  Goal: Free from fall injury  Outcome: Progressing     Problem: Chronic Conditions and Co-morbidities  Goal: Patient's chronic conditions and co-morbidity symptoms are monitored and maintained or improved  Outcome: Progressing  Flowsheets (Taken 7/17/2024 0801 by Rylee Martinez, RN)  Care Plan - Patient's Chronic Conditions and Co-Morbidity Symptoms are Monitored and Maintained or Improved:   Monitor and assess patient's chronic conditions and comorbid symptoms for stability, deterioration, or improvement   Collaborate with multidisciplinary team to address chronic and comorbid conditions and prevent exacerbation or deterioration   Update acute care plan with appropriate goals if chronic or comorbid symptoms are exacerbated and prevent overall improvement and discharge     Problem: Discharge Planning  Goal: Discharge to home or other facility with appropriate resources  Outcome: Progressing  Flowsheets (Taken 7/17/2024 0801 by Rylee Martinez, RN)  Discharge to home or other facility with appropriate resources:   Identify barriers to discharge with patient and caregiver   Arrange for needed discharge resources and transportation as appropriate   Identify discharge learning needs (meds, wound care, etc)     Problem: ABCDS Injury Assessment  Goal: Absence of physical injury  Outcome: Progressing  Flowsheets (Taken 7/18/2024 0100)  Absence of Physical Injury: Implement safety measures based on patient assessment     Problem: Respiratory - Adult  Goal: Achieves optimal ventilation and oxygenation  Outcome: Progressing  Flowsheets (Taken 7/17/2024 0801 by Rylee Martinez, RN)  Achieves optimal ventilation and oxygenation:   Assess for changes in respiratory status   Assess for changes in mentation and behavior     Problem: Pain  Goal: Verbalizes/displays adequate comfort level or baseline comfort level  Outcome: Progressing  Flowsheets (Taken 7/17/2024 0800 by Rylee Martinez

## 2024-07-18 NOTE — TELEPHONE ENCOUNTER
Name: Daja Espinosa  : 1960  MRN: [id number]    Oncology Navigation Follow-Up Note    Contact Type:  Inpatient    Notes: Chart reviewed.  Pt remains inpatient at [facility].  Pt is receiving antibiotic treatment.  Plan is to discharge home with spouse. Follow up with Dr. Shepard scheduled for 24.  Will await discharge and facilitate change in appointment if needed.     Electronically signed by Ave Arreaga RN on 2024 at 8:28 AM

## 2024-07-18 NOTE — PROGRESS NOTES
Jeremy Arellano Santacroce, Khan, Mostafa, Buck, Murtagh Jr  Urology Progress Note     Chief Complaint: Retained left ureteral stent    Subjective:  No acute events overnight, afebrile, vital signs stable  Pain level: None  Denies fever, chills, nausea, vomiting, chest pain, shortness of breath  Left nephrostomy tube draining clear yellow urine, dennis in place    WBC: 5.5 (6.1)  Hgb: 7.5 (7.9)  Cr: 4.9 (2.1)    Patient Vitals for the past 24 hrs:   BP Temp Temp src Pulse Resp SpO2   07/15/24 0400 123/73 97.2 °F (36.2 °C) Axillary 68 18 100 %   07/15/24 0304 137/76 -- -- 68 15 100 %   07/15/24 0052 -- -- -- 69 19 100 %   07/14/24 2357 116/61 98.5 °F (36.9 °C) Axillary 67 17 100 %   07/14/24 2315 -- -- -- 65 16 98 %   07/14/24 2116 (!) 141/80 -- -- -- -- --   07/14/24 2049 -- -- -- 63 22 96 %   07/14/24 1953 133/73 98.8 °F (37.1 °C) Oral 68 23 99 %   07/14/24 1700 126/76 -- -- 64 23 99 %   07/14/24 1500 138/70 98.4 °F (36.9 °C) Oral 66 20 95 %   07/14/24 1400 124/67 -- -- 65 17 99 %   07/14/24 1200 135/75 -- -- 63 20 95 %   07/14/24 1140 -- -- -- 64 14 98 %   07/14/24 1126 131/67 98.4 °F (36.9 °C) Oral 65 11 100 %   07/14/24 1100 (!) 142/72 -- -- 64 15 98 %   07/14/24 1000 125/70 -- -- 65 19 99 %   07/14/24 0900 134/78 -- -- 72 19 100 %   07/14/24 0825 (!) 147/79 98.4 °F (36.9 °C) Axillary 73 17 100 %   07/14/24 0800 (!) 147/79 -- -- 70 15 100 %       Intake/Output Summary (Last 24 hours) at 7/15/2024 0656  Last data filed at 7/15/2024 0628  Gross per 24 hour   Intake 480 ml   Output 3075 ml   Net -2595 ml       Recent Labs     07/13/24  0247 07/14/24  0835 07/15/24  0410   WBC 6.3 6.1 5.5   HGB 7.8* 7.9* 7.5*   HCT 25.1* 25.3* 23.8*   .0 101.2 98.8    398 382     Recent Labs     07/13/24  0247 07/14/24  0835 07/15/24  0410   * 134* 137   K 4.2 3.5* 3.0*    99 101   CO2 20 21 22   BUN 41* 37* 36*   CREATININE 2.2* 2.1* 1.9*       Recent Labs     07/12/24  2243   COLORU Yellow   PHUR 6.0 
    Today's Date: 7/16/2024  Patient Name: Daja Espinosa  Date of admission: 7/12/2024  6:59 PM  Patient's age: 63 y.o., 1960  Admission Dx: Acute-on-chronic respiratory failure (HCC) [J96.20]  Acute on chronic respiratory failure (HCC) [J96.20]    Subjective:   Patient seen and examined.  Labs and vitals reviewed.  She is hemodynamically stable, afebrile, tolerating 2 L nasal cannula  No labs to review today  No acute overnight events.  Tolerating Eliquis, no signs of bleeding.  Antibiotics continue for pneumonia, Rocephin until 7/18    History of Present Illness:      The patient is a 63 y.o. female who is admitted to the hospital for management of progressive respiratory failure.  She is known to us with  high-grade urothelial cancer undergoing chemotherapy  She received 1 cycle of chemotherapy and since then she has been in and out of the hospital with recurrent issues.  Unfortunately she has not been able to get any further chemotherapy.  She is anticoagulation for  thromboembolism. She is a known patient of Dr. 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  Embolic stroke on 4/6/2024  Concurrent chemo RT(plan for continuous neoadjuvant chemotherapy abandoned as patient not surgical candidate)  Chronic kidney disease     Hematology oncology treatment  Cisplatin gemcitabine neoadjuvant started on March 19, 2024 every 3 weeks status post 1 cycle and have stopped and the plan changed to concurrent chemo RT   Discussed the case with the  and radiation oncology with recent stroke patient would not be candidate for surgery per  and the plan for concurrent chemo RT, chemotherapy will be in the form of biweekly gemcitabine 27 mg/m² which is started on Johana 10, 2024   Eliquis    Past Medical History:   has a past medical history of Alcohol abuse, Arthritis, Bladder cancer (HCC), CAD (coronary 
   07/15/24 1941   Care Plan - Respiratory Goals   Achieves optimal ventilation and oxygenation Respiratory therapy support as indicated;Assess and instruct to report shortness of breath or any respiratory difficulty;Assess the need for suctioning and aspirate as needed;Encourage broncho-pulmonary hygiene including cough, deep breathe, incentive spirometry;Initiate smoking cessation protocol as indicated;Oxygen supplementation based on oxygen saturation or arterial blood gases;Position to facilitate oxygenation and minimize respiratory effort;Assess for changes in mentation and behavior;Assess for changes in respiratory status       
  [facility]  Progress Note    Daja Espinosa  MRN: [id number]  Date: 7/13/2024  LOS: 1     Reason for F/U :   Right lower lobe pneumonia    Impression :   Acute on chronic respiratory failure with  Urothelial carcinoma of the  Acute kidney injury on chronic kidney disease felt secondary to acute tubular necrosis  Paroxysmal atrial fibrillation  Bilateral lower extremity edema  Possible right lower lobe pneumonia    Recommendations:   Continue to venous antimicrobial therapy with Rocephin  The patient continues on BiPAP/high flow O2 by nasal cannula currently on 50% FiO2  Continue supportive care for now    Infection Control Recommendations:   Universal precaution    Discharge Planning:   Estimated Length of IV antimicrobials: To be determined  Patient will need Midline Catheter Insertion/ PICC line Insertion: No  Patient will need: Home IV , Infusion Center,  SNF,  LTAC: Undetermined  Patient willneed outpatient wound care: No    Medical Decision making / Summary of Stay:   Daja Espinosa is a 63 y.o.-year-old  female who was initially admitted on 7/12/2024. Patient seen at the request of Dr. Calderon.     INITIAL HISTORY:     Patient admitted to [facility] on 7-10-24 and transferred to [facility] on 7-12-24.     Patient with PMHX of:  Carcinoma of the bladder   Undergoing radiation therapy  Lt nephrostomy tube placement  HEAVENLY on CKD  ATN  PAF  Bilateral leg edema  CAD with prior MI  PAD  PVD  Prior CVA     Patient had been admitted to [facility] on 6-14-24 with pneumonia, pleural effusion with subsequent Rt side thoracentesis with removal of 1.4 L fluid, HEAVENLY requiring 3 sessions of HD, Lt nephrostomy tube placement.     She reports undergoing radiation therapy for bladder Ca at Home. Then developing progressive SOB requiring increases in her home 02 support. Admitted to [facility] on 7-10-24 and now transferred to [facility].     ID service asked to evaluate and advice on treatment. 
  [facility]  Progress Note    Daja Espinosa  MRN: [id number]  Date: 2024  LOS: 2     Reason for F/U :   Right lower lobe pneumonia    Impression :   Acute on chronic respiratory failure  RLL pneumonia   Recent R pleural effusion and thoracentesis  Now increasing dyspnea  Urothelial carcinoma of the bladder post xtx  Post L PCNT placement 2024  Acute kidney injury on chronic kidney disease felt secondary to acute tubular necrosis  Paroxysmal atrial fibrillation  Bilateral lower extremity edema    Recommendations:   Rocephin for  pneumonia   Clinically better and ess cough, less NC o2  Plan AB till     Infection Control Recommendations:   Universal precaution      Medical Decision making / Summary of Stay:   Daja Espinosa is a 63 y.o.-year-old  female who was initially admitted on 2024. Patient seen at the request of Dr. Caldreon.     INITIAL HISTORY:     Patient admitted to [facility] on 7-10-24 and transferred to [facility] on 24.     Patient with PMHX of:  Carcinoma of the bladder   Undergoing radiation therapy  Lt nephrostomy tube placement  HEAVENLY on CKD  ATN  PAF  Bilateral leg edema  CAD with prior MI  PAD  PVD  Prior CVA     Patient had been admitted to [facility] on 24 with pneumonia, pleural effusion with subsequent Rt side thoracentesis with removal of 1.4 L fluid, HEAVENLY requiring 3 sessions of HD, Lt nephrostomy tube placement.     She reports undergoing radiation therapy for bladder Ca at [facility]. Then developing progressive SOB requiring increases in her home 02 support. Admitted to [facility] on 7-10-24 and now transferred to [facility].     ID service asked to evaluate and advice on treatment.     Interval changes    /70   Pulse 66   Temp 98.4 °F (36.9 °C) (Oral)   Resp 20   LMP 2010   SpO2 95%     Temperature Range: Temp: 98.4 °F (36.9 °C) Temp  Av.1 °F (36.7 °C)  Min: 97.7 °F (36.5 °C)  Max: 98.4 °F (36.9 
  Physician Progress Note      PATIENT:               OTILIA KO  [facility] #:                  [id number]  :                       1960  ADMIT DATE:       2024 6:59 PM  DISCH DATE:  RESPONDING  PROVIDER #:        Gerardo Manriquez MD          QUERY TEXT:    Pt admitted with acute respiratory failure and fluid overload and has CHF   documented. If possible, please document further specificity regarding the   type and acuity of CHF:    The medical record reflects the following:  Risk Factors: CHF, A fib, HTN,  RLL PNA    Clinical Indicators: BNP 9,339. CXR: Small pleural effusions with adjacent   atelectasis, Focal airspace opacity in the right lung base, and Mild pulmonary   edema   Nephrology progress note: Decompensated SOB due to fluid retention.    Patient was not on any diuretics.  She responded well with IV Lasix. Echo   24 EF 60%    Treatment: Nephrology consult, IV Lasix, daily weights.    Thank-you,  Keri Pettit RN, CDS  [email or web address]  Options provided:  -- Acute on Chronic Diastolic CHF/HFpEF  -- Acute Diastolic CHF/HFpEF  -- Chronic Diastolic CHF/HFpEF  -- Other - I will add my own diagnosis  -- Disagree - Not applicable / Not valid  -- Disagree - Clinically unable to determine / Unknown  -- Refer to Clinical Documentation Reviewer    PROVIDER RESPONSE TEXT:    This patient has chronic diastolic CHF/HFpEF.    Query created by: Keri Pettit on 7/15/2024 7:54 AM      Electronically signed by:  Gerardo Manriquez MD 7/15/2024 5:38 PM          
 PULMONARY PROGRESS NOTE      Patient:  Daja Espinosa  YOB: 1960    MRN: [id number]     Acct: [id number]     Admit date: 7/12/2024    REASON FOR CONSULT:- Acute on chronic hypoxic respiratory failure     Pt seen and Chart reviewed.    Subjective:     64 yo F with past medical h/o hypertension, COPD, bladder cancer received BC treatments was in remission till March 2024 she is currently undergoing chemotherapy, Afib on anticoagulation therapy for PAD     [facility], increasing abdominal and pedal edema, CT scan of the chest shows mild areas of upper lobe groundglass change mild basilar atelectasis along with effusion .  She was transferred to [facility] for IR evaluation for thoracentesis.      She was also admitted recently for respiratory failure last month, where she was started on Zosyn for pneumonia, which was further complicated by A-fib with RVR, show pleural effusion, HEAVENLY on CKD, left nephrostomy tube placement and she also required.     She is on 2 L oxygen at home which she started last night. She is on rocephin     Echo on 4/2024 shows an EF 60%        Interval History  7/15/2024    No acute events overnight  3 L urine output over the past 24 hrs  -3.9 L since admission  No acute complaints  Afebrile, hemodynamically stable, maintaining saturations on 4 L nasal cannula  She reports that she is feeling much better, good appetite, tolerating regular diet    Had IR guided thoracentesis today 850 mL removed clear yellow fluid      Review of Systems -   Review of Systems   Constitutional:  Positive for fatigue. Negative for fever.   Respiratory:  Positive for shortness of breath. Negative for cough.    Gastrointestinal:  Negative for abdominal pain, diarrhea, nausea and vomiting.   Musculoskeletal:  Negative for arthralgias and myalgias.   Neurological:  Negative for dizziness.   Psychiatric/Behavioral:  Negative for agitation.            Physical Exam:  Vitals: BP 
 PULMONARY PROGRESS NOTE      Patient:  Daja Espinosa  YOB: 1960    MRN: [id number]     Acct: [id number]     Admit date: 7/12/2024    REASON FOR CONSULT:- Acute on chronic hypoxic respiratory failure     Pt seen and Chart reviewed.    Subjective:     64 yo F with past medical h/o hypertension, COPD, bladder cancer received BC treatments was in remission till March 2024 she is currently undergoing chemotherapy, Afib on anticoagulation therapy for PAD     [facility], increasing abdominal and pedal edema, CT scan of the chest shows mild areas of upper lobe groundglass change mild basilar atelectasis along with effusion .  She was transferred to [facility] for IR evaluation for thoracentesis.      She was also admitted recently for respiratory failure last month, where she was started on Zosyn for pneumonia, which was further complicated by A-fib with RVR, show pleural effusion, HEAVENLY on CKD, left nephrostomy tube placement and she also required.     She is on 2 L oxygen at home which she started last night. She is on rocephin     Echo on 4/2024 shows an EF 60%        Interval History  7/16/2024    No acute events overnight  3 L urine output over the past 24 hrs  -3.9 L since admission  No acute complaints  Afebrile, hemodynamically stable, maintaining saturations on 4 L nasal cannula  She reports that she is feeling much better, good appetite, tolerating regular diet    Had IR guided thoracentesis today 850 mL removed clear yellow fluid. Its negative for malignancy, pleural fluid consistent with transudate fluid      Review of Systems -   Review of Systems   Constitutional:  Positive for fatigue. Negative for fever.   Respiratory:  Positive for shortness of breath. Negative for cough.    Gastrointestinal:  Negative for abdominal pain, diarrhea, nausea and vomiting.   Musculoskeletal:  Negative for arthralgias and myalgias.   Neurological:  Negative for dizziness. 
CLINICAL PHARMACY NOTE: MEDS TO BEDS    Total # of Prescriptions Filled: 1   The following medications were delivered to the patient:  Furosemide     Additional Documentation:    
[facility] - Progress Note    Today's Date and Time: 7/15/2024, 8:09 AM    Impression :    Acute on chronic respiratory failure with hypoxia (HCC)   Urothelial carcinoma of bladder (HCC)   Acute kidney injury superimposed on CKD (HCC)   Paroxysmal atrial fibrillation (HCC)   ATN (acute tubular necrosis) (HCC)   Bilateral lower extremity edema   Possible RLL pneumonia    Recommendations:     DC meropenem.   Ceftriaxone 2 gm IV q 24 h until 7/18/24 for RLL PNA  Diflucan 200 mg daily x 3 days for candida urine    Medical Decision Making/Summary/Discussion:7/15/2024     Daily Elements of Decision Making provided by Consulting Physician:    Note: I have independently performed the steps listed below as part of the medical decision making and evaluation.    Review of current Problems:  Today I am seeing the patient for the following problems:  Acute on chronic respiratory failure with hypoxia (HCC)    Urothelial carcinoma of bladder (HCC)    Acute kidney injury superimposed on CKD (HCC)    Paroxysmal atrial fibrillation (HCC)    ATN (acute tubular necrosis) (HCC)    Bilateral lower extremity edema  Possible RLL pneumonia  Evaluation of Patient:  Patient with CHF  Acute hypoxic respiratory failure  Evaluated for concern possible RLL pneumonia  Please see daily details in Interval Changes Section  Changes in physical exam:  Acute hypoxic resp failure  CHF  Please see daily details in Physical Exam section and in Interval Changes Section  Changes in ROS:  Unchanged  Please see daily details in Review of Symptoms section and in Interval Changes Section  Discussion with Referring Physician or Service  Dr. Pate  Laboratory and Radiologic Studies with personal review of radiologic studies  Laboratory  Radiology  Microbiology  Please see Details in daily Interval Changes Section devoted to Lab, Micro and Radiology and in Medical Decision Laboratory, Imaging and Cultures 
PHARMACY NOTE:    The electrolyte replacement protocol for potassium/magnesium has been discontinued per P&T guidelines because the patient has reduced renal function (CrCl < 30 mL/min).      The patient's most recent potassium & magnesium levels are:  Recent Labs     07/13/24  0247 07/14/24  0835 07/15/24  0410   K 4.2 3.5* 3.0*   MG  --  2.3 2.0     Estimated Creatinine Clearance: 26 mL/min (A) (based on SCr of 1.9 mg/dL (H)).    For patients with decreased renal function (below 30ml/min) needing potassium/magnesium supplementation, please order individual bolus doses with appropriate monitoring.      Please contact the inpatient pharmacy with any concerns.  Thank you.    Olga Gonzalez, PharmD, BCCCP 7/15/2024 2:03 PM     
PT HAD RIGHT THORACENTESIS TODAY. 850 ML OF CLEAR YELLOW PLEURAL FLUID REMOVED. PT TOLERATED WELL WITH STABLE SA02 THROUGHOUT PROCEDURE.  2X2 GAUZE AND TEGADERM TO CATHETER SITE CLEAN AND DRY. SPECIMEN SENT TO LAB. REPORT TO FLOOR. RESP UNLABORED. READY FOR TRANSPORT.  
Patient requested to be taken off her Bipap, placed on 4L of oxygen via nasal cannula, O2 sat at 100%.   
Pt states she does not have her home oxygen tank, and lives 100 miles away. She states she does not need it for her ride. Pt understands the risks. Her room spo2 was 98% before leaving.   
Renal Progress Note    Patient :  Daja Espinosa; 63 y.o. MRN# [id number]  Location:    Attending:  Gerardo Pate*  Admit Date:  2024   Hospital Day: 4    Subjective   Patient has awake and alert and comfortable on room air sitting up in bed.  She underwent thoracentesis yesterday as documented in the record.  850 mL of clear yellow fluid removed from the right pleural space.  It was performed by IR.    -No acute overnight events.  -At the time of evaluation, patient was saturating well on 2 L.  She reported significant improvement in breathing.  No crackles or wheezing heard on auscultation.  -Patient is currently not on any fluids.  Urine output has been 1.35 L.  She has a left nephrostomy bag.  She is currently not on any diuretics.  Diuretics were DC'd yesterday.    Labs this a.m. show sodium 136, potassium 4.0, bicarb 23, creatinine 1.8.    Objective     VS: BP (!) 142/82   Pulse 77   Temp 98.6 °F (37 °C) (Oral)   Resp 25   LMP 2010   SpO2 99%   MAXIMUM TEMPERATURE OVER 24HRS:  Temp (24hrs), Av.1 °F (36.7 °C), Min:96.8 °F (36 °C), Max:98.7 °F (37.1 °C)    24HR BLOOD PRESSURE RANGE:  Systolic (24hrs), Av , Min:111 , Max:154   ; Diastolic (24hrs), Av, Min:73, Max:92    24HR INTAKE/OUTPUT:    Intake/Output Summary (Last 24 hours) at 2024 1141  Last data filed at 2024 0850  Gross per 24 hour   Intake --   Output 1350 ml   Net -1350 ml     WEIGHT :No data found.    Current Medications:     Scheduled Meds:    fluconazole  200 mg Oral Daily    levalbuterol  1.25 mg Nebulization BID RT    potassium chloride  40 mEq Oral BID WC    [Held by provider] furosemide  80 mg Oral BID    lactulose  20 g Oral Daily    sodium chloride flush  5-40 mL IntraVENous 2 times per day    amLODIPine  10 mg Oral Daily    apixaban  2.5 mg Oral BID    atorvastatin  80 mg Oral Nightly    carvedilol  25 mg Oral BID WC    flecainide  50 mg Oral 2 times per day    hydrALAZINE  25 mg Oral 
Renal Progress Note    Patient :  Daja Espinosa; 63 y.o. MRN# [id number]  Location:    Attending:  Brittani Francisco MD  Admit Date:  2024   Hospital Day: 2    Subjective   Patient was seen and examined.  Patient was sitting comfortably.  She was on nasal cannula.  She is saturating 96%.  She received 2 doses of Lasix that resulted in diuresis of 1.2 L.  She is putting out mostly from her left nephrostomy.  There is no urine output from [place].  Urology evaluation noted.    No acute events overnight.  Vital signs stable   -1.1 L since admission, UROP over last 24 hrs 1.2 L.     No labs to review at this time. Order in EMR.     Chart reviewed.  ID continuing Rocephin at this time RLL PNA.  Pulm consulted. Continues on BiPAP and/or high suzanne is ordered as well.  Oncology/Hematology consulted.     Objective     VS: /80   Pulse 70   Temp 97.7 °F (36.5 °C) (Axillary)   Resp 14   LMP 2010   SpO2 100%   MAXIMUM TEMPERATURE OVER 24HRS:  Temp (24hrs), Av °F (36.7 °C), Min:97.7 °F (36.5 °C), Max:98.4 °F (36.9 °C)    24HR BLOOD PRESSURE RANGE:  Systolic (24hrs), Av , Min:102 , Max:145   ; Diastolic (24hrs), Av, Min:76, Max:94    24HR INTAKE/OUTPUT:    Intake/Output Summary (Last 24 hours) at 2024 0726  Last data filed at 2024 0244  Gross per 24 hour   Intake 400 ml   Output 1225 ml   Net -825 ml     WEIGHT :No data found.    Current Medications:     Scheduled Meds:    furosemide  40 mg IntraVENous BID    lactulose  20 g Oral Daily    sodium chloride flush  5-40 mL IntraVENous 2 times per day    amLODIPine  10 mg Oral Daily    [Held by provider] apixaban  2.5 mg Oral BID    atorvastatin  80 mg Oral Nightly    carvedilol  25 mg Oral BID WC    flecainide  50 mg Oral 2 times per day    hydrALAZINE  25 mg Oral BID    levalbuterol  1.25 mg Nebulization 4x Daily    pantoprazole  40 mg Oral QAM AC    cefTRIAXone (ROCEPHIN) IV  2,000 mg IntraVENous Q24H     Continuous Infusions:    
Renal Progress Note    Patient :  Daja Espinosa; 63 y.o. MRN# [id number]  Location:    Attending:  Gerardo Pate*  Admit Date:  2024   Hospital Day: 3    Subjective   Patient has awake and alert and comfortable on room air sitting up in bed.  She underwent thoracentesis earlier today as documented in the record.  850 mL of clear yellow fluid removed from the right pleural space.  It was performed by IR.  She is putting out mostly from her left nephrostomy.  There is no urine output from [place].  Urology evaluation noted.    No acute events overnight.  She says her swelling is improving although she still has some mild lower extremity edema and upper extremity edema, especially in her bilateral forearm area.    Sodium is 137 with potassium 3 with supplementation given throughout the day, chloride 101, CO2 22, BUN 36 and creatinine 1.9 with magnesium 2 and calcium 8.5.  Albumin is 3.2.    Chart reviewed.  ID continuing Rocephin at this time RLL PNA.  Pulm consulted.    Oncology/Hematology consulted.     Objective     VS: /73   Pulse 64   Temp 98.6 °F (37 °C) (Oral)   Resp 20   LMP 2010   SpO2 99%   MAXIMUM TEMPERATURE OVER 24HRS:  Temp (24hrs), Av.2 °F (36.8 °C), Min:97.2 °F (36.2 °C), Max:98.8 °F (37.1 °C)    24HR BLOOD PRESSURE RANGE:  Systolic (24hrs), Av , Min:111 , Max:149   ; Diastolic (24hrs), Av, Min:61, Max:92    24HR INTAKE/OUTPUT:    Intake/Output Summary (Last 24 hours) at 7/15/2024 1750  Last data filed at 7/15/2024 1711  Gross per 24 hour   Intake --   Output 2925 ml   Net -2925 ml     WEIGHT :No data found.    Current Medications:     Scheduled Meds:    fluconazole  200 mg Oral Daily    levalbuterol  1.25 mg Nebulization BID RT    potassium chloride  40 mEq Oral BID WC    [Held by provider] furosemide  80 mg Oral BID    lactulose  20 g Oral Daily    sodium chloride flush  5-40 mL IntraVENous 2 times per day    amLODIPine  10 mg Oral Daily    apixaban  
Renal Progress Note    Patient :  Daja Espinosa; 63 y.o. MRN# [id number]  Location:  0104-  Attending:  Gerardo Pate*  Admit Date:  2024   Hospital Day: 6    Subjective     Patient was seen and examined.  No new issues reported overnight.    BMP results from today reviewed sodium 133, potassium 3.7, chloride 99, bicarb 22, calcium 8.7, BUN 37, creatinine improved to 1.7 mg/dl.  Urine output documented as 1.1 L in the last 24 hours.  Does have left-sided PCN tube in place.  Patient is now status post tunneled cath removal 2024.    She underwent thoracentesis 7/15/2024 as documented in the record.  850 mL of clear yellow fluid removed from the right pleural space.  It was performed by IR.    UTI with urine culture 2024 Positive for Candida.  Objective     VS: /71   Pulse 70   Temp 98 °F (36.7 °C)   Resp 23   Wt 63 kg (138 lb 14.2 oz)   LMP 2010   SpO2 97%   BMI 25.40 kg/m²   MAXIMUM TEMPERATURE OVER 24HRS:  Temp (24hrs), Av.2 °F (36.8 °C), Min:97.7 °F (36.5 °C), Max:99 °F (37.2 °C)    24HR BLOOD PRESSURE RANGE:  Systolic (24hrs), Av , Min:120 , Max:148   ; Diastolic (24hrs), Av, Min:71, Max:94    24HR INTAKE/OUTPUT:    Intake/Output Summary (Last 24 hours) at 2024 1008  Last data filed at 2024 0359  Gross per 24 hour   Intake --   Output 700 ml   Net -700 ml     WEIGHT :Patient Vitals for the past 96 hrs (Last 3 readings):   Weight   24 0600 63 kg (138 lb 14.2 oz)       Current Medications:     Scheduled Meds:    cefTRIAXone (ROCEPHIN) IV  2,000 mg IntraVENous Q24H    furosemide  40 mg Oral Daily    levalbuterol  1.25 mg Nebulization BID RT    lactulose  20 g Oral Daily    sodium chloride flush  5-40 mL IntraVENous 2 times per day    amLODIPine  10 mg Oral Daily    apixaban  2.5 mg Oral BID    atorvastatin  80 mg Oral Nightly    carvedilol  25 mg Oral BID WC    flecainide  50 mg Oral 2 times per day    hydrALAZINE  25 mg Oral BID    
[facility]  Office: [phone]  Deven Louis DO, Rashid Martinez DO, Koko House DO, Jc Wheat DO, Bahman Silva MD, Radha Joseph MD, Volodymyr Abad MD, Karyn Purvis MD,  Siva Helton MD, Jigna Emery MD, Mayur Tinajero MD,  Danni Lamas DO, Gayle Guzmán MD, Micha Sosa MD, Lloyd Louis DO, No Lutz MD,  Ganesh Lee DO, Brittani Francisco MD, Sherrell Hickey MD, Kayla Roberson MD, Macarena Martin MD,  Gerardo Pate MD, Daisy Stephenson MD, Kelly Connor MD, Clau Calderon MD, Aurelio Pérez MD, Eliceo Milligan MD, Talib Rodriguez DO, Yusef Lopez DO, Liban Williamson MD,  Franky Trinh MD, Shirley Waterhouse, CNP,  Savanah Diez CNP, Arias Lares, CNP,  Brenda Daniels, DNP, Phoebe Morris, CNP, Morelia Howard, CNP, Meaghan Rogers, CNP, Neris Del Toro, CNP, Krystal Morrow, PA-C, Ely Guevara PA-C, Nancy Tam, CNP, Marcy Simeon, CNP, Cody Steele, CNP, Aviva Hartman, CNP, Isela Montaño, CNP, Marj Figueroa, CNS, Keri Clayton, CNP, Anahi Carnes CNP, Tracy Schwab, CNP         [facility]   IN-PATIENT SERVICE   [facility]    Progress Note    7/17/2024    3:49 PM    Name:   Daja Espinosa  MRN:     [id number]     Acct:      [id number]   Room:   0104/0104-01   Day:  5  Admit Date:  7/12/2024  6:59 PM    PCP:   Sohail Garcia MD  Code Status:  Prior    Subjective:     C/C: No chief complaint on file.    Interval History Status: not changed.     Patient on NC.  Patient states she is feeling much better and is able to breathe much better.  She has no new complaints.    Brief History:     Daja Espinosa is a 63 y.o. Non- / non  female who presents with shortness of breath and is admitted to the hospital for the management of Acute on chronic respiratory failure (HCC).  She has a history significant for bladder cancer, atrial fibrillation, hypertension, hyperlipidemia, CAD, and CVA on Eliquis.     Patient presented 
  Eliquis    Past Medical History:   has a past medical history of Alcohol abuse, Arthritis, Bladder cancer (HCC), CAD (coronary artery disease), Cancer (HCC), Carotid artery occlusion, Carotid artery stenosis, Chronic back pain, History of chemotherapy, Hydronephrosis, Hyperlipidemia, Hypertension, Hypoglycemia, MI (myocardial infarction) (HCC), Peripheral vascular disease (HCC), Takayasu's arteritis (HCC), Tibial fracture, Umbilical hernia, Under care of team, Under care of team, Under care of team, Unspecified cerebral artery occlusion with cerebral infarction, and Wears partial dentures.    Past Surgical History:   has a past surgical history that includes Cardiac catheterization (12/2010); Cardiac surgery (1993); Vulva surgery (2001); vascular surgery; vascular surgery (5/12, 6/12); femoral bypass (5/12, 6/12); other surgical history (04/2016); Bladder surgery (05/17/2016); other surgical history; other surgical history; Colonoscopy (N/A, 07/22/2021); Upper gastrointestinal endoscopy (N/A, 07/22/2021); Colonoscopy (N/A, 11/01/2021); hernia repair; ventral hernia repair (N/A, 01/05/2022); Cystoscopy (N/A, 02/27/2024); Cystoscopy (Left, 02/27/2024); TUNNELED CENTRAL VENOUS CATHETER W/ SUBCUTANEOUS PORT (Right, 03/13/2024); IR PORT PLACEMENT < 5 YEARS (03/13/2024); Carotid angioplasty (Right); Cystocopy (Left, 04/02/2024); Anomalous venous return repair (N/A, 04/02/2024); Esophagogastroduodenoscopy (06/04/2024); Upper gastrointestinal endoscopy (N/A, 6/4/2024); IR GUIDED NEPHROSTOMY CATH PLACEMENT LEFT (6/18/2024); IR NONTUNNELED VASCULAR CATHETER > 5 YEARS (6/21/2024); and IR TUNNELED CVC PLACE WO SQ PORT/PUMP > 5 YEARS (6/25/2024).     Medications:    Prior to Admission medications    Medication Sig Start Date End Date Taking? Authorizing Provider   senna (SENOKOT) 8.6 MG tablet Take 1 tablet by mouth nightly as needed (for constipation) 7/12/24 11/9/24  Gail-Vijaya Mendoza, APRN - CNP   docusate sodium 
Arthritis, Bladder cancer (HCC), CAD (coronary artery disease), Cancer (HCC), Carotid artery occlusion, Carotid artery stenosis, Chronic back pain, History of chemotherapy, Hydronephrosis, Hyperlipidemia, Hypertension, Hypoglycemia, MI (myocardial infarction) (HCC), Peripheral vascular disease (HCC), Takayasu's arteritis (HCC), Tibial fracture, Umbilical hernia, Under care of team, Under care of team, Under care of team, Unspecified cerebral artery occlusion with cerebral infarction, and Wears partial dentures.    Past Surgical History:   has a past surgical history that includes Cardiac catheterization (12/2010); Cardiac surgery (1993); Vulva surgery (2001); vascular surgery; vascular surgery (5/12, 6/12); femoral bypass (5/12, 6/12); other surgical history (04/2016); Bladder surgery (05/17/2016); other surgical history; other surgical history; Colonoscopy (N/A, 07/22/2021); Upper gastrointestinal endoscopy (N/A, 07/22/2021); Colonoscopy (N/A, 11/01/2021); hernia repair; ventral hernia repair (N/A, 01/05/2022); Cystoscopy (N/A, 02/27/2024); Cystoscopy (Left, 02/27/2024); TUNNELED CENTRAL VENOUS CATHETER W/ SUBCUTANEOUS PORT (Right, 03/13/2024); IR PORT PLACEMENT < 5 YEARS (03/13/2024); Carotid angioplasty (Right); Cystocopy (Left, 04/02/2024); Anomalous venous return repair (N/A, 04/02/2024); Esophagogastroduodenoscopy (06/04/2024); Upper gastrointestinal endoscopy (N/A, 6/4/2024); IR GUIDED NEPHROSTOMY CATH PLACEMENT LEFT (6/18/2024); IR NONTUNNELED VASCULAR CATHETER > 5 YEARS (6/21/2024); and IR TUNNELED CVC PLACE WO SQ PORT/PUMP > 5 YEARS (6/25/2024).     Medications:    Prior to Admission medications    Medication Sig Start Date End Date Taking? Authorizing Provider   senna (SENOKOT) 8.6 MG tablet Take 1 tablet by mouth nightly as needed (for constipation) 7/12/24 11/9/24  Vijaya Robbins, APRN - CNP   docusate sodium (COLACE, DULCOLAX) 100 MG CAPS Take 100 mg by mouth 2 times daily as needed for 
available.    Assessment:     Acute on chronic kidney injury with improving, has been dialysis dependent since June 2024.  Previous baseline creatinine seems to range from from 1.2-1.6 mg/dl.  Patient was initiated on dialysis on 6/21/2024 had tunneled cath placement done at that time.  Decompensated shortness of breath due to fluid retention/plural effusion.  Patient was not on any diuretics.  She responded well with IV Lasix and now is status post thoracentesis  History of bladder cancer patient is currently receiving radiation therapy.  Malnutrition: Appetite has improved  Mild hyponatremia, hypervolemic, improving  Constipation - lactulose   Hypokalemia -improvement with supplementation  Obstructive uropathy status post left PCN tube placement, 6/18/2024.  Right-sided pleural effusions with cystocentesis.  Left-sided hydronephrosis status post cystoscopy and stent placement 1/2024 follows up with urology.  History of CVA.  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  Follows up with Dr. Chan.    Plan:   Will restart Lasix at 40 mg p.o. daily.  Potassium and magnesium replacements ordered for today.  Okay to restart home dose potassium upon discharge.  Will get tunneled cath removal done today.  Okay discharge from nephrology standpoint once electrolytes are replaced and tunneled catheter removed.  BMP in 1 week post discharge and fax results to Dr. Chan office; Fax # [phone].  Follow up with Dr. Chan in 3-4 weeks post d/c.     Nutrition   Regular diet    Alex Silva MD   [facility]    This note is created with the assistance of a speech-recognition program. While intending to generate a document that actually reflects the content of the visit, no guarantees can be provided that every mistake has been identified and corrected by editing.    
 8.6 8.6 8.9   PROBNP 9,339*  --   --   --   --    TROPHS 43* 42*  --   --   --      Recent Labs     07/10/24  1414 07/11/24  0510 07/12/24  0519   AST 19 16 17   ALT 18 14 14   LDH  --  320*  --    ALKPHOS 109* 93 91   BILITOT 0.6 0.7 0.6     ABG:  Lab Results   Component Value Date/Time    POCPH 7.438 06/16/2024 06:44 AM    POCPCO2 30.9 06/16/2024 06:44 AM    POCPO2 41.0 06/16/2024 06:44 AM    POCHCO3 20.9 06/16/2024 06:44 AM    NBEA 2.8 06/16/2024 06:44 AM    EZEA2UHG 79.0 06/16/2024 06:44 AM    FIO2 100 07/10/2024 02:56 PM     Lab Results   Component Value Date/Time    SPECIAL 20ML RIGHT ARM 07/10/2024 02:16 PM     Lab Results   Component Value Date/Time    CULTURE NO GROWTH 3 DAYS 07/10/2024 02:16 PM       Radiology:  US RENAL COMPLETE    Result Date: 7/12/2024  1. No hydronephrosis. 2. Small amount of free fluid. 3. Possible right pleural effusion.     CT CHEST WO CONTRAST    Result Date: 7/12/2024  1. Interval improvement in multifocal pneumonia when compared to 06/16/2024. Mild residual areas of ground-glass attenuation persists predominately in the right upper lobe with a few subtle subcentimeter nodular opacities.  A short-term follow-up CT is recommended to ensure complete resolution. 2. Improving mediastinal adenopathy. 3. Persistent moderate right with small to moderate left pleural effusions. 4. Anasarca.     US CHEST INCLUDING MEDIASTINUM    Result Date: 7/11/2024  Bilateral pleural effusions greater on the right.     XR CHEST PORTABLE    Result Date: 7/10/2024  1. Mild cardiomegaly with mild vascular congestive changes.  Mild interstitial edema. 2. Small to moderate bilateral pleural effusions, with dense atelectasis at the lung bases. 3. Findings are similar on previous exam.       Physical Examination:        Physical Exam  Vitals and nursing note reviewed.   Constitutional:       Appearance: She is ill-appearing.   Cardiovascular:      Rate and Rhythm: Normal rate and regular rhythm.   Pulmonary: 
137   K 4.2 3.5* 3.0*    99 101   CO2 20 21 22   GLUCOSE 104* 104* 93   BUN 41* 37* 36*   CREATININE 2.2* 2.1* 1.9*   MG  --  2.3 2.0   ANIONGAP 12 14 14   LABGLOM 24* 26* 29*   CALCIUM 8.9 8.8 8.5*       Recent Labs     07/15/24  0410   AST 19   ALT 13      ALKPHOS 84   BILITOT 0.3   BILIDIR <0.2       ABG:  Lab Results   Component Value Date/Time    POCPH 7.438 06/16/2024 06:44 AM    POCPCO2 30.9 06/16/2024 06:44 AM    POCPO2 41.0 06/16/2024 06:44 AM    POCHCO3 20.9 06/16/2024 06:44 AM    NBEA 2.8 06/16/2024 06:44 AM    BKMB0SLC 79.0 06/16/2024 06:44 AM    FIO2 100 07/10/2024 02:56 PM     Lab Results   Component Value Date/Time    SPECIAL Site: Urine 07/13/2024 10:44 AM     Lab Results   Component Value Date/Time    CULTURE Kalli albicans/dubliniensis >100,000 CFU/ML (A) 07/13/2024 10:44 AM       Radiology:  US RENAL COMPLETE    Result Date: 7/12/2024  1. No hydronephrosis. 2. Small amount of free fluid. 3. Possible right pleural effusion.     CT CHEST WO CONTRAST    Result Date: 7/12/2024  1. Interval improvement in multifocal pneumonia when compared to 06/16/2024. Mild residual areas of ground-glass attenuation persists predominately in the right upper lobe with a few subtle subcentimeter nodular opacities.  A short-term follow-up CT is recommended to ensure complete resolution. 2. Improving mediastinal adenopathy. 3. Persistent moderate right with small to moderate left pleural effusions. 4. Anasarca.     US CHEST INCLUDING MEDIASTINUM    Result Date: 7/11/2024  Bilateral pleural effusions greater on the right.     XR CHEST PORTABLE    Result Date: 7/10/2024  1. Mild cardiomegaly with mild vascular congestive changes.  Mild interstitial edema. 2. Small to moderate bilateral pleural effusions, with dense atelectasis at the lung bases. 3. Findings are similar on previous exam.       Physical Examination:        Physical Exam  Vitals and nursing note reviewed.   Constitutional:       Appearance: She 
cannot be ruled out.     C3:     Lab Results   Component Value Date/Time    C3 130 06/04/2024 06:09 PM     C4:     Lab Results   Component Value Date/Time    C4 19 06/04/2024 06:09 PM     MPO ANCA:   No results found for: \"MPO\"  PR3 ANCA:   No results found for: \"PR3\"  Anti-GBM:   No results found for: \"GBMABIGG\"  Hep BsAg:         Lab Results   Component Value Date/Time    HEPBSAG NONREACTIVE 06/04/2024 06:20 PM     Hep C AB:          Lab Results   Component Value Date/Time    HEPCAB REACTIVE 06/04/2024 06:20 PM       Urinalysis/Chemistries:      Lab Results   Component Value Date/Time    NITRU NEGATIVE 07/12/2024 10:43 PM    COLORU Yellow 07/12/2024 10:43 PM    PHUR 6.0 07/12/2024 10:43 PM    PHUR 6.5 02/15/2024 01:47 PM    WBCUA TOO NUMEROUS TO COUNT 07/12/2024 10:43 PM    RBCUA TOO NUMEROUS TO COUNT 07/12/2024 10:43 PM    MUCUS NOT REPORTED 03/08/2018 05:18 PM    TRICHOMONAS NOT REPORTED 03/08/2018 05:18 PM    YEAST MANY 07/12/2024 10:43 PM    BACTERIA None 07/12/2024 10:43 PM    LEUKOCYTESUR LARGE 07/12/2024 10:43 PM    UROBILINOGEN Normal 07/12/2024 10:43 PM    BILIRUBINUR NEGATIVE 07/12/2024 10:43 PM    GLUCOSEU NEGATIVE 07/12/2024 10:43 PM    KETUA NEGATIVE 07/12/2024 10:43 PM    AMORPHOUS NOT REPORTED 03/08/2018 05:18 PM     Urine Sodium:   No components found for: \"MATEUSZ\"  Urine Potassium:  No results found for: \"KUR\"  Urine Chloride:    Lab Results   Component Value Date/Time    CLUR 84 06/19/2024 11:42 AM     Urine Osmolarity:   Lab Results   Component Value Date/Time    OSMOU 384 07/12/2024 10:43 PM     Urine Protein:   No components found for: \"TOTALPROTEIN\", \"URINE\"   Urine Creatinine:   No results found for: \"LABCREA\"  Urine Eosinophils:  No components found for: \"UEOS\"    Radiology:     Reviewed.     Assessment:     Decompensated shortness of breath due to fluid retention.  Patient was not on any diuretics.  She responded well with IV Lasix.  Chronic kidney disease with a baseline creatinine 2.2-2.3 
nontender, nondistended, no masses or organomegaly.  Neurological - alert, oriented, normal speech, no focal findings or movement disorder noted  Extremities - peripheral pulses normal, positive bilateral pedal edema, no clubbing or cyanosis  Skin - normal coloration and turgor, no rashes, no suspicious skin lesions noted     DATA REVIEW     Medications:  Scheduled Meds:   heparin (porcine)  5,000 Units SubCUTAneous BID    potassium chloride  40 mEq Oral BID WC    [START ON 7/15/2024] furosemide  80 mg Oral BID    lactulose  20 g Oral Daily    sodium chloride flush  5-40 mL IntraVENous 2 times per day    amLODIPine  10 mg Oral Daily    [Held by provider] apixaban  2.5 mg Oral BID    atorvastatin  80 mg Oral Nightly    carvedilol  25 mg Oral BID WC    flecainide  50 mg Oral 2 times per day    hydrALAZINE  25 mg Oral BID    levalbuterol  1.25 mg Nebulization 4x Daily    pantoprazole  40 mg Oral QAM AC    cefTRIAXone (ROCEPHIN) IV  2,000 mg IntraVENous Q24H     Continuous Infusions:   sodium chloride       LABS:-  ABG:   No results for input(s): \"POCPH\", \"POCPCO2\", \"POCPO2\", \"POCHCO3\", \"DPLT0OCG\" in the last 72 hours.  CBC:   Recent Labs     07/12/24 0519 07/13/24 0247 07/14/24  0835   WBC 6.1 6.3 6.1   HGB 7.9* 7.8* 7.9*   HCT 23.3* 25.1* 25.3*   MCV 95.9 102.0 101.2    387 398   LYMPHOPCT 10* 13* 13*   RBC 2.43* 2.46* 2.50*   MCH 32.5 31.7 31.6   MCHC 33.9 31.1 31.2   RDW 15.7* 15.9* 15.6*     BMP:   Recent Labs     07/12/24 0519 07/13/24 0247 07/14/24  0835   * 134* 134*   K 4.5 4.2 3.5*    102 99   CO2 21 20 21   BUN 39* 41* 37*   CREATININE 2.3* 2.2* 2.1*   GLUCOSE 104* 104* 104*   MG  --   --  2.3     Liver Function Test:   Recent Labs     07/12/24  0519   ALT 14   AST 17   ALKPHOS 91   BILITOT 0.6     Amylase/Lipase:  No results for input(s): \"AMYLASE\", \"LIPASE\" in the last 72 hours.  Coagulation Profile:   No results for input(s): \"INR\", \"PROTIME\", \"APTT\" in the last 72 hours.  Cardiac 
  Neurological:      General: No focal deficit present.      Mental Status: She is alert and oriented to person, place, and time.         Assessment:        Hospital Problems             Last Modified POA    * (Principal) Acute on chronic respiratory failure (HCC) 7/12/2024 Yes    Acute kidney injury superimposed on CKD (HCC) 7/12/2024 Yes    Overview Signed 7/2/2024  3:22 PM by Nicole Chan MD     Secondary to ischemic ATN from sepsis/systemic inflammatory spine syndrome, also related to obstructive uropathy particular on the left side.  Has left hydronephrosis required percutaneous nephrostomy tube placement also has a stent on the left side.  Right kidney function appears to be suboptimal as she was not draining much urine from the right side.  Required dialysis during her hospitalization in June 2024 at [facility].  Has been off dialysis now         Atrial fibrillation with rapid ventricular response ([facility]) 7/12/2024 Yes    Acute hypoxic respiratory failure (HCC) 7/12/2024 Yes    Pleural effusion on right 7/13/2024 Yes    Paroxysmal atrial fibrillation (HCC) 7/12/2024 Yes    Obstructive uropathy 7/12/2024 Yes    Malignant neoplasm of urinary bladder (HCC) 7/12/2024 Yes    Hypertension, essential 7/12/2024 Yes    Dyslipidemia 7/12/2024 Yes    Hyponatremia 7/13/2024 Yes    Urothelial carcinoma of bladder (HCC) 7/12/2024 Yes    Coronary artery disease of native heart with stable angina pectoris ([facility]) 7/12/2024 Yes    Macrocytic anemia 7/13/2024 Yes    Biventricular congestive heart failure ([facility]) 7/12/2024 Yes    Nephrostomy status ([facility]) 7/13/2024 Yes    Stage 4 chronic kidney disease ([facility]) 7/13/2024 Yes    Acute pulmonary edema (HCC) 7/13/2024 Yes    Other hydronephrosis 7/13/2024 Yes    Chronic kidney disease 7/13/2024 Yes    Pleural effusion, bilateral 7/13/2024 Yes     Plan:        RLL pneumonia with effusion ? Recently treated- on IV Ceftriaxone 2 gm. Cxr reviewed. Small pleural effusions b/l. Started 
failure (HCC)    Biventricular congestive heart failure (HCC)    Malignant neoplasm of urinary bladder (HCC)    Acute hypoxic respiratory failure (HCC)    Pleural effusion on right    Nephrostomy status (HCC)    Stage 4 chronic kidney disease (HCC)    Acute pulmonary edema (HCC)    Other hydronephrosis    Chronic kidney disease    Pleural effusion, bilateral    Pneumonia of right lower lobe due to infectious organism       Assessment and Plan:     Acute on chronic hypoxic respiratory failure  Bilateral pleural effusion right greater than left  High-grade urothelial carcinoma on chemotherapy  Status post left nephrostomy  Paroxysmal atrial fibrillation  HEAVENLY on CKD   UTI yeast in urine  Paroxysmal A-fib on flecainide, Eliquis for anticoagulation      Plan:    Continue BiPAP every night and intermittently during the day with breaks.   Continue supplemental oxygen as needed to keep SpO2 >90%  Bronchodilators: Levalbuterol  On Lasix 40 BID as per nephrology,   Antimicrobials: Rocephin  Pleural fluid consistent with transudate, negative for malignancy  Monitor I/O, keep even/negative fluid balance  PT/OT  Encourage incentive spirometry, pulmonary toilet, aspiration precautions  Pulmonology to sign off, please call us with any questions      Deven Hartley MD  PGY-3, Internal Medicine Resident  [facility]         7/17/2024, 9:27 AM         
7/13/2024 Yes    Pneumonia of right lower lobe due to infectious organism 7/15/2024 Yes   Plan:        RLL pneumonia with effusion ? Recently treated- on IV Ceftriaxone 2 gm. Cxr reviewed. Small pleural effusions b/l. Started on Lasix. Repeat CXR tomorrow. IR guided thoracentesis in am. Eliquis on hold.  Patient underwent thoracentesis.  850 mL has been removed.  Eliquis has been restarted.  Acute on chronic respiratory failure - on Bipap intermittently with Hiflo.   Acute kidney injury on CKD 3- due to ATN. She has dennis. Not much urine in dennis. Not significant output after Lasix. Will await nephrology recommendations. Nephrostomy tube on left draining. Renal us done and shows no hydronephrosis. Nephrology following. I/O's. Plan to remove tunneled catheter in AM  P. A fib - on Flecainide. Eliquis was held due to hematuria. Will hold for now due to possible IR guided thoracentesis in AM.  Thoracentesis has been performed.  Urology has cleared the patient to follow-up as outpatient with urologist.  Okay to remove Dennis catheter.  We will restart Eliquis.  Remove Dennis catheter.  Hematuria - improved. C/s pending.   Macrocytic anemia - stable. Monitor . Transfuse as needed.  Obstructive uropathy - nephrostomy tube on left  HTN - on Norvasc, coreg  Transitional cell ca - follow up with oncology    ANEESH MISTRY MD  7/16/2024  3:45 PM    
weakness continues from previous stroke  Skin:  Warm and dry.    Labs:   Complete Blood Count:   Recent Labs     07/17/24  0350 07/18/24  0227   WBC 5.8 6.1   HGB 7.8* 7.4*   .6 100.0    351   RBC 2.43* 2.38*   HCT 24.7* 23.8*   MCH 32.1 31.1   MCHC 31.6 31.1   RDW 15.2* 15.1*   MPV 9.5 9.4        PT/INR:    Lab Results   Component Value Date/Time    PROTIME 14.5 07/15/2024 08:29 AM    INR 1.1 07/15/2024 08:29 AM     PTT:    Lab Results   Component Value Date/Time    APTT 32.9 06/18/2024 01:36 PM       Basal Metabolic Profile:   Recent Labs     07/16/24  1210 07/17/24  0350 07/18/24 0227    134* 133*   K 4.0 3.3* 3.7   BUN 32* 34* 37*   CREATININE 1.8* 1.8* 1.7*    100 99   CO2 23 21 22        LFTs  No results for input(s): \"ALKPHOS\", \"ALT\", \"AST\", \"BILITOT\", \"BILIDIR\", \"LABALBU\" in the last 72 hours.      Imaging:    Impression:   Primary Problem  Acute on chronic respiratory failure (HCC)  Active Hospital Problems    Diagnosis Date Noted    Atrial fibrillation with rapid ventricular response ([facility]) [I48.91] 06/16/2024     Priority: High    Pneumonia of right lower lobe due to infectious organism [J18.9] 07/15/2024    Pleural effusion on right [J90] 07/13/2024    Nephrostomy status ([facility]) [Z93.6] 07/13/2024    Stage 4 chronic kidney disease ([facility]) [N18.4] 07/13/2024    Acute pulmonary edema ([facility]) [J81.0] 07/13/2024    Other hydronephrosis [N13.39] 07/13/2024    Chronic kidney disease [N18.9] 07/13/2024    Pleural effusion, bilateral [J90] 07/13/2024    Acute on chronic respiratory failure (HCC) [J96.20] 07/12/2024    Biventricular congestive heart failure (HCC) [I50.82] 07/12/2024    Malignant neoplasm of urinary bladder (HCC) [C67.9] 07/12/2024    Acute hypoxic respiratory failure (HCC) [J96.01] 07/12/2024    Obstructive uropathy [N13.9] 06/19/2024    Paroxysmal atrial fibrillation (HCC) [I48.0] 06/18/2024    Community acquired pneumonia of right lower lobe of lung [J18.9] 06/14/2024    
vision. No conjunctival inflammation.  ENT: No sore throat or runny nose.. No hearing loss, tinnitus or vertigo.  Cardiovascular: No chest pain or palpitations.Shortness of breath. WATKINS  Lung: Shortness of breath, no cough. No sputum production  Abdomen: No nausea, vomiting, diarrhea, or abdominal pain.. No cramps.  Genitourinary: No increased urinary frequency, or dysuria. Reports hematuria. No suprapubic or CVA pain  Musculoskeletal: No muscle aches or pains. No joint effusions, swelling or deformities  Hematologic: No bleeding or bruising.  Neurologic: No headache, weakness, numbness, or tingling.  Integument: No rash, no ulcers.  Psychiatric: No depression.   Endocrine: No polyuria, no polydipsia, no polyphagia.    Physical Examination :   Patient Vitals for the past 8 hrs:   BP Temp Temp src Pulse Resp SpO2 Weight   07/18/24 0600 -- -- -- -- -- -- 63 kg (138 lb 14.2 oz)   07/18/24 0359 (!) 148/94 97.7 °F (36.5 °C) Oral 67 18 100 % --   07/18/24 0015 134/76 97.7 °F (36.5 °C) Oral 60 20 100 % --       General Appearance: Awake, alert  Head:  Normocephalic, no trauma  Eyes: Pupils equal, round, reactive to light and accommodation; extraocular movements intact; sclera anicteric; conjunctivae pink. No embolic phenomena.  ENT: Oropharynx clear, without erythema, exudate, or thrush. No tenderness of sinuses. Mouth/throat: mucosa pink and moist. No lesions. Dentition in good repair.  Neck:Supple, without lymphadenopathy. Thyroid normal, No bruits.  Pulmonary/Chest: Decreased breath sounds  No dullness to percussion.   Cardiovascular: Regular rate and rhythm without murmurs, rubs, or gallops.   Abdomen: Soft, non tender. Bowel sounds normal. No organomegaly  All four Extremities: No cyanosis, clubbing. Has bilateral leg edema.  Neurologic: No gross sensory or motor deficits.  Skin: Warm and dry with good turgor. Signs of peripheral arterial insufficiency. No ulcerations. No open wounds.    Medical Decision Making 
CNP  [facility]  7/17/2024, 8:25 AM       I have discussed the care of  this patient and I have personally examined the patient myselft and taken ros and hpi , including pertinent history and exam findings, labs, imaging , medication ad other relavent clincal data.  I have reviewed the key elements of all parts of the encounter with the Nurse Practitioner .  I agree with the assessment, plan and orders as documented by the  NP with the following addendum     More than 50% of the time was spent taking care of this patient in addition to the nurse practitioner time.  That also included history taking follow-up physical examination and review of system.      Patient seen and examined  Labs remarkable reviewed  Patient resting comfortably  Denies new complaints or interval events.  Hemoglobin 7.8.  Creatinine stable  Patient on Eliquis tolerating well  Patient scheduled for dialysis catheter removal today  Patient will be on Rocephin until 7/18               Uzma Amos M.D.        Electronically signed by UZMA AMOS MD           This note is created with the assistance of a speech recognition program.  While intending to generate a document that actually reflects the content of the visit, the document can still have some errors including those of syntax and sound a like substitutions which may escape proof reading.  It such instances, actual meaning can be extrapolated by contextual diversion.      
Completed Specimen: Blood Updated: 07/15/24 0716     Specimen Description .BLOOD     Special Requests 20ML LEFT WRIST     Culture NO GROWTH 5 DAYS              Medical Decision Making-Other:     Note:    Thank you for allowing us to participate in the care of this patient. Please call with questions.    MD Sushil Madsen MD Heather Helweg, APRN    ATTESTATION:    I have discussed the case, including pertinent history and exam findings with the APRN. I have evaluated the  History, physical findings and pictures of the patient and the key elements of the encounter have been performed by me. I have reviewed the laboratory data, other diagnostic studies and discussed them with the APRN. I have updated the medical record where necessary.    I agree with the assessment, plan and orders as documented by the APRN.    In addition diagnostic and decision making elements, performed by the Attending Physician, are included in the Diagnostic and Decision Making Section of the text.    Sushil Preston MD     7/17/2024              Office: [phone]    
allowing us to participate in the care of this patient. Please call with questions.    MD Sushil Madsen MD Heather Helweg, APRN    ATTESTATION:    I have discussed the case, including pertinent history and exam findings with the APRN. I have evaluated the  History, physical findings and pictures of the patient and the key elements of the encounter have been performed by me. I have reviewed the laboratory data, other diagnostic studies and discussed them with the APRN. I have updated the medical record where necessary.    I agree with the assessment, plan and orders as documented by the APRN.    In addition diagnostic and decision making elements, performed by the Attending Physician, are included in the Diagnostic and Decision Making Section of the text.    Sushil Preston MD     7/16/2024          Office: [phone]

## 2024-07-19 ENCOUNTER — CARE COORDINATION (OUTPATIENT)
Dept: CARE COORDINATION | Age: 64
End: 2024-07-19

## 2024-07-19 ENCOUNTER — TELEPHONE (OUTPATIENT)
Dept: FAMILY MEDICINE CLINIC | Age: 64
End: 2024-07-19

## 2024-07-19 ENCOUNTER — HOSPITAL ENCOUNTER (EMERGENCY)
Age: 64
Discharge: HOME | End: 2024-07-19
Attending: FAMILY MEDICINE
Payer: COMMERCIAL

## 2024-07-19 ENCOUNTER — CARE COORDINATION (OUTPATIENT)
Dept: CASE MANAGEMENT | Age: 64
End: 2024-07-19

## 2024-07-19 VITALS
TEMPERATURE: 98.2 F | DIASTOLIC BLOOD PRESSURE: 74 MMHG | HEIGHT: 62 IN | OXYGEN SATURATION: 94 % | WEIGHT: 138 LBS | BODY MASS INDEX: 25.4 KG/M2 | HEART RATE: 68 BPM | SYSTOLIC BLOOD PRESSURE: 131 MMHG | RESPIRATION RATE: 18 BRPM

## 2024-07-19 DIAGNOSIS — R79.89 ELEVATED BRAIN NATRIURETIC PEPTIDE (BNP) LEVEL: ICD-10-CM

## 2024-07-19 DIAGNOSIS — E87.6 HYPOKALEMIA: ICD-10-CM

## 2024-07-19 DIAGNOSIS — J96.21 ACUTE ON CHRONIC RESPIRATORY FAILURE WITH HYPOXIA (HCC): Primary | ICD-10-CM

## 2024-07-19 DIAGNOSIS — R60.0 BILATERAL LEG EDEMA: Primary | ICD-10-CM

## 2024-07-19 LAB
ALBUMIN SERPL-MCNC: 3.2 G/DL (ref 3.5–5.2)
ALP SERPL-CCNC: 98 U/L (ref 35–104)
ALT SERPL-CCNC: 11 U/L (ref 5–33)
ANION GAP SERPL CALCULATED.3IONS-SCNC: 16 MMOL/L (ref 9–17)
AST SERPL-CCNC: 16 U/L
BASOPHILS # BLD: 0.03 K/UL (ref 0–0.2)
BASOPHILS NFR BLD: 1 % (ref 0–2)
BILIRUB SERPL-MCNC: 0.4 MG/DL (ref 0.3–1.2)
BNP SERPL-MCNC: ABNORMAL PG/ML
BUN SERPL-MCNC: 39 MG/DL (ref 8–23)
BUN/CREAT SERPL: 26 (ref 9–20)
CALCIUM SERPL-MCNC: 9.1 MG/DL (ref 8.6–10.4)
CHLORIDE SERPL-SCNC: 99 MMOL/L (ref 98–107)
CO2 SERPL-SCNC: 22 MMOL/L (ref 20–31)
CREAT SERPL-MCNC: 1.5 MG/DL (ref 0.5–0.9)
EOSINOPHIL # BLD: 0.32 K/UL (ref 0–0.4)
EOSINOPHILS RELATIVE PERCENT: 6 % (ref 0–5)
ERYTHROCYTE [DISTWIDTH] IN BLOOD BY AUTOMATED COUNT: 14.6 % (ref 12.1–15.2)
GFR, ESTIMATED: 39 ML/MIN/1.73M2
GLUCOSE SERPL-MCNC: 142 MG/DL (ref 70–99)
HCT VFR BLD AUTO: 23.7 % (ref 36–46)
HGB BLD-MCNC: 8 G/DL (ref 12–16)
IMM GRANULOCYTES # BLD AUTO: 0.01 K/UL (ref 0–0.3)
IMM GRANULOCYTES NFR BLD: 0 % (ref 0–5)
LYMPHOCYTES NFR BLD: 0.81 K/UL (ref 1–4.8)
LYMPHOCYTES RELATIVE PERCENT: 15 % (ref 15–40)
MCH RBC QN AUTO: 31.4 PG (ref 26–34)
MCHC RBC AUTO-ENTMCNC: 33.8 G/DL (ref 31–37)
MCV RBC AUTO: 92.9 FL (ref 80–100)
MONOCYTES NFR BLD: 0.47 K/UL (ref 0–1)
MONOCYTES NFR BLD: 9 % (ref 4–8)
NEUTROPHILS NFR BLD: 69 % (ref 47–75)
NEUTS SEG NFR BLD: 3.72 K/UL (ref 2.5–7)
PLATELET # BLD AUTO: 414 K/UL (ref 140–450)
PMV BLD AUTO: 8.9 FL (ref 6–12)
POTASSIUM SERPL-SCNC: 3.2 MMOL/L (ref 3.7–5.3)
PROT SERPL-MCNC: 6.6 G/DL (ref 6.4–8.3)
RBC # BLD AUTO: 2.55 M/UL (ref 4–5.2)
SODIUM SERPL-SCNC: 137 MMOL/L (ref 135–144)
TROPONIN I SERPL HS-MCNC: 48 NG/L (ref 0–14)
TROPONIN I SERPL HS-MCNC: 51 NG/L (ref 0–14)
WBC OTHER # BLD: 5.4 K/UL (ref 3.5–11)

## 2024-07-19 PROCEDURE — 99283 EMERGENCY DEPT VISIT LOW MDM: CPT

## 2024-07-19 PROCEDURE — 83880 ASSAY OF NATRIURETIC PEPTIDE: CPT

## 2024-07-19 PROCEDURE — 85025 COMPLETE CBC W/AUTO DIFF WBC: CPT

## 2024-07-19 PROCEDURE — 36415 COLL VENOUS BLD VENIPUNCTURE: CPT

## 2024-07-19 PROCEDURE — 84484 ASSAY OF TROPONIN QUANT: CPT

## 2024-07-19 PROCEDURE — 80053 COMPREHEN METABOLIC PANEL: CPT

## 2024-07-19 ASSESSMENT — PAIN - FUNCTIONAL ASSESSMENT: PAIN_FUNCTIONAL_ASSESSMENT: NONE - DENIES PAIN

## 2024-07-19 ASSESSMENT — ENCOUNTER SYMPTOMS
CHEST TIGHTNESS: 0
SHORTNESS OF BREATH: 0

## 2024-07-19 NOTE — TELEPHONE ENCOUNTER
Care Transitions Initial Follow Up Call    Outreach made within 2 business days of discharge: Yes    Patient: Daja Espinosa Patient : 1960   MRN: [id number]  Reason for Admission: Acute on chronic respiratory failure with hypoxia (HCC)   Discharge Date: 24       Spoke with: Daja Espinosa     Discharge department/facility: [facility] Interactive Patient Contact:  Was patient able to fill all prescriptions: Yes  Was patient instructed to bring all medications to the follow-up visit: Yes  Is patient taking all medications as directed in the discharge summary? Yes  Does patient understand their discharge instructions: Yes  Does patient have questions or concerns that need addressed prior to 7-14 day follow up office visit: yes     Scheduled appointment with PCP within 7-14 days    Follow Up  Future Appointments   Date Time Provider Department Center   2024  9:15 AM Familia Shepard MD [place] [facility]   2024  3:15 PM Tulio Holder MD [place] [facility]   2024 10:30 AM Sohail Garcia MD [place] [facility]   2024  2:20 PM Nicole [facility] MD [facility]   2025 11:00 AM Terrell Cortez MD [facility]       Bethany Still MA        Pt states she is currently at [facility] for swelling.

## 2024-07-19 NOTE — CARE COORDINATION
Remote Patient Monitoring Note      Date/Time:  2024 12:07 PM  Patient Current Location: [place]  LPN contacted patient by telephone regarding red alert received for pulse ox reading (90). Verified patients name and  as identifiers.  Background: PNA  Clinical Interventions: Reviewed and followed up on alerts and treatments-Spoke to patient she started to set up equipment and was called by discharge nurse from hospital they have requested she return to the ED she will set up equipment when she returns home    Plan/Follow Up: Will continue to review, monitor and address alerts with follow up based on severity of symptoms and risk factors.

## 2024-07-19 NOTE — CARE COORDINATION
Care Transitions Note    Initial Call - Call within 2 business days of discharge: Yes    Patient Current Location:  Home:  [place] [address]    Care Transition Nurse contacted the patient by telephone to perform post hospital discharge assessment, verified name and  as identifiers. Provided introduction to self, and explanation of the Care Transition Nurse role.     Patient: Daja Espinosa    Patient : 1960   MRN: [id number]    Reason for Admission: Acute on chronic resp failure  Discharge Date: 24  RURS: Readmission Risk Score: 31.2      Last Discharge Facility       Date Complaint Diagnosis Description Type Department Provider    24   [facility]            Was this an external facility discharge? No    Additional needs identified to be addressed with provider   High priority: Left message to PCP, called Dr Chan Neph office-pt has swelling in thighs, abdomen since discharge             Method of communication with provider: phone.    Patients top risk factors for readmission: medical condition-HTN, Neph tube, 02 dependent    Interventions to address risk factors:   Review of patient management of conditions/medications: med review, appts, symptoms  Communication with providers: Called PCP, left VM. Called Neph. Office will call pt to advise    Care Summary Note: Spoke to Daja for transitions initial call. Pt readmitted to [facility] - for acute on chronic respiratory failure. Stated she had 850 ml fluid removed by thoracentesis prior to discharge, started having swelling in LE, thighs and abdomen before she arrived home after discharge. Patient put compression stockings on this morning, LE improved. Continues to have edema in thighs and abdomen. Pt started Lasix 400 MG this morning. Nephrostomy tube patent, drained @ 200 ml overnight. No blood in bag. UOP clear. No increased SOB, nausea, vomiting, abdominal pain. Pt is

## 2024-07-19 NOTE — ED PROVIDER NOTES
[facility]  EMERGENCY DEPARTMENT ENCOUNTER      Pt Name: Daja Espinosa  MRN: [id number]  Birthdate 1960  Date of evaluation: 7/19/2024  Provider: Talib Matos MD    CHIEF COMPLAINT       Chief Complaint   Patient presents with    Leg Swelling     Discharged from [facility] yesterday, here today for swelling in legs, abdomen, sent to ER by kidney doctor.         HISTORY OF PRESENT ILLNESS      Daja Espinosa is a 63 y.o. female who presents to the emergency department via private vehicle, patient was discharged yesterday from [facility], was noticing some swelling in her ankles at time of discharge, worsening at home, now feels it is up both her legs and into her abdomen.  Patient denies any SOB or CP/discomfort, states compliant with medications, denies excessive salt intake since discharge.  States called her Nephrology group who told her to go to the ER.    At time of patient arrival, no phone call received from specialty office advising sending patient to ER.    PCP: Jose  Neph: Rocio  Uro: Glory  Cards: Diego        REVIEW OF SYSTEMS       Review of Systems   Respiratory:  Negative for chest tightness and shortness of breath.    Cardiovascular:  Positive for leg swelling.   All other systems reviewed and are negative.        PAST MEDICAL HISTORY     Past Medical History:   Diagnosis Date    Alcohol abuse 03/02/2017    Arthritis     Bladder cancer (HCC) 2016    CAD (coronary artery disease)     Cancer (HCC) 2001    vulvar-    Carotid artery occlusion 1993    [facility] where she had a brachiocephalic aorta bypass.    Carotid artery stenosis     Chronic back pain     History of chemotherapy     Hydronephrosis 02/15/2024    Hyperlipidemia     Hypertension     Hypoglycemia     MI (myocardial infarction) (HCC)     Peripheral vascular disease (HCC)     Takayasu's arteritis (HCC)     Tibial fracture     right    Umbilical hernia     Under care of team 03/29/2024    Cardiologist: Terrell Cortez

## 2024-07-19 NOTE — DISCHARGE INSTRUCTIONS
Watch your salt intake, especially in canned, frozen, or processed foods.    Follow up with Primary Care and/or nephrology

## 2024-07-19 NOTE — TELEPHONE ENCOUNTER
Received call from [facility] Transition nurse. She can be reached at [phone]. She had advised the patient was discharged from [facility] yesterday and is now home. She explained the patient has been experiencing swelling since being home. She voiced the patient has wearing compression stockings as directed. Patient was scheduled for hospital follow up on Tuesday 07/30/2024 at 10:30 am.  Please advise. Thank you.

## 2024-07-20 LAB
MICROORGANISM SPEC CULT: NORMAL
MICROORGANISM/AGENT SPEC: NORMAL
SERVICE CMNT-IMP: NORMAL
SPECIMEN DESCRIPTION: NORMAL

## 2024-07-22 ENCOUNTER — OFFICE VISIT (OUTPATIENT)
Dept: ONCOLOGY | Age: 64
End: 2024-07-22
Payer: COMMERCIAL

## 2024-07-22 ENCOUNTER — TELEPHONE (OUTPATIENT)
Dept: ONCOLOGY | Age: 64
End: 2024-07-22

## 2024-07-22 ENCOUNTER — CARE COORDINATION (OUTPATIENT)
Dept: CARE COORDINATION | Age: 64
End: 2024-07-22

## 2024-07-22 ENCOUNTER — PROCEDURE VISIT (OUTPATIENT)
Dept: UROLOGY | Age: 64
End: 2024-07-22
Payer: COMMERCIAL

## 2024-07-22 VITALS
TEMPERATURE: 97.2 F | SYSTOLIC BLOOD PRESSURE: 120 MMHG | RESPIRATION RATE: 18 BRPM | WEIGHT: 133 LBS | BODY MASS INDEX: 24.33 KG/M2 | HEART RATE: 63 BPM | DIASTOLIC BLOOD PRESSURE: 59 MMHG

## 2024-07-22 VITALS
SYSTOLIC BLOOD PRESSURE: 140 MMHG | HEART RATE: 75 BPM | TEMPERATURE: 98.5 F | DIASTOLIC BLOOD PRESSURE: 62 MMHG | WEIGHT: 135 LBS | BODY MASS INDEX: 24.69 KG/M2

## 2024-07-22 DIAGNOSIS — C67.9 UROTHELIAL CARCINOMA OF BLADDER WITH INVASION OF MUSCLE (HCC): Primary | ICD-10-CM

## 2024-07-22 DIAGNOSIS — I73.9 PERIPHERAL VASCULAR DISEASE (HCC): ICD-10-CM

## 2024-07-22 DIAGNOSIS — T45.1X5D ADVERSE EFFECT OF CHEMOTHERAPY, SUBSEQUENT ENCOUNTER: ICD-10-CM

## 2024-07-22 DIAGNOSIS — J90 PLEURAL EFFUSION, BILATERAL: ICD-10-CM

## 2024-07-22 DIAGNOSIS — I48.0 PAROXYSMAL ATRIAL FIBRILLATION (HCC): ICD-10-CM

## 2024-07-22 DIAGNOSIS — N13.30 HYDRONEPHROSIS, UNSPECIFIED HYDRONEPHROSIS TYPE: ICD-10-CM

## 2024-07-22 DIAGNOSIS — C68.9 TRANSITIONAL CELL CARCINOMA (HCC): ICD-10-CM

## 2024-07-22 DIAGNOSIS — C67.9 UROTHELIAL CARCINOMA OF BLADDER (HCC): ICD-10-CM

## 2024-07-22 DIAGNOSIS — I10 ESSENTIAL HYPERTENSION: ICD-10-CM

## 2024-07-22 DIAGNOSIS — N18.4 STAGE 4 CHRONIC KIDNEY DISEASE (HCC): ICD-10-CM

## 2024-07-22 DIAGNOSIS — C67.9 MALIGNANT NEOPLASM OF URINARY BLADDER, UNSPECIFIED SITE (HCC): Primary | ICD-10-CM

## 2024-07-22 PROBLEM — C83.38 DIFFUSE LARGE B-CELL LYMPHOMA OF LYMPH NODES OF MULTIPLE REGIONS (HCC): Status: RESOLVED | Noted: 2024-06-15 | Resolved: 2024-07-22

## 2024-07-22 PROCEDURE — 52310 CYSTOSCOPY AND TREATMENT: CPT | Performed by: UROLOGY

## 2024-07-22 PROCEDURE — G8427 DOCREV CUR MEDS BY ELIG CLIN: HCPCS | Performed by: UROLOGY

## 2024-07-22 PROCEDURE — 1111F DSCHRG MED/CURRENT MED MERGE: CPT | Performed by: UROLOGY

## 2024-07-22 PROCEDURE — 1036F TOBACCO NON-USER: CPT | Performed by: INTERNAL MEDICINE

## 2024-07-22 PROCEDURE — 1111F DSCHRG MED/CURRENT MED MERGE: CPT | Performed by: INTERNAL MEDICINE

## 2024-07-22 PROCEDURE — 1036F TOBACCO NON-USER: CPT | Performed by: UROLOGY

## 2024-07-22 PROCEDURE — G8427 DOCREV CUR MEDS BY ELIG CLIN: HCPCS | Performed by: INTERNAL MEDICINE

## 2024-07-22 PROCEDURE — 3078F DIAST BP <80 MM HG: CPT | Performed by: INTERNAL MEDICINE

## 2024-07-22 PROCEDURE — G8420 CALC BMI NORM PARAMETERS: HCPCS | Performed by: UROLOGY

## 2024-07-22 PROCEDURE — 3077F SYST BP >= 140 MM HG: CPT | Performed by: UROLOGY

## 2024-07-22 PROCEDURE — 3074F SYST BP LT 130 MM HG: CPT | Performed by: INTERNAL MEDICINE

## 2024-07-22 PROCEDURE — G8420 CALC BMI NORM PARAMETERS: HCPCS | Performed by: INTERNAL MEDICINE

## 2024-07-22 PROCEDURE — 3078F DIAST BP <80 MM HG: CPT | Performed by: UROLOGY

## 2024-07-22 PROCEDURE — 99213 OFFICE O/P EST LOW 20 MIN: CPT | Performed by: UROLOGY

## 2024-07-22 PROCEDURE — 99214 OFFICE O/P EST MOD 30 MIN: CPT | Performed by: INTERNAL MEDICINE

## 2024-07-22 PROCEDURE — 3017F COLORECTAL CA SCREEN DOC REV: CPT | Performed by: INTERNAL MEDICINE

## 2024-07-22 PROCEDURE — 3017F COLORECTAL CA SCREEN DOC REV: CPT | Performed by: UROLOGY

## 2024-07-22 RX ORDER — AMLODIPINE BESYLATE 10 MG/1
10 TABLET ORAL DAILY
Qty: 90 TABLET | Refills: 1 | Status: SHIPPED | OUTPATIENT
Start: 2024-07-22

## 2024-07-22 RX ORDER — LIDOCAINE AND PRILOCAINE 25; 25 MG/G; MG/G
CREAM TOPICAL
Qty: 30 G | Refills: 1 | Status: SHIPPED | OUTPATIENT
Start: 2024-07-22

## 2024-07-22 NOTE — PROGRESS NOTES
Patient ID: Daja Espinosa, 1960, [id number], 63 y.o.  Referred by :  No ref. 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  Embolic stroke on 4/6/2024  Concurrent chemo RT as a definitive treatment started on Johana 10, 2024 (plan for continuous neoadjuvant chemotherapy abandoned as patient not surgical candidate)  Chronic kidney disease  Recurrent admission to the hospital for GI bleed, HEAVENLY, pneumonia, AVR    Hematology oncology treatment  Cisplatin gemcitabine neoadjuvant started on March 19, 2024 every 3 weeks status post 1 cycle and have stopped and the plan changed to concurrent chemo RT   concurrent chemo RT with low-dose biweekly gemcitabine 27 mg/m² started in Johana 10, 2024  Treatment interruptions due to several admission to the hospital  Eliquis      HISTORY OF PRESENT ILLNESS:    Oncologic History:    Daja Espinosa is a very pleasant 63 y.o. female.  With history of nonmuscle invasive bladder cancer status post multiple resection she lost to follow-up for almost 5 years, Patient did have a recent CT scan. This does show severe left hydronephrosis.  This is down to the level of the bladder.  The bladder does appear to be thickened on the left side.  Patient has had slight worsening of her creatinine.  Patient has been having some left-sided flank pain.  Patient has no unintentional weight loss or decreased appetite.  She reports no nausea or vomiting.     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   Pain significantly improved she still have intermittent hematuria and she was referred for neoadjuvant chemotherapy  CT chest abdomen pelvis no evidence of metastasis  Did have scattered atherosclerosis and stenosis of the celiac artery  Have severe peripheral artery disease with prior

## 2024-07-22 NOTE — CARE COORDINATION
Care Transitions Note    Follow Up Call     Patient Current Location:  Home: [address] [place] [address]    Care Transition Nurse contacted the patient by telephone. Verified name and  as identifiers.    Additional needs identified to be addressed with provider   No needs identified                 Method of communication with provider: none.    Care Summary Note: Spoke to Daja for transitions call. Patient was sent by Nephrology office to ED on  d/t increased BLE edema. Stated she is urinating wnl, has nephrostomy tube in place, bag draining straw colored urine. Patient has Urology appt this afternoon for cysto, possible stent removal. Went to Oncology appt this morning, will resume radiation tomorrow along with chemo.     Stated she continues to have BLE, is wearing compression stockings 12 hr per day. Stated edema worsens throughout the day, is elevating legs, adhering to low sodium diet.     Reviewed RPM vitals, completed welcome call. No questions or concerns at this time.      Remote Patient Monitoring Welcome Note  Date/Time:  2024 2:21 PM  Patient Current Location: Home: [address] [place] [address]  Verified patients name and  as identifiers.  Completed and confirmed the following:   Emergency Contact: Benji dominique [phone]  [x] Patient received all RPM equipment (tablet, scale, blood pressure device and cuff, and pulse oximeter)  Cuff Size: regular (9.05\"-15.74\")    Weight Scale:  regular (<330lbs)                    [x] Instructed patient keep box for use when returning equipment                                                          [x] Reviewed Patient Welcome Letter with patient    [x]  Reviewed Consent Form  Copy of consent form in chart.                 [x] Reviewed expectations for patient and care team  Monitoring hours M-F 9-4pm  It is important to take your vitals every day, even on the weekends,to keep your care team aware of how you are doing every day of the

## 2024-07-22 NOTE — TELEPHONE ENCOUNTER
Name: Daja Espinosa  : 1960  MRN: [id number]    Oncology Navigation Follow-Up Note    Contact Type:  Medical Oncology    Notes: Navigator met with Daja and spouse in clinic. Daja states she is feeling better since discharge.  Pt notes swelling in bilateral lower extremities.  Pt wearing compression stockings. Daja states she started on Lasix on Friday.  Daja states she urinated yesterday for the first time.  Has urostomy tube in place.    Call made to [facility].  Spoke to RUBY Mckeon in radiation department.  Dr. Nguyen is [place] of the country.  Per Linda, Dr. Cintron would like to restart radiation tomorrow 24.  Appointment obtained for 1330.  Pt is in agreement.  Restarting radiation was discussed with Dr. Shepard.  Instruction to restart chemotherapy tomorrow 24 as well.  Writer discussed with treatment RN.  Appointment obtained for 1000.  Pt agreeable to appointment time.      Per Linda patient has completed 10 out of 25 radiation treatments.       Electronically signed by Ave Arreaga RN on 2024 at 10:12 AM

## 2024-07-22 NOTE — PROGRESS NOTES
HPI:        Patient is a 63 y.o. female in no acute distress.  She is alert and oriented to person, place, and time.        History  TURBT 4/5/2016 High Grade Ta  TURBT re-resection 5/17/2016 High Grade Ta  6 Weekly BCG instuillations starting 6/16/16  3 weekly BCG starting 7/20/17     Lost to follow-up in 2019 2/2024 Patient is here today as a new patient.  Patient was well-known to our practice several years ago.  She did have multiple bladder resections.  She was lost to follow-up approximately 5 years ago.  Patient did have a recent CT scan.  This film was independently reviewed.  This does show severe left hydronephrosis.  This is down to the level of the bladder.  The bladder does appear to be thickened on the left side.  Patient has had slight worsening of her creatinine.  Patient has been having some left-sided flank pain.  Is unclear how long this has been going on.  Patient has no unintentional weight loss or decreased appetite.  She reports no nausea or vomiting.     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    4/2024 patient opted for chemotherapy and radiation therapy versus radical cystectomy to 2 surgical risk.  Started cisplatin/gemcitabine.     Currently  Patient is here today for left-sided stent removal.  Patient is undergoing chemotherapy as well as radiation therapy.  Patient has no pain today.     Cystoscopy With Stent Removal Procedure Note    Indications:    Diagnosis Orders   1. Urothelial carcinoma of bladder with invasion of muscle (HCC)  AR CYSTO W/SIMPLE REMOVAL STONE & STENT      2. Hydronephrosis, unspecified hydronephrosis type  AR CYSTO W/SIMPLE REMOVAL STONE & STENT      3. Transitional cell carcinoma (HCC)  AR CYSTO W/SIMPLE REMOVAL STONE & STENT          Pre-operative Diagnosis:    Diagnosis Orders   1. Urothelial carcinoma of bladder with invasion of muscle

## 2024-07-22 NOTE — PROGRESS NOTES
During cystoscopy the following was utilized on patient with no adverse affects:    45% SODIUM CHLORIDE 500 ML BAG  Lot number: [id number]  Expiration date: 7/2025      LIDOCAINE HYDROCHLORIDE JELLY 2%   Lot number: [id number]  Expiration date: 9/2025      CYSTOSCOPE  LOT# [id number]  EXP.DATE: 6/28/2026

## 2024-07-23 ENCOUNTER — HOSPITAL ENCOUNTER (OUTPATIENT)
Dept: INFUSION THERAPY | Age: 64
Discharge: HOME | End: 2024-07-23
Payer: COMMERCIAL

## 2024-07-23 VITALS
HEART RATE: 63 BPM | BODY MASS INDEX: 24.11 KG/M2 | HEIGHT: 62 IN | RESPIRATION RATE: 18 BRPM | DIASTOLIC BLOOD PRESSURE: 65 MMHG | WEIGHT: 131 LBS | TEMPERATURE: 97 F | SYSTOLIC BLOOD PRESSURE: 117 MMHG

## 2024-07-23 DIAGNOSIS — C67.9 MALIGNANT NEOPLASM OF URINARY BLADDER, UNSPECIFIED SITE (HCC): ICD-10-CM

## 2024-07-23 DIAGNOSIS — C67.9 UROTHELIAL CARCINOMA OF BLADDER (HCC): Primary | ICD-10-CM

## 2024-07-23 LAB
ALBUMIN SERPL-MCNC: 3.6 G/DL (ref 3.5–5.2)
ALBUMIN/GLOB SERPL: 1.2 {RATIO} (ref 1–2.5)
ALP SERPL-CCNC: 94 U/L (ref 35–104)
ALT SERPL-CCNC: 13 U/L (ref 5–33)
ANION GAP SERPL CALCULATED.3IONS-SCNC: 11 MMOL/L (ref 9–17)
AST SERPL-CCNC: 21 U/L
BASOPHILS # BLD: 0.04 K/UL (ref 0–0.2)
BASOPHILS NFR BLD: 1 % (ref 0–2)
BILIRUB SERPL-MCNC: 0.7 MG/DL (ref 0.3–1.2)
BUN SERPL-MCNC: 26 MG/DL (ref 8–23)
BUN/CREAT SERPL: 17 (ref 9–20)
CALCIUM SERPL-MCNC: 9 MG/DL (ref 8.6–10.4)
CHLORIDE SERPL-SCNC: 99 MMOL/L (ref 98–107)
CO2 SERPL-SCNC: 24 MMOL/L (ref 20–31)
CREAT SERPL-MCNC: 1.5 MG/DL (ref 0.5–0.9)
EOSINOPHIL # BLD: 0.27 K/UL (ref 0–0.44)
EOSINOPHILS RELATIVE PERCENT: 5 % (ref 1–4)
ERYTHROCYTE [DISTWIDTH] IN BLOOD BY AUTOMATED COUNT: 14.9 % (ref 11.8–14.4)
GFR, ESTIMATED: 39 ML/MIN/1.73M2
GLUCOSE SERPL-MCNC: 107 MG/DL (ref 70–99)
HCT VFR BLD AUTO: 22.3 % (ref 36.3–47.1)
HGB BLD-MCNC: 7.6 G/DL (ref 11.9–15.1)
IMM GRANULOCYTES # BLD AUTO: <0.03 K/UL (ref 0–0.3)
IMM GRANULOCYTES NFR BLD: 0 %
LYMPHOCYTES NFR BLD: 0.8 K/UL (ref 1.1–3.7)
LYMPHOCYTES RELATIVE PERCENT: 15 % (ref 24–43)
MCH RBC QN AUTO: 31.7 PG (ref 25.2–33.5)
MCHC RBC AUTO-ENTMCNC: 34.1 G/DL (ref 28.4–34.8)
MCV RBC AUTO: 92.9 FL (ref 82.6–102.9)
MONOCYTES NFR BLD: 0.51 K/UL (ref 0.1–1.2)
MONOCYTES NFR BLD: 10 % (ref 3–12)
NEUTROPHILS NFR BLD: 69 % (ref 36–65)
NEUTS SEG NFR BLD: 3.6 K/UL (ref 1.5–8.1)
NRBC BLD-RTO: 0 PER 100 WBC
PLATELET # BLD AUTO: 447 K/UL (ref 138–453)
PMV BLD AUTO: 9.1 FL (ref 8.1–13.5)
POTASSIUM SERPL-SCNC: 3.5 MMOL/L (ref 3.7–5.3)
PROT SERPL-MCNC: 6.6 G/DL (ref 6.4–8.3)
RBC # BLD AUTO: 2.4 M/UL (ref 3.95–5.11)
SODIUM SERPL-SCNC: 134 MMOL/L (ref 135–144)
WBC OTHER # BLD: 5.2 K/UL (ref 3.5–11.3)

## 2024-07-23 PROCEDURE — 6360000002 HC RX W HCPCS: Performed by: INTERNAL MEDICINE

## 2024-07-23 PROCEDURE — 36591 DRAW BLOOD OFF VENOUS DEVICE: CPT

## 2024-07-23 PROCEDURE — 85025 COMPLETE CBC W/AUTO DIFF WBC: CPT

## 2024-07-23 PROCEDURE — 96413 CHEMO IV INFUSION 1 HR: CPT

## 2024-07-23 PROCEDURE — 96375 TX/PRO/DX INJ NEW DRUG ADDON: CPT

## 2024-07-23 PROCEDURE — 80053 COMPREHEN METABOLIC PANEL: CPT

## 2024-07-23 PROCEDURE — 2580000003 HC RX 258: Performed by: INTERNAL MEDICINE

## 2024-07-23 RX ORDER — HEPARIN 100 UNIT/ML
500 SYRINGE INTRAVENOUS PRN
Status: DISCONTINUED | OUTPATIENT
Start: 2024-07-23 | End: 2024-07-24 | Stop reason: HOSPADM

## 2024-07-23 RX ORDER — ONDANSETRON 2 MG/ML
8 INJECTION INTRAMUSCULAR; INTRAVENOUS ONCE
Status: COMPLETED | OUTPATIENT
Start: 2024-07-23 | End: 2024-07-23

## 2024-07-23 RX ORDER — SODIUM CHLORIDE 0.9 % (FLUSH) 0.9 %
5-40 SYRINGE (ML) INJECTION PRN
Status: DISCONTINUED | OUTPATIENT
Start: 2024-07-23 | End: 2024-07-24 | Stop reason: HOSPADM

## 2024-07-23 RX ORDER — SODIUM CHLORIDE 9 MG/ML
5-250 INJECTION, SOLUTION INTRAVENOUS PRN
Status: DISCONTINUED | OUTPATIENT
Start: 2024-07-23 | End: 2024-07-24 | Stop reason: HOSPADM

## 2024-07-23 RX ADMIN — SODIUM CHLORIDE, PRESERVATIVE FREE 10 ML: 5 INJECTION INTRAVENOUS at 11:54

## 2024-07-23 RX ADMIN — GEMCITABINE HYDROCHLORIDE 43 MG: 200 INJECTION, SOLUTION INTRAVENOUS at 11:21

## 2024-07-23 RX ADMIN — SODIUM CHLORIDE, PRESERVATIVE FREE 10 ML: 5 INJECTION INTRAVENOUS at 11:55

## 2024-07-23 RX ADMIN — Medication 500 UNITS: at 11:55

## 2024-07-23 RX ADMIN — ONDANSETRON 8 MG: 2 INJECTION INTRAMUSCULAR; INTRAVENOUS at 10:42

## 2024-07-23 RX ADMIN — SODIUM CHLORIDE 200 ML/HR: 9 INJECTION, SOLUTION INTRAVENOUS at 10:41

## 2024-07-23 RX ADMIN — SODIUM CHLORIDE, PRESERVATIVE FREE 10 ML: 5 INJECTION INTRAVENOUS at 10:06

## 2024-07-23 RX ADMIN — SODIUM CHLORIDE, PRESERVATIVE FREE 10 ML: 5 INJECTION INTRAVENOUS at 10:05

## 2024-07-23 NOTE — PROGRESS NOTES
A Good visit again.  Last encounter was in April.  Engaged readily in conversation and sharing of what is happening in their lives of late.  She is glad to be out of the hospital.  Active listening extended as she shared about their trips to radiation and being so far from where they live.  Support offered and a general prayer said with both of them as they cope with the treatments.  Continue to visit and to support.  See Flowsheet.  An assurance of prayers given.    Sister Radha Randolph, OSF/T  [facility]

## 2024-07-23 NOTE — PLAN OF CARE
Problem: Chronic Conditions and Co-morbidities  Goal: Patient's chronic conditions and co-morbidity symptoms are monitored and maintained or improved  7/23/2024 0959 by Ankita Velez, RN  Outcome: Adequate for Discharge  7/23/2024 0959 by Ankita Velez, RN  Outcome: /HSPC Resolved Resume Next Certification Period     Problem: Safety - Adult  Goal: Free from fall injury  Outcome: Adequate for Discharge

## 2024-07-24 ENCOUNTER — CARE COORDINATION (OUTPATIENT)
Dept: CASE MANAGEMENT | Age: 64
End: 2024-07-24

## 2024-07-24 NOTE — CARE COORDINATION
Date/Time:  7/24/2024 2:14 PM  LPN attempted to reach patient by telephone regarding red alert in remote patient monitoring program. Left HIPPA compliant message requesting a return call. Will attempt to reach patient again.

## 2024-07-24 NOTE — CARE COORDINATION
-Remote Alert Monitoring Note      Date/Time:  2024 11:41 AM  Patient Current Location: Home: [address] [place] [address]  Verified patients name and  as identifiers.    Rpm alert to be reviewed by the provider   red alert  pulse ox reading (90)  Vitals Recheck pulse ox reading (90)  Additional needs to be addressed by provider: spoke to patient she denies chest pain and dizziness she has SOB with exertion only, patient does have oxygen and is wearing at 2 LPM, recheck of pulse ox was bouncing from 89 - 94% she is very anxious about the readings  educated patient to be aware of Pulse ox reading staying below 90 % for over 15 minutes, watching for SOB at rest and chest pain or dizziness, she was also provided with tech support number to make sure equipment is functioning properly, patient is speaking in full clear sentences and no signs of distress noted at this time                   LPN contacted patient by telephone regarding red alert received   Background: Pneumonia   Refer to 911 immediately if:  Patient unresponsive or unable to provide history  Change in cognition or sudden confusion  Patient unable to respond in complete sentences  Intense chest pain/tightness  Any concern for any clinical emergency  Red Alert: Provider response time of 1 hr required for any red alert requiring intervention  Yellow Alert: Provider response time of 3hr required for any escalated yellow alert  Patient Chief Complaint:  O2 Triage  Are you having any Chest Pain? no   Are you having any Shortness of Breath? no   Swelling in your hands or feet? no     Are you having any other health concerns or issues? no     Clinical Interventions: Reviewed and followed up on alerts and treatments-spoke to patient she denies chest pain and dizziness she has SOB with exertion only, patient does have oxygen and is wearing at 2 LPM, recheck of pulse ox was bouncing from 89 - 94% she is very anxious about the readings   educated patient

## 2024-07-26 ENCOUNTER — CARE COORDINATION (OUTPATIENT)
Dept: CARE COORDINATION | Age: 64
End: 2024-07-26

## 2024-07-26 VITALS
BODY MASS INDEX: 24.8 KG/M2 | SYSTOLIC BLOOD PRESSURE: 124 MMHG | TEMPERATURE: 98.6 F | DIASTOLIC BLOOD PRESSURE: 69 MMHG | HEART RATE: 63 BPM | OXYGEN SATURATION: 94 % | WEIGHT: 135.6 LBS

## 2024-07-26 NOTE — CARE COORDINATION
Care Transitions Note    Follow Up Call     Patient Current Location:  [place]    Care Transition Nurse contacted the patient by telephone. Verified name and  as identifiers.    Additional needs identified to be addressed with provider   No needs identified                 Method of communication with provider: none.    Care Summary Note: Was able to contact Daja for transitional outreach.  She stated that she was doing \"ok\".  She denied any shortness of breath, is urinating and having urine out of her nephrostomy tube.  She said that it is clear yellow urine.  She said that the only issue that she was having was increased edema in her lower extremities and her abdomen.  She said that in the morning her legs are skinny, but after she has been up they swell up.  She said that her abdomen is distended.  She is taking the lasix.  Encouraged her to speak with the nephrologist about the swelling.  Writer was going to contact oncology in regards to the swelling and possible ascites, but office is closed today and oncology nurse navigator not available.   Will attempt on next business day.     Plan of care updates since last contact:  Follow up on respiratory status, swelling       Advance Care Planning:   Does patient have an Advance Directive: reviewed during previous call, see note. .    Medication Review:  No changes since last call.     Remote Patient Monitoring:  Offered patient enrollment in the Remote Patient Monitoring (RPM) program for in-home monitoring: Yes, patient enrolled; current status is activated and monitoring.    Assessments:  Care Transitions Subsequent and Final Call    Subsequent and Final Calls  Do you have any ongoing symptoms?: Yes  Onset of Patient-reported symptoms: In the past 7 days  Patient-reported symptoms: Weight Gain  Have your medications changed?: No  Do you have any questions related to your medications?: No  Do you currently have any active services?: No  Do you have any needs or

## 2024-07-29 ENCOUNTER — CARE COORDINATION (OUTPATIENT)
Dept: CASE MANAGEMENT | Age: 64
End: 2024-07-29

## 2024-07-29 ENCOUNTER — TELEPHONE (OUTPATIENT)
Dept: ONCOLOGY | Age: 64
End: 2024-07-29

## 2024-07-29 ENCOUNTER — CARE COORDINATION (OUTPATIENT)
Dept: CARE COORDINATION | Age: 64
End: 2024-07-29

## 2024-07-29 ENCOUNTER — TELEPHONE (OUTPATIENT)
Dept: PRIMARY CARE CLINIC | Age: 64
End: 2024-07-29

## 2024-07-29 ENCOUNTER — HOSPITAL ENCOUNTER (OUTPATIENT)
Dept: INFUSION THERAPY | Age: 64
Discharge: HOME | End: 2024-07-29
Payer: COMMERCIAL

## 2024-07-29 VITALS
SYSTOLIC BLOOD PRESSURE: 115 MMHG | TEMPERATURE: 97.4 F | DIASTOLIC BLOOD PRESSURE: 69 MMHG | WEIGHT: 139 LBS | HEIGHT: 62 IN | BODY MASS INDEX: 25.58 KG/M2 | HEART RATE: 64 BPM

## 2024-07-29 DIAGNOSIS — C67.9 MALIGNANT NEOPLASM OF URINARY BLADDER, UNSPECIFIED SITE (HCC): ICD-10-CM

## 2024-07-29 DIAGNOSIS — C67.9 UROTHELIAL CARCINOMA OF BLADDER (HCC): Primary | ICD-10-CM

## 2024-07-29 LAB
ALBUMIN SERPL-MCNC: 3.7 G/DL (ref 3.5–5.2)
ALBUMIN/GLOB SERPL: 1.2 {RATIO} (ref 1–2.5)
ALP SERPL-CCNC: 89 U/L (ref 35–104)
ALT SERPL-CCNC: 10 U/L (ref 5–33)
ANION GAP SERPL CALCULATED.3IONS-SCNC: 12 MMOL/L (ref 9–17)
AST SERPL-CCNC: 17 U/L
BASOPHILS # BLD: 0.03 K/UL (ref 0–0.2)
BASOPHILS NFR BLD: 1 % (ref 0–2)
BILIRUB SERPL-MCNC: 0.8 MG/DL (ref 0.3–1.2)
BUN SERPL-MCNC: 31 MG/DL (ref 8–23)
BUN/CREAT SERPL: 19 (ref 9–20)
CALCIUM SERPL-MCNC: 9.1 MG/DL (ref 8.6–10.4)
CHLORIDE SERPL-SCNC: 97 MMOL/L (ref 98–107)
CO2 SERPL-SCNC: 25 MMOL/L (ref 20–31)
CREAT SERPL-MCNC: 1.6 MG/DL (ref 0.5–0.9)
EOSINOPHIL # BLD: 0.1 K/UL (ref 0–0.44)
EOSINOPHILS RELATIVE PERCENT: 2 % (ref 1–4)
ERYTHROCYTE [DISTWIDTH] IN BLOOD BY AUTOMATED COUNT: 15.2 % (ref 11.8–14.4)
GFR, ESTIMATED: 36 ML/MIN/1.73M2
GLUCOSE SERPL-MCNC: 107 MG/DL (ref 70–99)
HCT VFR BLD AUTO: 22.4 % (ref 36.3–47.1)
HGB BLD-MCNC: 7.6 G/DL (ref 11.9–15.1)
IMM GRANULOCYTES # BLD AUTO: <0.03 K/UL (ref 0–0.3)
IMM GRANULOCYTES NFR BLD: 0 %
LYMPHOCYTES NFR BLD: 0.61 K/UL (ref 1.1–3.7)
LYMPHOCYTES RELATIVE PERCENT: 15 % (ref 24–43)
MCH RBC QN AUTO: 31.5 PG (ref 25.2–33.5)
MCHC RBC AUTO-ENTMCNC: 33.9 G/DL (ref 28.4–34.8)
MCV RBC AUTO: 92.9 FL (ref 82.6–102.9)
MONOCYTES NFR BLD: 0.34 K/UL (ref 0.1–1.2)
MONOCYTES NFR BLD: 8 % (ref 3–12)
NEUTROPHILS NFR BLD: 74 % (ref 36–65)
NEUTS SEG NFR BLD: 3.12 K/UL (ref 1.5–8.1)
NRBC BLD-RTO: 0 PER 100 WBC
PLATELET # BLD AUTO: 395 K/UL (ref 138–453)
PMV BLD AUTO: 8.6 FL (ref 8.1–13.5)
POTASSIUM SERPL-SCNC: 3.5 MMOL/L (ref 3.7–5.3)
PROT SERPL-MCNC: 6.7 G/DL (ref 6.4–8.3)
RBC # BLD AUTO: 2.41 M/UL (ref 3.95–5.11)
SODIUM SERPL-SCNC: 134 MMOL/L (ref 135–144)
WBC OTHER # BLD: 4.2 K/UL (ref 3.5–11.3)

## 2024-07-29 PROCEDURE — 36591 DRAW BLOOD OFF VENOUS DEVICE: CPT

## 2024-07-29 PROCEDURE — 96413 CHEMO IV INFUSION 1 HR: CPT

## 2024-07-29 PROCEDURE — 85025 COMPLETE CBC W/AUTO DIFF WBC: CPT

## 2024-07-29 PROCEDURE — 2580000003 HC RX 258: Performed by: INTERNAL MEDICINE

## 2024-07-29 PROCEDURE — 80053 COMPREHEN METABOLIC PANEL: CPT

## 2024-07-29 PROCEDURE — 96375 TX/PRO/DX INJ NEW DRUG ADDON: CPT

## 2024-07-29 PROCEDURE — 6360000002 HC RX W HCPCS: Performed by: INTERNAL MEDICINE

## 2024-07-29 RX ORDER — ONDANSETRON 2 MG/ML
8 INJECTION INTRAMUSCULAR; INTRAVENOUS ONCE
Status: COMPLETED | OUTPATIENT
Start: 2024-07-29 | End: 2024-07-29

## 2024-07-29 RX ORDER — SODIUM CHLORIDE 0.9 % (FLUSH) 0.9 %
5-40 SYRINGE (ML) INJECTION PRN
Status: DISCONTINUED | OUTPATIENT
Start: 2024-07-29 | End: 2024-07-30 | Stop reason: HOSPADM

## 2024-07-29 RX ORDER — SODIUM CHLORIDE 9 MG/ML
5-250 INJECTION, SOLUTION INTRAVENOUS PRN
Status: DISCONTINUED | OUTPATIENT
Start: 2024-07-29 | End: 2024-07-30 | Stop reason: HOSPADM

## 2024-07-29 RX ORDER — HEPARIN 100 UNIT/ML
500 SYRINGE INTRAVENOUS PRN
Status: DISCONTINUED | OUTPATIENT
Start: 2024-07-29 | End: 2024-07-30 | Stop reason: HOSPADM

## 2024-07-29 RX ADMIN — GEMCITABINE HYDROCHLORIDE 43 MG: 200 INJECTION, SOLUTION INTRAVENOUS at 11:49

## 2024-07-29 RX ADMIN — SODIUM CHLORIDE 50 ML/HR: 9 INJECTION, SOLUTION INTRAVENOUS at 11:04

## 2024-07-29 RX ADMIN — SODIUM CHLORIDE, PRESERVATIVE FREE 20 ML: 5 INJECTION INTRAVENOUS at 10:15

## 2024-07-29 RX ADMIN — ONDANSETRON 8 MG: 2 INJECTION INTRAMUSCULAR; INTRAVENOUS at 11:11

## 2024-07-29 RX ADMIN — HEPARIN 500 UNITS: 100 SYRINGE at 12:22

## 2024-07-29 RX ADMIN — SODIUM CHLORIDE, PRESERVATIVE FREE 20 ML: 5 INJECTION INTRAVENOUS at 12:22

## 2024-07-29 NOTE — TELEPHONE ENCOUNTER
Returned call to Yu after speaking with Dr. Shepard; he does not feel need to add patient on to his schedule today to be seen rather patient is to follow as soon as possible with Nephrology.  Yu states patient was also to call them based on previous conversation.  Patient scheduled for treatment today at 10:00 am.

## 2024-07-29 NOTE — TELEPHONE ENCOUNTER
Name: Daja Espinosa  : 1960  MRN: [id number]    Oncology Navigation Follow-Up Note    Contact Type:  Medical Oncology    Notes: Navigator met with Daja in treatment area.  Daja states she has had 6 lb weight gain in 6 days.  Pt states she tried to call nephrology office on Friday but they were closed.  Encouraged patient to call after treatment.  Daja states she will.  Daja had questions regarding scheduling of treatment.  Chart reviewed.  Pt informed plan is ordered for Gemzar twice a week with concurrent radiation.  Daja states that Dr. Shepard came in and explained it to her and also informed her to call nephrology. Djaa states she is going to call after treatment. Daja denies other needs from navigation at this time.  Encourage pt to call with needs or questions.     TX 24 and 24  F/u 24  Electronically signed by Ave Arreaga RN on 2024 at 11:32 AM

## 2024-07-29 NOTE — CARE COORDINATION
-Remote Alert Monitoring Note      Date/Time:  2024 9:22 AM  Patient Current Location: Home: [address] [place] [address]  Verified patients name and  as identifiers.    Rpm alert to be reviewed by the provider   red alert  weight (5 lbs in 7 days )  Additional needs to be addressed by provider:  Spoke to patient about 5 lb weight increase in 7 days she states she has swelling in both legs and abdomen, swelling in legs goes away over night but returns throughout the day she denies increased SOB , chest pain and dizziness, patient taking Lasix 40 mg once daily she has appointment with you tomorrow .all other vitals WNL                   LPN contacted patient by telephone regarding red alert received   Background: PNA  Refer to 911 immediately if:  Patient unresponsive or unable to provide history  Change in cognition or sudden confusion  Patient unable to respond in complete sentences  Intense chest pain/tightness  Any concern for any clinical emergency  Red Alert: Provider response time of 1 hr required for any red alert requiring intervention  Yellow Alert: Provider response time of 3hr required for any escalated yellow alert  Patient Chief Complaint:  Weight Scale Triage  Was your weight obtained upon rising/waking today? yes   Was your weight obtained after voiding and/or use of the bathroom today? yes   Did you weigh yourself in the same amount of clothing today, compared to how you typically do? yes   Was the scale bumped or moved prior to today's weight? no   Is your scale on a flat/hard surface? yes   Did you obtain your weight with shoes on? no   If yes, is this something you normally do during your daily weights? no   Were you standing up straight on the scale today? yes   Were you leaning on anything while obtaining your weight today? no     Clinical Interventions: Spoke to patient about 5 lb weight increase in 7 days she states she has swelling in both legs and abdomen, swelling in legs

## 2024-07-29 NOTE — CARE COORDINATION
she has had a 6 lb water gain in 6 days.     Nephrology office was called in an attempt to update them on pt's weight gain and swelling.  No answer on the nurse line.  Detailed message was left with contact information for pt and writer.    Call placed to Daja.  She stated that she did call the nephrologist office and she has to leave another message.  She said that if she does not hear back from them today, she will call them again tomorrow.  She denied any increased shortness of breath, no problems with the nephrostomy tube and is urinating.   She had no further questions or concerns.     Plan of care updates since last contact:  Follow up on swelling and spoke with oncology ans asked for pt to be assessed       Advance Care Planning:   Does patient have an Advance Directive: reviewed during previous call, see note. .    Medication Review:  No changes since last call.     Remote Patient Monitoring:  Offered patient enrollment in the Remote Patient Monitoring (RPM) program for in-home monitoring: Yes, patient enrolled; current status is activated and monitoring.    Assessments:  Care Transitions Subsequent and Final Call    Subsequent and Final Calls  Do you have any ongoing symptoms?: Yes  Onset of Patient-reported symptoms: In the past 7 days  Patient-reported symptoms: Weight Gain  Interventions for patient-reported symptoms: Notified PCP/Physician  Have your medications changed?: No  Do you have any questions related to your medications?: No  Do you currently have any active services?: No  Do you have any needs or concerns that I can assist you with?: No  Care Transitions Interventions  Other Interventions:              Follow Up Appointment:   Reviewed upcoming appointment(s).  Future Appointments         Provider Specialty Dept Phone    7/29/2024 10:00 AM ERA BILL [facility] ONC ROOM 9 Infusion Therapy [phone]    7/30/2024 10:30 AM Sohail Garcia MD Family Medicine [phone]    8/1/2024 10:00 AM

## 2024-07-29 NOTE — TELEPHONE ENCOUNTER
07/29/24 10:54 AM     REMOTE PATIENT MONITORING HIGH ALERT RESPONSE     11:16 AM Attempted twice to reach pt but she is not answering the phone at this time. Left message introducing myself as the nurse practitioner with the [facility] remote patient monitoring program and that I would like to talk to her for a few minutes about the alert we received today. Left my phone # in message and requested a return call at her earliest convenience.     1:08 PM Attempted twice more to reach pt but she is not answering at this time. Asking RPM LPN to attempt to reach her and if able, to ask her to return my call before 4 PM today. LPN attempted to reach pt at 1:18 PM but was unable to do so. LM asking her to return my call and providing phone #.    1:51 PM Received call from pt's ACM. She sees in EMR that I've been trying to reach pt for an RPM alert. She has asked pt to call nephrology regarding her abdominal edema and states she will probably follow up with nephrology also. Thanked her for this intervention and advised I will close out my encounter now.    ASSESSMENT AND PLAN   Pt's ACM is calling nephrology on pt's behalf for guidance regarding ongoing issue with potential abd ascites. Thanked her and will now close out this alert intervention encounter.    CHIEF COMPLAINT    Responding to a high RPM alert for weight gain of 6# in last 7 days    CURRENT MEDICATIONS    Current Outpatient Rx   Medication Sig Dispense Refill    amLODIPine (NORVASC) 10 MG tablet TAKE 1 TABLET BY MOUTH DAILY 90 tablet 1    lidocaine-prilocaine (EMLA) 2.5-2.5 % cream Apply topically to port site 60 minutes before access as needed. 30 g 1    furosemide (LASIX) 40 MG tablet Take 1 tablet by mouth daily 60 tablet 3    senna (SENOKOT) 8.6 MG tablet Take 1 tablet by mouth nightly as needed (for constipation) 30 tablet 3    docusate sodium (COLACE, DULCOLAX) 100 MG CAPS Take 100 mg by mouth 2 times daily as needed for Constipation (Patient not taking:

## 2024-07-29 NOTE — CARE COORDINATION
Date/Time:  7/29/2024 1:18 PM  LPN attempted to reach patient by telephone regarding  calling Vernell Moe CNP   in remote patient monitoring program. Left HIPPA compliant message requesting a return call. Will attempt to reach patient again.

## 2024-07-30 ENCOUNTER — CARE COORDINATION (OUTPATIENT)
Dept: CASE MANAGEMENT | Age: 64
End: 2024-07-30

## 2024-07-30 ENCOUNTER — CARE COORDINATION (OUTPATIENT)
Dept: CARE COORDINATION | Age: 64
End: 2024-07-30

## 2024-07-30 ENCOUNTER — OFFICE VISIT (OUTPATIENT)
Dept: FAMILY MEDICINE CLINIC | Age: 64
End: 2024-07-30
Payer: COMMERCIAL

## 2024-07-30 VITALS
DIASTOLIC BLOOD PRESSURE: 63 MMHG | HEIGHT: 62 IN | WEIGHT: 140 LBS | HEART RATE: 66 BPM | OXYGEN SATURATION: 90 % | SYSTOLIC BLOOD PRESSURE: 118 MMHG | BODY MASS INDEX: 25.76 KG/M2 | RESPIRATION RATE: 18 BRPM

## 2024-07-30 DIAGNOSIS — I10 HYPERTENSION, ESSENTIAL: ICD-10-CM

## 2024-07-30 DIAGNOSIS — J96.21 ACUTE ON CHRONIC RESPIRATORY FAILURE WITH HYPOXIA (HCC): Primary | ICD-10-CM

## 2024-07-30 DIAGNOSIS — I48.91 ATRIAL FIBRILLATION WITH RAPID VENTRICULAR RESPONSE (HCC): ICD-10-CM

## 2024-07-30 DIAGNOSIS — J15.9 HOSPITAL-ACQUIRED BACTERIAL PNEUMONIA: ICD-10-CM

## 2024-07-30 DIAGNOSIS — J90 PLEURAL EFFUSION ON RIGHT: ICD-10-CM

## 2024-07-30 DIAGNOSIS — R60.1 ANASARCA: ICD-10-CM

## 2024-07-30 DIAGNOSIS — Z09 HOSPITAL DISCHARGE FOLLOW-UP: ICD-10-CM

## 2024-07-30 DIAGNOSIS — C67.9 UROTHELIAL CARCINOMA OF BLADDER (HCC): ICD-10-CM

## 2024-07-30 DIAGNOSIS — I63.9 ACUTE ISCHEMIC STROKE (HCC): ICD-10-CM

## 2024-07-30 DIAGNOSIS — N17.9 ACUTE KIDNEY INJURY SUPERIMPOSED ON CKD (HCC): ICD-10-CM

## 2024-07-30 DIAGNOSIS — N18.9 ACUTE KIDNEY INJURY SUPERIMPOSED ON CKD (HCC): ICD-10-CM

## 2024-07-30 DIAGNOSIS — D63.8 ANEMIA DUE TO CHRONIC ILLNESS: ICD-10-CM

## 2024-07-30 PROCEDURE — 1111F DSCHRG MED/CURRENT MED MERGE: CPT | Performed by: INTERNAL MEDICINE

## 2024-07-30 PROCEDURE — 99214 OFFICE O/P EST MOD 30 MIN: CPT | Performed by: INTERNAL MEDICINE

## 2024-07-30 RX ORDER — BUMETANIDE 1 MG/1
1 TABLET ORAL DAILY
Qty: 30 TABLET | Refills: 3 | Status: ON HOLD | OUTPATIENT
Start: 2024-07-30

## 2024-07-30 NOTE — CARE COORDINATION
Date/Time:  7/30/2024 9:21 AM  LPN attempted to reach patient by telephone regarding red alert [address] WEIGHT INCREASE in remote patient monitoring program. Left HIPPA compliant message requesting a return call. Will attempt to reach patient again.

## 2024-07-30 NOTE — PROGRESS NOTES
Post-Discharge Transitional Care Follow Up      Daja Espinosa   YOB: 1960    Date of Office Visit:  7/30/2024  Date of Hospital Admission: 7/19/24  Date of Hospital Discharge: 7/19/24  Readmission Risk Score (high >=14%. Medium >=10%):Readmission Risk Score: 31.2      Care management risk score Rising risk (score 2-5) and Complex Care (Scores >=6): No Risk Score On File     Non face to face  following discharge, date last encounter closed (first attempt may have been earlier): 07/19/2024     Call initiated 2 business days of discharge: Yes     1.Acute on chronic respiratory failure with hypoxia (HCC). The problem is stable, improved after thoracentesis R side x 2. On 2L NC  2.Anasarca  Not responding to Lasix. We decided to try Bumex 1 mg daily. She is to call us if no improvement. May need to try 2 mg. She gets biweekly BMP, will monitor renal function and potassium. She has to follow up with nephrology Dr. Rocio Chavarria on 8/6/24  -     bumetanide (BUMEX) 1 MG tablet; Take 1 tablet by mouth daily, Disp-30 tablet, R-3Normal  3.Atrial fibrillation with rapid ventricular response (HCC). The problem is stable. On Flecainide, Coreg, anticoagulated with Eliquis  4.Acute kidney injury superimposed on CKD (HCC)  Resolved, monitor renal function, follow up with nephrologist  5.Urothelial carcinoma of bladder (HCC)  Receiving chemo and RTX. Follow up with hem-onc Dr. Shepard  6.Hypertension, essential. Blood pressure stable, continue on current meds  7.Acute ischemic stroke (HCC)  8.Anemia due to chronic illness.at baseline  9.Hospital-acquired bacterial pneumonia. Resolved, completed Anbx course  10.Pleural effusion on right. S/P thoracentesis x 2. Resolved       Medical Decision Making: high complexity  No follow-ups on file.           Subjective:   HPI    Inpatient course: Discharge summary reviewed- see chart.    Interval history/Current status:     Daja Espinosa presents to office to follow up after recent

## 2024-07-30 NOTE — CARE COORDINATION
Care Transitions Note    Follow Up Call     Patient Current Location:  Home: [address] [place] [id number]    Care Transition Nurse contacted the patient by telephone. Verified name and  as identifiers.    Additional needs identified to be addressed with provider   No needs identified                 Method of communication with provider: none.    Care Summary Note: Was able to contact Daja for follow up on call to nephrologist and PCP visit.  She said that her PCP changed her diuretic to Bumex and stopped her Lasix.  She was told that she could take 2 tab if needed.  She was told that if she continue to gain fluid to call her PCP back.  She said that she has not heard back from the nephrologist office and she will attempt to attempt to recall the office again today.  She denies any other issues, besides the swelling.  Reviewed drinking only 1500 ml per day.  Will follow up on Friday.  She had no further questions or concerns.     Plan of care updates since last contact:  Follow up on PCP visit and if nephrologist office called       Advance Care Planning:   Does patient have an Advance Directive: reviewed during previous call, see note. .    Medication Review:  Medications changed since last call, reviewed today.     Remote Patient Monitoring:  Offered patient enrollment in the Remote Patient Monitoring (RPM) program for in-home monitoring: Yes, patient enrolled; current status is activated and monitoring.    Assessments:  Care Transitions Subsequent and Final Call    Subsequent and Final Calls  Do you have any ongoing symptoms?: Yes  Onset of Patient-reported symptoms: In the past 7 days  Patient-reported symptoms: Weight Gain  Have your medications changed?: Yes  Patient Reports: Lasix was stopped and started on Bumex  Do you have any questions related to your medications?: No  Do you currently have any active services?: No  Do you have any needs or concerns that I can assist you with?: No  Care Transitions

## 2024-07-31 ENCOUNTER — CARE COORDINATION (OUTPATIENT)
Dept: CASE MANAGEMENT | Age: 64
End: 2024-07-31

## 2024-07-31 ASSESSMENT — ENCOUNTER SYMPTOMS
WHEEZING: 0
BACK PAIN: 0
CHEST TIGHTNESS: 0
ABDOMINAL DISTENTION: 1
BLOOD IN STOOL: 0
RECTAL PAIN: 0
NAUSEA: 0
ABDOMINAL PAIN: 0
SORE THROAT: 0
SHORTNESS OF BREATH: 0
COUGH: 0

## 2024-07-31 NOTE — CARE COORDINATION
-Remote Alert Monitoring Note      Date/Time:  2024 8:58 AM  Patient Current Location: Home: [address] [id number]  Verified patients name and  as identifiers.    Rpm alert to be reviewed by the provider   red alert  pulse ox reading (86)  Vitals Recheck pulse ox reading (92)  Additional needs to be addressed by provider: No                   LPN contacted patient by telephone regarding red alert received   Background: PNA  Refer to 911 immediately if:  Patient unresponsive or unable to provide history  Change in cognition or sudden confusion  Patient unable to respond in complete sentences  Intense chest pain/tightness  Any concern for any clinical emergency  Red Alert: Provider response time of 1 hr required for any red alert requiring intervention  Yellow Alert: Provider response time of 3hr required for any escalated yellow alert  Patient Chief Complaint:  O2 Triage  Are you having any Chest Pain? no   Are you having any Shortness of Breath? no   Swelling in your hands or feet? yes     Are you having any other health concerns or issues? no     Clinical Interventions: Reviewed and followed up on alerts and treatments-Spoke to patient she states she is doing good today she did not have her oxygen on this am when she did her vitals. Denies chest pain, SOB, dizziness states her feet are still swollen she saw PCP yesterday and her diuretic was changed. Weight today unchanged from yesterday, recheck of pulse ox now 92% no other concerns at present time     Plan/Follow Up: Will continue to review, monitor and address alerts with follow up based on severity of symptoms and risk factors.  **For any new or worsening symptoms or you are concerned in anyway, please contact your Provider or report to the nearest Emergency Room.**

## 2024-08-01 ENCOUNTER — CARE COORDINATION (OUTPATIENT)
Dept: CASE MANAGEMENT | Age: 64
End: 2024-08-01

## 2024-08-01 ENCOUNTER — TELEPHONE (OUTPATIENT)
Dept: ONCOLOGY | Age: 64
End: 2024-08-01

## 2024-08-01 ENCOUNTER — CARE COORDINATION (OUTPATIENT)
Dept: CARE COORDINATION | Age: 64
End: 2024-08-01

## 2024-08-01 ENCOUNTER — APPOINTMENT (OUTPATIENT)
Dept: GENERAL RADIOLOGY | Age: 64
End: 2024-08-01
Payer: COMMERCIAL

## 2024-08-01 ENCOUNTER — DIRECT ADMIT ORDERS (OUTPATIENT)
Dept: INTERNAL MEDICINE CLINIC | Age: 64
End: 2024-08-01

## 2024-08-01 ENCOUNTER — HOSPITAL ENCOUNTER (EMERGENCY)
Age: 64
Discharge: ANOTHER HOSPITAL | End: 2024-08-01
Attending: EMERGENCY MEDICINE
Payer: COMMERCIAL

## 2024-08-01 VITALS
DIASTOLIC BLOOD PRESSURE: 71 MMHG | WEIGHT: 150 LBS | TEMPERATURE: 97.9 F | OXYGEN SATURATION: 94 % | BODY MASS INDEX: 27.6 KG/M2 | HEART RATE: 63 BPM | HEIGHT: 62 IN | RESPIRATION RATE: 16 BRPM | SYSTOLIC BLOOD PRESSURE: 124 MMHG

## 2024-08-01 DIAGNOSIS — N17.9 AKI (ACUTE KIDNEY INJURY) (HCC): ICD-10-CM

## 2024-08-01 DIAGNOSIS — R60.0 BILATERAL LEG EDEMA: Primary | ICD-10-CM

## 2024-08-01 DIAGNOSIS — E87.1 HYPONATREMIA: ICD-10-CM

## 2024-08-01 LAB
ANION GAP SERPL CALCULATED.3IONS-SCNC: 13 MMOL/L (ref 9–17)
BASOPHILS # BLD: 0.02 K/UL (ref 0–0.2)
BASOPHILS NFR BLD: 0 % (ref 0–2)
BNP SERPL-MCNC: ABNORMAL PG/ML
BUN SERPL-MCNC: 33 MG/DL (ref 8–23)
BUN/CREAT SERPL: 17 (ref 9–20)
CALCIUM SERPL-MCNC: 9 MG/DL (ref 8.6–10.4)
CHLORIDE SERPL-SCNC: 94 MMOL/L (ref 98–107)
CO2 SERPL-SCNC: 23 MMOL/L (ref 20–31)
CREAT SERPL-MCNC: 2 MG/DL (ref 0.5–0.9)
EOSINOPHIL # BLD: 0.15 K/UL (ref 0–0.4)
EOSINOPHILS RELATIVE PERCENT: 3 % (ref 0–5)
ERYTHROCYTE [DISTWIDTH] IN BLOOD BY AUTOMATED COUNT: 15.7 % (ref 12.1–15.2)
GFR, ESTIMATED: 28 ML/MIN/1.73M2
GLUCOSE SERPL-MCNC: 108 MG/DL (ref 70–99)
HCT VFR BLD AUTO: 22.1 % (ref 36–46)
HGB BLD-MCNC: 7.5 G/DL (ref 12–16)
IMM GRANULOCYTES # BLD AUTO: 0.01 K/UL (ref 0–0.3)
IMM GRANULOCYTES NFR BLD: 0 % (ref 0–5)
LYMPHOCYTES NFR BLD: 0.66 K/UL (ref 1–4.8)
LYMPHOCYTES RELATIVE PERCENT: 14 % (ref 15–40)
MCH RBC QN AUTO: 31 PG (ref 26–34)
MCHC RBC AUTO-ENTMCNC: 33.9 G/DL (ref 31–37)
MCV RBC AUTO: 91.3 FL (ref 80–100)
MONOCYTES NFR BLD: 0.26 K/UL (ref 0–1)
MONOCYTES NFR BLD: 6 % (ref 4–8)
NEUTROPHILS NFR BLD: 77 % (ref 47–75)
NEUTS SEG NFR BLD: 3.59 K/UL (ref 2.5–7)
PLATELET # BLD AUTO: 359 K/UL (ref 140–450)
PMV BLD AUTO: 9 FL (ref 6–12)
POTASSIUM SERPL-SCNC: 3.7 MMOL/L (ref 3.7–5.3)
RBC # BLD AUTO: 2.42 M/UL (ref 4–5.2)
SODIUM SERPL-SCNC: 130 MMOL/L (ref 135–144)
WBC OTHER # BLD: 4.7 K/UL (ref 3.5–11)

## 2024-08-01 PROCEDURE — 85025 COMPLETE CBC W/AUTO DIFF WBC: CPT

## 2024-08-01 PROCEDURE — 99285 EMERGENCY DEPT VISIT HI MDM: CPT

## 2024-08-01 PROCEDURE — 80048 BASIC METABOLIC PNL TOTAL CA: CPT

## 2024-08-01 PROCEDURE — 74018 RADEX ABDOMEN 1 VIEW: CPT

## 2024-08-01 PROCEDURE — 83880 ASSAY OF NATRIURETIC PEPTIDE: CPT

## 2024-08-01 RX ORDER — ACETAMINOPHEN 325 MG/1
650 TABLET ORAL EVERY 6 HOURS PRN
Status: CANCELLED | OUTPATIENT
Start: 2024-08-01

## 2024-08-01 RX ORDER — ONDANSETRON 4 MG/1
4 TABLET, ORALLY DISINTEGRATING ORAL EVERY 8 HOURS PRN
Status: CANCELLED | OUTPATIENT
Start: 2024-08-01

## 2024-08-01 RX ORDER — ENOXAPARIN SODIUM 100 MG/ML
30 INJECTION SUBCUTANEOUS DAILY
Status: CANCELLED | OUTPATIENT
Start: 2024-08-02

## 2024-08-01 RX ORDER — ONDANSETRON 2 MG/ML
4 INJECTION INTRAMUSCULAR; INTRAVENOUS EVERY 6 HOURS PRN
Status: CANCELLED | OUTPATIENT
Start: 2024-08-01

## 2024-08-01 RX ORDER — SODIUM CHLORIDE 9 MG/ML
INJECTION, SOLUTION INTRAVENOUS PRN
Status: CANCELLED | OUTPATIENT
Start: 2024-08-01

## 2024-08-01 RX ORDER — SODIUM CHLORIDE 0.9 % (FLUSH) 0.9 %
5-40 SYRINGE (ML) INJECTION PRN
Status: CANCELLED | OUTPATIENT
Start: 2024-08-01

## 2024-08-01 RX ORDER — SODIUM CHLORIDE 0.9 % (FLUSH) 0.9 %
5-40 SYRINGE (ML) INJECTION EVERY 12 HOURS SCHEDULED
Status: CANCELLED | OUTPATIENT
Start: 2024-08-01

## 2024-08-01 RX ORDER — ACETAMINOPHEN 650 MG/1
650 SUPPOSITORY RECTAL EVERY 6 HOURS PRN
Status: CANCELLED | OUTPATIENT
Start: 2024-08-01

## 2024-08-01 ASSESSMENT — PAIN - FUNCTIONAL ASSESSMENT: PAIN_FUNCTIONAL_ASSESSMENT: NONE - DENIES PAIN

## 2024-08-01 ASSESSMENT — ENCOUNTER SYMPTOMS
EYE REDNESS: 0
ABDOMINAL PAIN: 0
BACK PAIN: 0
CHEST TIGHTNESS: 0
EYE DISCHARGE: 0
CONSTIPATION: 0
BLOOD IN STOOL: 0
SHORTNESS OF BREATH: 0
VOMITING: 0
SORE THROAT: 0
NAUSEA: 0
TROUBLE SWALLOWING: 0
DIARRHEA: 0
WHEEZING: 0
SINUS PRESSURE: 0
RHINORRHEA: 0
FACIAL SWELLING: 0
EYE PAIN: 0
COLOR CHANGE: 0
COUGH: 0

## 2024-08-01 NOTE — TELEPHONE ENCOUNTER
Patient called and called Chemo today.  States not feeling well.Complaints being swollen and constipated.  Patient reminded of appointment on 8/5/24 for chemo and Doctor visit at 9am.  Patient voiced understanding.

## 2024-08-01 NOTE — CARE COORDINATION
LPN attempted to reach patient by telephone regarding red alert WEIGHT GAIN 5 LBS IN 7 DAYS in remote patient monitoring program. Left HIPPA compliant message requesting a return call. Will attempt to reach patient again.

## 2024-08-01 NOTE — ED PROVIDER NOTES
utilized:  None    EKG Interpretation:  None    External Documents Reviewed:  None    Imaging that is independently reviewed and interpreted by me as:  None    See more data below for the lab and radiology tests and orders.    3)  Treatment and Disposition      \"ED Course\" Notes From Epic Narrator:  ED Course as of 08/01/24 2237   u Aug 01, 2024   2103 I discussed the case with Dr. Garcia patient's PCP and because of her complicated renal history and the fact that she has failed IV diuretics in the past and was on dialysis short-term she thinks that this is more than what we can handle here without a nephrologist consult so she recommends transfer. [JL]   2147 Discussed the case with Titus nurse practitioner at [facility] who agrees to accept the transfer. [JL]   2218 Discussed the case with Dr. Qureshi with nephrology who will see the patient when they arrive. [JL]      ED Course User Index  [JL] Winston Mckinley MD         PROCEDURES:  None      DATA FOR LAB AND RADIOLOGY TESTS ORDERED BELOW ARE REVIEWED BY THE ED CLINICIAN:    RADIOLOGY: All x-rays, CT, MRI, and formal ultrasound images (except ED bedside ultrasound) are read by the radiologist, see reports below, unless otherwise noted in MDM or here.  Reports below are reviewed by myself.  XR ABDOMEN (KUB) (SINGLE AP VIEW)   Final Result      1. Unremarkable bowel gas pattern with mild stool in the colon.      2. Probable percutaneous nephrostomy tube on the left.      3. Small effusions in the lung bases.             LABS: Lab orders shown below, the results are reviewed by myself, and all abnormals are listed below.  Labs Reviewed   BASIC METABOLIC PANEL - Abnormal; Notable for the following components:       Result Value    Sodium 130 (*)     Chloride 94 (*)     Glucose 108 (*)     BUN 33 (*)     Creatinine 2.0 (*)     Est, Glom Filt Rate 28 (*)     All other components within normal limits   BRAIN NATRIURETIC PEPTIDE - Abnormal; Notable for the

## 2024-08-01 NOTE — CARE COORDINATION
-Remote Alert Monitoring Note      Date/Time:  2024 2:51 PM  Patient Current Location: [place]  Verified patients name and  as identifiers.    Rpm alert to be reviewed by the provider   red alert  weight (5 lbs in 7 days )    Additional needs to be addressed by provider: IMAN Spoke topatient she denies ches tpain, SOB, dizziness has no swelling in feet, legs states she is bloated, she cancelled her radiation treatment today states she has not had a BM in days and gave herself an enema this morning she is having some bowel relief but still feels bloated, patient has no other concerns and will check weight again in am                    LPN contacted patient by telephone regarding red alert received   Background: PNA  Refer to 911 immediately if:  Patient unresponsive or unable to provide history  Change in cognition or sudden confusion  Patient unable to respond in complete sentences  Intense chest pain/tightness  Any concern for any clinical emergency  Red Alert: Provider response time of 1 hr required for any red alert requiring intervention  Yellow Alert: Provider response time of 3hr required for any escalated yellow alert  Patient Chief Complaint:  Weight Scale Triage  Was your weight obtained upon rising/waking today? yes   Was your weight obtained after voiding and/or use of the bathroom today? no   Did you weigh yourself in the same amount of clothing today, compared to how you typically do? yes   Was the scale bumped or moved prior to today's weight? no   Is your scale on a flat/hard surface? yes   Did you obtain your weight with shoes on? no   If yes, is this something you normally do during your daily weights? no   Were you standing up straight on the scale today? yes   Were you leaning on anything while obtaining your weight today? no     Clinical Interventions: IMAN Spoke topatient she denies ches tpain, SOB, dizziness has no swelling in feet, legs states she is bloated, she cancelled her radiation

## 2024-08-01 NOTE — CARE COORDINATION
Date/Time:  8/1/2024 10:27 AM  LPN attempted to reach patient by telephone regarding red alert WEIGHT GAIN 5 LBS IN 7 DAYS in remote patient monitoring program. Left HIPPA compliant message requesting a return call. Will attempt to reach patient again.

## 2024-08-01 NOTE — CARE COORDINATION
Care Transitions Note    Follow Up Call     Patient Current Location:  Home: [address] [place] [id number]    Care Transition Nurse contacted the patient by telephone. Verified name and  as identifiers.    Additional needs identified to be addressed with provider   High priority: Call report to [facility] for report                 Method of communication with provider: phone.    Care Summary Note: Was able to contact Daja.  She stated that she was not doing so well.  She said that she was so bloated.  Noted in HRS that she is now gained 10lbs.  Bumex did not help with fluid removal.  She also stated that she was constipated and wanted to try a laxative to see if that helps.  Explained to Daja that she is to the point with the fluid retention that she needs to go to the ED.  Explained that attempts to address her fluid retention in the outpt setting as failed and that she is to the point that it needs to be handled in the acute care setting.  Explained that if she is admitted that they can also address her constipation.  She is agreeable to go to the ED.  She said that she will have to have her spouse take her, but he is at the ED with their grandson and the mother.  Asked if her spouse could leave to pick her up.  She said that he could since the mother is there.  [facility] was called and updated on pt and that she was advised and agreed to come to the ED.     Plan of care updates since last contact:  Swelling and weight gain       Advance Care Planning:   Does patient have an Advance Directive: reviewed during previous call, see note. .    Medication Review:  No changes since last call.     Remote Patient Monitoring:  Offered patient enrollment in the Remote Patient Monitoring (RPM) program for in-home monitoring: Yes, patient enrolled; current status is activated and monitoring.    Assessments:  Care Transitions Subsequent and Final Call    Subsequent and Final Calls  Do you have any ongoing

## 2024-08-02 ENCOUNTER — HOSPITAL ENCOUNTER (INPATIENT)
Age: 64
LOS: 3 days | Discharge: HOME | DRG: 469 | End: 2024-08-05
Attending: INTERNAL MEDICINE
Payer: COMMERCIAL

## 2024-08-02 ENCOUNTER — APPOINTMENT (OUTPATIENT)
Dept: GENERAL RADIOLOGY | Age: 64
DRG: 469 | End: 2024-08-02
Attending: INTERNAL MEDICINE
Payer: COMMERCIAL

## 2024-08-02 PROBLEM — I50.33 ACUTE ON CHRONIC DIASTOLIC CONGESTIVE HEART FAILURE (HCC): Status: ACTIVE | Noted: 2024-08-02

## 2024-08-02 LAB
ANION GAP SERPL CALCULATED.3IONS-SCNC: 12 MMOL/L (ref 9–16)
ANION GAP SERPL CALCULATED.3IONS-SCNC: 15 MMOL/L (ref 9–16)
BACTERIA URNS QL MICRO: NORMAL
BASOPHILS # BLD: 0 K/UL (ref 0–0.2)
BASOPHILS NFR BLD: 0 % (ref 0–2)
BILIRUB UR QL STRIP: NEGATIVE
BUN SERPL-MCNC: 31 MG/DL (ref 8–23)
BUN SERPL-MCNC: 32 MG/DL (ref 8–23)
CALCIUM SERPL-MCNC: 8.8 MG/DL (ref 8.6–10.4)
CALCIUM SERPL-MCNC: 8.9 MG/DL (ref 8.6–10.4)
CASTS #/AREA URNS LPF: NORMAL /LPF (ref 0–8)
CHLORIDE SERPL-SCNC: 98 MMOL/L (ref 98–107)
CHLORIDE SERPL-SCNC: 99 MMOL/L (ref 98–107)
CLARITY UR: CLEAR
CO2 SERPL-SCNC: 22 MMOL/L (ref 20–31)
CO2 SERPL-SCNC: 23 MMOL/L (ref 20–31)
COLOR UR: YELLOW
CREAT SERPL-MCNC: 1.9 MG/DL (ref 0.5–0.9)
CREAT SERPL-MCNC: 1.9 MG/DL (ref 0.5–0.9)
EOSINOPHIL # BLD: 0.19 K/UL (ref 0–0.44)
EOSINOPHILS RELATIVE PERCENT: 3 % (ref 1–4)
EPI CELLS #/AREA URNS HPF: NORMAL /HPF (ref 0–5)
ERYTHROCYTE [DISTWIDTH] IN BLOOD BY AUTOMATED COUNT: 15.8 % (ref 11.8–14.4)
FERRITIN SERPL-MCNC: 141 NG/ML (ref 13–150)
GFR, ESTIMATED: 29 ML/MIN/1.73M2
GFR, ESTIMATED: 29 ML/MIN/1.73M2
GLUCOSE SERPL-MCNC: 118 MG/DL (ref 74–99)
GLUCOSE SERPL-MCNC: 165 MG/DL (ref 74–99)
GLUCOSE UR STRIP-MCNC: NEGATIVE MG/DL
HCT VFR BLD AUTO: 24 % (ref 36.3–47.1)
HGB BLD-MCNC: 7.6 G/DL (ref 11.9–15.1)
HGB UR QL STRIP.AUTO: ABNORMAL
IMM GRANULOCYTES # BLD AUTO: 0.06 K/UL (ref 0–0.3)
IMM GRANULOCYTES NFR BLD: 1 %
IRON SATN MFR SERPL: 7 % (ref 20–55)
IRON SERPL-MCNC: 20 UG/DL (ref 37–145)
KETONES UR STRIP-MCNC: NEGATIVE MG/DL
LEUKOCYTE ESTERASE UR QL STRIP: ABNORMAL
LYMPHOCYTES NFR BLD: 0.5 K/UL (ref 1.1–3.7)
LYMPHOCYTES RELATIVE PERCENT: 8 % (ref 24–43)
MAGNESIUM SERPL-MCNC: 2.5 MG/DL (ref 1.6–2.4)
MCH RBC QN AUTO: 30.3 PG (ref 25.2–33.5)
MCHC RBC AUTO-ENTMCNC: 31.7 G/DL (ref 28.4–34.8)
MCV RBC AUTO: 95.6 FL (ref 82.6–102.9)
MONOCYTES NFR BLD: 0.19 K/UL (ref 0.1–1.2)
MONOCYTES NFR BLD: 3 % (ref 3–12)
MORPHOLOGY: ABNORMAL
NEUTROPHILS NFR BLD: 85 % (ref 36–65)
NEUTS SEG NFR BLD: 5.26 K/UL (ref 1.5–8.1)
NITRITE UR QL STRIP: NEGATIVE
NRBC BLD-RTO: 0 PER 100 WBC
PH UR STRIP: 7.5 [PH] (ref 5–8)
PLATELET # BLD AUTO: 370 K/UL (ref 138–453)
PMV BLD AUTO: 9.2 FL (ref 8.1–13.5)
POTASSIUM SERPL-SCNC: 3.3 MMOL/L (ref 3.7–5.3)
POTASSIUM SERPL-SCNC: 3.6 MMOL/L (ref 3.7–5.3)
PROT UR STRIP-MCNC: ABNORMAL MG/DL
RBC # BLD AUTO: 2.51 M/UL (ref 3.95–5.11)
RBC #/AREA URNS HPF: NORMAL /HPF (ref 0–4)
SODIUM SERPL-SCNC: 134 MMOL/L (ref 136–145)
SODIUM SERPL-SCNC: 135 MMOL/L (ref 136–145)
SODIUM UR-SCNC: 60 MMOL/L
SP GR UR STRIP: 1 (ref 1–1.03)
TIBC SERPL-MCNC: 272 UG/DL (ref 250–450)
TOTAL PROTEIN, URINE: 94 MG/DL
UNSATURATED IRON BINDING CAPACITY: 252 UG/DL (ref 112–347)
UROBILINOGEN UR STRIP-ACNC: NORMAL EU/DL (ref 0–1)
WBC #/AREA URNS HPF: NORMAL /HPF (ref 0–5)
WBC OTHER # BLD: 6.2 K/UL (ref 3.5–11.3)

## 2024-08-02 PROCEDURE — 6360000002 HC RX W HCPCS: Performed by: NURSE PRACTITIONER

## 2024-08-02 PROCEDURE — 1200000000 HC SEMI PRIVATE

## 2024-08-02 PROCEDURE — 83550 IRON BINDING TEST: CPT

## 2024-08-02 PROCEDURE — 6370000000 HC RX 637 (ALT 250 FOR IP): Performed by: STUDENT IN AN ORGANIZED HEALTH CARE EDUCATION/TRAINING PROGRAM

## 2024-08-02 PROCEDURE — 2580000003 HC RX 258: Performed by: NURSE PRACTITIONER

## 2024-08-02 PROCEDURE — 36415 COLL VENOUS BLD VENIPUNCTURE: CPT

## 2024-08-02 PROCEDURE — 80048 BASIC METABOLIC PNL TOTAL CA: CPT

## 2024-08-02 PROCEDURE — 99223 1ST HOSP IP/OBS HIGH 75: CPT | Performed by: STUDENT IN AN ORGANIZED HEALTH CARE EDUCATION/TRAINING PROGRAM

## 2024-08-02 PROCEDURE — 84300 ASSAY OF URINE SODIUM: CPT

## 2024-08-02 PROCEDURE — 83540 ASSAY OF IRON: CPT

## 2024-08-02 PROCEDURE — 85025 COMPLETE CBC W/AUTO DIFF WBC: CPT

## 2024-08-02 PROCEDURE — 83735 ASSAY OF MAGNESIUM: CPT

## 2024-08-02 PROCEDURE — 99222 1ST HOSP IP/OBS MODERATE 55: CPT | Performed by: INTERNAL MEDICINE

## 2024-08-02 PROCEDURE — 84156 ASSAY OF PROTEIN URINE: CPT

## 2024-08-02 PROCEDURE — 81015 MICROSCOPIC EXAM OF URINE: CPT

## 2024-08-02 PROCEDURE — 6360000002 HC RX W HCPCS: Performed by: STUDENT IN AN ORGANIZED HEALTH CARE EDUCATION/TRAINING PROGRAM

## 2024-08-02 PROCEDURE — 71045 X-RAY EXAM CHEST 1 VIEW: CPT

## 2024-08-02 PROCEDURE — 6360000002 HC RX W HCPCS: Performed by: INTERNAL MEDICINE

## 2024-08-02 PROCEDURE — 82728 ASSAY OF FERRITIN: CPT

## 2024-08-02 RX ORDER — BUMETANIDE 0.25 MG/ML
2 INJECTION INTRAMUSCULAR; INTRAVENOUS 2 TIMES DAILY
Status: DISCONTINUED | OUTPATIENT
Start: 2024-08-02 | End: 2024-08-03

## 2024-08-02 RX ORDER — ONDANSETRON 2 MG/ML
4 INJECTION INTRAMUSCULAR; INTRAVENOUS EVERY 6 HOURS PRN
Status: DISCONTINUED | OUTPATIENT
Start: 2024-08-02 | End: 2024-08-03 | Stop reason: SDUPTHER

## 2024-08-02 RX ORDER — ACETAMINOPHEN 650 MG/1
650 SUPPOSITORY RECTAL EVERY 6 HOURS PRN
Status: DISCONTINUED | OUTPATIENT
Start: 2024-08-02 | End: 2024-08-05 | Stop reason: HOSPADM

## 2024-08-02 RX ORDER — ONDANSETRON 4 MG/1
4 TABLET, ORALLY DISINTEGRATING ORAL EVERY 8 HOURS PRN
Status: DISCONTINUED | OUTPATIENT
Start: 2024-08-02 | End: 2024-08-03 | Stop reason: SDUPTHER

## 2024-08-02 RX ORDER — ATORVASTATIN CALCIUM 80 MG/1
80 TABLET, FILM COATED ORAL NIGHTLY
Status: DISCONTINUED | OUTPATIENT
Start: 2024-08-02 | End: 2024-08-05 | Stop reason: HOSPADM

## 2024-08-02 RX ORDER — POTASSIUM CHLORIDE 20 MEQ/1
20 TABLET, EXTENDED RELEASE ORAL 2 TIMES DAILY WITH MEALS
Status: DISCONTINUED | OUTPATIENT
Start: 2024-08-02 | End: 2024-08-03

## 2024-08-02 RX ORDER — SODIUM CHLORIDE 9 MG/ML
INJECTION, SOLUTION INTRAVENOUS PRN
Status: DISCONTINUED | OUTPATIENT
Start: 2024-08-02 | End: 2024-08-05 | Stop reason: HOSPADM

## 2024-08-02 RX ORDER — ACETAMINOPHEN 325 MG/1
650 TABLET ORAL EVERY 6 HOURS PRN
Status: DISCONTINUED | OUTPATIENT
Start: 2024-08-02 | End: 2024-08-05 | Stop reason: HOSPADM

## 2024-08-02 RX ORDER — SODIUM CHLORIDE 0.9 % (FLUSH) 0.9 %
5-40 SYRINGE (ML) INJECTION EVERY 12 HOURS SCHEDULED
Status: DISCONTINUED | OUTPATIENT
Start: 2024-08-02 | End: 2024-08-05 | Stop reason: HOSPADM

## 2024-08-02 RX ORDER — FERROUS SULFATE 325(65) MG
325 TABLET, DELAYED RELEASE (ENTERIC COATED) ORAL
Status: DISCONTINUED | OUTPATIENT
Start: 2024-08-03 | End: 2024-08-05 | Stop reason: HOSPADM

## 2024-08-02 RX ORDER — BUMETANIDE 0.25 MG/ML
1 INJECTION INTRAMUSCULAR; INTRAVENOUS 2 TIMES DAILY
Status: DISCONTINUED | OUTPATIENT
Start: 2024-08-02 | End: 2024-08-02

## 2024-08-02 RX ORDER — FLECAINIDE ACETATE 50 MG/1
50 TABLET ORAL EVERY 12 HOURS SCHEDULED
Status: DISCONTINUED | OUTPATIENT
Start: 2024-08-02 | End: 2024-08-05 | Stop reason: HOSPADM

## 2024-08-02 RX ORDER — BUMETANIDE 0.25 MG/ML
0.5 INJECTION INTRAMUSCULAR; INTRAVENOUS ONCE
Status: COMPLETED | OUTPATIENT
Start: 2024-08-02 | End: 2024-08-02

## 2024-08-02 RX ORDER — ENOXAPARIN SODIUM 100 MG/ML
30 INJECTION SUBCUTANEOUS DAILY
Status: DISCONTINUED | OUTPATIENT
Start: 2024-08-02 | End: 2024-08-02

## 2024-08-02 RX ORDER — SODIUM CHLORIDE 0.9 % (FLUSH) 0.9 %
5-40 SYRINGE (ML) INJECTION PRN
Status: DISCONTINUED | OUTPATIENT
Start: 2024-08-02 | End: 2024-08-05 | Stop reason: HOSPADM

## 2024-08-02 RX ORDER — HEPARIN SODIUM 5000 [USP'U]/ML
5000 INJECTION, SOLUTION INTRAVENOUS; SUBCUTANEOUS EVERY 8 HOURS SCHEDULED
Status: DISCONTINUED | OUTPATIENT
Start: 2024-08-02 | End: 2024-08-05 | Stop reason: HOSPADM

## 2024-08-02 RX ADMIN — BUMETANIDE 0.5 MG: 0.25 INJECTION INTRAMUSCULAR; INTRAVENOUS at 03:16

## 2024-08-02 RX ADMIN — ATORVASTATIN CALCIUM 80 MG: 80 TABLET, FILM COATED ORAL at 21:30

## 2024-08-02 RX ADMIN — HEPARIN SODIUM 5000 UNITS: 5000 INJECTION INTRAVENOUS; SUBCUTANEOUS at 16:27

## 2024-08-02 RX ADMIN — POTASSIUM CHLORIDE 20 MEQ: 1500 TABLET, EXTENDED RELEASE ORAL at 16:28

## 2024-08-02 RX ADMIN — SODIUM CHLORIDE, PRESERVATIVE FREE 10 ML: 5 INJECTION INTRAVENOUS at 07:53

## 2024-08-02 RX ADMIN — HEPARIN SODIUM 5000 UNITS: 5000 INJECTION INTRAVENOUS; SUBCUTANEOUS at 21:30

## 2024-08-02 RX ADMIN — FLECAINIDE ACETATE 50 MG: 50 TABLET ORAL at 21:30

## 2024-08-02 RX ADMIN — ENOXAPARIN SODIUM 30 MG: 100 INJECTION SUBCUTANEOUS at 07:52

## 2024-08-02 RX ADMIN — BUMETANIDE 2 MG: 0.25 INJECTION INTRAMUSCULAR; INTRAVENOUS at 16:27

## 2024-08-02 RX ADMIN — SODIUM CHLORIDE, PRESERVATIVE FREE 10 ML: 5 INJECTION INTRAVENOUS at 21:35

## 2024-08-02 NOTE — CONSULTS
Constitutional: Negative.    HENT: Negative.     Eyes: Negative.    Respiratory: Negative.     Cardiovascular: Negative.    Gastrointestinal: Negative.    Endocrine: Negative.    Genitourinary: Negative.    Musculoskeletal: Negative.         Lower leg edema   Allergic/Immunologic: Negative.    Neurological: Negative.    Hematological: Negative.    Psychiatric/Behavioral: Negative.           Physical Exam:      This a 63 y.o. female   Vitals:    08/02/24 0319   BP: 125/72   Pulse: 58   Resp: 23   Temp:    SpO2:      Physical Exam  HENT:      Head: Normocephalic and atraumatic.   Cardiovascular:      Rate and Rhythm: Normal rate.   Pulmonary:      Effort: Pulmonary effort is normal.   Abdominal:      General: Abdomen is flat.      Palpations: Abdomen is soft.   Genitourinary:     Comments: Left nephrostomy tube in place draining clear yellow urine  Skin:     General: Skin is warm.      Capillary Refill: Capillary refill takes less than 2 seconds.   Neurological:      Mental Status: She is alert and oriented to person, place, and time.         Labs:  Recent Labs     08/01/24  1751   WBC 4.7   HGB 7.5*   HCT 22.1*   MCV 91.3        Recent Labs     08/01/24  1751   *   K 3.7   CL 94*   CO2 23   BUN 33*   CREATININE 2.0*       No results for input(s): \"COLORU\", \"PHUR\", \"LABCAST\", \"WBCUA\", \"RBCUA\", \"MUCUS\", \"TRICHOMONAS\", \"YEAST\", \"BACTERIA\", \"CLARITYU\", \"SPECGRAV\", \"LEUKOCYTESUR\", \"UROBILINOGEN\", \"BILIRUBINUR\", \"BLOODU\" in the last 72 hours.    Invalid input(s): \"NITRATE\", \"GLUCOSEUKETONESUAMORPHOUS\"        -----------------------------------------------------------------  Imaging Results:  XR CHEST PORTABLE    Result Date: 8/2/2024  EXAMINATION: ONE XRAY VIEW OF THE CHEST 8/2/2024 2:28 am COMPARISON: 07/15/2024 HISTORY: ORDERING SYSTEM PROVIDED HISTORY: hypoxia TECHNOLOGIST PROVIDED HISTORY: hypoxia Reason for Exam: hypoxia upright port FINDINGS: Mild cardiomegaly increased in size from previous.   Pulmonary vasculature is normal.  Tiny bilateral pleural effusions are evident.  The lungs are clear and normally expanded.  No pneumothorax.  The patient is status post median sternotomy.  A right Port-A-Cath is in place.  The previously seen left dialysis catheter has been removed.     1. Mild cardiomegaly increased in size from previous. 2. Tiny bilateral pleural effusions, increased from previous.       Assessment and Plan   Impression:  63-year-old female with muscle invasive bladder cancer, undergoing chemoradiation.  She underwent multiple TURBT, first with Dr. Holder and subsequently with Dr. Arellano.  During Dr. Arellano's procedure, the left ureteral orifice was occluded and a stent was placed.  In June, she presented due to renal failure and hydronephrosis with stent in position, underwent nephrostomy tube placement.  Now, her stent was removed by Dr. Holder on 7/22, and she presents to the hospital for hyponatremia and bilateral lower extremity edema.  At this time, it is unclear if she still needs the nephrostomy tube.  Would recommend IR antegrade nephrostogram to evaluate for obstruction.  If there is no obstruction, the tube can be removed.    Plan:   Maintain left nephrostomy tube for now  We will reach out to interventional radiology to perform antegrade nephrostogram, possible removal of nephrostomy tube if tract is open    Thank you for involving us in the care of Daja Espinosa. Should you have any questions, please do not hesitate to contact us at any time.    García Mcguire MD  Urology Resident, PGY-4    63-year-old female with history of bladder cancer as mentioned above, an episode of acute kidney injury and temporary dialysis, renal function improved eventually off dialysis  Nephrostomy tube management will depend on findings of.  Nephrostogram, discussed with patient and family  Agree with the evaluation and plan of care as mentioned above    Nakul Jones MD

## 2024-08-02 NOTE — PROGRESS NOTES
0208 informed louis  patients admission,  0211 oxygen to 5L due to desaturation; seen and examined by rt. Saturation improved decrease oxygen back to 2

## 2024-08-02 NOTE — H&P
SUBCUTANEOUS PORT Right 03/13/2024    Dr Tran/[facility]    UPPER GASTROINTESTINAL ENDOSCOPY N/A 07/22/2021    EGD POLYP SNARE performed by Jose R Jack MD at [facility] ENDOSCOPY    UPPER GASTROINTESTINAL ENDOSCOPY N/A 6/4/2024    ESOPHAGOGASTRODUODENOSCOPY BIOPSY performed by Angelita Oropeza MD at [facility] OR    VASCULAR SURGERY      VASCULAR SURGERY  5/12, 6/12    ken leg vacular surgery.    VENTRAL HERNIA REPAIR N/A 01/05/2022    HERNIA VENTRAL REPAIR performed by Jose R Jack MD at [facility] OR    VULVA SURGERY  2001    cancer        Medications Prior to Admission:     Prior to Admission medications    Medication Sig Start Date End Date Taking? Authorizing Provider   bumetanide (BUMEX) 1 MG tablet Take 1 tablet by mouth daily 7/30/24   Sohail Garcia MD   amLODIPine (NORVASC) 10 MG tablet TAKE 1 TABLET BY MOUTH DAILY 7/22/24   Sohail Garcia MD   lidocaine-prilocaine (EMLA) 2.5-2.5 % cream Apply topically to port site 60 minutes before access as needed. 7/22/24   Familia Shepard MD   furosemide (LASIX) 40 MG tablet Take 1 tablet by mouth daily 7/19/24   Gerardo Pate MD   senna (SENOKOT) 8.6 MG tablet Take 1 tablet by mouth nightly as needed (for constipation) 7/12/24 11/9/24  Vijaya Robbins APRN - CNP   docusate sodium (COLACE, DULCOLAX) 100 MG CAPS Take 100 mg by mouth 2 times daily as needed for Constipation 7/12/24   Vijaya Robbins APRN - CNP   hydrALAZINE (APRESOLINE) 25 MG tablet Take 1 tablet by mouth 2 times daily at 0800 and 1400 7/8/24   Terrell Cortez MD   potassium bicarbonate (EFFER-K) 25 MEQ disintegrating tablet Take 1 tablet by mouth daily 7/2/24 6/27/25  Nicole Chan MD   OXYGEN by Other route 2% during day 3 at night    Nataliia Mae MD   ELIQUIS 2.5 MG TABS tablet Take 1 tablet by mouth 2 times daily 6/27/24   Nataliia Mae MD   flecainide (TAMBOCOR) 50 MG tablet Take 1 tablet by mouth every 12 hours 6/27/24    Lymphocytes Absolute 0.66 (L) 1.00 - 4.80 k/uL    Monocytes Absolute 0.26 0.00 - 1.00 k/uL    Eosinophils Absolute 0.15 0.00 - 0.40 k/uL    Basophils Absolute 0.02 0.00 - 0.20 k/uL    Immature Granulocytes Absolute 0.01 0.00 - 0.30 k/uL   CBC with Auto Differential    Collection Time: 08/02/24  9:25 AM   Result Value Ref Range    WBC 6.2 3.5 - 11.3 k/uL    RBC 2.51 (L) 3.95 - 5.11 m/uL    Hemoglobin 7.6 (L) 11.9 - 15.1 g/dL    Hematocrit 24.0 (L) 36.3 - 47.1 %    MCV 95.6 82.6 - 102.9 fL    MCH 30.3 25.2 - 33.5 pg    MCHC 31.7 28.4 - 34.8 g/dL    RDW 15.8 (H) 11.8 - 14.4 %    Platelets 370 138 - 453 k/uL    MPV 9.2 8.1 - 13.5 fL    NRBC Automated 0.0 0.0 per 100 WBC    Immature Granulocytes % 1 (H) 0 %    Neutrophils % 85 (H) 36 - 65 %    Lymphocytes % 8 (L) 24 - 43 %    Monocytes % 3 3 - 12 %    Eosinophils % 3 1 - 4 %    Basophils % 0 0 - 2 %    Immature Granulocytes Absolute 0.06 0.00 - 0.30 k/uL    Neutrophils Absolute 5.26 1.50 - 8.10 k/uL    Lymphocytes Absolute 0.50 (L) 1.10 - 3.70 k/uL    Monocytes Absolute 0.19 0.10 - 1.20 k/uL    Eosinophils Absolute 0.19 0.00 - 0.44 k/uL    Basophils Absolute 0.00 0.00 - 0.20 k/uL    Morphology ANISOCYTOSIS PRESENT    Basic Metabolic Panel w/ Reflex to MG    Collection Time: 08/02/24  9:25 AM   Result Value Ref Range    Sodium 135 (L) 136 - 145 mmol/L    Potassium 3.3 (L) 3.7 - 5.3 mmol/L    Chloride 98 98 - 107 mmol/L    CO2 22 20 - 31 mmol/L    Anion Gap 15 9 - 16 mmol/L    Glucose 165 (H) 74 - 99 mg/dL    BUN 31 (H) 8 - 23 mg/dL    Creatinine 1.9 (H) 0.50 - 0.90 mg/dL    Est, Glom Filt Rate 29 (L) >60 mL/min/1.73m2    Calcium 8.9 8.6 - 10.4 mg/dL   Ferritin    Collection Time: 08/02/24  9:25 AM   Result Value Ref Range    Ferritin 141 13 - 150 ng/mL   Iron and TIBC    Collection Time: 08/02/24  9:25 AM   Result Value Ref Range    Iron 20 (L) 37 - 145 ug/dL    TIBC 272 250 - 450 ug/dL    Iron % Saturation 7 (L) 20 - 55 %    UIBC 252 112 - 347 ug/dL   Urinalysis with

## 2024-08-02 NOTE — CONSULTS
Renal Consult Note    Patient :  Daja Espinosa; 63 y.o. MRN# [id number]  Location:  OBS 13/13-1  Attending:  Gayle Guzmán MD  Admit Date:  8/2/2024   Hospital Day: 0    Reason for Consult:     Asked by Gayle Joshi MD to see HEAVENLY, hyponatremia, fluid overload    History Obtained From:     patient    History of Present Illness:     Daja Espinosa; 63 y.o. female with past medical history bladder cancer diagnosed many years ago initially receiving BCG treatments and then the cancer was in remission until March 2024.  Diagnosed with recurrent bladder tumor.  Initial plan for cystectomy however had a CVA and the procedure was postponed.  Chemotherapy with cisplatin and radiation therapy.  Received 1 dose of cisplatin and then chemo switched to gemcitabine.  Therefore developed chronic kidney disease, A-fib on Eliquis, hypertension, hyperlipidemia, CAD who came to the hospital for concerns of leg swelling from an outlying facility  for concerns of worsening Leg swelling.    As per the patient she started noticing worsening leg swelling, was informed by the home care about weight gain of 10 pounds.  She also endorses having shortness of breath on exertion.  She denied any recent exposure to contrast, no excessive NSAID use, no history of nephrolithiasis, no hematuria, no pyuria, reduction in urine output, chest pain, abdominal pain, fever or any cough, chills    Please note that the patient was recently discharged from the hospital on 7/18/2024 when she was admitted for concerns of acute on chronic shortness of breath.  She was evaluated by nephrology for concerns of fluid overload, mild hyponatremia.  She was discharged home on Lasix.  she has undergone temporary dialysis in the past until 6/22/2024.  She has been treated multiple times for similar complaints.  Patient followed up with Dr. Chan 7 2//24 and her Lasix was switched to Bumex as per her as Lasix was not working.    She was evaluated in the ED and was found to  you.  Duke Ortiz MD

## 2024-08-02 NOTE — CARE COORDINATION
08/02/24 1216   Readmission Assessment   Number of Days since last admission? 8-30 days   Previous Disposition Home with Family   Who is being Interviewed Patient   What was the patient's/caregiver's perception as to why they think they needed to return back to the hospital? Other (Comment)  (symptoms)   Did you visit your Primary Care Physician after you left the hospital, before you returned this time? Yes   Did you see a specialist, such as Cardiac, Pulmonary, Orthopedic Physician, etc. after you left the hospital? Yes   Who advised the patient to return to the hospital? Self-referral   Does the patient report anything that got in the way of taking their medications? No   In our efforts to provide the best possible care to you and others like you, can you think of anything that we could have done to help you after you left the hospital the first time, so that you might not have needed to return so soon? Discharge instructions that are concise, clear, and non contradictory

## 2024-08-02 NOTE — CARE COORDINATION
Case Management Assessment  Initial Evaluation    Date/Time of Evaluation: 8/2/2024 12:12 PM  Assessment Completed by: JOSE HILTON RN    If patient is discharged prior to next notation, then this note serves as note for discharge by case management.    Patient Name: Daja Espinosa                   YOB: 1960  Diagnosis: HEAVENLY (acute kidney injury) (HCC) [N17.9]                   Date / Time: 8/2/2024  1:48 AM    Patient Admission Status: Inpatient   Readmission Risk (Low < 19, Mod (19-27), High > 27): Readmission Risk Score: 34.7    Current PCP: Sohail Garcia MD  PCP verified by CM? Yes    Chart Reviewed: Yes      History Provided by: Patient  Patient Orientation: Alert and Oriented    Patient Cognition: Alert    Hospitalization in the last 30 days (Readmission):  Yes    If yes, Readmission Assessment in CM Navigator will be completed.    Advance Directives:      Code Status: Full Code   Patient's Primary Decision Maker is:      Primary Decision Maker: Benji Espinosa - Spouse - [phone]    Discharge Planning:    Patient lives with: Spouse/Significant Other Type of Home: House  Primary Care Giver: Self  Patient Support Systems include: Spouse/Significant Other   Current Financial resources:    Current community resources:    Current services prior to admission: Durable Medical Equipment, Oxygen Therapy            Current DME: Oxygen Therapy (Comment), Shower Chair            Type of Home Care services:  None    ADLS  Prior functional level: Independent in ADLs/IADLs  Current functional level: Independent in ADLs/IADLs    PT AM-PAC:   /24  OT AM-PAC:   /24    Family can provide assistance at DC: Yes  Would you like Case Management to discuss the discharge plan with any other family members/significant others, and if so, who? No  Plans to Return to Present Housing: Yes  Other Identified Issues/Barriers to RETURNING to current housing: none  Potential Assistance needed at discharge: N/A             Potential DME:    Patient expects to discharge to: House  Plan for transportation at discharge:      Financial    [place] [employer] / [place] [facility] [place] / Product Type: *No Product type* /     Does insurance require precert for SNF: Yes    Potential assistance Purchasing Medications: No  Meds-to-Beds request: Yes      [facility] - [place], [place] - [address] [facility] [phone] - F [phone]  [address] [facility] [address]  Phone: [phone] Fax: [phone]      Notes:    Factors facilitating achievement of predicted outcomes: Family support    Barriers to discharge: Medical complications    Additional Case Management Notes: home with     The Plan for Transition of Care is related to the following treatment goals of HEAVENLY (acute kidney injury) (HCC) [N17.9]    IF APPLICABLE: The Patient and/or patient representative Daja and her family were provided with a choice of provider and agrees with the discharge plan. Freedom of choice list with basic dialogue that supports the patient's individualized plan of care/goals and shares the quality data associated with the providers was provided to:     Patient Representative Name:       The Patient and/or Patient Representative Agree with the Discharge Plan?      JOSE HILTON RN  Case Management Department  Ph: [phone] Fax:

## 2024-08-02 NOTE — PROGRESS NOTES
Congestive Heart Failure Education completed and charted. CHF booklet given. Patient was receptive to education.    Discussed the  importance of medication compliance.    Discussed the importance of a heart healthy diet. Discussed 2000 mg sodium-restricted daily diet.  Patient instructed to limit fluid intake to  1.5 to 2 liters per day.    Patient instructed to weigh self at the same time of each day each morning, reinforced teaching to monitor for 3-5 lb weight increase over 1-2 days notify physician if change noted.      Signs and symptoms of CHF discussed with patient, such as feeling more tired than normal, feeling short of breath, coughing that increases when lying down, sudden weight gain, swelling of the feet, legs or belly.  Patient verbalized understanding to notify physician office if these symptoms occur.  EF  60%

## 2024-08-03 ENCOUNTER — APPOINTMENT (OUTPATIENT)
Dept: ULTRASOUND IMAGING | Age: 64
DRG: 469 | End: 2024-08-03
Attending: INTERNAL MEDICINE
Payer: COMMERCIAL

## 2024-08-03 PROBLEM — R60.1 ANASARCA: Status: ACTIVE | Noted: 2024-08-03

## 2024-08-03 LAB
ALBUMIN SERPL-MCNC: 3.4 G/DL (ref 3.5–5.2)
ALBUMIN/GLOB SERPL: 1 {RATIO} (ref 1–2.5)
ALP SERPL-CCNC: 74 U/L (ref 35–104)
ALT SERPL-CCNC: 7 U/L (ref 10–35)
ANION GAP SERPL CALCULATED.3IONS-SCNC: 10 MMOL/L (ref 9–16)
AST SERPL-CCNC: 16 U/L (ref 10–35)
BASOPHILS # BLD: 0 K/UL (ref 0–0.2)
BASOPHILS NFR BLD: 0 % (ref 0–2)
BILIRUB SERPL-MCNC: 0.6 MG/DL (ref 0–1.2)
BUN SERPL-MCNC: 31 MG/DL (ref 8–23)
CALCIUM SERPL-MCNC: 8.6 MG/DL (ref 8.6–10.4)
CHLORIDE SERPL-SCNC: 100 MMOL/L (ref 98–107)
CO2 SERPL-SCNC: 25 MMOL/L (ref 20–31)
CREAT SERPL-MCNC: 1.7 MG/DL (ref 0.5–0.9)
CREAT UR-MCNC: 9.3 MG/DL (ref 28–217)
EOSINOPHIL # BLD: 0.12 K/UL (ref 0–0.4)
EOSINOPHILS RELATIVE PERCENT: 2 % (ref 1–4)
ERYTHROCYTE [DISTWIDTH] IN BLOOD BY AUTOMATED COUNT: 15.9 % (ref 11.8–14.4)
GFR, ESTIMATED: 33 ML/MIN/1.73M2
GLUCOSE SERPL-MCNC: 94 MG/DL (ref 74–99)
HCT VFR BLD AUTO: 23.3 % (ref 36.3–47.1)
HGB BLD-MCNC: 7.5 G/DL (ref 11.9–15.1)
IMM GRANULOCYTES # BLD AUTO: 0 K/UL (ref 0–0.3)
IMM GRANULOCYTES NFR BLD: 0 %
INR PPP: 1.3
LYMPHOCYTES NFR BLD: 0.85 K/UL (ref 1–4.8)
LYMPHOCYTES RELATIVE PERCENT: 14 % (ref 24–44)
MAGNESIUM SERPL-MCNC: 2.2 MG/DL (ref 1.6–2.4)
MCH RBC QN AUTO: 30.9 PG (ref 25.2–33.5)
MCHC RBC AUTO-ENTMCNC: 32.2 G/DL (ref 28.4–34.8)
MCV RBC AUTO: 95.9 FL (ref 82.6–102.9)
MONOCYTES NFR BLD: 0.31 K/UL (ref 0.1–0.8)
MONOCYTES NFR BLD: 5 % (ref 1–7)
MORPHOLOGY: ABNORMAL
MORPHOLOGY: ABNORMAL
NEUTROPHILS NFR BLD: 79 % (ref 36–66)
NEUTS SEG NFR BLD: 4.82 K/UL (ref 1.8–7.7)
NRBC BLD-RTO: 0 PER 100 WBC
PHOSPHATE SERPL-MCNC: 3.9 MG/DL (ref 2.5–4.5)
PLATELET # BLD AUTO: 363 K/UL (ref 138–453)
PMV BLD AUTO: 9.3 FL (ref 8.1–13.5)
POTASSIUM SERPL-SCNC: 3.3 MMOL/L (ref 3.7–5.3)
PROT SERPL-MCNC: 6.1 G/DL (ref 6.6–8.7)
PROTHROMBIN TIME: 16 SEC (ref 11.7–14.9)
RBC # BLD AUTO: 2.43 M/UL (ref 3.95–5.11)
SODIUM SERPL-SCNC: 135 MMOL/L (ref 136–145)
TOTAL PROTEIN, URINE: 48 MG/DL
URINE TOTAL PROTEIN CREATININE RATIO: 5.14
WBC OTHER # BLD: 6.1 K/UL (ref 3.5–11.3)

## 2024-08-03 PROCEDURE — 36415 COLL VENOUS BLD VENIPUNCTURE: CPT

## 2024-08-03 PROCEDURE — 99232 SBSQ HOSP IP/OBS MODERATE 35: CPT | Performed by: INTERNAL MEDICINE

## 2024-08-03 PROCEDURE — 6370000000 HC RX 637 (ALT 250 FOR IP): Performed by: STUDENT IN AN ORGANIZED HEALTH CARE EDUCATION/TRAINING PROGRAM

## 2024-08-03 PROCEDURE — 85025 COMPLETE CBC W/AUTO DIFF WBC: CPT

## 2024-08-03 PROCEDURE — 6360000002 HC RX W HCPCS: Performed by: INTERNAL MEDICINE

## 2024-08-03 PROCEDURE — 6360000002 HC RX W HCPCS: Performed by: STUDENT IN AN ORGANIZED HEALTH CARE EDUCATION/TRAINING PROGRAM

## 2024-08-03 PROCEDURE — 1200000000 HC SEMI PRIVATE

## 2024-08-03 PROCEDURE — 82570 ASSAY OF URINE CREATININE: CPT

## 2024-08-03 PROCEDURE — 85610 PROTHROMBIN TIME: CPT

## 2024-08-03 PROCEDURE — 84156 ASSAY OF PROTEIN URINE: CPT

## 2024-08-03 PROCEDURE — 83735 ASSAY OF MAGNESIUM: CPT

## 2024-08-03 PROCEDURE — 6370000000 HC RX 637 (ALT 250 FOR IP): Performed by: INTERNAL MEDICINE

## 2024-08-03 PROCEDURE — 2580000003 HC RX 258: Performed by: NURSE PRACTITIONER

## 2024-08-03 PROCEDURE — 76705 ECHO EXAM OF ABDOMEN: CPT

## 2024-08-03 PROCEDURE — 84100 ASSAY OF PHOSPHORUS: CPT

## 2024-08-03 PROCEDURE — 80053 COMPREHEN METABOLIC PANEL: CPT

## 2024-08-03 PROCEDURE — 99233 SBSQ HOSP IP/OBS HIGH 50: CPT | Performed by: INTERNAL MEDICINE

## 2024-08-03 RX ORDER — MAGNESIUM SULFATE IN WATER 40 MG/ML
2000 INJECTION, SOLUTION INTRAVENOUS PRN
Status: DISCONTINUED | OUTPATIENT
Start: 2024-08-03 | End: 2024-08-05 | Stop reason: HOSPADM

## 2024-08-03 RX ORDER — ACETAMINOPHEN 650 MG/1
650 SUPPOSITORY RECTAL EVERY 6 HOURS PRN
Status: DISCONTINUED | OUTPATIENT
Start: 2024-08-03 | End: 2024-08-05 | Stop reason: HOSPADM

## 2024-08-03 RX ORDER — ONDANSETRON 4 MG/1
4 TABLET, ORALLY DISINTEGRATING ORAL EVERY 8 HOURS PRN
Status: DISCONTINUED | OUTPATIENT
Start: 2024-08-03 | End: 2024-08-05 | Stop reason: HOSPADM

## 2024-08-03 RX ORDER — SPIRONOLACTONE 25 MG/1
25 TABLET ORAL DAILY
Status: DISCONTINUED | OUTPATIENT
Start: 2024-08-03 | End: 2024-08-05 | Stop reason: HOSPADM

## 2024-08-03 RX ORDER — BUMETANIDE 0.25 MG/ML
2 INJECTION INTRAMUSCULAR; INTRAVENOUS 3 TIMES DAILY
Status: DISCONTINUED | OUTPATIENT
Start: 2024-08-04 | End: 2024-08-05

## 2024-08-03 RX ORDER — POLYETHYLENE GLYCOL 3350 17 G/17G
17 POWDER, FOR SOLUTION ORAL DAILY PRN
Status: DISCONTINUED | OUTPATIENT
Start: 2024-08-03 | End: 2024-08-05 | Stop reason: HOSPADM

## 2024-08-03 RX ORDER — ACETAMINOPHEN 325 MG/1
650 TABLET ORAL EVERY 6 HOURS PRN
Status: DISCONTINUED | OUTPATIENT
Start: 2024-08-03 | End: 2024-08-05 | Stop reason: HOSPADM

## 2024-08-03 RX ORDER — SODIUM CHLORIDE 9 MG/ML
INJECTION, SOLUTION INTRAVENOUS PRN
Status: DISCONTINUED | OUTPATIENT
Start: 2024-08-03 | End: 2024-08-05 | Stop reason: HOSPADM

## 2024-08-03 RX ORDER — POTASSIUM CHLORIDE 7.45 MG/ML
10 INJECTION INTRAVENOUS PRN
Status: DISCONTINUED | OUTPATIENT
Start: 2024-08-03 | End: 2024-08-05 | Stop reason: HOSPADM

## 2024-08-03 RX ORDER — POTASSIUM CHLORIDE 20 MEQ/1
40 TABLET, EXTENDED RELEASE ORAL PRN
Status: DISCONTINUED | OUTPATIENT
Start: 2024-08-03 | End: 2024-08-05 | Stop reason: HOSPADM

## 2024-08-03 RX ORDER — POTASSIUM CHLORIDE 7.45 MG/ML
10 INJECTION INTRAVENOUS
Status: DISPENSED | OUTPATIENT
Start: 2024-08-03 | End: 2024-08-03

## 2024-08-03 RX ORDER — ONDANSETRON 2 MG/ML
4 INJECTION INTRAMUSCULAR; INTRAVENOUS EVERY 6 HOURS PRN
Status: DISCONTINUED | OUTPATIENT
Start: 2024-08-03 | End: 2024-08-05 | Stop reason: HOSPADM

## 2024-08-03 RX ORDER — SODIUM CHLORIDE 0.9 % (FLUSH) 0.9 %
5-40 SYRINGE (ML) INJECTION PRN
Status: DISCONTINUED | OUTPATIENT
Start: 2024-08-03 | End: 2024-08-05 | Stop reason: HOSPADM

## 2024-08-03 RX ORDER — ENOXAPARIN SODIUM 100 MG/ML
40 INJECTION SUBCUTANEOUS DAILY
Status: DISCONTINUED | OUTPATIENT
Start: 2024-08-03 | End: 2024-08-03 | Stop reason: SDUPTHER

## 2024-08-03 RX ORDER — SODIUM CHLORIDE 0.9 % (FLUSH) 0.9 %
5-40 SYRINGE (ML) INJECTION EVERY 12 HOURS SCHEDULED
Status: DISCONTINUED | OUTPATIENT
Start: 2024-08-03 | End: 2024-08-05 | Stop reason: HOSPADM

## 2024-08-03 RX ADMIN — POTASSIUM CHLORIDE 10 MEQ: 10 INJECTION, SOLUTION INTRAVENOUS at 15:30

## 2024-08-03 RX ADMIN — BUMETANIDE 2 MG: 0.25 INJECTION INTRAMUSCULAR; INTRAVENOUS at 18:04

## 2024-08-03 RX ADMIN — FLECAINIDE ACETATE 50 MG: 50 TABLET ORAL at 22:10

## 2024-08-03 RX ADMIN — HEPARIN SODIUM 5000 UNITS: 5000 INJECTION INTRAVENOUS; SUBCUTANEOUS at 22:10

## 2024-08-03 RX ADMIN — SODIUM CHLORIDE, PRESERVATIVE FREE 10 ML: 5 INJECTION INTRAVENOUS at 08:23

## 2024-08-03 RX ADMIN — POTASSIUM CHLORIDE 10 MEQ: 10 INJECTION, SOLUTION INTRAVENOUS at 19:59

## 2024-08-03 RX ADMIN — POTASSIUM CHLORIDE 10 MEQ: 10 INJECTION, SOLUTION INTRAVENOUS at 12:00

## 2024-08-03 RX ADMIN — FERROUS SULFATE TAB EC 325 MG (65 MG FE EQUIVALENT) 325 MG: 325 (65 FE) TABLET DELAYED RESPONSE at 08:23

## 2024-08-03 RX ADMIN — POTASSIUM CHLORIDE 10 MEQ: 10 INJECTION, SOLUTION INTRAVENOUS at 22:14

## 2024-08-03 RX ADMIN — POTASSIUM CHLORIDE 20 MEQ: 1500 TABLET, EXTENDED RELEASE ORAL at 08:23

## 2024-08-03 RX ADMIN — BUMETANIDE 2 MG: 0.25 INJECTION INTRAMUSCULAR; INTRAVENOUS at 08:23

## 2024-08-03 RX ADMIN — ATORVASTATIN CALCIUM 80 MG: 80 TABLET, FILM COATED ORAL at 19:56

## 2024-08-03 RX ADMIN — HEPARIN SODIUM 5000 UNITS: 5000 INJECTION INTRAVENOUS; SUBCUTANEOUS at 05:23

## 2024-08-03 RX ADMIN — POTASSIUM CHLORIDE 10 MEQ: 10 INJECTION, SOLUTION INTRAVENOUS at 14:36

## 2024-08-03 RX ADMIN — POTASSIUM CHLORIDE 10 MEQ: 10 INJECTION, SOLUTION INTRAVENOUS at 16:00

## 2024-08-03 RX ADMIN — FLECAINIDE ACETATE 50 MG: 50 TABLET ORAL at 08:23

## 2024-08-03 RX ADMIN — SPIRONOLACTONE 25 MG: 25 TABLET, FILM COATED ORAL at 20:42

## 2024-08-03 ASSESSMENT — PAIN SCALES - WONG BAKER: WONGBAKER_NUMERICALRESPONSE: NO HURT

## 2024-08-03 ASSESSMENT — PAIN SCALES - GENERAL: PAINLEVEL_OUTOF10: 0

## 2024-08-03 NOTE — PROGRESS NOTES
Renal Progress Note    Patient :  Daja Espinosa; 63 y.o. MRN# [id number]  Location:  OBS 13/13-1  Attending:  Sherrell Hickey MD  Admit Date:  8/2/2024   Hospital Day: 1      Subjective:     Oral intake good.  Urine output good.  Mostly putting out urine from her left nephrostomy tube. Lower extremity edema persists although improving.  Continues with IV Bumex.  No shortness of breath or orthopnea.  Quite frustrated about recurrent admissions.  Hemodynamically stable.  Neurology evaluation reviewed.  Patient planned for nephrostogram on Monday based on which decision will be made about changing the nephrostomy tube or discontinuing it altogether.  Urine studies do show proteinuria up to 5 g.  Albumin 3.1.  Labs today showed a sodium 135 potassium 3.3 chloride 100 bicarb 25 BUN 31 creatinine 1.7 glucose 94 calcium 8.6    History reviewed  Known history of bladder cancer with muscle invasion, cystectomy was considered however deferred as she had developed a CVA.  Currently undergoing chemotherapy and local treatments.  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  In June during her admission to [place] she had developed acute kidney injury and obstructive uropathy.  Required left nephrostomy tube placement.  It was felt that the ureteral opening was narrow and ureteral stent placement had failed.  She at that time did need dialysis for a few days.  Thereafter recovered renal function and she was discharged home.  Then she was seen by me in the office.  Well at that time, weight was up 57 kg.  Subsequently was hospitalized to [facility] with fluid overload and pleural effusion.  She was then transferred to [facility], diuretics were adjusted and then she was discharged home.  Comes in with worsening lower extremity edema.  Tells me that in the last week or so she has been noticing worsening lower

## 2024-08-03 NOTE — PLAN OF CARE
Problem: Chronic Conditions and Co-morbidities  Goal: Patient's chronic conditions and co-morbidity symptoms are monitored and maintained or improved  Outcome: Progressing     Problem: Discharge Planning  Goal: Discharge to home or other facility with appropriate resources  Outcome: Progressing     Problem: Safety - Adult  Goal: Free from fall injury  Outcome: Progressing     Problem: ABCDS Injury Assessment  Goal: Absence of physical injury  Outcome: Progressing     Problem: Chronic Conditions and Co-morbidities  Goal: Patient's chronic conditions and co-morbidity symptoms are monitored and maintained or improved  Outcome: Progressing     Problem: Discharge Planning  Goal: Discharge to home or other facility with appropriate resources  Outcome: Progressing     Problem: Safety - Adult  Goal: Free from fall injury  Outcome: Progressing     Problem: ABCDS Injury Assessment  Goal: Absence of physical injury  Outcome: Progressing

## 2024-08-03 NOTE — PROGRESS NOTES
[facility]  Office: [phone]  Deven Louis DO, Rashid Martinez DO, Koko House DO, Jc Wheat DO, Bahman Silva MD, Radha Joseph MD, Volodymyr Abad MD, Karyn Purvis MD,  Siva Helton MD, Jigna Emery MD, Mayur Tinajero MD,  Danni Lamas DO, Gayle Guzmán MD, Micha Sosa MD, Lloyd Louis DO, No Lutz MD,  Ganesh Lee DO, Brittani Francisco MD, Sherrell Hickey MD, Kayla Roberson MD, Macarena Martin MD,  Gerardo Pate MD, Daisy Stephenson MD, Kelly Connor MD, Clau Calderon MD, Aurelio Pérez MD, Eliceo Milligan MD, Talib Rodriguez DO, Yusef Lopez DO, Liban Williamson MD,  Franky Trinh MD, Shirley Waterhouse, CNP,  Savanah Diez CNP, Arias Lares, CNP,  Brenda Daniels, DNP, Phoebe Morris, CNP, Morelia Howard, CNP, Meaghan Rogers, CNP, Neris Del Toro, CNP, Krystal Morrow, PA-C, Ely Guevara, PA-C, Nancy Tam, CNP, Marcy Simeon, CNP, Cody Steele, CNP, Aviva Hartman, CNP, Isela Montaño, CNP, Marj Figueroa, CNS, Keri Clayton, CNP, Anahi Carnes CNP, Tracy Schwab, CNP         [facility]   IN-PATIENT SERVICE   [facility]    Progress Note    8/3/2024    11:30 AM    Name:   Daja Espinosa  MRN:     [id number]     Acct:      [id number]   Room:   OBS 13/13-1  IP Day:  1  Admit Date:  8/2/2024  1:48 AM    PCP:   Sohail Garcai MD  Code Status:  Full Code    Subjective:     C/C: No chief complaint on file.  Generalized anasarca     Interval History Status: not changed.     Continues to complain about abd distention and crampy pain     Brief History:     63-year-old female with past medical history of bladder cancer, A-fib, hypertension, hyperlipidemia, CAD, CVA on Eliquis, chronic respiratory failure presents to the hospital as a transfer from outlying facility.  Patient has known medical history of CKD stage III and follows with Dr. Chan.  Patient states that she was admitted 2-3 times in the hospital and was told that she

## 2024-08-04 ENCOUNTER — APPOINTMENT (OUTPATIENT)
Dept: INTERVENTIONAL RADIOLOGY/VASCULAR | Age: 64
DRG: 469 | End: 2024-08-04
Attending: INTERNAL MEDICINE
Payer: COMMERCIAL

## 2024-08-04 LAB
ANION GAP SERPL CALCULATED.3IONS-SCNC: 12 MMOL/L (ref 9–16)
BUN SERPL-MCNC: 30 MG/DL (ref 8–23)
CALCIUM SERPL-MCNC: 9.1 MG/DL (ref 8.6–10.4)
CHLORIDE SERPL-SCNC: 99 MMOL/L (ref 98–107)
CO2 SERPL-SCNC: 26 MMOL/L (ref 20–31)
CREAT SERPL-MCNC: 1.5 MG/DL (ref 0.5–0.9)
GFR, ESTIMATED: 39 ML/MIN/1.73M2
GLUCOSE SERPL-MCNC: 101 MG/DL (ref 74–99)
POTASSIUM SERPL-SCNC: 3.5 MMOL/L (ref 3.7–5.3)
POTASSIUM SERPL-SCNC: 3.9 MMOL/L (ref 3.7–5.3)
SODIUM SERPL-SCNC: 137 MMOL/L (ref 136–145)

## 2024-08-04 PROCEDURE — 6370000000 HC RX 637 (ALT 250 FOR IP): Performed by: INTERNAL MEDICINE

## 2024-08-04 PROCEDURE — 50431 NJX PX NFROSGRM &/URTRGRM: CPT

## 2024-08-04 PROCEDURE — 6360000002 HC RX W HCPCS: Performed by: STUDENT IN AN ORGANIZED HEALTH CARE EDUCATION/TRAINING PROGRAM

## 2024-08-04 PROCEDURE — 6360000002 HC RX W HCPCS: Performed by: INTERNAL MEDICINE

## 2024-08-04 PROCEDURE — 84132 ASSAY OF SERUM POTASSIUM: CPT

## 2024-08-04 PROCEDURE — BT121ZZ FLUOROSCOPY OF LEFT KIDNEY USING LOW OSMOLAR CONTRAST: ICD-10-PCS | Performed by: RADIOLOGY

## 2024-08-04 PROCEDURE — 1200000000 HC SEMI PRIVATE

## 2024-08-04 PROCEDURE — 36415 COLL VENOUS BLD VENIPUNCTURE: CPT

## 2024-08-04 PROCEDURE — 6370000000 HC RX 637 (ALT 250 FOR IP): Performed by: STUDENT IN AN ORGANIZED HEALTH CARE EDUCATION/TRAINING PROGRAM

## 2024-08-04 PROCEDURE — 99233 SBSQ HOSP IP/OBS HIGH 50: CPT | Performed by: INTERNAL MEDICINE

## 2024-08-04 PROCEDURE — 6360000004 HC RX CONTRAST MEDICATION: Performed by: INTERNAL MEDICINE

## 2024-08-04 PROCEDURE — 99232 SBSQ HOSP IP/OBS MODERATE 35: CPT | Performed by: INTERNAL MEDICINE

## 2024-08-04 PROCEDURE — 2580000003 HC RX 258: Performed by: INTERNAL MEDICINE

## 2024-08-04 PROCEDURE — 80048 BASIC METABOLIC PNL TOTAL CA: CPT

## 2024-08-04 RX ORDER — LISINOPRIL 5 MG/1
5 TABLET ORAL 2 TIMES DAILY
Status: DISCONTINUED | OUTPATIENT
Start: 2024-08-04 | End: 2024-08-05 | Stop reason: HOSPADM

## 2024-08-04 RX ADMIN — HEPARIN SODIUM 5000 UNITS: 5000 INJECTION INTRAVENOUS; SUBCUTANEOUS at 21:12

## 2024-08-04 RX ADMIN — HEPARIN SODIUM 5000 UNITS: 5000 INJECTION INTRAVENOUS; SUBCUTANEOUS at 06:31

## 2024-08-04 RX ADMIN — LISINOPRIL 5 MG: 5 TABLET ORAL at 21:12

## 2024-08-04 RX ADMIN — FLECAINIDE ACETATE 50 MG: 50 TABLET ORAL at 08:15

## 2024-08-04 RX ADMIN — BUMETANIDE 2 MG: 0.25 INJECTION INTRAMUSCULAR; INTRAVENOUS at 13:48

## 2024-08-04 RX ADMIN — BUMETANIDE 2 MG: 0.25 INJECTION INTRAMUSCULAR; INTRAVENOUS at 21:14

## 2024-08-04 RX ADMIN — LISINOPRIL 5 MG: 5 TABLET ORAL at 16:40

## 2024-08-04 RX ADMIN — FERROUS SULFATE TAB EC 325 MG (65 MG FE EQUIVALENT) 325 MG: 325 (65 FE) TABLET DELAYED RESPONSE at 08:15

## 2024-08-04 RX ADMIN — BUMETANIDE 2 MG: 0.25 INJECTION INTRAMUSCULAR; INTRAVENOUS at 08:15

## 2024-08-04 RX ADMIN — SODIUM CHLORIDE, PRESERVATIVE FREE 10 ML: 5 INJECTION INTRAVENOUS at 08:16

## 2024-08-04 RX ADMIN — IOHEXOL 31 ML: 240 INJECTION, SOLUTION INTRATHECAL; INTRAVASCULAR; INTRAVENOUS; ORAL at 13:21

## 2024-08-04 RX ADMIN — FLECAINIDE ACETATE 50 MG: 50 TABLET ORAL at 21:14

## 2024-08-04 RX ADMIN — SPIRONOLACTONE 25 MG: 25 TABLET, FILM COATED ORAL at 08:15

## 2024-08-04 RX ADMIN — ATORVASTATIN CALCIUM 80 MG: 80 TABLET, FILM COATED ORAL at 21:12

## 2024-08-04 RX ADMIN — SODIUM CHLORIDE, PRESERVATIVE FREE 10 ML: 5 INJECTION INTRAVENOUS at 21:12

## 2024-08-04 RX ADMIN — POTASSIUM CHLORIDE 40 MEQ: 1500 TABLET, EXTENDED RELEASE ORAL at 09:31

## 2024-08-04 RX ADMIN — HEPARIN SODIUM 5000 UNITS: 5000 INJECTION INTRAVENOUS; SUBCUTANEOUS at 13:48

## 2024-08-04 ASSESSMENT — PAIN SCALES - GENERAL: PAINLEVEL_OUTOF10: 0

## 2024-08-04 ASSESSMENT — PAIN SCALES - WONG BAKER: WONGBAKER_NUMERICALRESPONSE: NO HURT

## 2024-08-04 NOTE — PROGRESS NOTES
Pt arrives to IR for left nephrostogram  Dr Napier to bedside  KP RT to bedside  Site prepped and draped  Left neph injected and images viewed  Neph tube flushed and will remain in place at this time  Pt verbalized understanding  New drsg applied  Return to floor

## 2024-08-04 NOTE — PROGRESS NOTES
[facility]  Office: [phone]  Deven Louis DO, Rashid Martinez DO, Koko House DO, Jc Wheat DO, Bahman Silva MD, Radha Joseph MD, Volodymyr Abad MD, Karyn Purvis MD,  Siva Helton MD, Jigna Emery MD, Mayur Tinajero MD,  Danni Lamas DO, Gayle Guzmán MD, Micha Sosa MD, Lloyd Louis DO, No Lutz MD,  Ganesh Lee DO, Brittani Francisco MD, Sherrell Hickey MD, Kayla Roberson MD, Macarena Martin MD,  Gerardo Pate MD, Daisy Stephenson MD, Kelly Connor MD, Clau Calderon MD, Aurelio Pérez MD, Eliceo Milligan MD, Talib Rodriguez DO, Yusef Lopez DO, Liban Williamson MD,  Franky Trinh MD, Shirley Waterhouse, CNP,  Savanah Diez CNP, Arias Lares, CNP,  Brenda Daniels, DNP, Phoebe Morris, CNP, Morelia Howard, CNP, Meaghan Rogers, CNP, Neris Del Toro, CNP, Krystal Morrow, PA-C, Ely Guevara, PA-C, Nancy Tam, CNP, Marcy Simeon, CNP, Cody Steele, CNP, Aviva Hartman, CNP, Isela Montaño, CNP, Marj Figueroa, CNS, Keri Clayton, CNP, Anahi Carnes CNP, Tracy Schwab, CNP         [facility]   IN-PATIENT SERVICE   [facility]    Progress Note    8/4/2024    10:58 AM    Name:   Daja Espinosa  MRN:     [id number]     Acct:      [id number]   Room:   0239/0239-01   Day:  2  Admit Date:  8/2/2024  1:48 AM    PCP:   Sohail Garcia MD  Code Status:  Full Code    Subjective:     C/C: No chief complaint on file.  Generalized anasarca     Interval History Status: improved     Brief History:     63-year-old female with past medical history of bladder cancer, A-fib, hypertension, hyperlipidemia, CAD, CVA on Eliquis, chronic respiratory failure presents to the hospital as a transfer from outlying facility.  Patient has known medical history of CKD stage III and follows with Dr. Chan.  Patient states that she was admitted 2-3 times in the hospital and was told that she has pneumonia.  She also had significant fluid retention all over  her body.  She noticed that she would respond to IV medications in the hospital and responded to dialysis and initiated but when she goes home on oral diuretics she does not make a lot of urine.  She checks her weight regularly and was told by home care that she had gained 10 pounds of weight.  She was noticing increasing swelling in her legs, abdomen.  She was also beginning to get shortness of breath.     Last chemotherapy was 5 days ago.  He is currently on radiation and chemotherapy.  Cxr showed evidence of bilateral effusions.    past medical history   history of bladder cancer status post TURBT, BCG instillation, and reTURBT with recurrent muscle invasive disease.  Patient has elected to forego cystectomy and undergo chemotherapy and radiation, which she has initiated.  Patient follows up with Dr. Holder to manage her care.  In April, she had a reresection with Dr. Arellano who also placed a left ureteral stent.  In June, she continued to have hydronephrosis despite ureteral stent placement and nephrostomy tube was ordered, she did have renal failure and underwent dialysis at that time.  She subsequently followed up on 7/22 with Dr. Holder, and he removed her indwelling stent.  Urology consulted for nephrostomy tube management.     Review of Systems:     Constitutional:  negative for chills, fevers, sweats  Respiratory:  negative for cough, dyspnea on exertion, shortness of breath, wheezing  Cardiovascular:  negative for chest pain, chest pressure/discomfort, lower extremity edema, palpitations  Gastrointestinal:  negative for abdominal pain, constipation, diarrhea, nausea, vomiting  Neurological:  negative for dizziness, headache    Medications:     Allergies:  No Known Allergies    Current Meds:   Scheduled Meds:    sodium chloride flush  5-40 mL IntraVENous 2 times per day    bumetanide  2 mg IntraVENous TID    spironolactone  25 mg Oral Daily    sodium chloride flush  5-40 mL IntraVENous 2 times per day  apap lamictal K+given on unit

## 2024-08-04 NOTE — PROGRESS NOTES
[facility]  Urology Progress Note  Nakul Jones MD    Chief Complaint: Nephrostomy tube issues    Subjective:  No acute events overnight, afebrile, vital signs stable  Pain level: none  Denies fever, chills, nausea, vomiting, chest pain, shortness of breath  Nephrostomy tube in place draining clear yellow urine, voiding well from urethra    UOP: 475cc PCN, 1.7L urethra  Labs pending    Patient Vitals for the past 24 hrs:   BP Temp Temp src Pulse Resp SpO2   08/03/24 2145 (!) 146/82 98.6 °F (37 °C) Oral 73 18 100 %   08/03/24 1945 (!) 150/74 98.2 °F (36.8 °C) Oral 75 22 94 %   08/03/24 0830 128/78 98.1 °F (36.7 °C) -- 66 18 97 %       Intake/Output Summary (Last 24 hours) at 8/4/2024 0737  Last data filed at 8/4/2024 0400  Gross per 24 hour   Intake 560 ml   Output 2175 ml   Net -1615 ml       Recent Labs     08/01/24  1751 08/02/24  0925 08/03/24  0523   WBC 4.7 6.2 6.1   HGB 7.5* 7.6* 7.5*   HCT 22.1* 24.0* 23.3*   MCV 91.3 95.6 95.9    370 363     Recent Labs     08/02/24  0925 08/02/24  1939 08/03/24  0523 08/04/24  0634   * 134* 135*  --    K 3.3* 3.6* 3.3* 3.5*   CL 98 99 100  --    CO2 22 23 25  --    PHOS  --   --  3.9  --    BUN 31* 32* 31*  --    CREATININE 1.9* 1.9* 1.7*  --        Recent Labs     08/02/24  0951   COLORU Yellow   PHUR 7.5   WBCUA 2 TO 5   RBCUA 5 TO 10   BACTERIA None   LEUKOCYTESUR MODERATE*   UROBILINOGEN Normal   BILIRUBINUR NEGATIVE       Additional Lab/culture results:  None    Physical Exam  HENT:      Head: Normocephalic and atraumatic.   Cardiovascular:      Rate and Rhythm: Normal rate.   Pulmonary:      Effort: Pulmonary effort is normal.   Abdominal:      General: Abdomen is flat.      Palpations: Abdomen is soft.   Genitourinary:     Comments: Left nephrostomy tube in place draining clear yellow urine  Skin:     General: Skin is warm.      Capillary Refill: Capillary refill takes less than 2 seconds.   Neurological:      Mental Status: She is alert and

## 2024-08-04 NOTE — PLAN OF CARE
Problem: Chronic Conditions and Co-morbidities  Goal: Patient's chronic conditions and co-morbidity symptoms are monitored and maintained or improved  8/4/2024 1606 by Brandee Patel RN  Outcome: Progressing  8/4/2024 0323 by Ashley Liu RN  Outcome: Progressing  Flowsheets (Taken 8/3/2024 2145)  Care Plan - Patient's Chronic Conditions and Co-Morbidity Symptoms are Monitored and Maintained or Improved: Monitor and assess patient's chronic conditions and comorbid symptoms for stability, deterioration, or improvement     Problem: Discharge Planning  Goal: Discharge to home or other facility with appropriate resources  8/4/2024 1606 by Brandee Patel RN  Outcome: Progressing  8/4/2024 0323 by Ashley Liu RN  Outcome: Progressing  Flowsheets (Taken 8/3/2024 2145)  Discharge to home or other facility with appropriate resources: Identify barriers to discharge with patient and caregiver     Problem: Safety - Adult  Goal: Free from fall injury  8/4/2024 1606 by Brandee Patel RN  Outcome: Progressing  8/4/2024 0323 by Ashley Liu RN  Outcome: Progressing     Problem: ABCDS Injury Assessment  Goal: Absence of physical injury  8/4/2024 1606 by Brandee Patel RN  Outcome: Progressing  Flowsheets (Taken 8/4/2024 0728)  Absence of Physical Injury: Implement safety measures based on patient assessment  8/4/2024 0323 by Ashley Liu RN  Outcome: Progressing

## 2024-08-04 NOTE — BRIEF OP NOTE
Brief Postoperative Note    Daja Espinosa  YOB: 1960  [id number]    Pre-operative Diagnosis: Urothelial cancer; Left nephrostomy tube in place, assess for drainage down into bladder     Post-operative Diagnosis: Same    Procedure: Antegrade nephrostogram    Medications Given: none    Anesthesia: Local    Surgeons/Assistants: MICHAEL KOLB MD    Estimated Blood Loss: minimal    Complications: none    Specimens: were not obtained    Findings: Antegrade nephrostogram shows persistent focal distal ureteral narrowing with only limited flow into bladder. Recommend leaving PCN in place for a few more weeks, and intermittent capping.       Electronically signed by MICHAEL KOLB MD on 8/4/2024 at 1:23 PM

## 2024-08-04 NOTE — PROGRESS NOTES
Renal Progress Note    Patient :  Daja Espinosa; 63 y.o. MRN# [id number]  Location:  0239/0239-01  Attending:  Sherrell Hickey MD  Admit Date:  8/2/2024   Hospital Day: 2      Subjective:     Patient seen and examined.   Bumex increased to TID, also on aldactone, K 3.5, BMP pending.   PO intake good.   U/o good with 2175 past 24 hours Nephrostomy tube in situ with drainage of yellow/clear urine, voiding well   Stable hemodynamics. Denies pain.   Urine studies do show proteinuria up to 5 g.  Albumin 3.1.  Nephrostogram done today, found to have a narrow distal ureteral opening, unable to remove nephrostomy tube, nephrostomy tube was exchanged today.  BMP pending. Na 137, K 3.5, cr 1.5, ca 9.1.     History reviewed  Known history of bladder cancer with muscle invasion, cystectomy was considered however deferred as she had developed a CVA.  Currently undergoing chemotherapy and local treatments.  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  In June during her admission to [facility] she had developed acute kidney injury and obstructive uropathy.  Required left nephrostomy tube placement.  It was felt that the ureteral opening was narrow and ureteral stent placement had failed.  She at that time did need dialysis for a few days.  Thereafter recovered renal function and she was discharged home.  Then she was seen by me in the office.  Well at that time, weight was up 57 kg.  Subsequently was hospitalized to [facility] with fluid overload and pleural effusion.  She was then transferred to [facility], diuretics were adjusted and then she was discharged home.  Comes in with worsening lower extremity edema.  Tells me that in the last week or so she has been noticing worsening lower extremity edema weight gain of about 10 to 12 kg.  Also has some shortness of breath on exertion.  Came into the ER.  Was found to  sulfate, ondansetron **OR** ondansetron, polyethylene glycol, acetaminophen **OR** acetaminophen, sodium chloride flush, sodium chloride, acetaminophen **OR** acetaminophen    Input/Output:       I/O last 3 completed shifts:  In: 560 [P.O.:560]  Out: 2825 [Urine:2825].    Patient Vitals for the past 96 hrs (Last 3 readings):   Weight   24 0600 70.4 kg (155 lb 3.3 oz)   24 0209 70.7 kg (155 lb 13.8 oz)       Vital Signs:   Temperature:  Temp: 98.1 °F (36.7 °C)  TMax:   Temp (24hrs), Av.3 °F (36.8 °C), Min:98.1 °F (36.7 °C), Max:98.6 °F (37 °C)    Respirations:  Respirations: 18  Pulse:   Pulse: 69  BP:    BP: (!) 144/77  BP Range: Systolic (24hrs), Av , Min:144 , Max:150       Diastolic (24hrs), Av, Min:74, Max:82      Physical Examination:     General:  AAO x 3, speaking in full sentences, no accessory muscle use.  HEENT: Atraumatic, normocephalic, no throat congestion, moist mucosa.  Eyes:   Pupils equal, round and reactive to light, EOMI.  Neck:   No JVD, no thyromegaly, no lymphadenopathy.  Chest:  Bilateral vesicular breath sounds, no rales or wheezes.  Cardiac:  S1 S2 RR, no murmurs, gallops or rubs, JVP not raised.  Abdomen: Soft, non-tender, no masses or organomegaly, BS audible.  :   No suprapubic or flank tenderness.  Left nephrostomy tube in place  Neuro:  AAO x 3, No FND.  SKIN:  No rashes, good skin turgor.  Extremities:  2+ edema, palpable peripheral pulses, no calf tenderness.    Labs:       Recent Labs     24  1751 24  0925 24  0523   WBC 4.7 6.2 6.1   RBC 2.42* 2.51* 2.43*   HGB 7.5* 7.6* 7.5*   HCT 22.1* 24.0* 23.3*   MCV 91.3 95.6 95.9   MCH 31.0 30.3 30.9   MCHC 33.9 31.7 32.2   RDW 15.7* 15.8* 15.9*    370 363   MPV 9.0 9.2 9.3      BMP:   Recent Labs     24  0925 24  1939 24  0523 24  0634   * 134* 135*  --    K 3.3* 3.6* 3.3* 3.5*   CL 98 99 100  --    CO2 22 23 25  --    BUN 31* 32* 31*  --    CREATININE 1.9*

## 2024-08-04 NOTE — PLAN OF CARE
Problem: Chronic Conditions and Co-morbidities  Goal: Patient's chronic conditions and co-morbidity symptoms are monitored and maintained or improved  8/4/2024 0323 by Ashley Liu RN  Outcome: Progressing  Flowsheets (Taken 8/3/2024 2145)  Care Plan - Patient's Chronic Conditions and Co-Morbidity Symptoms are Monitored and Maintained or Improved: Monitor and assess patient's chronic conditions and comorbid symptoms for stability, deterioration, or improvement  8/3/2024 1809 by Jeannie Arevalo RN  Outcome: Progressing     Problem: Discharge Planning  Goal: Discharge to home or other facility with appropriate resources  8/4/2024 0323 by Ashley Liu RN  Outcome: Progressing  Flowsheets (Taken 8/3/2024 2145)  Discharge to home or other facility with appropriate resources: Identify barriers to discharge with patient and caregiver  8/3/2024 1809 by Jeannie Arevalo RN  Outcome: Progressing     Problem: Safety - Adult  Goal: Free from fall injury  8/4/2024 0323 by Ashley Liu RN  Outcome: Progressing  8/3/2024 1809 by Jeannie Arevalo RN  Outcome: Progressing     Problem: ABCDS Injury Assessment  Goal: Absence of physical injury  8/4/2024 0323 by Ashley Liu RN  Outcome: Progressing  8/3/2024 1809 by Jeannie Arevalo RN  Outcome: Progressing

## 2024-08-05 ENCOUNTER — TELEPHONE (OUTPATIENT)
Dept: ONCOLOGY | Age: 64
End: 2024-08-05

## 2024-08-05 ENCOUNTER — APPOINTMENT (OUTPATIENT)
Dept: INTERVENTIONAL RADIOLOGY/VASCULAR | Age: 64
DRG: 469 | End: 2024-08-05
Attending: INTERNAL MEDICINE
Payer: COMMERCIAL

## 2024-08-05 VITALS
OXYGEN SATURATION: 96 % | BODY MASS INDEX: 26.13 KG/M2 | SYSTOLIC BLOOD PRESSURE: 110 MMHG | WEIGHT: 142.86 LBS | TEMPERATURE: 98.4 F | HEART RATE: 62 BPM | DIASTOLIC BLOOD PRESSURE: 58 MMHG | RESPIRATION RATE: 20 BRPM

## 2024-08-05 PROBLEM — R80.9 NEPHROTIC RANGE PROTEINURIA: Status: ACTIVE | Noted: 2024-08-05

## 2024-08-05 LAB
ANION GAP SERPL CALCULATED.3IONS-SCNC: 11 MMOL/L (ref 9–16)
BUN SERPL-MCNC: 33 MG/DL (ref 8–23)
CALCIUM SERPL-MCNC: 8.7 MG/DL (ref 8.6–10.4)
CHLORIDE SERPL-SCNC: 98 MMOL/L (ref 98–107)
CO2 SERPL-SCNC: 26 MMOL/L (ref 20–31)
CREAT SERPL-MCNC: 1.5 MG/DL (ref 0.5–0.9)
GFR, ESTIMATED: 39 ML/MIN/1.73M2
GLUCOSE SERPL-MCNC: 93 MG/DL (ref 74–99)
POTASSIUM SERPL-SCNC: 3.6 MMOL/L (ref 3.7–5.3)
SODIUM SERPL-SCNC: 135 MMOL/L (ref 136–145)

## 2024-08-05 PROCEDURE — 6360000004 HC RX CONTRAST MEDICATION: Performed by: STUDENT IN AN ORGANIZED HEALTH CARE EDUCATION/TRAINING PROGRAM

## 2024-08-05 PROCEDURE — 99232 SBSQ HOSP IP/OBS MODERATE 35: CPT | Performed by: INTERNAL MEDICINE

## 2024-08-05 PROCEDURE — 50389 REMOVE RENAL TUBE W/FLUORO: CPT

## 2024-08-05 PROCEDURE — 0T25X0Z CHANGE DRAINAGE DEVICE IN KIDNEY, EXTERNAL APPROACH: ICD-10-PCS | Performed by: RADIOLOGY

## 2024-08-05 PROCEDURE — 36415 COLL VENOUS BLD VENIPUNCTURE: CPT

## 2024-08-05 PROCEDURE — 2580000003 HC RX 258: Performed by: INTERNAL MEDICINE

## 2024-08-05 PROCEDURE — 6370000000 HC RX 637 (ALT 250 FOR IP): Performed by: INTERNAL MEDICINE

## 2024-08-05 PROCEDURE — 6360000002 HC RX W HCPCS: Performed by: STUDENT IN AN ORGANIZED HEALTH CARE EDUCATION/TRAINING PROGRAM

## 2024-08-05 PROCEDURE — C1729 CATH, DRAINAGE: HCPCS

## 2024-08-05 PROCEDURE — 80048 BASIC METABOLIC PNL TOTAL CA: CPT

## 2024-08-05 PROCEDURE — 99232 SBSQ HOSP IP/OBS MODERATE 35: CPT | Performed by: STUDENT IN AN ORGANIZED HEALTH CARE EDUCATION/TRAINING PROGRAM

## 2024-08-05 PROCEDURE — 6370000000 HC RX 637 (ALT 250 FOR IP): Performed by: STUDENT IN AN ORGANIZED HEALTH CARE EDUCATION/TRAINING PROGRAM

## 2024-08-05 PROCEDURE — 50432 PLMT NEPHROSTOMY CATHETER: CPT

## 2024-08-05 RX ORDER — BUMETANIDE 2 MG/1
2 TABLET ORAL 2 TIMES DAILY
Qty: 30 TABLET | Refills: 3 | Status: ON HOLD | OUTPATIENT
Start: 2024-08-06 | End: 2024-08-14 | Stop reason: HOSPADM

## 2024-08-05 RX ORDER — LISINOPRIL 5 MG/1
5 TABLET ORAL 2 TIMES DAILY
Qty: 30 TABLET | Refills: 3 | Status: ON HOLD | OUTPATIENT
Start: 2024-08-05 | End: 2024-08-15 | Stop reason: HOSPADM

## 2024-08-05 RX ORDER — SPIRONOLACTONE 25 MG/1
25 TABLET ORAL DAILY
Qty: 30 TABLET | Refills: 3 | Status: SHIPPED | OUTPATIENT
Start: 2024-08-06

## 2024-08-05 RX ORDER — CARVEDILOL 25 MG/1
12.5 TABLET ORAL 2 TIMES DAILY WITH MEALS
Qty: 60 TABLET | Refills: 3 | Status: SHIPPED | OUTPATIENT
Start: 2024-08-05

## 2024-08-05 RX ORDER — BUMETANIDE 1 MG/1
2 TABLET ORAL 2 TIMES DAILY
Status: DISCONTINUED | OUTPATIENT
Start: 2024-08-05 | End: 2024-08-05 | Stop reason: HOSPADM

## 2024-08-05 RX ORDER — FERROUS SULFATE 325(65) MG
325 TABLET, DELAYED RELEASE (ENTERIC COATED) ORAL
Qty: 90 TABLET | Refills: 3 | Status: SHIPPED | OUTPATIENT
Start: 2024-08-06

## 2024-08-05 RX ADMIN — IOHEXOL 8 ML: 240 INJECTION, SOLUTION INTRATHECAL; INTRAVASCULAR; INTRAVENOUS; ORAL at 12:31

## 2024-08-05 RX ADMIN — LISINOPRIL 5 MG: 5 TABLET ORAL at 09:01

## 2024-08-05 RX ADMIN — BUMETANIDE 2 MG: 1 TABLET ORAL at 09:00

## 2024-08-05 RX ADMIN — SODIUM CHLORIDE, PRESERVATIVE FREE 10 ML: 5 INJECTION INTRAVENOUS at 09:02

## 2024-08-05 RX ADMIN — FLECAINIDE ACETATE 50 MG: 50 TABLET ORAL at 10:23

## 2024-08-05 RX ADMIN — HEPARIN SODIUM 5000 UNITS: 5000 INJECTION INTRAVENOUS; SUBCUTANEOUS at 13:44

## 2024-08-05 RX ADMIN — SPIRONOLACTONE 25 MG: 25 TABLET, FILM COATED ORAL at 09:00

## 2024-08-05 RX ADMIN — FERROUS SULFATE TAB EC 325 MG (65 MG FE EQUIVALENT) 325 MG: 325 (65 FE) TABLET DELAYED RESPONSE at 09:01

## 2024-08-05 RX ADMIN — BUMETANIDE 2 MG: 1 TABLET ORAL at 18:19

## 2024-08-05 RX ADMIN — HEPARIN SODIUM 5000 UNITS: 5000 INJECTION INTRAVENOUS; SUBCUTANEOUS at 05:36

## 2024-08-05 ASSESSMENT — PAIN SCALES - GENERAL: PAINLEVEL_OUTOF10: 0

## 2024-08-05 NOTE — BRIEF OP NOTE
Brief Postoperative Note    Daja Espinosa  YOB: 1960  [id number]    Pre-operative Diagnosis: Routine nephrostomy tube exchange    Post-operative Diagnosis: Same    Procedure: left Nephrostomy Tube Exchange     Anesthesia: 1% Lidocaine     Surgeons/Assistants: Oli Day PA-C    Estimated Blood Loss: Minimal    Complications: none    Specimens: were not obtained    8 Fr Nephrostomy exchanged for new 8 Fr Nephrostomy tube.  New nephrostomy tube was sutured to the skin.  Dressing and gravity drainage bag applied.  Vital signs were reviewed and were stable after the procedure.     Electronically signed by COLEMAN Conner on 8/5/2024 at 12:23 PM

## 2024-08-05 NOTE — PROGRESS NOTES
[facility]  Office: [phone]  Deven Louis DO, Rashid Martinez DO, Koko House DO, Jc Wheat DO, Bahman Silva MD, Radha Joseph MD, Volodymyr Abad MD, Karyn Purvis MD,  Siva Helton MD, Jigna Emery MD, Mayur Tinajero MD,  Danni Lamas DO, Gayle Guzmán MD, Micha Sosa MD, Lloyd Louis DO, No Lutz MD,  Ganesh Lee DO, Brittani Francisco MD, Sherrell Hickey MD, Kayla Roberson MD, Macarena Martin MD,  Gerardo Pate MD, Daisy Stephenson MD, Kelly Connor MD, Clau Calderon MD, Aurelio Pérez MD, Eliceo Milligan MD, Talib Rodriguez DO, Yusef Lopez DO, Liban Williamson MD,  Franky Trinh MD, Shirley Waterhouse, CNP,  Savanah Diez CNP, Arias Lares, CNP,  Brenda Daniels, DNP, Phoebe Morris, CNP, Morelia Howard, CNP, Meaghan Rogers, CNP, Neris Del Toro, CNP, Krystal Morrow, PA-C, Ely Guevara PA-C, Nancy Tam, CNP, Marcy Simeon, CNP, Cody Steele, CNP, Aviva Hartman, CNP, Isela Montaño, CNP, Marj Figueroa, CNS, Keri Clayton, CNP, Anahi Carnes CNP, Tracy Schwab, CNP         [facility]   IN-PATIENT SERVICE   [facility]    Progress Note    8/5/2024    8:55 AM    Name:   Daja Espinosa  MRN:     [id number]     Acct:      [id number]   Room:   0239/0239-01   Day:  3  Admit Date:  8/2/2024  1:48 AM    PCP:   Sohail Garcia MD  Code Status:  Full Code    Subjective:     C/C: Swelling   Interval History Status: improved.     Patient seen and examined at bedside this morning. No acute events overnight. Patient to have nephrostomy tube exchanged today. Cleared for discharge after the procedure from all services. Will see how patient feels afterwards. Swelling has improved. Patient denies any CP, SOB, HA, fever or chills.     Brief History:     63-year-old female with PMHx of bladder cancer status post TURBT, BCG instillation, and reTURBT with recurrent muscle invasive disease, A-fib, HTN, HLD, CAD, CVA on Eliquis, chronic

## 2024-08-05 NOTE — OR NURSING
8 fr x 35 cm m drain to left flank. Sutured in place with stayfix. Placed to leg bag. Report called and patient back to room

## 2024-08-05 NOTE — PROGRESS NOTES
Nephrology Progress Note      SUBJECTIVE       Pt was seen and examined. No acute issues overnite. Stable hemodynamics .   Patient is status post antegrade nephrostogram showing very little contrast going into the bladder consistent with distal ureteric narrowing.  She is apparently scheduled to have a PCN exchange today  Serum creatinine is stable at 1.5.  Overall weight is down by about 5.8 kg, intake output charting shows negative fluid balance of around 5 L.  She tells me her edema is pretty much down to her baseline now  Urine studies have indicated nephrotic proteinuria, ? developing secondary FSGS    Background history:  63-year-old lady with a known history of bladder cancer with muscle invasion, cystectomy was being considered however developed CVA.  Patient currently receiving chemotherapy and local treatments.  Does have history of CKD stage IIIb with a creatinine baseline of around 1.7-1.9 looking through records likely secondary to chronic interstitial nephritis.  She previously has had episodes of HEAVENLY secondary to obstructive uropathy.  She currently has a left nephrostomy tube in place.  Presented to the hospital with worsening lower extremity edema, weight gain of around 10 kg or thereabouts, also experienced shortness of breath.  Creatinine is stable at 1.5 however does have evidence of proteinuria, nephrotic range      OBJECTIVE      CURRENT TEMPERATURE:  Temp: 98.7 °F (37.1 °C)  MAXIMUM TEMPERATURE OVER 24HRS:  Temp (24hrs), Av.7 °F (37.1 °C), Min:98.7 °F (37.1 °C), Max:98.7 °F (37.1 °C)    CURRENT RESPIRATORY RATE:  Respirations: 19  CURRENT PULSE:  Pulse: 72  CURRENT BLOOD PRESSURE:  BP: (!) 142/86  24HR BLOOD PRESSURE RANGE:  Systolic (24hrs), Av , Min:142 , Max:152   ; Diastolic (24hrs), Av, Min:80, Max:86    24HR INTAKE/OUTPUT:    Intake/Output Summary (Last 24 hours) at 2024 0830  Last data filed at 2024 0408  Gross per 24 hour   Intake 560 ml   Output 3400 ml   Net

## 2024-08-05 NOTE — DISCHARGE SUMMARY
[facility]  Office: [phone]  Deven Louis DO, Rashid Martinez DO, Koko House DO, Jc Wheat DO, Bahman Silva MD, Radha Joseph MD, Volodymyr Abad MD, Karyn Purvis MD,  Siva Helton MD, Jigna Emery MD, Mayur Tinajero MD,  Danni Lamas DO, Gayle Guzmán MD, Micha Sosa MD, Lloyd Louis DO, No Lutz MD,  Ganesh Lee DO, Brittani Francisco MD, Sherrell Hickey MD, Kayla Roberson MD, Macarena Martin MD,  Gerardo Pate MD, Daisy Stephenson MD, Kelly Connor MD, Clau Calderon MD, Aurelio Pérez MD, Eliceo Milligan MD, Talib Rodriguez DO, Yusef Lopez DO, Liban Williamson MD,  Franky Trinh MD, Shirley Waterhouse, CNP,  Savanah Diez CNP, Arias Lares, CNP,  Brenda Daniels, DNP, Phoebe Morris, CNP, Morelia Howard, CNP, Meaghan Rogers CNP, Neris Del Toro, CNP, Krystal Morrow, PA-C, Ely Guevara PA-C, Nancy Tam, CNP, Marcy Simeon, CNP, Cody Steele, CNP, Aviva Hartman, CNP, Isela Montaño, CNP, Marj Figueroa, CNS, Keri Clayton, CNP, Anahi Carnes CNP, Tracy Schwab, CNP         [facility]   IN-PATIENT SERVICE   [facility]    Discharge Summary     Patient ID: Daja Espinosa  :  1960   MRN: [id number]     ACCOUNT:  [id number]   Patient's PCP: Sohail Garcia MD  Admit Date: 2024   Discharge Date: 2024   Length of Stay: 3  Code Status:  Full Code  Admitting Physician: No admitting provider for patient encounter.  Discharge Physician: Eliceo Milligan MD     Active Discharge Diagnoses:     Hospital Problem Lists:  Principal Problem:    HEAVENLY (acute kidney injury) (HCC)  Active Problems:    Urothelial carcinoma of bladder (HCC)    Hypercoagulable state (HCC)    History of stroke    Stage 3b chronic kidney disease (HCC)    Hypokalemia    Acute hypoxic respiratory failure (HCC)    Pleural effusion, bilateral    Acute on chronic diastolic congestive heart failure (HCC)    Anasarca    Nephrotic range

## 2024-08-05 NOTE — PLAN OF CARE
Problem: Chronic Conditions and Co-morbidities  Goal: Patient's chronic conditions and co-morbidity symptoms are monitored and maintained or improved  8/5/2024 1843 by Carlota Snider RN  Outcome: Adequate for Discharge  8/5/2024 1732 by Carlota Snider RN  Outcome: Progressing  Flowsheets (Taken 8/5/2024 0800)  Care Plan - Patient's Chronic Conditions and Co-Morbidity Symptoms are Monitored and Maintained or Improved:   Monitor and assess patient's chronic conditions and comorbid symptoms for stability, deterioration, or improvement   Collaborate with multidisciplinary team to address chronic and comorbid conditions and prevent exacerbation or deterioration   Update acute care plan with appropriate goals if chronic or comorbid symptoms are exacerbated and prevent overall improvement and discharge     Problem: Discharge Planning  Goal: Discharge to home or other facility with appropriate resources  8/5/2024 1843 by Carlota Snider RN  Outcome: Adequate for Discharge  8/5/2024 1732 by Carlota Snider RN  Outcome: Progressing  Flowsheets (Taken 8/5/2024 0800)  Discharge to home or other facility with appropriate resources: Identify barriers to discharge with patient and caregiver     Problem: Safety - Adult  Goal: Free from fall injury  8/5/2024 1843 by Carlota Snider RN  Outcome: Adequate for Discharge  8/5/2024 1732 by Carlota Snider RN  Outcome: Progressing  Flowsheets (Taken 8/5/2024 1700)  Free From Fall Injury: Instruct family/caregiver on patient safety     Problem: ABCDS Injury Assessment  Goal: Absence of physical injury  8/5/2024 1843 by Carlota Snider RN  Outcome: Adequate for Discharge  8/5/2024 1732 by Carlota Snider RN  Outcome: Progressing  Flowsheets (Taken 8/5/2024 1700)  Absence of Physical Injury: Implement safety measures based on patient assessment     Problem: Pain  Goal: Verbalizes/displays adequate comfort level or baseline comfort level  8/5/2024 1843 by Carlota Snider RN  Outcome:

## 2024-08-05 NOTE — PLAN OF CARE
Problem: Chronic Conditions and Co-morbidities  Goal: Patient's chronic conditions and co-morbidity symptoms are monitored and maintained or improved  8/5/2024 1732 by Carlota Snider RN  Outcome: Progressing  Flowsheets (Taken 8/5/2024 0800)  Care Plan - Patient's Chronic Conditions and Co-Morbidity Symptoms are Monitored and Maintained or Improved:   Monitor and assess patient's chronic conditions and comorbid symptoms for stability, deterioration, or improvement   Collaborate with multidisciplinary team to address chronic and comorbid conditions and prevent exacerbation or deterioration   Update acute care plan with appropriate goals if chronic or comorbid symptoms are exacerbated and prevent overall improvement and discharge  8/5/2024 0339 by Ashley Liu RN  Outcome: Progressing  Flowsheets (Taken 8/4/2024 2000)  Care Plan - Patient's Chronic Conditions and Co-Morbidity Symptoms are Monitored and Maintained or Improved: Monitor and assess patient's chronic conditions and comorbid symptoms for stability, deterioration, or improvement     Problem: Discharge Planning  Goal: Discharge to home or other facility with appropriate resources  8/5/2024 1732 by Carlota Snider RN  Outcome: Progressing  Flowsheets (Taken 8/5/2024 0800)  Discharge to home or other facility with appropriate resources: Identify barriers to discharge with patient and caregiver  8/5/2024 0339 by Ashley Liu RN  Outcome: Progressing  Flowsheets (Taken 8/4/2024 2000)  Discharge to home or other facility with appropriate resources: Identify barriers to discharge with patient and caregiver     Problem: Safety - Adult  Goal: Free from fall injury  8/5/2024 1732 by Carlota Snider RN  Outcome: Progressing  Flowsheets (Taken 8/5/2024 1700)  Free From Fall Injury: Instruct family/caregiver on patient safety  8/5/2024 0339 by Ashley Liu RN  Outcome: Progressing  Flowsheets (Taken 8/4/2024 1933)  Free From Fall Injury: Instruct

## 2024-08-05 NOTE — PROGRESS NOTES
Jeremy Arellano, Rupert Hurt Mostafa, Buck, Murtagh Jr  Urology Progress Note     Chief Complaint: Nephrostomy tube issues    Subjective:  Underwent antegrade nephrostogram yesterday, did show very little contrast in the bladder, nephrostomy tubes To drainage.  Nephrology did recommend exchanging nephrostomy tube, has been n.p.o. since midnight for IR exchange.  Pain level: none  Denies fever, chills, nausea, vomiting, chest pain, shortness of breath  Nephrostomy tube in place draining clear yellow urine, voiding well from urethra    Cr: 1.5 (1.5)    Patient Vitals for the past 24 hrs:   BP Temp Temp src Pulse Resp SpO2 Weight   08/05/24 0511 -- -- -- -- -- -- 64.8 kg (142 lb 13.7 oz)   08/05/24 0145 -- -- -- -- -- 100 % --   08/04/24 2112 (!) 142/86 -- -- -- -- -- --   08/04/24 1952 (!) 142/86 98.7 °F (37.1 °C) Oral 72 19 97 % --   08/04/24 1640 (!) 152/80 -- -- 74 -- -- --   08/04/24 1314 -- -- -- 73 18 91 % --   08/04/24 0700 (!) 144/77 98.1 °F (36.7 °C) Oral 69 18 98 % --       Intake/Output Summary (Last 24 hours) at 8/5/2024 0647  Last data filed at 8/5/2024 0408  Gross per 24 hour   Intake 860 ml   Output 3400 ml   Net -2540 ml       Recent Labs     08/02/24  0925 08/03/24  0523   WBC 6.2 6.1   HGB 7.6* 7.5*   HCT 24.0* 23.3*   MCV 95.6 95.9    363     Recent Labs     08/03/24  0523 08/04/24  0634 08/04/24  1229 08/05/24  0543   *  --  137 135*   K 3.3* 3.5* 3.9 3.6*     --  99 98   CO2 25  --  26 26   PHOS 3.9  --   --   --    BUN 31*  --  30* 33*   CREATININE 1.7*  --  1.5* 1.5*       Recent Labs     08/02/24  0951   COLORU Yellow   PHUR 7.5   WBCUA 2 TO 5   RBCUA 5 TO 10   BACTERIA None   LEUKOCYTESUR MODERATE*   UROBILINOGEN Normal   BILIRUBINUR NEGATIVE       Additional Lab/culture results:  None    Physical Exam  HENT:      Head: Normocephalic and atraumatic.   Cardiovascular:      Rate and Rhythm: Normal rate.   Pulmonary:      Effort: Pulmonary effort is normal.   Abdominal:

## 2024-08-05 NOTE — PLAN OF CARE
Problem: Chronic Conditions and Co-morbidities  Goal: Patient's chronic conditions and co-morbidity symptoms are monitored and maintained or improved  8/5/2024 0339 by Ashley Liu RN  Outcome: Progressing  Flowsheets (Taken 8/4/2024 2000)  Care Plan - Patient's Chronic Conditions and Co-Morbidity Symptoms are Monitored and Maintained or Improved: Monitor and assess patient's chronic conditions and comorbid symptoms for stability, deterioration, or improvement  8/4/2024 1606 by Brandee Patel RN  Outcome: Progressing     Problem: Discharge Planning  Goal: Discharge to home or other facility with appropriate resources  8/5/2024 0339 by Ashley Liu RN  Outcome: Progressing  Flowsheets (Taken 8/4/2024 2000)  Discharge to home or other facility with appropriate resources: Identify barriers to discharge with patient and caregiver  8/4/2024 1606 by Brandee Patel RN  Outcome: Progressing     Problem: Safety - Adult  Goal: Free from fall injury  8/5/2024 0339 by Ashley Liu RN  Outcome: Progressing  Flowsheets (Taken 8/4/2024 1933)  Free From Fall Injury: Instruct family/caregiver on patient safety  8/4/2024 1606 by Brandee Patel RN  Outcome: Progressing     Problem: ABCDS Injury Assessment  Goal: Absence of physical injury  8/5/2024 0339 by Ashley iLu RN  Outcome: Progressing  Flowsheets (Taken 8/4/2024 1933)  Absence of Physical Injury: Implement safety measures based on patient assessment  8/4/2024 1606 by Brandee Patel RN  Outcome: Progressing  Flowsheets (Taken 8/4/2024 0728)  Absence of Physical Injury: Implement safety measures based on patient assessment     Problem: Pain  Goal: Verbalizes/displays adequate comfort level or baseline comfort level  Outcome: Progressing  Flowsheets (Taken 8/4/2024 1952)  Verbalizes/displays adequate comfort level or baseline comfort level: Encourage patient to monitor pain and request assistance

## 2024-08-06 ENCOUNTER — TELEPHONE (OUTPATIENT)
Dept: FAMILY MEDICINE CLINIC | Age: 64
End: 2024-08-06

## 2024-08-06 ENCOUNTER — CARE COORDINATION (OUTPATIENT)
Dept: CARE COORDINATION | Age: 64
End: 2024-08-06

## 2024-08-06 DIAGNOSIS — N17.9 AKI (ACUTE KIDNEY INJURY) (HCC): Primary | ICD-10-CM

## 2024-08-06 NOTE — TELEPHONE ENCOUNTER
Care Transitions Initial Follow Up Call    Outreach made within 2 business days of discharge: Yes    Patient: Daja Espinosa Patient : 1960   MRN: [id number]  Reason for Admission:   Discharge Date: 24       Spoke with: Daja    Discharge department/facility: [facility] Interactive Patient Contact:  Was patient able to fill all prescriptions: Yes  Was patient instructed to bring all medications to the follow-up visit: Yes  Is patient taking all medications as directed in the discharge summary? Yes  Does patient understand their discharge instructions: Yes  Does patient have questions or concerns that need addressed prior to 7-14 day follow up office visit: no    Additional needs identified to be addressed with provider               Scheduled appointment with PCP within 7-14 days    Follow Up  Future Appointments   Date Time Provider Department Center   2024  9:00 AM SCHEDULE, [facility] MED ONC ROOM 3 [facility] MED ONC [place]   2024  9:30 AM Familia Shepard MD [place] [facility]   2024 11:00 AM Tulio Holder MD [place] [facility]   9/3/2024  1:00 PM [facility]   2025 11:00 AM Terrell Cortez MD [facility]       Thania Smith MA

## 2024-08-06 NOTE — PROGRESS NOTES
Discharge summary given and IV removed by RUBY SANDOVAL dayshift. Patient left with all belongings. Patient taken to front entrance to be picked up by family member.

## 2024-08-06 NOTE — CARE COORDINATION
Care Transitions Note    Initial Call - Call within 2 business days of discharge: Yes    Patient Current Location:  Home:  [address] [address]    Care Transition Nurse contacted the patient by telephone to perform post hospital discharge assessment, verified name and  as identifiers. Provided introduction to self, and explanation of the Care Transition Nurse role.     Patient: Daja Espinosa    Patient : 1960   MRN: [id number]    Reason for Admission: Increased swelling  Discharge Date: 24  RURS: Readmission Risk Score: 35.5      Last Discharge Facility       Date Complaint Diagnosis Description Type Department Provider    24   Admission (Discharged) ALY 2C Eliceo Milligan MD            Was this an external facility discharge? No    Additional needs identified to be addressed with provider   High priority: sent message to PCP office for HFU             Method of communication with provider: chart routing.    Patients top risk factors for readmission: functional physical ability, lack of knowledge about disease, and medical condition-bladder CA, Afib, PVD,CHF,HEAVENLY nephrostomy tube    Interventions to address risk factors:   Reviewed discharge    Care Summary Note: Was able to contact Daja for initial transitional outreach.  She stated that she was doing \"fine\".  She said that she lost 10 lbs of water weight while in the hospital, but on the way home, she started to swell again.  She said that her legs are now as big as they were before she went into the hospital.  She denied any shortness of breath and is urinating and having urine out of her nephrostomy tube.  She was unable to get her new prescriptions yesterday due to late discharge, so she is hoping when she get the medications today that after starting them, the swelling will be going down.  She is to have lab work done weekly x2, but no order noted in epic.  Did call nephrologist office and they stated that there is an active order

## 2024-08-08 ENCOUNTER — HOSPITAL ENCOUNTER (OUTPATIENT)
Dept: INFUSION THERAPY | Age: 64
Discharge: HOME | End: 2024-08-08
Payer: COMMERCIAL

## 2024-08-08 ENCOUNTER — OFFICE VISIT (OUTPATIENT)
Dept: ONCOLOGY | Age: 64
End: 2024-08-08
Payer: COMMERCIAL

## 2024-08-08 VITALS
SYSTOLIC BLOOD PRESSURE: 106 MMHG | DIASTOLIC BLOOD PRESSURE: 63 MMHG | RESPIRATION RATE: 18 BRPM | TEMPERATURE: 97.6 F | WEIGHT: 124 LBS | HEART RATE: 73 BPM | BODY MASS INDEX: 22.68 KG/M2

## 2024-08-08 DIAGNOSIS — D63.8 ANEMIA DUE TO CHRONIC ILLNESS: ICD-10-CM

## 2024-08-08 DIAGNOSIS — C67.9 UROTHELIAL CARCINOMA OF BLADDER (HCC): Primary | ICD-10-CM

## 2024-08-08 DIAGNOSIS — N18.5 ANEMIA DUE TO STAGE 5 CHRONIC KIDNEY DISEASE, NOT ON CHRONIC DIALYSIS (HCC): ICD-10-CM

## 2024-08-08 DIAGNOSIS — C67.9 MALIGNANT NEOPLASM OF URINARY BLADDER, UNSPECIFIED SITE (HCC): Primary | ICD-10-CM

## 2024-08-08 DIAGNOSIS — I73.9 PERIPHERAL VASCULAR DISEASE (HCC): ICD-10-CM

## 2024-08-08 DIAGNOSIS — D63.1 ANEMIA DUE TO STAGE 5 CHRONIC KIDNEY DISEASE, NOT ON CHRONIC DIALYSIS (HCC): ICD-10-CM

## 2024-08-08 DIAGNOSIS — J96.21 ACUTE ON CHRONIC RESPIRATORY FAILURE WITH HYPOXIA (HCC): ICD-10-CM

## 2024-08-08 DIAGNOSIS — N18.32 STAGE 3B CHRONIC KIDNEY DISEASE (HCC): ICD-10-CM

## 2024-08-08 DIAGNOSIS — I48.0 PAROXYSMAL ATRIAL FIBRILLATION (HCC): ICD-10-CM

## 2024-08-08 DIAGNOSIS — C67.9 MALIGNANT NEOPLASM OF URINARY BLADDER, UNSPECIFIED SITE (HCC): ICD-10-CM

## 2024-08-08 LAB
ALBUMIN SERPL-MCNC: 3.5 G/DL (ref 3.5–5.2)
ALBUMIN/GLOB SERPL: 1.1 {RATIO} (ref 1–2.5)
ALP SERPL-CCNC: 75 U/L (ref 35–104)
ALT SERPL-CCNC: 12 U/L (ref 5–33)
ANION GAP SERPL CALCULATED.3IONS-SCNC: 12 MMOL/L (ref 9–17)
AST SERPL-CCNC: 22 U/L
BASOPHILS # BLD: <0.03 K/UL (ref 0–0.2)
BASOPHILS NFR BLD: 0 % (ref 0–2)
BILIRUB SERPL-MCNC: 0.7 MG/DL (ref 0.3–1.2)
BUN SERPL-MCNC: 51 MG/DL (ref 8–23)
BUN/CREAT SERPL: 19 (ref 9–20)
CALCIUM SERPL-MCNC: 9.3 MG/DL (ref 8.6–10.4)
CHLORIDE SERPL-SCNC: 98 MMOL/L (ref 98–107)
CO2 SERPL-SCNC: 29 MMOL/L (ref 20–31)
CREAT SERPL-MCNC: 2.7 MG/DL (ref 0.5–0.9)
EOSINOPHIL # BLD: 0.2 K/UL (ref 0–0.44)
EOSINOPHILS RELATIVE PERCENT: 4 % (ref 1–4)
ERYTHROCYTE [DISTWIDTH] IN BLOOD BY AUTOMATED COUNT: 16.2 % (ref 11.8–14.4)
GFR, ESTIMATED: 19 ML/MIN/1.73M2
GLUCOSE SERPL-MCNC: 113 MG/DL (ref 70–99)
HCT VFR BLD AUTO: 23 % (ref 36.3–47.1)
HGB BLD-MCNC: 7.5 G/DL (ref 11.9–15.1)
IMM GRANULOCYTES # BLD AUTO: <0.03 K/UL (ref 0–0.3)
IMM GRANULOCYTES NFR BLD: 0 %
LYMPHOCYTES NFR BLD: 0.78 K/UL (ref 1.1–3.7)
LYMPHOCYTES RELATIVE PERCENT: 15 % (ref 24–43)
MCH RBC QN AUTO: 30.5 PG (ref 25.2–33.5)
MCHC RBC AUTO-ENTMCNC: 32.6 G/DL (ref 28.4–34.8)
MCV RBC AUTO: 93.5 FL (ref 82.6–102.9)
MONOCYTES NFR BLD: 0.66 K/UL (ref 0.1–1.2)
MONOCYTES NFR BLD: 13 % (ref 3–12)
NEUTROPHILS NFR BLD: 68 % (ref 36–65)
NEUTS SEG NFR BLD: 3.62 K/UL (ref 1.5–8.1)
NRBC BLD-RTO: 0 PER 100 WBC
PLATELET # BLD AUTO: 429 K/UL (ref 138–453)
PMV BLD AUTO: 9 FL (ref 8.1–13.5)
POTASSIUM SERPL-SCNC: 4.2 MMOL/L (ref 3.7–5.3)
PROT SERPL-MCNC: 6.7 G/DL (ref 6.4–8.3)
RBC # BLD AUTO: 2.46 M/UL (ref 3.95–5.11)
SODIUM SERPL-SCNC: 139 MMOL/L (ref 135–144)
WBC OTHER # BLD: 5.3 K/UL (ref 3.5–11.3)

## 2024-08-08 PROCEDURE — 1036F TOBACCO NON-USER: CPT | Performed by: INTERNAL MEDICINE

## 2024-08-08 PROCEDURE — 3074F SYST BP LT 130 MM HG: CPT | Performed by: INTERNAL MEDICINE

## 2024-08-08 PROCEDURE — 1111F DSCHRG MED/CURRENT MED MERGE: CPT | Performed by: INTERNAL MEDICINE

## 2024-08-08 PROCEDURE — 36591 DRAW BLOOD OFF VENOUS DEVICE: CPT

## 2024-08-08 PROCEDURE — 96375 TX/PRO/DX INJ NEW DRUG ADDON: CPT

## 2024-08-08 PROCEDURE — G8428 CUR MEDS NOT DOCUMENT: HCPCS | Performed by: INTERNAL MEDICINE

## 2024-08-08 PROCEDURE — G8420 CALC BMI NORM PARAMETERS: HCPCS | Performed by: INTERNAL MEDICINE

## 2024-08-08 PROCEDURE — 85025 COMPLETE CBC W/AUTO DIFF WBC: CPT

## 2024-08-08 PROCEDURE — 96413 CHEMO IV INFUSION 1 HR: CPT

## 2024-08-08 PROCEDURE — 3078F DIAST BP <80 MM HG: CPT | Performed by: INTERNAL MEDICINE

## 2024-08-08 PROCEDURE — 3017F COLORECTAL CA SCREEN DOC REV: CPT | Performed by: INTERNAL MEDICINE

## 2024-08-08 PROCEDURE — 6360000002 HC RX W HCPCS: Performed by: INTERNAL MEDICINE

## 2024-08-08 PROCEDURE — 80053 COMPREHEN METABOLIC PANEL: CPT

## 2024-08-08 PROCEDURE — 2580000003 HC RX 258: Performed by: INTERNAL MEDICINE

## 2024-08-08 PROCEDURE — 99214 OFFICE O/P EST MOD 30 MIN: CPT | Performed by: INTERNAL MEDICINE

## 2024-08-08 RX ORDER — HEPARIN 100 UNIT/ML
500 SYRINGE INTRAVENOUS PRN
Status: DISCONTINUED | OUTPATIENT
Start: 2024-08-08 | End: 2024-08-09 | Stop reason: HOSPADM

## 2024-08-08 RX ORDER — ONDANSETRON 2 MG/ML
8 INJECTION INTRAMUSCULAR; INTRAVENOUS ONCE
Status: COMPLETED | OUTPATIENT
Start: 2024-08-08 | End: 2024-08-08

## 2024-08-08 RX ORDER — SODIUM CHLORIDE 0.9 % (FLUSH) 0.9 %
5-40 SYRINGE (ML) INJECTION PRN
Status: DISCONTINUED | OUTPATIENT
Start: 2024-08-08 | End: 2024-08-09 | Stop reason: HOSPADM

## 2024-08-08 RX ORDER — SODIUM CHLORIDE 9 MG/ML
5-250 INJECTION, SOLUTION INTRAVENOUS PRN
Status: DISCONTINUED | OUTPATIENT
Start: 2024-08-08 | End: 2024-08-09 | Stop reason: HOSPADM

## 2024-08-08 RX ADMIN — SODIUM CHLORIDE 250 ML/HR: 9 INJECTION, SOLUTION INTRAVENOUS at 10:02

## 2024-08-08 RX ADMIN — GEMCITABINE 43 MG: 38 INJECTION, SOLUTION INTRAVENOUS at 10:28

## 2024-08-08 RX ADMIN — SODIUM CHLORIDE, PRESERVATIVE FREE 10 ML: 5 INJECTION INTRAVENOUS at 11:04

## 2024-08-08 RX ADMIN — ONDANSETRON 8 MG: 2 INJECTION INTRAMUSCULAR; INTRAVENOUS at 10:02

## 2024-08-08 RX ADMIN — HEPARIN 500 UNITS: 100 SYRINGE at 11:04

## 2024-08-08 RX ADMIN — SODIUM CHLORIDE, PRESERVATIVE FREE 10 ML: 5 INJECTION INTRAVENOUS at 11:03

## 2024-08-08 NOTE — PROGRESS NOTES
Patient arrives ambulating. Dr Shepard in to see patient.  Creatinine 2.7 today. Shown to Dr Shepard. Orders received to treat today

## 2024-08-08 NOTE — PROGRESS NOTES
prior history of carotid bypass  Status post 1 cycle of chemotherapy  Creatinine initially rising and in the clinic was improved  Admitted to the hospital for embolic stroke and started on Eliquis and recovering from CVA with a left-sided weakness improved and speech back to normal however she still weak  Reactive thrombocytosis  Status post cystoscopy/stent exchange  With recent stroke discussion with  and suggested definitive concurrent chemo RT as not surgical candidate  Admitted to the hospital with rectal bleed/acute blood loss anemia with hemoglobin down to 5.4 and creatinine up to 2.6  GI workup did show gastritis and duodenitis and colonoscopy being scheduled as outpatient   Patient readmitted to the hospital for pneumonia and rapid AVR and acute kidney failure required hemodialysis  This post left nephrostomy tube> kidney function improved > assess for dialysis  admission to the hospital with shortness of breath and found to have pneumonia and left-sided pleural effusion underwent diagnostic and therapeutic paracentesis and was negative for malignancy      Interval history  Patient presents to the clinic for a follow-up visit and to discuss results of his lab work-up and other relevant clinical data.  Overall patient has been tolerating treatment without unexpected or severe side effects.    During this visit patient's allergy, social, medical, surgical history and medications were reviewed and updated.    Recurrent admission to the hospital for fluid overload and leg swelling with creatinine rising        Past Medical History:   Diagnosis Date    Alcohol abuse 03/02/2017    Arthritis     Bladder cancer (HCC) 2016    CAD (coronary artery disease)     Cancer (HCC) 2001    vulvar-    Carotid artery occlusion 1993    [facility] where she had a brachiocephalic aorta bypass.    Carotid artery stenosis     Chronic back pain     History of chemotherapy     Hydronephrosis 02/15/2024    Hyperlipidemia

## 2024-08-09 ENCOUNTER — CARE COORDINATION (OUTPATIENT)
Dept: CARE COORDINATION | Age: 64
End: 2024-08-09

## 2024-08-09 NOTE — CARE COORDINATION
Remote Alert Monitoring Note      Date/Time:  2024 9:03 AM  Patient Current Location: Home: [address] [address]    LPN contacted patient by telephone regarding red alert received for blood pressure reading (146/106). Verified patients name and  as identifiers.    Rpm alert to be reviewed by the provider                         Background: Pneumonia     Refer to 911 immediately if:  Patient unresponsive or unable to provide history  Change in cognition or sudden confusion  Patient unable to respond in complete sentences  Intense chest pain/tightness  Any concern for any clinical emergency  Red Alert: Provider response time of 1 hr required for any red alert requiring intervention  Yellow Alert: Provider response time of 3hr required for any escalated yellow alert        BP Triage  Are you having any Chest Pain? no   Are you having any Shortness of Breath? no   Do you have a headache or have any vision changes? no   Are you having any numbness or tingling? no   Are you having any other health concerns or issues? no     Have you taken your medications as instructed by your doctor today? No     Clinical Interventions: Reviewed and followed up on alerts and treatments-. Pt is asymptomatic and in no apparent distress, speaking in full sentences.  Patient states she had not taken her medications prior to checking her BP.  She is agreeable to recheck in 1 hour. Will await update.        Plan/Follow Up: Will continue to review, monitor and address alerts with follow up based on severity of symptoms and risk factors.    [facility]/ CTN/ Remote Patient monitoring  [phone]

## 2024-08-09 NOTE — CARE COORDINATION
Care Transitions Note    Follow Up Call     Patient Current Location:  [address] [address]    Care Transition Nurse contacted the patient by telephone. Verified name and  as identifiers.    Additional needs identified to be addressed with provider   High priority: Nephrology office called and asked to have nephroloigist review BMP that was drawn on 24                 Method of communication with provider: phone.    Care Summary Note: Call place to Daja.  She said that she was feeling \"much better:.  She said that her swelling has gone down and her weight is now 119.4 from 132.4 (post discharge.  She said that she still has some swelling in the ankles, but no swelling in her upper and lower legs.  Abdomen still has some swelling, but not as much as it was.  She said that her breathing was better and she did not wear her oxygen yesterday and her oxygen saturation was 98%.  She does wear the oxygen at night and prn.   Pt did have chemo yesterday and had no lab drawn.  Noted that kidney levels had increased after her diurectics were adjusted at discharge.  Call placed to the nephrologist's office and spoke with Kimberley.  Requested that nephrologist review recent lab work.  Also explained that pt will be having repeat lab work done on .  She said that she will send message to Dr Chan to view most recent lab work.   Will recall Daja later to see if she received message from nephrologist.     Received chart message from nephrologist office.  Nephrologist decreased pt's Bumex to daily from BID.  Noted that they called Daja and informed her.  Writer did call Daja and she verified that she understood about the decrease in frequency of her Bumex.  She had no questions/concerns.      Plan of care updates since last contact:  Reviewed lab and called nephrologist office on abnormal levels       Advance Care Planning:   Does patient have an Advance Directive: reviewed during previous call, see

## 2024-08-12 ENCOUNTER — HOSPITAL ENCOUNTER (OUTPATIENT)
Dept: INFUSION THERAPY | Age: 64
Discharge: HOME | End: 2024-08-12
Payer: COMMERCIAL

## 2024-08-12 ENCOUNTER — APPOINTMENT (OUTPATIENT)
Dept: CT IMAGING | Age: 64
End: 2024-08-12
Attending: STUDENT IN AN ORGANIZED HEALTH CARE EDUCATION/TRAINING PROGRAM
Payer: COMMERCIAL

## 2024-08-12 ENCOUNTER — HOSPITAL ENCOUNTER (EMERGENCY)
Age: 64
Discharge: ANOTHER HOSPITAL | End: 2024-08-12
Payer: COMMERCIAL

## 2024-08-12 ENCOUNTER — HOSPITAL ENCOUNTER (INPATIENT)
Age: 64
LOS: 3 days | Discharge: HOME | End: 2024-08-15
Attending: STUDENT IN AN ORGANIZED HEALTH CARE EDUCATION/TRAINING PROGRAM
Payer: COMMERCIAL

## 2024-08-12 VITALS
TEMPERATURE: 98 F | RESPIRATION RATE: 16 BRPM | DIASTOLIC BLOOD PRESSURE: 74 MMHG | OXYGEN SATURATION: 98 % | HEART RATE: 63 BPM | SYSTOLIC BLOOD PRESSURE: 138 MMHG

## 2024-08-12 VITALS
BODY MASS INDEX: 21.35 KG/M2 | TEMPERATURE: 97.2 F | HEIGHT: 62 IN | WEIGHT: 116 LBS | RESPIRATION RATE: 18 BRPM | OXYGEN SATURATION: 99 % | DIASTOLIC BLOOD PRESSURE: 78 MMHG | SYSTOLIC BLOOD PRESSURE: 124 MMHG | HEART RATE: 61 BPM

## 2024-08-12 DIAGNOSIS — N17.9 ACUTE KIDNEY INJURY SUPERIMPOSED ON CKD (HCC): Primary | ICD-10-CM

## 2024-08-12 DIAGNOSIS — N18.9 ACUTE KIDNEY INJURY SUPERIMPOSED ON CKD (HCC): Primary | ICD-10-CM

## 2024-08-12 DIAGNOSIS — C67.9 UROTHELIAL CARCINOMA OF BLADDER (HCC): Primary | ICD-10-CM

## 2024-08-12 DIAGNOSIS — C67.9 MALIGNANT NEOPLASM OF URINARY BLADDER, UNSPECIFIED SITE (HCC): ICD-10-CM

## 2024-08-12 LAB
ALBUMIN SERPL-MCNC: 3.6 G/DL (ref 3.5–5.2)
ALBUMIN SERPL-MCNC: 4 G/DL (ref 3.5–5.2)
ALBUMIN/GLOB SERPL: 1 {RATIO} (ref 1–2.5)
ALBUMIN/GLOB SERPL: 1.2 {RATIO} (ref 1–2.5)
ALP SERPL-CCNC: 70 U/L (ref 35–104)
ALP SERPL-CCNC: 72 U/L (ref 35–104)
ALT SERPL-CCNC: 13 U/L (ref 5–33)
ALT SERPL-CCNC: 14 U/L (ref 5–33)
ANION GAP SERPL CALCULATED.3IONS-SCNC: 13 MMOL/L (ref 9–17)
ANION GAP SERPL CALCULATED.3IONS-SCNC: 14 MMOL/L (ref 9–17)
ANION GAP SERPL CALCULATED.3IONS-SCNC: 16 MMOL/L (ref 9–16)
AST SERPL-CCNC: 19 U/L
AST SERPL-CCNC: 20 U/L
BASOPHILS # BLD: 0.03 K/UL (ref 0–0.2)
BASOPHILS # BLD: <0.03 K/UL (ref 0–0.2)
BASOPHILS NFR BLD: 0 % (ref 0–2)
BASOPHILS NFR BLD: 1 % (ref 0–2)
BILIRUB SERPL-MCNC: 0.7 MG/DL (ref 0.3–1.2)
BILIRUB SERPL-MCNC: 0.7 MG/DL (ref 0.3–1.2)
BILIRUB UR QL STRIP: NEGATIVE
BNP SERPL-MCNC: ABNORMAL PG/ML
BNP SERPL-MCNC: ABNORMAL PG/ML (ref 0–300)
BUN SERPL-MCNC: 65 MG/DL (ref 8–23)
BUN SERPL-MCNC: 69 MG/DL (ref 8–23)
BUN SERPL-MCNC: 72 MG/DL (ref 8–23)
BUN/CREAT SERPL: 16 (ref 9–20)
BUN/CREAT SERPL: 16 (ref 9–20)
CALCIUM SERPL-MCNC: 9.3 MG/DL (ref 8.6–10.4)
CALCIUM SERPL-MCNC: 9.4 MG/DL (ref 8.6–10.4)
CALCIUM SERPL-MCNC: 9.9 MG/DL (ref 8.6–10.4)
CHLORIDE SERPL-SCNC: 94 MMOL/L (ref 98–107)
CHLORIDE SERPL-SCNC: 97 MMOL/L (ref 98–107)
CHLORIDE SERPL-SCNC: 97 MMOL/L (ref 98–107)
CLARITY UR: CLEAR
CO2 SERPL-SCNC: 24 MMOL/L (ref 20–31)
CO2 SERPL-SCNC: 26 MMOL/L (ref 20–31)
CO2 SERPL-SCNC: 27 MMOL/L (ref 20–31)
COLOR UR: YELLOW
CREAT SERPL-MCNC: 4.3 MG/DL (ref 0.5–0.9)
CREAT SERPL-MCNC: 4.3 MG/DL (ref 0.5–0.9)
CREAT SERPL-MCNC: 4.4 MG/DL (ref 0.5–0.9)
CREAT UR-MCNC: 31.4 MG/DL (ref 28–217)
EOSINOPHIL # BLD: 0.27 K/UL (ref 0–0.44)
EOSINOPHIL # BLD: 0.27 K/UL (ref 0–0.44)
EOSINOPHIL,URINE: NORMAL
EOSINOPHILS RELATIVE PERCENT: 6 % (ref 1–4)
EOSINOPHILS RELATIVE PERCENT: 6 % (ref 1–4)
EPI CELLS #/AREA URNS HPF: NORMAL /HPF (ref 0–25)
ERYTHROCYTE [DISTWIDTH] IN BLOOD BY AUTOMATED COUNT: 16 % (ref 11.8–14.4)
ERYTHROCYTE [DISTWIDTH] IN BLOOD BY AUTOMATED COUNT: 16.1 % (ref 11.8–14.4)
ERYTHROCYTE [DISTWIDTH] IN BLOOD BY AUTOMATED COUNT: 16.1 % (ref 11.8–14.4)
GFR, ESTIMATED: 11 ML/MIN/1.73M2
GLUCOSE SERPL-MCNC: 100 MG/DL (ref 74–99)
GLUCOSE SERPL-MCNC: 150 MG/DL (ref 70–99)
GLUCOSE SERPL-MCNC: 91 MG/DL (ref 70–99)
GLUCOSE UR STRIP-MCNC: NEGATIVE MG/DL
HCT VFR BLD AUTO: 23.7 % (ref 36.3–47.1)
HCT VFR BLD AUTO: 23.8 % (ref 36.3–47.1)
HCT VFR BLD AUTO: 26.7 % (ref 36.3–47.1)
HGB BLD-MCNC: 7.8 G/DL (ref 11.9–15.1)
HGB BLD-MCNC: 7.8 G/DL (ref 11.9–15.1)
HGB BLD-MCNC: 8.6 G/DL (ref 11.9–15.1)
HGB UR QL STRIP.AUTO: ABNORMAL
IMM GRANULOCYTES # BLD AUTO: <0.03 K/UL (ref 0–0.3)
IMM GRANULOCYTES # BLD AUTO: <0.03 K/UL (ref 0–0.3)
IMM GRANULOCYTES NFR BLD: 0 %
IMM GRANULOCYTES NFR BLD: 0 %
INR PPP: 1.3
KETONES UR STRIP-MCNC: NEGATIVE MG/DL
LACTATE BLDV-SCNC: 1.1 MMOL/L (ref 0.5–2.2)
LEUKOCYTE ESTERASE UR QL STRIP: ABNORMAL
LYMPHOCYTES NFR BLD: 0.65 K/UL (ref 1.1–3.7)
LYMPHOCYTES NFR BLD: 0.75 K/UL (ref 1.1–3.7)
LYMPHOCYTES RELATIVE PERCENT: 14 % (ref 24–43)
LYMPHOCYTES RELATIVE PERCENT: 16 % (ref 24–43)
MAGNESIUM SERPL-MCNC: 1.7 MG/DL (ref 1.6–2.6)
MCH RBC QN AUTO: 30.5 PG (ref 25.2–33.5)
MCH RBC QN AUTO: 30.6 PG (ref 25.2–33.5)
MCH RBC QN AUTO: 30.7 PG (ref 25.2–33.5)
MCHC RBC AUTO-ENTMCNC: 32.2 G/DL (ref 28.4–34.8)
MCHC RBC AUTO-ENTMCNC: 32.8 G/DL (ref 28.4–34.8)
MCHC RBC AUTO-ENTMCNC: 32.9 G/DL (ref 28.4–34.8)
MCV RBC AUTO: 92.9 FL (ref 82.6–102.9)
MCV RBC AUTO: 93 FL (ref 82.6–102.9)
MCV RBC AUTO: 95.4 FL (ref 82.6–102.9)
MONOCYTES NFR BLD: 0.17 K/UL (ref 0.1–1.2)
MONOCYTES NFR BLD: 0.24 K/UL (ref 0.1–1.2)
MONOCYTES NFR BLD: 4 % (ref 3–12)
MONOCYTES NFR BLD: 5 % (ref 3–12)
NEUTROPHILS NFR BLD: 73 % (ref 36–65)
NEUTROPHILS NFR BLD: 75 % (ref 36–65)
NEUTS SEG NFR BLD: 3.37 K/UL (ref 1.5–8.1)
NEUTS SEG NFR BLD: 3.43 K/UL (ref 1.5–8.1)
NITRITE UR QL STRIP: NEGATIVE
NRBC BLD-RTO: 0 PER 100 WBC
OSMOLALITY UR: 316 MOSM/KG (ref 80–1300)
PARTIAL THROMBOPLASTIN TIME: 29.2 SEC (ref 23–36.5)
PH UR STRIP: 7.5 [PH] (ref 5–9)
PHOSPHATE SERPL-MCNC: 5.1 MG/DL (ref 2.6–4.5)
PLATELET # BLD AUTO: 547 K/UL (ref 138–453)
PLATELET # BLD AUTO: 570 K/UL (ref 138–453)
PLATELET # BLD AUTO: 580 K/UL (ref 138–453)
PMV BLD AUTO: 8.9 FL (ref 8.1–13.5)
PMV BLD AUTO: 9 FL (ref 8.1–13.5)
PMV BLD AUTO: 9.1 FL (ref 8.1–13.5)
POTASSIUM SERPL-SCNC: 4.3 MMOL/L (ref 3.7–5.3)
POTASSIUM SERPL-SCNC: 4.5 MMOL/L (ref 3.7–5.3)
POTASSIUM SERPL-SCNC: 4.9 MMOL/L (ref 3.7–5.3)
PROT SERPL-MCNC: 7.1 G/DL (ref 6.4–8.3)
PROT SERPL-MCNC: 7.3 G/DL (ref 6.4–8.3)
PROT UR STRIP-MCNC: ABNORMAL MG/DL
PROTHROMBIN TIME: 15.9 SEC (ref 11.7–14.9)
RBC # BLD AUTO: 2.55 M/UL (ref 3.95–5.11)
RBC # BLD AUTO: 2.56 M/UL (ref 3.95–5.11)
RBC # BLD AUTO: 2.8 M/UL (ref 3.95–5.11)
RBC #/AREA URNS HPF: NORMAL /HPF (ref 0–2)
SODIUM SERPL-SCNC: 134 MMOL/L (ref 135–144)
SODIUM SERPL-SCNC: 137 MMOL/L (ref 135–144)
SODIUM SERPL-SCNC: 137 MMOL/L (ref 136–145)
SODIUM UR-SCNC: 97 MMOL/L
SP GR UR STRIP: 1.02 (ref 1.01–1.02)
TOTAL PROTEIN, URINE: 18 MG/DL
URINE TOTAL PROTEIN CREATININE RATIO: 0.58
UROBILINOGEN UR STRIP-ACNC: NORMAL EU/DL (ref 0–1)
WBC #/AREA URNS HPF: NORMAL /HPF (ref 0–5)
WBC OTHER # BLD: 4.4 K/UL (ref 3.5–11.3)
WBC OTHER # BLD: 4.6 K/UL (ref 3.5–11.3)
WBC OTHER # BLD: 4.7 K/UL (ref 3.5–11.3)

## 2024-08-12 PROCEDURE — 99223 1ST HOSP IP/OBS HIGH 75: CPT | Performed by: STUDENT IN AN ORGANIZED HEALTH CARE EDUCATION/TRAINING PROGRAM

## 2024-08-12 PROCEDURE — 87205 SMEAR GRAM STAIN: CPT

## 2024-08-12 PROCEDURE — 80048 BASIC METABOLIC PNL TOTAL CA: CPT

## 2024-08-12 PROCEDURE — 83935 ASSAY OF URINE OSMOLALITY: CPT

## 2024-08-12 PROCEDURE — 6370000000 HC RX 637 (ALT 250 FOR IP): Performed by: NURSE PRACTITIONER

## 2024-08-12 PROCEDURE — 83605 ASSAY OF LACTIC ACID: CPT

## 2024-08-12 PROCEDURE — 85027 COMPLETE CBC AUTOMATED: CPT

## 2024-08-12 PROCEDURE — 6360000002 HC RX W HCPCS: Performed by: STUDENT IN AN ORGANIZED HEALTH CARE EDUCATION/TRAINING PROGRAM

## 2024-08-12 PROCEDURE — 84166 PROTEIN E-PHORESIS/URINE/CSF: CPT

## 2024-08-12 PROCEDURE — 85025 COMPLETE CBC W/AUTO DIFF WBC: CPT

## 2024-08-12 PROCEDURE — 2580000003 HC RX 258: Performed by: INTERNAL MEDICINE

## 2024-08-12 PROCEDURE — 84100 ASSAY OF PHOSPHORUS: CPT

## 2024-08-12 PROCEDURE — 81001 URINALYSIS AUTO W/SCOPE: CPT

## 2024-08-12 PROCEDURE — 83880 ASSAY OF NATRIURETIC PEPTIDE: CPT

## 2024-08-12 PROCEDURE — 80053 COMPREHEN METABOLIC PANEL: CPT

## 2024-08-12 PROCEDURE — 83735 ASSAY OF MAGNESIUM: CPT

## 2024-08-12 PROCEDURE — 74176 CT ABD & PELVIS W/O CONTRAST: CPT

## 2024-08-12 PROCEDURE — 85610 PROTHROMBIN TIME: CPT

## 2024-08-12 PROCEDURE — 2580000003 HC RX 258: Performed by: STUDENT IN AN ORGANIZED HEALTH CARE EDUCATION/TRAINING PROGRAM

## 2024-08-12 PROCEDURE — 2060000000 HC ICU INTERMEDIATE R&B

## 2024-08-12 PROCEDURE — 84300 ASSAY OF URINE SODIUM: CPT

## 2024-08-12 PROCEDURE — 2580000003 HC RX 258

## 2024-08-12 PROCEDURE — 36591 DRAW BLOOD OFF VENOUS DEVICE: CPT

## 2024-08-12 PROCEDURE — 85730 THROMBOPLASTIN TIME PARTIAL: CPT

## 2024-08-12 PROCEDURE — 82570 ASSAY OF URINE CREATININE: CPT

## 2024-08-12 PROCEDURE — 36415 COLL VENOUS BLD VENIPUNCTURE: CPT

## 2024-08-12 PROCEDURE — 84156 ASSAY OF PROTEIN URINE: CPT

## 2024-08-12 RX ORDER — ONDANSETRON 2 MG/ML
4 INJECTION INTRAMUSCULAR; INTRAVENOUS EVERY 6 HOURS PRN
Status: DISCONTINUED | OUTPATIENT
Start: 2024-08-12 | End: 2024-08-15 | Stop reason: HOSPADM

## 2024-08-12 RX ORDER — FERROUS SULFATE 325(65) MG
325 TABLET, DELAYED RELEASE (ENTERIC COATED) ORAL
Status: DISCONTINUED | OUTPATIENT
Start: 2024-08-13 | End: 2024-08-15 | Stop reason: HOSPADM

## 2024-08-12 RX ORDER — SODIUM CHLORIDE 9 MG/ML
5-250 INJECTION, SOLUTION INTRAVENOUS PRN
Status: CANCELLED | OUTPATIENT
Start: 2024-08-19

## 2024-08-12 RX ORDER — ONDANSETRON 4 MG/1
4 TABLET, ORALLY DISINTEGRATING ORAL EVERY 8 HOURS PRN
Status: DISCONTINUED | OUTPATIENT
Start: 2024-08-12 | End: 2024-08-15 | Stop reason: HOSPADM

## 2024-08-12 RX ORDER — ATORVASTATIN CALCIUM 80 MG/1
80 TABLET, FILM COATED ORAL NIGHTLY
Status: DISCONTINUED | OUTPATIENT
Start: 2024-08-12 | End: 2024-08-15 | Stop reason: HOSPADM

## 2024-08-12 RX ORDER — SODIUM CHLORIDE 0.9 % (FLUSH) 0.9 %
5-40 SYRINGE (ML) INJECTION PRN
Status: DISCONTINUED | OUTPATIENT
Start: 2024-08-12 | End: 2024-08-13 | Stop reason: HOSPADM

## 2024-08-12 RX ORDER — PANTOPRAZOLE SODIUM 40 MG/1
40 TABLET, DELAYED RELEASE ORAL
Status: DISCONTINUED | OUTPATIENT
Start: 2024-08-13 | End: 2024-08-15 | Stop reason: HOSPADM

## 2024-08-12 RX ORDER — SODIUM CHLORIDE 0.9 % (FLUSH) 0.9 %
5-40 SYRINGE (ML) INJECTION PRN
Status: DISCONTINUED | OUTPATIENT
Start: 2024-08-12 | End: 2024-08-15 | Stop reason: HOSPADM

## 2024-08-12 RX ORDER — HEPARIN SODIUM 10000 [USP'U]/100ML
5-30 INJECTION, SOLUTION INTRAVENOUS CONTINUOUS
Status: DISCONTINUED | OUTPATIENT
Start: 2024-08-12 | End: 2024-08-15 | Stop reason: HOSPADM

## 2024-08-12 RX ORDER — HEPARIN SODIUM 1000 [USP'U]/ML
60 INJECTION, SOLUTION INTRAVENOUS; SUBCUTANEOUS PRN
Status: DISCONTINUED | OUTPATIENT
Start: 2024-08-12 | End: 2024-08-15 | Stop reason: HOSPADM

## 2024-08-12 RX ORDER — HEPARIN SODIUM 1000 [USP'U]/ML
30 INJECTION, SOLUTION INTRAVENOUS; SUBCUTANEOUS PRN
Status: DISCONTINUED | OUTPATIENT
Start: 2024-08-12 | End: 2024-08-15 | Stop reason: HOSPADM

## 2024-08-12 RX ORDER — HEPARIN 100 UNIT/ML
500 SYRINGE INTRAVENOUS PRN
Status: DISCONTINUED | OUTPATIENT
Start: 2024-08-12 | End: 2024-08-13 | Stop reason: HOSPADM

## 2024-08-12 RX ORDER — SODIUM CHLORIDE 0.9 % (FLUSH) 0.9 %
5-40 SYRINGE (ML) INJECTION PRN
Status: CANCELLED | OUTPATIENT
Start: 2024-08-19

## 2024-08-12 RX ORDER — LISINOPRIL 10 MG/1
5 TABLET ORAL 2 TIMES DAILY
Status: DISCONTINUED | OUTPATIENT
Start: 2024-08-12 | End: 2024-08-14

## 2024-08-12 RX ORDER — HEPARIN 100 UNIT/ML
500 SYRINGE INTRAVENOUS PRN
Status: CANCELLED | OUTPATIENT
Start: 2024-08-19

## 2024-08-12 RX ORDER — ACETAMINOPHEN 325 MG/1
650 TABLET ORAL EVERY 6 HOURS PRN
Status: DISCONTINUED | OUTPATIENT
Start: 2024-08-12 | End: 2024-08-15 | Stop reason: HOSPADM

## 2024-08-12 RX ORDER — SODIUM CHLORIDE 9 MG/ML
5-250 INJECTION, SOLUTION INTRAVENOUS PRN
Status: DISCONTINUED | OUTPATIENT
Start: 2024-08-12 | End: 2024-08-13 | Stop reason: HOSPADM

## 2024-08-12 RX ORDER — SODIUM CHLORIDE 9 MG/ML
INJECTION, SOLUTION INTRAVENOUS CONTINUOUS
Status: DISCONTINUED | OUTPATIENT
Start: 2024-08-12 | End: 2024-08-14

## 2024-08-12 RX ORDER — SODIUM CHLORIDE 9 MG/ML
INJECTION, SOLUTION INTRAVENOUS PRN
Status: DISCONTINUED | OUTPATIENT
Start: 2024-08-12 | End: 2024-08-15 | Stop reason: HOSPADM

## 2024-08-12 RX ORDER — POLYETHYLENE GLYCOL 3350 17 G/17G
17 POWDER, FOR SOLUTION ORAL DAILY PRN
Status: DISCONTINUED | OUTPATIENT
Start: 2024-08-12 | End: 2024-08-15 | Stop reason: HOSPADM

## 2024-08-12 RX ORDER — SODIUM CHLORIDE 9 MG/ML
INJECTION, SOLUTION INTRAVENOUS CONTINUOUS
Status: DISCONTINUED | OUTPATIENT
Start: 2024-08-12 | End: 2024-08-12 | Stop reason: HOSPADM

## 2024-08-12 RX ORDER — FLECAINIDE ACETATE 50 MG/1
50 TABLET ORAL EVERY 12 HOURS SCHEDULED
Status: DISCONTINUED | OUTPATIENT
Start: 2024-08-12 | End: 2024-08-15 | Stop reason: HOSPADM

## 2024-08-12 RX ORDER — HEPARIN SODIUM 1000 [USP'U]/ML
60 INJECTION, SOLUTION INTRAVENOUS; SUBCUTANEOUS ONCE
Status: COMPLETED | OUTPATIENT
Start: 2024-08-12 | End: 2024-08-12

## 2024-08-12 RX ORDER — SODIUM CHLORIDE 0.9 % (FLUSH) 0.9 %
5-40 SYRINGE (ML) INJECTION EVERY 12 HOURS SCHEDULED
Status: DISCONTINUED | OUTPATIENT
Start: 2024-08-12 | End: 2024-08-15 | Stop reason: HOSPADM

## 2024-08-12 RX ORDER — ACETAMINOPHEN 650 MG/1
650 SUPPOSITORY RECTAL EVERY 6 HOURS PRN
Status: DISCONTINUED | OUTPATIENT
Start: 2024-08-12 | End: 2024-08-15 | Stop reason: HOSPADM

## 2024-08-12 RX ORDER — CARVEDILOL 12.5 MG/1
12.5 TABLET ORAL 2 TIMES DAILY WITH MEALS
Status: DISCONTINUED | OUTPATIENT
Start: 2024-08-12 | End: 2024-08-15 | Stop reason: HOSPADM

## 2024-08-12 RX ADMIN — HEPARIN SODIUM 12 UNITS/KG/HR: 10000 INJECTION, SOLUTION INTRAVENOUS at 18:51

## 2024-08-12 RX ADMIN — SODIUM CHLORIDE: 9 INJECTION, SOLUTION INTRAVENOUS at 15:04

## 2024-08-12 RX ADMIN — SODIUM CHLORIDE, PRESERVATIVE FREE 10 ML: 5 INJECTION INTRAVENOUS at 09:46

## 2024-08-12 RX ADMIN — SODIUM CHLORIDE: 9 INJECTION, SOLUTION INTRAVENOUS at 18:40

## 2024-08-12 RX ADMIN — HEPARIN SODIUM 3200 UNITS: 1000 INJECTION INTRAVENOUS; SUBCUTANEOUS at 18:47

## 2024-08-12 RX ADMIN — ATORVASTATIN CALCIUM 80 MG: 80 TABLET, FILM COATED ORAL at 20:41

## 2024-08-12 RX ADMIN — CARVEDILOL 12.5 MG: 12.5 TABLET, FILM COATED ORAL at 18:43

## 2024-08-12 RX ADMIN — FLECAINIDE ACETATE 50 MG: 50 TABLET ORAL at 20:41

## 2024-08-12 RX ADMIN — SODIUM CHLORIDE, PRESERVATIVE FREE 10 ML: 5 INJECTION INTRAVENOUS at 09:47

## 2024-08-12 ASSESSMENT — ENCOUNTER SYMPTOMS
ABDOMINAL DISTENTION: 0
BACK PAIN: 0
COLOR CHANGE: 0
CONSTIPATION: 0
FACIAL SWELLING: 0
CHEST TIGHTNESS: 0
EYE DISCHARGE: 0
DIARRHEA: 0
EYE ITCHING: 0
ABDOMINAL PAIN: 0
APNEA: 0

## 2024-08-12 ASSESSMENT — PAIN SCALES - WONG BAKER: WONGBAKER_NUMERICALRESPONSE: NO HURT

## 2024-08-12 ASSESSMENT — PAIN SCALES - GENERAL
PAINLEVEL_OUTOF10: 0
PAINLEVEL_OUTOF10: 0

## 2024-08-12 ASSESSMENT — PAIN - FUNCTIONAL ASSESSMENT: PAIN_FUNCTIONAL_ASSESSMENT: NONE - DENIES PAIN

## 2024-08-12 NOTE — ED NOTES
Called [facility] for Dr Nicole Chan, spoke to answering service. They will page on call to call back

## 2024-08-12 NOTE — ED PROVIDER NOTES
Vijaya who would like patient transferred to [facility] for her nephrologist care [TC]   1420 Spoke to nephrology at [facility] who is in agreement with plan of transfer.  He agrees that patient should have some fluids and recommended  cc/h.  [TC]   1445 Spoke to hospitalist, accepted at [facility].  Patient is in agreement with plan of transfer.  All questions were answered at this time. [TC]      ED Course User Index  [TC] Karon Rodríguez MD       Medications   0.9 % sodium chloride infusion ( IntraVENous New Bag 8/12/24 4002)       New Prescriptions    No medications on file         Counseling:   The emergency provider has spoken with the patient and discussed today’s results, in addition to providing specific details for the plan of care and counseling regarding the diagnosis and prognosis.  Questions are answered at this time and they are agreeable with the plan.       --------------------------------- IMPRESSION AND DISPOSITION ---------------------------------    IMPRESSION  1. Acute kidney injury superimposed on CKD (HCC)        DISPOSITION  Disposition: Transfer to [facility]  Patient condition is stable        NOTE: This report was transcribed using voice recognition software. Every effort was made to ensure accuracy; however, inadvertent computerized transcription errors may be present         Karon Rodríguez MD  08/12/24 7424

## 2024-08-12 NOTE — PROGRESS NOTES
Pt arrived for scheduled treatment of Gemcitabine. Pt has no complaints today. She states she is actually feeling very good. Minimal swelling in lower extremities. Pt states this is the lowest her weight has been for awhile. Pt states she is to have radiation this afternoon. Port accessed and blood drawn for ordered labs. Upon labs being resulted Creatinine was 4.3. Last week on Thursday 8/8 was 2.7 and last Monday 8/5 was1.5. Notified Dr. Shepard who wanted her nephrologist notified and see if he was Ok with him for chemo being given. This nurse called and spoke with Dr Chan's nurse who reviewed pts chart and said she would call Dr. Chan and get back with me. The nurse called back and states Dr. Chan wants the patient evaluated in the ER. Messaged Dr. Shepard back and informed him of Dr. Chan's request and he ordered to hold chemo. Report called to Henrique in the ER. Pt then assisted into the WC and taken to ER. Also called [facility] and gave message to Rashida that the patient was taken to the ER.

## 2024-08-12 NOTE — H&P
08/12/24  5:39 PM   Result Value Ref Range    Sodium 137 136 - 145 mmol/L    Potassium 4.3 3.7 - 5.3 mmol/L    Chloride 97 (L) 98 - 107 mmol/L    CO2 24 20 - 31 mmol/L    Anion Gap 16 9 - 16 mmol/L    Glucose 100 (H) 74 - 99 mg/dL    BUN 65 (H) 8 - 23 mg/dL    Creatinine 4.3 (H) 0.50 - 0.90 mg/dL    Est, Glom Filt Rate 11 (L) >60 mL/min/1.73m2    Calcium 9.9 8.6 - 10.4 mg/dL       Imaging/Diagnostics:  No results found.    Assessment :      Hospital Problems             Last Modified POA    * (Principal) Acute renal failure (ARF) ([facility]) 8/12/2024 Yes    Atrial fibrillation with rapid ventricular response ([facility]) 8/12/2024 Yes    Peripheral vascular disease ([facility]) 8/12/2024 Yes    Urothelial carcinoma of bladder ([facility]) 8/12/2024 Yes    Coronary artery disease of native heart with stable angina pectoris ([facility]) 8/12/2024 Yes    Transitional cell carcinoma ([facility]) 8/12/2024 Yes    Acute ischemic stroke ([facility]) 8/12/2024 Yes    Bilateral carotid artery stenosis 8/12/2024 Yes    PAD (peripheral artery disease) ([facility]) 8/12/2024 Yes    Acute on chronic respiratory failure with hypoxia ([facility]) 8/12/2024 Yes    Paroxysmal atrial fibrillation ([facility]) 8/12/2024 Yes    ATN (acute tubular necrosis) ([facility]) 8/12/2024 Yes    History of bladder cancer 8/12/2024 Yes    Anemia due to chronic illness 8/12/2024 Yes    Anemia due to chronic kidney disease 8/12/2024 Yes    Biventricular congestive heart failure (HCC) 8/12/2024 Yes    Malignant neoplasm of urinary bladder ([facility]) 8/12/2024 Yes    Chronic kidney disease 8/12/2024 Yes    Anasarca 8/12/2024 Yes       Plan:     Patient status inpatient in the Progressive Unit/Step down    Acute renal failure.  Appreciate nephrology recommendations.  Hold lisinopril.  Hold Aldactone.  Follow-up urine studies  Atrial fibrillation.  Flecainide.  Heparin drip in place of Eliquis, coreg  GERD.  Protonix  Iron deficiency anemia ferrous sulfate  History of urothelial bladder cancer.  Hold gemcitabine.  History of

## 2024-08-12 NOTE — ED NOTES
Attempted to start ordered IV fluids at this time. Patient declined and states \"maybe later.\" Dr. Rodríguez aware.

## 2024-08-12 NOTE — ED NOTES
Called [facility] for transfer to [facility]. They will page hospitalist and nephrology (if needed)

## 2024-08-13 LAB
ANION GAP SERPL CALCULATED.3IONS-SCNC: 15 MMOL/L (ref 9–16)
BUN SERPL-MCNC: 61 MG/DL (ref 8–23)
CALCIUM SERPL-MCNC: 9 MG/DL (ref 8.6–10.4)
CHLORIDE SERPL-SCNC: 103 MMOL/L (ref 98–107)
CO2 SERPL-SCNC: 22 MMOL/L (ref 20–31)
CREAT SERPL-MCNC: 3.9 MG/DL (ref 0.5–0.9)
ERYTHROCYTE [DISTWIDTH] IN BLOOD BY AUTOMATED COUNT: 16 % (ref 11.8–14.4)
GFR, ESTIMATED: 12 ML/MIN/1.73M2
GLUCOSE SERPL-MCNC: 98 MG/DL (ref 74–99)
HCT VFR BLD AUTO: 25.6 % (ref 36.3–47.1)
HGB BLD-MCNC: 7.8 G/DL (ref 11.9–15.1)
MAGNESIUM SERPL-MCNC: 1.5 MG/DL (ref 1.6–2.4)
MCH RBC QN AUTO: 30.2 PG (ref 25.2–33.5)
MCHC RBC AUTO-ENTMCNC: 30.5 G/DL (ref 28.4–34.8)
MCV RBC AUTO: 99.2 FL (ref 82.6–102.9)
NRBC BLD-RTO: 0 PER 100 WBC
P E INTERPRETATION, U: NORMAL
PARTIAL THROMBOPLASTIN TIME: 35.6 SEC (ref 23–36.5)
PARTIAL THROMBOPLASTIN TIME: 57.6 SEC (ref 23–36.5)
PARTIAL THROMBOPLASTIN TIME: 64.1 SEC (ref 23–36.5)
PARTIAL THROMBOPLASTIN TIME: 69.4 SEC (ref 23–36.5)
PATHOLOGIST: NORMAL
PLATELET # BLD AUTO: 504 K/UL (ref 138–453)
PMV BLD AUTO: 9.2 FL (ref 8.1–13.5)
POTASSIUM SERPL-SCNC: 3.8 MMOL/L (ref 3.7–5.3)
POTASSIUM SERPL-SCNC: 3.9 MMOL/L (ref 3.7–5.3)
RBC # BLD AUTO: 2.58 M/UL (ref 3.95–5.11)
SODIUM SERPL-SCNC: 140 MMOL/L (ref 136–145)
SPECIMEN TYPE: NORMAL
TROPONIN I SERPL HS-MCNC: 22 NG/L (ref 0–14)
URINE TOTAL PROTEIN: 18 MG/DL
WBC OTHER # BLD: 4.4 K/UL (ref 3.5–11.3)

## 2024-08-13 PROCEDURE — 36415 COLL VENOUS BLD VENIPUNCTURE: CPT

## 2024-08-13 PROCEDURE — 97530 THERAPEUTIC ACTIVITIES: CPT

## 2024-08-13 PROCEDURE — 84484 ASSAY OF TROPONIN QUANT: CPT

## 2024-08-13 PROCEDURE — 83735 ASSAY OF MAGNESIUM: CPT

## 2024-08-13 PROCEDURE — 80048 BASIC METABOLIC PNL TOTAL CA: CPT

## 2024-08-13 PROCEDURE — 85730 THROMBOPLASTIN TIME PARTIAL: CPT

## 2024-08-13 PROCEDURE — 97166 OT EVAL MOD COMPLEX 45 MIN: CPT

## 2024-08-13 PROCEDURE — 99232 SBSQ HOSP IP/OBS MODERATE 35: CPT | Performed by: FAMILY MEDICINE

## 2024-08-13 PROCEDURE — 99222 1ST HOSP IP/OBS MODERATE 55: CPT | Performed by: INTERNAL MEDICINE

## 2024-08-13 PROCEDURE — 84132 ASSAY OF SERUM POTASSIUM: CPT

## 2024-08-13 PROCEDURE — 85027 COMPLETE CBC AUTOMATED: CPT

## 2024-08-13 PROCEDURE — 6370000000 HC RX 637 (ALT 250 FOR IP): Performed by: NURSE PRACTITIONER

## 2024-08-13 PROCEDURE — 2060000000 HC ICU INTERMEDIATE R&B

## 2024-08-13 PROCEDURE — 97535 SELF CARE MNGMENT TRAINING: CPT

## 2024-08-13 PROCEDURE — 2580000003 HC RX 258: Performed by: STUDENT IN AN ORGANIZED HEALTH CARE EDUCATION/TRAINING PROGRAM

## 2024-08-13 PROCEDURE — 93005 ELECTROCARDIOGRAM TRACING: CPT | Performed by: NURSE PRACTITIONER

## 2024-08-13 PROCEDURE — 97161 PT EVAL LOW COMPLEX 20 MIN: CPT

## 2024-08-13 PROCEDURE — 6360000002 HC RX W HCPCS: Performed by: STUDENT IN AN ORGANIZED HEALTH CARE EDUCATION/TRAINING PROGRAM

## 2024-08-13 RX ADMIN — PANTOPRAZOLE SODIUM 40 MG: 40 TABLET, DELAYED RELEASE ORAL at 06:44

## 2024-08-13 RX ADMIN — SODIUM CHLORIDE: 9 INJECTION, SOLUTION INTRAVENOUS at 17:05

## 2024-08-13 RX ADMIN — FERROUS SULFATE TAB EC 325 MG (65 MG FE EQUIVALENT) 325 MG: 325 (65 FE) TABLET DELAYED RESPONSE at 08:57

## 2024-08-13 RX ADMIN — CARVEDILOL 12.5 MG: 12.5 TABLET, FILM COATED ORAL at 17:04

## 2024-08-13 RX ADMIN — FLECAINIDE ACETATE 50 MG: 50 TABLET ORAL at 08:57

## 2024-08-13 RX ADMIN — HEPARIN SODIUM 1600 UNITS: 1000 INJECTION INTRAVENOUS; SUBCUTANEOUS at 11:01

## 2024-08-13 RX ADMIN — CARVEDILOL 12.5 MG: 12.5 TABLET, FILM COATED ORAL at 08:57

## 2024-08-13 RX ADMIN — ATORVASTATIN CALCIUM 80 MG: 80 TABLET, FILM COATED ORAL at 23:06

## 2024-08-13 RX ADMIN — FLECAINIDE ACETATE 50 MG: 50 TABLET ORAL at 23:06

## 2024-08-13 RX ADMIN — HEPARIN SODIUM 3200 UNITS: 1000 INJECTION INTRAVENOUS; SUBCUTANEOUS at 02:47

## 2024-08-13 RX ADMIN — SODIUM CHLORIDE: 9 INJECTION, SOLUTION INTRAVENOUS at 04:14

## 2024-08-13 ASSESSMENT — ENCOUNTER SYMPTOMS
ABDOMINAL PAIN: 0
BLOOD IN STOOL: 0
CONSTIPATION: 0
NAUSEA: 0
DIARRHEA: 0
WHEEZING: 0
SHORTNESS OF BREATH: 0
COUGH: 0
CHEST TIGHTNESS: 0
VOMITING: 0

## 2024-08-13 ASSESSMENT — PAIN SCALES - GENERAL
PAINLEVEL_OUTOF10: 0
PAINLEVEL_OUTOF10: 0

## 2024-08-13 NOTE — CARE COORDINATION
08/13/24 1805   Readmission Assessment   Number of Days since last admission? 8-30 days   Previous Disposition Home with Family   Who is being Interviewed Patient   What was the patient's/caregiver's perception as to why they think they needed to return back to the hospital? Other (Comment)  (needed meds adjusted)   Did you visit your Primary Care Physician after you left the hospital, before you returned this time? No   Why weren't you able to visit your PCP? Other (Comment)  (had apt 2 weeks ago)   Did you see a specialist, such as Cardiac, Pulmonary, Orthopedic Physician, etc. after you left the hospital? Yes   Who advised the patient to return to the hospital? Self-referral   Does the patient report anything that got in the way of taking their medications? No   In our efforts to provide the best possible care to you and others like you, can you think of anything that we could have done to help you after you left the hospital the first time, so that you might not have needed to return so soon? Other (Comment)  (none)

## 2024-08-13 NOTE — CONSULTS
Stability Vital Sign     Unable to Pay for Housing in the Last Year: No     Number of Places Lived in the Last Year: 1     Unstable Housing in the Last Year: No       Family History:        Family History   Problem Relation Age of Onset    Cancer Father         lung    Cancer Mother        Outpatient Medications:     Medications Prior to Admission: bumetanide (BUMEX) 2 MG tablet, Take 1 tablet by mouth in the morning and 1 tablet in the evening. (Patient taking differently: Take 1 tablet by mouth daily)  ferrous sulfate (FE TABS 325) 325 (65 Fe) MG EC tablet, Take 1 tablet by mouth daily (with breakfast)  lisinopril (PRINIVIL;ZESTRIL) 5 MG tablet, Take 1 tablet by mouth in the morning and at bedtime  spironolactone (ALDACTONE) 25 MG tablet, Take 1 tablet by mouth daily  carvedilol (COREG) 25 MG tablet, Take 0.5 tablets by mouth 2 times daily (with meals)  lidocaine-prilocaine (EMLA) 2.5-2.5 % cream, Apply topically to port site 60 minutes before access as needed.  potassium bicarbonate (EFFER-K) 25 MEQ disintegrating tablet, Take 1 tablet by mouth daily  OXYGEN, by Other route 2% during day 3 at night  ELIQUIS 2.5 MG TABS tablet, Take 1 tablet by mouth 2 times daily  flecainide (TAMBOCOR) 50 MG tablet, Take 1 tablet by mouth every 12 hours  pantoprazole (PROTONIX) 40 MG tablet, Take 1 tablet by mouth every morning (before breakfast)  atorvastatin (LIPITOR) 80 MG tablet, Take 1 tablet by mouth nightly  senna (SENOKOT) 8.6 MG tablet, Take 1 tablet by mouth nightly as needed (for constipation) (Patient not taking: Reported on 8/13/2024)  docusate sodium (COLACE, DULCOLAX) 100 MG CAPS, Take 100 mg by mouth 2 times daily as needed for Constipation (Patient not taking: Reported on 8/13/2024)    Current Medications:     Scheduled Meds:    atorvastatin  80 mg Oral Nightly    carvedilol  12.5 mg Oral BID WC    [Held by provider] apixaban  2.5 mg Oral BID    ferrous sulfate  325 mg Oral Daily with breakfast    flecainide  50  currently on chemotherapy was initially on cisplatin now has been switched to gemcitabine also on radiation therapy   History of left hydronephrosis s/p left percutaneous nephrostomy tube  History of high-grade muscle invasive bladder cancer  HFpEF with EF 60% echo 4/24    Plan:   Hold the diuretic for now  Continue the patient on IV fluids with normal saline at 75 ml per hour  Encourage oral intake  Would resume diuretics as clinically indicated tomorrow that is Bumex 2 mg once daily  Monitor I's and O's  BMP daily  Avoid nephrotoxic drugs  Medical management as per primary  Will continue to follow  Final plan to be formulated after discussion with attending    Nutrition   Please ensure that patient is on a renal diet/TF. Avoid nephrotoxic drugs/contrast exposure.    Thank you for the consultation. Please do not hesitate to contact us for any further questions/concerns. We will continue to follow along with you.       Holly Oconnor MD  Internal Medicine Resident, PGY-2  [facility], [place], [place]  8/13/2024, 1:28 PM    Attending Physician Statement  I have discussed the care of Daja MORELIA BravoJesús, including pertinent history and exam findings with the resident  I have reviewed the key elements of all parts of the encounter with the resident. I have seen and examined the patient with the resident.  I agree with the assessment and plan and status of the problem list as documented.  Addiitionally I recommend continue maintenance IV fluids.  Hopefully, we can discontinue the maintenance IV fluids tomorrow.  Follow basic metabolic panel.  Goal is discharge soon if continued significant improvement in renal function.      Alok Andrews MD  Nephrology Attending Physician  [facility]  8/13/2024

## 2024-08-13 NOTE — PROGRESS NOTES
Physical Therapy  Facility/Department: [address] ONC/MED SURG  Physical Therapy Initial Assessment    Name: Daja Espinosa  : 1960  MRN: [id number]  Date of Service: 2024  Obtained from medical chart:  \"  Chief Complaint   Patient presents with    OTHER       Patient sent form oncology due to elevated creatinine of 4.3. Patient previously a dialysis patient, has nephrostomy tube and get chemo on , radiation everyday. Patient states feeling good and denies any water retention at this time.    Abnormal Lab   \"  Discharge Recommendations:  No therapy recommended at discharge   PT Equipment Recommendations  Equipment Needed: No        Past Medical History:  has a past medical history of Alcohol abuse, Arthritis, Bladder cancer (HCC), CAD (coronary artery disease), Cancer (HCC), Carotid artery occlusion, Carotid artery stenosis, Chronic back pain, History of chemotherapy, Hydronephrosis, Hyperlipidemia, Hypertension, Hypoglycemia, MI (myocardial infarction) (HCC), Peripheral vascular disease (HCC), Takayasu's arteritis (HCC), Tibial fracture, Umbilical hernia, Under care of team, Under care of team, Under care of team, Unspecified cerebral artery occlusion with cerebral infarction, and Wears partial dentures.  Past Surgical History:  has a past surgical history that includes Cardiac catheterization (2010); Cardiac surgery (); Vulva surgery (); vascular surgery; vascular surgery (, ); femoral bypass (, ); other surgical history (2016); Bladder surgery (2016); other surgical history; other surgical history; Colonoscopy (N/A, 2021); Upper gastrointestinal endoscopy (N/A, 2021); Colonoscopy (N/A, 2021); hernia repair; ventral hernia repair (N/A, 2022); Cystoscopy (N/A, 2024); Cystoscopy (Left, 2024); TUNNELED CENTRAL VENOUS CATHETER W/ SUBCUTANEOUS PORT (Right, 2024); IR PORT PLACEMENT < 5 YEARS (2024); Carotid angioplasty       Bed mobility  Supine to Sit: Stand by assistance  Sit to Supine: Stand by assistance  Scooting: Stand by assistance  Bed Mobility Comments: HOB flattened, use of bed rail  Transfers  Sit to Stand: Contact guard assistance  Stand to Sit: Contact guard assistance  Comment: Transfers performed with no AD.  Ambulation  Surface: Level tile  Device: No Device  Assistance: Contact guard assistance  Quality of Gait: fair stability, decreased gait speed, no true LOB  Gait Deviations: Slow Corinna  Distance: 130 feet  More Ambulation?: No  Stairs/Curb  Stairs?: No     Balance  Posture: Fair  Sitting - Static: Good  Sitting - Dynamic: Good;-  Standing - Static: Fair;+  Standing - Dynamic: Fair;+  Comments: standing balance assessed while using no AD.                                        AM-PAC - Mobility    AM-PAC Basic Mobility - Inpatient   How much help is needed turning from your back to your side while in a flat bed without using bedrails?: None  How much help is needed moving from lying on your back to sitting on the side of a flat bed without using bedrails?: None  How much help is needed moving to and from a bed to a chair?: A Little  How much help is needed standing up from a chair using your arms?: A Little  How much help is needed walking in hospital room?: A Little  How much help is needed climbing 3-5 steps with a railing?: A Little  [facility] Inpatient Mobility Raw Score : 20  AM-PAC Inpatient T-Scale Score : 47.67  Mobility Inpatient CMS 0-100% Score: 35.83  Mobility Inpatient CMS G-Code Modifier : CJ       Goals  Short Term Goals  Time Frame for Short Term Goals: 14 visits  Short Term Goal 1: Pt will perform sit<>stand transfer independently  Short Term Goal 2: Pt will ambulate 300 feet with no AD and supervision.  Short Term Goal 3: Pt will demonstrate good dynamic standing balance to decrease fall risk.  Short Term Goal 4: Pt will negotiate 2 stairs with bilateral handrails and SBA to allow the pt to

## 2024-08-13 NOTE — PROGRESS NOTES
Occupational Therapy  Facility/Department: [address] ONC/MED SURG   Occupational Therapy Initial Evaluation    Patient Name: Daja Espinosa        MRN: [id number]    : 1960    Date of Service: 2024    Chief Complaint   Patient presents with    OTHER       Patient sent form oncology due to elevated creatinine of 4.3. Patient previously a dialysis patient, has nephrostomy tube and get chemo on , radiation everyday. Patient states feeling good and denies any water retention at this time.    Abnormal Lab     Discharge Recommendations  Discharge Recommendations: Patient would benefit from continued therapy after discharge    Assessment  Performance deficits / Impairments: Decreased functional mobility ;Decreased ADL status;Decreased safe awareness;Decreased cognition;Decreased endurance;Decreased balance;Decreased high-level IADLs;Decreased strength;Decreased fine motor control  Assessment: Pt presents with above noted deficits impacting pt's ADL status. Pt to benefit from continued therapy services while hospitalized and at discharge to maximize pt's safety and independence in performing functional tasks. Pt expected to be safe to return to prior living arrangements at discharge with supportive family able to assist as needed as pt progresses towards below stated goals with therapy.  Prognosis: Good  Decision Making: Medium Complexity  REQUIRES OT FOLLOW-UP: Yes  Activity Tolerance  Activity Tolerance: Patient limited by fatigue  Safety Devices  Type of Devices: Call light within reach;Left in bed;Nurse notified  Restraints  Restraints Initially in Place: No    Restrictions/Precautions  Restrictions/Precautions  Restrictions/Precautions: Fall Risk;Up as Tolerated  Required Braces or Orthoses?: No    Subjective  General  Patient assessed for rehabilitation services?: Yes  Family / Caregiver Present: Yes ()  General Comment  Comments: RN ok'd for therapy this PM. Pt agreeable to participate in

## 2024-08-13 NOTE — CARE COORDINATION
Case Management Assessment  Initial Evaluation    Date/Time of Evaluation: 8/13/2024 6:08 PM  Assessment Completed by: DAYSI PERSAUD RN    If patient is discharged prior to next notation, then this note serves as note for discharge by case management.    Patient Name: Daja Espinosa                   YOB: 1960  Diagnosis: Acute renal failure (ARF) (HCC) [N17.9]                   Date / Time: 8/12/2024  5:18 PM    Patient Admission Status: Inpatient   Readmission Risk (Low < 19, Mod (19-27), High > 27): Readmission Risk Score: 37.1    Current PCP: Sohail Garcia MD  PCP verified by CM? (P) Yes    Chart Reviewed: Yes      History Provided by: (P) Patient  Patient Orientation: (P) Alert and Oriented    Patient Cognition: (P) Alert    Hospitalization in the last 30 days (Readmission):  Yes    If yes, Readmission Assessment in  Navigator will be completed.    Advance Directives:      Code Status: Full Code   Patient's Primary Decision Maker is: (P) Legal Next of Kin    Primary Decision Maker: Benji Espinosa - Spouse - [phone]    Discharge Planning:    Patient lives with: (P) Spouse/Significant Other Type of Home: (P) House  Primary Care Giver: (P) Self  Patient Support Systems include: (P) Spouse/Significant Other   Current Financial resources: (P) Medicaid  Current community resources:    Current services prior to admission: (P) Durable Medical Equipment            Current DME: (P) Walker            Type of Home Care services:  (P) None    ADLS  Prior functional level: (P) Independent in ADLs/IADLs  Current functional level: (P) Independent in ADLs/IADLs    PT AM-PAC: 20 /24  OT AM-PAC: 19 /24    Family can provide assistance at DC: (P) Yes  Would you like Case Management to discuss the discharge plan with any other family members/significant others, and if so, who? (P) No  Plans to Return to Present Housing: (P) Yes  Other Identified Issues/Barriers to RETURNING to current housing: none

## 2024-08-13 NOTE — PROGRESS NOTES
Comprehensive Nutrition Assessment    Type and Reason for Visit:  Initial, Positive Nutrition Screen    Nutrition Recommendations/Plan:   Continue current diet as tolerated  Monitor wt, labs, edema, PO intake     Malnutrition Assessment:  Malnutrition Status:  Moderate malnutrition (08/13/24 1134)    Context:  Chronic Illness     Findings of the 6 clinical characteristics of malnutrition:  Energy Intake:  No significant decrease in energy intake  Weight Loss:  Greater than 5% over 1 month     Body Fat Loss:  Mild body fat loss Orbital, Buccal region   Muscle Mass Loss:  Mild muscle mass loss Temples (temporalis)  Fluid Accumulation:  Mild Extremities, Generalized   Strength:  Not Performed    Nutrition Assessment:    Pt seen for positive nutrition screen. Adm for worsening kidney function - asymptomatic to worsening creatinine - follows with Dr. Chan at [facility]. Per pt, she has a good appetite, never had a decrease in appetite. Had significant wt loss r/t edema improvement. Usual PO intake at home includes 3 meals and snacks throughout the day. Ate 100% of breakfast this morning. Has had a 13.4% wt loss in one month. LBM 8/10. +1 generalized and BLE edema.    Nutrition Related Findings:    Meds/labs reviewed Wound Type: None       Current Nutrition Intake & Therapies:    Average Meal Intake: %  Average Supplements Intake: None Ordered  ADULT DIET; Regular; Low Potassium (Less than 3000 mg/day); Low Phosphorus (Less than 1000 mg)    Anthropometric Measures:  Height:    Ideal Body Weight (IBW):   ( )    Admission Body Weight: 52.6 kg (116 lb)  Current Body Weight: 52.6 kg (116 lb),   IBW. Weight Source: Standing Scale  Current BMI (kg/m2): 21.2  Usual Body Weight: 60.8 kg (134 lb) (7/12/2024)  % Weight Change (Calculated): -13.4  Weight Adjustment For: No Adjustment  BMI Categories: Normal Weight (BMI 18.5-24.9)    Estimated Daily Nutrient Needs:  Fluid (ml/day): per MD    Nutrition Diagnosis:   Moderate

## 2024-08-13 NOTE — PROGRESS NOTES
[facility]  Office: [phone]  Deven Louis DO, Rashid Martinez DO, Koko House DO, Jc Wheat DO, Bahman Silva MD, Radha Joseph MD, Vooldymyr Abad MD, Karyn Purvis MD,  Siva Helton MD, Jigna Emery MD, Mayur Tinajreo MD,  Danni Lamas DO, Gayle Guzmán MD, Micha Sosa MD, Lloyd Louis DO, No Lutz MD,  Ganesh Lee DO, Brittani Francisco MD, Sherrell Hickey MD, Kayla Roberson MD, Macarena Martin MD,  Gerardo Pate MD, Daisy Stephenson MD, Kelly Connor MD, Clau Calderon MD, Aurelio Pérez MD, Eliceo Milligan MD, Talib Rodriguez DO, Yusef Lopez DO, Liban Williamson MD,  Franky Trinh MD, Shirley Waterhouse, CNP,  Savanah Diez CNP, Arias Lares, CNP,  Brenda Daniels, DNP, Phoebe Morris, CNP, Morelia Howard, CNP, Meaghan Rogers CNP, Neris Del Toro, CNP, Krystal Morrow, PA-C, Ely Guevara PA-C, Nancy Tam, CNP, Cody Steele, CNP, Aviva Hartman, CNP, Isela Montaño, CNP, Marj Figueroa, CNS, Keri Clayton, CNP, Anahi Carnes CNP, Tracy Schwab, CNP         [facility]   IN-PATIENT SERVICE   [facility]    Progress Note    8/13/2024    4:34 PM    Name:   Daja Espinosa  MRN:     [id number]     Acct:      [id number]   Room:   0446/0446-01   Day:  1  Admit Date:  8/12/2024  5:18 PM    PCP:   Sohail Garcia MD  Code Status:  Full Code    Subjective:     C/C: Abnormal labs    Interval History Status: improved.     Patient seen and examined at bedside, no acute events overnight.   Patient actually feels great and denies any chest pain, shortness of breath, chills, fevers, nausea or vomiting.  Patient vitals, labs and all providers notes were reviewed,from overnight shift and morning updates were noted and discussed with the nurse'    Brief History:     Per HPI  63-year-old female past medical history of urothelial carcinoma, CKD stage IV, nephrostomy tube in place, COPD, coronary artery disease, A-fib with RVR on Eliquis,  receiving gemcitabine every Monday and Thursday with oncology presents to outlying facility with plans for another dose of gemcitabine was found to have acute renal failure and was transferred to [facility] for further evaluation. Patient states that since discharge she has been feeling well has lost approximately 20 pounds in the past 2 weeks since her last gemcitabine dose. Originally patient was at 139 pounds and dropped 116. Patient endorses that after increasing her Bumex dose to 2 mg daily that she had noticed significant improvement in her swelling.     Review of Systems:     Review of Systems   Constitutional:  Negative for chills, diaphoresis and fever.   HENT:  Negative for congestion.    Eyes:  Negative for visual disturbance.   Respiratory:  Negative for cough, chest tightness, shortness of breath and wheezing.    Cardiovascular:  Negative for chest pain, palpitations and leg swelling.   Gastrointestinal:  Negative for abdominal pain, blood in stool, constipation, diarrhea, nausea and vomiting.   Genitourinary:  Negative for difficulty urinating.   Neurological:  Negative for dizziness, weakness, light-headedness, numbness and headaches.   All other systems reviewed and are negative.      Medications:     Allergies:  No Known Allergies    Current Meds:   Scheduled Meds:    atorvastatin  80 mg Oral Nightly    carvedilol  12.5 mg Oral BID WC    [Held by provider] apixaban  2.5 mg Oral BID    ferrous sulfate  325 mg Oral Daily with breakfast    flecainide  50 mg Oral 2 times per day    [Held by provider] lisinopril  5 mg Oral BID    pantoprazole  40 mg Oral QAM AC    sodium chloride flush  5-40 mL IntraVENous 2 times per day     Continuous Infusions:    sodium chloride      sodium chloride 75 mL/hr at 08/13/24 8284    heparin (PORCINE) Infusion 18 Units/kg/hr (08/13/24 1102)     PRN Meds: sodium chloride flush, sodium chloride, ondansetron **OR** ondansetron, polyethylene glycol, acetaminophen

## 2024-08-13 NOTE — PLAN OF CARE
Problem: Chronic Conditions and Co-morbidities  Goal: Patient's chronic conditions and co-morbidity symptoms are monitored and maintained or improved  8/13/2024 1216 by Krystal Farrell RN  Outcome: Progressing  8/13/2024 0423 by Lyssa Arreaga RN  Outcome: Progressing     Problem: Safety - Adult  Goal: Free from fall injury  8/13/2024 1216 by Krystal Farrell RN  Outcome: Progressing  8/13/2024 0423 by Lyssa Arreaga RN  Outcome: Progressing     Problem: ABCDS Injury Assessment  Goal: Absence of physical injury  8/13/2024 1216 by Krystal Farrell RN  Outcome: Progressing  8/13/2024 0423 by Lyssa Arreaga RN  Outcome: Progressing

## 2024-08-14 LAB
ANION GAP SERPL CALCULATED.3IONS-SCNC: 12 MMOL/L (ref 9–16)
BUN SERPL-MCNC: 47 MG/DL (ref 8–23)
CALCIUM SERPL-MCNC: 8.9 MG/DL (ref 8.6–10.4)
CHLORIDE SERPL-SCNC: 106 MMOL/L (ref 98–107)
CO2 SERPL-SCNC: 20 MMOL/L (ref 20–31)
CREAT SERPL-MCNC: 2.8 MG/DL (ref 0.5–0.9)
EKG ATRIAL RATE: 74 BPM
EKG P AXIS: 53 DEGREES
EKG P-R INTERVAL: 204 MS
EKG Q-T INTERVAL: 442 MS
EKG QRS DURATION: 104 MS
EKG QTC CALCULATION (BAZETT): 490 MS
EKG R AXIS: 62 DEGREES
EKG T AXIS: 50 DEGREES
EKG VENTRICULAR RATE: 74 BPM
ERYTHROCYTE [DISTWIDTH] IN BLOOD BY AUTOMATED COUNT: 16.1 % (ref 11.8–14.4)
GFR, ESTIMATED: 19 ML/MIN/1.73M2
GLUCOSE BLD-MCNC: 100 MG/DL (ref 65–105)
GLUCOSE BLD-MCNC: 105 MG/DL (ref 65–105)
GLUCOSE SERPL-MCNC: 103 MG/DL (ref 74–99)
HCT VFR BLD AUTO: 23.2 % (ref 36.3–47.1)
HGB BLD-MCNC: 7 G/DL (ref 11.9–15.1)
MCH RBC QN AUTO: 29.7 PG (ref 25.2–33.5)
MCHC RBC AUTO-ENTMCNC: 30.2 G/DL (ref 28.4–34.8)
MCV RBC AUTO: 98.3 FL (ref 82.6–102.9)
NRBC BLD-RTO: 0 PER 100 WBC
PARTIAL THROMBOPLASTIN TIME: 28.1 SEC (ref 23–36.5)
PARTIAL THROMBOPLASTIN TIME: 64.7 SEC (ref 23–36.5)
PLATELET # BLD AUTO: 449 K/UL (ref 138–453)
PMV BLD AUTO: 9.1 FL (ref 8.1–13.5)
POTASSIUM SERPL-SCNC: 3.6 MMOL/L (ref 3.7–5.3)
RBC # BLD AUTO: 2.36 M/UL (ref 3.95–5.11)
SODIUM SERPL-SCNC: 138 MMOL/L (ref 136–145)
WBC OTHER # BLD: 6 K/UL (ref 3.5–11.3)

## 2024-08-14 PROCEDURE — 99232 SBSQ HOSP IP/OBS MODERATE 35: CPT | Performed by: INTERNAL MEDICINE

## 2024-08-14 PROCEDURE — 2580000003 HC RX 258: Performed by: STUDENT IN AN ORGANIZED HEALTH CARE EDUCATION/TRAINING PROGRAM

## 2024-08-14 PROCEDURE — 6360000002 HC RX W HCPCS: Performed by: STUDENT IN AN ORGANIZED HEALTH CARE EDUCATION/TRAINING PROGRAM

## 2024-08-14 PROCEDURE — 6370000000 HC RX 637 (ALT 250 FOR IP): Performed by: NURSE PRACTITIONER

## 2024-08-14 PROCEDURE — 2060000000 HC ICU INTERMEDIATE R&B

## 2024-08-14 PROCEDURE — 85027 COMPLETE CBC AUTOMATED: CPT

## 2024-08-14 PROCEDURE — 82947 ASSAY GLUCOSE BLOOD QUANT: CPT

## 2024-08-14 PROCEDURE — 99232 SBSQ HOSP IP/OBS MODERATE 35: CPT | Performed by: FAMILY MEDICINE

## 2024-08-14 PROCEDURE — 6360000002 HC RX W HCPCS: Performed by: NURSE PRACTITIONER

## 2024-08-14 PROCEDURE — 80048 BASIC METABOLIC PNL TOTAL CA: CPT

## 2024-08-14 PROCEDURE — 2580000003 HC RX 258: Performed by: NURSE PRACTITIONER

## 2024-08-14 PROCEDURE — 6370000000 HC RX 637 (ALT 250 FOR IP): Performed by: FAMILY MEDICINE

## 2024-08-14 PROCEDURE — 85730 THROMBOPLASTIN TIME PARTIAL: CPT

## 2024-08-14 PROCEDURE — 36415 COLL VENOUS BLD VENIPUNCTURE: CPT

## 2024-08-14 RX ORDER — LABETALOL HYDROCHLORIDE 5 MG/ML
20 INJECTION, SOLUTION INTRAVENOUS ONCE
Status: COMPLETED | OUTPATIENT
Start: 2024-08-14 | End: 2024-08-14

## 2024-08-14 RX ORDER — BUMETANIDE 1 MG/1
2 TABLET ORAL DAILY
Status: DISCONTINUED | OUTPATIENT
Start: 2024-08-15 | End: 2024-08-15 | Stop reason: HOSPADM

## 2024-08-14 RX ORDER — BUMETANIDE 2 MG/1
2 TABLET ORAL DAILY
Qty: 30 TABLET | Refills: 3 | Status: SHIPPED | OUTPATIENT
Start: 2024-08-15

## 2024-08-14 RX ADMIN — PANTOPRAZOLE SODIUM 40 MG: 40 TABLET, DELAYED RELEASE ORAL at 07:01

## 2024-08-14 RX ADMIN — CARVEDILOL 12.5 MG: 12.5 TABLET, FILM COATED ORAL at 08:03

## 2024-08-14 RX ADMIN — SODIUM CHLORIDE, PRESERVATIVE FREE 10 ML: 5 INJECTION INTRAVENOUS at 21:23

## 2024-08-14 RX ADMIN — SODIUM CHLORIDE, PRESERVATIVE FREE 10 ML: 5 INJECTION INTRAVENOUS at 08:04

## 2024-08-14 RX ADMIN — FERROUS SULFATE TAB EC 325 MG (65 MG FE EQUIVALENT) 325 MG: 325 (65 FE) TABLET DELAYED RESPONSE at 08:03

## 2024-08-14 RX ADMIN — SODIUM CHLORIDE: 9 INJECTION, SOLUTION INTRAVENOUS at 07:02

## 2024-08-14 RX ADMIN — FLECAINIDE ACETATE 50 MG: 50 TABLET ORAL at 08:03

## 2024-08-14 RX ADMIN — ATORVASTATIN CALCIUM 80 MG: 80 TABLET, FILM COATED ORAL at 21:21

## 2024-08-14 RX ADMIN — FLECAINIDE ACETATE 50 MG: 50 TABLET ORAL at 21:21

## 2024-08-14 RX ADMIN — CARVEDILOL 12.5 MG: 12.5 TABLET, FILM COATED ORAL at 17:13

## 2024-08-14 RX ADMIN — HEPARIN SODIUM 18 UNITS/KG/HR: 10000 INJECTION, SOLUTION INTRAVENOUS at 00:19

## 2024-08-14 RX ADMIN — APIXABAN 2.5 MG: 2.5 TABLET, FILM COATED ORAL at 21:21

## 2024-08-14 RX ADMIN — LABETALOL HYDROCHLORIDE 20 MG: 5 INJECTION, SOLUTION INTRAVENOUS at 22:36

## 2024-08-14 ASSESSMENT — ENCOUNTER SYMPTOMS
NAUSEA: 0
BLOOD IN STOOL: 0
COUGH: 0
ABDOMINAL PAIN: 0
CONSTIPATION: 0
CHEST TIGHTNESS: 0
DIARRHEA: 0
WHEEZING: 0
SHORTNESS OF BREATH: 0
VOMITING: 0

## 2024-08-14 ASSESSMENT — PAIN SCALES - GENERAL
PAINLEVEL_OUTOF10: 0

## 2024-08-14 NOTE — PROGRESS NOTES
Renal Progress Note    Patient :  Daja Espinosa; 63 y.o. MRN# [id number]  Location:  0446/0446-01  Attending:  Karyn Purvis MD  Admit Date:  8/12/2024   Hospital Day: 2    Subjective:     Afebrile  Hemodynamically stable  On room air maintaining saturation of 98%    Labs reviewed suggestive of sodium 130, potassium 3.6, creatinine improving 2.8  CBC suggestive of hemoglobin 7, heparin held watch for any signs of active bleed  Urine output 2.3 L    Currently no acute complaints she denies any shortness of breath, chest pain or any bilateral leg swelling.      History reviwed:   63 y.o. female with past medical history bladder cancer diagnosed many years ago initially receiving BCG treatments and then the cancer was in remission until March 2024.  Diagnosed with recurrent bladder tumor.  Initial plan for cystectomy however had a CVA and the procedure was postponed.  Chemotherapy with cisplatin and radiation therapy.  Received 1 dose of cisplatin and then chemo switched to gemcitabine.  She is receiving gemcitabine every Monday and Thursday therefore developed chronic kidney disease, A-fib on Eliquis, hypertension, hyperlipidemia, CAD.  She has been receiving gemcitabine every Monday and Thursday with oncology presents to outlying facility with plans for another dose of gemcitabine was found to have acute renal failure and was transferred to [facility] for further evaluation. Patient states that since discharge she has been feeling well has lost approximately 20 pounds in the past 2 weeks since her last gemcitabine dose. Originally patient was at 139 pounds and dropped 216. Patient endorses that after increasing her Bumex dose to 2 mg daily that she had noticed significant improvement in her swelling.     On evaluation in the ED she was hemodynamically stable, asymptomatic her creatinine was found to be 4.3,, otherwise unremarkable BMP, unremarkable CBC.    As per her she has been having good urine output from  formulated after discussion with attending    Nutrition   Please ensure that patient is on a renal diet/TF. Avoid nephrotoxic drugs/contrast exposure.    Holly Oconnor MD  Internal Medicine Resident, PGY-2  [facility], [place], [place]  8/14/2024, 10:14 AM      This note is created with the assistance of a speech-recognition program. While intending to generate a document that actually reflects the content of the visit, no guarantees can be provided that every mistake has been identified and corrected by editing.    See attending note for the day.    Alok Andrews MD  [facility]

## 2024-08-14 NOTE — CARE COORDINATION
Discharge Report    [facility]  Clinical Case Management Department  Written by: ONEAL VILLELA    Patient Name: Daja Espinosa  Attending Provider: Karyn Purvis MD  Admit Date: 2024  5:18 PM  MRN: [id number]  Account: [id number]                     : 1960  Discharge Date: 24      Disposition: home    ONEAL VILLELA

## 2024-08-14 NOTE — PROGRESS NOTES
[facility]  Office: [phone]  Deven Louis DO, Rashid Martinez DO, Koko House DO, Jc Wheat DO, Bahman Silva MD, Radha Joseph MD, Volodymyr Abad MD, Karyn Purvis MD,  Siva Helton MD, Jigna Emery MD, Mayur Tinajero MD,  Danni Lamas DO, Gayle Guzmán MD, Micha Sosa MD, Lloyd Louis DO, No Lutz MD,  Ganesh Lee DO, Brittani Francisco MD, Sherrell Hickey MD, Kayla Roberson MD, Macarena Martin MD,  Gerardo Pate MD, Daisy Stephenson MD, Kelly Connor MD, Clau Calderon MD, Aurelio Pérez MD, Eliceo Milligan MD, Talib Rodriguez DO, Yusef Lopez DO, Liban Williamson MD,  Franky Trinh MD, Shirley Waterhouse, CNP,  Savanah Diez CNP, Arias Lares, CNP,  Brenda Daniels, DNP, Phoebe Morris, CNP, Morelia Howard, CNP, Meaghan Rogers CNP, Neris Del Toro, CNP, Krystal Morrow, PA-C, Ely Guevara PA-C, Nancy Tam, CNP, Cody Steele, CNP, Aviva Hartman, CNP, Isela Montaño, CNP, Marj Figueroa, CNS, Keri Clayton, CNP, Anahi Carnes CNP, Tracy Schwab, CNP         [facility]   IN-PATIENT SERVICE   [facility]    Progress Note    8/14/2024    7:36 AM    Name:   Daja Espinosa  MRN:     [id number]     Acct:      [id number]   Room:   0446/0446-01   Day:  2  Admit Date:  8/12/2024  5:18 PM    PCP:   Sohail Garcia MD  Code Status:  Full Code    Subjective:     C/C: Abnormal labs    Interval History Status: improved.     Patient seen and examined at bedside, no acute events overnight.   Patient continues to deny any acute complaint, she is actually feeling better.  Her creatinine continues to improve.  Hemoglobin dropped to 7 this morning, denies any active sign or symptom of bleed  Patient vitals, labs and all providers notes were reviewed,from overnight shift and morning updates were noted and discussed with the nurse'    Brief History:     Per HPI  63-year-old female past medical history of urothelial carcinoma, CKD stage IV,

## 2024-08-14 NOTE — PROGRESS NOTES
ANCA:   No results found for: \"MPO\"  PR3 ANCA:   No results found for: \"PR3\"  Anti-GBM:   No results found for: \"GBMABIGG\"  Hep BsAg:         Lab Results   Component Value Date/Time    HEPBSAG NONREACTIVE 06/04/2024 06:20 PM     Hep C AB:          Lab Results   Component Value Date/Time    HEPCAB REACTIVE 06/04/2024 06:20 PM       Urinalysis/Chemistries:      Lab Results   Component Value Date/Time    NITRU NEGATIVE 08/12/2024 11:41 AM    COLORU Yellow 08/12/2024 11:41 AM    PHUR 7.5 08/12/2024 11:41 AM    PHUR 6.5 02/15/2024 01:47 PM    WBCUA 0 TO 2 08/12/2024 11:41 AM    RBCUA 0 TO 2 08/12/2024 11:41 AM    MUCUS NOT REPORTED 03/08/2018 05:18 PM    TRICHOMONAS NOT REPORTED 03/08/2018 05:18 PM    YEAST MANY 07/12/2024 10:43 PM    BACTERIA None 08/02/2024 09:51 AM    LEUKOCYTESUR TRACE 08/12/2024 11:41 AM    UROBILINOGEN Normal 08/12/2024 11:41 AM    BILIRUBINUR NEGATIVE 08/12/2024 11:41 AM    GLUCOSEU NEGATIVE 08/12/2024 11:41 AM    KETUA NEGATIVE 08/12/2024 11:41 AM    AMORPHOUS NOT REPORTED 03/08/2018 05:18 PM     Urine Sodium:   No components found for: \"MATEUSZ\"  Urine Potassium:  No results found for: \"KUR\"  Urine Chloride:    Lab Results   Component Value Date/Time    CLUR 84 06/19/2024 11:42 AM     Urine Osmolarity:   Lab Results   Component Value Date/Time    OSMOU 316 08/12/2024 08:17 PM     Urine Protein:     Lab Results   Component Value Date/Time    TPU 18 08/12/2024 08:17 PM     Urine Creatinine:   No results found for: \"LABCREA\"  UPC:     Urine Eosinophils:  No components found for: \"UEOS\"    Radiology:     Reviewed as available.    Assessment:     Acute kidney injury on CKD stage IIIb: Likely ischemic ATN secondary to hypoperfusion from intravascular depletion from volume depletion due to overdiuresis complicated by decreased oral intake creatinine 1.2-1.4 up until April then after has been in the range 2-2.5.  On discharge creatinine was 1.5 up trended to 2.7 to a peak of 5.4 currently 3.9.  Was dialysis  if continued significant improvement in renal function.     Attending Physician Statement  I have discussed the care of Daja Espinosa, including pertinent history and exam findings with the resident. I have reviewed the key elements of all parts of the encounter with the resident. I have seen and examined the patient. I agree with the assessment and plan and status of the problem list as documented and have edited the documentation as appropriate.  Addiitionally I recommend restart Bumex 2 mg oral once a day starting tomorrow.  Patient is not discharged today with hemoglobin down to 7.  Evaluation is ongoing by the primary service.       Alok Andrews MD  Nephrology Attending Physician  [facility]  8/14/2024

## 2024-08-14 NOTE — PLAN OF CARE
Pt hgb dropped from 7.8 to 7.0. Provider notified. See orders. Report to oncoming shift,.  Problem: Chronic Conditions and Co-morbidities  Goal: Patient's chronic conditions and co-morbidity symptoms are monitored and maintained or improved  Outcome: Progressing     Problem: Safety - Adult  Goal: Free from fall injury  Outcome: Progressing     Problem: ABCDS Injury Assessment  Goal: Absence of physical injury  Outcome: Progressing

## 2024-08-15 ENCOUNTER — TELEPHONE (OUTPATIENT)
Dept: ONCOLOGY | Age: 64
End: 2024-08-15

## 2024-08-15 VITALS
RESPIRATION RATE: 16 BRPM | SYSTOLIC BLOOD PRESSURE: 140 MMHG | DIASTOLIC BLOOD PRESSURE: 72 MMHG | TEMPERATURE: 97.7 F | HEART RATE: 75 BPM | OXYGEN SATURATION: 97 %

## 2024-08-15 LAB
ANION GAP SERPL CALCULATED.3IONS-SCNC: 10 MMOL/L (ref 9–16)
BUN SERPL-MCNC: 40 MG/DL (ref 8–23)
CALCIUM SERPL-MCNC: 9.4 MG/DL (ref 8.6–10.4)
CHLORIDE SERPL-SCNC: 108 MMOL/L (ref 98–107)
CO2 SERPL-SCNC: 20 MMOL/L (ref 20–31)
CREAT SERPL-MCNC: 2.1 MG/DL (ref 0.5–0.9)
ERYTHROCYTE [DISTWIDTH] IN BLOOD BY AUTOMATED COUNT: 16.3 % (ref 11.8–14.4)
GFR, ESTIMATED: 27 ML/MIN/1.73M2
GLUCOSE SERPL-MCNC: 97 MG/DL (ref 74–99)
HCT VFR BLD AUTO: 24.3 % (ref 36.3–47.1)
HGB BLD-MCNC: 7.5 G/DL (ref 11.9–15.1)
MCH RBC QN AUTO: 30.5 PG (ref 25.2–33.5)
MCHC RBC AUTO-ENTMCNC: 30.9 G/DL (ref 28.4–34.8)
MCV RBC AUTO: 98.8 FL (ref 82.6–102.9)
NRBC BLD-RTO: 0 PER 100 WBC
PLATELET # BLD AUTO: 485 K/UL (ref 138–453)
PMV BLD AUTO: 9.3 FL (ref 8.1–13.5)
POTASSIUM SERPL-SCNC: 3.9 MMOL/L (ref 3.7–5.3)
RBC # BLD AUTO: 2.46 M/UL (ref 3.95–5.11)
SODIUM SERPL-SCNC: 138 MMOL/L (ref 136–145)
WBC OTHER # BLD: 4.4 K/UL (ref 3.5–11.3)

## 2024-08-15 PROCEDURE — 85027 COMPLETE CBC AUTOMATED: CPT

## 2024-08-15 PROCEDURE — 6370000000 HC RX 637 (ALT 250 FOR IP): Performed by: INTERNAL MEDICINE

## 2024-08-15 PROCEDURE — 36415 COLL VENOUS BLD VENIPUNCTURE: CPT

## 2024-08-15 PROCEDURE — 6360000002 HC RX W HCPCS: Performed by: NURSE PRACTITIONER

## 2024-08-15 PROCEDURE — 2580000003 HC RX 258: Performed by: NURSE PRACTITIONER

## 2024-08-15 PROCEDURE — 6360000002 HC RX W HCPCS

## 2024-08-15 PROCEDURE — 99239 HOSP IP/OBS DSCHRG MGMT >30: CPT | Performed by: FAMILY MEDICINE

## 2024-08-15 PROCEDURE — 80048 BASIC METABOLIC PNL TOTAL CA: CPT

## 2024-08-15 PROCEDURE — 6370000000 HC RX 637 (ALT 250 FOR IP): Performed by: NURSE PRACTITIONER

## 2024-08-15 PROCEDURE — 6370000000 HC RX 637 (ALT 250 FOR IP): Performed by: FAMILY MEDICINE

## 2024-08-15 PROCEDURE — 6360000002 HC RX W HCPCS: Performed by: FAMILY MEDICINE

## 2024-08-15 RX ORDER — HYDRALAZINE HYDROCHLORIDE 20 MG/ML
10 INJECTION INTRAMUSCULAR; INTRAVENOUS ONCE
Status: COMPLETED | OUTPATIENT
Start: 2024-08-15 | End: 2024-08-15

## 2024-08-15 RX ORDER — HEPARIN 100 UNIT/ML
500 SYRINGE INTRAVENOUS ONCE
Status: COMPLETED | OUTPATIENT
Start: 2024-08-15 | End: 2024-08-15

## 2024-08-15 RX ORDER — HEPARIN SODIUM 5000 [USP'U]/ML
5000 INJECTION, SOLUTION INTRAVENOUS; SUBCUTANEOUS ONCE
Status: DISCONTINUED | OUTPATIENT
Start: 2024-08-15 | End: 2024-08-15 | Stop reason: CLARIF

## 2024-08-15 RX ORDER — HYDRALAZINE HYDROCHLORIDE 20 MG/ML
INJECTION INTRAMUSCULAR; INTRAVENOUS
Status: COMPLETED
Start: 2024-08-15 | End: 2024-08-15

## 2024-08-15 RX ADMIN — FLECAINIDE ACETATE 50 MG: 50 TABLET ORAL at 09:44

## 2024-08-15 RX ADMIN — CARVEDILOL 12.5 MG: 12.5 TABLET, FILM COATED ORAL at 09:29

## 2024-08-15 RX ADMIN — HYDRALAZINE HYDROCHLORIDE 10 MG: 20 INJECTION, SOLUTION INTRAMUSCULAR; INTRAVENOUS at 07:35

## 2024-08-15 RX ADMIN — APIXABAN 2.5 MG: 2.5 TABLET, FILM COATED ORAL at 09:29

## 2024-08-15 RX ADMIN — HEPARIN 500 UNITS: 100 SYRINGE at 12:22

## 2024-08-15 RX ADMIN — HYDRALAZINE HYDROCHLORIDE 10 MG: 20 INJECTION, SOLUTION INTRAMUSCULAR; INTRAVENOUS at 03:58

## 2024-08-15 RX ADMIN — BUMETANIDE 2 MG: 1 TABLET ORAL at 09:29

## 2024-08-15 RX ADMIN — SODIUM CHLORIDE, PRESERVATIVE FREE 10 ML: 5 INJECTION INTRAVENOUS at 09:30

## 2024-08-15 RX ADMIN — FERROUS SULFATE TAB EC 325 MG (65 MG FE EQUIVALENT) 325 MG: 325 (65 FE) TABLET DELAYED RESPONSE at 09:29

## 2024-08-15 RX ADMIN — PANTOPRAZOLE SODIUM 40 MG: 40 TABLET, DELAYED RELEASE ORAL at 05:04

## 2024-08-15 ASSESSMENT — ENCOUNTER SYMPTOMS
ABDOMINAL PAIN: 0
NAUSEA: 0
WHEEZING: 0
COUGH: 0
VOMITING: 0
SHORTNESS OF BREATH: 0
CONSTIPATION: 0
CHEST TIGHTNESS: 0
DIARRHEA: 0
BLOOD IN STOOL: 0

## 2024-08-15 ASSESSMENT — PAIN SCALES - GENERAL
PAINLEVEL_OUTOF10: 0
PAINLEVEL_OUTOF10: 0

## 2024-08-15 NOTE — TELEPHONE ENCOUNTER
Name: Daja Espinosa  : 1960  MRN: [id number]    Oncology Navigation Follow-Up Note    Contact Type:  Inpatient    Notes: Chart reviewed.  Pt was sent to ER from Oncology on 24 and transferred to [facility]. Pt is currently admitted. Will await discharge.     Current follow up with Dr. Shepard scheduled 24     Electronically signed by Ave Arreaga RN on 8/15/2024 at 11:03 AM

## 2024-08-15 NOTE — PLAN OF CARE
Problem: Chronic Conditions and Co-morbidities  Goal: Patient's chronic conditions and co-morbidity symptoms are monitored and maintained or improved  Outcome: Completed     Problem: ABCDS Injury Assessment  Goal: Absence of physical injury  Outcome: Completed  Flowsheets (Taken 8/15/2024 0901 by Tamela Franco)  Absence of Physical Injury: Implement safety measures based on patient assessment

## 2024-08-15 NOTE — DISCHARGE SUMMARY
[facility]  Office: [phone]  Deven Louis DO, Rashid Martinez DO, Koko House DO, Jc Wheat DO, Bahman Silva MD, Radha Joseph MD, Volodymyr Abad MD, Karyn Purvis MD,  Siva Helton MD, Jigna Emery MD, Mayur Tinajero MD,  Danni Lamas DO, Gayle Guzmán MD, Micha Sosa MD, Lloyd Louis DO, No Lutz MD,  Ganesh Lee DO, Brittani Francisco MD, Sherrell Hickey MD, Kayla Roberson MD, Macarena Martin MD,  Gerardo Pate MD, Daisy Stephenson MD, Kelly Connor MD, Clau Calderon MD, Aurelio Pérez MD, Eliceo Milligan MD, Talib Rodriguez DO, Yusef Lopez DO, Liban Williamson MD,  Franky Trinh MD, Shirley Waterhouse, CNP,  Savanah Diez CNP, Arias Lares, CNP,  Brenda Daniels, MALENA, Phoebe Morris, CNP, Morelia Howard, CNP, Meaghan Rogers CNP, Neris Del Toro, CNP, Krystal Morrow, PA-C, Ely Guevara PA-C, Nancy Tam, CNP, Cody Steele, CNP, Aviva Hartman, CNP, Isela Montaño, CNP, Marj Figueroa, CNS, Keri Clayton, CNP, Anahi Carnes CNP, Tracy Schwab, CNP         [facility]   IN-PATIENT SERVICE   [facility]    Discharge Summary     Patient ID: Daja Espinosa  :  1960   MRN: [id number]     ACCOUNT:  [id number]   Patient's PCP: Sohail Garcia MD  Admit Date: 2024   Discharge Date: 8/15/2024     Length of Stay: 3  Code Status:  Full Code  Admitting Physician: No admitting provider for patient encounter.  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 Recommend follow-up renal ultrasound.  Recommend follow-up CTA or MRI. 4. Cardiomegaly and calcific coronary artery disease. 5. Small right pleural effusion. 6. Colonic diverticulosis. 7. Increased stool throughout the colon. 8. Fat containing inguinal hernias. 9. Grade 1 spondylolisthesis L4-5.       Consultations:    Consults:     Final Specialist Recommendations/Findings:   IP CONSULT TO NEPHROLOGY      The patient was seen and examined on day of discharge and this discharge summary is in conjunction with any daily progress note from day of discharge.    Discharge plan:     Disposition: Home    Physician Follow Up:     Sohail Garcia MD  [address] [id number] [phone]          Familia Shepard MD  [address] [id number] [phone]    Follow up in 1 week(s)      Nicole Chan MD  [address] [facility] [address]    Follow up         Requiring Further Evaluation/Follow Up POST HOSPITALIZATION/Incidental Findings:     Diet: renal diet    Activity: As tolerated    Instructions to Patient:     Discharge Medications:      Medication List        CONTINUE taking these medications      atorvastatin 80 MG tablet  Commonly known as: LIPITOR  Take 1 tablet by mouth nightly     bumetanide 2 MG tablet  Commonly known as: BUMEX  Take 1 tablet by mouth daily     carvedilol 25 MG tablet  Commonly known as: COREG  Take 0.5 tablets by mouth 2 times daily (with meals)     docusate 100 MG Caps  Commonly known as: COLACE, DULCOLAX  Take 100 mg by mouth 2 times daily as needed for Constipation     Eliquis 2.5 MG Tabs tablet  Generic drug: apixaban     ferrous sulfate 325 (65 Fe) MG EC tablet  Commonly known as: FE TABS 325  Take 1 tablet by mouth daily (with breakfast)     flecainide 50 MG tablet  Commonly known as: TAMBOCOR  Take 1 tablet by mouth every 12 hours     lidocaine-prilocaine 2.5-2.5 % cream  Commonly known as: EMLA  Apply topically to port site 60

## 2024-08-16 ENCOUNTER — CARE COORDINATION (OUTPATIENT)
Dept: CARE COORDINATION | Age: 64
End: 2024-08-16

## 2024-08-16 ENCOUNTER — TELEPHONE (OUTPATIENT)
Dept: FAMILY MEDICINE CLINIC | Age: 64
End: 2024-08-16

## 2024-08-16 DIAGNOSIS — N17.2 ACUTE RENAL FAILURE WITH MEDULLARY NECROSIS (HCC): Primary | ICD-10-CM

## 2024-08-16 NOTE — CARE COORDINATION
Care Transitions Note    Initial Call - Call within 2 business days of discharge: Yes    Patient Current Location:  Home:  [address] [address]    Care Transition Nurse contacted the patient by telephone to perform post hospital discharge assessment, verified name and  as identifiers. Provided introduction to self, and explanation of the Care Transition Nurse role.     Patient: Daja Espinosa    Patient : 1960   MRN: [id number]    Reason for Admission: HEAVENLY/abnormal lab   Discharge Date: 8/15/24  RURS: Readmission Risk Score: 37.7      Last Discharge Facility       Date Complaint Diagnosis Description Type Department Provider    24   Admission (Discharged) [facility]  ONC Karyn Purvis MD    24 OTHER; Abnormal Lab Acute kidney injury superimposed on CKD (HCC) ED (TRANSFER) [facility] ED Karon Rodríguez MD            Was this an external facility discharge? No    Additional needs identified to be addressed with provider   High priority: HFU with PCP             Method of communication with provider: chart routing.    Patients top risk factors for readmission: functional physical ability, lack of knowledge about disease, and medical condition-bladder CA/CHF, ARF,     Interventions to address risk factors:   Reviewed discharg    Care Summary Note: was able to contact Daja for initial transitional outreach.  She was sent into the hospital due to worsening kidney levels.  Pt was having increased swelling and her diuretics were adjusted.  When she had lab work done for chemo, she was noted with worsening kidney function.  She was sent to the ED from the cancer center.  She said that she was feeling good.  She denied any shortness of breath, cough, and no swelling.  She did not that her BP was elevated and will be discussing this with her providers.  She said that she still has the nephrostomy tube and the urine is yellow and she is voiding also.   She denied any fever/chills and she is only wearing

## 2024-08-16 NOTE — TELEPHONE ENCOUNTER
Care Transitions Initial Follow Up Call    Outreach made within 2 business days of discharge: Yes    Patient: Daja Espinosa Patient : 1960   MRN: [id number]  Reason for Admission:   Discharge Date: 8/15/24       Spoke with: Daja    Discharge department/facility: [facility] Interactive Patient Contact:  Was patient able to fill all prescriptions: Yes  Was patient instructed to bring all medications to the follow-up visit: Yes  Is patient taking all medications as directed in the discharge summary? Yes  Does patient understand their discharge instructions: Yes  Does patient have questions or concerns that need addressed prior to 7-14 day follow up office visit: no    Additional needs identified to be addressed with provider  .             Scheduled appointment with PCP within 7-14 days    Follow Up  Future Appointments   Date Time Provider Department Center   2024  9:00 AM SCHEDULE, [facility] MED ONC ROOM 3 [facility] MED ONC [place]   2024  9:00 AM SCHEDULE, [facility] MED ONC ROOM 3 [facility] MED ONC [place]   2024 11:45 [facility]   9/3/2024  1:00 PM Nicole Chan MD [facility] None   2025 11:00 AM [facility]       Thania Smith MA

## 2024-08-19 ENCOUNTER — HOSPITAL ENCOUNTER (OUTPATIENT)
Dept: INFUSION THERAPY | Age: 64
Discharge: HOME | End: 2024-08-19
Payer: COMMERCIAL

## 2024-08-19 VITALS
DIASTOLIC BLOOD PRESSURE: 91 MMHG | TEMPERATURE: 97.7 F | BODY MASS INDEX: 20.06 KG/M2 | SYSTOLIC BLOOD PRESSURE: 168 MMHG | WEIGHT: 109 LBS | HEIGHT: 62 IN | HEART RATE: 69 BPM | RESPIRATION RATE: 18 BRPM

## 2024-08-19 DIAGNOSIS — C67.9 MALIGNANT NEOPLASM OF URINARY BLADDER, UNSPECIFIED SITE (HCC): ICD-10-CM

## 2024-08-19 DIAGNOSIS — C68.9 TRANSITIONAL CELL CARCINOMA (HCC): ICD-10-CM

## 2024-08-19 DIAGNOSIS — C67.9 UROTHELIAL CARCINOMA OF BLADDER (HCC): Primary | ICD-10-CM

## 2024-08-19 LAB
ALBUMIN SERPL-MCNC: 4.1 G/DL (ref 3.5–5.2)
ALBUMIN/GLOB SERPL: 1.1 {RATIO} (ref 1–2.5)
ALP SERPL-CCNC: 78 U/L (ref 35–104)
ALT SERPL-CCNC: 22 U/L (ref 5–33)
ANION GAP SERPL CALCULATED.3IONS-SCNC: 14 MMOL/L (ref 9–17)
AST SERPL-CCNC: 27 U/L
BASOPHILS # BLD: 0.03 K/UL (ref 0–0.2)
BASOPHILS NFR BLD: 1 % (ref 0–2)
BILIRUB SERPL-MCNC: 0.7 MG/DL (ref 0.3–1.2)
BUN SERPL-MCNC: 57 MG/DL (ref 8–23)
BUN/CREAT SERPL: 27 (ref 9–20)
CALCIUM SERPL-MCNC: 9.9 MG/DL (ref 8.6–10.4)
CHLORIDE SERPL-SCNC: 99 MMOL/L (ref 98–107)
CO2 SERPL-SCNC: 23 MMOL/L (ref 20–31)
CREAT SERPL-MCNC: 2.1 MG/DL (ref 0.5–0.9)
EOSINOPHIL # BLD: 0.29 K/UL (ref 0–0.44)
EOSINOPHILS RELATIVE PERCENT: 5 % (ref 1–4)
ERYTHROCYTE [DISTWIDTH] IN BLOOD BY AUTOMATED COUNT: 16.7 % (ref 11.8–14.4)
GFR, ESTIMATED: 26 ML/MIN/1.73M2
GLUCOSE SERPL-MCNC: 102 MG/DL (ref 70–99)
HCT VFR BLD AUTO: 26.1 % (ref 36.3–47.1)
HGB BLD-MCNC: 8.9 G/DL (ref 11.9–15.1)
IMM GRANULOCYTES # BLD AUTO: <0.03 K/UL (ref 0–0.3)
IMM GRANULOCYTES NFR BLD: 0 %
LYMPHOCYTES NFR BLD: 1.01 K/UL (ref 1.1–3.7)
LYMPHOCYTES RELATIVE PERCENT: 17 % (ref 24–43)
MCH RBC QN AUTO: 31.1 PG (ref 25.2–33.5)
MCHC RBC AUTO-ENTMCNC: 34.1 G/DL (ref 28.4–34.8)
MCV RBC AUTO: 91.3 FL (ref 82.6–102.9)
MONOCYTES NFR BLD: 0.66 K/UL (ref 0.1–1.2)
MONOCYTES NFR BLD: 11 % (ref 3–12)
NEUTROPHILS NFR BLD: 66 % (ref 36–65)
NEUTS SEG NFR BLD: 4.01 K/UL (ref 1.5–8.1)
NRBC BLD-RTO: 0 PER 100 WBC
PLATELET # BLD AUTO: 482 K/UL (ref 138–453)
PMV BLD AUTO: 9 FL (ref 8.1–13.5)
POTASSIUM SERPL-SCNC: 4.8 MMOL/L (ref 3.7–5.3)
PROT SERPL-MCNC: 7.9 G/DL (ref 6.4–8.3)
RBC # BLD AUTO: 2.86 M/UL (ref 3.95–5.11)
SODIUM SERPL-SCNC: 136 MMOL/L (ref 135–144)
WBC OTHER # BLD: 6 K/UL (ref 3.5–11.3)

## 2024-08-19 PROCEDURE — 96375 TX/PRO/DX INJ NEW DRUG ADDON: CPT

## 2024-08-19 PROCEDURE — 85025 COMPLETE CBC W/AUTO DIFF WBC: CPT

## 2024-08-19 PROCEDURE — 36591 DRAW BLOOD OFF VENOUS DEVICE: CPT

## 2024-08-19 PROCEDURE — 2580000003 HC RX 258: Performed by: INTERNAL MEDICINE

## 2024-08-19 PROCEDURE — 6360000002 HC RX W HCPCS: Performed by: INTERNAL MEDICINE

## 2024-08-19 PROCEDURE — 96413 CHEMO IV INFUSION 1 HR: CPT

## 2024-08-19 PROCEDURE — 80053 COMPREHEN METABOLIC PANEL: CPT

## 2024-08-19 RX ORDER — ONDANSETRON 2 MG/ML
8 INJECTION INTRAMUSCULAR; INTRAVENOUS ONCE
Status: COMPLETED | OUTPATIENT
Start: 2024-08-19 | End: 2024-08-19

## 2024-08-19 RX ORDER — HEPARIN 100 UNIT/ML
500 SYRINGE INTRAVENOUS PRN
Status: CANCELLED | OUTPATIENT
Start: 2024-08-19

## 2024-08-19 RX ORDER — SODIUM CHLORIDE 9 MG/ML
5-250 INJECTION, SOLUTION INTRAVENOUS PRN
Status: DISCONTINUED | OUTPATIENT
Start: 2024-08-19 | End: 2024-08-20 | Stop reason: HOSPADM

## 2024-08-19 RX ORDER — SODIUM CHLORIDE 0.9 % (FLUSH) 0.9 %
5-40 SYRINGE (ML) INJECTION PRN
Status: DISCONTINUED | OUTPATIENT
Start: 2024-08-19 | End: 2024-08-20 | Stop reason: HOSPADM

## 2024-08-19 RX ORDER — HEPARIN 100 UNIT/ML
500 SYRINGE INTRAVENOUS PRN
Status: DISCONTINUED | OUTPATIENT
Start: 2024-08-19 | End: 2024-08-20 | Stop reason: HOSPADM

## 2024-08-19 RX ORDER — SODIUM CHLORIDE 0.9 % (FLUSH) 0.9 %
5-40 SYRINGE (ML) INJECTION PRN
Status: CANCELLED | OUTPATIENT
Start: 2024-08-19

## 2024-08-19 RX ADMIN — SODIUM CHLORIDE, PRESERVATIVE FREE 10 ML: 5 INJECTION INTRAVENOUS at 11:07

## 2024-08-19 RX ADMIN — SODIUM CHLORIDE, PRESERVATIVE FREE 10 ML: 5 INJECTION INTRAVENOUS at 09:09

## 2024-08-19 RX ADMIN — SODIUM CHLORIDE, PRESERVATIVE FREE 10 ML: 5 INJECTION INTRAVENOUS at 11:08

## 2024-08-19 RX ADMIN — HEPARIN 500 UNITS: 100 SYRINGE at 11:08

## 2024-08-19 RX ADMIN — SODIUM CHLORIDE 200 ML/HR: 9 INJECTION, SOLUTION INTRAVENOUS at 09:43

## 2024-08-19 RX ADMIN — SODIUM CHLORIDE, PRESERVATIVE FREE 10 ML: 5 INJECTION INTRAVENOUS at 09:08

## 2024-08-19 RX ADMIN — ONDANSETRON 8 MG: 2 INJECTION INTRAMUSCULAR; INTRAVENOUS at 09:47

## 2024-08-19 RX ADMIN — SODIUM CHLORIDE 200 ML/HR: 9 INJECTION, SOLUTION INTRAVENOUS at 09:47

## 2024-08-19 RX ADMIN — GEMCITABINE HYDROCHLORIDE 40 MG: 200 INJECTION, SOLUTION INTRAVENOUS at 10:28

## 2024-08-19 NOTE — PROGRESS NOTES
Physician Progress Note      PATIENT:               OTILIA KO  CSN #:                  [id number]  :                       1960  ADMIT DATE:       2024 5:18 PM  DISCH DATE:        8/15/2024 12:30 PM  RESPONDING  PROVIDER #:        Karyn Purvis MD          QUERY TEXT:    Patient admitted with Acute renal failure and CKD. Noted to have dietician   assessment with malnutrition diagnosis. If possible, please document if you   are evaluating and /or treating any of the following:    The medical record reflects the following:  Risk Factors: Urothelial carcinoma, on chemo, CKD    Clinical Indicators: 24 Dietician note: Meets criteria for Moderate   Malnutrition  Findings of the 6 clinical characteristics of malnutrition:  Weight Loss:  Greater than 5% over 1 month  Body Fat Loss:  Mild body fat loss Orbital, Buccal region  Muscle Mass Loss:  Mild muscle mass loss Temples (temporalis)  Fluid Accumulation:  Mild Extremities, Generalized    Treatment: Continue regular low potassium low phosphorus diet as tolerated,   per Dietician note monitor wt, labs, edema, and po intake    ASPEN Criteria:    https://aspenjournals.onlinelibrary.chavez.com/doi/full/10.1177/955661432843969  5    Thank you so much,  Keri Pettit RN, CDS  [email or web address]  Options provided:  -- Protein calorie malnutrition moderate  -- Protein calorie malnutrition mild  -- Other - I will add my own diagnosis  -- Disagree - Not applicable / Not valid  -- Disagree - Clinically unable to determine / Unknown  -- Refer to Clinical Documentation Reviewer    PROVIDER RESPONSE TEXT:    This patient has moderate protein calorie malnutrition.    Query created by: Keri Pettit on 2024 1:28 PM      Electronically signed by:  Karyn Purvis MD 2024 9:49 AM

## 2024-08-20 ENCOUNTER — CARE COORDINATION (OUTPATIENT)
Dept: CARE COORDINATION | Age: 64
End: 2024-08-20

## 2024-08-20 NOTE — CARE COORDINATION
Care Transitions Note    Follow Up Call     Attempted to reach patient for transitions of care follow up.  Unable to reach patient.      Outreach Attempts:   Unable to leave message.     Care Summary Note: 1st attempt    Follow Up Appointment:   Future Appointments         Provider Specialty Dept Phone    2024 10:00 AM Sohail Garcia MD Family Medicine [phone]    2024 9:00 AM SCHEDULE, [facility] ONC ROOM 3 Infusion Therapy [phone]    2024 10:45 AM Familia Shepard MD Oncology [phone]    9/3/2024 1:00 PM Nicole Chan MD Nephrology [phone]    2025 11:00 AM Terrell Cortez MD Cardiology [phone]            Plan for follow-up on next business day.  based on severity of symptoms and risk factors. Plan for next call: symptom management-follow up on s/s of dehydration, urine outpt voiding and nephrostomy tube.   Tolerating chemo appetite.     MENDOZA THOMPSON RN      Care Transitions Note    Follow Up Call     Patient Current Location:  [place]    Care Transition Nurse contacted the patient by telephone. Verified name and  as identifiers.    Additional needs identified to be addressed with provider   No needs identified                 Method of communication with provider: none.    Care Summary Note: Daja returned call.  She stated that that she was doing \"good\". She denied any shortness of breath and has been off the oxygen and her saturations have been in the mid 90's.  She denied any swelling, chest pain, constipation and is voiding yellow urine and getting yellow urine from her nephrostomy tube.  She has been noted to be losing weight per RPM and is down to 105.8 lbs from 114.2 pre admission and from 141 around .  She said that she is afraid to eat.  She does not know what she can eat so she does not eat much.  She is following a Renal diet, but is still unclear if she is to be on it.  She will be asking her oncologist this Thursday and the nephrologist at her

## 2024-08-22 ENCOUNTER — OFFICE VISIT (OUTPATIENT)
Dept: FAMILY MEDICINE CLINIC | Age: 64
End: 2024-08-22
Payer: COMMERCIAL

## 2024-08-22 VITALS
BODY MASS INDEX: 19.73 KG/M2 | HEART RATE: 78 BPM | HEIGHT: 62 IN | WEIGHT: 107.2 LBS | SYSTOLIC BLOOD PRESSURE: 159 MMHG | OXYGEN SATURATION: 98 % | DIASTOLIC BLOOD PRESSURE: 88 MMHG | RESPIRATION RATE: 18 BRPM

## 2024-08-22 DIAGNOSIS — N17.2 ACUTE RENAL FAILURE WITH MEDULLARY NECROSIS (HCC): ICD-10-CM

## 2024-08-22 DIAGNOSIS — R60.1 ANASARCA: ICD-10-CM

## 2024-08-22 DIAGNOSIS — C67.9 UROTHELIAL CARCINOMA OF BLADDER (HCC): ICD-10-CM

## 2024-08-22 DIAGNOSIS — I10 HYPERTENSION, ESSENTIAL: Primary | ICD-10-CM

## 2024-08-22 DIAGNOSIS — I48.91 ATRIAL FIBRILLATION WITH RAPID VENTRICULAR RESPONSE (HCC): ICD-10-CM

## 2024-08-22 DIAGNOSIS — Z09 HOSPITAL DISCHARGE FOLLOW-UP: ICD-10-CM

## 2024-08-22 DIAGNOSIS — D63.8 ANEMIA DUE TO CHRONIC ILLNESS: ICD-10-CM

## 2024-08-22 PROCEDURE — 3077F SYST BP >= 140 MM HG: CPT | Performed by: INTERNAL MEDICINE

## 2024-08-22 PROCEDURE — G8427 DOCREV CUR MEDS BY ELIG CLIN: HCPCS | Performed by: INTERNAL MEDICINE

## 2024-08-22 PROCEDURE — 1036F TOBACCO NON-USER: CPT | Performed by: INTERNAL MEDICINE

## 2024-08-22 PROCEDURE — 99214 OFFICE O/P EST MOD 30 MIN: CPT | Performed by: INTERNAL MEDICINE

## 2024-08-22 PROCEDURE — 1111F DSCHRG MED/CURRENT MED MERGE: CPT | Performed by: INTERNAL MEDICINE

## 2024-08-22 PROCEDURE — 3079F DIAST BP 80-89 MM HG: CPT | Performed by: INTERNAL MEDICINE

## 2024-08-22 PROCEDURE — G8420 CALC BMI NORM PARAMETERS: HCPCS | Performed by: INTERNAL MEDICINE

## 2024-08-22 PROCEDURE — 3017F COLORECTAL CA SCREEN DOC REV: CPT | Performed by: INTERNAL MEDICINE

## 2024-08-22 RX ORDER — HYDRALAZINE HYDROCHLORIDE 25 MG/1
25 TABLET, FILM COATED ORAL 2 TIMES DAILY
Qty: 60 TABLET | Refills: 3 | Status: SHIPPED | OUTPATIENT
Start: 2024-08-22

## 2024-08-22 RX ORDER — FERROUS SULFATE 325(65) MG
1 TABLET ORAL
COMMUNITY
Start: 2024-08-05

## 2024-08-22 ASSESSMENT — ENCOUNTER SYMPTOMS
ABDOMINAL PAIN: 0
SORE THROAT: 0
NAUSEA: 0
SHORTNESS OF BREATH: 0
COUGH: 0

## 2024-08-22 NOTE — PROGRESS NOTES
Post-Discharge Transitional Care Follow Up      Daja Espinosa   YOB: 1960    Date of Office Visit:  8/22/2024  Date of Hospital Admission: 8/12/24  Date of Hospital Discharge: 8/15/24  Readmission Risk Score (high >=14%. Medium >=10%):Readmission Risk Score: 37.7      Care management risk score Rising risk (score 2-5) and Complex Care (Scores >=6): No Risk Score On File     Non face to face  following discharge, date last encounter closed (first attempt may have been earlier): 08/16/2024     Call initiated 2 business days of discharge: Yes     1.Hypertension, essential.  Blood pressure uncontrolled since discontinuation of lisinopril and hydralazine, amlodipine.  Will resume hydralazine 25 mg twice daily.  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  Resolved.  Renal function at baseline.  Follow-up with your nephrologist  3.Anasarca.  Resolved with IV diuretics.  Currently on spironolactone, Bumex, monitor renal function  4.Atrial fibrillation with rapid ventricular response (HCC) stable.  Anticoagulated with Eliquis.  Rate controlled on Coreg and flecainide  5.Anemia due to chronic illness.  Stable.  Monitoring weekly  6.Urothelial carcinoma of bladder (HCC)  Continue chemotherapy and radiation therapy.  Follows up with heme-onc           Medical Decision Making: high complexity    Return in about 3 months (around 11/22/2024) for HTN, hyperlipidemia.           Subjective:   HPI    Inpatient course: Discharge summary reviewed- see chart.    Interval history/Current status:     Daja presents to office to follow-up for recent hospitalization at [facility] in [place] due to anasarca and worsening renal function.  She has been having progressively worsening swelling in her lower extremities, abdomen and was started on Bumex since oral Lasix was not helping.  Unfortunately she developed worsening in

## 2024-08-23 ENCOUNTER — HOSPITAL ENCOUNTER (OUTPATIENT)
Dept: INFUSION THERAPY | Age: 64
Discharge: HOME | End: 2024-08-23
Payer: COMMERCIAL

## 2024-08-23 ENCOUNTER — CARE COORDINATION (OUTPATIENT)
Dept: CARE COORDINATION | Age: 64
End: 2024-08-23

## 2024-08-23 ENCOUNTER — OFFICE VISIT (OUTPATIENT)
Dept: ONCOLOGY | Age: 64
End: 2024-08-23
Payer: COMMERCIAL

## 2024-08-23 VITALS
HEIGHT: 62 IN | DIASTOLIC BLOOD PRESSURE: 79 MMHG | BODY MASS INDEX: 20.06 KG/M2 | SYSTOLIC BLOOD PRESSURE: 135 MMHG | HEART RATE: 76 BPM | TEMPERATURE: 96.9 F | RESPIRATION RATE: 18 BRPM | WEIGHT: 109 LBS

## 2024-08-23 VITALS
SYSTOLIC BLOOD PRESSURE: 135 MMHG | DIASTOLIC BLOOD PRESSURE: 79 MMHG | TEMPERATURE: 96.9 F | RESPIRATION RATE: 18 BRPM | WEIGHT: 109 LBS | HEART RATE: 98 BPM | BODY MASS INDEX: 19.94 KG/M2

## 2024-08-23 DIAGNOSIS — N18.4 STAGE 4 CHRONIC KIDNEY DISEASE (HCC): ICD-10-CM

## 2024-08-23 DIAGNOSIS — C67.9 MALIGNANT NEOPLASM OF URINARY BLADDER, UNSPECIFIED SITE (HCC): Primary | ICD-10-CM

## 2024-08-23 DIAGNOSIS — T45.1X5D ADVERSE EFFECT OF CHEMOTHERAPY, SUBSEQUENT ENCOUNTER: ICD-10-CM

## 2024-08-23 DIAGNOSIS — C67.9 UROTHELIAL CARCINOMA OF BLADDER (HCC): Primary | ICD-10-CM

## 2024-08-23 DIAGNOSIS — C67.9 MALIGNANT NEOPLASM OF URINARY BLADDER, UNSPECIFIED SITE (HCC): ICD-10-CM

## 2024-08-23 DIAGNOSIS — D68.59 HYPERCOAGULABLE STATE (HCC): ICD-10-CM

## 2024-08-23 DIAGNOSIS — D63.1 ANEMIA DUE TO STAGE 5 CHRONIC KIDNEY DISEASE, NOT ON CHRONIC DIALYSIS (HCC): ICD-10-CM

## 2024-08-23 DIAGNOSIS — C67.9 UROTHELIAL CARCINOMA OF BLADDER (HCC): ICD-10-CM

## 2024-08-23 DIAGNOSIS — N18.5 ANEMIA DUE TO STAGE 5 CHRONIC KIDNEY DISEASE, NOT ON CHRONIC DIALYSIS (HCC): ICD-10-CM

## 2024-08-23 LAB
ALBUMIN SERPL-MCNC: 4.3 G/DL (ref 3.5–5.2)
ALBUMIN/GLOB SERPL: 1.2 {RATIO} (ref 1–2.5)
ALP SERPL-CCNC: 85 U/L (ref 35–104)
ALT SERPL-CCNC: 18 U/L (ref 5–33)
ANION GAP SERPL CALCULATED.3IONS-SCNC: 14 MMOL/L (ref 9–17)
AST SERPL-CCNC: 22 U/L
BASOPHILS # BLD: <0.03 K/UL (ref 0–0.2)
BASOPHILS NFR BLD: 0 % (ref 0–2)
BILIRUB SERPL-MCNC: 0.7 MG/DL (ref 0.3–1.2)
BUN SERPL-MCNC: 76 MG/DL (ref 8–23)
BUN/CREAT SERPL: 38 (ref 9–20)
CALCIUM SERPL-MCNC: 9.9 MG/DL (ref 8.6–10.4)
CHLORIDE SERPL-SCNC: 92 MMOL/L (ref 98–107)
CO2 SERPL-SCNC: 23 MMOL/L (ref 20–31)
CREAT SERPL-MCNC: 2 MG/DL (ref 0.5–0.9)
EOSINOPHIL # BLD: 0.25 K/UL (ref 0–0.44)
EOSINOPHILS RELATIVE PERCENT: 4 % (ref 1–4)
ERYTHROCYTE [DISTWIDTH] IN BLOOD BY AUTOMATED COUNT: 16.2 % (ref 11.8–14.4)
GFR, ESTIMATED: 28 ML/MIN/1.73M2
GLUCOSE SERPL-MCNC: 128 MG/DL (ref 70–99)
HCT VFR BLD AUTO: 25.6 % (ref 36.3–47.1)
HGB BLD-MCNC: 8.8 G/DL (ref 11.9–15.1)
IMM GRANULOCYTES # BLD AUTO: <0.03 K/UL (ref 0–0.3)
IMM GRANULOCYTES NFR BLD: 0 %
LYMPHOCYTES NFR BLD: 0.75 K/UL (ref 1.1–3.7)
LYMPHOCYTES RELATIVE PERCENT: 13 % (ref 24–43)
MCH RBC QN AUTO: 30.9 PG (ref 25.2–33.5)
MCHC RBC AUTO-ENTMCNC: 34.4 G/DL (ref 28.4–34.8)
MCV RBC AUTO: 89.8 FL (ref 82.6–102.9)
MONOCYTES NFR BLD: 0.32 K/UL (ref 0.1–1.2)
MONOCYTES NFR BLD: 6 % (ref 3–12)
NEUTROPHILS NFR BLD: 77 % (ref 36–65)
NEUTS SEG NFR BLD: 4.49 K/UL (ref 1.5–8.1)
NRBC BLD-RTO: 0 PER 100 WBC
PLATELET # BLD AUTO: 460 K/UL (ref 138–453)
PMV BLD AUTO: 9 FL (ref 8.1–13.5)
POTASSIUM SERPL-SCNC: 3.6 MMOL/L (ref 3.7–5.3)
PROT SERPL-MCNC: 7.9 G/DL (ref 6.4–8.3)
RBC # BLD AUTO: 2.85 M/UL (ref 3.95–5.11)
SODIUM SERPL-SCNC: 129 MMOL/L (ref 135–144)
WBC OTHER # BLD: 5.9 K/UL (ref 3.5–11.3)

## 2024-08-23 PROCEDURE — 6360000002 HC RX W HCPCS: Performed by: INTERNAL MEDICINE

## 2024-08-23 PROCEDURE — 3078F DIAST BP <80 MM HG: CPT | Performed by: INTERNAL MEDICINE

## 2024-08-23 PROCEDURE — 36415 COLL VENOUS BLD VENIPUNCTURE: CPT

## 2024-08-23 PROCEDURE — 3075F SYST BP GE 130 - 139MM HG: CPT | Performed by: INTERNAL MEDICINE

## 2024-08-23 PROCEDURE — 3017F COLORECTAL CA SCREEN DOC REV: CPT | Performed by: INTERNAL MEDICINE

## 2024-08-23 PROCEDURE — 1036F TOBACCO NON-USER: CPT | Performed by: INTERNAL MEDICINE

## 2024-08-23 PROCEDURE — 1111F DSCHRG MED/CURRENT MED MERGE: CPT | Performed by: INTERNAL MEDICINE

## 2024-08-23 PROCEDURE — G8420 CALC BMI NORM PARAMETERS: HCPCS | Performed by: INTERNAL MEDICINE

## 2024-08-23 PROCEDURE — 80053 COMPREHEN METABOLIC PANEL: CPT

## 2024-08-23 PROCEDURE — 96413 CHEMO IV INFUSION 1 HR: CPT

## 2024-08-23 PROCEDURE — 2580000003 HC RX 258: Performed by: INTERNAL MEDICINE

## 2024-08-23 PROCEDURE — 96375 TX/PRO/DX INJ NEW DRUG ADDON: CPT

## 2024-08-23 PROCEDURE — 85025 COMPLETE CBC W/AUTO DIFF WBC: CPT

## 2024-08-23 PROCEDURE — 36591 DRAW BLOOD OFF VENOUS DEVICE: CPT

## 2024-08-23 PROCEDURE — G8428 CUR MEDS NOT DOCUMENT: HCPCS | Performed by: INTERNAL MEDICINE

## 2024-08-23 PROCEDURE — 99214 OFFICE O/P EST MOD 30 MIN: CPT | Performed by: INTERNAL MEDICINE

## 2024-08-23 RX ORDER — ONDANSETRON 2 MG/ML
8 INJECTION INTRAMUSCULAR; INTRAVENOUS ONCE
Status: COMPLETED | OUTPATIENT
Start: 2024-08-23 | End: 2024-08-23

## 2024-08-23 RX ORDER — SODIUM CHLORIDE 0.9 % (FLUSH) 0.9 %
5-40 SYRINGE (ML) INJECTION PRN
Status: DISCONTINUED | OUTPATIENT
Start: 2024-08-23 | End: 2024-08-24 | Stop reason: HOSPADM

## 2024-08-23 RX ORDER — SODIUM CHLORIDE 9 MG/ML
5-250 INJECTION, SOLUTION INTRAVENOUS PRN
Status: DISCONTINUED | OUTPATIENT
Start: 2024-08-23 | End: 2024-08-24 | Stop reason: HOSPADM

## 2024-08-23 RX ORDER — HEPARIN 100 UNIT/ML
500 SYRINGE INTRAVENOUS PRN
Status: DISCONTINUED | OUTPATIENT
Start: 2024-08-23 | End: 2024-08-24 | Stop reason: HOSPADM

## 2024-08-23 RX ADMIN — GEMCITABINE HYDROCHLORIDE 40 MG: 200 INJECTION, SOLUTION INTRAVENOUS at 10:48

## 2024-08-23 RX ADMIN — SODIUM CHLORIDE, PRESERVATIVE FREE 10 ML: 5 INJECTION INTRAVENOUS at 11:21

## 2024-08-23 RX ADMIN — HEPARIN 500 UNITS: 100 SYRINGE at 11:21

## 2024-08-23 RX ADMIN — ONDANSETRON 8 MG: 2 INJECTION INTRAMUSCULAR; INTRAVENOUS at 10:24

## 2024-08-23 RX ADMIN — SODIUM CHLORIDE, PRESERVATIVE FREE 10 ML: 5 INJECTION INTRAVENOUS at 09:05

## 2024-08-23 RX ADMIN — SODIUM CHLORIDE, PRESERVATIVE FREE 10 ML: 5 INJECTION INTRAVENOUS at 11:20

## 2024-08-23 RX ADMIN — SODIUM CHLORIDE, PRESERVATIVE FREE 10 ML: 5 INJECTION INTRAVENOUS at 09:06

## 2024-08-23 RX ADMIN — SODIUM CHLORIDE 100 ML/HR: 9 INJECTION, SOLUTION INTRAVENOUS at 10:23

## 2024-08-23 NOTE — PROGRESS NOTES
reviewed with the patient and creatinine rising to 2.2>   Left hydronephrosis > status post cystoscopy and stent  Recent history of acute blood loss anemia/rectal bleed> endoscopy duodenitis and colonoscopy to be done as an outpatient  Normocytic anemia> related to high creatinine and chemotherapy  Acute stroke embolic with mild right side subdural hematoma> on Eliquis and aspirin, left-sided weakness improved and slurred speech resolved   We discussed the case with the  and radiation oncology with recent stroke patient would not be candidate for surgery per  and the plan for concurrent chemo RT, chemotherapy in the form of biweekly gemcitabine 27 mg/m² which started on Johana 10, 2024  Admission to the hospital initially for GI bleed and second for pneumonia pleural effusion and HEAVENLY status post nephrostomy tube and dialysis and status post thoracentesis which came back negative for malignancy> follow-up with  and nephrology  In general prognosis poor with multiple comorbidity and active malignancy/poor tolerance to treatment  Transfuse to hemoglobin above 7  Proceed with chemotherapy twice a week concurrently with radiation  RTC in 3 weeks for last immunotherapy and then will proceed with immunotherapy if no evidence of progression                                         Familia Shepard MD                          [facility]                            This note is created with the assistance of a speech recognition program.  While intending to generate a document that actually reflects the content of the visit, the document can still have some errors including those of syntax and sound a like substitutions which may escape proof reading.  It such instances, actual meaning can be extrapolated by contextual diversion.

## 2024-08-23 NOTE — PLAN OF CARE
Problem: Chronic Conditions and Co-morbidities  Goal: Patient's chronic conditions and co-morbidity symptoms are monitored and maintained or improved  Outcome: Adequate for Discharge     Problem: Chronic Conditions and Co-morbidities  Goal: Patient's chronic conditions and co-morbidity symptoms are monitored and maintained or improved  Outcome: Adequate for Discharge     Problem: Chronic Conditions and Co-morbidities  Goal: Patient's chronic conditions and co-morbidity symptoms are monitored and maintained or improved  Outcome: Adequate for Discharge

## 2024-08-23 NOTE — CARE COORDINATION
Care Transitions Note    Follow Up Call     Patient Current Location:  Home: [address] [address]    Care Transition Nurse contacted the patient by telephone. Verified name and  as identifiers.    Additional needs identified to be addressed with provider   No needs identified                 Method of communication with provider: none.    Care Summary Note: Was able to contact Daja for transitional outreach.  She stated that she was doing \"pretty good\".  She said that her breathing was good and her kidney function was good encough that she could get her chemo treatment.  She said that  will be her last radiation treatment and her chemo will be ending around then also.  She said that she was given more Boost while at the oncology center.  She will be making an appointment with urology after she sees her nephrologist on 9/3.  She is hoping that the urologist will be able to take the nephrostomy tube out.  She did see her PCP and was started on Hydralazine for her elevated BP.  She said that she just picked it up today and has not started it yet.  She said her BP this morning in HRS was before she took any of her medications  She said that at the cancer center it was 135/79 and that was after she took her am medications.   She had no further question or concerns. She did forget to ask the oncologist about the lump in her groin but did mentioned it to her radiologist oncologist.    Plan of care updates since last contact:  Vitals, weight and lab work       Advance Care Planning:   Does patient have an Advance Directive: reviewed during previous call, see note. .    Medication Review:  Medications changed since last call, reviewed today.     Remote Patient Monitoring:  Offered patient enrollment in the Remote Patient Monitoring (RPM) program for in-home monitoring: Yes, patient enrolled; current status is activated and monitoring.    Assessments:  Care Transitions Subsequent and Final Call    Subsequent

## 2024-08-26 ENCOUNTER — HOSPITAL ENCOUNTER (OUTPATIENT)
Dept: INFUSION THERAPY | Age: 64
Discharge: HOME | End: 2024-08-26
Payer: COMMERCIAL

## 2024-08-26 ENCOUNTER — CARE COORDINATION (OUTPATIENT)
Dept: CASE MANAGEMENT | Age: 64
End: 2024-08-26

## 2024-08-26 VITALS
HEART RATE: 77 BPM | RESPIRATION RATE: 18 BRPM | TEMPERATURE: 97.2 F | HEIGHT: 62 IN | DIASTOLIC BLOOD PRESSURE: 83 MMHG | SYSTOLIC BLOOD PRESSURE: 150 MMHG | BODY MASS INDEX: 19.88 KG/M2 | WEIGHT: 108 LBS

## 2024-08-26 DIAGNOSIS — C67.9 MALIGNANT NEOPLASM OF URINARY BLADDER, UNSPECIFIED SITE (HCC): ICD-10-CM

## 2024-08-26 DIAGNOSIS — C67.9 UROTHELIAL CARCINOMA OF BLADDER (HCC): Primary | ICD-10-CM

## 2024-08-26 LAB
ALBUMIN SERPL-MCNC: 4 G/DL (ref 3.5–5.2)
ALBUMIN/GLOB SERPL: 1.1 {RATIO} (ref 1–2.5)
ALP SERPL-CCNC: 84 U/L (ref 35–104)
ALT SERPL-CCNC: 12 U/L (ref 5–33)
ANION GAP SERPL CALCULATED.3IONS-SCNC: 14 MMOL/L (ref 9–17)
AST SERPL-CCNC: 19 U/L
BASOPHILS # BLD: 0.03 K/UL (ref 0–0.2)
BASOPHILS NFR BLD: 1 % (ref 0–2)
BILIRUB SERPL-MCNC: 0.7 MG/DL (ref 0.3–1.2)
BUN SERPL-MCNC: 79 MG/DL (ref 8–23)
BUN/CREAT SERPL: 28 (ref 9–20)
CALCIUM SERPL-MCNC: 9.5 MG/DL (ref 8.6–10.4)
CHLORIDE SERPL-SCNC: 92 MMOL/L (ref 98–107)
CO2 SERPL-SCNC: 23 MMOL/L (ref 20–31)
CREAT SERPL-MCNC: 2.8 MG/DL (ref 0.5–0.9)
EOSINOPHIL # BLD: 0.34 K/UL (ref 0–0.44)
EOSINOPHILS RELATIVE PERCENT: 6 % (ref 1–4)
ERYTHROCYTE [DISTWIDTH] IN BLOOD BY AUTOMATED COUNT: 16.3 % (ref 11.8–14.4)
GFR, ESTIMATED: 19 ML/MIN/1.73M2
GLUCOSE SERPL-MCNC: 126 MG/DL (ref 70–99)
HCT VFR BLD AUTO: 24.1 % (ref 36.3–47.1)
HGB BLD-MCNC: 8.4 G/DL (ref 11.9–15.1)
IMM GRANULOCYTES # BLD AUTO: <0.03 K/UL (ref 0–0.3)
IMM GRANULOCYTES NFR BLD: 0 %
LYMPHOCYTES NFR BLD: 0.69 K/UL (ref 1.1–3.7)
LYMPHOCYTES RELATIVE PERCENT: 11 % (ref 24–43)
MCH RBC QN AUTO: 31.6 PG (ref 25.2–33.5)
MCHC RBC AUTO-ENTMCNC: 34.9 G/DL (ref 28.4–34.8)
MCV RBC AUTO: 90.6 FL (ref 82.6–102.9)
MONOCYTES NFR BLD: 0.38 K/UL (ref 0.1–1.2)
MONOCYTES NFR BLD: 6 % (ref 3–12)
NEUTROPHILS NFR BLD: 76 % (ref 36–65)
NEUTS SEG NFR BLD: 4.77 K/UL (ref 1.5–8.1)
NRBC BLD-RTO: 0 PER 100 WBC
PLATELET # BLD AUTO: 382 K/UL (ref 138–453)
PMV BLD AUTO: 8.9 FL (ref 8.1–13.5)
POTASSIUM SERPL-SCNC: 3.2 MMOL/L (ref 3.7–5.3)
PROT SERPL-MCNC: 7.5 G/DL (ref 6.4–8.3)
RBC # BLD AUTO: 2.66 M/UL (ref 3.95–5.11)
SODIUM SERPL-SCNC: 129 MMOL/L (ref 135–144)
WBC OTHER # BLD: 6.2 K/UL (ref 3.5–11.3)

## 2024-08-26 PROCEDURE — 36591 DRAW BLOOD OFF VENOUS DEVICE: CPT

## 2024-08-26 PROCEDURE — 85025 COMPLETE CBC W/AUTO DIFF WBC: CPT

## 2024-08-26 PROCEDURE — 2580000003 HC RX 258: Performed by: INTERNAL MEDICINE

## 2024-08-26 PROCEDURE — 6370000000 HC RX 637 (ALT 250 FOR IP): Performed by: INTERNAL MEDICINE

## 2024-08-26 PROCEDURE — 96413 CHEMO IV INFUSION 1 HR: CPT

## 2024-08-26 PROCEDURE — 96375 TX/PRO/DX INJ NEW DRUG ADDON: CPT

## 2024-08-26 PROCEDURE — 80053 COMPREHEN METABOLIC PANEL: CPT

## 2024-08-26 PROCEDURE — 6360000002 HC RX W HCPCS: Performed by: INTERNAL MEDICINE

## 2024-08-26 RX ORDER — HEPARIN 100 UNIT/ML
500 SYRINGE INTRAVENOUS PRN
Status: DISCONTINUED | OUTPATIENT
Start: 2024-08-26 | End: 2024-08-27 | Stop reason: HOSPADM

## 2024-08-26 RX ORDER — POTASSIUM CHLORIDE 1500 MG/1
40 TABLET, EXTENDED RELEASE ORAL ONCE
Status: COMPLETED | OUTPATIENT
Start: 2024-08-26 | End: 2024-08-26

## 2024-08-26 RX ORDER — SODIUM CHLORIDE 0.9 % (FLUSH) 0.9 %
5-40 SYRINGE (ML) INJECTION PRN
Status: DISCONTINUED | OUTPATIENT
Start: 2024-08-26 | End: 2024-08-27 | Stop reason: HOSPADM

## 2024-08-26 RX ORDER — ONDANSETRON 2 MG/ML
8 INJECTION INTRAMUSCULAR; INTRAVENOUS ONCE
Status: COMPLETED | OUTPATIENT
Start: 2024-08-26 | End: 2024-08-26

## 2024-08-26 RX ORDER — SODIUM CHLORIDE 9 MG/ML
5-250 INJECTION, SOLUTION INTRAVENOUS PRN
Status: DISCONTINUED | OUTPATIENT
Start: 2024-08-26 | End: 2024-08-27 | Stop reason: HOSPADM

## 2024-08-26 RX ADMIN — GEMCITABINE HYDROCHLORIDE 40 MG: 200 INJECTION, SOLUTION INTRAVENOUS at 10:01

## 2024-08-26 RX ADMIN — ONDANSETRON 8 MG: 2 INJECTION INTRAMUSCULAR; INTRAVENOUS at 09:38

## 2024-08-26 RX ADMIN — POTASSIUM CHLORIDE 40 MEQ: 1500 TABLET, EXTENDED RELEASE ORAL at 09:37

## 2024-08-26 RX ADMIN — HEPARIN 500 UNITS: 100 SYRINGE at 10:39

## 2024-08-26 RX ADMIN — SODIUM CHLORIDE, PRESERVATIVE FREE 10 ML: 5 INJECTION INTRAVENOUS at 10:38

## 2024-08-26 RX ADMIN — SODIUM CHLORIDE 100 ML/HR: 9 INJECTION, SOLUTION INTRAVENOUS at 09:34

## 2024-08-26 RX ADMIN — SODIUM CHLORIDE, PRESERVATIVE FREE 10 ML: 5 INJECTION INTRAVENOUS at 08:09

## 2024-08-26 RX ADMIN — SODIUM CHLORIDE, PRESERVATIVE FREE 10 ML: 5 INJECTION INTRAVENOUS at 08:10

## 2024-08-26 RX ADMIN — SODIUM CHLORIDE, PRESERVATIVE FREE 10 ML: 5 INJECTION INTRAVENOUS at 10:39

## 2024-08-26 NOTE — CARE COORDINATION
Remote Patient Monitoring Note  Date/Time:  2024 3:41 PM  Patient Current Location: Home: [address] [place] [address]  LPN contacted patient by telephone regarding red alert received for blood pressure reading (184/99). Verified patients name and  as identifiers.  Background: enrolled in [facility] for Pneumonia   Clinical Interventions: Reviewed and followed up on alerts and treatments-SPOKE TO OTILIA. Pt reports she is lying down . She had chemo today and will check her metrics again later.     Plan/Follow Up: Will continue to review, monitor and address alerts with follow up based on severity of symptoms and risk factors.

## 2024-08-26 NOTE — CARE COORDINATION
-Remote Alert Monitoring Note      Date/Time:  2024 8:24 AM  Patient Current Location: [place]  Verified patients name and  as identifiers.    Rpm alert to be reviewed by the provider   red alert  blood pressure reading (184/99)  Vitals Recheck n/a  Additional needs to be addressed by provider: No                   LPN contacted patient by telephone regarding red alert received   Background: enrolled in RPM for pneumonia  Refer to 911 immediately if:  Patient unresponsive or unable to provide history  Change in cognition or sudden confusion  Patient unable to respond in complete sentences  Intense chest pain/tightness  Any concern for any clinical emergency  Red Alert: Provider response time of 1 hr required for any red alert requiring intervention  Yellow Alert: Provider response time of 3hr required for any escalated yellow alert  Patient Chief Complaint:  BP Triage  Are you having any Chest Pain? no   Are you having any Shortness of Breath? no   Do you have a headache or have any vision changes? no   Are you having any numbness or tingling? no   Are you having any other health concerns or issues? no     Clinical Interventions: Reviewed and followed up on alerts and treatments-Spoke to Daja regarding RPM red alert for high BP. Denies chest pain, dyspnea, numbness tingling or headache. Pt states \" I feel really good\". Reviewed medications. Pt is taking Apresoline and Coreg as directed.   She had to check her metrics less than one hour after taking medications. She has an appt this morning for an infusion,.  Daja will recheck her metrics later today when she returns home.   Plan/Follow Up: Will continue to review, monitor and address alerts with follow up based on severity of symptoms and risk factors.  **For any new or worsening symptoms or you are concerned in anyway, please contact your Provider or report to the nearest Emergency Room.**

## 2024-08-28 ENCOUNTER — CARE COORDINATION (OUTPATIENT)
Dept: CARE COORDINATION | Age: 64
End: 2024-08-28

## 2024-08-28 NOTE — CARE COORDINATION
Care Transitions Note    Follow Up Call     Patient Current Location:  Home: [address] [address]    [facility] Care Coordinator contacted the patient by telephone. Verified name and  as identifiers.    Additional needs identified to be addressed with provider   No needs identified                 Method of communication with provider: none.    Care Summary Note: Writer spoke with Daja for a follow up care transitions call. She states she is doing okay but feels a little run down and nauseous today and thinks she may have \"picked up a bug\". She denies having fever.chills or vomiting. She states she is not having any edema or SOB. RPM vitals reviewed-BP has been elevated in the morning but goes down when rechecked. She denies having any new needs or concerns at this time. Discussed to call PCP if her nausea/fatigue gets worse or of she develops any new symptoms.     Plan of care updates since last contact:  Review of patient management of conditions/medications:         Advance Care Planning:   Does patient have an Advance Directive:  not on file. .    Medication Review:  No changes since last call.     Remote Patient Monitoring:  Offered patient enrollment in the Remote Patient Monitoring (RPM) program for in-home monitoring: Yes, patient enrolled; current status is activated and monitoring.    Assessments:  Care Transitions Subsequent and Final Call    Subsequent and Final Calls  Do you have any ongoing symptoms?: No  Have your medications changed?: No  Do you have any questions related to your medications?: No  Do you currently have any active services?: No  Do you have any needs or concerns that I can assist you with?: No  Identified Barriers: None  Care Transitions Interventions  Other Interventions:              Follow Up Appointment:   Reviewed upcoming appointment(s). and MARITZA appointment attended as scheduled   Future Appointments         Provider Specialty Dept Phone    2024 9:00 AM SCHEDULE,  [facility] MED ONC ROOM 3 Infusion Therapy [phone]    9/3/2024 9:00 AM SCHEDULE, [facility] MED ONC ROOM 2 Infusion Therapy [phone]    9/3/2024 1:00 PM Nicole Chan MD Nephrology [phone]    9/6/2024 9:00 AM SCHEDULE, [facility] MED ONC ROOM 5 Infusion Therapy [phone]    9/12/2024 10:45 AM Familia Shepard MD Oncology [phone]    2/11/2025 11:00 AM Terrell Cortez MD Cardiology [phone]            N Care Coordinator provided contact information.  Plan for follow-up call in 6-10 days based on severity of symptoms and risk factors.  Plan for next call:  how is nausea/fatigue? Review RPM vitals BP still elevated in AM? Hoe is edema?       Dagmar Cunningham LPN

## 2024-08-29 ENCOUNTER — HOSPITAL ENCOUNTER (OUTPATIENT)
Dept: INFUSION THERAPY | Age: 64
Discharge: HOME | End: 2024-08-29
Payer: COMMERCIAL

## 2024-08-29 VITALS
HEIGHT: 62 IN | DIASTOLIC BLOOD PRESSURE: 74 MMHG | WEIGHT: 108 LBS | HEART RATE: 64 BPM | BODY MASS INDEX: 19.88 KG/M2 | RESPIRATION RATE: 18 BRPM | SYSTOLIC BLOOD PRESSURE: 127 MMHG | TEMPERATURE: 97.6 F

## 2024-08-29 DIAGNOSIS — C67.9 MALIGNANT NEOPLASM OF URINARY BLADDER, UNSPECIFIED SITE (HCC): ICD-10-CM

## 2024-08-29 DIAGNOSIS — C68.9 TRANSITIONAL CELL CARCINOMA (HCC): ICD-10-CM

## 2024-08-29 DIAGNOSIS — C67.9 UROTHELIAL CARCINOMA OF BLADDER (HCC): Primary | ICD-10-CM

## 2024-08-29 LAB
ALBUMIN SERPL-MCNC: 3.8 G/DL (ref 3.5–5.2)
ALBUMIN/GLOB SERPL: 1.3 {RATIO} (ref 1–2.5)
ALP SERPL-CCNC: 79 U/L (ref 35–104)
ALT SERPL-CCNC: 12 U/L (ref 10–35)
ANION GAP SERPL CALCULATED.3IONS-SCNC: 15 MMOL/L (ref 9–16)
AST SERPL-CCNC: 19 U/L (ref 10–35)
BASOPHILS # BLD: <0.03 K/UL (ref 0–0.2)
BASOPHILS NFR BLD: 0 % (ref 0–2)
BILIRUB SERPL-MCNC: 0.8 MG/DL (ref 0–1.2)
BUN SERPL-MCNC: 86 MG/DL (ref 8–23)
BUN/CREAT SERPL: 21 (ref 9–20)
CALCIUM SERPL-MCNC: 9.4 MG/DL (ref 8.6–10.4)
CHLORIDE SERPL-SCNC: 90 MMOL/L (ref 98–107)
CO2 SERPL-SCNC: 21 MMOL/L (ref 20–31)
CREAT SERPL-MCNC: 4.1 MG/DL (ref 0.5–0.9)
EOSINOPHIL # BLD: 0.4 K/UL (ref 0–0.44)
EOSINOPHILS RELATIVE PERCENT: 5 % (ref 1–4)
ERYTHROCYTE [DISTWIDTH] IN BLOOD BY AUTOMATED COUNT: 16.5 % (ref 11.8–14.4)
GFR, ESTIMATED: 12 ML/MIN/1.73M2
GLUCOSE SERPL-MCNC: 122 MG/DL (ref 74–99)
HCT VFR BLD AUTO: 22.7 % (ref 36.3–47.1)
HGB BLD-MCNC: 7.8 G/DL (ref 11.9–15.1)
IMM GRANULOCYTES # BLD AUTO: 0.03 K/UL (ref 0–0.3)
IMM GRANULOCYTES NFR BLD: 0 %
LYMPHOCYTES NFR BLD: 0.68 K/UL (ref 1.1–3.7)
LYMPHOCYTES RELATIVE PERCENT: 9 % (ref 24–43)
MCH RBC QN AUTO: 30.6 PG (ref 25.2–33.5)
MCHC RBC AUTO-ENTMCNC: 34.4 G/DL (ref 28.4–34.8)
MCV RBC AUTO: 89 FL (ref 82.6–102.9)
MONOCYTES NFR BLD: 0.38 K/UL (ref 0.1–1.2)
MONOCYTES NFR BLD: 5 % (ref 3–12)
NEUTROPHILS NFR BLD: 81 % (ref 36–65)
NEUTS SEG NFR BLD: 6.12 K/UL (ref 1.5–8.1)
NRBC BLD-RTO: 0 PER 100 WBC
PLATELET # BLD AUTO: 348 K/UL (ref 138–453)
PMV BLD AUTO: 9 FL (ref 8.1–13.5)
POTASSIUM SERPL-SCNC: 3.7 MMOL/L (ref 3.7–5.3)
PROT SERPL-MCNC: 6.9 G/DL (ref 6.6–8.7)
RBC # BLD AUTO: 2.55 M/UL (ref 3.95–5.11)
SODIUM SERPL-SCNC: 126 MMOL/L (ref 136–145)
WBC OTHER # BLD: 7.6 K/UL (ref 3.5–11.3)

## 2024-08-29 PROCEDURE — 2580000003 HC RX 258: Performed by: INTERNAL MEDICINE

## 2024-08-29 PROCEDURE — 85025 COMPLETE CBC W/AUTO DIFF WBC: CPT

## 2024-08-29 PROCEDURE — 36591 DRAW BLOOD OFF VENOUS DEVICE: CPT

## 2024-08-29 PROCEDURE — 96374 THER/PROPH/DIAG INJ IV PUSH: CPT

## 2024-08-29 PROCEDURE — 80053 COMPREHEN METABOLIC PANEL: CPT

## 2024-08-29 PROCEDURE — 6360000002 HC RX W HCPCS: Performed by: INTERNAL MEDICINE

## 2024-08-29 RX ORDER — SODIUM CHLORIDE 0.9 % (FLUSH) 0.9 %
5-40 SYRINGE (ML) INJECTION PRN
OUTPATIENT
Start: 2024-09-03

## 2024-08-29 RX ORDER — EPINEPHRINE 1 MG/ML
0.3 INJECTION, SOLUTION, CONCENTRATE INTRAVENOUS PRN
OUTPATIENT
Start: 2024-09-03

## 2024-08-29 RX ORDER — ACETAMINOPHEN 325 MG/1
650 TABLET ORAL
OUTPATIENT
Start: 2024-09-06

## 2024-08-29 RX ORDER — DEXTROSE MONOHYDRATE 50 MG/ML
5-250 INJECTION, SOLUTION INTRAVENOUS PRN
OUTPATIENT
Start: 2024-09-06

## 2024-08-29 RX ORDER — ONDANSETRON 2 MG/ML
8 INJECTION INTRAMUSCULAR; INTRAVENOUS ONCE
Status: COMPLETED | OUTPATIENT
Start: 2024-08-29 | End: 2024-08-29

## 2024-08-29 RX ORDER — SODIUM CHLORIDE 0.9 % (FLUSH) 0.9 %
5-40 SYRINGE (ML) INJECTION PRN
OUTPATIENT
Start: 2024-09-06

## 2024-08-29 RX ORDER — SODIUM CHLORIDE 9 MG/ML
5-250 INJECTION, SOLUTION INTRAVENOUS PRN
OUTPATIENT
Start: 2024-09-03

## 2024-08-29 RX ORDER — ONDANSETRON 2 MG/ML
8 INJECTION INTRAMUSCULAR; INTRAVENOUS
OUTPATIENT
Start: 2024-09-03

## 2024-08-29 RX ORDER — HEPARIN 100 UNIT/ML
500 SYRINGE INTRAVENOUS PRN
Status: DISCONTINUED | OUTPATIENT
Start: 2024-08-29 | End: 2024-08-30 | Stop reason: HOSPADM

## 2024-08-29 RX ORDER — SODIUM CHLORIDE 0.9 % (FLUSH) 0.9 %
5-40 SYRINGE (ML) INJECTION PRN
Status: DISCONTINUED | OUTPATIENT
Start: 2024-08-29 | End: 2024-08-30 | Stop reason: HOSPADM

## 2024-08-29 RX ORDER — HEPARIN 100 UNIT/ML
500 SYRINGE INTRAVENOUS PRN
OUTPATIENT
Start: 2024-09-03

## 2024-08-29 RX ORDER — SODIUM CHLORIDE 9 MG/ML
INJECTION, SOLUTION INTRAVENOUS CONTINUOUS
OUTPATIENT
Start: 2024-09-06

## 2024-08-29 RX ORDER — ONDANSETRON 2 MG/ML
8 INJECTION INTRAMUSCULAR; INTRAVENOUS ONCE
OUTPATIENT
Start: 2024-09-06 | End: 2024-09-06

## 2024-08-29 RX ORDER — SODIUM CHLORIDE 9 MG/ML
5-250 INJECTION, SOLUTION INTRAVENOUS PRN
OUTPATIENT
Start: 2024-09-06

## 2024-08-29 RX ORDER — SODIUM CHLORIDE 9 MG/ML
INJECTION, SOLUTION INTRAVENOUS CONTINUOUS
OUTPATIENT
Start: 2024-09-03

## 2024-08-29 RX ORDER — HEPARIN 100 UNIT/ML
500 SYRINGE INTRAVENOUS PRN
OUTPATIENT
Start: 2024-08-29

## 2024-08-29 RX ORDER — ALBUTEROL SULFATE 90 UG/1
4 AEROSOL, METERED RESPIRATORY (INHALATION) PRN
OUTPATIENT
Start: 2024-09-03

## 2024-08-29 RX ORDER — ONDANSETRON 2 MG/ML
8 INJECTION INTRAMUSCULAR; INTRAVENOUS ONCE
OUTPATIENT
Start: 2024-09-03 | End: 2024-09-03

## 2024-08-29 RX ORDER — DIPHENHYDRAMINE HYDROCHLORIDE 50 MG/ML
50 INJECTION INTRAMUSCULAR; INTRAVENOUS
OUTPATIENT
Start: 2024-09-06

## 2024-08-29 RX ORDER — ACETAMINOPHEN 325 MG/1
650 TABLET ORAL
OUTPATIENT
Start: 2024-09-03

## 2024-08-29 RX ORDER — EPINEPHRINE 1 MG/ML
0.3 INJECTION, SOLUTION, CONCENTRATE INTRAVENOUS PRN
OUTPATIENT
Start: 2024-09-06

## 2024-08-29 RX ORDER — ONDANSETRON 2 MG/ML
8 INJECTION INTRAMUSCULAR; INTRAVENOUS
OUTPATIENT
Start: 2024-09-06

## 2024-08-29 RX ORDER — SODIUM CHLORIDE 0.9 % (FLUSH) 0.9 %
5-40 SYRINGE (ML) INJECTION PRN
OUTPATIENT
Start: 2024-08-29

## 2024-08-29 RX ORDER — HEPARIN 100 UNIT/ML
500 SYRINGE INTRAVENOUS PRN
OUTPATIENT
Start: 2024-09-06

## 2024-08-29 RX ORDER — DIPHENHYDRAMINE HYDROCHLORIDE 50 MG/ML
50 INJECTION INTRAMUSCULAR; INTRAVENOUS
OUTPATIENT
Start: 2024-09-03

## 2024-08-29 RX ORDER — ALBUTEROL SULFATE 90 UG/1
4 AEROSOL, METERED RESPIRATORY (INHALATION) PRN
OUTPATIENT
Start: 2024-09-06

## 2024-08-29 RX ORDER — FAMOTIDINE 10 MG/ML
20 INJECTION, SOLUTION INTRAVENOUS
OUTPATIENT
Start: 2024-09-03

## 2024-08-29 RX ORDER — DEXTROSE MONOHYDRATE 50 MG/ML
5-250 INJECTION, SOLUTION INTRAVENOUS PRN
OUTPATIENT
Start: 2024-09-03

## 2024-08-29 RX ORDER — FAMOTIDINE 10 MG/ML
20 INJECTION, SOLUTION INTRAVENOUS
OUTPATIENT
Start: 2024-09-06

## 2024-08-29 RX ADMIN — SODIUM CHLORIDE, PRESERVATIVE FREE 10 ML: 5 INJECTION INTRAVENOUS at 09:16

## 2024-08-29 RX ADMIN — SODIUM CHLORIDE, PRESERVATIVE FREE 10 ML: 5 INJECTION INTRAVENOUS at 09:17

## 2024-08-29 RX ADMIN — SODIUM CHLORIDE, PRESERVATIVE FREE 10 ML: 5 INJECTION INTRAVENOUS at 10:06

## 2024-08-29 RX ADMIN — SODIUM CHLORIDE, PRESERVATIVE FREE 10 ML: 5 INJECTION INTRAVENOUS at 10:05

## 2024-08-29 RX ADMIN — HEPARIN 500 UNITS: 100 SYRINGE at 10:06

## 2024-08-29 RX ADMIN — ONDANSETRON 8 MG: 2 INJECTION INTRAMUSCULAR; INTRAVENOUS at 10:02

## 2024-08-29 NOTE — FLOWSHEET NOTE
Lab results reviewed with Dr. Shepard. Notified that pt complains of feeling terrible. Orders to hold chemo today. Orders received for zofran.

## 2024-09-03 ENCOUNTER — HOSPITAL ENCOUNTER (OUTPATIENT)
Dept: INFUSION THERAPY | Age: 64
Discharge: HOME | End: 2024-09-03
Payer: COMMERCIAL

## 2024-09-03 VITALS
TEMPERATURE: 96.9 F | WEIGHT: 106 LBS | SYSTOLIC BLOOD PRESSURE: 140 MMHG | HEART RATE: 76 BPM | RESPIRATION RATE: 18 BRPM | BODY MASS INDEX: 19.51 KG/M2 | HEIGHT: 62 IN | DIASTOLIC BLOOD PRESSURE: 81 MMHG

## 2024-09-03 DIAGNOSIS — C67.9 UROTHELIAL CARCINOMA OF BLADDER (HCC): Primary | ICD-10-CM

## 2024-09-03 DIAGNOSIS — C67.9 MALIGNANT NEOPLASM OF URINARY BLADDER, UNSPECIFIED SITE (HCC): ICD-10-CM

## 2024-09-03 DIAGNOSIS — C68.9 TRANSITIONAL CELL CARCINOMA (HCC): ICD-10-CM

## 2024-09-03 LAB
ALBUMIN SERPL-MCNC: 4.2 G/DL (ref 3.5–5.2)
ALBUMIN/GLOB SERPL: 1.3 {RATIO} (ref 1–2.5)
ALP SERPL-CCNC: 77 U/L (ref 35–104)
ALT SERPL-CCNC: 16 U/L (ref 10–35)
ANION GAP SERPL CALCULATED.3IONS-SCNC: 15 MMOL/L (ref 9–16)
AST SERPL-CCNC: 22 U/L (ref 10–35)
BASOPHILS # BLD: <0.03 K/UL (ref 0–0.2)
BASOPHILS NFR BLD: 0 % (ref 0–2)
BILIRUB SERPL-MCNC: 0.7 MG/DL (ref 0–1.2)
BUN SERPL-MCNC: 81 MG/DL (ref 8–23)
BUN/CREAT SERPL: 25 (ref 9–20)
CALCIUM SERPL-MCNC: 9.5 MG/DL (ref 8.6–10.4)
CHLORIDE SERPL-SCNC: 92 MMOL/L (ref 98–107)
CO2 SERPL-SCNC: 22 MMOL/L (ref 20–31)
CREAT SERPL-MCNC: 3.3 MG/DL (ref 0.5–0.9)
EOSINOPHIL # BLD: 0.4 K/UL (ref 0–0.44)
EOSINOPHILS RELATIVE PERCENT: 5 % (ref 1–4)
ERYTHROCYTE [DISTWIDTH] IN BLOOD BY AUTOMATED COUNT: 17.2 % (ref 11.8–14.4)
GFR, ESTIMATED: 15 ML/MIN/1.73M2
GLUCOSE SERPL-MCNC: 110 MG/DL (ref 74–99)
HCT VFR BLD AUTO: 24.6 % (ref 36.3–47.1)
HGB BLD-MCNC: 8.5 G/DL (ref 11.9–15.1)
IMM GRANULOCYTES # BLD AUTO: 0.04 K/UL (ref 0–0.3)
IMM GRANULOCYTES NFR BLD: 1 %
LYMPHOCYTES NFR BLD: 0.74 K/UL (ref 1.1–3.7)
LYMPHOCYTES RELATIVE PERCENT: 9 % (ref 24–43)
MCH RBC QN AUTO: 30.8 PG (ref 25.2–33.5)
MCHC RBC AUTO-ENTMCNC: 34.6 G/DL (ref 28.4–34.8)
MCV RBC AUTO: 89.1 FL (ref 82.6–102.9)
MONOCYTES NFR BLD: 0.59 K/UL (ref 0.1–1.2)
MONOCYTES NFR BLD: 7 % (ref 3–12)
NEUTROPHILS NFR BLD: 78 % (ref 36–65)
NEUTS SEG NFR BLD: 6.2 K/UL (ref 1.5–8.1)
NRBC BLD-RTO: 0 PER 100 WBC
PLATELET # BLD AUTO: 345 K/UL (ref 138–453)
PMV BLD AUTO: 9.1 FL (ref 8.1–13.5)
POTASSIUM SERPL-SCNC: 3.3 MMOL/L (ref 3.7–5.3)
PROT SERPL-MCNC: 7.4 G/DL (ref 6.6–8.7)
RBC # BLD AUTO: 2.76 M/UL (ref 3.95–5.11)
SODIUM SERPL-SCNC: 129 MMOL/L (ref 136–145)
WBC OTHER # BLD: 8 K/UL (ref 3.5–11.3)

## 2024-09-03 PROCEDURE — 2580000003 HC RX 258: Performed by: INTERNAL MEDICINE

## 2024-09-03 PROCEDURE — 6370000000 HC RX 637 (ALT 250 FOR IP): Performed by: INTERNAL MEDICINE

## 2024-09-03 PROCEDURE — 96413 CHEMO IV INFUSION 1 HR: CPT

## 2024-09-03 PROCEDURE — 96375 TX/PRO/DX INJ NEW DRUG ADDON: CPT

## 2024-09-03 PROCEDURE — 85025 COMPLETE CBC W/AUTO DIFF WBC: CPT

## 2024-09-03 PROCEDURE — 80053 COMPREHEN METABOLIC PANEL: CPT

## 2024-09-03 PROCEDURE — 36591 DRAW BLOOD OFF VENOUS DEVICE: CPT

## 2024-09-03 PROCEDURE — 6360000002 HC RX W HCPCS: Performed by: INTERNAL MEDICINE

## 2024-09-03 RX ORDER — HEPARIN 100 UNIT/ML
500 SYRINGE INTRAVENOUS PRN
Status: DISCONTINUED | OUTPATIENT
Start: 2024-09-03 | End: 2024-09-04 | Stop reason: HOSPADM

## 2024-09-03 RX ORDER — DEXTROSE MONOHYDRATE 50 MG/ML
5-250 INJECTION, SOLUTION INTRAVENOUS PRN
Status: DISCONTINUED | OUTPATIENT
Start: 2024-09-03 | End: 2024-09-04 | Stop reason: HOSPADM

## 2024-09-03 RX ORDER — APIXABAN 5 MG/1
5 TABLET, FILM COATED ORAL 2 TIMES DAILY
Qty: 60 TABLET | Refills: 1 | OUTPATIENT
Start: 2024-09-03

## 2024-09-03 RX ORDER — SODIUM CHLORIDE 0.9 % (FLUSH) 0.9 %
5-40 SYRINGE (ML) INJECTION PRN
Status: CANCELLED | OUTPATIENT
Start: 2024-09-03

## 2024-09-03 RX ORDER — ONDANSETRON 2 MG/ML
8 INJECTION INTRAMUSCULAR; INTRAVENOUS ONCE
Status: COMPLETED | OUTPATIENT
Start: 2024-09-03 | End: 2024-09-03

## 2024-09-03 RX ORDER — SODIUM CHLORIDE 0.9 % (FLUSH) 0.9 %
5-40 SYRINGE (ML) INJECTION PRN
Status: DISCONTINUED | OUTPATIENT
Start: 2024-09-03 | End: 2024-09-04 | Stop reason: HOSPADM

## 2024-09-03 RX ORDER — POTASSIUM CHLORIDE 1500 MG/1
40 TABLET, EXTENDED RELEASE ORAL ONCE
Status: COMPLETED | OUTPATIENT
Start: 2024-09-03 | End: 2024-09-03

## 2024-09-03 RX ORDER — APIXABAN 2.5 MG/1
2.5 TABLET, FILM COATED ORAL 2 TIMES DAILY
Qty: 60 TABLET | Refills: 1 | Status: SHIPPED | OUTPATIENT
Start: 2024-09-03 | End: 2024-12-02

## 2024-09-03 RX ORDER — HEPARIN 100 UNIT/ML
500 SYRINGE INTRAVENOUS PRN
Status: CANCELLED | OUTPATIENT
Start: 2024-09-03

## 2024-09-03 RX ORDER — SODIUM CHLORIDE 9 MG/ML
5-250 INJECTION, SOLUTION INTRAVENOUS PRN
Status: DISCONTINUED | OUTPATIENT
Start: 2024-09-03 | End: 2024-09-04 | Stop reason: HOSPADM

## 2024-09-03 RX ADMIN — SODIUM CHLORIDE 200 ML/HR: 9 INJECTION, SOLUTION INTRAVENOUS at 10:00

## 2024-09-03 RX ADMIN — SODIUM CHLORIDE, PRESERVATIVE FREE 10 ML: 5 INJECTION INTRAVENOUS at 11:22

## 2024-09-03 RX ADMIN — ONDANSETRON 8 MG: 2 INJECTION INTRAMUSCULAR; INTRAVENOUS at 09:59

## 2024-09-03 RX ADMIN — POTASSIUM CHLORIDE 40 MEQ: 1500 TABLET, EXTENDED RELEASE ORAL at 09:58

## 2024-09-03 RX ADMIN — SODIUM CHLORIDE, PRESERVATIVE FREE 10 ML: 5 INJECTION INTRAVENOUS at 09:20

## 2024-09-03 RX ADMIN — SODIUM CHLORIDE, PRESERVATIVE FREE 10 ML: 5 INJECTION INTRAVENOUS at 09:21

## 2024-09-03 RX ADMIN — HEPARIN 500 UNITS: 100 SYRINGE at 11:23

## 2024-09-03 RX ADMIN — SODIUM CHLORIDE, PRESERVATIVE FREE 10 ML: 5 INJECTION INTRAVENOUS at 11:23

## 2024-09-03 RX ADMIN — GEMCITABINE HYDROCHLORIDE 40 MG: 200 INJECTION, SOLUTION INTRAVENOUS at 10:46

## 2024-09-03 NOTE — PROGRESS NOTES
Pt here for Gemzar treatment. Labs drawn as ordered. Upon labs being resulted Creatinine noted to be 3.3 was 4.1 last week. Message sent to Dr. Shepard to see if it is ok to treat. Received message back, yes proceed with treatment. No other orders at this time.

## 2024-09-04 ENCOUNTER — CARE COORDINATION (OUTPATIENT)
Dept: CASE MANAGEMENT | Age: 64
End: 2024-09-04

## 2024-09-04 NOTE — CARE COORDINATION
Date/Time:  9/4/2024 10:53 AM  LPN attempted to reach patient by telephone regarding red alert /105 in remote patient monitoring program. Mailbox is full unable to leave HIPPA compliant message requesting a return call. Will attempt to reach patient again.

## 2024-09-05 ENCOUNTER — CARE COORDINATION (OUTPATIENT)
Dept: CASE MANAGEMENT | Age: 64
End: 2024-09-05

## 2024-09-05 ENCOUNTER — CARE COORDINATION (OUTPATIENT)
Dept: CARE COORDINATION | Age: 64
End: 2024-09-05

## 2024-09-05 ENCOUNTER — TELEPHONE (OUTPATIENT)
Dept: FAMILY MEDICINE CLINIC | Age: 64
End: 2024-09-05

## 2024-09-05 RX ORDER — AMLODIPINE BESYLATE 5 MG/1
5 TABLET ORAL DAILY
Qty: 90 TABLET | Refills: 0 | Status: SHIPPED | OUTPATIENT
Start: 2024-09-05

## 2024-09-05 NOTE — CARE COORDINATION
Date/Time:  9/5/2024 11:09 AM  LPN attempted to reach patient by telephone regarding yellow alert /96 in remote patient monitoring program. Unable to leave  HIPPA compliant message requesting a return call. Mailbox full Will attempt to reach patient again.

## 2024-09-05 NOTE — CARE COORDINATION
Care Transitions Note    Follow Up Call     Attempted to reach patient for transitions of care follow up.  Unable to reach patient.      Outreach Attempts:   Unable to leave message.     Care Summary Note: 1st attempt  will attempt later today due to pt having chemo and radiation.    Did speak with Malinda HANEY LPN,  Requested that the vitals from the last week in HRS be sent to writer, so PCP office can be called about elevated BPs and have them view the readings.        Call was placed to PCP office and spoke with Lance.  Explained that pt was current with RPM and that her BP have been elevated.  Explained that the vitals from [facility] are in Epic under flow sheet and that Tano SANDS LPN also loaded this weeks BP in her note from today.  Lance said that she will reach out to the provider and get back wit writer.      Follow Up Appointment:   Future Appointments         Provider Specialty Dept Phone    2024 9:00 AM SCHEDULE, [facility] MED ONC ROOM 5 Infusion Therapy [phone]    9/10/2024 3:00 PM [facility] ULTRASOUND ROOM Radiology [phone]    2024 10:45 AM Familia Shepard MD Oncology [phone]    2025 11:00 AM Terrell Cortez MD Cardiology [phone]            MENDOZA HTOMPSON, RN    Care Transitions Note    Follow Up Call     Patient Current Location:  Home: [address] [place] [address]    Care Transition Nurse contacted the patient by telephone. Verified name and  as identifiers.    Additional needs identified to be addressed with provider   High priority: elevated BP  call to PCP office                 Method of communication with provider: phone.    Care Summary Note: Was able to contact Daja for transitional outreach.  She said that she was having some good days and bad days.  She said that she thinks the bad day are related to her elevated BP.  She did go to her nephrologist appointment and no new medications were started for her elevated BP.  Writer did call PCP office and informed them of the

## 2024-09-05 NOTE — CARE COORDINATION
Date/Time:  9/5/2024 3:07 PM  LPN attempted to reach patient by telephone regarding yellow alert /96 in remote patient monitoring program. UNABLE TO LEAVE HIPPA compliant message requesting a return call. Will attempt to reach patient again.

## 2024-09-06 ENCOUNTER — HOSPITAL ENCOUNTER (OUTPATIENT)
Dept: INFUSION THERAPY | Age: 64
Discharge: HOME | End: 2024-09-06
Payer: COMMERCIAL

## 2024-09-06 ENCOUNTER — CARE COORDINATION (OUTPATIENT)
Dept: CARE COORDINATION | Age: 64
End: 2024-09-06

## 2024-09-06 VITALS
SYSTOLIC BLOOD PRESSURE: 147 MMHG | WEIGHT: 107.2 LBS | BODY MASS INDEX: 19.73 KG/M2 | DIASTOLIC BLOOD PRESSURE: 80 MMHG | TEMPERATURE: 97.4 F | HEIGHT: 62 IN | HEART RATE: 73 BPM | RESPIRATION RATE: 18 BRPM

## 2024-09-06 DIAGNOSIS — C67.9 UROTHELIAL CARCINOMA OF BLADDER (HCC): Primary | ICD-10-CM

## 2024-09-06 DIAGNOSIS — N17.9 ACUTE KIDNEY INJURY SUPERIMPOSED ON CKD (HCC): ICD-10-CM

## 2024-09-06 DIAGNOSIS — N18.9 ACUTE KIDNEY INJURY SUPERIMPOSED ON CKD (HCC): ICD-10-CM

## 2024-09-06 DIAGNOSIS — N18.32 STAGE 3B CHRONIC KIDNEY DISEASE (HCC): ICD-10-CM

## 2024-09-06 LAB
ANION GAP SERPL CALCULATED.3IONS-SCNC: 14 MMOL/L (ref 9–16)
BUN SERPL-MCNC: 74 MG/DL (ref 8–23)
BUN/CREAT SERPL: 31 (ref 9–20)
CALCIUM SERPL-MCNC: 9.3 MG/DL (ref 8.6–10.4)
CHLORIDE SERPL-SCNC: 95 MMOL/L (ref 98–107)
CO2 SERPL-SCNC: 21 MMOL/L (ref 20–31)
CREAT SERPL-MCNC: 2.4 MG/DL (ref 0.5–0.9)
GFR, ESTIMATED: 23 ML/MIN/1.73M2
GLUCOSE SERPL-MCNC: 126 MG/DL (ref 74–99)
POTASSIUM SERPL-SCNC: 3.4 MMOL/L (ref 3.7–5.3)
SODIUM SERPL-SCNC: 130 MMOL/L (ref 136–145)

## 2024-09-06 PROCEDURE — 80048 BASIC METABOLIC PNL TOTAL CA: CPT

## 2024-09-06 PROCEDURE — 2580000003 HC RX 258: Performed by: INTERNAL MEDICINE

## 2024-09-06 PROCEDURE — 6360000002 HC RX W HCPCS: Performed by: INTERNAL MEDICINE

## 2024-09-06 PROCEDURE — 96413 CHEMO IV INFUSION 1 HR: CPT

## 2024-09-06 PROCEDURE — 96375 TX/PRO/DX INJ NEW DRUG ADDON: CPT

## 2024-09-06 RX ORDER — SODIUM CHLORIDE 9 MG/ML
5-250 INJECTION, SOLUTION INTRAVENOUS PRN
Status: DISCONTINUED | OUTPATIENT
Start: 2024-09-06 | End: 2024-09-07 | Stop reason: HOSPADM

## 2024-09-06 RX ORDER — ONDANSETRON 2 MG/ML
8 INJECTION INTRAMUSCULAR; INTRAVENOUS ONCE
Status: COMPLETED | OUTPATIENT
Start: 2024-09-06 | End: 2024-09-06

## 2024-09-06 RX ORDER — HEPARIN 100 UNIT/ML
500 SYRINGE INTRAVENOUS PRN
Status: DISCONTINUED | OUTPATIENT
Start: 2024-09-06 | End: 2024-09-07 | Stop reason: HOSPADM

## 2024-09-06 RX ORDER — SODIUM CHLORIDE 0.9 % (FLUSH) 0.9 %
5-40 SYRINGE (ML) INJECTION PRN
Status: DISCONTINUED | OUTPATIENT
Start: 2024-09-06 | End: 2024-09-07 | Stop reason: HOSPADM

## 2024-09-06 RX ADMIN — GEMCITABINE HYDROCHLORIDE 40 MG: 200 INJECTION, SOLUTION INTRAVENOUS at 10:35

## 2024-09-06 RX ADMIN — SODIUM CHLORIDE, PRESERVATIVE FREE 10 ML: 5 INJECTION INTRAVENOUS at 09:10

## 2024-09-06 RX ADMIN — SODIUM CHLORIDE, PRESERVATIVE FREE 10 ML: 5 INJECTION INTRAVENOUS at 11:11

## 2024-09-06 RX ADMIN — SODIUM CHLORIDE, PRESERVATIVE FREE 10 ML: 5 INJECTION INTRAVENOUS at 09:09

## 2024-09-06 RX ADMIN — ONDANSETRON 8 MG: 2 INJECTION INTRAMUSCULAR; INTRAVENOUS at 10:23

## 2024-09-06 RX ADMIN — HEPARIN 500 UNITS: 100 SYRINGE at 11:11

## 2024-09-06 RX ADMIN — SODIUM CHLORIDE 200 ML/HR: 9 INJECTION, SOLUTION INTRAVENOUS at 10:23

## 2024-09-06 RX ADMIN — SODIUM CHLORIDE, PRESERVATIVE FREE 10 ML: 5 INJECTION INTRAVENOUS at 11:10

## 2024-09-06 NOTE — CARE COORDINATION
Care Transitions Note    Follow Up Call     Patient Current Location:  Home: [address] [facility] [address]    Care Transition Nurse contacted the patient by telephone. Verified name and  as identifiers.    Additional needs identified to be addressed with provider   No needs identified                 Method of communication with provider: none.    Care Summary Note: Was able to contact Daja for transitional outreach.  She stated that she was doing \"good\".  She denied any problems with breathing and is hardly wearing the oxygen.  She denied any swelling.  She said that her BP was up, but had gone down by time she went to radiation therapy.  It was 140/80.  She said that she is urinating and the nephrostomy tube is functioning and has yellow urine.  She is ambulating well, but does take it slowly.  Her nephrologist said that she can eat anything and she said that she is eating well.  She will be having a renal US to evaluate the kidney and if the nephrostomy tube can come out.  She does not have a follow up with urology so far.  She said that she only has 4 more radiation treatment and possibly 1-2 chemo treatments left.  She has not gotten the new BP medication yet, but will be picking it up tomorrow.  She had no further questions or concerns.  Informed that next week will be final outreach and will be handing off to [facility].     Plan of care updates since last contact:  Review of patient management of conditions/medications: if she picked up the [facility]       Advance Care Planning:   Does patient have an Advance Directive: reviewed during previous call, see note. .    Medication Review:  Medications changed since last call, reviewed today.     Remote Patient Monitoring:  Offered patient enrollment in the Remote Patient Monitoring (RPM) program for in-home monitoring: Yes, patient enrolled; current status is activated and monitoring.    Assessments:  Care Transitions Subsequent and Final Call    Subsequent and Final

## 2024-09-06 NOTE — PROGRESS NOTES
Patient arrives ambulating for chemotherapy. Alert and oriented. Denies any changes in assessment.

## 2024-09-06 NOTE — CARE COORDINATION
Date/Time:  9/6/2024 10:22 AM    Update: No return call received from patient. Per EMP patient is currently at Chemo and current BP wnl, 147/80.

## 2024-09-06 NOTE — CARE COORDINATION
Remote Patient Monitoring Note      Date/Time:  9/6/2024 8:26 AM    LPN attempted to contact patient by telephone regarding yellow alert received for blood pressure reading (172/98).    Background: Pneumonia     Clinical Interventions:   HIPAA compliant message left on  requesting a return call.    Plan/Follow Up: Will continue to review, monitor and address alerts with follow up based on severity of symptoms and risk factors.       Jenny Howard LPN  [facility]/ CTN/ Remote Patient Monitoring  [phone]

## 2024-09-09 ENCOUNTER — HOSPITAL ENCOUNTER (OUTPATIENT)
Dept: INFUSION THERAPY | Age: 64
Discharge: HOME | End: 2024-09-09
Payer: COMMERCIAL

## 2024-09-09 VITALS
RESPIRATION RATE: 18 BRPM | BODY MASS INDEX: 19.32 KG/M2 | TEMPERATURE: 96.9 F | HEIGHT: 62 IN | WEIGHT: 105 LBS | HEART RATE: 73 BPM | DIASTOLIC BLOOD PRESSURE: 84 MMHG | SYSTOLIC BLOOD PRESSURE: 154 MMHG

## 2024-09-09 DIAGNOSIS — C67.9 MALIGNANT NEOPLASM OF URINARY BLADDER, UNSPECIFIED SITE (HCC): ICD-10-CM

## 2024-09-09 DIAGNOSIS — C67.9 UROTHELIAL CARCINOMA OF BLADDER (HCC): Primary | ICD-10-CM

## 2024-09-09 DIAGNOSIS — C68.9 TRANSITIONAL CELL CARCINOMA (HCC): ICD-10-CM

## 2024-09-09 LAB
ALBUMIN SERPL-MCNC: 3.9 G/DL (ref 3.5–5.2)
ALBUMIN/GLOB SERPL: 1.2 {RATIO} (ref 1–2.5)
ALP SERPL-CCNC: 73 U/L (ref 35–104)
ALT SERPL-CCNC: 13 U/L (ref 10–35)
ANION GAP SERPL CALCULATED.3IONS-SCNC: 13 MMOL/L (ref 9–16)
AST SERPL-CCNC: 21 U/L (ref 10–35)
BASOPHILS # BLD: <0.03 K/UL (ref 0–0.2)
BASOPHILS NFR BLD: 0 % (ref 0–2)
BILIRUB SERPL-MCNC: 0.6 MG/DL (ref 0–1.2)
BUN SERPL-MCNC: 60 MG/DL (ref 8–23)
BUN/CREAT SERPL: 32 (ref 9–20)
CALCIUM SERPL-MCNC: 9.7 MG/DL (ref 8.6–10.4)
CHLORIDE SERPL-SCNC: 92 MMOL/L (ref 98–107)
CO2 SERPL-SCNC: 25 MMOL/L (ref 20–31)
CREAT SERPL-MCNC: 1.9 MG/DL (ref 0.5–0.9)
EOSINOPHIL # BLD: 0.31 K/UL (ref 0–0.44)
EOSINOPHILS RELATIVE PERCENT: 4 % (ref 1–4)
ERYTHROCYTE [DISTWIDTH] IN BLOOD BY AUTOMATED COUNT: 18 % (ref 11.8–14.4)
GFR, ESTIMATED: 30 ML/MIN/1.73M2
GLUCOSE SERPL-MCNC: 104 MG/DL (ref 74–99)
HCT VFR BLD AUTO: 24 % (ref 36.3–47.1)
HGB BLD-MCNC: 8.2 G/DL (ref 11.9–15.1)
IMM GRANULOCYTES # BLD AUTO: 0.03 K/UL (ref 0–0.3)
IMM GRANULOCYTES NFR BLD: 0 %
LYMPHOCYTES NFR BLD: 0.58 K/UL (ref 1.1–3.7)
LYMPHOCYTES RELATIVE PERCENT: 8 % (ref 24–43)
MCH RBC QN AUTO: 30.7 PG (ref 25.2–33.5)
MCHC RBC AUTO-ENTMCNC: 34.2 G/DL (ref 28.4–34.8)
MCV RBC AUTO: 89.9 FL (ref 82.6–102.9)
MONOCYTES NFR BLD: 0.3 K/UL (ref 0.1–1.2)
MONOCYTES NFR BLD: 4 % (ref 3–12)
NEUTROPHILS NFR BLD: 84 % (ref 36–65)
NEUTS SEG NFR BLD: 5.88 K/UL (ref 1.5–8.1)
NRBC BLD-RTO: 0 PER 100 WBC
PLATELET # BLD AUTO: 409 K/UL (ref 138–453)
PMV BLD AUTO: 8.8 FL (ref 8.1–13.5)
POTASSIUM SERPL-SCNC: 4.2 MMOL/L (ref 3.7–5.3)
PROT SERPL-MCNC: 7.2 G/DL (ref 6.6–8.7)
RBC # BLD AUTO: 2.67 M/UL (ref 3.95–5.11)
SODIUM SERPL-SCNC: 130 MMOL/L (ref 136–145)
WBC OTHER # BLD: 7.1 K/UL (ref 3.5–11.3)

## 2024-09-09 PROCEDURE — 96413 CHEMO IV INFUSION 1 HR: CPT

## 2024-09-09 PROCEDURE — 80053 COMPREHEN METABOLIC PANEL: CPT

## 2024-09-09 PROCEDURE — 2580000003 HC RX 258: Performed by: INTERNAL MEDICINE

## 2024-09-09 PROCEDURE — 6360000002 HC RX W HCPCS: Performed by: INTERNAL MEDICINE

## 2024-09-09 PROCEDURE — 85025 COMPLETE CBC W/AUTO DIFF WBC: CPT

## 2024-09-09 PROCEDURE — 96375 TX/PRO/DX INJ NEW DRUG ADDON: CPT

## 2024-09-09 PROCEDURE — 36591 DRAW BLOOD OFF VENOUS DEVICE: CPT

## 2024-09-09 RX ORDER — HEPARIN SODIUM (PORCINE) LOCK FLUSH IV SOLN 100 UNIT/ML 100 UNIT/ML
500 SOLUTION INTRAVENOUS PRN
Status: CANCELLED | OUTPATIENT
Start: 2024-09-09

## 2024-09-09 RX ORDER — SODIUM CHLORIDE 0.9 % (FLUSH) 0.9 %
5-40 SYRINGE (ML) INJECTION PRN
Status: CANCELLED | OUTPATIENT
Start: 2024-09-12

## 2024-09-09 RX ORDER — DEXTROSE MONOHYDRATE 50 MG/ML
5-250 INJECTION, SOLUTION INTRAVENOUS PRN
Status: CANCELLED | OUTPATIENT
Start: 2024-09-12

## 2024-09-09 RX ORDER — HEPARIN 100 UNIT/ML
500 SYRINGE INTRAVENOUS PRN
OUTPATIENT
Start: 2024-09-09

## 2024-09-09 RX ORDER — ALBUTEROL SULFATE 90 UG/1
4 AEROSOL, METERED RESPIRATORY (INHALATION) PRN
Status: CANCELLED | OUTPATIENT
Start: 2024-09-12

## 2024-09-09 RX ORDER — SODIUM CHLORIDE 0.9 % (FLUSH) 0.9 %
5-40 SYRINGE (ML) INJECTION PRN
Status: DISCONTINUED | OUTPATIENT
Start: 2024-09-09 | End: 2024-09-10 | Stop reason: HOSPADM

## 2024-09-09 RX ORDER — ACETAMINOPHEN 325 MG/1
650 TABLET ORAL
Status: CANCELLED | OUTPATIENT
Start: 2024-09-09

## 2024-09-09 RX ORDER — HEPARIN 100 UNIT/ML
500 SYRINGE INTRAVENOUS PRN
Status: DISCONTINUED | OUTPATIENT
Start: 2024-09-09 | End: 2024-09-10 | Stop reason: HOSPADM

## 2024-09-09 RX ORDER — ONDANSETRON 2 MG/ML
8 INJECTION INTRAMUSCULAR; INTRAVENOUS
Status: CANCELLED | OUTPATIENT
Start: 2024-09-12

## 2024-09-09 RX ORDER — EPINEPHRINE 1 MG/ML
0.3 INJECTION, SOLUTION, CONCENTRATE INTRAVENOUS PRN
Status: CANCELLED | OUTPATIENT
Start: 2024-09-09

## 2024-09-09 RX ORDER — SODIUM CHLORIDE 9 MG/ML
INJECTION, SOLUTION INTRAVENOUS CONTINUOUS
Status: CANCELLED | OUTPATIENT
Start: 2024-09-12

## 2024-09-09 RX ORDER — SODIUM CHLORIDE 9 MG/ML
5-250 INJECTION, SOLUTION INTRAVENOUS PRN
Status: CANCELLED | OUTPATIENT
Start: 2024-09-09

## 2024-09-09 RX ORDER — DIPHENHYDRAMINE HYDROCHLORIDE 50 MG/ML
50 INJECTION INTRAMUSCULAR; INTRAVENOUS
Status: CANCELLED | OUTPATIENT
Start: 2024-09-12

## 2024-09-09 RX ORDER — SODIUM CHLORIDE 0.9 % (FLUSH) 0.9 %
5-40 SYRINGE (ML) INJECTION PRN
Status: CANCELLED | OUTPATIENT
Start: 2024-09-09

## 2024-09-09 RX ORDER — EPINEPHRINE 1 MG/ML
0.3 INJECTION, SOLUTION, CONCENTRATE INTRAVENOUS PRN
Status: CANCELLED | OUTPATIENT
Start: 2024-09-12

## 2024-09-09 RX ORDER — DEXTROSE MONOHYDRATE 50 MG/ML
5-250 INJECTION, SOLUTION INTRAVENOUS PRN
Status: CANCELLED | OUTPATIENT
Start: 2024-09-09

## 2024-09-09 RX ORDER — SODIUM CHLORIDE 9 MG/ML
INJECTION, SOLUTION INTRAVENOUS CONTINUOUS
Status: CANCELLED | OUTPATIENT
Start: 2024-09-09

## 2024-09-09 RX ORDER — HEPARIN SODIUM (PORCINE) LOCK FLUSH IV SOLN 100 UNIT/ML 100 UNIT/ML
500 SOLUTION INTRAVENOUS PRN
Status: CANCELLED | OUTPATIENT
Start: 2024-09-12

## 2024-09-09 RX ORDER — DIPHENHYDRAMINE HYDROCHLORIDE 50 MG/ML
50 INJECTION INTRAMUSCULAR; INTRAVENOUS
Status: CANCELLED | OUTPATIENT
Start: 2024-09-09

## 2024-09-09 RX ORDER — ACETAMINOPHEN 325 MG/1
650 TABLET ORAL
Status: CANCELLED | OUTPATIENT
Start: 2024-09-12

## 2024-09-09 RX ORDER — SODIUM CHLORIDE 9 MG/ML
5-250 INJECTION, SOLUTION INTRAVENOUS PRN
Status: CANCELLED | OUTPATIENT
Start: 2024-09-12

## 2024-09-09 RX ORDER — FAMOTIDINE 10 MG/ML
20 INJECTION, SOLUTION INTRAVENOUS
Status: CANCELLED | OUTPATIENT
Start: 2024-09-09

## 2024-09-09 RX ORDER — SODIUM CHLORIDE 9 MG/ML
5-250 INJECTION, SOLUTION INTRAVENOUS PRN
Status: DISCONTINUED | OUTPATIENT
Start: 2024-09-09 | End: 2024-09-10 | Stop reason: HOSPADM

## 2024-09-09 RX ORDER — ONDANSETRON 2 MG/ML
8 INJECTION INTRAMUSCULAR; INTRAVENOUS ONCE
Status: COMPLETED | OUTPATIENT
Start: 2024-09-09 | End: 2024-09-09

## 2024-09-09 RX ORDER — SODIUM CHLORIDE 0.9 % (FLUSH) 0.9 %
5-40 SYRINGE (ML) INJECTION PRN
OUTPATIENT
Start: 2024-09-09

## 2024-09-09 RX ORDER — ONDANSETRON 2 MG/ML
8 INJECTION INTRAMUSCULAR; INTRAVENOUS
Status: CANCELLED | OUTPATIENT
Start: 2024-09-09

## 2024-09-09 RX ORDER — ONDANSETRON 2 MG/ML
8 INJECTION INTRAMUSCULAR; INTRAVENOUS ONCE
Status: CANCELLED | OUTPATIENT
Start: 2024-09-09 | End: 2024-09-09

## 2024-09-09 RX ORDER — ALBUTEROL SULFATE 90 UG/1
4 AEROSOL, METERED RESPIRATORY (INHALATION) PRN
Status: CANCELLED | OUTPATIENT
Start: 2024-09-09

## 2024-09-09 RX ORDER — ONDANSETRON 2 MG/ML
8 INJECTION INTRAMUSCULAR; INTRAVENOUS ONCE
Status: CANCELLED | OUTPATIENT
Start: 2024-09-12 | End: 2024-09-12

## 2024-09-09 RX ORDER — FAMOTIDINE 10 MG/ML
20 INJECTION, SOLUTION INTRAVENOUS
Status: CANCELLED | OUTPATIENT
Start: 2024-09-12

## 2024-09-09 RX ADMIN — Medication 500 UNITS: at 11:24

## 2024-09-09 RX ADMIN — GEMCITABINE HYDROCHLORIDE 40 MG: 200 INJECTION, SOLUTION INTRAVENOUS at 10:49

## 2024-09-09 RX ADMIN — SODIUM CHLORIDE, PRESERVATIVE FREE 10 ML: 5 INJECTION INTRAVENOUS at 09:09

## 2024-09-09 RX ADMIN — SODIUM CHLORIDE, PRESERVATIVE FREE 10 ML: 5 INJECTION INTRAVENOUS at 09:10

## 2024-09-09 RX ADMIN — ONDANSETRON 8 MG: 2 INJECTION INTRAMUSCULAR; INTRAVENOUS at 10:08

## 2024-09-09 RX ADMIN — SODIUM CHLORIDE, PRESERVATIVE FREE 10 ML: 5 INJECTION INTRAVENOUS at 11:24

## 2024-09-09 RX ADMIN — SODIUM CHLORIDE, PRESERVATIVE FREE 10 ML: 5 INJECTION INTRAVENOUS at 11:23

## 2024-09-09 RX ADMIN — SODIUM CHLORIDE 200 ML/HR: 9 INJECTION, SOLUTION INTRAVENOUS at 10:07

## 2024-09-10 ENCOUNTER — HOSPITAL ENCOUNTER (OUTPATIENT)
Dept: ULTRASOUND IMAGING | Age: 64
Discharge: HOME | End: 2024-09-12
Attending: INTERNAL MEDICINE
Payer: COMMERCIAL

## 2024-09-10 DIAGNOSIS — N17.9 ACUTE KIDNEY INJURY SUPERIMPOSED ON CKD (HCC): ICD-10-CM

## 2024-09-10 DIAGNOSIS — N18.9 ACUTE KIDNEY INJURY SUPERIMPOSED ON CKD (HCC): ICD-10-CM

## 2024-09-10 PROCEDURE — 76770 US EXAM ABDO BACK WALL COMP: CPT

## 2024-09-11 ENCOUNTER — CARE COORDINATION (OUTPATIENT)
Dept: CARE COORDINATION | Age: 64
End: 2024-09-11

## 2024-09-12 ENCOUNTER — TELEPHONE (OUTPATIENT)
Dept: ONCOLOGY | Age: 64
End: 2024-09-12

## 2024-09-12 ENCOUNTER — OFFICE VISIT (OUTPATIENT)
Dept: ONCOLOGY | Age: 64
End: 2024-09-12
Payer: COMMERCIAL

## 2024-09-12 ENCOUNTER — HOSPITAL ENCOUNTER (OUTPATIENT)
Dept: INFUSION THERAPY | Age: 64
Discharge: HOME | End: 2024-09-12
Payer: COMMERCIAL

## 2024-09-12 ENCOUNTER — CARE COORDINATION (OUTPATIENT)
Dept: CARE COORDINATION | Age: 64
End: 2024-09-12

## 2024-09-12 VITALS
DIASTOLIC BLOOD PRESSURE: 67 MMHG | HEART RATE: 76 BPM | BODY MASS INDEX: 19.57 KG/M2 | RESPIRATION RATE: 18 BRPM | WEIGHT: 107 LBS | TEMPERATURE: 96.9 F | SYSTOLIC BLOOD PRESSURE: 130 MMHG

## 2024-09-12 VITALS
RESPIRATION RATE: 18 BRPM | HEIGHT: 62 IN | BODY MASS INDEX: 19.69 KG/M2 | WEIGHT: 107 LBS | HEART RATE: 76 BPM | SYSTOLIC BLOOD PRESSURE: 130 MMHG | TEMPERATURE: 96.9 F | DIASTOLIC BLOOD PRESSURE: 67 MMHG

## 2024-09-12 DIAGNOSIS — C67.9 UROTHELIAL CARCINOMA OF BLADDER (HCC): Primary | ICD-10-CM

## 2024-09-12 DIAGNOSIS — C67.9 MALIGNANT NEOPLASM OF URINARY BLADDER, UNSPECIFIED SITE (HCC): ICD-10-CM

## 2024-09-12 DIAGNOSIS — T45.1X5D ADVERSE EFFECT OF CHEMOTHERAPY, SUBSEQUENT ENCOUNTER: ICD-10-CM

## 2024-09-12 DIAGNOSIS — N18.4 STAGE 4 CHRONIC KIDNEY DISEASE (HCC): ICD-10-CM

## 2024-09-12 DIAGNOSIS — D63.8 ANEMIA DUE TO CHRONIC ILLNESS: ICD-10-CM

## 2024-09-12 LAB
ALBUMIN SERPL-MCNC: 3.7 G/DL (ref 3.5–5.2)
ALBUMIN/GLOB SERPL: 1.3 {RATIO} (ref 1–2.5)
ALP SERPL-CCNC: 71 U/L (ref 35–104)
ALT SERPL-CCNC: 12 U/L (ref 10–35)
ANION GAP SERPL CALCULATED.3IONS-SCNC: 12 MMOL/L (ref 9–16)
AST SERPL-CCNC: 20 U/L (ref 10–35)
BASOPHILS # BLD: <0.03 K/UL (ref 0–0.2)
BASOPHILS NFR BLD: 0 % (ref 0–2)
BILIRUB SERPL-MCNC: 0.6 MG/DL (ref 0–1.2)
BUN SERPL-MCNC: 54 MG/DL (ref 8–23)
BUN/CREAT SERPL: 28 (ref 9–20)
CALCIUM SERPL-MCNC: 9.6 MG/DL (ref 8.6–10.4)
CHLORIDE SERPL-SCNC: 94 MMOL/L (ref 98–107)
CO2 SERPL-SCNC: 24 MMOL/L (ref 20–31)
CREAT SERPL-MCNC: 1.9 MG/DL (ref 0.5–0.9)
EOSINOPHIL # BLD: 0.29 K/UL (ref 0–0.44)
EOSINOPHILS RELATIVE PERCENT: 6 % (ref 1–4)
ERYTHROCYTE [DISTWIDTH] IN BLOOD BY AUTOMATED COUNT: 18.3 % (ref 11.8–14.4)
GFR, ESTIMATED: 30 ML/MIN/1.73M2
GLUCOSE SERPL-MCNC: 100 MG/DL (ref 74–99)
HCT VFR BLD AUTO: 22.7 % (ref 36.3–47.1)
HGB BLD-MCNC: 7.7 G/DL (ref 11.9–15.1)
IMM GRANULOCYTES # BLD AUTO: <0.03 K/UL (ref 0–0.3)
IMM GRANULOCYTES NFR BLD: 0 %
LYMPHOCYTES NFR BLD: 0.54 K/UL (ref 1.1–3.7)
LYMPHOCYTES RELATIVE PERCENT: 11 % (ref 24–43)
MCH RBC QN AUTO: 31.2 PG (ref 25.2–33.5)
MCHC RBC AUTO-ENTMCNC: 33.9 G/DL (ref 28.4–34.8)
MCV RBC AUTO: 91.9 FL (ref 82.6–102.9)
MONOCYTES NFR BLD: 0.25 K/UL (ref 0.1–1.2)
MONOCYTES NFR BLD: 5 % (ref 3–12)
NEUTROPHILS NFR BLD: 78 % (ref 36–65)
NEUTS SEG NFR BLD: 4.04 K/UL (ref 1.5–8.1)
NRBC BLD-RTO: 0 PER 100 WBC
PLATELET # BLD AUTO: 312 K/UL (ref 138–453)
PMV BLD AUTO: 8.6 FL (ref 8.1–13.5)
POTASSIUM SERPL-SCNC: 4.1 MMOL/L (ref 3.7–5.3)
PROT SERPL-MCNC: 6.7 G/DL (ref 6.6–8.7)
RBC # BLD AUTO: 2.47 M/UL (ref 3.95–5.11)
SODIUM SERPL-SCNC: 130 MMOL/L (ref 136–145)
WBC OTHER # BLD: 5.2 K/UL (ref 3.5–11.3)

## 2024-09-12 PROCEDURE — 36591 DRAW BLOOD OFF VENOUS DEVICE: CPT

## 2024-09-12 PROCEDURE — 6360000002 HC RX W HCPCS: Performed by: INTERNAL MEDICINE

## 2024-09-12 PROCEDURE — 1111F DSCHRG MED/CURRENT MED MERGE: CPT | Performed by: INTERNAL MEDICINE

## 2024-09-12 PROCEDURE — 96413 CHEMO IV INFUSION 1 HR: CPT

## 2024-09-12 PROCEDURE — G8427 DOCREV CUR MEDS BY ELIG CLIN: HCPCS | Performed by: INTERNAL MEDICINE

## 2024-09-12 PROCEDURE — 85025 COMPLETE CBC W/AUTO DIFF WBC: CPT

## 2024-09-12 PROCEDURE — 2580000003 HC RX 258: Performed by: INTERNAL MEDICINE

## 2024-09-12 PROCEDURE — 3075F SYST BP GE 130 - 139MM HG: CPT | Performed by: INTERNAL MEDICINE

## 2024-09-12 PROCEDURE — 3078F DIAST BP <80 MM HG: CPT | Performed by: INTERNAL MEDICINE

## 2024-09-12 PROCEDURE — 1036F TOBACCO NON-USER: CPT | Performed by: INTERNAL MEDICINE

## 2024-09-12 PROCEDURE — 96375 TX/PRO/DX INJ NEW DRUG ADDON: CPT

## 2024-09-12 PROCEDURE — G8420 CALC BMI NORM PARAMETERS: HCPCS | Performed by: INTERNAL MEDICINE

## 2024-09-12 PROCEDURE — 99214 OFFICE O/P EST MOD 30 MIN: CPT | Performed by: INTERNAL MEDICINE

## 2024-09-12 PROCEDURE — 80053 COMPREHEN METABOLIC PANEL: CPT

## 2024-09-12 PROCEDURE — 3017F COLORECTAL CA SCREEN DOC REV: CPT | Performed by: INTERNAL MEDICINE

## 2024-09-12 RX ORDER — SODIUM CHLORIDE 0.9 % (FLUSH) 0.9 %
5-40 SYRINGE (ML) INJECTION PRN
Status: DISCONTINUED | OUTPATIENT
Start: 2024-09-12 | End: 2024-09-13 | Stop reason: HOSPADM

## 2024-09-12 RX ORDER — HEPARIN 100 UNIT/ML
500 SYRINGE INTRAVENOUS PRN
Status: DISCONTINUED | OUTPATIENT
Start: 2024-09-12 | End: 2024-09-13 | Stop reason: HOSPADM

## 2024-09-12 RX ORDER — ONDANSETRON 2 MG/ML
8 INJECTION INTRAMUSCULAR; INTRAVENOUS ONCE
Status: COMPLETED | OUTPATIENT
Start: 2024-09-12 | End: 2024-09-12

## 2024-09-12 RX ORDER — SODIUM CHLORIDE 9 MG/ML
5-250 INJECTION, SOLUTION INTRAVENOUS PRN
Status: DISCONTINUED | OUTPATIENT
Start: 2024-09-12 | End: 2024-09-13 | Stop reason: HOSPADM

## 2024-09-12 RX ADMIN — SODIUM CHLORIDE, PRESERVATIVE FREE 10 ML: 5 INJECTION INTRAVENOUS at 09:05

## 2024-09-12 RX ADMIN — SODIUM CHLORIDE, PRESERVATIVE FREE 10 ML: 5 INJECTION INTRAVENOUS at 09:06

## 2024-09-12 RX ADMIN — ONDANSETRON 8 MG: 2 INJECTION INTRAMUSCULAR; INTRAVENOUS at 09:50

## 2024-09-12 RX ADMIN — GEMCITABINE HYDROCHLORIDE 40 MG: 200 INJECTION, SOLUTION INTRAVENOUS at 10:07

## 2024-09-12 RX ADMIN — HEPARIN 500 UNITS: 100 SYRINGE at 10:42

## 2024-09-12 RX ADMIN — SODIUM CHLORIDE 100 ML/HR: 9 INJECTION, SOLUTION INTRAVENOUS at 09:49

## 2024-09-12 RX ADMIN — SODIUM CHLORIDE, PRESERVATIVE FREE 10 ML: 5 INJECTION INTRAVENOUS at 10:41

## 2024-09-12 RX ADMIN — SODIUM CHLORIDE, PRESERVATIVE FREE 10 ML: 5 INJECTION INTRAVENOUS at 10:42

## 2024-09-13 ENCOUNTER — CARE COORDINATION (OUTPATIENT)
Dept: CASE MANAGEMENT | Age: 64
End: 2024-09-13

## 2024-09-16 ENCOUNTER — CARE COORDINATION (OUTPATIENT)
Dept: CARE COORDINATION | Age: 64
End: 2024-09-16

## 2024-09-20 ENCOUNTER — HOSPITAL ENCOUNTER (OUTPATIENT)
Age: 64
Discharge: HOME | End: 2024-09-20
Payer: COMMERCIAL

## 2024-09-20 DIAGNOSIS — N17.9 ACUTE KIDNEY INJURY SUPERIMPOSED ON CKD (HCC): ICD-10-CM

## 2024-09-20 DIAGNOSIS — N18.9 ACUTE KIDNEY INJURY SUPERIMPOSED ON CKD (HCC): ICD-10-CM

## 2024-09-20 DIAGNOSIS — R30.0 DYSURIA: ICD-10-CM

## 2024-09-20 LAB
-: ABNORMAL
BACTERIA URNS QL MICRO: ABNORMAL
BILIRUB UR QL STRIP: NEGATIVE
CLARITY UR: ABNORMAL
COLOR UR: YELLOW
COMMENT: ABNORMAL
GLUCOSE UR STRIP-MCNC: NEGATIVE MG/DL
HGB UR QL STRIP.AUTO: ABNORMAL
KETONES UR STRIP-MCNC: NEGATIVE MG/DL
LEUKOCYTE ESTERASE UR QL STRIP: ABNORMAL
NITRITE UR QL STRIP: NEGATIVE
PH UR STRIP: 6 [PH] (ref 5–8)
PROT UR STRIP-MCNC: ABNORMAL MG/DL
RBC #/AREA URNS HPF: ABNORMAL /HPF (ref 0–2)
SP GR UR STRIP: 1.01 (ref 1–1.03)
UROBILINOGEN UR STRIP-ACNC: NORMAL EU/DL (ref 0–1)
WBC #/AREA URNS HPF: ABNORMAL /HPF

## 2024-09-20 PROCEDURE — 87086 URINE CULTURE/COLONY COUNT: CPT

## 2024-09-20 PROCEDURE — 81001 URINALYSIS AUTO W/SCOPE: CPT

## 2024-09-21 LAB
MICROORGANISM SPEC CULT: NO GROWTH
SERVICE CMNT-IMP: NORMAL
SPECIMEN DESCRIPTION: NORMAL

## 2024-09-26 ENCOUNTER — TELEPHONE (OUTPATIENT)
Dept: UROLOGY | Age: 64
End: 2024-09-26

## 2024-09-30 ENCOUNTER — OFFICE VISIT (OUTPATIENT)
Dept: UROLOGY | Age: 64
End: 2024-09-30
Payer: COMMERCIAL

## 2024-09-30 ENCOUNTER — HOSPITAL ENCOUNTER (OUTPATIENT)
Age: 64
Setting detail: SPECIMEN
Discharge: HOME | End: 2024-09-30
Payer: COMMERCIAL

## 2024-09-30 VITALS
TEMPERATURE: 97.5 F | WEIGHT: 109 LBS | DIASTOLIC BLOOD PRESSURE: 84 MMHG | BODY MASS INDEX: 19.94 KG/M2 | SYSTOLIC BLOOD PRESSURE: 150 MMHG

## 2024-09-30 DIAGNOSIS — N13.30 HYDRONEPHROSIS, UNSPECIFIED HYDRONEPHROSIS TYPE: ICD-10-CM

## 2024-09-30 DIAGNOSIS — C68.9 TRANSITIONAL CELL CARCINOMA (HCC): ICD-10-CM

## 2024-09-30 DIAGNOSIS — C67.9 UROTHELIAL CARCINOMA OF BLADDER WITH INVASION OF MUSCLE (HCC): Primary | ICD-10-CM

## 2024-09-30 DIAGNOSIS — C67.9 UROTHELIAL CARCINOMA OF BLADDER WITH INVASION OF MUSCLE (HCC): ICD-10-CM

## 2024-09-30 LAB
BACTERIA URNS QL MICRO: ABNORMAL
BILIRUB UR QL STRIP: NEGATIVE
CLARITY UR: CLEAR
COLOR UR: YELLOW
EPI CELLS #/AREA URNS HPF: ABNORMAL /HPF (ref 0–25)
GLUCOSE UR STRIP-MCNC: NEGATIVE MG/DL
HGB UR QL STRIP.AUTO: NEGATIVE
KETONES UR STRIP-MCNC: NEGATIVE MG/DL
LEUKOCYTE ESTERASE UR QL STRIP: ABNORMAL
NITRITE UR QL STRIP: NEGATIVE
PH UR STRIP: 6 [PH] (ref 5–9)
PROT UR STRIP-MCNC: NEGATIVE MG/DL
RBC #/AREA URNS HPF: ABNORMAL /HPF (ref 0–2)
SP GR UR STRIP: 1.01 (ref 1.01–1.02)
UROBILINOGEN UR STRIP-ACNC: NORMAL EU/DL (ref 0–1)
WBC #/AREA URNS HPF: ABNORMAL /HPF (ref 0–5)

## 2024-09-30 PROCEDURE — 3017F COLORECTAL CA SCREEN DOC REV: CPT | Performed by: UROLOGY

## 2024-09-30 PROCEDURE — 87086 URINE CULTURE/COLONY COUNT: CPT

## 2024-09-30 PROCEDURE — G8427 DOCREV CUR MEDS BY ELIG CLIN: HCPCS | Performed by: UROLOGY

## 2024-09-30 PROCEDURE — 3079F DIAST BP 80-89 MM HG: CPT | Performed by: UROLOGY

## 2024-09-30 PROCEDURE — 1036F TOBACCO NON-USER: CPT | Performed by: UROLOGY

## 2024-09-30 PROCEDURE — 99214 OFFICE O/P EST MOD 30 MIN: CPT | Performed by: UROLOGY

## 2024-09-30 PROCEDURE — 81001 URINALYSIS AUTO W/SCOPE: CPT

## 2024-09-30 PROCEDURE — 3077F SYST BP >= 140 MM HG: CPT | Performed by: UROLOGY

## 2024-09-30 PROCEDURE — G8420 CALC BMI NORM PARAMETERS: HCPCS | Performed by: UROLOGY

## 2024-09-30 NOTE — PROGRESS NOTES
Value Date    BUN 54 (H) 09/12/2024     Lab Results   Component Value Date    CREATININE 1.9 (H) 09/12/2024       ASSESSMENT:  This is a 63 y.o. female with the following diagnoses:   Diagnosis Orders   1. 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 Hydronephrosis, unspecified hydronephrosis type  IR GUIDED NEPHROSTOMY CATH EXCHANGE    Urinalysis with Microscopic    Culture, Urine      3. Transitional cell carcinoma (HCC)  IR GUIDED NEPHROSTOMY CATH EXCHANGE    Urinalysis with Microscopic    Culture, Urine             PLAN:  Patient will get her left sided PERC tube changed.  She is due for repeat PET scan.  Will see her after both of these.  Then make a plan to try to clamp her tube and hopefully remove her tube

## 2024-10-01 LAB
MICROORGANISM SPEC CULT: NO GROWTH
SERVICE CMNT-IMP: NORMAL
SPECIMEN DESCRIPTION: NORMAL

## 2024-10-02 ENCOUNTER — TELEPHONE (OUTPATIENT)
Dept: UROLOGY | Age: 64
End: 2024-10-02

## 2024-10-03 ENCOUNTER — CARE COORDINATION (OUTPATIENT)
Dept: CARE COORDINATION | Age: 64
End: 2024-10-03

## 2024-10-03 NOTE — CARE COORDINATION
Ambulatory Care Coordination Note     10/3/2024      Patient Current Location:  Home: [address] [facility] [address]     ACM contacted the patient by telephone. Verified name and  with patient as identifiers.         ACM: Thania Toscano RN     Challenges to be reviewed by the provider   Additional needs identified to be addressed with provider No  none               Method of communication with provider: none.    Has the patient been seen in the ED since your last call? no    Care Summary Note: Spoke with Daja. Reports she is doing pretty well. RPM metrics reviewed in HRS- noted BP still elevated at times. States she is taking medications as prescribed. Denied any headaches or lightheaded/dizziness on these days when elevated. Will continue to monitor. States her next week is busy- labs, PET scan and radiation follow up. Denied any current or new needs at this time. Appreciative of call today.     Offered patient enrollment in the Remote Patient Monitoring (RPM) program for in-home monitoring: Yes, patient enrolled; current status is activated and monitoring.       Medications Reviewed:   Patient denies any changes with medications and reports taking all medications as prescribed.    PCP/Specialist follow up:   Future Appointments         Provider Specialty Dept Phone    10/9/2024 11:00 AM Radiologist, [facility]; [facility] SPECIAL PROC Cardiology [phone]    10/11/2024 10:15 AM Familia Shepard MD Oncology [phone]    10/18/2024 9:45 AM Tulio Holder MD Urology [phone]    2024 12:20 PM Nicole Chan MD Nephrology [phone]    2025 11:00 AM Terrell Cortez MD Cardiology [phone]            Follow Up:   Plan for next [facility] outreach in approximately 2 weeks to complete:  - disease specific assessments  - medication review   - goal progression  - education   - follow up appointment with radiation and PET scans .   Patient  is agreeable to this plan.

## 2024-10-08 ENCOUNTER — CARE COORDINATION (OUTPATIENT)
Dept: CARE COORDINATION | Age: 64
End: 2024-10-08

## 2024-10-08 NOTE — CARE COORDINATION
Remote Alert Monitoring Note      Date/Time:  10/8/2024 3:27 PM  Patient Current Location: Home: [address] [address]    LPN contacted patient by telephone regarding red alert received for blood pressure reading (). Verified patients name and  as identifiers.    Rpm alert to be reviewed by the provider                         Background:  Pneumonia     Refer to 911 immediately if:  Patient unresponsive or unable to provide history  Change in cognition or sudden confusion  Patient unable to respond in complete sentences  Intense chest pain/tightness  Any concern for any clinical emergency  Red Alert: Provider response time of 1 hr required for any red alert requiring intervention  Yellow Alert: Provider response time of 3hr required for any escalated yellow alert        Blood Pressure BP Triage  Are you having any Chest Pain? no   Are you having any Shortness of Breath? no   Do you have a headache or have any vision changes? no   Are you having any numbness or tingling? no   Are you having any other health concerns or issues? no     Have you taken your medications as instructed by your doctor today? Yes       Clinical Interventions: Reviewed and followed up on alerts and treatments-. Pt is asymptomatic and in no apparent distress, speaking in full sentences.  Patient states she is feeling fine denying any chest pain or SOB. She states she took all medications around noon today.  She is agreeable to sit and relax for 20 minutes prior to rechecking BP.  Will await update.        Plan/Follow Up: Will continue to review, monitor and address alerts with follow up based on severity of symptoms and risk factors.    WOO Burns [facility]/ CTN/ Remote Patient monitoring  [phone]

## 2024-10-08 NOTE — CARE COORDINATION
Remote Patient Monitoring Note      Date/Time:  10/8/2024 1:48 PM  Patient Current Location: Home: [address] [place] [address]  LPN contacted patient by telephone regarding yellow alert received for no v/s X 2 days Verified patients name and  as identifiers.  Background: Pneumonia   Clinical Interventions: Reviewed and followed up on alerts and treatments-Pt stated that she has been very busy and will be entering V/S soon.     Plan/Follow Up: Will continue to review, monitor and address alerts with follow up based on severity of symptoms and risk factors.

## 2024-10-09 ENCOUNTER — CARE COORDINATION (OUTPATIENT)
Dept: CARE COORDINATION | Age: 64
End: 2024-10-09

## 2024-10-09 ENCOUNTER — HOSPITAL ENCOUNTER (OUTPATIENT)
Dept: INTERVENTIONAL RADIOLOGY/VASCULAR | Age: 64
Discharge: HOME | End: 2024-10-11
Attending: UROLOGY
Payer: COMMERCIAL

## 2024-10-09 VITALS
OXYGEN SATURATION: 96 % | RESPIRATION RATE: 16 BRPM | SYSTOLIC BLOOD PRESSURE: 190 MMHG | TEMPERATURE: 97 F | HEART RATE: 72 BPM | DIASTOLIC BLOOD PRESSURE: 97 MMHG

## 2024-10-09 DIAGNOSIS — N13.30 HYDRONEPHROSIS, UNSPECIFIED HYDRONEPHROSIS TYPE: ICD-10-CM

## 2024-10-09 DIAGNOSIS — D09.0 BLADDER CA IN SITU: ICD-10-CM

## 2024-10-09 DIAGNOSIS — C68.9 TRANSITIONAL CELL CARCINOMA (HCC): ICD-10-CM

## 2024-10-09 DIAGNOSIS — C67.9 UROTHELIAL CARCINOMA OF BLADDER WITH INVASION OF MUSCLE (HCC): ICD-10-CM

## 2024-10-09 PROCEDURE — 6360000004 HC RX CONTRAST MEDICATION: Performed by: RADIOLOGY

## 2024-10-09 PROCEDURE — 7100000011 HC PHASE II RECOVERY - ADDTL 15 MIN: Performed by: RADIOLOGY

## 2024-10-09 PROCEDURE — 50435 EXCHANGE NEPHROSTOMY CATH: CPT | Performed by: RADIOLOGY

## 2024-10-09 PROCEDURE — 2709999900 HC NON-CHARGEABLE SUPPLY: Performed by: RADIOLOGY

## 2024-10-09 PROCEDURE — C1729 CATH, DRAINAGE: HCPCS | Performed by: RADIOLOGY

## 2024-10-09 PROCEDURE — 2500000003 HC RX 250 WO HCPCS: Performed by: RADIOLOGY

## 2024-10-09 PROCEDURE — 2500000003 HC RX 250 WO HCPCS

## 2024-10-09 PROCEDURE — 7100000010 HC PHASE II RECOVERY - FIRST 15 MIN: Performed by: RADIOLOGY

## 2024-10-09 RX ORDER — IOPAMIDOL 755 MG/ML
INJECTION, SOLUTION INTRAVASCULAR PRN
Status: COMPLETED | OUTPATIENT
Start: 2024-10-09 | End: 2024-10-09

## 2024-10-09 RX ADMIN — LIDOCAINE HYDROCHLORIDE 1 ML: 20 INJECTION, SOLUTION INFILTRATION; PERINEURAL at 12:26

## 2024-10-09 RX ADMIN — IOPAMIDOL 10 ML: 755 INJECTION, SOLUTION INTRAVENOUS at 12:22

## 2024-10-09 NOTE — BRIEF OP NOTE
Brief Postoperative Note    Daja Espinosa  YOB: 1960  [id number]    Pre-operative Diagnosis: Exchange left PCN    Post-operative Diagnosis: Same    Procedure: PCN exchange    Medications Given: none    Anesthesia: Local    Surgeons/Assistants: MICHAEL KOLB MD    Estimated Blood Loss: minimal    Complications: none    Specimens: were not obtained    Findings: Limited nephrostogram shows PCN in place and focal proximal ureteral narrowing. New 8 F PCN inserted without incident. Sutured in place.      Electronically signed by MICHAEL KOLB MD on 10/9/2024 at 12:54 PM

## 2024-10-09 NOTE — DISCHARGE INSTRUCTIONS
NEPHROSTOMY TUBE DISCHARGE INSTRUCTIONS    1. Go home and rest for the remainder of the day. Do no drive, operate appliances, or make any legal decisions today.  2. Have a responsible adult drive you home and remain with you for the rest of the day.  3. You may resume your normal diet after the procedure. Drink extra fluids (6 to 8 glasses per day) while the nephrostomy catheter is in place. Avoid alcoholic beverages and   depressant drugs for 24 hours.  4. Do not take aspirin-containing products, ibuprofen, vitamin E, or blood-thinning products (*Coumadin) for 24 hours. You may take Tylenol (one to two tablets every four to six   hours) for mild discomfort at the nephrostomy site. If you develop severe pain, swelling, or redness at the catheter insertion site, call your physician.  5. You will be sore for one to two weeks after the catheter is inserted. This may limit your activity. After that, you should continue to avoid any activity that causes a pulling   sensation or pain around the catheter or kinking of the catheter. You may take a shower, but cover the dressing with plastic wrap so that it does not get wet. It may be   easier to take a sponge bath. You may not take a tub bath.  Dressing change instruction:     Change dressing daily unless special instructions are given.    Some dressings will only need changed every 7 days.  Instruction for showerin.  Turn stop cock up to purple to lock bag, then remove bag from tubing.  2.  Cover dressing and surrounding skin with a piece of plastic food wrap (cling wrap, siran wrap) then you may bathe or shower.  3.  After shower, remove plastic wrap and dressing.  4.  Cleanse site and dry well.  5.  Apply new sterile gauze dressing .   Cleanse stop cock and tubing into bag and reattach bag to stop cock.  Turn stop cock to the right to open flow.  6. Call your physician immediately if you have pain in your side, if your catheter becomes dislodged or broken, if your

## 2024-10-09 NOTE — CARE COORDINATION
Remote Alert Monitoring Note      Date/Time:  10/9/2024 9:59 AM  Patient Current Location: [facility] contacted patient by telephone regarding yellow alert received for blood pressure reading (171/). Verified patients name and  as identifiers.    Rpm alert to be reviewed by the provider                         Background: Pneumonia     Refer to 911 immediately if:  Patient unresponsive or unable to provide history  Change in cognition or sudden confusion  Patient unable to respond in complete sentences  Intense chest pain/tightness  Any concern for any clinical emergency  Red Alert: Provider response time of 1 hr required for any red alert requiring intervention  Yellow Alert: Provider response time of 3hr required for any escalated yellow alert        Blood Pressure BP Triage  Are you having any Chest Pain? no   Are you having any Shortness of Breath? no   Do you have a headache or have any vision changes? no   Are you having any numbness or tingling? no   Are you having any other health concerns or issues? no     Have you taken your medications as instructed by your doctor today? Yes       Clinical Interventions: Reviewed and followed up on alerts and treatments-. Pt is asymptomatic and in no apparent distress, speaking in full sentences.  Patient states she took her BP right after taking her medications.  She states she was in a hurry and did not allow enough time between.  She is currently on her way to some testing and is agreeable to recheck BP once she returns home. Patient reminded to allow time to rest prior to rechecking.   Will await update.    Plan/Follow Up: Will continue to review, monitor and address alerts with follow up based on severity of symptoms and risk factors.    WOO Burns [facility]/ CTN/ Remote Patient monitoring  [phone]

## 2024-10-10 ENCOUNTER — CARE COORDINATION (OUTPATIENT)
Dept: CASE MANAGEMENT | Age: 64
End: 2024-10-10

## 2024-10-10 ENCOUNTER — HOSPITAL ENCOUNTER (OUTPATIENT)
Dept: PET IMAGING | Age: 64
Discharge: HOME | End: 2024-10-12
Payer: COMMERCIAL

## 2024-10-10 DIAGNOSIS — C67.9 UROTHELIAL CARCINOMA OF BLADDER (HCC): ICD-10-CM

## 2024-10-10 PROCEDURE — 3430000000 HC RX DIAGNOSTIC RADIOPHARMACEUTICAL: Performed by: INTERNAL MEDICINE

## 2024-10-10 PROCEDURE — A9609 HC RX DIAGNOSTIC RADIOPHARMACEUTICAL: HCPCS | Performed by: INTERNAL MEDICINE

## 2024-10-10 PROCEDURE — 78815 PET IMAGE W/CT SKULL-THIGH: CPT

## 2024-10-10 RX ORDER — FLUDEOXYGLUCOSE F 18 200 MCI/ML
13.4 INJECTION, SOLUTION INTRAVENOUS
Status: COMPLETED | OUTPATIENT
Start: 2024-10-10 | End: 2024-10-10

## 2024-10-10 RX ADMIN — FLUDEOXYGLUCOSE F 18 13.4 MILLICURIE: 200 INJECTION, SOLUTION INTRAVENOUS at 10:06

## 2024-10-10 NOTE — CARE COORDINATION
Date/Time:  10/10/2024 4:09 PM  LPN attempted to reach patient by telephone regarding red alert /92 in remote patient monitoring program. Left HIPPA compliant message requesting a return call. Will attempt to reach patient again.

## 2024-10-11 ENCOUNTER — TELEMEDICINE (OUTPATIENT)
Dept: ONCOLOGY | Age: 64
End: 2024-10-11

## 2024-10-11 DIAGNOSIS — N18.4 STAGE 4 CHRONIC KIDNEY DISEASE (HCC): ICD-10-CM

## 2024-10-11 DIAGNOSIS — I73.9 PERIPHERAL VASCULAR DISEASE (HCC): ICD-10-CM

## 2024-10-11 DIAGNOSIS — C67.9 UROTHELIAL CARCINOMA OF BLADDER (HCC): ICD-10-CM

## 2024-10-11 DIAGNOSIS — I63.429 CEREBROVASCULAR ACCIDENT (CVA) DUE TO EMBOLISM OF ANTERIOR CEREBRAL ARTERY, UNSPECIFIED BLOOD VESSEL LATERALITY (HCC): Primary | ICD-10-CM

## 2024-10-11 DIAGNOSIS — N18.5 ANEMIA DUE TO STAGE 5 CHRONIC KIDNEY DISEASE, NOT ON CHRONIC DIALYSIS (HCC): ICD-10-CM

## 2024-10-11 DIAGNOSIS — D63.1 ANEMIA DUE TO STAGE 5 CHRONIC KIDNEY DISEASE, NOT ON CHRONIC DIALYSIS (HCC): ICD-10-CM

## 2024-10-11 NOTE — PROGRESS NOTES
candidate for surgery per  and the plan for concurrent chemo RT, chemotherapy in the form of biweekly gemcitabine 27 mg/m² which started on Johana 10, 2024  Admission to the hospital initially for GI bleed and second for pneumonia pleural effusion and HEAVENLY status post nephrostomy tube and dialysis and status post thoracentesis which came back negative for malignancy> follow-up with  and nephrology  In general prognosis poor with multiple comorbidity and active malignancy/poor tolerance to treatment  Transfuse to hemoglobin above 7  Status post concurrent chemo therapy radiation done on September 12  Discussed with the patient about immunotherapy for 1 year which will be in the form of Opdivo if there is no evidence of cancer progression discussed with the patient that in general the immunotherapy done after surgical resection stratification however due to her comorbidity which she got suboptimal curative intent approach will consider immunotherapy in the form of Opdivo;  Discussed toxicity profile associated with immunotherapy/Opdivo which will be given every 2 weeks(can change later if tolerance to every 4 weeks )for 1 year> I have explained that at least 20-30% of patients will experience IO related toxicity the most common of which are grade 1-2 but rarely can be severe/life threatening. The most common AE's experienced include diarrhea (colitis), rash (dermatitis), and thyroid dysfunction. Others include pneumonitis, myocarditis, myositis, hepatitis, nephritis, encephalitis etc. We discussed the ways we can medically manage toxicities of treatment> patient willing to proceed    posttreatment PET/CT suspicious for carcinomatosis but those are nonspecific finding on the abdomen> will proceed with Opdivo and follow-up imaging in 6 months  RTC after above                                           MD [facility]                            This note is created

## 2024-10-16 ENCOUNTER — CARE COORDINATION (OUTPATIENT)
Dept: CASE MANAGEMENT | Age: 64
End: 2024-10-16

## 2024-10-16 NOTE — CARE COORDINATION
Date/Time:  10/16/2024 1:29 PM  LPN attempted to reach patient by telephone regarding yellow alert no metric in 3 days in remote patient monitoring program. Left HIPPA compliant message requesting a return call. Will attempt to reach patient again.

## 2024-10-17 ENCOUNTER — TELEMEDICINE (OUTPATIENT)
Dept: ONCOLOGY | Age: 64
End: 2024-10-17
Payer: COMMERCIAL

## 2024-10-17 ENCOUNTER — CARE COORDINATION (OUTPATIENT)
Dept: CASE MANAGEMENT | Age: 64
End: 2024-10-17

## 2024-10-17 ENCOUNTER — CARE COORDINATION (OUTPATIENT)
Dept: CARE COORDINATION | Age: 64
End: 2024-10-17

## 2024-10-17 DIAGNOSIS — N18.5 ANEMIA DUE TO STAGE 5 CHRONIC KIDNEY DISEASE, NOT ON CHRONIC DIALYSIS (HCC): ICD-10-CM

## 2024-10-17 DIAGNOSIS — C67.9 UROTHELIAL CARCINOMA OF BLADDER (HCC): Primary | ICD-10-CM

## 2024-10-17 DIAGNOSIS — D63.1 ANEMIA DUE TO STAGE 5 CHRONIC KIDNEY DISEASE, NOT ON CHRONIC DIALYSIS (HCC): ICD-10-CM

## 2024-10-17 DIAGNOSIS — N18.4 STAGE 4 CHRONIC KIDNEY DISEASE (HCC): ICD-10-CM

## 2024-10-17 DIAGNOSIS — C67.9 MALIGNANT NEOPLASM OF URINARY BLADDER, UNSPECIFIED SITE (HCC): Primary | ICD-10-CM

## 2024-10-17 DIAGNOSIS — C67.9 UROTHELIAL CARCINOMA OF BLADDER (HCC): ICD-10-CM

## 2024-10-17 PROCEDURE — 99214 OFFICE O/P EST MOD 30 MIN: CPT | Performed by: INTERNAL MEDICINE

## 2024-10-17 PROCEDURE — 1036F TOBACCO NON-USER: CPT | Performed by: INTERNAL MEDICINE

## 2024-10-17 PROCEDURE — G8427 DOCREV CUR MEDS BY ELIG CLIN: HCPCS | Performed by: INTERNAL MEDICINE

## 2024-10-17 PROCEDURE — G8420 CALC BMI NORM PARAMETERS: HCPCS | Performed by: INTERNAL MEDICINE

## 2024-10-17 PROCEDURE — 3017F COLORECTAL CA SCREEN DOC REV: CPT | Performed by: INTERNAL MEDICINE

## 2024-10-17 PROCEDURE — 99211 OFF/OP EST MAY X REQ PHY/QHP: CPT | Performed by: INTERNAL MEDICINE

## 2024-10-17 PROCEDURE — G8484 FLU IMMUNIZE NO ADMIN: HCPCS | Performed by: INTERNAL MEDICINE

## 2024-10-17 RX ORDER — ONDANSETRON 2 MG/ML
8 INJECTION INTRAMUSCULAR; INTRAVENOUS
Status: CANCELLED | OUTPATIENT
Start: 2024-10-17

## 2024-10-17 RX ORDER — SODIUM CHLORIDE 9 MG/ML
INJECTION, SOLUTION INTRAVENOUS CONTINUOUS
Status: CANCELLED | OUTPATIENT
Start: 2024-10-17

## 2024-10-17 RX ORDER — HEPARIN SODIUM (PORCINE) LOCK FLUSH IV SOLN 100 UNIT/ML 100 UNIT/ML
500 SOLUTION INTRAVENOUS PRN
Status: CANCELLED | OUTPATIENT
Start: 2024-10-17

## 2024-10-17 RX ORDER — MEPERIDINE HYDROCHLORIDE 50 MG/ML
12.5 INJECTION INTRAMUSCULAR; INTRAVENOUS; SUBCUTANEOUS PRN
Status: CANCELLED | OUTPATIENT
Start: 2024-10-17

## 2024-10-17 RX ORDER — ALBUTEROL SULFATE 90 UG/1
4 INHALANT RESPIRATORY (INHALATION) PRN
Status: CANCELLED | OUTPATIENT
Start: 2024-10-17

## 2024-10-17 RX ORDER — SODIUM CHLORIDE 9 MG/ML
5-250 INJECTION, SOLUTION INTRAVENOUS PRN
Status: CANCELLED | OUTPATIENT
Start: 2024-10-17

## 2024-10-17 RX ORDER — EPINEPHRINE 1 MG/ML
0.3 INJECTION, SOLUTION, CONCENTRATE INTRAVENOUS PRN
Status: CANCELLED | OUTPATIENT
Start: 2024-10-17

## 2024-10-17 RX ORDER — DIPHENHYDRAMINE HYDROCHLORIDE 50 MG/ML
50 INJECTION INTRAMUSCULAR; INTRAVENOUS
Status: CANCELLED | OUTPATIENT
Start: 2024-10-17

## 2024-10-17 RX ORDER — ACETAMINOPHEN 325 MG/1
650 TABLET ORAL
Status: CANCELLED | OUTPATIENT
Start: 2024-10-17

## 2024-10-17 RX ORDER — FAMOTIDINE 10 MG/ML
20 INJECTION, SOLUTION INTRAVENOUS
Status: CANCELLED | OUTPATIENT
Start: 2024-10-17

## 2024-10-17 RX ORDER — SODIUM CHLORIDE 0.9 % (FLUSH) 0.9 %
5-40 SYRINGE (ML) INJECTION PRN
Status: CANCELLED | OUTPATIENT
Start: 2024-10-17

## 2024-10-17 NOTE — PROGRESS NOTES
was evaluated through a synchronous (real-time) audio-video encounter. The patient (or guardian if applicable) is aware that this is a billable service, which includes applicable co-pays. This Virtual Visit was conducted with patient's (and/or legal guardian's) consent. Patient identification was verified, and a caregiver was present when appropriate.   The patient was located at Facility (Appt Department): At home   provider was located at Facility (Appt Dept): [facility]   Total time spent for this encounter: Not billed by time      An electronic signature was used to authenticate this note.               Patient ID: Daja Espinosa, 1960, [id number], 63 y.o.  Referred by :  No ref. 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  Embolic stroke on 4/6/2024  Concurrent chemo RT as a definitive treatment started on Johana 10, 2024 (plan for continuous neoadjuvant chemotherapy abandoned as patient not surgical candidate)  Chronic kidney disease  Recurrent admission to the hospital for GI bleed, HEAVENLY, pneumonia, AVR      Hematology oncology treatment  Cisplatin gemcitabine neoadjuvant started on March 19, 2024 every 3 weeks status post 1 cycle and have stopped and the plan changed to concurrent chemo RT   concurrent chemo RT with low-dose biweekly gemcitabine 27 mg/m² started in Johana 10, 2024  Treatment interruptions due to several admission to the hospital  Eliquis      HISTORY OF PRESENT ILLNESS:    Oncologic History:    Daja Espinosa is a very pleasant 63 y.o. female.  With history of nonmuscle invasive bladder cancer status post multiple resection she lost to follow-up for almost 5 years, Patient did have a recent CT scan. This does show severe left hydronephrosis.  This is down to the level of the bladder.  The bladder does appear to be thickened on the left side.  Patient has had slight worsening of

## 2024-10-17 NOTE — CARE COORDINATION
Ambulatory Care Coordination Note     10/17/2024 12:00 PM     Patient Current Location:  Home: [address] [place], [place] [id number]     ACM contacted the patient by telephone. Verified name and  with patient as identifiers.         ACM: Thania Toscano RN     Challenges to be reviewed by the provider   Additional needs identified to be addressed with provider No  none               Method of communication with provider: none.    Has the patient been seen in the ED since your last call? no    Care Summary Note:   Incoming message from RAJESH Petty nurse. Malinda asking ACM to call patient. Patient informed Malinda that she is done with oxygen and not sure what to do with it.     Call placed to [facility]. Spoke with Shannon. Explained reason for call. Shannon states that they need a discharge order for the oxygen from the provider and then they can schedule a  of equipment as this was not a purchased item. Shannon states that if patient chooses not to have provider discharge her from oxygen, they can  equipment and have her sign a form however then without proper discharge insurance could come back and not cover. Encouraged discharge order from provider.     Spoke with Daja. Informed her of reason for call.  ACM informed Daja of call to [facility]. All details given. Daja voiced understanding. Encouraged she call PCP office as she is due for a routine visit and discuss the oxygen discharge. PCP office can fax order to [facility] then equipment be picked up if no longer needed. Daja voiced understanding and states she will call PCP office to get appointment made.     Offered patient enrollment in the Remote Patient Monitoring (RPM) program for in-home monitoring: Yes, patient enrolled; current status is activated and monitoring.     Assessments Completed:   Hypertension - Encounter Level    Symptom course: worsening          Medications Reviewed:   Patient denies any changes with medications and reports taking all

## 2024-10-17 NOTE — CARE COORDINATION
-Remote Alert Monitoring Note      Date/Time:  10/17/2024 8:33 AM  Patient Current Location: Home: [address] [address]  Verified patients name and  as identifiers.    Rpm alert to be reviewed by the provider   red alert  blood pressure reading (158/105)  Vitals Recheck blood pressure reading (na)  Additional needs to be addressed by provider: No                   LPN contacted patient by telephone regarding red alert received   Background: PNA  Refer to 911 immediately if:  Patient unresponsive or unable to provide history  Change in cognition or sudden confusion  Patient unable to respond in complete sentences  Intense chest pain/tightness  Any concern for any clinical emergency  Red Alert: Provider response time of 1 hr required for any red alert requiring intervention  Yellow Alert: Provider response time of 3hr required for any escalated yellow alert  Patient Chief Complaint:  Blood Pressure BP Triage  Are you having any Chest Pain? no   Are you having any Shortness of Breath? no   Do you have a headache or have any vision changes? no   Are you having any numbness or tingling? no   Are you having any other health concerns or issues? no     Clinical Interventions: Reviewed and followed up on alerts and treatments-Spoke to patient she denies chest pain, SOB, dizziness. Patient states she has not had any medications this am, she states she had a video call with a dr this am and they wanted to check her vitals, she will take her medication and recheck BP after 1 hour patient aware I will call her back if there is any problem, she did have question about oxygen she has at her home I will send message to [facility] to follow up with this    Plan/Follow Up: Will continue to review, monitor and address alerts with follow up based on severity of symptoms and risk factors.  **For any new or worsening symptoms or you are concerned in anyway, please contact your Provider or report to the nearest Emergency Room.**

## 2024-10-18 ENCOUNTER — OFFICE VISIT (OUTPATIENT)
Dept: UROLOGY | Age: 64
End: 2024-10-18

## 2024-10-18 ENCOUNTER — HOSPITAL ENCOUNTER (OUTPATIENT)
Dept: INFUSION THERAPY | Age: 64
Discharge: HOME | End: 2024-10-18
Payer: COMMERCIAL

## 2024-10-18 VITALS
WEIGHT: 105 LBS | TEMPERATURE: 97.3 F | RESPIRATION RATE: 18 BRPM | SYSTOLIC BLOOD PRESSURE: 153 MMHG | BODY MASS INDEX: 19.32 KG/M2 | HEIGHT: 62 IN | DIASTOLIC BLOOD PRESSURE: 83 MMHG | HEART RATE: 71 BPM

## 2024-10-18 VITALS
HEIGHT: 62 IN | BODY MASS INDEX: 19.69 KG/M2 | WEIGHT: 107 LBS | DIASTOLIC BLOOD PRESSURE: 80 MMHG | SYSTOLIC BLOOD PRESSURE: 142 MMHG

## 2024-10-18 DIAGNOSIS — C67.9 UROTHELIAL CARCINOMA OF BLADDER WITH INVASION OF MUSCLE (HCC): Primary | ICD-10-CM

## 2024-10-18 DIAGNOSIS — N13.30 HYDRONEPHROSIS, UNSPECIFIED HYDRONEPHROSIS TYPE: ICD-10-CM

## 2024-10-18 DIAGNOSIS — C67.9 UROTHELIAL CARCINOMA OF BLADDER (HCC): Primary | ICD-10-CM

## 2024-10-18 LAB
ALBUMIN SERPL-MCNC: 4.1 G/DL (ref 3.5–5.2)
ALBUMIN/GLOB SERPL: 1 {RATIO} (ref 1–2.5)
ALP SERPL-CCNC: 105 U/L (ref 35–104)
ALT SERPL-CCNC: 14 U/L (ref 10–35)
AMYLASE SERPL-CCNC: 90 U/L (ref 28–100)
ANION GAP SERPL CALCULATED.3IONS-SCNC: 13 MMOL/L (ref 9–16)
AST SERPL-CCNC: 19 U/L (ref 10–35)
BASOPHILS # BLD: 0.03 K/UL (ref 0–0.2)
BASOPHILS NFR BLD: 0 % (ref 0–2)
BILIRUB SERPL-MCNC: 0.8 MG/DL (ref 0–1.2)
BUN SERPL-MCNC: 58 MG/DL (ref 8–23)
BUN/CREAT SERPL: 28 (ref 9–20)
CALCIUM SERPL-MCNC: 9.8 MG/DL (ref 8.6–10.4)
CHLORIDE SERPL-SCNC: 95 MMOL/L (ref 98–107)
CO2 SERPL-SCNC: 24 MMOL/L (ref 20–31)
CORTIS SERPL-MCNC: 11.9 UG/DL (ref 2.5–19.5)
CORTISOL COLLECTION INFO: NORMAL
CREAT SERPL-MCNC: 2.1 MG/DL (ref 0.5–0.9)
EOSINOPHIL # BLD: 0.42 K/UL (ref 0–0.44)
EOSINOPHILS RELATIVE PERCENT: 5 % (ref 1–4)
ERYTHROCYTE [DISTWIDTH] IN BLOOD BY AUTOMATED COUNT: 17.8 % (ref 11.8–14.4)
GFR, ESTIMATED: 26 ML/MIN/1.73M2
GLUCOSE SERPL-MCNC: 116 MG/DL (ref 74–99)
HCT VFR BLD AUTO: 28.9 % (ref 36.3–47.1)
HGB BLD-MCNC: 9.5 G/DL (ref 11.9–15.1)
IMM GRANULOCYTES # BLD AUTO: 0.03 K/UL (ref 0–0.3)
IMM GRANULOCYTES NFR BLD: 0 %
LIPASE SERPL-CCNC: 37 U/L (ref 13–60)
LYMPHOCYTES NFR BLD: 1.45 K/UL (ref 1.1–3.7)
LYMPHOCYTES RELATIVE PERCENT: 16 % (ref 24–43)
MCH RBC QN AUTO: 30.9 PG (ref 25.2–33.5)
MCHC RBC AUTO-ENTMCNC: 32.9 G/DL (ref 28.4–34.8)
MCV RBC AUTO: 94.1 FL (ref 82.6–102.9)
MONOCYTES NFR BLD: 0.56 K/UL (ref 0.1–1.2)
MONOCYTES NFR BLD: 6 % (ref 3–12)
NEUTROPHILS NFR BLD: 73 % (ref 36–65)
NEUTS SEG NFR BLD: 6.43 K/UL (ref 1.5–8.1)
NRBC BLD-RTO: 0 PER 100 WBC
PLATELET # BLD AUTO: 473 K/UL (ref 138–453)
PMV BLD AUTO: 9 FL (ref 8.1–13.5)
POTASSIUM SERPL-SCNC: 3.9 MMOL/L (ref 3.7–5.3)
PROT SERPL-MCNC: 8 G/DL (ref 6.6–8.7)
RBC # BLD AUTO: 3.07 M/UL (ref 3.95–5.11)
SODIUM SERPL-SCNC: 132 MMOL/L (ref 136–145)
TSH SERPL DL<=0.05 MIU/L-ACNC: 2.64 UIU/ML (ref 0.27–4.2)
WBC OTHER # BLD: 8.9 K/UL (ref 3.5–11.3)

## 2024-10-18 PROCEDURE — 82150 ASSAY OF AMYLASE: CPT

## 2024-10-18 PROCEDURE — 83690 ASSAY OF LIPASE: CPT

## 2024-10-18 PROCEDURE — 2580000003 HC RX 258: Performed by: INTERNAL MEDICINE

## 2024-10-18 PROCEDURE — 82533 TOTAL CORTISOL: CPT

## 2024-10-18 PROCEDURE — 6360000002 HC RX W HCPCS: Performed by: INTERNAL MEDICINE

## 2024-10-18 PROCEDURE — 84443 ASSAY THYROID STIM HORMONE: CPT

## 2024-10-18 PROCEDURE — 36415 COLL VENOUS BLD VENIPUNCTURE: CPT

## 2024-10-18 PROCEDURE — 85025 COMPLETE CBC W/AUTO DIFF WBC: CPT

## 2024-10-18 PROCEDURE — 80053 COMPREHEN METABOLIC PANEL: CPT

## 2024-10-18 PROCEDURE — 36591 DRAW BLOOD OFF VENOUS DEVICE: CPT

## 2024-10-18 PROCEDURE — 96413 CHEMO IV INFUSION 1 HR: CPT

## 2024-10-18 RX ORDER — SODIUM CHLORIDE 0.9 % (FLUSH) 0.9 %
5-40 SYRINGE (ML) INJECTION PRN
Status: DISCONTINUED | OUTPATIENT
Start: 2024-10-18 | End: 2024-10-19 | Stop reason: HOSPADM

## 2024-10-18 RX ORDER — HEPARIN 100 UNIT/ML
500 SYRINGE INTRAVENOUS PRN
Status: DISCONTINUED | OUTPATIENT
Start: 2024-10-18 | End: 2024-10-19 | Stop reason: HOSPADM

## 2024-10-18 RX ORDER — SODIUM CHLORIDE 9 MG/ML
5-250 INJECTION, SOLUTION INTRAVENOUS PRN
Status: DISCONTINUED | OUTPATIENT
Start: 2024-10-18 | End: 2024-10-19 | Stop reason: HOSPADM

## 2024-10-18 RX ADMIN — SODIUM CHLORIDE 100 ML/HR: 9 INJECTION, SOLUTION INTRAVENOUS at 12:28

## 2024-10-18 RX ADMIN — SODIUM CHLORIDE 240 MG: 9 INJECTION, SOLUTION INTRAVENOUS at 12:32

## 2024-10-18 RX ADMIN — SODIUM CHLORIDE, PRESERVATIVE FREE 10 ML: 5 INJECTION INTRAVENOUS at 11:25

## 2024-10-18 RX ADMIN — HEPARIN 500 UNITS: 100 SYRINGE at 13:06

## 2024-10-18 RX ADMIN — SODIUM CHLORIDE, PRESERVATIVE FREE 10 ML: 5 INJECTION INTRAVENOUS at 11:24

## 2024-10-18 RX ADMIN — SODIUM CHLORIDE, PRESERVATIVE FREE 10 ML: 5 INJECTION INTRAVENOUS at 13:05

## 2024-10-18 RX ADMIN — SODIUM CHLORIDE, PRESERVATIVE FREE 10 ML: 5 INJECTION INTRAVENOUS at 11:26

## 2024-10-18 RX ADMIN — SODIUM CHLORIDE, PRESERVATIVE FREE 10 ML: 5 INJECTION INTRAVENOUS at 13:06

## 2024-10-18 NOTE — PROGRESS NOTES
HPI:        Patient is a 63 y.o. female in no acute distress.  She is alert and oriented to person, place, and time.        History  TURBT 4/5/2016 High Grade Ta  TURBT re-resection 5/17/2016 High Grade Ta  6 Weekly BCG instuillations starting 6/16/16  3 weekly BCG starting 7/20/17     Lost to follow-up in 2019 2/2024 Patient is here today as a new patient.  Patient was well-known to our practice several years ago.  She did have multiple bladder resections.  She was lost to follow-up approximately 5 years ago.  Patient did have a recent CT scan.  This film was independently reviewed.  This does show severe left hydronephrosis.  This is down to the level of the bladder.  The bladder does appear to be thickened on the left side.  Patient has had slight worsening of her creatinine.  Patient has been having some left-sided flank pain.  Is unclear how long this has been going on.  Patient has no unintentional weight loss or decreased appetite.  She reports no nausea or vomiting.     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     4/2024 patient opted for chemotherapy and radiation therapy versus radical cystectomy to 2 surgical risk.  Started cisplatin/gemcitabine.      .  Currently  Patient is here today after having her left-sided percutaneous nephrostomy tube changed.  Patient did have a recent whole-body PET scan.  This film was independently reviewed.  This did not show any specific metastasis.  Radiology does feel like her left hydronephrosis has improved since her tube was changed.  This has been draining well.  No recent gross hematuria.  Patient's tube has not been leaking around.  No pain today.    Past Medical History:   Diagnosis Date    Alcohol abuse 03/02/2017    Arthritis     Bladder cancer (HCC) 2016    CAD (coronary artery disease)     Cancer (HCC) 2001    vulvar-    Carotid artery

## 2024-10-21 RX ORDER — APIXABAN 2.5 MG/1
2.5 TABLET, FILM COATED ORAL 2 TIMES DAILY
Qty: 60 TABLET | Refills: 1 | Status: SHIPPED | OUTPATIENT
Start: 2024-10-21

## 2024-10-25 ENCOUNTER — TELEPHONE (OUTPATIENT)
Dept: ONCOLOGY | Age: 64
End: 2024-10-25

## 2024-10-25 NOTE — TELEPHONE ENCOUNTER
Contacted patient she had L/M stating she started new medication of infusion on Friday 10/18 and wanted to report some symptoms. Patient  Stated she was having some diarrhea and abdominal pain and looked up the side effects of opdivo and she stated that those are some symptoms. Patient stated she will try to take a tylenol for pain or imodium to help with diarrhea I had stated that should help but if  I Had stated that there is no doctor here today and advised if it got worse to report to the ER over the weekend. She voiced understanding and agreed.

## 2024-10-28 ENCOUNTER — CARE COORDINATION (OUTPATIENT)
Dept: CASE MANAGEMENT | Age: 64
End: 2024-10-28

## 2024-10-28 NOTE — CARE COORDINATION
-Remote Alert Monitoring Note      Date/Time:  10/28/2024 12:59 PM  Patient Current Location: Home: [address] [place], [place] [id number]  Verified patients name and  as identifiers.    Rpm alert to be reviewed by the provider   red alert  blood pressure reading (136/108)  Vitals Recheck blood pressure reading (128/73)  Additional needs to be addressed by provider: No                   LPN contacted patient by telephone regarding red alert received   Background: PNA  Refer to 911 immediately if:  Patient unresponsive or unable to provide history  Change in cognition or sudden confusion  Patient unable to respond in complete sentences  Intense chest pain/tightness  Any concern for any clinical emergency  Red Alert: Provider response time of 1 hr required for any red alert requiring intervention  Yellow Alert: Provider response time of 3hr required for any escalated yellow alert  Patient Chief Complaint:  Blood Pressure BP Triage  Are you having any Chest Pain? no   Are you having any Shortness of Breath? no   Do you have a headache or have any vision changes? no   Are you having any numbness or tingling? no   Are you having any other health concerns or issues? no     Clinical Interventions: Reviewed and followed up on alerts and treatments-Spoke to patient she denies chest pain, dizziness SOB states she had a stomach bug last week but feels way better today  repeat BP is now 128/73 no concerns at present time   Plan/Follow Up: Will continue to review, monitor and address alerts with follow up based on severity of symptoms and risk factors.  **For any new or worsening symptoms or you are concerned in anyway, please contact your Provider or report to the nearest Emergency Room.**

## 2024-10-29 RX ORDER — PANTOPRAZOLE SODIUM 40 MG/1
40 TABLET, DELAYED RELEASE ORAL
Qty: 30 TABLET | Refills: 3 | Status: SHIPPED | OUTPATIENT
Start: 2024-10-29

## 2024-10-29 RX ORDER — FLECAINIDE ACETATE 50 MG/1
50 TABLET ORAL EVERY 12 HOURS SCHEDULED
Qty: 60 TABLET | Refills: 3 | Status: SHIPPED | OUTPATIENT
Start: 2024-10-29

## 2024-10-29 NOTE — TELEPHONE ENCOUNTER
Health Maintenance   Topic Date Due    Pneumococcal 0-64 years Vaccine (1 of 2 - PCV) Never done    DTaP/Tdap/Td vaccine (1 - Tdap) Never done    Shingles vaccine (1 of 2) Never done    Hepatitis B vaccine (1 of 3 - Risk Dialysis 4-dose series) Never done    Cervical cancer screen  02/06/2021    COVID-19 Vaccine (3 - Pfizer risk series) 10/01/2021    Respiratory Syncytial Virus (RSV) Pregnant or age 60 yrs+ (1 - 1-dose 60+ series) Never done    Breast cancer screen  12/03/2023    Flu vaccine (1) Never done    Depression Screen  02/06/2025    A1C test (Diabetic or Prediabetic)  04/07/2025    Lipids  06/03/2025    GFR test (Diabetes, CKD 3-4, OR last GFR 15-59)  10/18/2025    Colorectal Cancer Screen  11/01/2031    Hepatitis C screen  Completed    HIV screen  Addressed    Hepatitis A vaccine  Aged Out    Hib vaccine  Aged Out    Polio vaccine  Aged Out    Meningococcal (ACWY) vaccine  Aged Out             (applicable per patient's age: Cancer Screenings, Depression Screening, Fall Risk Screening, Immunizations)    Hemoglobin A1C (%)   Date Value   04/07/2024 5.9   03/15/2018 5.2   03/02/2017 5.8     AST (U/L)   Date Value   10/18/2024 19     ALT (U/L)   Date Value   10/18/2024 14     BUN (mg/dL)   Date Value   10/18/2024 58 (H)      (goal A1C is < 7)   (goal LDL is <100) need 30-50% reduction from baseline     BP Readings from Last 3 Encounters:   10/18/24 (!) 153/83   10/18/24 (!) 142/80   10/09/24 (!) 190/97    (goal /80)      All Future Testing planned in [facility]:  Lab Frequency Next Occurrence   DAGO DIGITAL SCREEN W OR WO CAD BILATERAL Once 11/16/2023   TSH with Reflex Once 01/23/2024   CBC with Auto Differential Once 01/23/2024   Comprehensive Metabolic Panel Once 01/23/2024   Lipid Panel Once 01/23/2024   Magnesium Once 01/23/2024   Vitamin D 25 Hydroxy Once 01/23/2024   EKG 12 Lead Once 01/23/2024   BUN & Creatinine Once 01/17/2024   CT 3D RECONSTRUCTION Once 01/17/2024   Echo (TTE) complete (PRN

## 2024-10-30 ENCOUNTER — HOSPITAL ENCOUNTER (OUTPATIENT)
Dept: ULTRASOUND IMAGING | Age: 64
Discharge: HOME | End: 2024-11-01
Attending: UROLOGY
Payer: COMMERCIAL

## 2024-10-30 ENCOUNTER — CARE COORDINATION (OUTPATIENT)
Dept: CASE MANAGEMENT | Age: 64
End: 2024-10-30

## 2024-10-30 ENCOUNTER — HOSPITAL ENCOUNTER (OUTPATIENT)
Age: 64
Discharge: HOME | End: 2024-10-30
Attending: UROLOGY
Payer: COMMERCIAL

## 2024-10-30 DIAGNOSIS — C67.9 UROTHELIAL CARCINOMA OF BLADDER WITH INVASION OF MUSCLE (HCC): ICD-10-CM

## 2024-10-30 DIAGNOSIS — N13.30 HYDRONEPHROSIS, UNSPECIFIED HYDRONEPHROSIS TYPE: ICD-10-CM

## 2024-10-30 DIAGNOSIS — N18.32 STAGE 3B CHRONIC KIDNEY DISEASE (HCC): ICD-10-CM

## 2024-10-30 LAB
25(OH)D3 SERPL-MCNC: 20 NG/ML (ref 30–100)
ALBUMIN SERPL-MCNC: 3.9 G/DL (ref 3.5–5.2)
ANION GAP SERPL CALCULATED.3IONS-SCNC: 16 MMOL/L (ref 9–16)
BUN SERPL-MCNC: 53 MG/DL (ref 8–23)
BUN/CREAT SERPL: 24 (ref 9–20)
CALCIUM SERPL-MCNC: 9.7 MG/DL (ref 8.6–10.4)
CHLORIDE SERPL-SCNC: 96 MMOL/L (ref 98–107)
CO2 SERPL-SCNC: 19 MMOL/L (ref 20–31)
CREAT SERPL-MCNC: 2.2 MG/DL (ref 0.5–0.9)
ERYTHROCYTE [DISTWIDTH] IN BLOOD BY AUTOMATED COUNT: 15.8 % (ref 11.8–14.4)
GFR, ESTIMATED: 25 ML/MIN/1.73M2
GLUCOSE SERPL-MCNC: 127 MG/DL (ref 74–99)
HCT VFR BLD AUTO: 29.9 % (ref 36.3–47.1)
HGB BLD-MCNC: 9.7 G/DL (ref 11.9–15.1)
MCH RBC QN AUTO: 30.4 PG (ref 25.2–33.5)
MCHC RBC AUTO-ENTMCNC: 32.4 G/DL (ref 28.4–34.8)
MCV RBC AUTO: 93.7 FL (ref 82.6–102.9)
NRBC BLD-RTO: 0 PER 100 WBC
PHOSPHATE SERPL-MCNC: 4 MG/DL (ref 2.5–4.5)
PLATELET # BLD AUTO: 482 K/UL (ref 138–453)
PMV BLD AUTO: 9 FL (ref 8.1–13.5)
POTASSIUM SERPL-SCNC: 3.9 MMOL/L (ref 3.7–5.3)
PTH-INTACT SERPL-MCNC: 131 PG/ML (ref 15–65)
RBC # BLD AUTO: 3.19 M/UL (ref 3.95–5.11)
SODIUM SERPL-SCNC: 131 MMOL/L (ref 136–145)
WBC OTHER # BLD: 9.2 K/UL (ref 3.5–11.3)

## 2024-10-30 PROCEDURE — 82306 VITAMIN D 25 HYDROXY: CPT

## 2024-10-30 PROCEDURE — 82040 ASSAY OF SERUM ALBUMIN: CPT

## 2024-10-30 PROCEDURE — 84100 ASSAY OF PHOSPHORUS: CPT

## 2024-10-30 PROCEDURE — 83970 ASSAY OF PARATHORMONE: CPT

## 2024-10-30 PROCEDURE — 80048 BASIC METABOLIC PNL TOTAL CA: CPT

## 2024-10-30 PROCEDURE — 85027 COMPLETE CBC AUTOMATED: CPT

## 2024-10-30 PROCEDURE — 36415 COLL VENOUS BLD VENIPUNCTURE: CPT

## 2024-10-30 PROCEDURE — 76775 US EXAM ABDO BACK WALL LIM: CPT

## 2024-10-30 NOTE — CARE COORDINATION
No metrics entered in RPM for 2 days  [employer] message sent to pt  Daja Espinosa , I am a nurse with the remote patient monitoring team;  reaching out to you today to remind you to please take your vitals. Important: Please try to take your vitals each day before 12pm. This ensures the monitoring team will have time to connect with your providers and get back to you with any new orders or instructions.    Enrolled in RPM FOR PNEUMONIA

## 2024-10-31 ENCOUNTER — CARE COORDINATION (OUTPATIENT)
Dept: CASE MANAGEMENT | Age: 64
End: 2024-10-31

## 2024-10-31 ENCOUNTER — TELEPHONE (OUTPATIENT)
Dept: UROLOGY | Age: 64
End: 2024-10-31

## 2024-10-31 DIAGNOSIS — N13.30 HYDRONEPHROSIS, UNSPECIFIED HYDRONEPHROSIS TYPE: Primary | ICD-10-CM

## 2024-10-31 NOTE — TELEPHONE ENCOUNTER
Patient informed of lab results and the provider's instructions to repeat on 11/12/2024.  The patient verbalizes understanding of all information.

## 2024-10-31 NOTE — CARE COORDINATION
Date/Time:  10/31/2024 1:06 PM  LPN attempted to reach patient by telephone regarding  no metrics x 3 days  in remote patient monitoring program. Left HIPPA compliant message requesting a return call. Will attempt to reach patient again.    Enrolled in RPM for pneumonia

## 2024-11-04 ENCOUNTER — CARE COORDINATION (OUTPATIENT)
Dept: CASE MANAGEMENT | Age: 64
End: 2024-11-04

## 2024-11-04 ENCOUNTER — HOSPITAL ENCOUNTER (OUTPATIENT)
Dept: INFUSION THERAPY | Age: 64
Discharge: HOME | End: 2024-11-04
Payer: COMMERCIAL

## 2024-11-04 ENCOUNTER — OFFICE VISIT (OUTPATIENT)
Dept: ONCOLOGY | Age: 64
End: 2024-11-04
Payer: COMMERCIAL

## 2024-11-04 VITALS
BODY MASS INDEX: 19.02 KG/M2 | HEART RATE: 69 BPM | DIASTOLIC BLOOD PRESSURE: 87 MMHG | TEMPERATURE: 97.8 F | WEIGHT: 104 LBS | SYSTOLIC BLOOD PRESSURE: 160 MMHG | RESPIRATION RATE: 16 BRPM

## 2024-11-04 VITALS
WEIGHT: 104.9 LBS | SYSTOLIC BLOOD PRESSURE: 160 MMHG | HEART RATE: 69 BPM | TEMPERATURE: 97.8 F | BODY MASS INDEX: 19.3 KG/M2 | RESPIRATION RATE: 18 BRPM | HEIGHT: 62 IN | DIASTOLIC BLOOD PRESSURE: 87 MMHG

## 2024-11-04 DIAGNOSIS — C67.9 UROTHELIAL CARCINOMA OF BLADDER (HCC): Primary | ICD-10-CM

## 2024-11-04 DIAGNOSIS — C67.9 MALIGNANT NEOPLASM OF URINARY BLADDER, UNSPECIFIED SITE (HCC): ICD-10-CM

## 2024-11-04 DIAGNOSIS — N18.4 STAGE 4 CHRONIC KIDNEY DISEASE (HCC): ICD-10-CM

## 2024-11-04 DIAGNOSIS — K52.1 DIARRHEA DUE TO DRUG: ICD-10-CM

## 2024-11-04 DIAGNOSIS — T45.1X5A ADVERSE EFFECT OF CHEMOTHERAPY, INITIAL ENCOUNTER: ICD-10-CM

## 2024-11-04 LAB
ALBUMIN SERPL-MCNC: 3.8 G/DL (ref 3.5–5.2)
ALBUMIN/GLOB SERPL: 0.9 {RATIO} (ref 1–2.5)
ALP SERPL-CCNC: 115 U/L (ref 35–104)
ALT SERPL-CCNC: 13 U/L (ref 10–35)
AMYLASE SERPL-CCNC: 85 U/L (ref 28–100)
ANION GAP SERPL CALCULATED.3IONS-SCNC: 12 MMOL/L (ref 9–16)
AST SERPL-CCNC: 17 U/L (ref 10–35)
BASOPHILS # BLD: 0.07 K/UL (ref 0–0.2)
BASOPHILS NFR BLD: 1 % (ref 0–2)
BILIRUB SERPL-MCNC: 0.6 MG/DL (ref 0–1.2)
BUN SERPL-MCNC: 60 MG/DL (ref 8–23)
BUN/CREAT SERPL: 29 (ref 9–20)
CALCIUM SERPL-MCNC: 9.5 MG/DL (ref 8.6–10.4)
CHLORIDE SERPL-SCNC: 96 MMOL/L (ref 98–107)
CO2 SERPL-SCNC: 22 MMOL/L (ref 20–31)
CREAT SERPL-MCNC: 2.1 MG/DL (ref 0.5–0.9)
EOSINOPHIL # BLD: 0.72 K/UL (ref 0–0.44)
EOSINOPHILS RELATIVE PERCENT: 6 % (ref 1–4)
ERYTHROCYTE [DISTWIDTH] IN BLOOD BY AUTOMATED COUNT: 15.5 % (ref 11.8–14.4)
GFR, ESTIMATED: 27 ML/MIN/1.73M2
GLUCOSE SERPL-MCNC: 90 MG/DL (ref 74–99)
HCT VFR BLD AUTO: 26.8 % (ref 36.3–47.1)
HGB BLD-MCNC: 9 G/DL (ref 11.9–15.1)
IMM GRANULOCYTES # BLD AUTO: 0.03 K/UL (ref 0–0.3)
IMM GRANULOCYTES NFR BLD: 0 %
LIPASE SERPL-CCNC: 37 U/L (ref 13–60)
LYMPHOCYTES NFR BLD: 1.4 K/UL (ref 1.1–3.7)
LYMPHOCYTES RELATIVE PERCENT: 11 % (ref 24–43)
MCH RBC QN AUTO: 31 PG (ref 25.2–33.5)
MCHC RBC AUTO-ENTMCNC: 33.6 G/DL (ref 28.4–34.8)
MCV RBC AUTO: 92.4 FL (ref 82.6–102.9)
MONOCYTES NFR BLD: 0.87 K/UL (ref 0.1–1.2)
MONOCYTES NFR BLD: 7 % (ref 3–12)
NEUTROPHILS NFR BLD: 75 % (ref 36–65)
NEUTS SEG NFR BLD: 10.03 K/UL (ref 1.5–8.1)
NRBC BLD-RTO: 0 PER 100 WBC
PLATELET # BLD AUTO: 500 K/UL (ref 138–453)
PMV BLD AUTO: 8.8 FL (ref 8.1–13.5)
POTASSIUM SERPL-SCNC: 4.3 MMOL/L (ref 3.7–5.3)
PROT SERPL-MCNC: 7.9 G/DL (ref 6.6–8.7)
RBC # BLD AUTO: 2.9 M/UL (ref 3.95–5.11)
SODIUM SERPL-SCNC: 130 MMOL/L (ref 136–145)
TSH SERPL DL<=0.05 MIU/L-ACNC: 3 UIU/ML (ref 0.27–4.2)
WBC OTHER # BLD: 13.1 K/UL (ref 3.5–11.3)

## 2024-11-04 PROCEDURE — 83690 ASSAY OF LIPASE: CPT

## 2024-11-04 PROCEDURE — 2580000003 HC RX 258: Performed by: INTERNAL MEDICINE

## 2024-11-04 PROCEDURE — G8420 CALC BMI NORM PARAMETERS: HCPCS | Performed by: INTERNAL MEDICINE

## 2024-11-04 PROCEDURE — 36591 DRAW BLOOD OFF VENOUS DEVICE: CPT

## 2024-11-04 PROCEDURE — 82150 ASSAY OF AMYLASE: CPT

## 2024-11-04 PROCEDURE — 96413 CHEMO IV INFUSION 1 HR: CPT

## 2024-11-04 PROCEDURE — 3017F COLORECTAL CA SCREEN DOC REV: CPT | Performed by: INTERNAL MEDICINE

## 2024-11-04 PROCEDURE — 80053 COMPREHEN METABOLIC PANEL: CPT

## 2024-11-04 PROCEDURE — G8484 FLU IMMUNIZE NO ADMIN: HCPCS | Performed by: INTERNAL MEDICINE

## 2024-11-04 PROCEDURE — 3077F SYST BP >= 140 MM HG: CPT | Performed by: INTERNAL MEDICINE

## 2024-11-04 PROCEDURE — 85025 COMPLETE CBC W/AUTO DIFF WBC: CPT

## 2024-11-04 PROCEDURE — 6360000002 HC RX W HCPCS: Performed by: INTERNAL MEDICINE

## 2024-11-04 PROCEDURE — G8427 DOCREV CUR MEDS BY ELIG CLIN: HCPCS | Performed by: INTERNAL MEDICINE

## 2024-11-04 PROCEDURE — 82533 TOTAL CORTISOL: CPT

## 2024-11-04 PROCEDURE — 84443 ASSAY THYROID STIM HORMONE: CPT

## 2024-11-04 PROCEDURE — 3079F DIAST BP 80-89 MM HG: CPT | Performed by: INTERNAL MEDICINE

## 2024-11-04 PROCEDURE — 99214 OFFICE O/P EST MOD 30 MIN: CPT | Performed by: INTERNAL MEDICINE

## 2024-11-04 PROCEDURE — 1036F TOBACCO NON-USER: CPT | Performed by: INTERNAL MEDICINE

## 2024-11-04 RX ORDER — MEPERIDINE HYDROCHLORIDE 50 MG/ML
12.5 INJECTION INTRAMUSCULAR; INTRAVENOUS; SUBCUTANEOUS PRN
Status: CANCELLED | OUTPATIENT
Start: 2024-11-04

## 2024-11-04 RX ORDER — SODIUM CHLORIDE 9 MG/ML
5-250 INJECTION, SOLUTION INTRAVENOUS PRN
Status: CANCELLED | OUTPATIENT
Start: 2024-11-04

## 2024-11-04 RX ORDER — EPINEPHRINE 1 MG/ML
0.3 INJECTION, SOLUTION, CONCENTRATE INTRAVENOUS PRN
Status: CANCELLED | OUTPATIENT
Start: 2024-11-04

## 2024-11-04 RX ORDER — SODIUM CHLORIDE 0.9 % (FLUSH) 0.9 %
5-40 SYRINGE (ML) INJECTION PRN
Status: CANCELLED | OUTPATIENT
Start: 2024-11-04

## 2024-11-04 RX ORDER — ONDANSETRON 2 MG/ML
8 INJECTION INTRAMUSCULAR; INTRAVENOUS
Status: CANCELLED | OUTPATIENT
Start: 2024-11-04

## 2024-11-04 RX ORDER — ALBUTEROL SULFATE 90 UG/1
4 INHALANT RESPIRATORY (INHALATION) PRN
Status: CANCELLED | OUTPATIENT
Start: 2024-11-04

## 2024-11-04 RX ORDER — HEPARIN SODIUM (PORCINE) LOCK FLUSH IV SOLN 100 UNIT/ML 100 UNIT/ML
500 SOLUTION INTRAVENOUS PRN
Status: CANCELLED | OUTPATIENT
Start: 2024-11-04

## 2024-11-04 RX ORDER — SODIUM CHLORIDE 9 MG/ML
5-250 INJECTION, SOLUTION INTRAVENOUS PRN
Status: DISCONTINUED | OUTPATIENT
Start: 2024-11-04 | End: 2024-11-05 | Stop reason: HOSPADM

## 2024-11-04 RX ORDER — ACETAMINOPHEN 325 MG/1
650 TABLET ORAL
Status: CANCELLED | OUTPATIENT
Start: 2024-11-04

## 2024-11-04 RX ORDER — SODIUM CHLORIDE 9 MG/ML
INJECTION, SOLUTION INTRAVENOUS CONTINUOUS
Status: CANCELLED | OUTPATIENT
Start: 2024-11-04

## 2024-11-04 RX ORDER — HEPARIN 100 UNIT/ML
500 SYRINGE INTRAVENOUS PRN
Status: DISCONTINUED | OUTPATIENT
Start: 2024-11-04 | End: 2024-11-05 | Stop reason: HOSPADM

## 2024-11-04 RX ORDER — DIPHENHYDRAMINE HYDROCHLORIDE 50 MG/ML
50 INJECTION INTRAMUSCULAR; INTRAVENOUS
Status: CANCELLED | OUTPATIENT
Start: 2024-11-04

## 2024-11-04 RX ORDER — SODIUM CHLORIDE 0.9 % (FLUSH) 0.9 %
5-40 SYRINGE (ML) INJECTION PRN
Status: DISCONTINUED | OUTPATIENT
Start: 2024-11-04 | End: 2024-11-05 | Stop reason: HOSPADM

## 2024-11-04 RX ORDER — FAMOTIDINE 10 MG/ML
20 INJECTION, SOLUTION INTRAVENOUS
Status: CANCELLED | OUTPATIENT
Start: 2024-11-04

## 2024-11-04 RX ADMIN — SODIUM CHLORIDE, PRESERVATIVE FREE 10 ML: 5 INJECTION INTRAVENOUS at 11:15

## 2024-11-04 RX ADMIN — SODIUM CHLORIDE 240 MG: 9 INJECTION, SOLUTION INTRAVENOUS at 12:28

## 2024-11-04 RX ADMIN — SODIUM CHLORIDE, PRESERVATIVE FREE 10 ML: 5 INJECTION INTRAVENOUS at 12:59

## 2024-11-04 RX ADMIN — HEPARIN 500 UNITS: 100 SYRINGE at 13:00

## 2024-11-04 RX ADMIN — SODIUM CHLORIDE, PRESERVATIVE FREE 10 ML: 5 INJECTION INTRAVENOUS at 11:16

## 2024-11-04 RX ADMIN — SODIUM CHLORIDE, PRESERVATIVE FREE 10 ML: 5 INJECTION INTRAVENOUS at 13:00

## 2024-11-04 RX ADMIN — SODIUM CHLORIDE 100 ML/HR: 9 INJECTION, SOLUTION INTRAVENOUS at 12:11

## 2024-11-04 NOTE — PROGRESS NOTES
Patient ID: Daja Espinosa, 1960, [id number], 63 y.o.  Referred by :  No ref. 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  Embolic stroke on 4/6/2024  Concurrent chemo RT as a definitive treatment started on Johana 10, 2024 (plan for continuous neoadjuvant chemotherapy abandoned as patient not surgical candidate)  Chronic kidney disease  Recurrent admission to the hospital for GI bleed, HEAVENLY, pneumonia, AVR      Hematology oncology treatment  Cisplatin gemcitabine neoadjuvant started on March 19, 2024 every 3 weeks status post 1 cycle and have stopped and the plan changed to concurrent chemo RT   concurrent chemo RT with low-dose biweekly gemcitabine 27 mg/m² started in Johana 10, 2024  Treatment interruptions due to several admission to the hospital  Eliquis  Started immunotherapy with Opdivo on 10/18/2024      HISTORY OF PRESENT ILLNESS:    Oncologic History:    Daja Espinosa is a very pleasant 63 y.o. female.  With history of nonmuscle invasive bladder cancer status post multiple resection she lost to follow-up for almost 5 years, Patient did have a recent CT scan. This does show severe left hydronephrosis.  This is down to the level of the bladder.  The bladder does appear to be thickened on the left side.  Patient has had slight worsening of her creatinine.  Patient has been having some left-sided flank pain.  Patient has no unintentional weight loss or decreased appetite.  She reports no nausea or vomiting.     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   Pain significantly improved she still have intermittent hematuria and she was referred for neoadjuvant chemotherapy  CT chest abdomen pelvis no evidence of metastasis  Did have scattered atherosclerosis and stenosis of the celiac

## 2024-11-04 NOTE — CARE COORDINATION
Date/Time:  11/4/2024 11:15 AM  LPN attempted to reach patient by telephone regarding yellow alert in remote patient monitoring program. Left HIPPA compliant message requesting a return call. Will attempt to reach patient again.    RPM yellow alert for /91  Enrolled in RPM for pneumonia

## 2024-11-04 NOTE — CARE COORDINATION
Date/Time:  11/4/2024 2:41 PM  LPN attempted to reach patient by telephone regarding yellow alert in remote patient monitoring program. Left HIPPA compliant message requesting a return call. Will attempt to reach patient again.    [facility] yellow alert for /91  Enrolled in RPM for pneumonia  Second attempt  no answer.no return call  Update to [facility]

## 2024-11-05 LAB — CORTIS SERPL-MCNC: 14 UG/DL (ref 2.5–19.5)

## 2024-11-08 ENCOUNTER — CARE COORDINATION (OUTPATIENT)
Dept: CARE COORDINATION | Age: 64
End: 2024-11-08

## 2024-11-08 NOTE — CARE COORDINATION
Remote Patient Monitoring Note      Date/Time:  11/8/2024 1:49 PM    LPN attempted to contact patient by telephone regarding yellow alert received for no metrics for >2 days  .     Background: Pneumonia     Clinical Interventions: VM full, unable to LM.    Plan/Follow Up: Will continue to review, monitor and address alerts with follow up based on severity of symptoms and risk factors.       [facility]/ CTN/ Remote Patient Monitoring  [phone]

## 2024-11-11 ENCOUNTER — CARE COORDINATION (OUTPATIENT)
Dept: CARE COORDINATION | Age: 64
End: 2024-11-11

## 2024-11-11 NOTE — CARE COORDINATION
Remote Patient Monitoring Note      Date/Time:  11/11/2024 2:57 PM    LPN attempted to contact patient by telephone regarding yellow alert received for no metrics for 6 days  .     Background:  Pneumonia     Clinical Interventions: NO answer, unable to LM. Day 6 notes sent to ACM.    Plan/Follow Up: Will continue to review, monitor and address alerts with follow up based on severity of symptoms and risk factors.       Jenny Howard LPN  [facility]/ CTN/ Remote Patient Monitoring  [phone]

## 2024-11-12 ENCOUNTER — HOSPITAL ENCOUNTER (OUTPATIENT)
Age: 64
Discharge: HOME | End: 2024-11-12
Payer: COMMERCIAL

## 2024-11-12 ENCOUNTER — CARE COORDINATION (OUTPATIENT)
Dept: CASE MANAGEMENT | Age: 64
End: 2024-11-12

## 2024-11-12 DIAGNOSIS — N13.30 HYDRONEPHROSIS, UNSPECIFIED HYDRONEPHROSIS TYPE: ICD-10-CM

## 2024-11-12 LAB
ANION GAP SERPL CALCULATED.3IONS-SCNC: 14 MMOL/L (ref 9–17)
BUN SERPL-MCNC: 72 MG/DL (ref 8–23)
BUN/CREAT SERPL: 34 (ref 9–20)
CALCIUM SERPL-MCNC: 9.3 MG/DL (ref 8.6–10.4)
CHLORIDE SERPL-SCNC: 94 MMOL/L (ref 98–107)
CO2 SERPL-SCNC: 20 MMOL/L (ref 20–31)
CREAT SERPL-MCNC: 2.1 MG/DL (ref 0.5–0.9)
GFR, ESTIMATED: 26 ML/MIN/1.73M2
GLUCOSE SERPL-MCNC: 104 MG/DL (ref 70–99)
POTASSIUM SERPL-SCNC: 5.2 MMOL/L (ref 3.7–5.3)
SODIUM SERPL-SCNC: 128 MMOL/L (ref 135–144)

## 2024-11-12 PROCEDURE — 36415 COLL VENOUS BLD VENIPUNCTURE: CPT

## 2024-11-12 PROCEDURE — 80048 BASIC METABOLIC PNL TOTAL CA: CPT

## 2024-11-12 NOTE — CARE COORDINATION
Date/Time:  11/12/2024 2:14 PM  LPN attempted to reach patient by telephone regarding red alert BP WEIGHT in remote patient monitoring program. Left HIPAA compliant message requesting a return call. Will attempt to reach patient again.

## 2024-11-12 NOTE — CARE COORDINATION
Date/Time:  11/12/2024 12:18 PM  LPN attempted to reach patient by telephone regarding red alert BP WEIGHT in remote patient monitoring program. Left HIPAA compliant message requesting a return call. Will attempt to reach patient again.

## 2024-11-13 ENCOUNTER — HOSPITAL ENCOUNTER (OUTPATIENT)
Age: 64
Setting detail: SPECIMEN
Discharge: HOME | End: 2024-11-13
Payer: COMMERCIAL

## 2024-11-13 ENCOUNTER — OFFICE VISIT (OUTPATIENT)
Dept: UROLOGY | Age: 64
End: 2024-11-13
Payer: COMMERCIAL

## 2024-11-13 ENCOUNTER — CARE COORDINATION (OUTPATIENT)
Dept: CARE COORDINATION | Age: 64
End: 2024-11-13

## 2024-11-13 VITALS
BODY MASS INDEX: 19.51 KG/M2 | TEMPERATURE: 97.3 F | SYSTOLIC BLOOD PRESSURE: 124 MMHG | WEIGHT: 106 LBS | DIASTOLIC BLOOD PRESSURE: 68 MMHG | HEIGHT: 62 IN

## 2024-11-13 DIAGNOSIS — C68.9 TRANSITIONAL CELL CARCINOMA (HCC): ICD-10-CM

## 2024-11-13 DIAGNOSIS — R39.15 URGENCY OF URINATION: ICD-10-CM

## 2024-11-13 DIAGNOSIS — R39.15 URGENCY OF URINATION: Primary | ICD-10-CM

## 2024-11-13 DIAGNOSIS — N13.30 HYDRONEPHROSIS, UNSPECIFIED HYDRONEPHROSIS TYPE: ICD-10-CM

## 2024-11-13 DIAGNOSIS — C67.9 UROTHELIAL CARCINOMA OF BLADDER WITH INVASION OF MUSCLE (HCC): ICD-10-CM

## 2024-11-13 LAB
BACTERIA URNS QL MICRO: ABNORMAL
BILIRUB UR QL STRIP: NEGATIVE
CLARITY UR: ABNORMAL
COLOR UR: YELLOW
EPI CELLS #/AREA URNS HPF: ABNORMAL /HPF (ref 0–25)
GLUCOSE UR STRIP-MCNC: NEGATIVE MG/DL
HGB UR QL STRIP.AUTO: ABNORMAL
KETONES UR STRIP-MCNC: NEGATIVE MG/DL
LEUKOCYTE ESTERASE UR QL STRIP: ABNORMAL
NITRITE UR QL STRIP: POSITIVE
PH UR STRIP: 7 [PH] (ref 5–9)
PROT UR STRIP-MCNC: ABNORMAL MG/DL
RBC #/AREA URNS HPF: ABNORMAL /HPF (ref 0–2)
SP GR UR STRIP: 1.02 (ref 1.01–1.02)
UROBILINOGEN UR STRIP-ACNC: NORMAL EU/DL (ref 0–1)
WBC #/AREA URNS HPF: ABNORMAL /HPF (ref 0–5)

## 2024-11-13 PROCEDURE — 1036F TOBACCO NON-USER: CPT | Performed by: UROLOGY

## 2024-11-13 PROCEDURE — 3074F SYST BP LT 130 MM HG: CPT | Performed by: UROLOGY

## 2024-11-13 PROCEDURE — 81001 URINALYSIS AUTO W/SCOPE: CPT

## 2024-11-13 PROCEDURE — 3078F DIAST BP <80 MM HG: CPT | Performed by: UROLOGY

## 2024-11-13 PROCEDURE — G8427 DOCREV CUR MEDS BY ELIG CLIN: HCPCS | Performed by: UROLOGY

## 2024-11-13 PROCEDURE — 87086 URINE CULTURE/COLONY COUNT: CPT

## 2024-11-13 PROCEDURE — G8484 FLU IMMUNIZE NO ADMIN: HCPCS | Performed by: UROLOGY

## 2024-11-13 PROCEDURE — 3017F COLORECTAL CA SCREEN DOC REV: CPT | Performed by: UROLOGY

## 2024-11-13 PROCEDURE — G8420 CALC BMI NORM PARAMETERS: HCPCS | Performed by: UROLOGY

## 2024-11-13 PROCEDURE — 99214 OFFICE O/P EST MOD 30 MIN: CPT | Performed by: UROLOGY

## 2024-11-13 NOTE — CARE COORDINATION
Ambulatory Care Coordination Note     11/13/2024      Patient outreach attempt by this ACM today to perform care management follow up . ACM was unable to reach the patient by telephone today;   left voice message requesting a return phone call to this ACM.     ACM: Thania Toscano, RN     Care Summary Note: chart reviewed. Reviewed RPM metrics in HRS. Did note vitals from Urology visit today.     PCP/Specialist follow up:   Future Appointments         Provider Specialty Dept Phone    11/18/2024 9:15 AM Familia Shepard MD Oncology [phone]    11/18/2024 10:00 AM SCHEDULE, [facility] MED ONC CHAIR 2 Infusion Therapy [phone]    12/5/2024 2:00 PM Tulio Holder MD Urology [phone]    2/11/2025 11:00 AM Terrell Cortez MD Cardiology [phone]            Follow Up:   Plan for next ACM outreach in approximately 1 week to complete:  - disease specific assessments  - SDOH assessments  - goal progression  - education   - RPM.

## 2024-11-14 ENCOUNTER — CARE COORDINATION (OUTPATIENT)
Dept: CASE MANAGEMENT | Age: 64
End: 2024-11-14

## 2024-11-14 LAB
MICROORGANISM SPEC CULT: NO GROWTH
SERVICE CMNT-IMP: NORMAL
SPECIMEN DESCRIPTION: NORMAL

## 2024-11-14 NOTE — CARE COORDINATION
No metrics entered in RPM for 2 days. [employer] message sent to pt  Daja Espinosa , I am a nurse with the remote patient monitoring team;  reaching out to you today to remind you to please take your vitals. Important: Please try to take your vitals each day before 12pm. This ensures the monitoring team will have time to connect with your providers and get back to you with any new orders or instructions.    Enrolled in RPM for Pneumonia

## 2024-11-15 ENCOUNTER — TELEPHONE (OUTPATIENT)
Dept: UROLOGY | Age: 64
End: 2024-11-15

## 2024-11-15 NOTE — TELEPHONE ENCOUNTER
Phone call to patient, patient was advised of providers comment and voiced understanding with no further questions.

## 2024-11-15 NOTE — TELEPHONE ENCOUNTER
----- Message from MARGARITA Gagnon - CNP sent at 11/15/2024  8:46 AM EST -----  Call pt - urine cx reviewed and negative for UTI

## 2024-11-18 ENCOUNTER — TELEMEDICINE (OUTPATIENT)
Dept: ONCOLOGY | Age: 64
End: 2024-11-18
Payer: COMMERCIAL

## 2024-11-18 ENCOUNTER — CARE COORDINATION (OUTPATIENT)
Dept: CASE MANAGEMENT | Age: 64
End: 2024-11-18

## 2024-11-18 ENCOUNTER — HOSPITAL ENCOUNTER (OUTPATIENT)
Dept: INFUSION THERAPY | Age: 64
Discharge: HOME | End: 2024-11-18
Payer: COMMERCIAL

## 2024-11-18 VITALS
WEIGHT: 105 LBS | HEIGHT: 62 IN | HEART RATE: 65 BPM | TEMPERATURE: 97.7 F | SYSTOLIC BLOOD PRESSURE: 173 MMHG | DIASTOLIC BLOOD PRESSURE: 91 MMHG | BODY MASS INDEX: 19.32 KG/M2 | RESPIRATION RATE: 18 BRPM

## 2024-11-18 DIAGNOSIS — C67.9 MALIGNANT NEOPLASM OF URINARY BLADDER, UNSPECIFIED SITE (HCC): ICD-10-CM

## 2024-11-18 DIAGNOSIS — D63.1 ANEMIA DUE TO STAGE 5 CHRONIC KIDNEY DISEASE, NOT ON CHRONIC DIALYSIS (HCC): ICD-10-CM

## 2024-11-18 DIAGNOSIS — C67.9 UROTHELIAL CARCINOMA OF BLADDER (HCC): Primary | ICD-10-CM

## 2024-11-18 DIAGNOSIS — N13.30 HYDRONEPHROSIS OF LEFT KIDNEY: ICD-10-CM

## 2024-11-18 DIAGNOSIS — K52.1 DIARRHEA DUE TO DRUG: ICD-10-CM

## 2024-11-18 DIAGNOSIS — N18.4 STAGE 4 CHRONIC KIDNEY DISEASE (HCC): ICD-10-CM

## 2024-11-18 DIAGNOSIS — N18.5 ANEMIA DUE TO STAGE 5 CHRONIC KIDNEY DISEASE, NOT ON CHRONIC DIALYSIS (HCC): ICD-10-CM

## 2024-11-18 DIAGNOSIS — D63.8 ANEMIA DUE TO CHRONIC ILLNESS: ICD-10-CM

## 2024-11-18 LAB
ALBUMIN SERPL-MCNC: 3.9 G/DL (ref 3.5–5.2)
ALBUMIN/GLOB SERPL: 1.1 {RATIO} (ref 1–2.5)
ALP SERPL-CCNC: 93 U/L (ref 35–104)
ALT SERPL-CCNC: 10 U/L (ref 10–35)
AMYLASE SERPL-CCNC: 105 U/L (ref 28–100)
ANION GAP SERPL CALCULATED.3IONS-SCNC: 11 MMOL/L (ref 9–16)
AST SERPL-CCNC: 16 U/L (ref 10–35)
BASOPHILS # BLD: 0.06 K/UL (ref 0–0.2)
BASOPHILS NFR BLD: 1 % (ref 0–2)
BILIRUB SERPL-MCNC: 0.3 MG/DL (ref 0–1.2)
BUN SERPL-MCNC: 78 MG/DL (ref 8–23)
BUN/CREAT SERPL: 29 (ref 9–20)
CALCIUM SERPL-MCNC: 9.4 MG/DL (ref 8.6–10.4)
CHLORIDE SERPL-SCNC: 96 MMOL/L (ref 98–107)
CO2 SERPL-SCNC: 23 MMOL/L (ref 20–31)
CORTIS SERPL-MCNC: 15 UG/DL (ref 2.5–19.5)
CREAT SERPL-MCNC: 2.7 MG/DL (ref 0.5–0.9)
EOSINOPHIL # BLD: 0.72 K/UL (ref 0–0.44)
EOSINOPHILS RELATIVE PERCENT: 8 % (ref 1–4)
ERYTHROCYTE [DISTWIDTH] IN BLOOD BY AUTOMATED COUNT: 14.8 % (ref 11.8–14.4)
GFR, ESTIMATED: 20 ML/MIN/1.73M2
GLUCOSE SERPL-MCNC: 99 MG/DL (ref 74–99)
HCT VFR BLD AUTO: 26.9 % (ref 36.3–47.1)
HGB BLD-MCNC: 8.8 G/DL (ref 11.9–15.1)
IMM GRANULOCYTES # BLD AUTO: 0.11 K/UL (ref 0–0.3)
IMM GRANULOCYTES NFR BLD: 1 %
LIPASE SERPL-CCNC: 54 U/L (ref 13–60)
LYMPHOCYTES NFR BLD: 1.44 K/UL (ref 1.1–3.7)
LYMPHOCYTES RELATIVE PERCENT: 15 % (ref 24–43)
MCH RBC QN AUTO: 29.9 PG (ref 25.2–33.5)
MCHC RBC AUTO-ENTMCNC: 32.7 G/DL (ref 28.4–34.8)
MCV RBC AUTO: 91.5 FL (ref 82.6–102.9)
MONOCYTES NFR BLD: 0.76 K/UL (ref 0.1–1.2)
MONOCYTES NFR BLD: 8 % (ref 3–12)
NEUTROPHILS NFR BLD: 67 % (ref 36–65)
NEUTS SEG NFR BLD: 6.37 K/UL (ref 1.5–8.1)
NRBC BLD-RTO: 0 PER 100 WBC
PLATELET # BLD AUTO: 615 K/UL (ref 138–453)
PMV BLD AUTO: 8.5 FL (ref 8.1–13.5)
POTASSIUM SERPL-SCNC: 4.9 MMOL/L (ref 3.7–5.3)
PROT SERPL-MCNC: 7.3 G/DL (ref 6.6–8.7)
RBC # BLD AUTO: 2.94 M/UL (ref 3.95–5.11)
SODIUM SERPL-SCNC: 130 MMOL/L (ref 136–145)
TSH SERPL DL<=0.05 MIU/L-ACNC: 2.52 UIU/ML (ref 0.27–4.2)
WBC OTHER # BLD: 9.5 K/UL (ref 3.5–11.3)

## 2024-11-18 PROCEDURE — 96413 CHEMO IV INFUSION 1 HR: CPT

## 2024-11-18 PROCEDURE — 99214 OFFICE O/P EST MOD 30 MIN: CPT | Performed by: INTERNAL MEDICINE

## 2024-11-18 PROCEDURE — 82533 TOTAL CORTISOL: CPT

## 2024-11-18 PROCEDURE — 85025 COMPLETE CBC W/AUTO DIFF WBC: CPT

## 2024-11-18 PROCEDURE — 84443 ASSAY THYROID STIM HORMONE: CPT

## 2024-11-18 PROCEDURE — 2580000003 HC RX 258: Performed by: INTERNAL MEDICINE

## 2024-11-18 PROCEDURE — 82150 ASSAY OF AMYLASE: CPT

## 2024-11-18 PROCEDURE — 36591 DRAW BLOOD OFF VENOUS DEVICE: CPT

## 2024-11-18 PROCEDURE — 3017F COLORECTAL CA SCREEN DOC REV: CPT | Performed by: INTERNAL MEDICINE

## 2024-11-18 PROCEDURE — 80053 COMPREHEN METABOLIC PANEL: CPT

## 2024-11-18 PROCEDURE — 83690 ASSAY OF LIPASE: CPT

## 2024-11-18 PROCEDURE — G8427 DOCREV CUR MEDS BY ELIG CLIN: HCPCS | Performed by: INTERNAL MEDICINE

## 2024-11-18 PROCEDURE — 6360000002 HC RX W HCPCS: Performed by: INTERNAL MEDICINE

## 2024-11-18 RX ORDER — SODIUM CHLORIDE 9 MG/ML
INJECTION, SOLUTION INTRAVENOUS CONTINUOUS
Status: CANCELLED | OUTPATIENT
Start: 2024-11-18

## 2024-11-18 RX ORDER — MEPERIDINE HYDROCHLORIDE 50 MG/ML
12.5 INJECTION INTRAMUSCULAR; INTRAVENOUS; SUBCUTANEOUS PRN
Status: CANCELLED | OUTPATIENT
Start: 2024-11-18

## 2024-11-18 RX ORDER — ACETAMINOPHEN 325 MG/1
650 TABLET ORAL
Status: CANCELLED | OUTPATIENT
Start: 2024-11-18

## 2024-11-18 RX ORDER — SODIUM CHLORIDE 0.9 % (FLUSH) 0.9 %
5-40 SYRINGE (ML) INJECTION PRN
Status: CANCELLED | OUTPATIENT
Start: 2024-11-18

## 2024-11-18 RX ORDER — SODIUM CHLORIDE 9 MG/ML
5-250 INJECTION, SOLUTION INTRAVENOUS PRN
Status: CANCELLED | OUTPATIENT
Start: 2024-11-18

## 2024-11-18 RX ORDER — HYDROCORTISONE SODIUM SUCCINATE 100 MG/2ML
100 INJECTION INTRAMUSCULAR; INTRAVENOUS
Status: CANCELLED | OUTPATIENT
Start: 2024-11-18

## 2024-11-18 RX ORDER — ONDANSETRON 2 MG/ML
8 INJECTION INTRAMUSCULAR; INTRAVENOUS
Status: CANCELLED | OUTPATIENT
Start: 2024-11-18

## 2024-11-18 RX ORDER — HEPARIN 100 UNIT/ML
500 SYRINGE INTRAVENOUS PRN
Status: DISCONTINUED | OUTPATIENT
Start: 2024-11-18 | End: 2024-11-19 | Stop reason: HOSPADM

## 2024-11-18 RX ORDER — DIPHENHYDRAMINE HYDROCHLORIDE 50 MG/ML
50 INJECTION INTRAMUSCULAR; INTRAVENOUS
Status: CANCELLED | OUTPATIENT
Start: 2024-11-18

## 2024-11-18 RX ORDER — ALBUTEROL SULFATE 90 UG/1
4 INHALANT RESPIRATORY (INHALATION) PRN
Status: CANCELLED | OUTPATIENT
Start: 2024-11-18

## 2024-11-18 RX ORDER — HEPARIN SODIUM (PORCINE) LOCK FLUSH IV SOLN 100 UNIT/ML 100 UNIT/ML
500 SOLUTION INTRAVENOUS PRN
Status: CANCELLED | OUTPATIENT
Start: 2024-11-18

## 2024-11-18 RX ORDER — FAMOTIDINE 10 MG/ML
20 INJECTION, SOLUTION INTRAVENOUS
Status: CANCELLED | OUTPATIENT
Start: 2024-11-18

## 2024-11-18 RX ORDER — SODIUM CHLORIDE 0.9 % (FLUSH) 0.9 %
5-40 SYRINGE (ML) INJECTION PRN
Status: DISCONTINUED | OUTPATIENT
Start: 2024-11-18 | End: 2024-11-19 | Stop reason: HOSPADM

## 2024-11-18 RX ORDER — EPINEPHRINE 1 MG/ML
0.3 INJECTION, SOLUTION, CONCENTRATE INTRAVENOUS PRN
Status: CANCELLED | OUTPATIENT
Start: 2024-11-18

## 2024-11-18 RX ADMIN — SODIUM CHLORIDE 240 MG: 9 INJECTION, SOLUTION INTRAVENOUS at 11:44

## 2024-11-18 RX ADMIN — HEPARIN 500 UNITS: 100 SYRINGE at 12:18

## 2024-11-18 RX ADMIN — SODIUM CHLORIDE, PRESERVATIVE FREE 10 ML: 5 INJECTION INTRAVENOUS at 12:17

## 2024-11-18 RX ADMIN — SODIUM CHLORIDE, PRESERVATIVE FREE 10 ML: 5 INJECTION INTRAVENOUS at 12:18

## 2024-11-18 NOTE — PROGRESS NOTES
was evaluated through a synchronous (real-time) audio-video encounter. The patient (or guardian if applicable) is aware that this is a billable service, which includes applicable co-pays. This Virtual Visit was conducted with patient's (and/or legal guardian's) consent. Patient identification was verified, and a caregiver was present when appropriate.   The patient was located at Facility (Appt Department): [facility]   [facility] [place] [facility]   Total time spent for this encounter: Not billed by time     An electronic signature was used to authenticate this note.               Patient ID: Daja Espinosa, 1960, [id number], 63 y.o.  Referred by :  No ref. 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  Embolic stroke on 4/6/2024  Concurrent chemo RT as a definitive treatment started on Johana 10, 2024 (plan for continuous neoadjuvant chemotherapy abandoned as patient not surgical candidate)  Chronic kidney disease  Recurrent admission to the hospital for GI bleed, HEAVENLY, pneumonia, AVR      Hematology oncology treatment  Cisplatin gemcitabine neoadjuvant started on March 19, 2024 every 3 weeks status post 1 cycle and have stopped and the plan changed to concurrent chemo RT   concurrent chemo RT with low-dose biweekly gemcitabine 27 mg/m² started in Johana 10, 2024  Treatment interruptions due to several admission to the hospital  Eliquis  Started immunotherapy with Opdivo on 10/18/2024      HISTORY OF PRESENT ILLNESS:    Oncologic History:    Daja Espinosa is a very pleasant 63 y.o. female.  With history of nonmuscle invasive bladder cancer status post multiple resection she lost to follow-up for almost 5 years, Patient did have a recent CT scan. This does show severe left hydronephrosis.  This is down to the level of the bladder.  The bladder does appear to

## 2024-11-18 NOTE — CARE COORDINATION
-Remote Alert Monitoring Note      Date/Time:  2024 3:30 PM  Patient Current Location: Home: [address] [place] [address]  Verified patients name and  as identifiers.    Rpm alert to be reviewed by the provider   red alert  blood pressure reading (182/95)  Vitals Recheck blood pressure reading (na)  Additional needs to be addressed by provider: No                   LPN contacted patient by telephone regarding red alert received   Background: PNA  Refer to 911 immediately if:  Patient unresponsive or unable to provide history  Change in cognition or sudden confusion  Patient unable to respond in complete sentences  Intense chest pain/tightness  Any concern for any clinical emergency  Red Alert: Provider response time of 1 hr required for any red alert requiring intervention  Yellow Alert: Provider response time of 3hr required for any escalated yellow alert  Patient Chief Complaint:  Blood Pressure BP Triage  Are you having any Chest Pain? no   Are you having any Shortness of Breath? no   Do you have a headache or have any vision changes? no   Are you having any numbness or tingling? no   Are you having any other health concerns or issues? no     Clinical Interventions: Reviewed and followed up on alerts and treatments-spoke to patient she denies chest pain, SOB dizziness states she feels better then last week, she did have immunotherapy this am and noted her BP elevated at appointment . States she has no concerns and we can address BP tomorrow with PCP if still elevated    Plan/Follow Up: Will continue to review, monitor and address alerts with follow up based on severity of symptoms and risk factors.  **For any new or worsening symptoms or you are concerned in anyway, please contact your Provider or report to the nearest Emergency Room.**

## 2024-11-18 NOTE — PLAN OF CARE
Problem: Safety - Adult  Goal: Free from fall injury  11/18/2024 0940 by Ankita Velez, RN  Outcome: Adequate for Discharge  11/18/2024 0933 by Ankita Velez, RN  Outcome: Adequate for Discharge

## 2024-11-18 NOTE — PROGRESS NOTES
Pt seen and assessed by Dr Shepard via VV. Pt states she is feeling much better this week.  Ordered to proceed with treatment once labs are resulted. Upon receiving the lab results creatinine noted to be 2.7 which is higher than where she has been running. Messaged Dr Shepard with this result and asked if it was OK to proceed with treatment. Orders received to proceed with Opdivo as ordered. No other orders at this time.

## 2024-11-18 NOTE — CARE COORDINATION
Date/Time:  11/18/2024 1:44 PM  LPN attempted to reach patient and EC by telephone regarding yellow alert NO METRICS IN 6 DAYS in remote patient monitoring program. Left HIPAA compliant message requesting a return call. ACM NOTIFIED

## 2024-11-19 RX ORDER — HYDRALAZINE HYDROCHLORIDE 25 MG/1
TABLET, FILM COATED ORAL
Qty: 60 TABLET | Refills: 3 | Status: SHIPPED | OUTPATIENT
Start: 2024-11-19

## 2024-11-25 RX ORDER — AMLODIPINE BESYLATE 5 MG/1
5 TABLET ORAL DAILY
Qty: 90 TABLET | Refills: 0 | Status: SHIPPED | OUTPATIENT
Start: 2024-11-25

## 2024-11-26 ENCOUNTER — CARE COORDINATION (OUTPATIENT)
Dept: CARE COORDINATION | Age: 64
End: 2024-11-26

## 2024-11-26 NOTE — CARE COORDINATION
Ambulatory Care Coordination Note     11/26/2024      Patient outreach attempt by this ACM today to perform care management follow up . ACM was unable to reach the patient by telephone today;   left voice message requesting a return phone call to this ACM.     ACM: Thania Toscano, RN     Care Summary Note: chart reviewed from last ACM outreach-present. RPM metrics reviewed in HRS- noted no metrics entered in 3 days.     PCP/Specialist follow up:   Future Appointments         Provider Specialty Dept Phone    12/2/2024 11:00 AM SCHEDULE, [facility] MED ONC FAST TRACK CHAIR 1 Infusion Therapy [phone]    12/2/2024 11:15 AM Familia Shepard MD Oncology [phone]    12/5/2024 2:00 PM Tulio Holder MD Urology [phone]    2/11/2025 11:00 AM Terrell Cortez MD Cardiology [phone]            Follow Up:   Plan for next AC outreach in approximately 1- 2 weeks to complete:  - disease specific assessments  - goal progression  - education   - RPM. Graduate or discontinue

## 2024-11-27 ENCOUNTER — CARE COORDINATION (OUTPATIENT)
Dept: CASE MANAGEMENT | Age: 64
End: 2024-11-27

## 2024-11-27 NOTE — CARE COORDINATION
Date/Time:  11/27/2024 1:48 PM  LPN attempted to reach patient by telephone regarding yellow alert no metrics in 3 days in remote patient monitoring program. Left HIPAA compliant message requesting a return call. Will attempt to reach patient again.

## 2024-12-02 ENCOUNTER — HOSPITAL ENCOUNTER (OUTPATIENT)
Dept: INFUSION THERAPY | Age: 64
Discharge: HOME | End: 2024-12-02
Payer: COMMERCIAL

## 2024-12-02 ENCOUNTER — CARE COORDINATION (OUTPATIENT)
Dept: CASE MANAGEMENT | Age: 64
End: 2024-12-02

## 2024-12-02 ENCOUNTER — OFFICE VISIT (OUTPATIENT)
Dept: ONCOLOGY | Age: 64
End: 2024-12-02
Payer: COMMERCIAL

## 2024-12-02 VITALS
DIASTOLIC BLOOD PRESSURE: 76 MMHG | HEART RATE: 65 BPM | RESPIRATION RATE: 16 BRPM | SYSTOLIC BLOOD PRESSURE: 161 MMHG | WEIGHT: 109 LBS | BODY MASS INDEX: 19.94 KG/M2 | TEMPERATURE: 96.8 F

## 2024-12-02 VITALS — HEIGHT: 62 IN | BODY MASS INDEX: 20.06 KG/M2 | TEMPERATURE: 96.8 F | WEIGHT: 109 LBS

## 2024-12-02 DIAGNOSIS — C67.9 UROTHELIAL CARCINOMA OF BLADDER (HCC): Primary | ICD-10-CM

## 2024-12-02 DIAGNOSIS — Z86.73 HISTORY OF STROKE: ICD-10-CM

## 2024-12-02 DIAGNOSIS — C67.9 MALIGNANT NEOPLASM OF URINARY BLADDER, UNSPECIFIED SITE (HCC): ICD-10-CM

## 2024-12-02 DIAGNOSIS — N18.4 STAGE 4 CHRONIC KIDNEY DISEASE (HCC): ICD-10-CM

## 2024-12-02 DIAGNOSIS — I48.91 ATRIAL FIBRILLATION WITH RAPID VENTRICULAR RESPONSE (HCC): Primary | ICD-10-CM

## 2024-12-02 DIAGNOSIS — D68.59 HYPERCOAGULABLE STATE (HCC): ICD-10-CM

## 2024-12-02 DIAGNOSIS — D53.9 MACROCYTIC ANEMIA: ICD-10-CM

## 2024-12-02 DIAGNOSIS — C67.9 UROTHELIAL CARCINOMA OF BLADDER (HCC): ICD-10-CM

## 2024-12-02 LAB
ALBUMIN SERPL-MCNC: 3.8 G/DL (ref 3.5–5.2)
ALBUMIN/GLOB SERPL: 1.1 {RATIO} (ref 1–2.5)
ALP SERPL-CCNC: 99 U/L (ref 35–104)
ALT SERPL-CCNC: 15 U/L (ref 10–35)
AMYLASE SERPL-CCNC: 80 U/L (ref 28–100)
ANION GAP SERPL CALCULATED.3IONS-SCNC: 11 MMOL/L (ref 9–16)
AST SERPL-CCNC: 20 U/L (ref 10–35)
BASOPHILS # BLD: 0.03 K/UL (ref 0–0.2)
BASOPHILS NFR BLD: 1 % (ref 0–2)
BILIRUB SERPL-MCNC: 0.5 MG/DL (ref 0–1.2)
BUN SERPL-MCNC: 39 MG/DL (ref 8–23)
BUN/CREAT SERPL: 23 (ref 9–20)
CALCIUM SERPL-MCNC: 9.2 MG/DL (ref 8.6–10.4)
CHLORIDE SERPL-SCNC: 98 MMOL/L (ref 98–107)
CO2 SERPL-SCNC: 23 MMOL/L (ref 20–31)
CORTIS SERPL-MCNC: 14.4 UG/DL (ref 2.5–19.5)
CORTISOL COLLECTION INFO: 1115
CREAT SERPL-MCNC: 1.7 MG/DL (ref 0.5–0.9)
EOSINOPHIL # BLD: 0.45 K/UL (ref 0–0.44)
EOSINOPHILS RELATIVE PERCENT: 8 % (ref 1–4)
ERYTHROCYTE [DISTWIDTH] IN BLOOD BY AUTOMATED COUNT: 15.1 % (ref 11.8–14.4)
GFR, ESTIMATED: 35 ML/MIN/1.73M2
GLUCOSE SERPL-MCNC: 117 MG/DL (ref 74–99)
HCT VFR BLD AUTO: 27.1 % (ref 36.3–47.1)
HGB BLD-MCNC: 8.9 G/DL (ref 11.9–15.1)
IMM GRANULOCYTES # BLD AUTO: <0.03 K/UL (ref 0–0.3)
IMM GRANULOCYTES NFR BLD: 0 %
LIPASE SERPL-CCNC: 39 U/L (ref 13–60)
LYMPHOCYTES NFR BLD: 0.97 K/UL (ref 1.1–3.7)
LYMPHOCYTES RELATIVE PERCENT: 17 % (ref 24–43)
MCH RBC QN AUTO: 30.3 PG (ref 25.2–33.5)
MCHC RBC AUTO-ENTMCNC: 32.8 G/DL (ref 28.4–34.8)
MCV RBC AUTO: 92.2 FL (ref 82.6–102.9)
MONOCYTES NFR BLD: 0.65 K/UL (ref 0.1–1.2)
MONOCYTES NFR BLD: 11 % (ref 3–12)
NEUTROPHILS NFR BLD: 63 % (ref 36–65)
NEUTS SEG NFR BLD: 3.74 K/UL (ref 1.5–8.1)
NRBC BLD-RTO: 0 PER 100 WBC
PLATELET # BLD AUTO: 414 K/UL (ref 138–453)
PMV BLD AUTO: 8.7 FL (ref 8.1–13.5)
POTASSIUM SERPL-SCNC: 3.7 MMOL/L (ref 3.7–5.3)
PROT SERPL-MCNC: 7.2 G/DL (ref 6.6–8.7)
RBC # BLD AUTO: 2.94 M/UL (ref 3.95–5.11)
SODIUM SERPL-SCNC: 132 MMOL/L (ref 136–145)
TSH SERPL DL<=0.05 MIU/L-ACNC: 3.33 UIU/ML (ref 0.27–4.2)
WBC OTHER # BLD: 5.9 K/UL (ref 3.5–11.3)

## 2024-12-02 PROCEDURE — G8484 FLU IMMUNIZE NO ADMIN: HCPCS | Performed by: INTERNAL MEDICINE

## 2024-12-02 PROCEDURE — G8420 CALC BMI NORM PARAMETERS: HCPCS | Performed by: INTERNAL MEDICINE

## 2024-12-02 PROCEDURE — 84443 ASSAY THYROID STIM HORMONE: CPT

## 2024-12-02 PROCEDURE — 3078F DIAST BP <80 MM HG: CPT | Performed by: INTERNAL MEDICINE

## 2024-12-02 PROCEDURE — 99214 OFFICE O/P EST MOD 30 MIN: CPT | Performed by: INTERNAL MEDICINE

## 2024-12-02 PROCEDURE — 82150 ASSAY OF AMYLASE: CPT

## 2024-12-02 PROCEDURE — 3077F SYST BP >= 140 MM HG: CPT | Performed by: INTERNAL MEDICINE

## 2024-12-02 PROCEDURE — 83690 ASSAY OF LIPASE: CPT

## 2024-12-02 PROCEDURE — 96413 CHEMO IV INFUSION 1 HR: CPT

## 2024-12-02 PROCEDURE — 36591 DRAW BLOOD OFF VENOUS DEVICE: CPT

## 2024-12-02 PROCEDURE — 6360000002 HC RX W HCPCS: Performed by: INTERNAL MEDICINE

## 2024-12-02 PROCEDURE — 80053 COMPREHEN METABOLIC PANEL: CPT

## 2024-12-02 PROCEDURE — 1036F TOBACCO NON-USER: CPT | Performed by: INTERNAL MEDICINE

## 2024-12-02 PROCEDURE — 2580000003 HC RX 258: Performed by: INTERNAL MEDICINE

## 2024-12-02 PROCEDURE — 82533 TOTAL CORTISOL: CPT

## 2024-12-02 PROCEDURE — 3017F COLORECTAL CA SCREEN DOC REV: CPT | Performed by: INTERNAL MEDICINE

## 2024-12-02 PROCEDURE — 85025 COMPLETE CBC W/AUTO DIFF WBC: CPT

## 2024-12-02 PROCEDURE — G8427 DOCREV CUR MEDS BY ELIG CLIN: HCPCS | Performed by: INTERNAL MEDICINE

## 2024-12-02 RX ORDER — ONDANSETRON 2 MG/ML
8 INJECTION INTRAMUSCULAR; INTRAVENOUS
Status: CANCELLED | OUTPATIENT
Start: 2024-12-02

## 2024-12-02 RX ORDER — SODIUM CHLORIDE 9 MG/ML
5-250 INJECTION, SOLUTION INTRAVENOUS PRN
Status: CANCELLED | OUTPATIENT
Start: 2024-12-02

## 2024-12-02 RX ORDER — FAMOTIDINE 10 MG/ML
20 INJECTION, SOLUTION INTRAVENOUS
Status: CANCELLED | OUTPATIENT
Start: 2024-12-02

## 2024-12-02 RX ORDER — SODIUM CHLORIDE 0.9 % (FLUSH) 0.9 %
5-40 SYRINGE (ML) INJECTION PRN
Status: CANCELLED | OUTPATIENT
Start: 2024-12-02

## 2024-12-02 RX ORDER — ALBUTEROL SULFATE 90 UG/1
4 INHALANT RESPIRATORY (INHALATION) PRN
Status: CANCELLED | OUTPATIENT
Start: 2024-12-02

## 2024-12-02 RX ORDER — HEPARIN SODIUM (PORCINE) LOCK FLUSH IV SOLN 100 UNIT/ML 100 UNIT/ML
500 SOLUTION INTRAVENOUS PRN
Status: CANCELLED | OUTPATIENT
Start: 2024-12-02

## 2024-12-02 RX ORDER — MEPERIDINE HYDROCHLORIDE 50 MG/ML
12.5 INJECTION INTRAMUSCULAR; INTRAVENOUS; SUBCUTANEOUS PRN
Status: CANCELLED | OUTPATIENT
Start: 2024-12-02

## 2024-12-02 RX ORDER — HEPARIN 100 UNIT/ML
500 SYRINGE INTRAVENOUS PRN
Status: DISCONTINUED | OUTPATIENT
Start: 2024-12-02 | End: 2024-12-03 | Stop reason: HOSPADM

## 2024-12-02 RX ORDER — ACETAMINOPHEN 325 MG/1
650 TABLET ORAL
Status: CANCELLED | OUTPATIENT
Start: 2024-12-02

## 2024-12-02 RX ORDER — HYDROCORTISONE SODIUM SUCCINATE 100 MG/2ML
100 INJECTION INTRAMUSCULAR; INTRAVENOUS
Status: CANCELLED | OUTPATIENT
Start: 2024-12-02

## 2024-12-02 RX ORDER — DIPHENHYDRAMINE HYDROCHLORIDE 50 MG/ML
50 INJECTION INTRAMUSCULAR; INTRAVENOUS
Status: CANCELLED | OUTPATIENT
Start: 2024-12-02

## 2024-12-02 RX ORDER — SODIUM CHLORIDE 9 MG/ML
INJECTION, SOLUTION INTRAVENOUS CONTINUOUS
Status: CANCELLED | OUTPATIENT
Start: 2024-12-02

## 2024-12-02 RX ORDER — SODIUM CHLORIDE 9 MG/ML
5-250 INJECTION, SOLUTION INTRAVENOUS PRN
Status: DISCONTINUED | OUTPATIENT
Start: 2024-12-02 | End: 2024-12-03 | Stop reason: HOSPADM

## 2024-12-02 RX ORDER — EPINEPHRINE 1 MG/ML
0.3 INJECTION, SOLUTION, CONCENTRATE INTRAVENOUS PRN
Status: CANCELLED | OUTPATIENT
Start: 2024-12-02

## 2024-12-02 RX ORDER — SODIUM CHLORIDE 0.9 % (FLUSH) 0.9 %
5-40 SYRINGE (ML) INJECTION PRN
Status: DISCONTINUED | OUTPATIENT
Start: 2024-12-02 | End: 2024-12-03 | Stop reason: HOSPADM

## 2024-12-02 RX ADMIN — SODIUM CHLORIDE, PRESERVATIVE FREE 10 ML: 5 INJECTION INTRAVENOUS at 13:06

## 2024-12-02 RX ADMIN — SODIUM CHLORIDE, PRESERVATIVE FREE 10 ML: 5 INJECTION INTRAVENOUS at 13:07

## 2024-12-02 RX ADMIN — SODIUM CHLORIDE, PRESERVATIVE FREE 10 ML: 5 INJECTION INTRAVENOUS at 11:15

## 2024-12-02 RX ADMIN — SODIUM CHLORIDE 240 MG: 9 INJECTION, SOLUTION INTRAVENOUS at 12:34

## 2024-12-02 RX ADMIN — HEPARIN 500 UNITS: 100 SYRINGE at 13:07

## 2024-12-02 RX ADMIN — SODIUM CHLORIDE, PRESERVATIVE FREE 10 ML: 5 INJECTION INTRAVENOUS at 11:16

## 2024-12-02 NOTE — CARE COORDINATION
Date/Time:  12/2/2024 2:47 PM  LPN attempted to reach patient by telephone regarding red alert in remote patient monitoring program. Left HIPAA compliant message requesting a return call. Will attempt to reach patient again.

## 2024-12-02 NOTE — CARE COORDINATION
Date/Time:  12/2/2024 10:48 AM  LPN attempted to reach patient by telephone regarding red alert in remote patient monitoring program. Left HIPAA compliant message requesting a return call. Will attempt to reach patient again.

## 2024-12-02 NOTE — PROGRESS NOTES
CALCIUM 9.2 12/02/2024 1115    ALKPHOS 99 12/02/2024 1115    AST 20 12/02/2024 1115    ALT 15 12/02/2024 1115    BILITOT 0.5 12/02/2024 1115            PATHOLOGY DATA:     Value: Path Number: [id number]    -- Diagnosis --  Urinary bladder, transurethral resection of tumor:  High-grade urothelial carcinoma with squamous differentiation invading  detrusor muscle.    -- Diagnosis Comment --  Slides are reviewed by a second Pathologist who concurs with the  diagnosis (MERA).      RITO Yung.  **Electronically Signed Out**        sf/3/1/2024     Clinical Information  Pre-Op Diagnosis:  BLADDER MASS  Operative Findings:  BLADDER TUMOR CHIPS  Operation Performed:  CYSTOSCOPY  POSS TRANSURETHRAL RESECTION OF  BLADDER TUMOR, FULGURATION; CYSTOSCOPY URETERAL STENT INSERTION  mj        Source of Specimen  A: BLADDER TUMOR CHIPS      Gross Description  \"OTILIA KO, BLADDER TUMOR CHIPS\" Received in formalin are rubbery  tan-pink fragments 2.7 x 2.3 x 0.3 cm in aggregate.  Entirely 1cs.  kb       Keri Moise/mj:2/28/2024  Microscopic Description  1 H&E reviewed.  Microscopic examination performed.      Processing Lab:  [address] [id number]  Interpretation Performed at [address] [id number]    SURGICAL PATHOLOGY CONSULTATION       Patient Name: OTILIA KO  [facility] Rec: [id number]  [facility]  [facility]  [facility]  ANATOMIC PATHOLOGY  [address] [place] [phone]  Fax: [phone]       IMAGING DATA:      US RENAL LIMITED  Narrative: EXAMINATION:  ULTRASOUND OF THE KIDNEYS    10/30/2024 10:34 am    COMPARISON:  None.    HISTORY:  ORDERING SYSTEM PROVIDED HISTORY: Urothelial carcinoma of bladder with  invasion of muscle (HCC)  TECHNOLOGIST PROVIDED HISTORY:  This procedure can be scheduled via [employer].    FINDINGS:  The right kidney measures 8 cm in length and

## 2024-12-04 ENCOUNTER — HOSPITAL ENCOUNTER (OUTPATIENT)
Age: 64
Discharge: HOME | End: 2024-12-04
Payer: COMMERCIAL

## 2024-12-04 DIAGNOSIS — C68.9 TRANSITIONAL CELL CARCINOMA (HCC): ICD-10-CM

## 2024-12-04 DIAGNOSIS — N13.30 HYDRONEPHROSIS, UNSPECIFIED HYDRONEPHROSIS TYPE: ICD-10-CM

## 2024-12-04 DIAGNOSIS — C67.9 UROTHELIAL CARCINOMA OF BLADDER WITH INVASION OF MUSCLE (HCC): ICD-10-CM

## 2024-12-04 LAB
ANION GAP SERPL CALCULATED.3IONS-SCNC: 15 MMOL/L (ref 9–17)
BUN SERPL-MCNC: 44 MG/DL (ref 8–23)
BUN/CREAT SERPL: 24 (ref 9–20)
CALCIUM SERPL-MCNC: 9.6 MG/DL (ref 8.6–10.4)
CHLORIDE SERPL-SCNC: 95 MMOL/L (ref 98–107)
CO2 SERPL-SCNC: 22 MMOL/L (ref 20–31)
CREAT SERPL-MCNC: 1.8 MG/DL (ref 0.5–0.9)
GFR, ESTIMATED: 31 ML/MIN/1.73M2
GLUCOSE SERPL-MCNC: 100 MG/DL (ref 70–99)
POTASSIUM SERPL-SCNC: 4.3 MMOL/L (ref 3.7–5.3)
SODIUM SERPL-SCNC: 132 MMOL/L (ref 135–144)

## 2024-12-04 PROCEDURE — 36415 COLL VENOUS BLD VENIPUNCTURE: CPT

## 2024-12-04 PROCEDURE — 80048 BASIC METABOLIC PNL TOTAL CA: CPT

## 2024-12-05 ENCOUNTER — OFFICE VISIT (OUTPATIENT)
Dept: UROLOGY | Age: 64
End: 2024-12-05

## 2024-12-05 VITALS
HEART RATE: 70 BPM | DIASTOLIC BLOOD PRESSURE: 93 MMHG | WEIGHT: 108 LBS | BODY MASS INDEX: 19.75 KG/M2 | TEMPERATURE: 97.7 F | SYSTOLIC BLOOD PRESSURE: 166 MMHG

## 2024-12-05 DIAGNOSIS — N13.30 HYDRONEPHROSIS, UNSPECIFIED HYDRONEPHROSIS TYPE: Primary | ICD-10-CM

## 2024-12-05 DIAGNOSIS — C67.9 UROTHELIAL CARCINOMA OF BLADDER WITH INVASION OF MUSCLE (HCC): ICD-10-CM

## 2024-12-05 DIAGNOSIS — R39.15 URGENCY OF URINATION: ICD-10-CM

## 2024-12-05 DIAGNOSIS — C68.9 TRANSITIONAL CELL CARCINOMA (HCC): ICD-10-CM

## 2024-12-05 NOTE — PROGRESS NOTES
3. Transitional cell carcinoma (HCC)        4. Urgency of urination               PLAN:  We will plan for cystoscopy left ureteroscopy left balloon dilation of ureteral stricture left stent placement.  She will keep her left-sided percutaneous nephrostomy tube in place.  Patient does have multiple Medical medical comorbidities.  We will need cardiac clearance to discontinue anticoagulation.

## 2024-12-06 ENCOUNTER — CARE COORDINATION (OUTPATIENT)
Dept: OTHER | Facility: CLINIC | Age: 64
End: 2024-12-06

## 2024-12-09 ENCOUNTER — CARE COORDINATION (OUTPATIENT)
Dept: OTHER | Facility: CLINIC | Age: 64
End: 2024-12-09

## 2024-12-09 NOTE — CARE COORDINATION
12/9/2024 1:06 PM  *  Unable to Reach Date/Time:  12/9/2024 1:06 PM  ACM attempted to reach patient by telephone regarding yellow alert r/t no metrics day 3 in remote patient monitoring program. Left HIPAA compliant message requesting a return call. Will attempt to reach patient again.       AREN Benz, RN  Associate Care Manager   Cell: [phone]  [email or web address]

## 2024-12-10 ENCOUNTER — CARE COORDINATION (OUTPATIENT)
Dept: OTHER | Facility: CLINIC | Age: 64
End: 2024-12-10

## 2024-12-10 ENCOUNTER — CARE COORDINATION (OUTPATIENT)
Dept: CARE COORDINATION | Age: 64
End: 2024-12-10

## 2024-12-10 NOTE — CARE COORDINATION
ACM attempted to reach patient for red alerts for No metrics in 3 days.  HIPAA compliant message left requesting a return phone call at patients convenience. Will continue to follow. Placed a call to the EC and left a vm for him with details of the reason for the call.

## 2024-12-11 ENCOUNTER — CARE COORDINATION (OUTPATIENT)
Dept: CARE COORDINATION | Age: 64
End: 2024-12-11

## 2024-12-11 NOTE — CARE COORDINATION
Ambulatory Care Coordination Note     12/10/2024     Patient outreach attempt by this ACM today to perform care management follow up . ACM was unable to reach the patient by telephone today;   left voice message requesting a return phone call to this ACM.     ACM: Thania Toscano, RN     Care Summary Note: noted RPM team was unable to contact today- red alert for no metrics    12/11/24  UPDATE  Metrics entered yesterday 12/10/2024 later in day     PCP/Specialist follow up:   Future Appointments         Provider Specialty Dept Phone    12/16/2024 1:00 PM SCHEDULE, [facility] MED ONC CHAIR 2 Infusion Therapy [phone]    12/19/2024 11:15 AM Familia Shepard MD Oncology [phone]    2/4/2025 1:40 PM Nicole Chan MD Nephrology [phone]    2/11/2025 11:00 AM Terrell Cortez MD Cardiology [phone]            Follow Up:   Plan for next AC outreach in approximately 2 weeks to complete:  - disease specific assessments  - medication review  - goal progression  - education   - RPM.

## 2024-12-11 NOTE — CARE COORDINATION
12/11/2024 3:50 PM  *  Unable to Reach Date/Time:  12/11/2024 3:50 PM  LPN attempted to reach patient by telephone regarding red alert in remote patient monitoring program. Left HIPAA compliant message requesting a return call. Will attempt to reach patient again.     SENT MY CHART MESSAGE ABOUT BLOOD PRESSURE RED alert.

## 2024-12-11 NOTE — CARE COORDINATION
2024 3:54 PM  *  Alert and Triage   -Remote Alert Monitoring Note      Date/Time:  2024 3:54 PM  Patient Current Location: Home: [address] [place] [address]  Verified patients name and  as identifiers.    Rpm alert to be reviewed by the provider   red alert  blood pressure reading (182/100)  Vitals Recheck blood pressure reading (185/93)  Additional needs to be addressed by provider: No           LPN contacted patient by telephone regarding red alert received   Background: enrolled in   Refer to 911 immediately if:  Patient unresponsive or unable to provide history  Change in cognition or sudden confusion  Patient unable to respond in complete sentences  Intense chest pain/tightness  Any concern for any clinical emergency  Red Alert: Provider response time of 1 hr required for any red alert requiring intervention  Yellow Alert: Provider response time of 3hr required for any escalated yellow alert  Patient Chief Complaint:  Blood Pressure BP Triage  Are you having any Chest Pain? no   Are you having any Shortness of Breath? no   Do you have a headache or have any vision changes? no   Are you having any numbness or tingling? no   Are you having any other health concerns or issues? no  Patient/Caregiver educated on how to properly take a blood pressure. Patient/Caregiver verbalizes understanding.     Clinical Interventions: Education of patient/family/caregiver/guardian to support self-management-   Called [phone], spoke with Daja Espinosa,  The patient is in no apparent distress and offers no complaints. Gave patient education on taking a good BP reading. Patient confirmed she is taking BP medication.Patient wakes up at 9 am, takes BP medication around 1030, patient will get vitals 1-2 hours after taking BP reading ( before noon 12 pm).Patient has not complaints or concerns at this time.     Plan/Follow Up: Will continue to review, monitor and address alerts with follow up based on severity of

## 2024-12-12 ENCOUNTER — CARE COORDINATION (OUTPATIENT)
Dept: CASE MANAGEMENT | Age: 64
End: 2024-12-12

## 2024-12-12 ENCOUNTER — TELEPHONE (OUTPATIENT)
Dept: UROLOGY | Age: 64
End: 2024-12-12

## 2024-12-12 NOTE — CARE COORDINATION
-Remote Alert Monitoring Note      Date/Time:  2024 1:17 PM  Patient Current Location: Home: [address] [address]  Verified patients name and  as identifiers.    Rpm alert to be reviewed by the provider   red alert  blood pressure reading (178/86) and weight (4 lbs in 1 day)  Vitals Recheck blood pressure reading (168/85)  Additional needs to be addressed by provider: No                   LPN contacted patient by telephone regarding red alert received   Background: PNA  Refer to 911 immediately if:  Patient unresponsive or unable to provide history  Change in cognition or sudden confusion  Patient unable to respond in complete sentences  Intense chest pain/tightness  Any concern for any clinical emergency  Red Alert: Provider response time of 1 hr required for any red alert requiring intervention  Yellow Alert: Provider response time of 3hr required for any escalated yellow alert  Patient Chief Complaint:  Blood Pressure BP Triage  Are you having any Chest Pain? no   Are you having any Shortness of Breath? no   Do you have a headache or have any vision changes? no   Are you having any numbness or tingling? no   Are you having any other health concerns or issues? no  Patient/Caregiver educated on how to properly take a blood pressure. Patient/Caregiver verbalizes understanding.     Clinical Interventions: Reviewed and followed up on alerts and treatments-Spoke to patient she denies chest pains SOB, dizziness has no swelling states she did rearrange her kitchen and she moved her scale. Will continue to watch weight s closely, repeat BP now WN no concerns    Plan/Follow Up: Will continue to review, monitor and address alerts with follow up based on severity of symptoms and risk factors.  **For any new or worsening symptoms or you are concerned in anyway, please contact your Provider or report to the nearest Emergency Room.**

## 2024-12-13 ENCOUNTER — CARE COORDINATION (OUTPATIENT)
Dept: CASE MANAGEMENT | Age: 64
End: 2024-12-13

## 2024-12-13 NOTE — CARE COORDINATION
-Remote Alert Monitoring Note      Date/Time:  2024 1:24 PM  Patient Current Location: Home: [address] [address]  Verified patients name and  as identifiers.    Rpm alert to be reviewed by the provider   yellow alert  blood pressure reading (180/93)  Vitals Recheck blood pressure reading (186/89)  Additional needs to be addressed by provider: No                   LPN contacted patient by telephone regarding red alert received   Background: PNA  Refer to 911 immediately if:  Patient unresponsive or unable to provide history  Change in cognition or sudden confusion  Patient unable to respond in complete sentences  Intense chest pain/tightness  Any concern for any clinical emergency  Red Alert: Provider response time of 1 hr required for any red alert requiring intervention  Yellow Alert: Provider response time of 3hr required for any escalated yellow alert  Patient Chief Complaint:  Blood Pressure BP Triage  Are you having any Chest Pain? no   Are you having any Shortness of Breath? no   Do you have a headache or have any vision changes? no   Are you having any numbness or tingling? no   Are you having any other health concerns or issues? no  Patient/Caregiver educated on how to properly take a blood pressure. Patient/Caregiver verbalizes understanding.     Clinical Interventions: Reviewed and followed up on alerts and treatments-Spoke to patient she denies ches tpain, SOB, dizziness states she feels good today repeat BP is higher then initial BP went over cuff placement with patient she will repeat BP reading again in 30 minutes she has no cocncerns    Plan/Follow Up: Will continue to review, monitor and address alerts with follow up based on severity of symptoms and risk factors.  **For any new or worsening symptoms or you are concerned in anyway, please contact your Provider or report to the nearest Emergency Room.**

## 2024-12-16 ENCOUNTER — HOSPITAL ENCOUNTER (OUTPATIENT)
Dept: INFUSION THERAPY | Age: 64
Discharge: HOME | End: 2024-12-16
Payer: COMMERCIAL

## 2024-12-16 VITALS
TEMPERATURE: 96.8 F | SYSTOLIC BLOOD PRESSURE: 178 MMHG | BODY MASS INDEX: 19.69 KG/M2 | HEART RATE: 67 BPM | WEIGHT: 107 LBS | RESPIRATION RATE: 18 BRPM | DIASTOLIC BLOOD PRESSURE: 85 MMHG | HEIGHT: 62 IN

## 2024-12-16 DIAGNOSIS — C67.9 UROTHELIAL CARCINOMA OF BLADDER (HCC): Primary | ICD-10-CM

## 2024-12-16 LAB
ALBUMIN SERPL-MCNC: 3.9 G/DL (ref 3.5–5.2)
ALBUMIN/GLOB SERPL: 1.2 {RATIO} (ref 1–2.5)
ALP SERPL-CCNC: 115 U/L (ref 35–104)
ALT SERPL-CCNC: 13 U/L (ref 10–35)
AMYLASE SERPL-CCNC: 103 U/L (ref 28–100)
ANION GAP SERPL CALCULATED.3IONS-SCNC: 13 MMOL/L (ref 9–16)
AST SERPL-CCNC: 19 U/L (ref 10–35)
BASOPHILS # BLD: 0.04 K/UL (ref 0–0.2)
BASOPHILS NFR BLD: 1 % (ref 0–2)
BILIRUB SERPL-MCNC: 0.7 MG/DL (ref 0–1.2)
BUN SERPL-MCNC: 74 MG/DL (ref 8–23)
BUN/CREAT SERPL: 32 (ref 9–20)
CALCIUM SERPL-MCNC: 9.2 MG/DL (ref 8.6–10.4)
CHLORIDE SERPL-SCNC: 93 MMOL/L (ref 98–107)
CO2 SERPL-SCNC: 21 MMOL/L (ref 20–31)
CORTIS SERPL-MCNC: 13.5 UG/DL (ref 2.5–19.5)
CORTISOL COLLECTION INFO: NORMAL
CREAT SERPL-MCNC: 2.3 MG/DL (ref 0.5–0.9)
EOSINOPHIL # BLD: 0.42 K/UL (ref 0–0.44)
EOSINOPHILS RELATIVE PERCENT: 6 % (ref 1–4)
ERYTHROCYTE [DISTWIDTH] IN BLOOD BY AUTOMATED COUNT: 14.6 % (ref 11.8–14.4)
GFR, ESTIMATED: 23 ML/MIN/1.73M2
GLUCOSE SERPL-MCNC: 108 MG/DL (ref 74–99)
HCT VFR BLD AUTO: 29.8 % (ref 36.3–47.1)
HGB BLD-MCNC: 10 G/DL (ref 11.9–15.1)
IMM GRANULOCYTES # BLD AUTO: 0.03 K/UL (ref 0–0.3)
IMM GRANULOCYTES NFR BLD: 0 %
LIPASE SERPL-CCNC: 54 U/L (ref 13–60)
LYMPHOCYTES NFR BLD: 1.19 K/UL (ref 1.1–3.7)
LYMPHOCYTES RELATIVE PERCENT: 16 % (ref 24–43)
MCH RBC QN AUTO: 29.7 PG (ref 25.2–33.5)
MCHC RBC AUTO-ENTMCNC: 33.6 G/DL (ref 28.4–34.8)
MCV RBC AUTO: 88.4 FL (ref 82.6–102.9)
MONOCYTES NFR BLD: 0.81 K/UL (ref 0.1–1.2)
MONOCYTES NFR BLD: 11 % (ref 3–12)
NEUTROPHILS NFR BLD: 66 % (ref 36–65)
NEUTS SEG NFR BLD: 5.18 K/UL (ref 1.5–8.1)
NRBC BLD-RTO: 0 PER 100 WBC
PLATELET # BLD AUTO: 452 K/UL (ref 138–453)
PMV BLD AUTO: 8.8 FL (ref 8.1–13.5)
POTASSIUM SERPL-SCNC: 4.5 MMOL/L (ref 3.7–5.3)
PROT SERPL-MCNC: 7.1 G/DL (ref 6.6–8.7)
RBC # BLD AUTO: 3.37 M/UL (ref 3.95–5.11)
SODIUM SERPL-SCNC: 127 MMOL/L (ref 136–145)
TSH SERPL DL<=0.05 MIU/L-ACNC: 3.17 UIU/ML (ref 0.27–4.2)
WBC OTHER # BLD: 7.7 K/UL (ref 3.5–11.3)

## 2024-12-16 PROCEDURE — 84443 ASSAY THYROID STIM HORMONE: CPT

## 2024-12-16 PROCEDURE — 82150 ASSAY OF AMYLASE: CPT

## 2024-12-16 PROCEDURE — 6360000002 HC RX W HCPCS: Performed by: INTERNAL MEDICINE

## 2024-12-16 PROCEDURE — 96413 CHEMO IV INFUSION 1 HR: CPT

## 2024-12-16 PROCEDURE — 36415 COLL VENOUS BLD VENIPUNCTURE: CPT

## 2024-12-16 PROCEDURE — 82533 TOTAL CORTISOL: CPT

## 2024-12-16 PROCEDURE — 2580000003 HC RX 258: Performed by: INTERNAL MEDICINE

## 2024-12-16 PROCEDURE — 83690 ASSAY OF LIPASE: CPT

## 2024-12-16 PROCEDURE — 85025 COMPLETE CBC W/AUTO DIFF WBC: CPT

## 2024-12-16 PROCEDURE — 80053 COMPREHEN METABOLIC PANEL: CPT

## 2024-12-16 RX ORDER — SODIUM CHLORIDE 9 MG/ML
5-250 INJECTION, SOLUTION INTRAVENOUS PRN
OUTPATIENT
Start: 2024-12-30

## 2024-12-16 RX ORDER — HYDROCORTISONE SODIUM SUCCINATE 100 MG/2ML
100 INJECTION INTRAMUSCULAR; INTRAVENOUS
OUTPATIENT
Start: 2024-12-30

## 2024-12-16 RX ORDER — DIPHENHYDRAMINE HYDROCHLORIDE 50 MG/ML
50 INJECTION INTRAMUSCULAR; INTRAVENOUS
OUTPATIENT
Start: 2024-12-30

## 2024-12-16 RX ORDER — SODIUM CHLORIDE 9 MG/ML
5-250 INJECTION, SOLUTION INTRAVENOUS PRN
OUTPATIENT
Start: 2025-01-13

## 2024-12-16 RX ORDER — EPINEPHRINE 1 MG/ML
0.3 INJECTION, SOLUTION, CONCENTRATE INTRAVENOUS PRN
OUTPATIENT
Start: 2025-01-13

## 2024-12-16 RX ORDER — FAMOTIDINE 10 MG/ML
20 INJECTION, SOLUTION INTRAVENOUS
OUTPATIENT
Start: 2025-01-13

## 2024-12-16 RX ORDER — MEPERIDINE HYDROCHLORIDE 50 MG/ML
12.5 INJECTION INTRAMUSCULAR; INTRAVENOUS; SUBCUTANEOUS PRN
Status: CANCELLED | OUTPATIENT
Start: 2024-12-16

## 2024-12-16 RX ORDER — SODIUM CHLORIDE 9 MG/ML
INJECTION, SOLUTION INTRAVENOUS CONTINUOUS
OUTPATIENT
Start: 2024-12-30

## 2024-12-16 RX ORDER — HYDROCORTISONE SODIUM SUCCINATE 100 MG/2ML
100 INJECTION INTRAMUSCULAR; INTRAVENOUS
OUTPATIENT
Start: 2025-01-13

## 2024-12-16 RX ORDER — EPINEPHRINE 1 MG/ML
0.3 INJECTION, SOLUTION, CONCENTRATE INTRAVENOUS PRN
OUTPATIENT
Start: 2024-12-30

## 2024-12-16 RX ORDER — SODIUM CHLORIDE 0.9 % (FLUSH) 0.9 %
5-40 SYRINGE (ML) INJECTION PRN
OUTPATIENT
Start: 2025-01-13

## 2024-12-16 RX ORDER — ONDANSETRON 2 MG/ML
8 INJECTION INTRAMUSCULAR; INTRAVENOUS
Status: CANCELLED | OUTPATIENT
Start: 2024-12-16

## 2024-12-16 RX ORDER — ACETAMINOPHEN 325 MG/1
650 TABLET ORAL
OUTPATIENT
Start: 2024-12-30

## 2024-12-16 RX ORDER — DIPHENHYDRAMINE HYDROCHLORIDE 50 MG/ML
50 INJECTION INTRAMUSCULAR; INTRAVENOUS
OUTPATIENT
Start: 2025-01-13

## 2024-12-16 RX ORDER — HEPARIN SODIUM (PORCINE) LOCK FLUSH IV SOLN 100 UNIT/ML 100 UNIT/ML
500 SOLUTION INTRAVENOUS PRN
OUTPATIENT
Start: 2024-12-30

## 2024-12-16 RX ORDER — ONDANSETRON 2 MG/ML
8 INJECTION INTRAMUSCULAR; INTRAVENOUS
OUTPATIENT
Start: 2025-01-13

## 2024-12-16 RX ORDER — HEPARIN SODIUM (PORCINE) LOCK FLUSH IV SOLN 100 UNIT/ML 100 UNIT/ML
500 SOLUTION INTRAVENOUS PRN
OUTPATIENT
Start: 2025-01-13

## 2024-12-16 RX ORDER — SODIUM CHLORIDE 0.9 % (FLUSH) 0.9 %
5-40 SYRINGE (ML) INJECTION PRN
Status: CANCELLED | OUTPATIENT
Start: 2024-12-16

## 2024-12-16 RX ORDER — HYDROCORTISONE SODIUM SUCCINATE 100 MG/2ML
100 INJECTION INTRAMUSCULAR; INTRAVENOUS
Status: CANCELLED | OUTPATIENT
Start: 2024-12-16

## 2024-12-16 RX ORDER — HEPARIN SODIUM (PORCINE) LOCK FLUSH IV SOLN 100 UNIT/ML 100 UNIT/ML
500 SOLUTION INTRAVENOUS PRN
Status: CANCELLED | OUTPATIENT
Start: 2024-12-16

## 2024-12-16 RX ORDER — FAMOTIDINE 10 MG/ML
20 INJECTION, SOLUTION INTRAVENOUS
Status: CANCELLED | OUTPATIENT
Start: 2024-12-16

## 2024-12-16 RX ORDER — ALBUTEROL SULFATE 90 UG/1
4 INHALANT RESPIRATORY (INHALATION) PRN
Status: CANCELLED | OUTPATIENT
Start: 2024-12-16

## 2024-12-16 RX ORDER — DIPHENHYDRAMINE HYDROCHLORIDE 50 MG/ML
50 INJECTION INTRAMUSCULAR; INTRAVENOUS
Status: CANCELLED | OUTPATIENT
Start: 2024-12-16

## 2024-12-16 RX ORDER — ALBUTEROL SULFATE 90 UG/1
4 INHALANT RESPIRATORY (INHALATION) PRN
OUTPATIENT
Start: 2025-01-13

## 2024-12-16 RX ORDER — ACETAMINOPHEN 325 MG/1
650 TABLET ORAL
OUTPATIENT
Start: 2025-01-13

## 2024-12-16 RX ORDER — SODIUM CHLORIDE 9 MG/ML
5-250 INJECTION, SOLUTION INTRAVENOUS PRN
Status: CANCELLED | OUTPATIENT
Start: 2024-12-16

## 2024-12-16 RX ORDER — SODIUM CHLORIDE 9 MG/ML
INJECTION, SOLUTION INTRAVENOUS CONTINUOUS
OUTPATIENT
Start: 2025-01-13

## 2024-12-16 RX ORDER — EPINEPHRINE 1 MG/ML
0.3 INJECTION, SOLUTION, CONCENTRATE INTRAVENOUS PRN
Status: CANCELLED | OUTPATIENT
Start: 2024-12-16

## 2024-12-16 RX ORDER — MEPERIDINE HYDROCHLORIDE 50 MG/ML
12.5 INJECTION INTRAMUSCULAR; INTRAVENOUS; SUBCUTANEOUS PRN
OUTPATIENT
Start: 2024-12-30

## 2024-12-16 RX ORDER — ALBUTEROL SULFATE 90 UG/1
4 INHALANT RESPIRATORY (INHALATION) PRN
OUTPATIENT
Start: 2024-12-30

## 2024-12-16 RX ORDER — ONDANSETRON 2 MG/ML
8 INJECTION INTRAMUSCULAR; INTRAVENOUS
OUTPATIENT
Start: 2024-12-30

## 2024-12-16 RX ORDER — SODIUM CHLORIDE 9 MG/ML
INJECTION, SOLUTION INTRAVENOUS CONTINUOUS
Status: CANCELLED | OUTPATIENT
Start: 2024-12-16

## 2024-12-16 RX ORDER — SODIUM CHLORIDE 0.9 % (FLUSH) 0.9 %
5-40 SYRINGE (ML) INJECTION PRN
Status: DISCONTINUED | OUTPATIENT
Start: 2024-12-16 | End: 2024-12-17 | Stop reason: HOSPADM

## 2024-12-16 RX ORDER — SODIUM CHLORIDE 0.9 % (FLUSH) 0.9 %
5-40 SYRINGE (ML) INJECTION PRN
OUTPATIENT
Start: 2024-12-30

## 2024-12-16 RX ORDER — FAMOTIDINE 10 MG/ML
20 INJECTION, SOLUTION INTRAVENOUS
OUTPATIENT
Start: 2024-12-30

## 2024-12-16 RX ORDER — MEPERIDINE HYDROCHLORIDE 50 MG/ML
12.5 INJECTION INTRAMUSCULAR; INTRAVENOUS; SUBCUTANEOUS PRN
OUTPATIENT
Start: 2025-01-13

## 2024-12-16 RX ORDER — HEPARIN 100 UNIT/ML
500 SYRINGE INTRAVENOUS PRN
Status: DISCONTINUED | OUTPATIENT
Start: 2024-12-16 | End: 2024-12-17 | Stop reason: HOSPADM

## 2024-12-16 RX ORDER — ACETAMINOPHEN 325 MG/1
650 TABLET ORAL
Status: CANCELLED | OUTPATIENT
Start: 2024-12-16

## 2024-12-16 RX ADMIN — SODIUM CHLORIDE, PRESERVATIVE FREE 10 ML: 5 INJECTION INTRAVENOUS at 13:09

## 2024-12-16 RX ADMIN — SODIUM CHLORIDE, PRESERVATIVE FREE 10 ML: 5 INJECTION INTRAVENOUS at 14:35

## 2024-12-16 RX ADMIN — SODIUM CHLORIDE 240 MG: 9 INJECTION, SOLUTION INTRAVENOUS at 14:05

## 2024-12-16 RX ADMIN — HEPARIN 500 UNITS: 100 SYRINGE at 14:36

## 2024-12-16 RX ADMIN — SODIUM CHLORIDE, PRESERVATIVE FREE 10 ML: 5 INJECTION INTRAVENOUS at 14:36

## 2024-12-16 RX ADMIN — SODIUM CHLORIDE, PRESERVATIVE FREE 10 ML: 5 INJECTION INTRAVENOUS at 13:10

## 2024-12-16 NOTE — PLAN OF CARE
Problem: Chronic Conditions and Co-morbidities  Goal: Patient's chronic conditions and co-morbidity symptoms are monitored and maintained or improved  Outcome: Adequate for Discharge     Problem: Safety - Adult  Goal: Free from fall injury  Outcome: Adequate for Discharge      No

## 2024-12-17 ENCOUNTER — CARE COORDINATION (OUTPATIENT)
Dept: CARE COORDINATION | Age: 64
End: 2024-12-17

## 2024-12-17 RX ORDER — APIXABAN 2.5 MG/1
2.5 TABLET, FILM COATED ORAL 2 TIMES DAILY
Qty: 60 TABLET | Refills: 1 | Status: SHIPPED | OUTPATIENT
Start: 2024-12-17

## 2024-12-17 NOTE — CARE COORDINATION
DAY 3, NO METRICS RECEIVED    12/17/2024 11:39 AM  *  Unable to Reach Date/Time:  12/17/2024 11:40 AM  LPN attempted to reach patient by telephone regarding  NO METRICS FOR X3 DAYS  in remote patient monitoring program. Left HIPAA compliant message requesting a return call. Will attempt to reach patient again.      No Blood Pressure taken for 2 Days   No Weight taken for 2 Days   No Temperature taken for 2 Days   No Pulse Ox taken for 2 Days   No Blood Pressure taken for 3 Days   No Pulse Ox taken for 3 Days   No Weight taken for 3 Days   No Temperature taken for 3 Days

## 2024-12-18 ENCOUNTER — CARE COORDINATION (OUTPATIENT)
Dept: CASE MANAGEMENT | Age: 64
End: 2024-12-18

## 2024-12-18 NOTE — CARE COORDINATION
RPM reviewed. No metrics entered x 5 days. No call. Message to [facility]  Daja Espinosa  is currently enrolled in Remote Patient Monitoring (RPM) and has not entered vitals in 4 days. The RPM team has  Been Unable to Reach your patient to discuss adherence in RPM.    Please attempt to outreach your patient and discuss adherence with RPM. If patient is no longer interested in participating please send a dis-enrollment request to the RPM pool for processing.     Thank You,

## 2024-12-19 ENCOUNTER — TELEMEDICINE (OUTPATIENT)
Dept: ONCOLOGY | Age: 64
End: 2024-12-19
Payer: COMMERCIAL

## 2024-12-19 DIAGNOSIS — D63.1 ANEMIA DUE TO STAGE 5 CHRONIC KIDNEY DISEASE, NOT ON CHRONIC DIALYSIS (HCC): ICD-10-CM

## 2024-12-19 DIAGNOSIS — D68.59 HYPERCOAGULABLE STATE (HCC): ICD-10-CM

## 2024-12-19 DIAGNOSIS — N18.5 ANEMIA DUE TO STAGE 5 CHRONIC KIDNEY DISEASE, NOT ON CHRONIC DIALYSIS (HCC): ICD-10-CM

## 2024-12-19 DIAGNOSIS — I73.9 PERIPHERAL VASCULAR DISEASE (HCC): ICD-10-CM

## 2024-12-19 DIAGNOSIS — I48.0 PAROXYSMAL ATRIAL FIBRILLATION (HCC): ICD-10-CM

## 2024-12-19 DIAGNOSIS — Z86.73 HISTORY OF STROKE: ICD-10-CM

## 2024-12-19 DIAGNOSIS — C67.9 UROTHELIAL CARCINOMA OF BLADDER WITH INVASION OF MUSCLE (HCC): ICD-10-CM

## 2024-12-19 DIAGNOSIS — C67.9 MALIGNANT NEOPLASM OF URINARY BLADDER, UNSPECIFIED SITE (HCC): Primary | ICD-10-CM

## 2024-12-19 DIAGNOSIS — N18.4 STAGE 4 CHRONIC KIDNEY DISEASE (HCC): ICD-10-CM

## 2024-12-19 PROCEDURE — 3017F COLORECTAL CA SCREEN DOC REV: CPT | Performed by: INTERNAL MEDICINE

## 2024-12-19 PROCEDURE — G8484 FLU IMMUNIZE NO ADMIN: HCPCS | Performed by: INTERNAL MEDICINE

## 2024-12-19 PROCEDURE — 99214 OFFICE O/P EST MOD 30 MIN: CPT | Performed by: INTERNAL MEDICINE

## 2024-12-19 PROCEDURE — 99211 OFF/OP EST MAY X REQ PHY/QHP: CPT | Performed by: INTERNAL MEDICINE

## 2024-12-19 PROCEDURE — 1036F TOBACCO NON-USER: CPT | Performed by: INTERNAL MEDICINE

## 2024-12-19 PROCEDURE — G8428 CUR MEDS NOT DOCUMENT: HCPCS | Performed by: INTERNAL MEDICINE

## 2024-12-19 PROCEDURE — G8420 CALC BMI NORM PARAMETERS: HCPCS | Performed by: INTERNAL MEDICINE

## 2024-12-19 NOTE — PROGRESS NOTES
sf/3/1/2024     Clinical Information  Pre-Op Diagnosis:  BLADDER MASS  Operative Findings:  BLADDER TUMOR CHIPS  Operation Performed:  CYSTOSCOPY  POSS TRANSURETHRAL RESECTION OF  BLADDER TUMOR, FULGURATION; CYSTOSCOPY URETERAL STENT INSERTION  mj        Source of Specimen  A: BLADDER TUMOR CHIPS      Gross Description  \"OTILIA KO BLADDER TUMOR CHIPS\" Received in formalin are rubbery  tan-pink fragments 2.7 x 2.3 x 0.3 cm in aggregate.  Entirely 1cs.  kb       Keri Moise/mj:2/28/2024  Microscopic Description  1 H&E reviewed.  Microscopic examination performed.      Processing Lab:  [address] [address]  Interpretation Performed at [address] [id number]    SURGICAL PATHOLOGY CONSULTATION       Patient Name: OTILIA KO  [facility] [place] Rec: [id number]  [facility]  [place]  [facility]  ANATOMIC PATHOLOGY  [address] [place] [address].  [place], [place] [address] [phone]  Fax: [phone]       IMAGING DATA:      US RENAL LIMITED  Narrative: EXAMINATION:  ULTRASOUND OF THE KIDNEYS    10/30/2024 10:34 am    COMPARISON:  None.    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    FINDINGS:  The right kidney measures 8 cm in length and the left kidney measures 7.4 cm.  In length.    Severe left hydronephrosis.  No right hydronephrosis.  Simple left renal cyst  14 mm.  Left-sided nephrostomy tube noted.  Impression: 1. Severe left hydronephrosis.  2. Left-sided nephrostomy tube.  3. Simple left renal cyst.       ASSESSMENT:       Diagnosis Orders   1. Malignant neoplasm of urinary bladder, unspecified site (HCC)        2. Peripheral vascular disease (HCC)        3. Paroxysmal atrial fibrillation (HCC)        4. Urothelial carcinoma of bladder with invasion of muscle

## 2024-12-23 ENCOUNTER — CARE COORDINATION (OUTPATIENT)
Dept: OTHER | Facility: CLINIC | Age: 64
End: 2024-12-23

## 2024-12-26 ENCOUNTER — CARE COORDINATION (OUTPATIENT)
Dept: CARE COORDINATION | Age: 64
End: 2024-12-26

## 2024-12-26 NOTE — CARE COORDINATION
Ambulatory Care Coordination Note     2024 12:33 PM     Patient Current Location:  Home: [address] [place] [address]     ACM contacted the patient by telephone. Verified name and  with patient as identifiers.         ACM: Kristyn Linda RN     Challenges to be reviewed by the provider   Additional needs identified to be addressed with provider No  none               Method of communication with provider: none.    Utilization: Patient has not had any utilization since our last call.     Care Summary Note: This ACM covering for Thania Toscano RN called patient due to notification that she hasn't entered metrics in RPM the last 6 days.   Spoke to Daja. She had entered metrics this morning prior to phone call. BP was 159/90. She states that is better than it usually is. She denies any symptoms. She denies any needs or concerns. She states she took her medications. She has all of her meds. Explained that it looks like Thania was going to discuss graduation from [facility] with her next call. She has been enrolled since July. She verbalized understanding.     Offered patient enrollment in the Remote Patient Monitoring (RPM) program for in-home monitoring: Yes, patient enrolled; current status is activated and monitoring.     Assessments Completed:   No changes since last call    Medications Reviewed:   Patient denies any changes with medications and reports taking all medications as prescribed.    Advance Care Planning:   Not reviewed during this call     Care Planning:   Not completed during this call    PCP/Specialist follow up:   Future Appointments         Provider Specialty Dept Phone    2024 11:00 AM FL[facility] MED ONC CHAIR 4 Infusion Therapy [phone]    2025 1:30 PM Familia Shepard MD Oncology [phone]    2025 1:40 PM Nicole Chan MD Nephrology [phone]    2025 11:00 AM Terrell Cortez MD Cardiology [phone]            Follow Up:   Plan for next [facility]

## 2024-12-26 NOTE — CARE COORDINATION
RN attempted to reach the patient to discuss no metrics x6 days. Patient did not answer and VM left reminding patient to take readings daily. Will route to the ACM for further follow up .

## 2024-12-30 ENCOUNTER — HOSPITAL ENCOUNTER (OUTPATIENT)
Dept: INFUSION THERAPY | Age: 64
Discharge: HOME | End: 2024-12-30
Payer: COMMERCIAL

## 2024-12-30 VITALS
HEART RATE: 64 BPM | RESPIRATION RATE: 18 BRPM | WEIGHT: 109 LBS | DIASTOLIC BLOOD PRESSURE: 72 MMHG | SYSTOLIC BLOOD PRESSURE: 151 MMHG | TEMPERATURE: 97.9 F | BODY MASS INDEX: 20.06 KG/M2 | HEIGHT: 62 IN

## 2024-12-30 DIAGNOSIS — C67.9 UROTHELIAL CARCINOMA OF BLADDER (HCC): Primary | ICD-10-CM

## 2024-12-30 LAB
ALBUMIN SERPL-MCNC: 3.9 G/DL (ref 3.5–5.2)
ALBUMIN/GLOB SERPL: 1 {RATIO} (ref 1–2.5)
ALP SERPL-CCNC: 120 U/L (ref 35–104)
ALT SERPL-CCNC: 15 U/L (ref 10–35)
AMYLASE SERPL-CCNC: 107 U/L (ref 28–100)
ANION GAP SERPL CALCULATED.3IONS-SCNC: 13 MMOL/L (ref 9–16)
AST SERPL-CCNC: 21 U/L (ref 10–35)
BASOPHILS # BLD: 0.04 K/UL (ref 0–0.2)
BASOPHILS NFR BLD: 1 % (ref 0–2)
BILIRUB SERPL-MCNC: 0.4 MG/DL (ref 0–1.2)
BUN SERPL-MCNC: 39 MG/DL (ref 8–23)
BUN/CREAT SERPL: 23 (ref 9–20)
CALCIUM SERPL-MCNC: 9.5 MG/DL (ref 8.6–10.4)
CHLORIDE SERPL-SCNC: 93 MMOL/L (ref 98–107)
CO2 SERPL-SCNC: 26 MMOL/L (ref 20–31)
CORTIS SERPL-MCNC: 15.6 UG/DL (ref 2.5–19.5)
CREAT SERPL-MCNC: 1.7 MG/DL (ref 0.5–0.9)
EOSINOPHIL # BLD: 0.72 K/UL (ref 0–0.44)
EOSINOPHILS RELATIVE PERCENT: 10 % (ref 1–4)
ERYTHROCYTE [DISTWIDTH] IN BLOOD BY AUTOMATED COUNT: 14.4 % (ref 11.8–14.4)
GFR, ESTIMATED: 33 ML/MIN/1.73M2
GLUCOSE SERPL-MCNC: 107 MG/DL (ref 74–99)
HCT VFR BLD AUTO: 30.9 % (ref 36.3–47.1)
HGB BLD-MCNC: 10.2 G/DL (ref 11.9–15.1)
IMM GRANULOCYTES # BLD AUTO: 0.03 K/UL (ref 0–0.3)
IMM GRANULOCYTES NFR BLD: 0 %
LIPASE SERPL-CCNC: 41 U/L (ref 13–60)
LYMPHOCYTES NFR BLD: 1.2 K/UL (ref 1.1–3.7)
LYMPHOCYTES RELATIVE PERCENT: 17 % (ref 24–43)
MCH RBC QN AUTO: 28.9 PG (ref 25.2–33.5)
MCHC RBC AUTO-ENTMCNC: 33 G/DL (ref 28.4–34.8)
MCV RBC AUTO: 87.5 FL (ref 82.6–102.9)
MONOCYTES NFR BLD: 0.93 K/UL (ref 0.1–1.2)
MONOCYTES NFR BLD: 13 % (ref 3–12)
NEUTROPHILS NFR BLD: 59 % (ref 36–65)
NEUTS SEG NFR BLD: 4.33 K/UL (ref 1.5–8.1)
NRBC BLD-RTO: 0 PER 100 WBC
PLATELET # BLD AUTO: 468 K/UL (ref 138–453)
PMV BLD AUTO: 8.8 FL (ref 8.1–13.5)
POTASSIUM SERPL-SCNC: 3.6 MMOL/L (ref 3.7–5.3)
PROT SERPL-MCNC: 7.6 G/DL (ref 6.6–8.7)
RBC # BLD AUTO: 3.53 M/UL (ref 3.95–5.11)
SODIUM SERPL-SCNC: 132 MMOL/L (ref 136–145)
TSH SERPL DL<=0.05 MIU/L-ACNC: 2.03 UIU/ML (ref 0.27–4.2)
WBC OTHER # BLD: 7.3 K/UL (ref 3.5–11.3)

## 2024-12-30 PROCEDURE — 84443 ASSAY THYROID STIM HORMONE: CPT

## 2024-12-30 PROCEDURE — 83690 ASSAY OF LIPASE: CPT

## 2024-12-30 PROCEDURE — 6360000002 HC RX W HCPCS: Performed by: INTERNAL MEDICINE

## 2024-12-30 PROCEDURE — 82533 TOTAL CORTISOL: CPT

## 2024-12-30 PROCEDURE — 82150 ASSAY OF AMYLASE: CPT

## 2024-12-30 PROCEDURE — 2500000003 HC RX 250 WO HCPCS: Performed by: INTERNAL MEDICINE

## 2024-12-30 PROCEDURE — 2580000003 HC RX 258: Performed by: INTERNAL MEDICINE

## 2024-12-30 PROCEDURE — 80053 COMPREHEN METABOLIC PANEL: CPT

## 2024-12-30 PROCEDURE — 96413 CHEMO IV INFUSION 1 HR: CPT

## 2024-12-30 PROCEDURE — 85025 COMPLETE CBC W/AUTO DIFF WBC: CPT

## 2024-12-30 RX ORDER — SODIUM CHLORIDE 9 MG/ML
5-250 INJECTION, SOLUTION INTRAVENOUS PRN
Status: DISCONTINUED | OUTPATIENT
Start: 2024-12-30 | End: 2024-12-31 | Stop reason: HOSPADM

## 2024-12-30 RX ORDER — HEPARIN 100 UNIT/ML
500 SYRINGE INTRAVENOUS PRN
Status: DISCONTINUED | OUTPATIENT
Start: 2024-12-30 | End: 2024-12-31 | Stop reason: HOSPADM

## 2024-12-30 RX ORDER — SODIUM CHLORIDE 0.9 % (FLUSH) 0.9 %
5-40 SYRINGE (ML) INJECTION PRN
Status: DISCONTINUED | OUTPATIENT
Start: 2024-12-30 | End: 2024-12-31 | Stop reason: HOSPADM

## 2024-12-30 RX ADMIN — SODIUM CHLORIDE, PRESERVATIVE FREE 10 ML: 5 INJECTION INTRAVENOUS at 11:11

## 2024-12-30 RX ADMIN — SODIUM CHLORIDE, PRESERVATIVE FREE 10 ML: 5 INJECTION INTRAVENOUS at 11:10

## 2024-12-30 RX ADMIN — SODIUM CHLORIDE 240 MG: 9 INJECTION, SOLUTION INTRAVENOUS at 12:12

## 2024-12-30 RX ADMIN — SODIUM CHLORIDE, PRESERVATIVE FREE 10 ML: 5 INJECTION INTRAVENOUS at 12:43

## 2024-12-30 RX ADMIN — SODIUM CHLORIDE, PRESERVATIVE FREE 10 ML: 5 INJECTION INTRAVENOUS at 12:42

## 2024-12-30 RX ADMIN — HEPARIN 500 UNITS: 100 SYRINGE at 12:43

## 2025-01-02 ENCOUNTER — CARE COORDINATION (OUTPATIENT)
Dept: CASE MANAGEMENT | Age: 65
End: 2025-01-02

## 2025-01-02 NOTE — CARE COORDINATION
RPM reviewed. No metrics entered for 2 days. Tablet message sent to pt  Message sent to patient via Patient Connect Portal for Remote Patient Monitoring     RPM Nurse message No readings in two days Hello, Please see an important message from your RPM Team:  This is a reminder that we have not received your readings for two days please enter them as soon as possible.     Please do not respond to this message; If you have a question or concern please call your Ambulatory Care Manager or your Primary Care Physician.

## 2025-01-03 ENCOUNTER — CARE COORDINATION (OUTPATIENT)
Dept: CASE MANAGEMENT | Age: 65
End: 2025-01-03

## 2025-01-03 ENCOUNTER — CARE COORDINATION (OUTPATIENT)
Dept: CARE COORDINATION | Age: 65
End: 2025-01-03

## 2025-01-03 NOTE — CARE COORDINATION
Remote Alert Monitoring Note      Date/Time:  1/3/2025 1:17 PM  Patient Current Location: Home: [address] [address]    LPN contacted patient by telephone regarding yellow alert received for blood pressure reading (174/). Verified patients name and  as identifiers.    Rpm alert to be reviewed by the provider                         Background:  Pneumonia     Refer to 911 immediately if:  Patient unresponsive or unable to provide history  Change in cognition or sudden confusion  Patient unable to respond in complete sentences  Intense chest pain/tightness  Any concern for any clinical emergency  Red Alert: Provider response time of 1 hr required for any red alert requiring intervention  Yellow Alert: Provider response time of 3hr required for any escalated yellow alert        Blood Pressure BP Triage  Are you having any Chest Pain? no   Are you having any Shortness of Breath? no   Do you have a headache or have any vision changes? no   Are you having any numbness or tingling? no   Are you having any other health concerns or issues? no  Patient/Caregiver educated on how to properly take a blood pressure. Patient/Caregiver verbalizes understanding.     Have you taken your medications as instructed by your doctor today? Yes       Clinical Interventions: Reviewed and followed up on alerts and treatments-. Pt is asymptomatic and in no apparent distress, speaking in full sentences.  Patient states she had not taken her medications at the time she took her BP. She has since taken them and has agreed to recheck BP once an hour has passed. Will await update     Advised pt to call 911 and go to ED for the following: chest pain/pressure/tightness, new or worsening SOB, sudden loss of use of an arm or leg, sudden numbness or tingling--especially unilateral, confusion, sudden change in vision, facial droop, slurred speech, fainting spell or any other serious or worsening symptoms. Patient agreeable to this plan.

## 2025-01-03 NOTE — CARE COORDINATION
Date/Time:  1/3/2025 11:35 AM  LPN attempted to reach patient by telephone regarding  no metrics x 3 days  in remote patient monitoring program. Left HIPAA compliant message requesting a return call. Will attempt to reach patient again.    Enrolled in RPM for Pneumonia  Message sent to tablet  Message sent to patient via Patient Connect Portal for Remote Patient Monitoring     RPM Nurse message Reminder to keep tablet charged and connected Hello, Please see an important message from your RPM Team:  Please remember to keep the tablet charged and connected    Please do not respond to this message; If you have a question or concern please call your Ambulatory Care Manager or your Primary Care Physician.     Message sent to patient via Patient Connect Portal for Remote Patient Monitoring     RPM Nurse message Tablet Readings Hello, Please see an important message from your RPM Team:    Please remember to take your readings daily before noon.   Please do not respond to this message; If you have a question or concern please call your Ambulatory Care Manager or your Primary Care Physician.

## 2025-01-07 ENCOUNTER — CARE COORDINATION (OUTPATIENT)
Dept: CARE COORDINATION | Age: 65
End: 2025-01-07

## 2025-01-07 NOTE — CARE COORDINATION
Ambulatory Care Coordination Note     1/7/2025      Patient outreach attempt by this ACM today to perform care management follow up . ACM was unable to reach the patient by telephone today;   left voice message requesting a return phone call to this ACM.     ACM: Thania Toscano, RN     Care Summary Note: reminding to monitor VS using RPM daily.     PCP/Specialist follow up:   Future Appointments         Provider Specialty Dept Phone    1/13/2025 11:00 AM SCHEDULE, [facility] ONC FAST TRACK CHAIR 1 Infusion Therapy [phone]    1/16/2025 1:30 PM Familia Shepard MD Oncology [phone]    2/11/2025 11:00 AM Terrell Cortez MD Cardiology [phone]    2/25/2025 1:40 PM Nicole Chan MD Nephrology [phone]            Follow Up:   Plan for next ACM outreach in approximately 2 weeks to complete:  - disease specific assessments  - medication review  - goal progression  - education   - RPM graduation ready? .

## 2025-01-08 ENCOUNTER — CARE COORDINATION (OUTPATIENT)
Dept: CASE MANAGEMENT | Age: 65
End: 2025-01-08

## 2025-01-08 ENCOUNTER — TELEPHONE (OUTPATIENT)
Dept: PREADMISSION TESTING | Age: 65
End: 2025-01-08

## 2025-01-08 NOTE — TELEPHONE ENCOUNTER
Patient is scheduled on 1/14 with Dr KAN for a ureteroscopy with dilatation and stent insertion. She does have cardiac clearance from Dr Cortez, but she has a pretty extensive history so just as an FYI. She states she was told she was cancer free in September of 2024, but has started immunotherapy, which she currently goes for infusions every Monday. She is on elqiuis for an acute ischemic stroke she had on 4/6/2024, which she will hold the eliquis for 3 days prior per Dr Cortez. She states her only deficits are weakness in her left hand and tingling/numbness on the left side of her face. Also a history of kidney disease, a fib, and pneumonia (patient said she had it twice in 2024), and currently has a nephrostomy. During her PAT phone call-she denies any current issues or concerning symptoms. Thanks!

## 2025-01-08 NOTE — CARE COORDINATION
Date/Time:  1/8/2025 1:38 PM  LPN attempted to reach patient by telephone regarding red alert /97 in remote patient monitoring program. Left HIPAA compliant message requesting a return call. Will attempt to reach patient again.

## 2025-01-08 NOTE — CARE COORDINATION
-Remote Alert Monitoring Note      Date/Time:  2025 3:53 PM  Patient Current Location: Home: [address] [place] [address]  Verified patients name and  as identifiers.    Rpm alert to be reviewed by the provider   red alert  blood pressure reading (182/94)    Additional needs to be addressed by provider: IMAN spoke to patient she denies chest pain, SOB dizziness has no headache blurred vision or any other pain , she states she feels fine all medications have been taken, we will watch BP and if it remains elevated will reach out for plan of care at that time                   LPN contacted patient by telephone regarding red alert received   Background: PNA  Refer to 911 immediately if:  Patient unresponsive or unable to provide history  Change in cognition or sudden confusion  Patient unable to respond in complete sentences  Intense chest pain/tightness  Any concern for any clinical emergency  Red Alert: Provider response time of 1 hr required for any red alert requiring intervention  Yellow Alert: Provider response time of 3hr required for any escalated yellow alert  Patient Chief Complaint:  Blood Pressure BP Triage  Are you having any Chest Pain? no   Are you having any Shortness of Breath? no   Do you have a headache or have any vision changes? no   Are you having any numbness or tingling? no   Are you having any other health concerns or issues? no  Patient/Caregiver educated on how to properly take a blood pressure. Patient/Caregiver verbalizes understanding.     Clinical Interventions: CHERRISAMMIE spoke to patient she denies chest pain, SOB dizziness has no headache blurred vision or any other pain , she states she feels fine all medications have been taken, we will watch BP and if it remains elevated will reach out for plan of care at that time   Plan/Follow Up: Will continue to review, monitor and address alerts with follow up based on severity of symptoms and risk factors.  **For any new or worsening

## 2025-01-08 NOTE — CARE COORDINATION
Date/Time:  1/8/2025 1:19 PM  LPN attempted to reach patient by telephone regarding yellow alert /98 in remote patient monitoring program. Left HIPAA compliant message requesting a return call. Will attempt to reach patient again.

## 2025-01-08 NOTE — PROGRESS NOTES
Patient instructed on the pre-operative, intra-operative, and post-operative process. Patient instructed on NPO status. Medication instructions and pre operative instruction sheet reviewed with the patient. G skin prep instructions reviewed with patient. NPO status (including no gum, hard candy, mints, water, coffee, or smoking) was reviewed and patient verbalizes understanding. She was instructed per Dr. Cortez to stop her eliquis 3 days prior to her procedure. She was instructed to take her amlodipine, atorvastatin, carvedilol, flecainide, hydralazine, and pantoprazole with a small sip of water on the morning of her procedure. Patient denies any fever related illnesses or antibiotic use in the last 4 weeks. Patient has cardiac clearance per Dr. Cortez. Chart sent to anesthesia.

## 2025-01-09 ENCOUNTER — CARE COORDINATION (OUTPATIENT)
Dept: CASE MANAGEMENT | Age: 65
End: 2025-01-09

## 2025-01-09 NOTE — CARE COORDINATION
-Remote Alert Monitoring Note      Date/Time:  2025 1:07 PM  Patient Current Location: Home: [address] [address]  Verified patients name and  as identifiers.    Rpm alert to be reviewed by the provider   yellow alert  blood pressure reading (172/94)  Vitals Recheck blood pressure reading (na)  Additional needs to be addressed by provider: No                   LPN contacted patient by telephone regarding red alert received   Background: PNA  Refer to 911 immediately if:  Patient unresponsive or unable to provide history  Change in cognition or sudden confusion  Patient unable to respond in complete sentences  Intense chest pain/tightness  Any concern for any clinical emergency  Red Alert: Provider response time of 1 hr required for any red alert requiring intervention  Yellow Alert: Provider response time of 3hr required for any escalated yellow alert  Patient Chief Complaint:  Blood Pressure BP Triage  Are you having any Chest Pain? no   Are you having any Shortness of Breath? no   Do you have a headache or have any vision changes? no   Are you having any numbness or tingling? no   Are you having any other health concerns or issues? no  Patient/Caregiver educated on how to properly take a blood pressure. Patient/Caregiver verbalizes understanding.     Clinical Interventions: Reviewed and followed up on alerts and treatments-Spoke to patient she denies chest pain, dizziness SOB, patient has no headache no blurred vision and no tingling, she states she is laying down and will repeat BP after she gets up, if BP is still elevated this LPN will reach out to her Dr.     Plan/Follow Up: Will continue to review, monitor and address alerts with follow up based on severity of symptoms and risk factors.  **For any new or worsening symptoms or you are concerned in anyway, please contact your Provider or report to the nearest Emergency Room.**

## 2025-01-10 ENCOUNTER — CARE COORDINATION (OUTPATIENT)
Dept: CASE MANAGEMENT | Age: 65
End: 2025-01-10

## 2025-01-10 NOTE — CARE COORDINATION
Date/Time:  1/10/2025 12:19 PM  LPN attempted to reach patient by telephone regarding yellow alert /94 in remote patient monitoring program. Left HIPAA compliant message requesting a return call. Will attempt to reach patient again.

## 2025-01-13 ENCOUNTER — HOSPITAL ENCOUNTER (OUTPATIENT)
Dept: INFUSION THERAPY | Age: 65
Discharge: HOME | End: 2025-01-13
Payer: COMMERCIAL

## 2025-01-13 ENCOUNTER — ANESTHESIA EVENT (OUTPATIENT)
Dept: OPERATING ROOM | Age: 65
End: 2025-01-13
Payer: COMMERCIAL

## 2025-01-13 ENCOUNTER — OFFICE VISIT (OUTPATIENT)
Dept: ONCOLOGY | Age: 65
End: 2025-01-13
Payer: COMMERCIAL

## 2025-01-13 VITALS
BODY MASS INDEX: 20.3 KG/M2 | DIASTOLIC BLOOD PRESSURE: 76 MMHG | TEMPERATURE: 97.1 F | WEIGHT: 111 LBS | RESPIRATION RATE: 18 BRPM | SYSTOLIC BLOOD PRESSURE: 133 MMHG | HEART RATE: 71 BPM

## 2025-01-13 DIAGNOSIS — C67.9 UROTHELIAL CARCINOMA OF BLADDER (HCC): ICD-10-CM

## 2025-01-13 DIAGNOSIS — N18.4 STAGE 4 CHRONIC KIDNEY DISEASE (HCC): ICD-10-CM

## 2025-01-13 DIAGNOSIS — N13.30 HYDRONEPHROSIS OF LEFT KIDNEY: ICD-10-CM

## 2025-01-13 DIAGNOSIS — I48.0 PAROXYSMAL ATRIAL FIBRILLATION (HCC): ICD-10-CM

## 2025-01-13 DIAGNOSIS — I73.9 PERIPHERAL VASCULAR DISEASE (HCC): Primary | ICD-10-CM

## 2025-01-13 DIAGNOSIS — C67.9 UROTHELIAL CARCINOMA OF BLADDER (HCC): Primary | ICD-10-CM

## 2025-01-13 DIAGNOSIS — C67.9 UROTHELIAL CARCINOMA OF BLADDER WITH INVASION OF MUSCLE (HCC): ICD-10-CM

## 2025-01-13 LAB
ALBUMIN SERPL-MCNC: 3.8 G/DL (ref 3.5–5.2)
ALBUMIN/GLOB SERPL: 1.3 {RATIO} (ref 1–2.5)
ALP SERPL-CCNC: 101 U/L (ref 35–104)
ALT SERPL-CCNC: 15 U/L (ref 10–35)
AMYLASE SERPL-CCNC: 95 U/L (ref 28–100)
ANION GAP SERPL CALCULATED.3IONS-SCNC: 12 MMOL/L (ref 9–16)
AST SERPL-CCNC: 21 U/L (ref 10–35)
BASOPHILS # BLD: 0.04 K/UL (ref 0–0.2)
BASOPHILS NFR BLD: 1 % (ref 0–2)
BILIRUB SERPL-MCNC: 0.6 MG/DL (ref 0–1.2)
BUN SERPL-MCNC: 74 MG/DL (ref 8–23)
BUN/CREAT SERPL: 28 (ref 9–20)
CALCIUM SERPL-MCNC: 9.1 MG/DL (ref 8.6–10.4)
CHLORIDE SERPL-SCNC: 94 MMOL/L (ref 98–107)
CO2 SERPL-SCNC: 21 MMOL/L (ref 20–31)
CORTIS SERPL-MCNC: 14.3 UG/DL (ref 2.5–19.5)
CREAT SERPL-MCNC: 2.6 MG/DL (ref 0.5–0.9)
EOSINOPHIL # BLD: 0.47 K/UL (ref 0–0.44)
EOSINOPHILS RELATIVE PERCENT: 7 % (ref 1–4)
ERYTHROCYTE [DISTWIDTH] IN BLOOD BY AUTOMATED COUNT: 15.4 % (ref 11.8–14.4)
GFR, ESTIMATED: 20 ML/MIN/1.73M2
GLUCOSE SERPL-MCNC: 134 MG/DL (ref 74–99)
HCT VFR BLD AUTO: 33.3 % (ref 36.3–47.1)
HGB BLD-MCNC: 11.2 G/DL (ref 11.9–15.1)
IMM GRANULOCYTES # BLD AUTO: <0.03 K/UL (ref 0–0.3)
IMM GRANULOCYTES NFR BLD: 0 %
LIPASE SERPL-CCNC: 51 U/L (ref 13–60)
LYMPHOCYTES NFR BLD: 1 K/UL (ref 1.1–3.7)
LYMPHOCYTES RELATIVE PERCENT: 14 % (ref 24–43)
MCH RBC QN AUTO: 29.1 PG (ref 25.2–33.5)
MCHC RBC AUTO-ENTMCNC: 33.6 G/DL (ref 28.4–34.8)
MCV RBC AUTO: 86.5 FL (ref 82.6–102.9)
MONOCYTES NFR BLD: 0.53 K/UL (ref 0.1–1.2)
MONOCYTES NFR BLD: 8 % (ref 3–12)
NEUTROPHILS NFR BLD: 70 % (ref 36–65)
NEUTS SEG NFR BLD: 4.89 K/UL (ref 1.5–8.1)
NRBC BLD-RTO: 0 PER 100 WBC
PLATELET # BLD AUTO: 425 K/UL (ref 138–453)
PMV BLD AUTO: 8.7 FL (ref 8.1–13.5)
POTASSIUM SERPL-SCNC: 4.2 MMOL/L (ref 3.7–5.3)
PROT SERPL-MCNC: 6.5 G/DL (ref 6.6–8.7)
RBC # BLD AUTO: 3.85 M/UL (ref 3.95–5.11)
SODIUM SERPL-SCNC: 127 MMOL/L (ref 136–145)
TSH SERPL DL<=0.05 MIU/L-ACNC: 2.84 UIU/ML (ref 0.27–4.2)
WBC OTHER # BLD: 7 K/UL (ref 3.5–11.3)

## 2025-01-13 PROCEDURE — G8427 DOCREV CUR MEDS BY ELIG CLIN: HCPCS | Performed by: INTERNAL MEDICINE

## 2025-01-13 PROCEDURE — 83690 ASSAY OF LIPASE: CPT

## 2025-01-13 PROCEDURE — 99214 OFFICE O/P EST MOD 30 MIN: CPT | Performed by: INTERNAL MEDICINE

## 2025-01-13 PROCEDURE — 82533 TOTAL CORTISOL: CPT

## 2025-01-13 PROCEDURE — G8420 CALC BMI NORM PARAMETERS: HCPCS | Performed by: INTERNAL MEDICINE

## 2025-01-13 PROCEDURE — 2580000003 HC RX 258: Performed by: INTERNAL MEDICINE

## 2025-01-13 PROCEDURE — 6360000002 HC RX W HCPCS: Performed by: INTERNAL MEDICINE

## 2025-01-13 PROCEDURE — 82150 ASSAY OF AMYLASE: CPT

## 2025-01-13 PROCEDURE — 80053 COMPREHEN METABOLIC PANEL: CPT

## 2025-01-13 PROCEDURE — 96413 CHEMO IV INFUSION 1 HR: CPT

## 2025-01-13 PROCEDURE — 84443 ASSAY THYROID STIM HORMONE: CPT

## 2025-01-13 PROCEDURE — 85025 COMPLETE CBC W/AUTO DIFF WBC: CPT

## 2025-01-13 PROCEDURE — 3078F DIAST BP <80 MM HG: CPT | Performed by: INTERNAL MEDICINE

## 2025-01-13 PROCEDURE — 3017F COLORECTAL CA SCREEN DOC REV: CPT | Performed by: INTERNAL MEDICINE

## 2025-01-13 PROCEDURE — 2500000003 HC RX 250 WO HCPCS: Performed by: INTERNAL MEDICINE

## 2025-01-13 PROCEDURE — 1036F TOBACCO NON-USER: CPT | Performed by: INTERNAL MEDICINE

## 2025-01-13 PROCEDURE — 3075F SYST BP GE 130 - 139MM HG: CPT | Performed by: INTERNAL MEDICINE

## 2025-01-13 RX ORDER — HEPARIN 100 UNIT/ML
500 SYRINGE INTRAVENOUS PRN
Status: DISCONTINUED | OUTPATIENT
Start: 2025-01-13 | End: 2025-01-14 | Stop reason: HOSPADM

## 2025-01-13 RX ORDER — SODIUM CHLORIDE 9 MG/ML
5-250 INJECTION, SOLUTION INTRAVENOUS PRN
Status: DISCONTINUED | OUTPATIENT
Start: 2025-01-13 | End: 2025-01-14 | Stop reason: HOSPADM

## 2025-01-13 RX ORDER — SODIUM CHLORIDE 0.9 % (FLUSH) 0.9 %
5-40 SYRINGE (ML) INJECTION PRN
Status: DISCONTINUED | OUTPATIENT
Start: 2025-01-13 | End: 2025-01-14 | Stop reason: HOSPADM

## 2025-01-13 RX ADMIN — SODIUM CHLORIDE, PRESERVATIVE FREE 10 ML: 5 INJECTION INTRAVENOUS at 13:14

## 2025-01-13 RX ADMIN — SODIUM CHLORIDE, PRESERVATIVE FREE 10 ML: 5 INJECTION INTRAVENOUS at 11:25

## 2025-01-13 RX ADMIN — SODIUM CHLORIDE, PRESERVATIVE FREE 10 ML: 5 INJECTION INTRAVENOUS at 11:26

## 2025-01-13 RX ADMIN — SODIUM CHLORIDE, PRESERVATIVE FREE 10 ML: 5 INJECTION INTRAVENOUS at 13:15

## 2025-01-13 RX ADMIN — SODIUM CHLORIDE 240 MG: 9 INJECTION, SOLUTION INTRAVENOUS at 12:42

## 2025-01-13 RX ADMIN — HEPARIN 500 UNITS: 100 SYRINGE at 13:15

## 2025-01-13 NOTE — PROGRESS NOTES
myocarditis, myositis, hepatitis, nephritis, encephalitis etc. We discussed the ways we can medically manage toxicities of treatment> patient willing to proceed    posttreatment PET/CT suspicious for carcinomatosis but those are nonspecific finding on the abdomen>follow-up imaging in 3 months and will continue immunotherapy  Currently on Opdivo status post 6 cycle toxicity profile reviewed with the patient and she is tolerated treatment well> proceed with Opdivo  Diarrhea/resolved> if recurs will hold Opdivo and give prednisone  RTC in 4 weeks with a PET/CT                                           Familia Shepard MD                          [facility]                            This note is created with the assistance of a speech recognition program.  While intending to generate a document that actually reflects the content of the visit, the document can still have some errors including those of syntax and sound a like substitutions which may escape proof reading.  It such instances, actual meaning can be extrapolated by contextual diversion.

## 2025-01-14 ENCOUNTER — HOSPITAL ENCOUNTER (OUTPATIENT)
Age: 65
Setting detail: OUTPATIENT SURGERY
Discharge: HOME | End: 2025-01-14
Attending: UROLOGY | Admitting: UROLOGY
Payer: COMMERCIAL

## 2025-01-14 ENCOUNTER — ANESTHESIA (OUTPATIENT)
Dept: OPERATING ROOM | Age: 65
End: 2025-01-14
Payer: COMMERCIAL

## 2025-01-14 ENCOUNTER — CARE COORDINATION (OUTPATIENT)
Dept: CASE MANAGEMENT | Age: 65
End: 2025-01-14

## 2025-01-14 ENCOUNTER — APPOINTMENT (OUTPATIENT)
Dept: GENERAL RADIOLOGY | Age: 65
End: 2025-01-14
Attending: UROLOGY
Payer: COMMERCIAL

## 2025-01-14 VITALS
OXYGEN SATURATION: 95 % | DIASTOLIC BLOOD PRESSURE: 76 MMHG | HEIGHT: 62 IN | TEMPERATURE: 97.6 F | RESPIRATION RATE: 18 BRPM | BODY MASS INDEX: 20.2 KG/M2 | HEART RATE: 64 BPM | SYSTOLIC BLOOD PRESSURE: 176 MMHG | WEIGHT: 109.8 LBS

## 2025-01-14 LAB
ANION GAP SERPL CALCULATED.3IONS-SCNC: 14 MMOL/L (ref 9–16)
BUN SERPL-MCNC: 77 MG/DL (ref 8–23)
BUN/CREAT SERPL: 30 (ref 9–20)
CALCIUM SERPL-MCNC: 8.7 MG/DL (ref 8.6–10.4)
CHLORIDE SERPL-SCNC: 95 MMOL/L (ref 98–107)
CO2 SERPL-SCNC: 19 MMOL/L (ref 20–31)
CREAT SERPL-MCNC: 2.6 MG/DL (ref 0.5–0.9)
GFR, ESTIMATED: 20 ML/MIN/1.73M2
GLUCOSE SERPL-MCNC: 105 MG/DL (ref 74–99)
POTASSIUM SERPL-SCNC: 4.1 MMOL/L (ref 3.7–5.3)
SODIUM SERPL-SCNC: 128 MMOL/L (ref 136–145)

## 2025-01-14 PROCEDURE — 6360000004 HC RX CONTRAST MEDICATION: Performed by: UROLOGY

## 2025-01-14 PROCEDURE — 3700000000 HC ANESTHESIA ATTENDED CARE: Performed by: UROLOGY

## 2025-01-14 PROCEDURE — 7100000010 HC PHASE II RECOVERY - FIRST 15 MIN: Performed by: UROLOGY

## 2025-01-14 PROCEDURE — 3700000001 HC ADD 15 MINUTES (ANESTHESIA): Performed by: UROLOGY

## 2025-01-14 PROCEDURE — 2709999900 HC NON-CHARGEABLE SUPPLY: Performed by: UROLOGY

## 2025-01-14 PROCEDURE — 6360000002 HC RX W HCPCS: Performed by: NURSE ANESTHETIST, CERTIFIED REGISTERED

## 2025-01-14 PROCEDURE — 6360000002 HC RX W HCPCS: Performed by: UROLOGY

## 2025-01-14 PROCEDURE — C1726 CATH, BAL DIL, NON-VASCULAR: HCPCS | Performed by: UROLOGY

## 2025-01-14 PROCEDURE — 6370000000 HC RX 637 (ALT 250 FOR IP): Performed by: NURSE ANESTHETIST, CERTIFIED REGISTERED

## 2025-01-14 PROCEDURE — C1769 GUIDE WIRE: HCPCS | Performed by: UROLOGY

## 2025-01-14 PROCEDURE — C1758 CATHETER, URETERAL: HCPCS | Performed by: UROLOGY

## 2025-01-14 PROCEDURE — 3600000013 HC SURGERY LEVEL 3 ADDTL 15MIN: Performed by: UROLOGY

## 2025-01-14 PROCEDURE — 7100000000 HC PACU RECOVERY - FIRST 15 MIN: Performed by: UROLOGY

## 2025-01-14 PROCEDURE — 6370000000 HC RX 637 (ALT 250 FOR IP): Performed by: UROLOGY

## 2025-01-14 PROCEDURE — 7100000011 HC PHASE II RECOVERY - ADDTL 15 MIN: Performed by: UROLOGY

## 2025-01-14 PROCEDURE — 80048 BASIC METABOLIC PNL TOTAL CA: CPT

## 2025-01-14 PROCEDURE — C2617 STENT, NON-COR, TEM W/O DEL: HCPCS | Performed by: UROLOGY

## 2025-01-14 PROCEDURE — 3600000003 HC SURGERY LEVEL 3 BASE: Performed by: UROLOGY

## 2025-01-14 PROCEDURE — 7100000001 HC PACU RECOVERY - ADDTL 15 MIN: Performed by: UROLOGY

## 2025-01-14 DEVICE — URETERAL STENT
Type: IMPLANTABLE DEVICE | Site: URETER | Status: FUNCTIONAL
Brand: PERCUFLEX™ PLUS

## 2025-01-14 RX ORDER — SODIUM CHLORIDE 0.9 % (FLUSH) 0.9 %
5-40 SYRINGE (ML) INJECTION EVERY 12 HOURS SCHEDULED
Status: DISCONTINUED | OUTPATIENT
Start: 2025-01-14 | End: 2025-01-14 | Stop reason: HOSPADM

## 2025-01-14 RX ORDER — FENTANYL CITRATE 50 UG/ML
INJECTION, SOLUTION INTRAMUSCULAR; INTRAVENOUS
Status: DISCONTINUED | OUTPATIENT
Start: 2025-01-14 | End: 2025-01-14 | Stop reason: SDUPTHER

## 2025-01-14 RX ORDER — ONDANSETRON 2 MG/ML
4 INJECTION INTRAMUSCULAR; INTRAVENOUS
Status: DISCONTINUED | OUTPATIENT
Start: 2025-01-14 | End: 2025-01-14 | Stop reason: HOSPADM

## 2025-01-14 RX ORDER — SODIUM CHLORIDE 0.9 % (FLUSH) 0.9 %
5-40 SYRINGE (ML) INJECTION PRN
Status: DISCONTINUED | OUTPATIENT
Start: 2025-01-14 | End: 2025-01-14 | Stop reason: HOSPADM

## 2025-01-14 RX ORDER — NALOXONE HYDROCHLORIDE 0.4 MG/ML
INJECTION, SOLUTION INTRAMUSCULAR; INTRAVENOUS; SUBCUTANEOUS PRN
Status: DISCONTINUED | OUTPATIENT
Start: 2025-01-14 | End: 2025-01-14 | Stop reason: HOSPADM

## 2025-01-14 RX ORDER — MIDAZOLAM HYDROCHLORIDE 1 MG/ML
INJECTION, SOLUTION INTRAMUSCULAR; INTRAVENOUS
Status: DISCONTINUED | OUTPATIENT
Start: 2025-01-14 | End: 2025-01-14 | Stop reason: SDUPTHER

## 2025-01-14 RX ORDER — DIMENHYDRINATE 50 MG
50 TABLET ORAL ONCE
Status: COMPLETED | OUTPATIENT
Start: 2025-01-14 | End: 2025-01-14

## 2025-01-14 RX ORDER — LABETALOL HYDROCHLORIDE 5 MG/ML
10 INJECTION, SOLUTION INTRAVENOUS ONCE
Status: COMPLETED | OUTPATIENT
Start: 2025-01-14 | End: 2025-01-14

## 2025-01-14 RX ORDER — CEFAZOLIN SODIUM/WATER 2 G/20 ML
2000 SYRINGE (ML) INTRAVENOUS
Status: COMPLETED | OUTPATIENT
Start: 2025-01-14 | End: 2025-01-14

## 2025-01-14 RX ORDER — SODIUM CHLORIDE 9 MG/ML
INJECTION, SOLUTION INTRAVENOUS PRN
Status: DISCONTINUED | OUTPATIENT
Start: 2025-01-14 | End: 2025-01-14 | Stop reason: HOSPADM

## 2025-01-14 RX ORDER — ACETAMINOPHEN 325 MG/1
650 TABLET ORAL ONCE
Status: COMPLETED | OUTPATIENT
Start: 2025-01-14 | End: 2025-01-14

## 2025-01-14 RX ORDER — PROPOFOL 10 MG/ML
INJECTION, EMULSION INTRAVENOUS
Status: DISCONTINUED | OUTPATIENT
Start: 2025-01-14 | End: 2025-01-14 | Stop reason: SDUPTHER

## 2025-01-14 RX ORDER — LIDOCAINE HCL/PF 100 MG/5ML
SYRINGE (ML) INJECTION
Status: DISCONTINUED | OUTPATIENT
Start: 2025-01-14 | End: 2025-01-14 | Stop reason: SDUPTHER

## 2025-01-14 RX ORDER — HYDRALAZINE HYDROCHLORIDE 20 MG/ML
10 INJECTION INTRAMUSCULAR; INTRAVENOUS ONCE
Status: COMPLETED | OUTPATIENT
Start: 2025-01-14 | End: 2025-01-14

## 2025-01-14 RX ORDER — SODIUM CHLORIDE 9 MG/ML
INJECTION, SOLUTION INTRAVENOUS CONTINUOUS
Status: DISCONTINUED | OUTPATIENT
Start: 2025-01-14 | End: 2025-01-14 | Stop reason: HOSPADM

## 2025-01-14 RX ORDER — ONDANSETRON 2 MG/ML
INJECTION INTRAMUSCULAR; INTRAVENOUS
Status: DISCONTINUED | OUTPATIENT
Start: 2025-01-14 | End: 2025-01-14 | Stop reason: SDUPTHER

## 2025-01-14 RX ORDER — DEXAMETHASONE SODIUM PHOSPHATE 4 MG/ML
INJECTION, SOLUTION INTRA-ARTICULAR; INTRALESIONAL; INTRAMUSCULAR; INTRAVENOUS; SOFT TISSUE
Status: DISCONTINUED | OUTPATIENT
Start: 2025-01-14 | End: 2025-01-14 | Stop reason: SDUPTHER

## 2025-01-14 RX ORDER — HYDROMORPHONE HYDROCHLORIDE 1 MG/ML
0.25 INJECTION, SOLUTION INTRAMUSCULAR; INTRAVENOUS; SUBCUTANEOUS EVERY 5 MIN PRN
Status: DISCONTINUED | OUTPATIENT
Start: 2025-01-14 | End: 2025-01-14 | Stop reason: HOSPADM

## 2025-01-14 RX ORDER — LIDOCAINE HYDROCHLORIDE 20 MG/ML
JELLY TOPICAL PRN
Status: DISCONTINUED | OUTPATIENT
Start: 2025-01-14 | End: 2025-01-14 | Stop reason: ALTCHOICE

## 2025-01-14 RX ADMIN — FENTANYL CITRATE 100 MCG: 50 INJECTION INTRAMUSCULAR; INTRAVENOUS at 12:40

## 2025-01-14 RX ADMIN — DIMENHYDRINATE 50 MG: 50 TABLET ORAL at 11:19

## 2025-01-14 RX ADMIN — LABETALOL HYDROCHLORIDE 10 MG: 5 INJECTION INTRAVENOUS at 12:21

## 2025-01-14 RX ADMIN — LIDOCAINE HYDROCHLORIDE 40 MG: 20 INJECTION, SOLUTION INTRAVENOUS at 12:40

## 2025-01-14 RX ADMIN — HYDRALAZINE HYDROCHLORIDE 10 MG: 20 INJECTION INTRAMUSCULAR; INTRAVENOUS at 11:45

## 2025-01-14 RX ADMIN — ONDANSETRON 4 MG: 2 INJECTION, SOLUTION INTRAMUSCULAR; INTRAVENOUS at 12:40

## 2025-01-14 RX ADMIN — Medication 2000 MG: at 12:30

## 2025-01-14 RX ADMIN — MIDAZOLAM 2 MG: 1 INJECTION INTRAMUSCULAR; INTRAVENOUS at 12:40

## 2025-01-14 RX ADMIN — PROPOFOL 200 MG: 10 INJECTION, EMULSION INTRAVENOUS at 12:40

## 2025-01-14 RX ADMIN — HYDRALAZINE HYDROCHLORIDE 10 MG: 20 INJECTION INTRAMUSCULAR; INTRAVENOUS at 14:10

## 2025-01-14 RX ADMIN — HYDRALAZINE HYDROCHLORIDE 10 MG: 20 INJECTION INTRAMUSCULAR; INTRAVENOUS at 14:49

## 2025-01-14 RX ADMIN — DEXAMETHASONE SODIUM PHOSPHATE 4 MG: 4 INJECTION, SOLUTION INTRAMUSCULAR; INTRAVENOUS at 12:41

## 2025-01-14 RX ADMIN — ACETAMINOPHEN 650 MG: 325 TABLET ORAL at 11:20

## 2025-01-14 ASSESSMENT — PAIN DESCRIPTION - ORIENTATION: ORIENTATION: LEFT

## 2025-01-14 ASSESSMENT — PAIN DESCRIPTION - FREQUENCY: FREQUENCY: CONTINUOUS

## 2025-01-14 ASSESSMENT — PAIN SCALES - GENERAL
PAINLEVEL_OUTOF10: 0
PAINLEVEL_OUTOF10: 5
PAINLEVEL_OUTOF10: 0

## 2025-01-14 ASSESSMENT — ENCOUNTER SYMPTOMS: SHORTNESS OF BREATH: 0

## 2025-01-14 ASSESSMENT — PAIN DESCRIPTION - LOCATION: LOCATION: FLANK

## 2025-01-14 ASSESSMENT — PAIN DESCRIPTION - PAIN TYPE: TYPE: ACUTE PAIN

## 2025-01-14 ASSESSMENT — LIFESTYLE VARIABLES: SMOKING_STATUS: 0

## 2025-01-14 ASSESSMENT — PAIN DESCRIPTION - DESCRIPTORS: DESCRIPTORS: ACHING

## 2025-01-14 ASSESSMENT — PAIN DESCRIPTION - ONSET: ONSET: ON-GOING

## 2025-01-14 ASSESSMENT — PAIN - FUNCTIONAL ASSESSMENT: PAIN_FUNCTIONAL_ASSESSMENT: ACTIVITIES ARE NOT PREVENTED

## 2025-01-14 NOTE — H&P
failure    Acute-on-chronic respiratory failure    Biventricular congestive heart failure (HCC)    Malignant neoplasm of urinary bladder (HCC)    Acute hypoxic respiratory failure    Pleural effusion on right    Nephrostomy status (HCC)    Stage 4 chronic kidney disease (HCC)    Acute pulmonary edema    Other hydronephrosis    Chronic kidney disease    Pleural effusion, bilateral    Pneumonia of right lower lobe due to infectious organism    Acute on chronic diastolic congestive heart failure (HCC)    Anasarca    Nephrotic range proteinuria    Acute renal failure (ARF) (HCC)    Diarrhea due to drug    Hydronephrosis    Urothelial carcinoma of bladder with invasion of muscle (HCC)    Urgency of urination       Plan: left ureteroscopy with dilation and stent    Tulio Holder MD  7:46 AM 1/14/2025

## 2025-01-14 NOTE — OP NOTE
[address] [address]                            OPERATIVE REPORT      PATIENT NAME: OTILIA KO                 : 1960  MED REC NO: [id number]                          ROOM: [facility]  ACCOUNT NO: [id number]                       ADMIT DATE: 2025  PROVIDER: Tulio Holder MD      DATE OF PROCEDURE:  2025    SURGEON:  Tulio Holder MD    ASSISTANT:  None.    PREOPERATIVE DIAGNOSIS:  Left ureteral stricture.    POSTOPERATIVE DIAGNOSIS:  Left ureteral stricture.    PROCEDURE:  Cystoscopy with left ureteral dilation with UroMax balloon and stent placement.    ANESTHESIA:  General.    COMPLICATIONS:  None.    ESTIMATED BLOOD LOSS:  Minimal.    SPECIMENS:  None.    PROSTHESIS:  6-Urdu x 24 cm double-J ureteral stent.    DISPOSITION:  Stable.    FINDINGS:  Stricturing in a distal 3-4 cm left ureter.    INDICATIONS:  The patient is a 64-year-old female with extensive bladder cancer history as well as radiation therapy with distal stricturing, here now for identification and dilation.    DESCRIPTION OF PROCEDURE:  The patient was taken back to the operating room after informed consent including risks, benefits, and alternatives obtained.  The patient was transferred from the [facility] onto the operating room table, where she was induced under general anesthesia, given IV Ancef for preoperative antibiotic prophylaxis.  To begin the case, prepped and draped in normal sterile fashion, placed in dorsal lithotomy.  A 22-Urdu sheath 30-degree lens passed through the urethra into the bladder, where we identified the left ureteral orifice.  A cone-tipped catheter was placed up.  We then shot dye up the left ureter.  We could see a narrowed area approximately 3-4 cm right from the UVJ to the 3-4 cm proximal in the ureter.  Contrast was able to pass proximal to this.  At this point in time, we removed the cone-tipped

## 2025-01-14 NOTE — CARE COORDINATION
Date/Time:  1/14/2025 10:15 AM  LPN attempted to reach patient by telephone regarding red alert /104 in remote patient monitoring program. Left HIPAA compliant message requesting a return call. Will attempt to reach patient again.

## 2025-01-14 NOTE — ANESTHESIA POSTPROCEDURE EVALUATION
Department of Anesthesiology  Postprocedure Note    Patient: Daja Espinosa  MRN: [id number]  YOB: 1960  Date of evaluation: 1/14/2025    Procedure Summary       Date: 01/14/25 Room / Location: [facility]    Anesthesia Start: 1229 Anesthesia Stop: 1306    Procedure: URETEROSCOPY- DILATATION WITH UROMAX,URETERAL STENT INSERTION (Left: Ureter) Diagnosis:       Hydronephrosis, unspecified hydronephrosis type      Urothelial carcinoma of bladder with invasion of muscle (HCC)      Transitional cell carcinoma (HCC)      Urgency of urination      (Hydronephrosis, unspecified hydronephrosis type [N13.30])      (Urothelial carcinoma of bladder with invasion of muscle (HCC) [C67.9])      (Transitional cell carcinoma (HCC) [C68.9])      (Urgency of urination [R39.15])    Surgeons: Tulio Holder MD Responsible Provider: Isaak Alberto APRN - CRNA    Anesthesia Type: general ASA Status: 3            Anesthesia Type: No value filed.    Segundo Phase I: Segundo Score: 10    Segundo Phase II:      Anesthesia Post Evaluation    Patient location during evaluation: bedside  Patient participation: complete - patient cannot participate  Level of consciousness: sleepy but conscious  Pain score: 0  Airway patency: patent  Nausea & Vomiting: no nausea  Cardiovascular status: blood pressure returned to baseline  Respiratory status: acceptable  Hydration status: stable  Pain management: adequate    No notable events documented.

## 2025-01-14 NOTE — ANESTHESIA PRE PROCEDURE
Current medications:    Current Facility-Administered Medications   Medication Dose Route Frequency Provider Last Rate Last Admin   • 0.9 % sodium chloride infusion   IntraVENous Continuous Avery Agosto APRN - CRNA       • ceFAZolin (ANCEF) 2000 mg in 20 mL IV syringe  2,000 mg IntraVENous On Call to OR Tulio Holder MD           Allergies:  No Known Allergies    Problem List:    Patient Active Problem List   Diagnosis Code   • Low back pain M54.50   • Hypertension, essential I10   • Dyslipidemia E78.5   • Incisional hernia at bottom of median sternotomy scar K43.2   • Hyponatremia E87.1   • Peripheral vascular disease ([facility]) I73.9   • Umbilical hernia K42.9   • Bladder mass N32.89   • Urothelial carcinoma of bladder ([facility]) C67.9   • NSTEMI (non-ST elevated myocardial infarction) ([facility]) I21.4   • Alcohol abuse F10.10   • Coronary artery disease of native heart with stable angina pectoris ([facility]) I25.118   • History of colon polyps Z86.0100   • Transitional cell carcinoma ([facility]) C68.9   • Bladder wall thickening N32.89   • Hydronephrosis of left kidney N13.30   • Acute ischemic stroke ([facility]) I63.9   • Bilateral carotid artery stenosis I65.23   • Stroke ([facility]) I63.9   • Hypercoagulable state ([facility]) D68.59   • Chemotherapy adverse reaction T45.1X5A   • Reactive thrombocytosis D75.838   • Macrocytic anemia D53.9   • Melena K92.1   • History of stroke Z86.73   • Acute kidney injury superimposed on CKD ([facility]) N17.9, N18.9   • Metabolic acidosis E87.20   • Rectal bleed K62.5   • Anemia, blood loss D50.0   • Stage 3b chronic kidney disease ([facility]) N18.32   • Community acquired pneumonia of right lower lobe of lung J18.9   • Hospital-acquired bacterial pneumonia J15.9   • Atrial fibrillation with rapid ventricular response ([facility]) I48.91   • Aortoiliac occlusive disease ([facility]) I74.09   • PAD (peripheral artery disease) ([facility]) I73.9   • Acute on chronic respiratory failure with hypoxia J96.21   • Paroxysmal atrial fibrillation

## 2025-01-14 NOTE — PROGRESS NOTES
Patient BPs updated to Arpan KNUTSON, states okay to be discharged at this time. Patient to continue to monitor BPs and updated PCP. Patient transfers to bathroom and states ready to be discharged. Instructions given to pt and spouse, both verbalize understanding, deny any questions and/or concerns. Pt transfer off unit in wheelchair w/ spouse and all belongings.    Discharge Criteria    Inpatients must meet Criteria 1 through 7. All other patients are either YES or N/A. If a NO is chosen then Anesthesia or Surgeon must be notified.      1.  Minimum 30 minutes after last dose of sedative medication.    Yes      2.  Systolic BP between 90 - 160. Diastolic BP between 60 - 90.    Yes      3.  Pulse between 60 - 120    Yes      4.  Respirations between 8 - 25.    Yes      5.  SpO2 92% - 100%.    Yes      6.  Able to cough and swallow or return to baseline function.    Yes      7.  Alert and oriented or return to baseline mental status.    Yes      8.  Demonstrates controlled, coordinated movements, ambulates with steady gait, or return to baseline activity function.    Yes      9.  Minimal or no pain or nausea, or at a level tolerable and acceptable to patient.    Yes      10. Takes and retains oral fluids as allowed.    Yes      11. Procedural / perioperative site stable.  Minimal or no bleeding.    Yes          12. If GI endoscopy procedure, minimal or no abdominal distention or passing flatus.    N/A      13. Written discharge instructions and emergency telephone number provided.    Yes      14. Accompanied by a responsible adult.    Yes

## 2025-01-14 NOTE — DISCHARGE INSTRUCTIONS
SAME DAY SURGERY DISCHARGE INSTRUCTIONS    1.  Do not drive or operate hazardous machinery for 24 hours.    2.  Do not make important personal or business decisions for 24 hours.    3.  Do not drink alcoholic beverages for 24 hours.    4.  Do not smoke tobacco products for 24 hours.    5.  Eat light foods (Jell-O, soups, etc....) and drink plenty of fluids (water, Sprite, etc...) up to 8 glasses per day, as you can tolerate.    6.  Limit your activities for 24 hours.  Do not engage in heavy work until your surgeon gives you permission.      7.  Patient should not be left alone for 12-24 hours following surgical procedure.    8.  Wash hands before and after incision care.  It is important to practice good personal hygiene during the post op period.    9.  Call your surgeon for any questions regarding your surgery.    CYSTOSCOPY DISCHARGE INSTRUCTIONS    Possible burning during urination and/or blood tinged urine.    Drink 6-8 glasses of water for the next day or so.  (This helps to flush the urinary tract.)    Call Dr. Holder ([phone]) if you develop:    Fever over 100 degrees  Prolonged soreness/pain  Unusual bleeding/bruising  Unable to urinate or if urine is bloody  You cannot pass urine 8 hours after the test.  You have pain in your belly or your back just below your rib cage.  (This is called flank pain.)  You have frequent urge to urinate but can pass only small amounts of urine.    Call Dr. Holder office for follow-up appointment ([phone]).

## 2025-01-15 ENCOUNTER — CARE COORDINATION (OUTPATIENT)
Dept: CASE MANAGEMENT | Age: 65
End: 2025-01-15

## 2025-01-15 NOTE — CARE COORDINATION
Date/Time:  1/15/2025 3:23 PM  LPN attempted to reach patient by telephone regarding red alert in remote patient monitoring program. Left HIPAA compliant message requesting a return call. Will attempt to reach patient again

## 2025-01-15 NOTE — CARE COORDINATION
Date/Time:  1/15/2025 2:03 PM  LPN attempted to reach patient by telephone regarding red alert in remote patient monitoring program. Left HIPAA compliant message requesting a return call. Will attempt to reach patient again.

## 2025-01-16 ENCOUNTER — TELEPHONE (OUTPATIENT)
Dept: UROLOGY | Age: 65
End: 2025-01-16

## 2025-01-16 NOTE — TELEPHONE ENCOUNTER
Patient returned phone call to the office.  The patient states she cannot drive to [place] today due to the bad weather.  The patient is agreeable to drive to [facility] for an appt at 3:00 today in the [facility].

## 2025-01-16 NOTE — TELEPHONE ENCOUNTER
Patient reports the bag for her nephrostomy tube is leaking, so she has put duct tape on it and placed the bag in a ziploc bag.  The patient went to the ER and they did not have a nephrostomy bag or have any knowledge of where to get a bag.

## 2025-01-16 NOTE — TELEPHONE ENCOUNTER
Writer leaves  for patient to call back. Attempting to f/u on answering service call.  She is scheduled post op 1/22/25

## 2025-01-16 NOTE — TELEPHONE ENCOUNTER
3:05 pm  Patient's nephrostomy tube bag was changed by Dr. Holder without any problems.  Patient will follow up in urology office 01/22/2025.    --- ANGEL Lawrence RN

## 2025-01-16 NOTE — TELEPHONE ENCOUNTER
Nephrostomy bag located in cardiac cath lab.      Attempted to contact the patient to schedule time to change the bag.  Unable to leave a message due to the mailbox is full.

## 2025-01-17 ENCOUNTER — TELEPHONE (OUTPATIENT)
Dept: ONCOLOGY | Age: 65
End: 2025-01-17

## 2025-01-17 NOTE — TELEPHONE ENCOUNTER
Call was made to [facility] too inform them [facility] that the [facility] scheduled needed to be canceled. [facility] was contacted and the PET was canceled.

## 2025-01-17 NOTE — TELEPHONE ENCOUNTER
Dr. Shepard informed 1/16/25 that PET was not authorized by insurance; physician states OK, no new orders received.  No plan for peer to peer/etc.

## 2025-01-21 ENCOUNTER — CARE COORDINATION (OUTPATIENT)
Dept: CARE COORDINATION | Age: 65
End: 2025-01-21

## 2025-01-21 ENCOUNTER — CARE COORDINATION (OUTPATIENT)
Dept: CASE MANAGEMENT | Age: 65
End: 2025-01-21

## 2025-01-21 NOTE — CARE COORDINATION
Date/Time:  1/21/2025 11:00 AM  LPN attempted to reach patient by telephone regarding red alert in remote patient monitoring program. Left HIPAA compliant message requesting a return call. Will attempt to reach patient again.    RPM RED alert for /92     No pulse ox and weight  metrics for 3 days  Message sent to patient via Patient Connect Portal for Remote Patient Monitoring     RPM Nurse message Return call requested Hello, Please see an important message from your RPM Team.  We have attempted to reach you to discuss your readings please reach out to us to discuss with the provided number left on your voicemail.     Please do not respond to this message; If you have a question or concern please call your Ambulatory Care Manager or your Primary Care Physician.

## 2025-01-21 NOTE — CARE COORDINATION
-Remote Alert Monitoring Note      Date/Time:  2025 3:43 PM  Patient Current Location: Home: [address] [place] [address]  Verified patients name and  as identifiers.    Rpm alert to be reviewed by the provider   yellow alert  blood pressure reading (172/98)  Vitals Recheck blood pressure reading (164/86)  Additional needs to be addressed by provider: No                   LPN contacted patient by telephone regarding red alert received   Background: PNA  Refer to 911 immediately if:  Patient unresponsive or unable to provide history  Change in cognition or sudden confusion  Patient unable to respond in complete sentences  Intense chest pain/tightness  Any concern for any clinical emergency  Red Alert: Provider response time of 1 hr required for any red alert requiring intervention  Yellow Alert: Provider response time of 3hr required for any escalated yellow alert  Patient Chief Complaint:  Blood Pressure BP Triage  Are you having any Chest Pain? no   Are you having any Shortness of Breath? no   Do you have a headache or have any vision changes? no   Are you having any numbness or tingling? no   Are you having any other health concerns or issues? no  Patient/Caregiver educated on how to properly take a blood pressure. Patient/Caregiver verbalizes understanding.     Clinical Interventions: Reviewed and followed up on alerts and treatments-Spoke to patient she denies chest pain, SOB, dizziness and headache she feels good today , repeat BP is now WNL she will continue to take medications as directed    Plan/Follow Up: Will continue to review, monitor and address alerts with follow up based on severity of symptoms and risk factors.  **For any new or worsening symptoms or you are concerned in anyway, please contact your Provider or report to the nearest Emergency Room.**

## 2025-01-21 NOTE — CARE COORDINATION
Ambulatory Care Coordination Note     2025 2:39 PM     Patient Current Location:  Home: [address] [place] [address]     ACM contacted the patient by telephone. Verified name and  with patient as identifiers.         ACM: Thania Toscano RN     Challenges to be reviewed by the provider   Additional needs identified to be addressed with provider No  none               Method of communication with provider: none.    Utilization: Patient has not had any utilization since our last call.     Care Summary Note: Spoke with Daja. Reports she is aware BP reading is high. RPM team attempted to reach her 2 times today without success. Daja states she will wait a few minutes and recheck. Educated to make sure she is sitting/resting for 5 minutes before checking BP for a more accurate readings and make sure feet flat on floor. Encourage full set of vital signs as metrics not entered in few days. Updated RPM team about plan to recheck in few minutes. Daja denied any chest pain, pressure or shortness of breath. States she is unsure why reading is high. States she did have procedure (stent placement) done the other day and maybe pain related. Has follow up with Urology tomorrow. Reports urine flowing well, states bag is full about every 3 hours so thinking stent working well. Denied any new needs today.     Offered patient enrollment in the Remote Patient Monitoring (RPM) program for in-home monitoring: Yes, patient enrolled; current status is activated and monitoring.     Medications Reviewed:   Patient denies any changes with medications and reports taking all medications as prescribed.    PCP/Specialist follow up:   Future Appointments         Provider Specialty Dept Phone    2025 9:30 AM Tulio Holder MD Urology [phone]    2025 11:00 AM [facility] ONC ROOM 5 Infusion Therapy [phone]    2025 11:15 AM Familia Shepard MD Oncology [phone]    2025 11:00 AM Terrell Cortez,

## 2025-01-21 NOTE — CARE COORDINATION
Date/Time:  1/21/2025 2:18 PM  LPN attempted to reach patient by telephone regarding red alert in remote patient monitoring program. Left HIPAA compliant message requesting a return call. Will attempt to reach patient again.    RPM RED alert for /92     No pulse ox and weight  metrics for 3 days  Second attempt. No answer. No return call  Update to [facility]

## 2025-01-22 ENCOUNTER — OFFICE VISIT (OUTPATIENT)
Dept: UROLOGY | Age: 65
End: 2025-01-22
Payer: COMMERCIAL

## 2025-01-22 ENCOUNTER — CARE COORDINATION (OUTPATIENT)
Dept: CARE COORDINATION | Age: 65
End: 2025-01-22

## 2025-01-22 VITALS
HEIGHT: 62 IN | SYSTOLIC BLOOD PRESSURE: 126 MMHG | DIASTOLIC BLOOD PRESSURE: 74 MMHG | BODY MASS INDEX: 21.35 KG/M2 | WEIGHT: 116 LBS | TEMPERATURE: 97.5 F

## 2025-01-22 DIAGNOSIS — N13.30 HYDRONEPHROSIS, UNSPECIFIED HYDRONEPHROSIS TYPE: Primary | ICD-10-CM

## 2025-01-22 DIAGNOSIS — R39.15 URGENCY OF URINATION: ICD-10-CM

## 2025-01-22 PROCEDURE — 3017F COLORECTAL CA SCREEN DOC REV: CPT | Performed by: UROLOGY

## 2025-01-22 PROCEDURE — G8420 CALC BMI NORM PARAMETERS: HCPCS | Performed by: UROLOGY

## 2025-01-22 PROCEDURE — 99214 OFFICE O/P EST MOD 30 MIN: CPT | Performed by: UROLOGY

## 2025-01-22 PROCEDURE — 3078F DIAST BP <80 MM HG: CPT | Performed by: UROLOGY

## 2025-01-22 PROCEDURE — 1036F TOBACCO NON-USER: CPT | Performed by: UROLOGY

## 2025-01-22 PROCEDURE — 3074F SYST BP LT 130 MM HG: CPT | Performed by: UROLOGY

## 2025-01-22 PROCEDURE — G8427 DOCREV CUR MEDS BY ELIG CLIN: HCPCS | Performed by: UROLOGY

## 2025-01-22 NOTE — CARE COORDINATION
BP now 166/86 HR 70 at 14:58    Thank you for contacting the [facility] team.  Your Request (#[id number]) has been reviewed and marked resolved. To reopen or add additional comments, please reply to this email.  Did you know, you can also find self-help assistance on product, troubleshooting, and tracking FAQ by visiting our Help Center: [email or web address].[email or web address]/hc/en-us.  Hello,     We spoke with patient PID [id number] and after troubleshooting their device, they were able to get a better reading according to their normal threshold.     Moving forward the patient should be all set.     If there are any questions or concerns, please reach out.     José Huffman     15:18 Notify RAJESH GONZALEZ

## 2025-01-22 NOTE — PROGRESS NOTES
HPI:        Patient is a 64 y.o. female in no acute distress.  She is alert and oriented to person, place, and time.        History  TURBT 4/5/2016 High Grade Ta  TURBT re-resection 5/17/2016 High Grade Ta  6 Weekly BCG instuillations starting 6/16/16  3 weekly BCG starting 7/20/17     Lost to follow-up in 2019 2/2024 Patient is here today as a new patient.  Patient was well-known to our practice several years ago.  She did have multiple bladder resections.  She was lost to follow-up approximately 5 years ago.  Patient did have a recent CT scan.  This film was independently reviewed.  This does show severe left hydronephrosis.  This is down to the level of the bladder.  The bladder does appear to be thickened on the left side.  Patient has had slight worsening of her creatinine.  Patient has been having some left-sided flank pain.  Is unclear how long this has been going on.  Patient has no unintentional weight loss or decreased appetite.  She reports no nausea or vomiting.     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     4/2024 patient opted for chemotherapy and radiation therapy versus radical cystectomy to 2 surgical risk.  Started cisplatin/gemcitabine.     1/2025 cystoscopy with left ureteral dilation and stent placement    Currently  Patient is here today for follow-up.  Patient does have a PERC tube in place in the left side.  Patient is doing better with her new bag.  This is not leaking.  He has been draining clear urine.  She does also make urine.  From below.  No pain.  Past Medical History:   Diagnosis Date    Alcohol abuse 03/02/2017    Arthritis     Bladder cancer (HCC) 2016    CAD (coronary artery disease)     Cancer (HCC) 2001    vulvar-    Carotid artery occlusion 1993    [facility] where she had a brachiocephalic aorta bypass.    Carotid artery stenosis     Chronic back  EMERGENCY DEPARTMENT ENCOUNTER      This patient was seen and evaluated by the attending physician. Pt Name: Max Banuelos  MRN: [id number]  [place] 1944  Date of evaluation: 1/6/2022  Provider: ADRIANA ReidC  PCP: Sam Mccarthy  ED Attending: Madhuri Molina MD    History provided by family    CHIEF COMPLAINT:     Chief Complaint   Patient presents with    Fatigue       HISTORY OF PRESENT ILLNESS:      Max Banuelos is a 69 y.o. male who presents [facility]  ED with complaints of fatigue. Patient family states the patient has been more fatigued lately and took a long 6-hour nap which is unusual for him. Patient has a history of Alzheimer's, he is pleasantly confused, tells me that he has Alzheimer's and he does not really know why is here. He does not appear to be distressed. Is here for further evaluation. Location:-  Quality:-  Severity:-  Duration:-  Modifying factors:-    Nursing Notes were reviewed     REVIEW OF SYSTEMS:     Review of Systems  All systems, atotal of 10, are reviewed and negative except for those that were just noted in history present illness.         PAST MEDICAL HISTORY:     Past Medical History:   Diagnosis Date    ADHD (attention deficit hyperactivity disorder)     [facility] disease [facility]     Cancer ([facility]     prostate    Hyperlipidemia     Hypertension     IFG (impaired fasting glucose)     OCD (obsessive compulsive disorder)     Osteoarthritis     Renal insufficiency     TIA (transient ischemic attack)          SURGICAL HISTORY:      Past Surgical History:   Procedure Laterality Date    COLONOSCOPY  2004    EYE SURGERY      INTRACAPSULAR CATARACT EXTRACTION Right 10/21/2019    PHACOEMULSIFICATION OF CATARACT RIGHT EYE WITH INTRAOCULAR LENS IMPLANT performed by Sergio Rowe MD at [facility] [address] Left 10/31/2019    PHACOEMULSIFICATION OF CATARACT LEFT EYE WITH INTRAOCULAR LENS IMPLANT performed by Stephanie Morales MD at [address]:       Previous Medications    ASCORBIC ACID (VITAMIN C) 250 MG TABLET    Take 500 mg by mouth daily. ASPIRIN 81 MG TABLET    Take 81 mg by mouth daily. ATENOLOL PO    Take 50 mg by mouth daily    ATORVASTATIN (LIPITOR) 40 MG TABLET    daily     FLUOXETINE (PROZAC) 20 MG CAPSULE    Take 60 mg by mouth daily    METHYLPHENIDATE (RITALIN) 5 MG TABLET    Take 10 mg by mouth 4 times daily. MISC NATURAL PRODUCTS (OSTEO BI-FLEX JOINT SHIELD PO)    Take  by mouth. MULTIVITAMIN (THERAGRAN) PER TABLET    Take 1 tablet by mouth daily. OMEGA-3 FATTY ACIDS (FISH OIL) 1000 MG CAPS    Take 2,000 mg by mouth 2 times daily. PSYLLIUM (METAMUCIL PO)    Take  by mouth. RIVASTIGMINE (EXELON) 6 MG CAPSULE    Take 1 capsule by mouth 2 times daily     TRAZODONE (DESYREL) 50 MG TABLET    Take 100 mg by mouth nightly. ALLERGIES:    Pcn [penicillins] and Sulfa antibiotics    FAMILY HISTORY:       Family History   Problem Relation Age of Onset    Heart Disease Maternal Grandfather           SOCIAL HISTORY:       Social History     Socioeconomic History    Marital status:      Spouse name: None    Number of children: None    Years of education: None    Highest education level: None   Occupational History    None   Tobacco Use    Smoking status: Never Smoker    Smokeless tobacco: Never Used   Substance and Sexual Activity    Alcohol use: No    Drug use: Never    Sexual activity: None   Other Topics Concern    None   Social History Narrative    None     Social Determinants of Health     Financial Resource Strain:     Difficulty of Paying Living Expenses: Not on file   Food Insecurity:     Worried About Running Out of Food in the Last Year: Not on file    Kris of Food in the Last Year: Not on file   Transportation Needs:     Lack of Transportation (Medical):  Not on file    Lack of Transportation (Non-Medical): Not on file   Physical Activity:     Days of Exercise per Week: Not on file    Minutes of Exercise per Session: Not on file   Stress:     Feeling of Stress : Not on file   Social Connections:     Frequency of Communication with Friends and Family: Not on file    Frequency of Social Gatherings with Friends and Family: Not on file    Attends Hindu Services: Not on file    Active Member of Clubs or Organizations: Not on file    Attends Club or Organization Meetings: Not on file    Marital Status: Not on file   Intimate Partner Violence:     Fear of Current or Ex-Partner: Not on file    Emotionally Abused: Not on file    Physically Abused: Not on file    Sexually Abused: Not on file   Housing Stability:     Unable to Pay for Housing in the Last Year: Not on file    Number of [place] in the Last Year: Not on file    Unstable Housing in the Last Year: Not on file       SCREENINGS:   [place] Coma Scale  Eye Opening: Spontaneous  Best Verbal Response: Oriented  Best Motor Response: Obeys commands  Eh Coma Scale Score: 15        PHYSICAL EXAM:       ED Triage Vitals [01/06/22 1825]   BP Temp Temp Source Pulse Resp SpO2 Height Weight   (!) 154/68 99.4 °F (37.4 °C) Oral 87 18 95 % 5' 11\" (1.803 m) 200 lb (90.7 kg)       Physical Exam    CONSTITUTIONAL: Awake and alert. Cooperative. Well-developed. Well-nourished. Non-toxic. No acute distress. Vitals:    01/06/22 1825 01/06/22 2113 01/06/22 2145 01/06/22 2146   BP: (!) 154/68 133/78     Pulse: 87 153  163   Resp: 18 19     Temp: 99.4 °F (37.4 °C)  99.4 °F (37.4 °C) 98.9 °F (37.2 °C)   TempSrc: Oral  Oral Oral   SpO2: 95% 97%     Weight: 200 lb (90.7 kg)      Height: 5' 11\" (1.803 m)        HENT: Normocephalic. Atraumatic. External ears normal, without discharge. TMs clear bilaterally. No nasal discharge. Oropharynx clear, no erythema.  Mucous membranes moist.  EYES: Conjunctiva non-injected, no lid abnormalities noted. No scleral icterus. PERRL. EOM's grossly intact. Anterior chambers clear. NECK: Supple. Normal ROM. No meningismus. No thyroid tenderness or swelling noted. CARDIOVASCULAR: RRR. No Murmer. PULMONARY/CHEST WALL: Effort normal. No tachypnea. Lungs clear to ausculation. ABDOMEN: Normal BS. Soft. Nondistended. No tenderness to palpate. No guarding. No hernias noted. No splenomegaly. Back: Spine is midline. No ecchymosis. No crepitus on palpation. No obvious subluxation of vertebral column. No saddle anesthesia or evidence of cauda equina. /ANORECTAL: Not assessed  MUSKULOSKELETAL: Normal ROM. No acute deformities. No edema. No tenderness to palpate. SKIN: Warm and dry. NEUROLOGICAL:  GCS 15. CN II-XII grossly intact. Strength is 5/5 in allextremities and sensation is intact. PSYCHIATRIC: Normal affect, normal insight and judgement. Alert andoriented x 3.         DIAGNOSTIC RESULTS:     LABS:    Results for orders placed or performed during the hospital encounter of 01/06/22   CBC auto differential   Result Value Ref Range    WBC 4.7 4.0 - 11.0 K/uL    RBC 4.59 4.20 - 5.90 M/uL    Hemoglobin 13.4 (L) 13.5 - 17.5 g/dL    Hematocrit 39.7 (L) 40.5 - 52.5 %    MCV 86.5 80.0 - 100.0 fL    MCH 29.3 26.0 - 34.0 pg    MCHC 33.8 31.0 - 36.0 g/dL    RDW 14.0 12.4 - 15.4 %    Platelets 263 841 - 633 K/uL    MPV 6.9 5.0 - 10.5 fL    Neutrophils % 72.9 %    Lymphocytes % 10.8 %    Monocytes % 15.4 %    Eosinophils % 0.2 %    Basophils % 0.7 %    Neutrophils Absolute 3.4 1.7 - 7.7 K/uL    Lymphocytes Absolute 0.5 (L) 1.0 - 5.1 K/uL    Monocytes Absolute 0.7 0.0 - 1.3 K/uL    Eosinophils Absolute 0.0 0.0 - 0.6 K/uL    Basophils Absolute 0.0 0.0 - 0.2 K/uL   Comprehensive metabolic panel   Result Value Ref Range    Sodium 135 (L) 136 - 145 mmol/L    Potassium 4.2 3.5 - 5.1 mmol/L    Chloride 99 99 - 110 mmol/L    CO2 24 21 - 32 mmol/L    Anion Gap 12 3 - 16    Glucose 87 70 - 99 mg/dL    BUN 17 7 - 20 mg/dL    CREATININE 1.4 (H) 0.8 - 1.3 mg/dL    GFR Non- 49 (A) >60    GFR  59 (A) >60    Calcium 9.2 8.3 - 10.6 mg/dL    Total Protein 7.2 6.4 - 8.2 g/dL    Albumin 4.1 3.4 - 5.0 g/dL    Albumin/Globulin Ratio 1.3 1.1 - 2.2    Total Bilirubin 0.9 0.0 - 1.0 mg/dL    Alkaline Phosphatase 98 40 - 129 U/L    ALT 8 (L) 10 - 40 U/L    AST 13 (L) 15 - 37 U/L   Troponin   Result Value Ref Range    Troponin <0.01 <0.01 ng/mL   Urinalysis   Result Value Ref Range    Color, UA DARK YELLOW (A) Straw/Yellow    Clarity, UA CLOUDY (A) Clear    Glucose, Ur Negative Negative mg/dL    Bilirubin Urine SMALL (A) Negative    Ketones, Urine Negative Negative mg/dL    Specific Gravity, UA >=1.030 1.005 - 1.030    Blood, Urine MODERATE (A) Negative    pH, UA 5.0 5.0 - 8.0    Protein, UA 30 (A) Negative mg/dL    Urobilinogen, Urine 0.2 <2.0 E.U./dL    Nitrite, Urine POSITIVE (A) Negative    Leukocyte Esterase, Urine MODERATE (A) Negative    Microscopic Examination YES     Urine Type NotGiven    Microscopic Urinalysis   Result Value Ref Range    WBC, UA >100 (A) 0 - 5 /HPF    RBC, UA 5-10 (A) 0 - 4 /HPF    Bacteria, UA 4+ (A) None Seen /HPF   Lactic acid, plasma   Result Value Ref Range    Lactic Acid 1.1 0.4 - 2.0 mmol/L   EKG 12 Lead   Result Value Ref Range    Ventricular Rate 89 BPM    Atrial Rate 89 BPM    P-R Interval 152 ms    QRS Duration 76 ms    Q-T Interval 352 ms    QTc Calculation (Bazett) 428 ms    P Axis 54 degrees    R Axis 23 degrees    T Axis 31 degrees    Diagnosis       Sinus rhythm with marked sinus arrhythmiaOtherwise normal ECGWhen compared with ECG of 08-AUG-2019 10:09,Vent.  rate has increased BY  32 BPM   EKG 12 Lead   Result Value Ref Range    Ventricular Rate 124 BPM    Atrial Rate 166 BPM    QRS Duration 78 ms    Q-T Interval 324 ms    QTc Calculation (Bazett) 465 ms    R Axis 15 degrees    T Axis -14 degrees    Diagnosis       Atrial fibrillation with rapid ventricular Francie Tafoya compared with ECG of 06-JAN-2022 18:42,Atrial fibrillation has replaced Sinus rhythmNon-specific change in ST segment in Inferior leads         RADIOLOGY:  All x-ray studies are viewed/reviewedby me. Formal interpretations per the radiologist are as follows:      XR CHEST PORTABLE   Final Result   No acute cardiopulmonary disease. Suboptimal rotated portable study obtained at low lung volumes. When the   patient is able, a follow-up inspiratory PA and lateral examination the chest   is recommended. EKG:  See EKG interpretation by an attending phsyician      PROCEDURES:   N/A    CRITICAL CARE TIME:   Total critical care time today provided was at least 38 minutes. This excludes seperately billable procedure. Critical care time provided for atrial fibrillation with RVR that required close evaluation and/or intervention with concern for patient decompensation. CONSULTS:  None      EMERGENCYDEPARTMENT COURSE and DIFFERENTIAL DIAGNOSIS/MDM:   Vitals:    Vitals:    01/06/22 1825 01/06/22 2113 01/06/22 2145 01/06/22 2146   BP: (!) 154/68 133/78     Pulse: 87 153  163   Resp: 18 19     Temp: 99.4 °F (37.4 °C)  99.4 °F (37.4 °C) 98.9 °F (37.2 °C)   TempSrc: Oral  Oral Oral   SpO2: 95% 97%     Weight: 200 lb (90.7 kg)      Height: 5' 11\" (1.803 m)          Patient was given the following medications:  Medications   dilTIAZem injection 10 mg (10 mg IntraVENous Given 1/6/22 2152)     Followed by   dilTIAZem 125 mg in dextrose 5 % 125 mL infusion (5 mg/hr IntraVENous New Bag 1/6/22 2154)   cefTRIAXone (ROCEPHIN) 1000 mg IVPB in 50 mL D5W minibag (0 mg IntraVENous Stopped 1/6/22 2151)         Patient was evaluated by both myself and Ana Whaley MD.    Patient presented to the emergency room today with complaints of fatigue. Patient had no complaints of pain, he does have Alzheimer's and tells me that he is unaware of why he was here.   Patient apparently took a long nap today and has been more fatigued. Patient work-up today showed negative lactate, he did have a large urinary tract infection but vital signs were stable, patient was getting ready to be discharged home when his heart rate spiked up in the 130s and 140s, EKG was done and showed new onset of atrial fibrillation with rapid ventricular response. He has no history of this, he was started on Cardizem drip, he will need to be admitted to the hospital at this time. Case discussed by the attending physician who also agrees with plan    Patient laboratory studies, radiographic imaging, andassessment were all discussed with the patient and/or patient family. There was shared decision-making between myself, the attending physician, as well as the patient and/or their surrogate and we are all in agreement with admission. There was an opportunity for questions and all questions were answered to the best of my ability and to the satisfaction of the patient and/or patient family. FINAL IMPRESSION:      1. Urinary tract infection without hematuria, site unspecified    2.  Atrial fibrillation with RVR ([address]          DISPOSITION/PLAN:   DISPOSITIONAdmitted      PATIENT REFERRED TO:  Merlene Rice  [address] [place] [address] [address] [facility]  [phone]    Call   For follow up      DISCHARGE MEDICATIONS:  New Prescriptions    CEFUROXIME (CEFTIN) 250 MG TABLET    Take 1 tablet by mouth 2 times daily for 7 days                  (Please note that portions of this note were completed with a voice recognition program.  Efforts were made to edit the dictations, but occasionally words are mis-transcribed.)    ADRIANA Chase CNP-C (electronically signed)       ADRIANA Chase CNP  01/06/22 8706

## 2025-01-22 NOTE — CARE COORDINATION
2025 12:07 PM  *  Alert and Triage   -Remote Alert Monitoring Note      Date/Time:  2025 12:08 PM  Patient Current Location: Home: [address] [place] [address]  Verified patients name and  as identifiers.    Rpm alert to be reviewed by the provider   red alert  blood pressure reading (176/97)  Vitals Recheck blood pressure reading (176/98)    Additional needs to be addressed by provider: FYI      RPM CNP,  Yellow alert, BP asymptomatic   Took her BP medication at 08:30, denies chest pain, sob, headache, vision issues, numbness or tingling. She does state she has some pain from her procedure \"but not that severe\".          Around 09:30 BP reading               LPN contacted patient by telephone regarding red alert received   Background: enrolled in RPM for RPM Care Plans: Pneumonia    Refer to 911 immediately if:  Patient unresponsive or unable to provide history  Change in cognition or sudden confusion  Patient unable to respond in complete sentences  Intense chest pain/tightness  Any concern for any clinical emergency  Red Alert: Provider response time of 1 hr required for any red alert requiring intervention  Yellow Alert: Provider response time of 3hr required for any escalated yellow alert  Patient Chief Complaint:  Blood Pressure BP Triage  Are you having any Chest Pain? no   Are you having any Shortness of Breath? no   Do you have a headache or have any vision changes? no   Are you having any numbness or tingling? no   Are you having any other health concerns or issues? Yes, some pain from procedure.   Patient/Caregiver educated on how to properly take a blood pressure. Patient/Caregiver verbalizes understanding.     Clinical Interventions: Escalated alert to NP-       Called [phone], and spoke with Daja Espinosa she states she does not know why it is reading high, she just had an appointment with her urologist and the BP was good. Reviewed encounter from 09:30 this morning BP was 126/74.

## 2025-01-23 ENCOUNTER — CARE COORDINATION (OUTPATIENT)
Dept: CASE MANAGEMENT | Age: 65
End: 2025-01-23

## 2025-01-23 ENCOUNTER — TELEPHONE (OUTPATIENT)
Dept: UROLOGY | Age: 65
End: 2025-01-23

## 2025-01-23 NOTE — CARE COORDINATION
Date/Time:  1/23/2025 2:04 PM  LPN attempted to reach patient by telephone regarding red alert /99 in remote patient monitoring program. Left HIPAA compliant message requesting a return call. Left message with SONI OSBORNE notified

## 2025-01-23 NOTE — CARE COORDINATION
Date/Time:  1/23/2025 12:54 PM  LPN attempted to reach patient by telephone regarding red alert /99 in remote patient monitoring program. Left HIPAA compliant message requesting a return call. Will attempt to reach patient again.

## 2025-01-23 NOTE — TELEPHONE ENCOUNTER
Patient calls back and speaks with EM Whitehead. States after replacing the cap, it is no longer leaking. Enc to call if she has any other issues.

## 2025-01-23 NOTE — TELEPHONE ENCOUNTER
Patient calls in reporting the cap from her neph tube is leaking. She reports we capped tube in office yesterday.  She states it started last night before bed.  She thinks there may be a slow leak. Phone call was lost during conversation. Writer calls patient back, and she reports she had a different cap at home that she just replaced. She will call back in an hour with update.

## 2025-01-27 ENCOUNTER — HOSPITAL ENCOUNTER (OUTPATIENT)
Dept: INFUSION THERAPY | Age: 65
Discharge: HOME | End: 2025-01-27
Payer: COMMERCIAL

## 2025-01-27 ENCOUNTER — TELEPHONE (OUTPATIENT)
Dept: ONCOLOGY | Age: 65
End: 2025-01-27

## 2025-01-27 ENCOUNTER — OFFICE VISIT (OUTPATIENT)
Dept: UROLOGY | Age: 65
End: 2025-01-27
Payer: COMMERCIAL

## 2025-01-27 ENCOUNTER — CARE COORDINATION (OUTPATIENT)
Dept: CASE MANAGEMENT | Age: 65
End: 2025-01-27

## 2025-01-27 ENCOUNTER — OFFICE VISIT (OUTPATIENT)
Dept: ONCOLOGY | Age: 65
End: 2025-01-27
Payer: COMMERCIAL

## 2025-01-27 VITALS
TEMPERATURE: 97 F | DIASTOLIC BLOOD PRESSURE: 82 MMHG | HEART RATE: 61 BPM | WEIGHT: 115 LBS | SYSTOLIC BLOOD PRESSURE: 174 MMHG | BODY MASS INDEX: 21.03 KG/M2 | RESPIRATION RATE: 18 BRPM

## 2025-01-27 VITALS
HEIGHT: 62 IN | BODY MASS INDEX: 21.16 KG/M2 | DIASTOLIC BLOOD PRESSURE: 90 MMHG | SYSTOLIC BLOOD PRESSURE: 180 MMHG | WEIGHT: 115 LBS | TEMPERATURE: 97.3 F

## 2025-01-27 VITALS — SYSTOLIC BLOOD PRESSURE: 153 MMHG | DIASTOLIC BLOOD PRESSURE: 77 MMHG | HEART RATE: 60 BPM

## 2025-01-27 DIAGNOSIS — C67.9 MALIGNANT NEOPLASM OF URINARY BLADDER, UNSPECIFIED SITE (HCC): ICD-10-CM

## 2025-01-27 DIAGNOSIS — C68.9 TRANSITIONAL CELL CARCINOMA (HCC): Primary | ICD-10-CM

## 2025-01-27 DIAGNOSIS — N13.9 OBSTRUCTIVE UROPATHY: ICD-10-CM

## 2025-01-27 DIAGNOSIS — R39.15 URGENCY OF URINATION: Primary | ICD-10-CM

## 2025-01-27 DIAGNOSIS — N13.30 HYDRONEPHROSIS OF LEFT KIDNEY: ICD-10-CM

## 2025-01-27 DIAGNOSIS — C67.9 UROTHELIAL CARCINOMA OF BLADDER WITH INVASION OF MUSCLE (HCC): ICD-10-CM

## 2025-01-27 DIAGNOSIS — C67.9 UROTHELIAL CARCINOMA OF BLADDER (HCC): ICD-10-CM

## 2025-01-27 DIAGNOSIS — N18.4 STAGE 4 CHRONIC KIDNEY DISEASE (HCC): ICD-10-CM

## 2025-01-27 DIAGNOSIS — C68.9 TRANSITIONAL CELL CARCINOMA (HCC): ICD-10-CM

## 2025-01-27 DIAGNOSIS — I48.91 ATRIAL FIBRILLATION WITH RAPID VENTRICULAR RESPONSE (HCC): Primary | ICD-10-CM

## 2025-01-27 LAB
ALBUMIN SERPL-MCNC: 3.8 G/DL (ref 3.5–5.2)
ALBUMIN/GLOB SERPL: 1.3 {RATIO} (ref 1–2.5)
ALP SERPL-CCNC: 94 U/L (ref 35–104)
ALT SERPL-CCNC: 13 U/L (ref 10–35)
AMYLASE SERPL-CCNC: 103 U/L (ref 28–100)
ANION GAP SERPL CALCULATED.3IONS-SCNC: 11 MMOL/L (ref 9–16)
AST SERPL-CCNC: 20 U/L (ref 10–35)
BASOPHILS # BLD: 0.04 K/UL (ref 0–0.2)
BASOPHILS NFR BLD: 1 % (ref 0–2)
BILIRUB SERPL-MCNC: 0.6 MG/DL (ref 0–1.2)
BUN SERPL-MCNC: 40 MG/DL (ref 8–23)
BUN/CREAT SERPL: 25 (ref 9–20)
CALCIUM SERPL-MCNC: 9.4 MG/DL (ref 8.6–10.4)
CHLORIDE SERPL-SCNC: 98 MMOL/L (ref 98–107)
CO2 SERPL-SCNC: 24 MMOL/L (ref 20–31)
CORTIS SERPL-MCNC: 12.3 UG/DL (ref 2.5–19.5)
CREAT SERPL-MCNC: 1.6 MG/DL (ref 0.5–0.9)
EOSINOPHIL # BLD: 0.75 K/UL (ref 0–0.44)
EOSINOPHILS RELATIVE PERCENT: 12 % (ref 1–4)
ERYTHROCYTE [DISTWIDTH] IN BLOOD BY AUTOMATED COUNT: 16 % (ref 11.8–14.4)
GFR, ESTIMATED: 36 ML/MIN/1.73M2
GLUCOSE SERPL-MCNC: 98 MG/DL (ref 74–99)
HCT VFR BLD AUTO: 30.2 % (ref 36.3–47.1)
HGB BLD-MCNC: 10 G/DL (ref 11.9–15.1)
IMM GRANULOCYTES # BLD AUTO: <0.03 K/UL (ref 0–0.3)
IMM GRANULOCYTES NFR BLD: 0 %
LIPASE SERPL-CCNC: 56 U/L (ref 13–60)
LYMPHOCYTES NFR BLD: 1.06 K/UL (ref 1.1–3.7)
LYMPHOCYTES RELATIVE PERCENT: 17 % (ref 24–43)
MCH RBC QN AUTO: 29.1 PG (ref 25.2–33.5)
MCHC RBC AUTO-ENTMCNC: 33.1 G/DL (ref 28.4–34.8)
MCV RBC AUTO: 87.8 FL (ref 82.6–102.9)
MONOCYTES NFR BLD: 0.61 K/UL (ref 0.1–1.2)
MONOCYTES NFR BLD: 10 % (ref 3–12)
NEUTROPHILS NFR BLD: 60 % (ref 36–65)
NEUTS SEG NFR BLD: 3.69 K/UL (ref 1.5–8.1)
NRBC BLD-RTO: 0 PER 100 WBC
PLATELET # BLD AUTO: 381 K/UL (ref 138–453)
PMV BLD AUTO: 8.8 FL (ref 8.1–13.5)
POTASSIUM SERPL-SCNC: 4.6 MMOL/L (ref 3.7–5.3)
PROT SERPL-MCNC: 6.6 G/DL (ref 6.6–8.7)
RBC # BLD AUTO: 3.44 M/UL (ref 3.95–5.11)
SODIUM SERPL-SCNC: 133 MMOL/L (ref 136–145)
TSH SERPL DL<=0.05 MIU/L-ACNC: 3.05 UIU/ML (ref 0.27–4.2)
WBC OTHER # BLD: 6.2 K/UL (ref 3.5–11.3)

## 2025-01-27 PROCEDURE — 1036F TOBACCO NON-USER: CPT | Performed by: UROLOGY

## 2025-01-27 PROCEDURE — 85025 COMPLETE CBC W/AUTO DIFF WBC: CPT

## 2025-01-27 PROCEDURE — 99214 OFFICE O/P EST MOD 30 MIN: CPT | Performed by: INTERNAL MEDICINE

## 2025-01-27 PROCEDURE — G8427 DOCREV CUR MEDS BY ELIG CLIN: HCPCS | Performed by: UROLOGY

## 2025-01-27 PROCEDURE — 3077F SYST BP >= 140 MM HG: CPT | Performed by: UROLOGY

## 2025-01-27 PROCEDURE — 84443 ASSAY THYROID STIM HORMONE: CPT

## 2025-01-27 PROCEDURE — 83690 ASSAY OF LIPASE: CPT

## 2025-01-27 PROCEDURE — 3017F COLORECTAL CA SCREEN DOC REV: CPT | Performed by: UROLOGY

## 2025-01-27 PROCEDURE — 2500000003 HC RX 250 WO HCPCS: Performed by: INTERNAL MEDICINE

## 2025-01-27 PROCEDURE — 96413 CHEMO IV INFUSION 1 HR: CPT

## 2025-01-27 PROCEDURE — 99214 OFFICE O/P EST MOD 30 MIN: CPT | Performed by: UROLOGY

## 2025-01-27 PROCEDURE — 80053 COMPREHEN METABOLIC PANEL: CPT

## 2025-01-27 PROCEDURE — G8420 CALC BMI NORM PARAMETERS: HCPCS | Performed by: INTERNAL MEDICINE

## 2025-01-27 PROCEDURE — 1036F TOBACCO NON-USER: CPT | Performed by: INTERNAL MEDICINE

## 2025-01-27 PROCEDURE — 3017F COLORECTAL CA SCREEN DOC REV: CPT | Performed by: INTERNAL MEDICINE

## 2025-01-27 PROCEDURE — 82533 TOTAL CORTISOL: CPT

## 2025-01-27 PROCEDURE — 3079F DIAST BP 80-89 MM HG: CPT | Performed by: INTERNAL MEDICINE

## 2025-01-27 PROCEDURE — 82150 ASSAY OF AMYLASE: CPT

## 2025-01-27 PROCEDURE — 3080F DIAST BP >= 90 MM HG: CPT | Performed by: UROLOGY

## 2025-01-27 PROCEDURE — 3077F SYST BP >= 140 MM HG: CPT | Performed by: INTERNAL MEDICINE

## 2025-01-27 PROCEDURE — 6360000002 HC RX W HCPCS: Performed by: INTERNAL MEDICINE

## 2025-01-27 PROCEDURE — G8427 DOCREV CUR MEDS BY ELIG CLIN: HCPCS | Performed by: INTERNAL MEDICINE

## 2025-01-27 PROCEDURE — 2580000003 HC RX 258: Performed by: INTERNAL MEDICINE

## 2025-01-27 PROCEDURE — G8420 CALC BMI NORM PARAMETERS: HCPCS | Performed by: UROLOGY

## 2025-01-27 RX ORDER — MEPERIDINE HYDROCHLORIDE 50 MG/ML
12.5 INJECTION INTRAMUSCULAR; INTRAVENOUS; SUBCUTANEOUS PRN
Status: CANCELLED | OUTPATIENT
Start: 2025-01-27

## 2025-01-27 RX ORDER — ONDANSETRON 2 MG/ML
8 INJECTION INTRAMUSCULAR; INTRAVENOUS
Status: CANCELLED | OUTPATIENT
Start: 2025-01-27

## 2025-01-27 RX ORDER — FAMOTIDINE 10 MG/ML
20 INJECTION, SOLUTION INTRAVENOUS
OUTPATIENT
Start: 2025-02-10

## 2025-01-27 RX ORDER — DIPHENHYDRAMINE HYDROCHLORIDE 50 MG/ML
50 INJECTION INTRAMUSCULAR; INTRAVENOUS
Status: CANCELLED | OUTPATIENT
Start: 2025-01-27

## 2025-01-27 RX ORDER — HYDROCORTISONE SODIUM SUCCINATE 100 MG/2ML
100 INJECTION INTRAMUSCULAR; INTRAVENOUS
OUTPATIENT
Start: 2025-02-10

## 2025-01-27 RX ORDER — ALBUTEROL SULFATE 90 UG/1
4 INHALANT RESPIRATORY (INHALATION) PRN
Status: CANCELLED | OUTPATIENT
Start: 2025-01-27

## 2025-01-27 RX ORDER — SODIUM CHLORIDE 9 MG/ML
5-250 INJECTION, SOLUTION INTRAVENOUS PRN
OUTPATIENT
Start: 2025-02-10

## 2025-01-27 RX ORDER — ONDANSETRON 2 MG/ML
8 INJECTION INTRAMUSCULAR; INTRAVENOUS
OUTPATIENT
Start: 2025-02-10

## 2025-01-27 RX ORDER — HEPARIN SODIUM (PORCINE) LOCK FLUSH IV SOLN 100 UNIT/ML 100 UNIT/ML
500 SOLUTION INTRAVENOUS PRN
OUTPATIENT
Start: 2025-02-10

## 2025-01-27 RX ORDER — SODIUM CHLORIDE 0.9 % (FLUSH) 0.9 %
5-40 SYRINGE (ML) INJECTION PRN
Status: DISCONTINUED | OUTPATIENT
Start: 2025-01-27 | End: 2025-01-28 | Stop reason: HOSPADM

## 2025-01-27 RX ORDER — HEPARIN SODIUM (PORCINE) LOCK FLUSH IV SOLN 100 UNIT/ML 100 UNIT/ML
500 SOLUTION INTRAVENOUS PRN
Status: CANCELLED | OUTPATIENT
Start: 2025-01-27

## 2025-01-27 RX ORDER — SODIUM CHLORIDE 0.9 % (FLUSH) 0.9 %
5-40 SYRINGE (ML) INJECTION PRN
OUTPATIENT
Start: 2025-01-27

## 2025-01-27 RX ORDER — SODIUM CHLORIDE 9 MG/ML
5-250 INJECTION, SOLUTION INTRAVENOUS PRN
Status: CANCELLED | OUTPATIENT
Start: 2025-01-27

## 2025-01-27 RX ORDER — SODIUM CHLORIDE 0.9 % (FLUSH) 0.9 %
5-40 SYRINGE (ML) INJECTION PRN
Status: CANCELLED | OUTPATIENT
Start: 2025-01-27

## 2025-01-27 RX ORDER — FAMOTIDINE 10 MG/ML
20 INJECTION, SOLUTION INTRAVENOUS
Status: CANCELLED | OUTPATIENT
Start: 2025-01-27

## 2025-01-27 RX ORDER — SODIUM CHLORIDE 9 MG/ML
INJECTION, SOLUTION INTRAVENOUS CONTINUOUS
Status: CANCELLED | OUTPATIENT
Start: 2025-01-27

## 2025-01-27 RX ORDER — HEPARIN 100 UNIT/ML
500 SYRINGE INTRAVENOUS PRN
OUTPATIENT
Start: 2025-01-27

## 2025-01-27 RX ORDER — EPINEPHRINE 1 MG/ML
0.3 INJECTION, SOLUTION, CONCENTRATE INTRAVENOUS PRN
Status: CANCELLED | OUTPATIENT
Start: 2025-01-27

## 2025-01-27 RX ORDER — HEPARIN 100 UNIT/ML
500 SYRINGE INTRAVENOUS PRN
Status: DISCONTINUED | OUTPATIENT
Start: 2025-01-27 | End: 2025-01-28 | Stop reason: HOSPADM

## 2025-01-27 RX ORDER — SODIUM CHLORIDE 9 MG/ML
INJECTION, SOLUTION INTRAVENOUS CONTINUOUS
OUTPATIENT
Start: 2025-02-10

## 2025-01-27 RX ORDER — ALBUTEROL SULFATE 90 UG/1
4 INHALANT RESPIRATORY (INHALATION) PRN
OUTPATIENT
Start: 2025-02-10

## 2025-01-27 RX ORDER — HYDROCORTISONE SODIUM SUCCINATE 100 MG/2ML
100 INJECTION INTRAMUSCULAR; INTRAVENOUS
Status: CANCELLED | OUTPATIENT
Start: 2025-01-27

## 2025-01-27 RX ORDER — ACETAMINOPHEN 325 MG/1
650 TABLET ORAL
OUTPATIENT
Start: 2025-02-10

## 2025-01-27 RX ORDER — SODIUM CHLORIDE 9 MG/ML
5-250 INJECTION, SOLUTION INTRAVENOUS PRN
Status: DISCONTINUED | OUTPATIENT
Start: 2025-01-27 | End: 2025-01-28 | Stop reason: HOSPADM

## 2025-01-27 RX ORDER — MEPERIDINE HYDROCHLORIDE 50 MG/ML
12.5 INJECTION INTRAMUSCULAR; INTRAVENOUS; SUBCUTANEOUS PRN
OUTPATIENT
Start: 2025-02-10

## 2025-01-27 RX ORDER — ACETAMINOPHEN 325 MG/1
650 TABLET ORAL
Status: CANCELLED | OUTPATIENT
Start: 2025-01-27

## 2025-01-27 RX ORDER — EPINEPHRINE 1 MG/ML
0.3 INJECTION, SOLUTION, CONCENTRATE INTRAVENOUS PRN
OUTPATIENT
Start: 2025-02-10

## 2025-01-27 RX ORDER — SODIUM CHLORIDE 0.9 % (FLUSH) 0.9 %
5-40 SYRINGE (ML) INJECTION PRN
OUTPATIENT
Start: 2025-02-10

## 2025-01-27 RX ORDER — DIPHENHYDRAMINE HYDROCHLORIDE 50 MG/ML
50 INJECTION INTRAMUSCULAR; INTRAVENOUS
OUTPATIENT
Start: 2025-02-10

## 2025-01-27 RX ADMIN — SODIUM CHLORIDE 240 MG: 9 INJECTION, SOLUTION INTRAVENOUS at 12:37

## 2025-01-27 RX ADMIN — SODIUM CHLORIDE, PRESERVATIVE FREE 20 ML: 5 INJECTION INTRAVENOUS at 13:08

## 2025-01-27 RX ADMIN — SODIUM CHLORIDE, PRESERVATIVE FREE 20 ML: 5 INJECTION INTRAVENOUS at 11:22

## 2025-01-27 RX ADMIN — Medication 500 UNITS: at 13:08

## 2025-01-27 NOTE — PROGRESS NOTES
HPI:        Patient is a 64 y.o. female in no acute distress.  She is alert and oriented to person, place, and time.        History  TURBT 4/5/2016 High Grade Ta  TURBT re-resection 5/17/2016 High Grade Ta  6 Weekly BCG instuillations starting 6/16/16  3 weekly BCG starting 7/20/17     Lost to follow-up in 2019 2/2024 Patient is here today as a new patient.  Patient was well-known to our practice several years ago.  She did have multiple bladder resections.  She was lost to follow-up approximately 5 years ago.  Patient did have a recent CT scan.  This film was independently reviewed.  This does show severe left hydronephrosis.  This is down to the level of the bladder.  The bladder does appear to be thickened on the left side.  Patient has had slight worsening of her creatinine.  Patient has been having some left-sided flank pain.  Is unclear how long this has been going on.  Patient has no unintentional weight loss or decreased appetite.  She reports no nausea or vomiting.     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     4/2024 patient opted for chemotherapy and radiation therapy versus radical cystectomy to 2 surgical risk.  Started cisplatin/gemcitabine.      1/2025 cystoscopy with left ureteral dilation and stent placement     Currently  Patient is here today for 1 week follow-up.  Last week we did clamp her left-sided percutaneous nephrostomy tube.  Patient did get a repeat lab value.  Patient's current creatinine is 1.6.  GFR is 36.  This significantly improved from 2 weeks ago.  Patient has had no flank pain.  She has had no gross hematuria.  She is voiding well.  She reports no pain today  Past Medical History:   Diagnosis Date    Alcohol abuse 03/02/2017    Arthritis     Bladder cancer (HCC) 2016    CAD (coronary artery disease)     Cancer (HCC) 2001    vulvar-    Carotid artery

## 2025-01-27 NOTE — TELEPHONE ENCOUNTER
Name: Daja Espinosa  : 1960  MRN: [id number]    Oncology Navigation Follow-Up Note    Contact Type:  Medical Oncology    Notes: Met with Daja in treatment.  Daja states she is doing much better, denies needs from navigation at this time.  Encouraged Daja to call with any needs.      Electronically signed by Ave Arreaga RN on 2025 at 12:01 PM

## 2025-01-27 NOTE — PROGRESS NOTES
Patient here for treatment.  Seen by Dr. Shepard for clinic visit - okay to proceed with treatment as planned without any changes.

## 2025-01-27 NOTE — PROGRESS NOTES
patient willing to proceed    posttreatment PET/CT suspicious for carcinomatosis but those are nonspecific finding on the abdomen>follow-up imaging in 3 months and will continue immunotherapy  Currently on Opdivo status post 6 cycle toxicity profile reviewed with the patient and she is tolerated treatment well> proceed with Opdivo  Diarrhea/resolved> if recurs will hold Opdivo and give prednisone  hydronephrosis of the left kidney> left stent  RTC in 4 weeks with a PET/CT                                           Familia Shepard MD                          [facility]                            This note is created with the assistance of a speech recognition program.  While intending to generate a document that actually reflects the content of the visit, the document can still have some errors including those of syntax and sound a like substitutions which may escape proof reading.  It such instances, actual meaning can be extrapolated by contextual diversion.

## 2025-01-27 NOTE — CARE COORDINATION
Remote Patient Monitoring Note      Date/Time:  2025 11:17 AM  Patient Current Location: [facility] contacted patient by telephone regarding red alert received for blood pressure reading (192/97). Verified patients name and  as identifiers.  Background: PNA  Clinical Interventions: Reviewed and followed up on alerts and treatments-Patient is currently at oncology appointment they are getting ready to take BP she is feeling great today and will repeat BP when she returns home    Plan/Follow Up: Will continue to review, monitor and address alerts with follow up based on severity of symptoms and risk factors.

## 2025-01-28 ENCOUNTER — TELEPHONE (OUTPATIENT)
Dept: FAMILY MEDICINE CLINIC | Age: 65
End: 2025-01-28

## 2025-01-28 DIAGNOSIS — Z12.31 BREAST CANCER SCREENING BY MAMMOGRAM: Primary | ICD-10-CM

## 2025-01-31 ENCOUNTER — CARE COORDINATION (OUTPATIENT)
Dept: CASE MANAGEMENT | Age: 65
End: 2025-01-31

## 2025-01-31 NOTE — CARE COORDINATION
Date/Time:  1/31/2025 3:38 PM  LPN attempted to reach patient by telephone regarding red alert  184/99 in remote patient monitoring program. Left HIPAA compliant message requesting a return call. Will attempt to reach patient again.  ACM notified

## 2025-01-31 NOTE — CARE COORDINATION
Date/Time:  1/31/2025 11:48 AM  LPN attempted to reach patient by telephone regarding red alert /99 in remote patient monitoring program. Left HIPAA compliant message requesting a return call. Will attempt to reach patient again.  SOFÍA WITH EC JESÚS NOTIFIED ACM

## 2025-01-31 NOTE — CARE COORDINATION
Date/Time:  1/31/2025 10:25 AM  LPN attempted to reach patient by telephone regarding red alert /99 in remote patient monitoring program. Left HIPAA compliant message requesting a return call. Will attempt to reach patient again.

## 2025-01-31 NOTE — CARE COORDINATION
Date/Time:  1/31/2025 3:10 PM  LPN attempted to reach patient by telephone regarding red alert  184/99 in remote patient monitoring program. Left HIPAA compliant message requesting a return call. Will attempt to reach patient again.

## 2025-02-03 ENCOUNTER — CARE COORDINATION (OUTPATIENT)
Dept: CASE MANAGEMENT | Age: 65
End: 2025-02-03

## 2025-02-03 NOTE — CARE COORDINATION
-Remote Alert Monitoring Note      Date/Time:  2/3/2025 11:28 AM  Patient Current Location: Home: [address] [place] [address]  Verified patients name and  as identifiers.    Rpm alert to be reviewed by the provider   red alert  blood pressure reading (183/101)  Vitals Recheck blood pressure reading (153/100)  Additional needs to be addressed by provider: No                   LPN contacted patient by telephone regarding red alert received   Background: PNA  Refer to 911 immediately if:  Patient unresponsive or unable to provide history  Change in cognition or sudden confusion  Patient unable to respond in complete sentences  Intense chest pain/tightness  Any concern for any clinical emergency  Red Alert: Provider response time of 1 hr required for any red alert requiring intervention  Yellow Alert: Provider response time of 3hr required for any escalated yellow alert  Patient Chief Complaint:  Blood Pressure BP Triage  Are you having any Chest Pain? no   Are you having any Shortness of Breath? no   Do you have a headache or have any vision changes? no   Are you having any numbness or tingling? no   Are you having any other health concerns or issues? no  Patient/Caregiver educated on how to properly take a blood pressure. Patient/Caregiver verbalizes understanding.     Clinical Interventions: Reviewed and followed up on alerts and treatments-spoke to patient she denies chest pain, SOB, dizziness states she feels good today has taken all her morning medications repeat BP is now WNL     Plan/Follow Up: Will continue to review, monitor and address alerts with follow up based on severity of symptoms and risk factors.  **For any new or worsening symptoms or you are concerned in anyway, please contact your Provider or report to the nearest Emergency Room.**

## 2025-02-05 ENCOUNTER — CARE COORDINATION (OUTPATIENT)
Dept: CASE MANAGEMENT | Age: 65
End: 2025-02-05

## 2025-02-05 NOTE — CARE COORDINATION
-Remote Alert Monitoring Note      Date/Time:  2025 12:55 PM  Patient Current Location: Home: [address] [address]  Verified patients name and  as identifiers.    Rpm alert to be reviewed by the provider   yellow alert  blood pressure reading (172/92)  Vitals Recheck blood pressure reading (168/91)  Additional needs to be addressed by provider: No                   LPN contacted patient by telephone regarding red alert received   Background: PNA  Refer to 911 immediately if:  Patient unresponsive or unable to provide history  Change in cognition or sudden confusion  Patient unable to respond in complete sentences  Intense chest pain/tightness  Any concern for any clinical emergency  Red Alert: Provider response time of 1 hr required for any red alert requiring intervention  Yellow Alert: Provider response time of 3hr required for any escalated yellow alert  Patient Chief Complaint:  Blood Pressure BP Triage  Are you having any Chest Pain? no   Are you having any Shortness of Breath? no   Do you have a headache or have any vision changes? no   Are you having any numbness or tingling? no   Are you having any other health concerns or issues? no  Patient/Caregiver educated on how to properly take a blood pressure. Patient/Caregiver verbalizes understanding.     Clinical Interventions: Reviewed and followed up on alerts and treatments-Spoke to patient she denies chest pain, SOB, dizziness states she feels fine repeat BP is now WNL no concerns at present time    Plan/Follow Up: Will continue to review, monitor and address alerts with follow up based on severity of symptoms and risk factors.  **For any new or worsening symptoms or you are concerned in anyway, please contact your Provider or report to the nearest Emergency Room.**

## 2025-02-06 ENCOUNTER — CARE COORDINATION (OUTPATIENT)
Dept: CASE MANAGEMENT | Age: 65
End: 2025-02-06

## 2025-02-06 ENCOUNTER — HOSPITAL ENCOUNTER (OUTPATIENT)
Dept: PET IMAGING | Age: 65
Discharge: HOME | End: 2025-02-08
Payer: COMMERCIAL

## 2025-02-06 DIAGNOSIS — C67.9 UROTHELIAL CARCINOMA OF BLADDER WITH INVASION OF MUSCLE (HCC): ICD-10-CM

## 2025-02-06 PROCEDURE — 3430000000 HC RX DIAGNOSTIC RADIOPHARMACEUTICAL: Performed by: INTERNAL MEDICINE

## 2025-02-06 PROCEDURE — A9609 HC RX DIAGNOSTIC RADIOPHARMACEUTICAL: HCPCS | Performed by: INTERNAL MEDICINE

## 2025-02-06 PROCEDURE — 78815 PET IMAGE W/CT SKULL-THIGH: CPT

## 2025-02-06 RX ORDER — FLUDEOXYGLUCOSE F 18 200 MCI/ML
12.65 INJECTION, SOLUTION INTRAVENOUS
Status: COMPLETED | OUTPATIENT
Start: 2025-02-06 | End: 2025-02-06

## 2025-02-06 RX ADMIN — FLUDEOXYGLUCOSE F 18 12.65 MILLICURIE: 200 INJECTION, SOLUTION INTRAVENOUS at 08:44

## 2025-02-06 NOTE — CARE COORDINATION
-Remote Alert Monitoring Note      Date/Time:  2025 12:51 PM  Patient Current Location: [place]  Verified patients name and  as identifiers.    Rpm alert to be reviewed by the provider   yellow alert  blood pressure reading (179/94)  Vitals Recheck blood pressure reading (na)  Additional needs to be addressed by provider: No                   LPN contacted patient by telephone regarding red alert received   Background: PNA  Refer to 911 immediately if:  Patient unresponsive or unable to provide history  Change in cognition or sudden confusion  Patient unable to respond in complete sentences  Intense chest pain/tightness  Any concern for any clinical emergency  Red Alert: Provider response time of 1 hr required for any red alert requiring intervention  Yellow Alert: Provider response time of 3hr required for any escalated yellow alert  Patient Chief Complaint:  Blood Pressure BP Triage  Are you having any Chest Pain? no   Are you having any Shortness of Breath? no   Do you have a headache or have any vision changes? no   Are you having any numbness or tingling? no   Are you having any other health concerns or issues? no  Patient/Caregiver educated on how to properly take a blood pressure. Patient/Caregiver verbalizes understanding.     Clinical Interventions: Reviewed and followed up on alerts and treatments-spoke to patient she states she is doing fine denies chest pain, SOB dizziness and headache she is currently not at home but agrees to repeat BP when she gets back     Plan/Follow Up: Will continue to review, monitor and address alerts with follow up based on severity of symptoms and risk factors.  **For any new or worsening symptoms or you are concerned in anyway, please contact your Provider or report to the nearest Emergency Room.**

## 2025-02-10 ENCOUNTER — HOSPITAL ENCOUNTER (OUTPATIENT)
Dept: INFUSION THERAPY | Age: 65
Discharge: HOME | End: 2025-02-10
Payer: COMMERCIAL

## 2025-02-10 ENCOUNTER — CARE COORDINATION (OUTPATIENT)
Dept: CASE MANAGEMENT | Age: 65
End: 2025-02-10

## 2025-02-10 VITALS
WEIGHT: 114 LBS | SYSTOLIC BLOOD PRESSURE: 162 MMHG | HEIGHT: 62 IN | DIASTOLIC BLOOD PRESSURE: 70 MMHG | HEART RATE: 62 BPM | TEMPERATURE: 96.8 F | RESPIRATION RATE: 18 BRPM | BODY MASS INDEX: 20.98 KG/M2

## 2025-02-10 DIAGNOSIS — C67.9 UROTHELIAL CARCINOMA OF BLADDER (HCC): Primary | ICD-10-CM

## 2025-02-10 LAB
ALBUMIN SERPL-MCNC: 4.1 G/DL (ref 3.5–5.2)
ALBUMIN/GLOB SERPL: 1.3 {RATIO} (ref 1–2.5)
ALP SERPL-CCNC: 105 U/L (ref 35–104)
ALT SERPL-CCNC: 17 U/L (ref 10–35)
AMYLASE SERPL-CCNC: 103 U/L (ref 28–100)
ANION GAP SERPL CALCULATED.3IONS-SCNC: 11 MMOL/L (ref 9–16)
AST SERPL-CCNC: 22 U/L (ref 10–35)
BASOPHILS # BLD: 0.07 K/UL (ref 0–0.2)
BASOPHILS NFR BLD: 1 % (ref 0–2)
BILIRUB SERPL-MCNC: 0.7 MG/DL (ref 0–1.2)
BUN SERPL-MCNC: 48 MG/DL (ref 8–23)
BUN/CREAT SERPL: 23 (ref 9–20)
CALCIUM SERPL-MCNC: 9.6 MG/DL (ref 8.6–10.4)
CHLORIDE SERPL-SCNC: 95 MMOL/L (ref 98–107)
CO2 SERPL-SCNC: 24 MMOL/L (ref 20–31)
CORTIS SERPL-MCNC: 12.2 UG/DL (ref 2.5–19.5)
CREAT SERPL-MCNC: 2.1 MG/DL (ref 0.5–0.9)
EOSINOPHIL # BLD: 1.24 K/UL (ref 0–0.44)
EOSINOPHILS RELATIVE PERCENT: 18 % (ref 1–4)
ERYTHROCYTE [DISTWIDTH] IN BLOOD BY AUTOMATED COUNT: 15.6 % (ref 11.8–14.4)
GFR, ESTIMATED: 26 ML/MIN/1.73M2
GLUCOSE SERPL-MCNC: 99 MG/DL (ref 74–99)
HCT VFR BLD AUTO: 31 % (ref 36.3–47.1)
HGB BLD-MCNC: 10.3 G/DL (ref 11.9–15.1)
IMM GRANULOCYTES # BLD AUTO: <0.03 K/UL (ref 0–0.3)
IMM GRANULOCYTES NFR BLD: 0 %
LIPASE SERPL-CCNC: 42 U/L (ref 13–60)
LYMPHOCYTES NFR BLD: 1.34 K/UL (ref 1.1–3.7)
LYMPHOCYTES RELATIVE PERCENT: 20 % (ref 24–43)
MCH RBC QN AUTO: 29.3 PG (ref 25.2–33.5)
MCHC RBC AUTO-ENTMCNC: 33.2 G/DL (ref 28.4–34.8)
MCV RBC AUTO: 88.3 FL (ref 82.6–102.9)
MONOCYTES NFR BLD: 0.6 K/UL (ref 0.1–1.2)
MONOCYTES NFR BLD: 9 % (ref 3–12)
NEUTROPHILS NFR BLD: 52 % (ref 36–65)
NEUTS SEG NFR BLD: 3.55 K/UL (ref 1.5–8.1)
NRBC BLD-RTO: 0 PER 100 WBC
PLATELET # BLD AUTO: 358 K/UL (ref 138–453)
PMV BLD AUTO: 8.6 FL (ref 8.1–13.5)
POTASSIUM SERPL-SCNC: 4.3 MMOL/L (ref 3.7–5.3)
PROT SERPL-MCNC: 7.1 G/DL (ref 6.6–8.7)
RBC # BLD AUTO: 3.51 M/UL (ref 3.95–5.11)
SODIUM SERPL-SCNC: 130 MMOL/L (ref 136–145)
TSH SERPL DL<=0.05 MIU/L-ACNC: 2.45 UIU/ML (ref 0.27–4.2)
WBC OTHER # BLD: 6.8 K/UL (ref 3.5–11.3)

## 2025-02-10 PROCEDURE — 2580000003 HC RX 258: Performed by: INTERNAL MEDICINE

## 2025-02-10 PROCEDURE — 84443 ASSAY THYROID STIM HORMONE: CPT

## 2025-02-10 PROCEDURE — 96413 CHEMO IV INFUSION 1 HR: CPT

## 2025-02-10 PROCEDURE — 2500000003 HC RX 250 WO HCPCS: Performed by: INTERNAL MEDICINE

## 2025-02-10 PROCEDURE — 83690 ASSAY OF LIPASE: CPT

## 2025-02-10 PROCEDURE — 82150 ASSAY OF AMYLASE: CPT

## 2025-02-10 PROCEDURE — 85025 COMPLETE CBC W/AUTO DIFF WBC: CPT

## 2025-02-10 PROCEDURE — 6360000002 HC RX W HCPCS: Performed by: INTERNAL MEDICINE

## 2025-02-10 PROCEDURE — 80053 COMPREHEN METABOLIC PANEL: CPT

## 2025-02-10 PROCEDURE — 82533 TOTAL CORTISOL: CPT

## 2025-02-10 RX ORDER — SODIUM CHLORIDE 9 MG/ML
5-250 INJECTION, SOLUTION INTRAVENOUS PRN
Status: DISCONTINUED | OUTPATIENT
Start: 2025-02-10 | End: 2025-02-11 | Stop reason: HOSPADM

## 2025-02-10 RX ORDER — SODIUM CHLORIDE 0.9 % (FLUSH) 0.9 %
5-40 SYRINGE (ML) INJECTION PRN
Status: DISCONTINUED | OUTPATIENT
Start: 2025-02-10 | End: 2025-02-11 | Stop reason: HOSPADM

## 2025-02-10 RX ORDER — HEPARIN 100 UNIT/ML
500 SYRINGE INTRAVENOUS PRN
Status: DISCONTINUED | OUTPATIENT
Start: 2025-02-10 | End: 2025-02-11 | Stop reason: HOSPADM

## 2025-02-10 RX ADMIN — SODIUM CHLORIDE, PRESERVATIVE FREE 10 ML: 5 INJECTION INTRAVENOUS at 11:50

## 2025-02-10 RX ADMIN — SODIUM CHLORIDE, PRESERVATIVE FREE 10 ML: 5 INJECTION INTRAVENOUS at 11:51

## 2025-02-10 RX ADMIN — SODIUM CHLORIDE, PRESERVATIVE FREE 10 ML: 5 INJECTION INTRAVENOUS at 13:41

## 2025-02-10 RX ADMIN — SODIUM CHLORIDE 240 MG: 9 INJECTION, SOLUTION INTRAVENOUS at 13:10

## 2025-02-10 RX ADMIN — HEPARIN 500 UNITS: 100 SYRINGE at 13:41

## 2025-02-10 RX ADMIN — SODIUM CHLORIDE, PRESERVATIVE FREE 10 ML: 5 INJECTION INTRAVENOUS at 13:40

## 2025-02-10 NOTE — CARE COORDINATION
Date/Time:  2/10/2025 2:44 PM  LPN attempted to reach patient by telephone regarding yellow alert no metrics  in remote patient monitoring program. Left HIPAA compliant message requesting a return call. Will attempt to reach patient again.

## 2025-02-11 ENCOUNTER — CARE COORDINATION (OUTPATIENT)
Dept: CASE MANAGEMENT | Age: 65
End: 2025-02-11

## 2025-02-11 ENCOUNTER — TELEPHONE (OUTPATIENT)
Dept: PRIMARY CARE CLINIC | Age: 65
End: 2025-02-11

## 2025-02-11 ENCOUNTER — OFFICE VISIT (OUTPATIENT)
Dept: CARDIOLOGY CLINIC | Age: 65
End: 2025-02-11

## 2025-02-11 VITALS
WEIGHT: 114 LBS | DIASTOLIC BLOOD PRESSURE: 80 MMHG | SYSTOLIC BLOOD PRESSURE: 180 MMHG | HEART RATE: 76 BPM | BODY MASS INDEX: 20.85 KG/M2 | OXYGEN SATURATION: 99 %

## 2025-02-11 DIAGNOSIS — I25.118 CORONARY ARTERY DISEASE OF NATIVE ARTERY OF NATIVE HEART WITH STABLE ANGINA PECTORIS (HCC): ICD-10-CM

## 2025-02-11 DIAGNOSIS — D09.0 BLADDER CA IN SITU: Primary | ICD-10-CM

## 2025-02-11 DIAGNOSIS — R06.02 SOB (SHORTNESS OF BREATH): ICD-10-CM

## 2025-02-11 DIAGNOSIS — E78.5 DYSLIPIDEMIA: ICD-10-CM

## 2025-02-11 DIAGNOSIS — I10 ESSENTIAL HYPERTENSION: ICD-10-CM

## 2025-02-11 NOTE — CARE COORDINATION
and address alerts with follow up based on severity of symptoms and risk factors.  **For any new or worsening symptoms or you are concerned in anyway, please contact your Provider or report to the nearest Emergency Room.**

## 2025-02-11 NOTE — PROGRESS NOTES
Ov DR Cortez 1 year follow up  Starting to gain weight  Hope will be 18   The 12 and 7 yr old  Is back with Mom   Had edema lt leg   Had CVA April 2024 difficulty   With rt hand  Had Urothelial carcoma   Bladder.  Did have chemo    To call in with meds    Bedside echo done.    See in 6 mths

## 2025-02-11 NOTE — CARE COORDINATION
Date/Time:  2/11/2025 10:25 AM  LPN attempted to reach patient by telephone regarding red alert /96 in remote patient monitoring program. Left HIPAA compliant message requesting a return call. Will attempt to reach patient again.

## 2025-02-11 NOTE — TELEPHONE ENCOUNTER
02/11/25 3:54 PM     REMOTE PATIENT MONITORING HIGH ALERT RESPONSE     Message sent to patient via Patient Connect Portal for Remote Patient Monitoring     DIONY Roth, This is Vernell Moe the [facility] nurse practitioner with [facility]. I will be calling you shortly from [phone]. Please answer if you are able.        ASSESSMENT AND PLAN   Hypertension  --pt has seen cardiology today and is anticipating some possible medication changes from him  --advised pt to call 911 and go to ED for the following: chest pain/pressure/tightness, new or worsening SOB, sudden loss of use of an arm or leg, sudden numbness or tingling--especially unilateral, confusion, sudden change in vision, facial droop, slurred speech, fainting spell or any other serious or worsening symptoms. Patient agreeable to this plan.    --we will continue to monitor    CHIEF COMPLAINT    Responding to a [facility] RPM alert for elevated BP of 186/97 (64) at 1:43P; BP today at cardiology visit today was 180/80.    HPI    Daja Espinosa is a 64 y.o. female with above BP reading. States she has been to cardiology visit today and expects to hear later from her cardiologist with possible medication changes. Continues to feel well and denies having any new or concerning symptoms since assessment earlier today by our RPM LPN finding pt to be asymptomatic at that time.    ALLERGIES    No Known Allergies    Vernell Moe DNP, FNP-C, Remote Patient Monitoring NP, (ph) [phone]

## 2025-02-11 NOTE — CARE COORDINATION
-Remote Alert Monitoring Note      Date/Time:  2025 11:54 AM  Patient Current Location: [place]  Verified patients name and  as identifiers.    Rpm alert to be reviewed by the provider   red alert  blood pressure reading (188/96)  Vitals Recheck blood pressure reading (na)  Additional needs to be addressed by provider: No                   LPN contacted patient by telephone regarding red alert received   Background: PNA  Refer to 911 immediately if:  Patient unresponsive or unable to provide history  Change in cognition or sudden confusion  Patient unable to respond in complete sentences  Intense chest pain/tightness  Any concern for any clinical emergency  Red Alert: Provider response time of 1 hr required for any red alert requiring intervention  Yellow Alert: Provider response time of 3hr required for any escalated yellow alert  Patient Chief Complaint:  Blood Pressure BP Triage  Are you having any Chest Pain? no   Are you having any Shortness of Breath? no   Do you have a headache or have any vision changes? no   Are you having any numbness or tingling? no   Are you having any other health concerns or issues? no  Patient/Caregiver educated on how to properly take a blood pressure. Patient/Caregiver verbalizes understanding.     Clinical Interventions: Reviewed and followed up on alerts and treatments-Spoke to patient she is leaving cardiology appointment at present time they had no concerns with her elevated BP today she will go in for a medication review with pharmacy per new order, she denies chest pain, SOB, dizziness and will repeat BP when she gets home she was also reminded to take weight and temperature as it has been 4 days she agreed    Plan/Follow Up: Will continue to review, monitor and address alerts with follow up based on severity of symptoms and risk factors.  **For any new or worsening symptoms or you are concerned in anyway, please contact your Provider or report to the nearest Emergency

## 2025-02-13 ENCOUNTER — CARE COORDINATION (OUTPATIENT)
Dept: CASE MANAGEMENT | Age: 65
End: 2025-02-13

## 2025-02-13 NOTE — PROGRESS NOTES
Yaakov Cortez M.D.  [facility]  [facility]  [address] [place] [address]  [phone]        2025        RE:  DAJA ESPINOSA  :  1960    CHIEF COMPLAINT:    Severe coronary artery disease.  Severe peripheral vascular disease.  Paroxysmal atrial fibrillation with a CVA in 2024, with still some difficulty with her left hand with her currently on Eliquis 2.5 mg b.i.d.    High-grade urothelial carcinoma with muscle invasion, on chemotherapy with no evidence of metastasis.  Chronic renal insufficiency with a creatinine in the 2 range.    HISTORY OF PRESENT ILLNESS:  I had the pleasure of seeing Daja Espinosa in the office on 2025.  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  She had right carotid artery occlusion in , transferred to [facility] and had a brachiocephalic aorta bypass.    On 2010, she had a catheterization to the right femoral artery that showed 95% disease in the right coronary artery.  LAD and circumflex unremarkable, EF 60%, and I stented the right coronary artery with a 3.0 x 32 and 3.0 x 20 mm Taxus stent with a good end result.  The aortic arch demonstrated widely patent brachiocephalic bypass graft with a native brachiocephalic being occluded.  We injected the left subclavian artery, could not cannulate it and we felt it was occluded.    She had 80% to 90% disease in the right iliac artery and left carotid artery disease which was stented on 2011, by Dr. Salgado.    She then had angioplasty of the right iliac artery on 2012, by Dr. Ho and developed obstruction of left femoral artery on 2012, with stenting of the left femoral artery.    On 2017, she had detectable troponin, went to [facility], attempts were made to cross her femoral artery on both the left and right side which were unsuccessful.  The right brachial artery was

## 2025-02-14 ENCOUNTER — CARE COORDINATION (OUTPATIENT)
Dept: CASE MANAGEMENT | Age: 65
End: 2025-02-14

## 2025-02-14 ENCOUNTER — CARE COORDINATION (OUTPATIENT)
Dept: CARE COORDINATION | Age: 65
End: 2025-02-14

## 2025-02-14 NOTE — CARE COORDINATION
-Remote Alert Monitoring Note      Date/Time:  2025 3:01 PM  Patient Current Location: Home: [address] [place] [address]  Verified patients name and  as identifiers.    Rpm alert to be reviewed by the provider   red alert  blood pressure reading (212/107)  Vitals Recheck blood pressure reading (150/90)  Additional needs to be addressed by provider: No                   LPN contacted patient by telephone regarding red alert received   Background: PNA  Refer to 911 immediately if:  Patient unresponsive or unable to provide history  Change in cognition or sudden confusion  Patient unable to respond in complete sentences  Intense chest pain/tightness  Any concern for any clinical emergency  Red Alert: Provider response time of 1 hr required for any red alert requiring intervention  Yellow Alert: Provider response time of 3hr required for any escalated yellow alert  Patient Chief Complaint:  Blood Pressure BP Triage  Are you having any Chest Pain? no   Are you having any Shortness of Breath? no   Do you have a headache or have any vision changes? no   Are you having any numbness or tingling? no   Are you having any other health concerns or issues? no  Patient/Caregiver educated on how to properly take a blood pressure. Patient/Caregiver verbalizes understanding.     Clinical Interventions: Reviewed and followed up on alerts and treatments-Spoke with patient ans  she denies chest pain, SOB, dizziness headache tingling states she feels fine and took the new dose of 50 Mg Hydralazine today after several attempts with increased readings we changed the batteries in her BP cuff, new reading was now 150/90 no concerns a tpresent time    Plan/Follow Up: Will continue to review, monitor and address alerts with follow up based on severity of symptoms and risk factors.  **For any new or worsening symptoms or you are concerned in anyway, please contact your Provider or report to the nearest Emergency Room.**

## 2025-02-14 NOTE — CARE COORDINATION
Ambulatory Care Coordination Note     2025 9:51 AM     Patient Current Location:  Home: [address] [place] [address]     ACM contacted the patient by telephone. Verified name and  with patient as identifiers.         ACM: Thania Toscano RN     Challenges to be reviewed by the provider   Additional needs identified to be addressed with provider No  none               Method of communication with provider: none.    Utilization: Patient has not had any utilization since our last call.     Care Summary Note: Spoke with Daja this morning. Reports she is doing well. States that she just talked wit her PCP- will increase her Hydralazine to 50 mg BID (was taking 25 mg BID). Will start this increased dose today. States that she recently has been very itchy and wonders if it is from the medication- confirmed she has been taking this medication for quite some time now and has not yet started the increased dose. Encouraged that she take the increased dose as prescribed and see if itching gets any worse after that dose- if so to contact ACM or PCP office. Voiced understanding. Confirmed she has hydralazine at her home. Daja states she gets her medications filled on  of the month so she has new bottle of hydralazine- Educated that the bottle she picked up on  would be the 25 mg tablets (confirmed that is what her bottle is)- educated she needs to take 2 tablets in AM and 2 tablets in the PM now. PCP has called in new prescription for 50 mg tablets but patient prefers to use up what she has at home and verbalizes understanding of how to take medication. Active with RPM program. No vitals taken yet today. Appreciative of call today.     Offered patient enrollment in the Remote Patient Monitoring (RPM) program for in-home monitoring: Yes, patient enrolled; current status is activated and monitoring.     Medications Reviewed:   Completed during this call      PCP/Specialist follow up:   Future Appointments

## 2025-02-18 ENCOUNTER — TELEPHONE (OUTPATIENT)
Dept: PRIMARY CARE CLINIC | Age: 65
End: 2025-02-18

## 2025-02-18 ENCOUNTER — CARE COORDINATION (OUTPATIENT)
Dept: CARE COORDINATION | Age: 65
End: 2025-02-18

## 2025-02-18 NOTE — TELEPHONE ENCOUNTER
02/18/25 3:30 PM     REMOTE PATIENT MONITORING HIGH ALERT RESPONSE     Message sent to patient via Patient Connect Portal for Remote Patient Monitoring     DIONY Roth, This is Vernell Moe the [facility] nurse practitioner with [facility]. I will be calling you shortly from [phone]. Please answer if you are able.       ASSESSMENT AND PLAN   Hypertension  --reports good adherence to HTN med regimen  --hydralazine recently increased to 50 mg BID  --remains asymptomatic this afternoon  --repeated BP while on phone--now 178/90 (64) at 4:15PM  --advised pt to call 911 and go to ED for the following: chest pain/pressure/tightness, new or worsening SOB, sudden loss of use of an arm or leg, sudden numbness or tingling--especially unilateral, confusion, sudden change in vision, facial droop, slurred speech, fainting spell or any other serious or worsening symptoms. Patient agreeable to this plan.      CHIEF COMPLAINT    Responding to a high RPM alert for BP of 218/108 (58) at 1:36 PM; on repeat a few minutes later was 203/110 (59)    HPI    Daja Espinosa is a 64 y.o. female HPI: Patient is enrolled in [facility] Remote Patient Monitoring program for hx of HTN. Denies any new or concerning symptoms this afternoon. Agreeable to repeating BP while on phone after 3-minute rest period. Is in position now with pillow to support back in recliner so feet can be flat on the floor at the same time. Repeat BP was now 178/90 (64) at 4:15PM.    Current Medications for for blood pressure include:   Hydralazine 50 mg BID--increased on 2/12 to 50 mg  Amlodipine 5 mg daily  Spironolactone 25 mg BID  Carvedilol 25 mg BID--takes 1/2 tablet BID    She reports compliant all of the time with taking her medications as directed to control blood pressure. Denies any missed doses over the last 2 weeks.    REVIEW OF SYSTEMS    CONSTITUTIONAL: Denies fatigue, EYES: Denies sudden vision changes and blurred vision, HENT: Denies nosebleeds,

## 2025-02-18 NOTE — CARE COORDINATION
Date/Time:  2/18/2025 1:57 PM    Update: Updated BP elevated, 203/110. Patient remains asymptomatic. Note forwarded to NP per protocol.   Advised pt to call 911 and go to ED for the following: chest pain/pressure/tightness, new or worsening SOB, sudden loss of use of an arm or leg, sudden numbness or tingling--especially unilateral, confusion, sudden change in vision, facial droop, slurred speech, fainting spell or any other serious or worsening symptoms. Patient agreeable to this plan.

## 2025-02-18 NOTE — CARE COORDINATION
Remote Alert Monitoring Note      Date/Time:  2025 1:23 PM  Patient Current Location: Home: [address] [address]    LPN contacted patient by telephone regarding yellow alert received for blood pressure reading (177). Verified patients name and  as identifiers.    Rpm alert to be reviewed by the provider                         Background:  Pneumonia     Refer to 911 immediately if:  Patient unresponsive or unable to provide history  Change in cognition or sudden confusion  Patient unable to respond in complete sentences  Intense chest pain/tightness  Any concern for any clinical emergency  Red Alert: Provider response time of 1 hr required for any red alert requiring intervention  Yellow Alert: Provider response time of 3hr required for any escalated yellow alert        Blood Pressure BP Triage  Are you having any Chest Pain? no   Are you having any Shortness of Breath? no   Do you have a headache or have any vision changes? no   Are you having any numbness or tingling? no   Are you having any other health concerns or issues? no  Patient/Caregiver educated on how to properly take a blood pressure. Patient/Caregiver verbalizes understanding.     Have you taken your medications as instructed by your doctor today? Yes       Clinical Interventions: Reviewed and followed up on alerts and treatments-. Pt is asymptomatic and in no apparent distress, speaking in full sentences.  Patient states she is feeling fine and is agreeable to sit and relax for 20 minutes prior to rechecking BP. Will await update.      **For any new or worsening symptoms or you are concerned in anyway, please contact your Provider or report to the nearest Emergency Room.**    Plan/Follow Up: Will continue to review, monitor and address alerts with follow up based on severity of symptoms and risk factors.    [facility]/ CTN/ Remote Patient monitoring  [phone]

## 2025-02-19 ENCOUNTER — CARE COORDINATION (OUTPATIENT)
Dept: CARE COORDINATION | Age: 65
End: 2025-02-19

## 2025-02-19 DIAGNOSIS — I73.9 PERIPHERAL VASCULAR DISEASE: ICD-10-CM

## 2025-02-19 DIAGNOSIS — E78.5 HYPERLIPIDEMIA, UNSPECIFIED HYPERLIPIDEMIA TYPE: ICD-10-CM

## 2025-02-19 RX ORDER — PANTOPRAZOLE SODIUM 40 MG/1
40 TABLET, DELAYED RELEASE ORAL
Qty: 30 TABLET | Refills: 3 | Status: SHIPPED | OUTPATIENT
Start: 2025-02-19

## 2025-02-19 RX ORDER — AMLODIPINE BESYLATE 5 MG/1
5 TABLET ORAL DAILY
Qty: 90 TABLET | Refills: 3 | Status: SHIPPED | OUTPATIENT
Start: 2025-02-19

## 2025-02-19 RX ORDER — ATORVASTATIN CALCIUM 80 MG/1
80 TABLET, FILM COATED ORAL NIGHTLY
Qty: 30 TABLET | Refills: 11 | Status: SHIPPED | OUTPATIENT
Start: 2025-02-19

## 2025-02-19 RX ORDER — FLECAINIDE ACETATE 50 MG/1
50 TABLET ORAL EVERY 12 HOURS SCHEDULED
Qty: 60 TABLET | Refills: 1 | Status: SHIPPED | OUTPATIENT
Start: 2025-02-19

## 2025-02-19 RX ORDER — APIXABAN 2.5 MG/1
2.5 TABLET, FILM COATED ORAL 2 TIMES DAILY
Qty: 60 TABLET | Refills: 1 | Status: SHIPPED | OUTPATIENT
Start: 2025-02-19

## 2025-02-19 NOTE — CARE COORDINATION
Ambulatory Care Coordination Note     2/19/2025      Patient outreach attempt by this ACM today to perform care management follow up . ACM was unable to reach the patient by telephone today.      ACM: Thania Toscano RN     Care Summary Note: RPM metrics have not been entered in today. BP readings were elevated yesterday 2/18/25, and LISA Matt for RPM had contacted patient. PCP aware of elevated BP readings yesterday. Noted compliance with medications and recent changes.     PCP/Specialist follow up:   Future Appointments         Provider Specialty Dept Phone    2/24/2025 11:00 AM Familia Shepard MD Oncology [phone]    2/24/2025 11:00 AM SCHEDULE, [facility] ONC FAST TRACK CHAIR 1 Infusion Therapy [phone]    2/25/2025 1:40 PM Nicole Chan MD Nephrology [phone]    3/10/2025 9:45 AM Tulio Holder MD Urology [phone]    8/13/2025 10:00 AM Terrell Cortez MD Cardiology [phone]            Follow Up:   Plan for next ACM outreach in approximately 1-2 days  to complete:  - disease specific assessments  - medication review  - goal progression  - RPM.

## 2025-02-20 ENCOUNTER — CARE COORDINATION (OUTPATIENT)
Dept: CASE MANAGEMENT | Age: 65
End: 2025-02-20

## 2025-02-24 ENCOUNTER — TELEPHONE (OUTPATIENT)
Dept: ONCOLOGY | Age: 65
End: 2025-02-24

## 2025-02-24 ENCOUNTER — OFFICE VISIT (OUTPATIENT)
Dept: ONCOLOGY | Age: 65
End: 2025-02-24
Payer: COMMERCIAL

## 2025-02-24 ENCOUNTER — CARE COORDINATION (OUTPATIENT)
Dept: CASE MANAGEMENT | Age: 65
End: 2025-02-24

## 2025-02-24 ENCOUNTER — HOSPITAL ENCOUNTER (OUTPATIENT)
Dept: INFUSION THERAPY | Age: 65
Discharge: HOME | End: 2025-02-24
Payer: COMMERCIAL

## 2025-02-24 VITALS
RESPIRATION RATE: 18 BRPM | BODY MASS INDEX: 20.85 KG/M2 | TEMPERATURE: 97 F | HEART RATE: 56 BPM | DIASTOLIC BLOOD PRESSURE: 79 MMHG | SYSTOLIC BLOOD PRESSURE: 166 MMHG | WEIGHT: 114 LBS

## 2025-02-24 DIAGNOSIS — N13.9 OBSTRUCTIVE UROPATHY: ICD-10-CM

## 2025-02-24 DIAGNOSIS — C67.9 UROTHELIAL CARCINOMA OF BLADDER (HCC): ICD-10-CM

## 2025-02-24 DIAGNOSIS — C68.9 TRANSITIONAL CELL CARCINOMA (HCC): Primary | ICD-10-CM

## 2025-02-24 DIAGNOSIS — D68.59 HYPERCOAGULABLE STATE: ICD-10-CM

## 2025-02-24 DIAGNOSIS — I48.91 ATRIAL FIBRILLATION WITH RAPID VENTRICULAR RESPONSE (HCC): Primary | ICD-10-CM

## 2025-02-24 DIAGNOSIS — C67.9 UROTHELIAL CARCINOMA OF BLADDER WITH INVASION OF MUSCLE (HCC): ICD-10-CM

## 2025-02-24 DIAGNOSIS — N18.4 STAGE 4 CHRONIC KIDNEY DISEASE (HCC): ICD-10-CM

## 2025-02-24 DIAGNOSIS — C67.9 MALIGNANT NEOPLASM OF URINARY BLADDER, UNSPECIFIED SITE (HCC): ICD-10-CM

## 2025-02-24 LAB
ALBUMIN SERPL-MCNC: 4.4 G/DL (ref 3.5–5.2)
ALBUMIN/GLOB SERPL: 1.6 {RATIO} (ref 1–2.5)
ALP SERPL-CCNC: 104 U/L (ref 35–104)
ALT SERPL-CCNC: 12 U/L (ref 10–35)
AMYLASE SERPL-CCNC: 121 U/L (ref 28–100)
ANION GAP SERPL CALCULATED.3IONS-SCNC: 13 MMOL/L (ref 9–16)
AST SERPL-CCNC: 18 U/L (ref 10–35)
BASOPHILS # BLD: 0.05 K/UL (ref 0–0.2)
BASOPHILS NFR BLD: 1 % (ref 0–2)
BILIRUB SERPL-MCNC: 0.7 MG/DL (ref 0–1.2)
BUN SERPL-MCNC: 87 MG/DL (ref 8–23)
BUN/CREAT SERPL: 27 (ref 9–20)
CALCIUM SERPL-MCNC: 9.1 MG/DL (ref 8.6–10.4)
CHLORIDE SERPL-SCNC: 90 MMOL/L (ref 98–107)
CO2 SERPL-SCNC: 22 MMOL/L (ref 20–31)
CORTIS SERPL-MCNC: 13.2 UG/DL (ref 2.5–19.5)
CREAT SERPL-MCNC: 3.2 MG/DL (ref 0.5–0.9)
EOSINOPHIL # BLD: 1.92 K/UL (ref 0–0.44)
EOSINOPHILS RELATIVE PERCENT: 24 % (ref 1–4)
ERYTHROCYTE [DISTWIDTH] IN BLOOD BY AUTOMATED COUNT: 14.8 % (ref 11.8–14.4)
GFR, ESTIMATED: 16 ML/MIN/1.73M2
GLUCOSE SERPL-MCNC: 111 MG/DL (ref 74–99)
HCT VFR BLD AUTO: 29.4 % (ref 36.3–47.1)
HGB BLD-MCNC: 10.2 G/DL (ref 11.9–15.1)
IMM GRANULOCYTES # BLD AUTO: <0.03 K/UL (ref 0–0.3)
IMM GRANULOCYTES NFR BLD: 0 %
LIPASE SERPL-CCNC: 67 U/L (ref 13–60)
LYMPHOCYTES NFR BLD: 1.39 K/UL (ref 1.1–3.7)
LYMPHOCYTES RELATIVE PERCENT: 17 % (ref 24–43)
MCH RBC QN AUTO: 30.3 PG (ref 25.2–33.5)
MCHC RBC AUTO-ENTMCNC: 34.7 G/DL (ref 28.4–34.8)
MCV RBC AUTO: 87.2 FL (ref 82.6–102.9)
MONOCYTES NFR BLD: 0.62 K/UL (ref 0.1–1.2)
MONOCYTES NFR BLD: 8 % (ref 3–12)
NEUTROPHILS NFR BLD: 50 % (ref 36–65)
NEUTS SEG NFR BLD: 4.04 K/UL (ref 1.5–8.1)
NRBC BLD-RTO: 0 PER 100 WBC
PLATELET # BLD AUTO: 373 K/UL (ref 138–453)
PMV BLD AUTO: 8.8 FL (ref 8.1–13.5)
POTASSIUM SERPL-SCNC: 4.6 MMOL/L (ref 3.7–5.3)
PROT SERPL-MCNC: 7.1 G/DL (ref 6.6–8.7)
RBC # BLD AUTO: 3.37 M/UL (ref 3.95–5.11)
SODIUM SERPL-SCNC: 125 MMOL/L (ref 136–145)
TSH SERPL DL<=0.05 MIU/L-ACNC: 1.73 UIU/ML (ref 0.27–4.2)
WBC OTHER # BLD: 8 K/UL (ref 3.5–11.3)

## 2025-02-24 PROCEDURE — 3017F COLORECTAL CA SCREEN DOC REV: CPT | Performed by: INTERNAL MEDICINE

## 2025-02-24 PROCEDURE — 82150 ASSAY OF AMYLASE: CPT

## 2025-02-24 PROCEDURE — 99214 OFFICE O/P EST MOD 30 MIN: CPT | Performed by: INTERNAL MEDICINE

## 2025-02-24 PROCEDURE — 83690 ASSAY OF LIPASE: CPT

## 2025-02-24 PROCEDURE — 6360000002 HC RX W HCPCS: Performed by: INTERNAL MEDICINE

## 2025-02-24 PROCEDURE — 85025 COMPLETE CBC W/AUTO DIFF WBC: CPT

## 2025-02-24 PROCEDURE — 36591 DRAW BLOOD OFF VENOUS DEVICE: CPT

## 2025-02-24 PROCEDURE — 3078F DIAST BP <80 MM HG: CPT | Performed by: INTERNAL MEDICINE

## 2025-02-24 PROCEDURE — 3077F SYST BP >= 140 MM HG: CPT | Performed by: INTERNAL MEDICINE

## 2025-02-24 PROCEDURE — G8427 DOCREV CUR MEDS BY ELIG CLIN: HCPCS | Performed by: INTERNAL MEDICINE

## 2025-02-24 PROCEDURE — 82533 TOTAL CORTISOL: CPT

## 2025-02-24 PROCEDURE — 1036F TOBACCO NON-USER: CPT | Performed by: INTERNAL MEDICINE

## 2025-02-24 PROCEDURE — 2500000003 HC RX 250 WO HCPCS: Performed by: INTERNAL MEDICINE

## 2025-02-24 PROCEDURE — 84443 ASSAY THYROID STIM HORMONE: CPT

## 2025-02-24 PROCEDURE — 80053 COMPREHEN METABOLIC PANEL: CPT

## 2025-02-24 PROCEDURE — G8420 CALC BMI NORM PARAMETERS: HCPCS | Performed by: INTERNAL MEDICINE

## 2025-02-24 RX ORDER — SODIUM CHLORIDE 0.9 % (FLUSH) 0.9 %
5-40 SYRINGE (ML) INJECTION PRN
Status: DISCONTINUED | OUTPATIENT
Start: 2025-02-24 | End: 2025-02-25 | Stop reason: HOSPADM

## 2025-02-24 RX ORDER — HEPARIN 100 UNIT/ML
500 SYRINGE INTRAVENOUS PRN
OUTPATIENT
Start: 2025-02-24

## 2025-02-24 RX ORDER — SODIUM CHLORIDE 0.9 % (FLUSH) 0.9 %
5-40 SYRINGE (ML) INJECTION PRN
OUTPATIENT
Start: 2025-02-24

## 2025-02-24 RX ORDER — HEPARIN 100 UNIT/ML
500 SYRINGE INTRAVENOUS PRN
Status: DISCONTINUED | OUTPATIENT
Start: 2025-02-24 | End: 2025-02-25 | Stop reason: HOSPADM

## 2025-02-24 RX ADMIN — SODIUM CHLORIDE, PRESERVATIVE FREE 10 ML: 5 INJECTION INTRAVENOUS at 11:58

## 2025-02-24 RX ADMIN — SODIUM CHLORIDE, PRESERVATIVE FREE 10 ML: 5 INJECTION INTRAVENOUS at 11:11

## 2025-02-24 RX ADMIN — HEPARIN 500 UNITS: 100 SYRINGE at 11:58

## 2025-02-24 RX ADMIN — SODIUM CHLORIDE, PRESERVATIVE FREE 10 ML: 5 INJECTION INTRAVENOUS at 11:12

## 2025-02-24 RX ADMIN — SODIUM CHLORIDE, PRESERVATIVE FREE 10 ML: 5 INJECTION INTRAVENOUS at 11:57

## 2025-02-24 NOTE — CARE COORDINATION
2/24/2025 3:46 PM  *  Tablet Messaging Message sent to patient via Patient Connect Portal for Remote Patient Monitoring     RPM Nurse message Tablet Readings Hello, Please see an important message from your RPM Team:    Please remember to take your readings daily before noon.   Please do not respond to this message; If you have a question or concern please call your Ambulatory Care Manager or your Primary Care Physician.

## 2025-02-24 NOTE — FLOWSHEET NOTE
Sarbjit RN notified of pt requesting port flush in [place]. Sarbjit states she will call pt to schedule flush.

## 2025-02-24 NOTE — PROGRESS NOTES
Patient ID: Daja Espinosa, 1960, [id number], 64 y.o.  Referred by :  No ref. 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  Embolic stroke on 4/6/2024  Concurrent chemo RT as a definitive treatment started on Johana 10, 2024 (plan for continuous neoadjuvant chemotherapy abandoned as patient not surgical candidate)  Chronic kidney disease  Recurrent admission to the hospital for GI bleed, HEAVENLY, pneumonia, AVR      Hematology oncology treatment  Cisplatin gemcitabine neoadjuvant started on March 19, 2024 every 3 weeks status post 1 cycle and have stopped and the plan changed to concurrent chemo RT   concurrent chemo RT with low-dose biweekly gemcitabine 27 mg/m² started in Joh[place] 10, 2024  Treatment interruptions due to several admission to the hospital  Eliquis  Started immunotherapy with Opdivo on 10/18/2024 with intent to follow PET/CT  Follow-up PET/CT on February 6, 2025 no evidence of metastasis> Opdivo has been discontinued      HISTORY OF PRESENT ILLNESS:    Oncologic History:    Daja Espinosa is a very pleasant 64 y.o. female.  With history of nonmuscle invasive bladder cancer status post multiple resection she lost to follow-up for almost 5 years, Patient did have a recent CT scan. This does show severe left hydronephrosis.  This is down to the level of the bladder.  The bladder does appear to be thickened on the left side.  Patient has had slight worsening of her creatinine.  Patient has been having some left-sided flank pain.  Patient has no unintentional weight loss or decreased appetite.  She reports no nausea or vomiting.     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   Pain significantly improved she still have intermittent hematuria and she was referred for neoadjuvant

## 2025-02-24 NOTE — CARE COORDINATION
2/24/2025 3:15 PM  *  Unable to Reach Date/Time:  2/24/2025 3:15 PM  LPN attempted to reach patient by telephone regarding red alert in remote patient monitoring program. Left HIPAA compliant message requesting a return call. Will attempt to reach patient again.

## 2025-02-24 NOTE — TELEPHONE ENCOUNTER
Name: Daja Espinosa  : 1960  MRN: [id number]    Oncology Navigation Follow-Up Note    Contact Type:  Medical Oncology    Notes: Met with Daja in clinic.  Daja reports doing well. Denies needs from navigation at this time. Immunotherapy has been discontinued and proceeding with surveillance.  Writer will sign off at this time. Will be available with any needs in the future.       Electronically signed by Ave Arreaga RN on 2025 at 1:08 PM

## 2025-02-25 ENCOUNTER — CARE COORDINATION (OUTPATIENT)
Dept: CARE COORDINATION | Age: 65
End: 2025-02-25

## 2025-02-25 ENCOUNTER — TELEPHONE (OUTPATIENT)
Dept: PRIMARY CARE CLINIC | Age: 65
End: 2025-02-25

## 2025-02-25 ENCOUNTER — CARE COORDINATION (OUTPATIENT)
Dept: OTHER | Facility: CLINIC | Age: 65
End: 2025-02-25

## 2025-02-25 NOTE — TELEPHONE ENCOUNTER
02/25/25 1:56 PM     REMOTE PATIENT MONITORING HIGH ALERT RESPONSE     Message sent to patient via Patient Connect Portal for Remote Patient Monitoring     DIONY Roth, This is Vernell Moe the [facility] nurse practitioner with [facility]. I will be calling you shortly from [phone]. Please answer if you are able.       2:05 PM Attempted twice to reach pt but she is not answering the phone at this time. Left message re-introducing myself as the nurse practitioner with the [facility] remote patient monitoring program and that I would like to talk to her for a few minutes about the alert we received today for elevated BP. Left my phone # in message and requested a return call by 4 PM today if at all possible.    2:40 PM Pt returned the call and I called her back soon after. We had the following conversation.    ASSESSMENT AND PLAN   Hypertension  --hydralazine was increased to 50 mg after card F/U appt on 2/11 and pt reports starting this new dose on 2/19  --agreed to repeat BP while on phone after 3-minute rest period--new reading was 184/97 and pt confirms she is still asymptomatic    CHIEF COMPLAINT    Responding to a high RPM alert for elevated BP of 189/97/62 (most recent)  194/98/59    HPI    Daja Espinosa is a 64 y.o. female with above elevated BP alert. Has been on increased dose of hydralazine since last Wednesday. Reports seeing small change in BP, especially this week with no RPM BPs with systolic 200 or greater. Denies any new or concerning symptoms this afternoon. Canceled today's nephrology appt but confirms she plans to reschedule. Will continue to monitor.    HTN MEDICATIONS IN EPIC  Amlodipine 5 mg daily  Hydralazine 50 mg in the morning and at bedtime--dose was increased from on 2/19, per pt  Carvedilol 25 mg (1/2 tablet) BID    ALLERGIES    No Known Allergies    Vernell Moe DNP, FNP-C, Remote Patient Monitoring NP, ) [phone]

## 2025-02-25 NOTE — CARE COORDINATION
Ambulatory Care Coordination Note     2/25/2025 12:21 PM     Planned to make CC outreach call today to patient.   Noted Red RPM alert - reviewed RPM encounter note and seen plan- patient is to recheck today around 12:30 (BP) was also instructed to check temperature and weight as no reading x2 days for these.Defer call at this time to patient.       2:32 PM:  Noted patient did recheck vitals. Weight and temperature taken and within normal limits. Noted BP remain elevated at 189/97 with HR 62. RPM team unable to reach patient.       3:08 PM:   Noted Vernell RPM NP has attempted to reach patient x 2 attempts to discuss RPM red alert and both attempts have been unsuccessful. Noted RPM message sent to patient requesting call to NP. Noted patient has taken a 3rd reading today, 184/97 with HR 62. ACM attempted to call patient without success.        Future Appointments   Date Time Provider Department Center   3/10/2025  9:45 AM Tulio Holder MD [place] [facility]   4/7/2025 10:00 AM [facility] MED ONC RM 1 MW MED ONC All   4/28/2025  9:45 AM Familia Shepard MD [place] onc spe [facility]   8/13/2025 10:00 AM Terrell Cortez MD [place] [facility]       Care Coordination Plan of Care:   This nurse Care Coordinator will  -attempt to reach out to patient later this week   -RPM follow up   -medication compliance   -kidney function

## 2025-02-25 NOTE — CARE COORDINATION
2025 11:42 AM  *  Alert and Triage   -Remote Alert Monitoring Note      Date/Time:  2025 11:42 AM  Patient Current Location: Home: [address] [place] [address]  Verified patients name and  as identifiers.    Rpm alert to be reviewed by the provider   red alert  blood pressure reading (194/98)  Vitals Recheck blood pressure reading (189/97)  Additional needs to be addressed by provider: FYI patient had had high BP all day. Asymptomatic.                     ACM contacted patient by telephone regarding red alert received   Background: Pneumonia  Refer to 911 immediately if:  Patient unresponsive or unable to provide history  Change in cognition or sudden confusion  Patient unable to respond in complete sentences  Intense chest pain/tightness  Any concern for any clinical emergency  Red Alert: Provider response time of 1 hr required for any red alert requiring intervention  Yellow Alert: Provider response time of 3hr required for any escalated yellow alert  Patient Chief Complaint:  Blood Pressure BP Triage  Are you having any Chest Pain? no   Are you having any Shortness of Breath? no   Do you have a headache or have any vision changes? no   Are you having any numbness or tingling? no   Are you having any other health concerns or issues? no  Patient/Caregiver educated on how to properly take a blood pressure. Patient/Caregiver verbalizes understanding.     Clinical Interventions: Reviewed and followed up on alerts and treatments-discussed red alert r/t BP with patient. She reports taking medication around 10 am. She reports no concerning s/s. BP was checking at 11:33. She is agreeable to re-checking around 1230 today. She will expect a call with repeat abnormal readings or with no new reading in the next hour. She will also check her temperature and weight when she re-checks her BP. Repeat BP continues to be elevated. Patient increased dose of Hctx last Wednesday from 25mg to 50mg. No improvement in BP

## 2025-02-27 ENCOUNTER — CARE COORDINATION (OUTPATIENT)
Dept: CASE MANAGEMENT | Age: 65
End: 2025-02-27

## 2025-02-27 NOTE — CARE COORDINATION
2025 12:09 PM  *  Alert and Triage   -Remote Alert Monitoring Note      Date/Time:  2025 12:11 PM  Patient Current Location: Home: [address] [place] [address]  Verified patients name and  as identifiers.    Rpm alert to be reviewed by the provider   yellow alert  blood pressure reading (177/94)  Vitals Recheck blood pressure reading ( )  Additional needs to be addressed by provider: No                   LPN contacted patient by telephone regarding red alert received   Background: RPM monitoring for PNA  Refer to 911 immediately if:  Patient unresponsive or unable to provide history  Change in cognition or sudden confusion  Patient unable to respond in complete sentences  Intense chest pain/tightness  Any concern for any clinical emergency  Red Alert: Provider response time of 1 hr required for any red alert requiring intervention  Yellow Alert: Provider response time of 3hr required for any escalated yellow alert  Patient Chief Complaint:  Blood Pressure BP Triage  Are you having any Chest Pain? no   Are you having any Shortness of Breath? no   Do you have a headache or have any vision changes? no   Are you having any numbness or tingling? no   Are you having any other health concerns or issues? no  Patient/Caregiver educated on how to properly take a blood pressure. Patient/Caregiver verbalizes understanding.     Clinical Interventions:  Patient feels fine. Denies sx. At dentist with granddaughter. Expects to be back home around 4. Agreeable to recheck BP this afternoon.    Plan/Follow Up: Will continue to review, monitor and address alerts with follow up based on severity of symptoms and risk factors.  **For any new or worsening symptoms or you are concerned in anyway, please contact your Provider or report to the nearest Emergency Room.**

## 2025-02-28 ENCOUNTER — CARE COORDINATION (OUTPATIENT)
Dept: OTHER | Facility: CLINIC | Age: 65
End: 2025-02-28

## 2025-02-28 ENCOUNTER — TELEPHONE (OUTPATIENT)
Dept: PRIMARY CARE CLINIC | Age: 65
End: 2025-02-28

## 2025-02-28 NOTE — CARE COORDINATION
2/28/2025 1:31 PM  *  Unable to Reach Date/Time:  2/28/2025 1:31 PM  ACM attempted to reach patient by telephone regarding red alert r/t BP in remote patient monitoring program. Left HIPAA compliant message requesting a return call. Will attempt to reach patient again.           AREN Benz, RN  Associate Care Manager   Cell: [phone]  [email or web address]

## 2025-02-28 NOTE — TELEPHONE ENCOUNTER
02/28/25 3:18 PM     REMOTE PATIENT MONITORING HIGH ALERT RESPONSE         ASSESSMENT AND PLAN   Hypertension  --pt's BP improved on repeat with RPM RN  --pt was asymptomatic at the time of her call  --she confirmed with RN that she is taking all of her HTN meds  --reminded pt to reschedule nephrology visit  --has a couple of meds that could be increased if BP doesn't improve and if tolerated  --will continue to monitor     CHIEF COMPLAINT    Responding to a high RPM alert for elevated BP of 170/88/65 (most recent)  181/89/62    HPI    Daja Espinosa is a 64 y.o. female who is enrolled in [facility] for PNA. I have reviewed recent labs, office/hospital notes, and RPM RN triage note. This is day 221 of monitoring. Calling pt to confirm HTN regimen. She confirms there are 3 meds that she takes twice daily and she takes the second dose of each at bedtime. Also, confirms she has not had any missed doses since we last talked on 2/25. Denies any new or concerning symptoms. Has not rescheduled with nephrology yet. Encouraged to call early this coming week.     HTN MEDICATIONS  Amlodipine 5 mg daily  Hydralazine 50 mg in the morning and at bedtime  Spironolactone 25 mg BID  Carvedilol 25 mg 1/2 tablet BID    Vernell Moe DNP, FNP-C, Remote Patient Monitoring NP, () [phone]

## 2025-02-28 NOTE — CARE COORDINATION
2025 2:42 PM  *  Alert and Triage   -Remote Alert Monitoring Note      Date/Time:  2025 2:42 PM  Patient Current Location: Home: [address] [place] [address]  Verified patients name and  as identifiers.    Rpm alert to be reviewed by the provider   red alert  blood pressure reading (181/89)  Vitals Recheck blood pressure reading (170/88)  Additional needs to be addressed by provider: No                   ACM contacted patient by telephone regarding red alert received   Background: Pneumonia  Refer to 911 immediately if:  Patient unresponsive or unable to provide history  Change in cognition or sudden confusion  Patient unable to respond in complete sentences  Intense chest pain/tightness  Any concern for any clinical emergency  Red Alert: Provider response time of 1 hr required for any red alert requiring intervention  Yellow Alert: Provider response time of 3hr required for any escalated yellow alert  Patient Chief Complaint:  Blood Pressure BP Triage  Are you having any Chest Pain? no   Are you having any Shortness of Breath? no   Do you have a headache or have any vision changes? no   Are you having any numbness or tingling? no   Are you having any other health concerns or issues? no  Patient/Caregiver educated on how to properly take a blood pressure. Patient/Caregiver verbalizes understanding.     Clinical Interventions: Reviewed and followed up on alerts and treatments-discussed red alert r/t BP with patient. Patient reports no s/s and taking all medications as prescribed. She is also following a sodium restricted diet. She has no other medications to take in the afternoon. Repeat metrics improved. No other questions or concerns.      Plan/Follow Up: Will continue to review, monitor and address alerts with follow up based on severity of symptoms and risk factors.  **For any new or worsening symptoms or you are concerned in anyway, please contact your Provider or report to the nearest Emergency

## 2025-03-03 ENCOUNTER — CARE COORDINATION (OUTPATIENT)
Dept: OTHER | Facility: CLINIC | Age: 65
End: 2025-03-03

## 2025-03-03 NOTE — CARE COORDINATION
3/3/2025 1:55 PM  *  Alert and Triage   -Remote Alert Monitoring Note      Date/Time:  3/3/2025 1:55 PM  Patient Current Location: Home: [address] [place] [address]  Verified patients name and  as identifiers.    Rpm alert to be reviewed by the provider   yellow alert  blood pressure reading (172/85)  Vitals Recheck blood pressure reading (174/78)  Additional needs to be addressed by provider: No                   ACM contacted patient by telephone regarding red alert received   Background: Pneumonia  Refer to 911 immediately if:  Patient unresponsive or unable to provide history  Change in cognition or sudden confusion  Patient unable to respond in complete sentences  Intense chest pain/tightness  Any concern for any clinical emergency  Red Alert: Provider response time of 1 hr required for any red alert requiring intervention  Yellow Alert: Provider response time of 3hr required for any escalated yellow alert  Patient Chief Complaint:  Blood Pressure BP Triage  Are you having any Chest Pain? no   Are you having any Shortness of Breath? no   Do you have a headache or have any vision changes? no   Are you having any numbness or tingling? no   Are you having any other health concerns or issues? no  Patient/Caregiver educated on how to properly take a blood pressure. Patient/Caregiver verbalizes understanding.     Clinical Interventions: Reviewed and followed up on alerts and treatments-discussed yellow alert r/t BP with patient. She reports no new or concerning s/s. She reports this is a pretty good BP for her based off past several days of readings. Repeat metrics completed and about the same at 174/78. PCP notified.      Plan/Follow Up: Will continue to review, monitor and address alerts with follow up based on severity of symptoms and risk factors.  **For any new or worsening symptoms or you are concerned in anyway, please contact your Provider or report to the nearest Emergency Room.**        Nicole

## 2025-03-04 ENCOUNTER — CARE COORDINATION (OUTPATIENT)
Dept: CARE COORDINATION | Age: 65
End: 2025-03-04

## 2025-03-04 NOTE — CARE COORDINATION
Ambulatory Care Coordination Note     3/4/2025      Chart reviewed   -Metrics reviewed in HRS- no metrics entered in yet today. BP remain elevated but lower than it has been running.   -Noted Dr. Chan office outreach to patient today about scheduling with them and increasing Norvasc to 10 mg daily and Nifedipine to 50 mg 3 times a day.   -Order for lasix renogram faxed to [facility]- needs scheduled yet       Future Appointments   Date Time Provider Department Center   3/10/2025  9:45 AM Tulio Holder MD [place] UROLOGY [facility]   4/7/2025 10:00 AM [facility] MED ONC RM 1 [facility] MED ONC All   4/28/2025  9:45 AM Familia Shepard MD [place] onc spe [facility]   8/13/2025 10:00 AM Terrell Cortez MD [place] [facility]       Care Coordination Plan of Care:   This nurse Care Coordinator will  - plan outreach call to patient later this week if able   -assist with scheduling lasix renogram in [place] if able   -get scheduled with Dr. Chan? Get message about medications?   -RPM

## 2025-03-05 ENCOUNTER — CARE COORDINATION (OUTPATIENT)
Dept: CARE COORDINATION | Facility: CLINIC | Age: 65
End: 2025-03-05

## 2025-03-06 ENCOUNTER — CARE COORDINATION (OUTPATIENT)
Dept: OTHER | Facility: CLINIC | Age: 65
End: 2025-03-06

## 2025-03-06 NOTE — CARE COORDINATION
3/6/2025 12:25 PM  *  Unable to Reach Date/Time:  3/6/2025 12:25 PM  ACM attempted to reach patient by telephone regarding yellow alert r/t no metrics x 3 days in remote patient monitoring program. Left HIPAA compliant message requesting a return call. Will attempt to reach patient again.         AREN Benz, RN  Associate Care Manager   Cell: [phone]  [email or web address]

## 2025-03-07 ENCOUNTER — CARE COORDINATION (OUTPATIENT)
Dept: CARE COORDINATION | Age: 65
End: 2025-03-07

## 2025-03-07 NOTE — CARE COORDINATION
3/7/2025 1:23 PM  *  Unable to Reach Date/Time:  3/7/2025 1:24 PM  LPN attempted to reach patient by telephone regarding red alert in remote patient monitoring program. Left HIPAA compliant message requesting a return call. Will attempt to reach patient again.     15:00 called [phone], left     AC aware

## 2025-03-07 NOTE — CARE COORDINATION
DAY 4, NO METRICS RECEIVED, called the patient, left vm. Called the patient’s emergency contact, left vm. Notify AC.    3/7/2025 11:10 AM  *  Unable to Reach Date/Time:  3/7/2025 11:10 AM  LPN attempted to reach patient by telephone regarding   No Pulse Ox taken for 3 Days   No Temperature taken for 3 Days    in remote patient monitoring program. Left HIPAA compliant message requesting a return call. Will attempt to reach patient again.     1103  Emergency Contact (name and contact number): Benji Espinosa (spouse) [phone]  Notify [facility]

## 2025-03-07 NOTE — CARE COORDINATION
Ambulatory Care Coordination Note     3/7/2025      Received notification from RPM team that patient has not entered vitals in over 4 days.     Noted patient has monitored BP and weight day prior on 3/6.       Noted vitals have been taken today- patient normally does monitor late morning/early afternoons.       Future Appointments   Date Time Provider Department Center   3/10/2025  9:45 AM Tulio Holder MD [place] UROLOGY [facility]   4/7/2025 10:00 AM [facility] MED ONC  1 [facility] MED ONC All   4/22/2025  1:40 PM Nicole Chan MD [facility] None   4/28/2025  9:45 AM Familia Shepard MD [place] onc spe [facility]   8/13/2025 10:00 AM Terrell Cortez MD [facility]       Care Coordination Plan of Care:   This nurse Care Coordinator will  - continue to monitor RPM metrics  -plan outreach call to patient next week   -follow up lasix renogram- scheduling   -follow up medications (see telephone encounter from 2/28/3/4 Dr Chan office).

## 2025-03-10 ENCOUNTER — CARE COORDINATION (OUTPATIENT)
Dept: CASE MANAGEMENT | Age: 65
End: 2025-03-10

## 2025-03-10 ENCOUNTER — HOSPITAL ENCOUNTER (OUTPATIENT)
Age: 65
Discharge: HOME | End: 2025-03-10
Payer: COMMERCIAL

## 2025-03-10 ENCOUNTER — OFFICE VISIT (OUTPATIENT)
Dept: UROLOGY | Age: 65
End: 2025-03-10
Payer: COMMERCIAL

## 2025-03-10 VITALS
BODY MASS INDEX: 21.04 KG/M2 | HEIGHT: 62 IN | DIASTOLIC BLOOD PRESSURE: 98 MMHG | WEIGHT: 114.3 LBS | TEMPERATURE: 97.7 F | SYSTOLIC BLOOD PRESSURE: 160 MMHG

## 2025-03-10 DIAGNOSIS — C68.9 TRANSITIONAL CELL CARCINOMA (HCC): ICD-10-CM

## 2025-03-10 DIAGNOSIS — N13.30 HYDRONEPHROSIS, UNSPECIFIED HYDRONEPHROSIS TYPE: ICD-10-CM

## 2025-03-10 DIAGNOSIS — C67.9 UROTHELIAL CARCINOMA OF BLADDER WITH INVASION OF MUSCLE (HCC): ICD-10-CM

## 2025-03-10 DIAGNOSIS — R39.15 URGENCY OF URINATION: Primary | ICD-10-CM

## 2025-03-10 DIAGNOSIS — R39.15 URGENCY OF URINATION: ICD-10-CM

## 2025-03-10 LAB
ANION GAP SERPL CALCULATED.3IONS-SCNC: 13 MMOL/L (ref 9–16)
BUN SERPL-MCNC: 66 MG/DL (ref 8–23)
BUN/CREAT SERPL: 24 (ref 9–20)
CALCIUM SERPL-MCNC: 9.8 MG/DL (ref 8.6–10.4)
CHLORIDE SERPL-SCNC: 94 MMOL/L (ref 98–107)
CO2 SERPL-SCNC: 24 MMOL/L (ref 20–31)
CREAT SERPL-MCNC: 2.7 MG/DL (ref 0.5–0.9)
GFR, ESTIMATED: 19 ML/MIN/1.73M2
GLUCOSE SERPL-MCNC: 98 MG/DL (ref 74–99)
POTASSIUM SERPL-SCNC: 4.9 MMOL/L (ref 3.7–5.3)
SODIUM SERPL-SCNC: 131 MMOL/L (ref 136–145)

## 2025-03-10 PROCEDURE — G8427 DOCREV CUR MEDS BY ELIG CLIN: HCPCS | Performed by: UROLOGY

## 2025-03-10 PROCEDURE — 80048 BASIC METABOLIC PNL TOTAL CA: CPT

## 2025-03-10 PROCEDURE — G8420 CALC BMI NORM PARAMETERS: HCPCS | Performed by: UROLOGY

## 2025-03-10 PROCEDURE — 1036F TOBACCO NON-USER: CPT | Performed by: UROLOGY

## 2025-03-10 PROCEDURE — 36415 COLL VENOUS BLD VENIPUNCTURE: CPT

## 2025-03-10 PROCEDURE — 3017F COLORECTAL CA SCREEN DOC REV: CPT | Performed by: UROLOGY

## 2025-03-10 PROCEDURE — 3080F DIAST BP >= 90 MM HG: CPT | Performed by: UROLOGY

## 2025-03-10 PROCEDURE — 51798 US URINE CAPACITY MEASURE: CPT | Performed by: UROLOGY

## 2025-03-10 PROCEDURE — 99214 OFFICE O/P EST MOD 30 MIN: CPT | Performed by: UROLOGY

## 2025-03-10 PROCEDURE — 3077F SYST BP >= 140 MM HG: CPT | Performed by: UROLOGY

## 2025-03-10 RX ORDER — FERROUS SULFATE 325(65) MG
1 TABLET ORAL
COMMUNITY
Start: 2025-02-27

## 2025-03-10 RX ORDER — OXYBUTYNIN CHLORIDE 5 MG/1
5 TABLET, EXTENDED RELEASE ORAL DAILY
Qty: 30 TABLET | Refills: 3 | Status: SHIPPED | OUTPATIENT
Start: 2025-03-10

## 2025-03-10 NOTE — PROGRESS NOTES
HPI:        Patient is a 64 y.o. female in no acute distress.  She is alert and oriented to person, place, and time.        History  TURBT 4/5/2016 High Grade Ta  TURBT re-resection 5/17/2016 High Grade Ta  6 Weekly BCG instuillations starting 6/16/16  3 weekly BCG starting 7/20/17     Lost to follow-up in 2019     2/2024 Patient is here today as a new patient.  Patient was well-known to our practice several years ago.  She did have multiple bladder resections.  She was lost to follow-up approximately 5 years ago.  Patient did have a recent CT scan.  This film was independently reviewed.  This does show severe left hydronephrosis.  This is down to the level of the bladder.  The bladder does appear to be thickened on the left side.  Patient has had slight worsening of her creatinine.  Patient has been having some left-sided flank pain.  Is unclear how long this has been going on.  Patient has no unintentional weight loss or decreased appetite.  She reports no nausea or vomiting.     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     4/2024 patient opted for chemotherapy and radiation therapy versus radical cystectomy to 2 surgical risk.  Started cisplatin/gemcitabine.      1/2025 cystoscopy with left ureteral dilation and stent placement     Currently  Patient is here today for follow-up.  Patient has had worsening kidney function.  Current lab values 3.2 for creatinine.  Patient does feel like she is having issues with urinating all night long.  She feels like she has to void every time she stands up.  She has had 2 episodes of bright red blood.  Patient is scheduled to have a Lasix renogram per nephrology.  Patient is overall feeling well.  She is having issues with urgency and frequency.  Patient has no pain today.  Patient was able to void 70 mL today.  She had a postvoid residual of 13 mL.  Past

## 2025-03-11 ENCOUNTER — CARE COORDINATION (OUTPATIENT)
Dept: CASE MANAGEMENT | Age: 65
End: 2025-03-11

## 2025-03-11 ENCOUNTER — CARE COORDINATION (OUTPATIENT)
Dept: CARE COORDINATION | Facility: CLINIC | Age: 65
End: 2025-03-11

## 2025-03-11 NOTE — CARE COORDINATION
3/11/2025 2:21 PM  *  Unable to Reach Date/Time:  3/11/2025 2:22 PM  LPN attempted to reach patient by telephone regarding yellow alert in remote patient monitoring program. Metrics have not been updated x 4 days. Left HIPAA compliant message requesting a return call. LPN attempted to contact patients emergency contact, Benji Espinosa. Left HIPAA compliant message requesting a return call. Will escalate to ACM stating, \"Daja Espinosa  is currently enrolled in Remote Patient Monitoring (RPM) and has not entered vitals in 4 days. The RPM team has Left A Message your patient to discuss adherence in RPM.  Please attempt to outreach your patient and discuss adherence with RPM. If patient is no longer interested in participating please send a dis-enrollment request to the RPM pool for processing.   Thank You.\"

## 2025-03-11 NOTE — CARE COORDINATION
Date/Time:  3/11/2025 3:31 PM  LPN attempted to reach patient by telephone regarding yellow alert in remote patient monitoring program. Left HIPAA compliant message requesting a return call. Will attempt to reach patient again.

## 2025-03-11 NOTE — CARE COORDINATION
Date/Time:  3/11/2025 2:58 PM  LPN attempted to reach patient by telephone regarding yellow alert in remote patient monitoring program. Left HIPAA compliant message requesting a return call. Will attempt to reach patient again.

## 2025-03-12 ENCOUNTER — RESULTS FOLLOW-UP (OUTPATIENT)
Dept: UROLOGY | Age: 65
End: 2025-03-12

## 2025-03-12 ENCOUNTER — CARE COORDINATION (OUTPATIENT)
Dept: CASE MANAGEMENT | Age: 65
End: 2025-03-12

## 2025-03-12 ENCOUNTER — CARE COORDINATION (OUTPATIENT)
Dept: CARE COORDINATION | Age: 65
End: 2025-03-12

## 2025-03-12 NOTE — TELEPHONE ENCOUNTER
Phone call to patient, patient notified of providers comment and voiced she is being seen 3/22/2024 by her nephrologist

## 2025-03-12 NOTE — RESULT ENCOUNTER NOTE
Please let her know that her kidney function is slightly better than what it was 2 weeks ago we recommend she follows up with her nephrologist

## 2025-03-12 NOTE — TELEPHONE ENCOUNTER
----- Message from Osmin Nicole PA-C sent at 3/12/2025  8:20 AM EDT -----  Please let her know that her kidney function is slightly better than what it was 2 weeks ago we recommend she follows up with her nephrologist

## 2025-03-12 NOTE — CARE COORDINATION
Ambulatory Care Coordination Note     3/12/2025      Received notification from RPM team yesterday that patient has not entered metrics in several days. Requesting ACM follow up with patient.     HRS reviewed- noted patient has entered metrics for yesterday and today, 3/12 with exception of pulse ox. Patient normally enters metrics in the early afternoon. All metrics entered today are within green zone. BP stable, temperature normal and weight remain stable, down slightly in 7 day trend.     Future Appointments   Date Time Provider Department Center   4/7/2025 10:00 AM [facility] MED ONC RM 1 [facility] MED ONC All   4/14/2025  9:30 AM Tulio Holder MD [place] UROLOGY [facility]   4/22/2025  1:40 PM [facility]   4/28/2025  9:45 AM Familia Shepard MD [place] onc spe [facility]   8/13/2025 10:00 AM Terrell Cortez MD [place] [facility]       Care Coordination Plan of Care:   This nurse Care Coordinator will  - continue to monitor RPM   -medication management   -follow up order from Dr. Chan

## 2025-03-12 NOTE — CARE COORDINATION
Date/Time:  3/12/2025 2:36 PM  LPN attempted to reach patient by telephone regarding  no [pulse ox reading in 6 days   in remote patient monitoring program. Left HIPAA compliant message requesting a return call. Will attempt to reach patient again.  All other Metrics WNL

## 2025-03-13 ENCOUNTER — TELEPHONE (OUTPATIENT)
Dept: FAMILY MEDICINE CLINIC | Age: 65
End: 2025-03-13

## 2025-03-13 ENCOUNTER — TELEPHONE (OUTPATIENT)
Dept: CARDIOLOGY CLINIC | Age: 65
End: 2025-03-13

## 2025-03-13 RX ORDER — PREDNISONE 20 MG/1
40 TABLET ORAL DAILY
Qty: 14 TABLET | Refills: 0 | Status: SHIPPED | OUTPATIENT
Start: 2025-03-13 | End: 2025-03-20

## 2025-03-13 NOTE — TELEPHONE ENCOUNTER
PT called in. States that her medication is making her 'itch from the inside out' - is not dry skin, has been using lotion.

## 2025-03-13 NOTE — TELEPHONE ENCOUNTER
She has been itching like crazy.Checked about her medications, I asked if she has changed anything ellse or eating something different. No, I suggested ER because you are not in. This has been going on for a month or so

## 2025-03-14 ENCOUNTER — CARE COORDINATION (OUTPATIENT)
Dept: CASE MANAGEMENT | Age: 65
End: 2025-03-14

## 2025-03-14 NOTE — CARE COORDINATION
Date/Time:  3/14/2025 2:05 PM  LPN attempted to reach patient by telephone regarding yellow alert BP AND NO PO in remote patient monitoring program. Left HIPAA compliant message requesting a return call. Will attempt to reach patient again.

## 2025-03-14 NOTE — CARE COORDINATION
Date/Time:  3/14/2025 2:37 PM  LPN attempted to reach patient by telephone regarding yellow alert BP AND NO PO in remote patient monitoring program. Left HIPAA compliant message requesting a return call. Left message with EC . ACM notified

## 2025-03-17 ENCOUNTER — CARE COORDINATION (OUTPATIENT)
Dept: CASE MANAGEMENT | Age: 65
End: 2025-03-17

## 2025-03-18 ENCOUNTER — CARE COORDINATION (OUTPATIENT)
Dept: CARE COORDINATION | Age: 65
End: 2025-03-18

## 2025-03-18 ENCOUNTER — CARE COORDINATION (OUTPATIENT)
Dept: CASE MANAGEMENT | Age: 65
End: 2025-03-18

## 2025-03-18 NOTE — CARE COORDINATION
Remote Patient Monitoring Note      Date/Time:  3/18/2025 1:55 PM  Patient Current Location: Home: [address] [place] [address]  LPN contacted patient by telephone regarding yellow alert received for no metrics in 4 days . Verified patients name and  as identifiers.  Background: PNA  Clinical Interventions: Reviewed and followed up on alerts and treatments-Spoke to patient she states she has not felt well has had the flu and not taking her medications she agrees to start checking again tomorrow    Plan/Follow Up: Will continue to review, monitor and address alerts with follow up based on severity of symptoms and risk factors.

## 2025-03-18 NOTE — CARE COORDINATION
Ambulatory Care Coordination Note     3/18/2025 4:02 PM     Noted RPM team has spoken with patient today. Patient has had the flu and not feeling well. Noted metrics were not entered for several days. RPM metrics were completed later on today. Metrics today in green zone. Will defer planned call today due to patient being ill and not feeling well.     Future Appointments   Date Time Provider Department Center   3/20/2025  1:00 PM [facility] NUCLEAR ROOM [facility] NUC MED MW Rad   4/7/2025 10:00 AM [facility] MED ONC RM 1 [facility] MED ONC [place]   4/14/2025  9:30 AM Tulio Holder MD [place] UROLOGY [facility]   4/22/2025  1:40 PM [facility]   4/28/2025  9:45 AM Familia Shepard MD [place] onc spe [facility]   8/13/2025 10:00 AM Terrell Cortez MD [place] [facility]       Care Coordination Plan of Care:   This nurse Care Coordinator will  - plan outreach call to patient in 2 weeks   -medication management  -RPM   -goal progression    -complete testing per Dr. Chan- scheduled 3/20

## 2025-03-19 ENCOUNTER — CARE COORDINATION (OUTPATIENT)
Dept: OTHER | Facility: CLINIC | Age: 65
End: 2025-03-19

## 2025-03-19 DIAGNOSIS — I10 ESSENTIAL HYPERTENSION: ICD-10-CM

## 2025-03-19 RX ORDER — HYDRALAZINE HYDROCHLORIDE 50 MG/1
50 TABLET, FILM COATED ORAL 3 TIMES DAILY
Qty: 90 TABLET | Refills: 3 | Status: SHIPPED | OUTPATIENT
Start: 2025-03-19

## 2025-03-19 NOTE — CARE COORDINATION
3/19/2025 12:58 PM  *  Unable to Reach Date/Time:  3/19/2025 12:58 PM  ACM attempted to reach patient by telephone regarding yellow alert r/t BP  in remote patient monitoring program. Left HIPAA compliant message requesting a return call. Will attempt to reach patient again.       REFUGIO BenzN, RN  Associate Care Manager   Cell: [phone]  [email or web address]                        
Up: Will continue to review, monitor and address alerts with follow up based on severity of symptoms and risk factors.  **For any new or worsening symptoms or you are concerned in anyway, please contact your Provider or report to the nearest Emergency Room.**        AREN Benz, RN  Associate Care Manager   Cell: [phone]  [email or web address]

## 2025-03-20 ENCOUNTER — CARE COORDINATION (OUTPATIENT)
Dept: CASE MANAGEMENT | Age: 65
End: 2025-03-20

## 2025-03-20 ENCOUNTER — CARE COORDINATION (OUTPATIENT)
Dept: CARE COORDINATION | Age: 65
End: 2025-03-20

## 2025-03-20 ENCOUNTER — HOSPITAL ENCOUNTER (OUTPATIENT)
Dept: NUCLEAR MEDICINE | Age: 65
Discharge: HOME | End: 2025-03-22
Payer: COMMERCIAL

## 2025-03-20 VITALS — WEIGHT: 115 LBS | BODY MASS INDEX: 21.16 KG/M2 | HEIGHT: 62 IN

## 2025-03-20 DIAGNOSIS — N18.32 STAGE 3B CHRONIC KIDNEY DISEASE (HCC): ICD-10-CM

## 2025-03-20 DIAGNOSIS — N28.89 OBSTRUCTION OF KIDNEY: ICD-10-CM

## 2025-03-20 PROCEDURE — 6360000002 HC RX W HCPCS: Performed by: INTERNAL MEDICINE

## 2025-03-20 PROCEDURE — A9562 TC99M MERTIATIDE: HCPCS | Performed by: INTERNAL MEDICINE

## 2025-03-20 PROCEDURE — 78708 K FLOW/FUNCT IMAGE W/DRUG: CPT

## 2025-03-20 PROCEDURE — 3430000000 HC RX DIAGNOSTIC RADIOPHARMACEUTICAL: Performed by: INTERNAL MEDICINE

## 2025-03-20 RX ORDER — FUROSEMIDE 10 MG/ML
40 INJECTION INTRAMUSCULAR; INTRAVENOUS ONCE
Status: COMPLETED | OUTPATIENT
Start: 2025-03-20 | End: 2025-03-20

## 2025-03-20 RX ORDER — CLONIDINE HYDROCHLORIDE 0.1 MG/1
0.1 TABLET ORAL 2 TIMES DAILY
Qty: 60 TABLET | Refills: 3 | Status: SHIPPED | OUTPATIENT
Start: 2025-03-20

## 2025-03-20 RX ADMIN — Medication 5 MILLICURIE: at 13:04

## 2025-03-20 RX ADMIN — FUROSEMIDE 40 MG: 10 INJECTION, SOLUTION INTRAMUSCULAR; INTRAVENOUS at 13:06

## 2025-03-20 NOTE — CARE COORDINATION
-Remote Alert Monitoring Note      Date/Time:  3/20/2025 3:45 PM  Patient Current Location: Home: [address] [place] [address]  Verified patients name and  as identifiers.    Rpm alert to be reviewed by the provider   red alert  blood pressure reading (191/82)  Vitals Recheck blood pressure reading (192/84)  Additional needs to be addressed by provider:  Spoke to patient she denies chest pain, SOB, dizziness headache and blurred vision . Patient takes medication at 10 am, she just took BP for the first time this afternoon, she has no complaints at present time advised patient to take BP after medication at least 1 hour, if she experiences chest pain, SOB, dizziness or headache to seek emergency attention,                    LPN contacted patient by telephone regarding red alert received   Background: PNA  Refer to 911 immediately if:  Patient unresponsive or unable to provide history  Change in cognition or sudden confusion  Patient unable to respond in complete sentences  Intense chest pain/tightness  Any concern for any clinical emergency  Red Alert: Provider response time of 1 hr required for any red alert requiring intervention  Yellow Alert: Provider response time of 3hr required for any escalated yellow alert  Patient Chief Complaint:  Blood Pressure BP Triage  Are you having any Chest Pain? no   Are you having any Shortness of Breath? no   Do you have a headache or have any vision changes? no   Are you having any numbness or tingling? no   Are you having any other health concerns or issues? no  Patient/Caregiver educated on how to properly take a blood pressure. Patient/Caregiver verbalizes understanding.     Clinical Interventions: Spoke to patient she denies chest pain, SOB, dizziness headache and blurred vision . Patient takes medication at 10 am, she just took BP for the first time this afternoon, she has no complaints at present time advised patient to take BP after medication at least 1 hour, if

## 2025-03-20 NOTE — CARE COORDINATION
Remote Patient Monitoring Note      Date/Time:  3/20/2025 2:12 PM    LPN attempted to contact patient by telephone regarding yellow alert received yesterday for BP.    Background:  Pneumonia     Clinical Interventions: No answer, unable to LM.    Plan/Follow Up: Will continue to review, monitor and address alerts with follow up based on severity of symptoms and risk factors.       WOO [facility]/ CTN/ Remote Patient Monitoring  [phone]

## 2025-03-21 NOTE — CARE COORDINATION
Date/Time:  3/21/2025 8:40 AM  LPN attempted to reach patient by telephone regarding  new medication order from PCP   in remote patient monitoring program. Left HIPAA compliant message requesting a return call. Will attempt to reach patient again.

## 2025-03-25 RX ORDER — CARVEDILOL 25 MG/1
12.5 TABLET ORAL 2 TIMES DAILY WITH MEALS
Qty: 60 TABLET | Refills: 3 | Status: SHIPPED | OUTPATIENT
Start: 2025-03-25

## 2025-03-26 ENCOUNTER — CARE COORDINATION (OUTPATIENT)
Dept: CARE COORDINATION | Age: 65
End: 2025-03-26

## 2025-03-26 NOTE — CARE COORDINATION
Remote Alert Monitoring Note      Date/Time:  3/26/2025 11:31 AM  Patient Current Location: Home: [address] [address]    LPN contacted patient by telephone regarding yellow alert received for blood pressure reading (176/85). Verified patients name and  as identifiers.    Rpm alert to be reviewed by the provider                         Background: Pneumonia     Refer to 911 immediately if:  Patient unresponsive or unable to provide history  Change in cognition or sudden confusion  Patient unable to respond in complete sentences  Intense chest pain/tightness  Any concern for any clinical emergency  Red Alert: Provider response time of 1 hr required for any red alert requiring intervention  Yellow Alert: Provider response time of 3hr required for any escalated yellow alert        Blood Pressure BP Triage  Are you having any Chest Pain? no   Are you having any Shortness of Breath? no   Do you have a headache or have any vision changes? no   Are you having any numbness or tingling? no   Are you having any other health concerns or issues? no  Patient/Caregiver educated on how to properly take a blood pressure. Patient/Caregiver verbalizes understanding.     Have you taken your medications as instructed by your doctor today? Yes       Clinical Interventions: Reviewed and followed up on alerts and treatments-. Pt is asymptomatic and in no apparent distress, speaking in full sentences.  Patient states she is feeling fine. She is agreeable to sit and relax fro 15 minutes prior to rechecking BP. She states she took meds 1 hour ago. Will await update.    **For any new or worsening symptoms or you are concerned in anyway, please contact your Provider or report to the nearest Emergency Room.**    Plan/Follow Up: Will continue to review, monitor and address alerts with follow up based on severity of symptoms and risk factors.    WOO Burns [facility]/ CTN/ Remote Patient

## 2025-03-27 ENCOUNTER — CARE COORDINATION (OUTPATIENT)
Dept: CASE MANAGEMENT | Age: 65
End: 2025-03-27

## 2025-03-27 NOTE — CARE COORDINATION
-Remote Alert Monitoring Note      Date/Time:  3/27/2025 12:11 PM  Patient Current Location: Home: [address] [place] [address]  Verified patients name and  as identifiers.    Rpm alert to be reviewed by the provider   red alert and yellow alert  blood pressure reading (180/82) and weight (3 lbs in 1 day )  Vitals Recheck blood pressure reading (147/63) and weight (112.2)  Additional needs to be addressed by provider: No                   LPN contacted patient by telephone regarding red alert received   Background: PNA  Refer to 911 immediately if:  Patient unresponsive or unable to provide history  Change in cognition or sudden confusion  Patient unable to respond in complete sentences  Intense chest pain/tightness  Any concern for any clinical emergency  Red Alert: Provider response time of 1 hr required for any red alert requiring intervention  Yellow Alert: Provider response time of 3hr required for any escalated yellow alert  Patient Chief Complaint:  Blood Pressure BP Triage  Are you having any Chest Pain? no   Are you having any Shortness of Breath? no   Do you have a headache or have any vision changes? no   Are you having any numbness or tingling? no   Are you having any other health concerns or issues? Back pain   Patient/Caregiver educated on how to properly take a blood pressure. Patient/Caregiver verbalizes understanding.       Weight Weight Scale Triage  Was your weight obtained upon rising/waking today? no   Was your weight obtained after voiding and/or use of the bathroom today? yes   Did you weigh yourself in the same amount of clothing today, compared to how you typically do? yes   Was the scale bumped or moved prior to today's weight? no   Is your scale on a flat/hard surface? yes   Did you obtain your weight with shoes on? yes   If yes, is this something you normally do during your daily weights? no   Were you standing up straight on the scale today? yes   Were you leaning on anything while

## 2025-03-31 ENCOUNTER — CLINICAL DOCUMENTATION (OUTPATIENT)
Dept: NEPHROLOGY | Age: 65
End: 2025-03-31

## 2025-03-31 ENCOUNTER — CARE COORDINATION (OUTPATIENT)
Dept: CARE COORDINATION | Age: 65
End: 2025-03-31

## 2025-03-31 DIAGNOSIS — N18.4 CKD (CHRONIC KIDNEY DISEASE), STAGE IV (HCC): Primary | ICD-10-CM

## 2025-03-31 DIAGNOSIS — N13.9 OBSTRUCTIVE UROPATHY: ICD-10-CM

## 2025-04-01 ENCOUNTER — TELEPHONE (OUTPATIENT)
Dept: UROLOGY | Age: 65
End: 2025-04-01

## 2025-04-01 NOTE — CARE COORDINATION
Ambulatory Care Coordination Note     2025 11:08 AM     Patient Current Location:  Home: [address] [place] [address]     ACM contacted the patient by telephone. Verified name and  with patient as identifiers.         ACM: Thania Toscano RN     Challenges to be reviewed by the provider   Additional needs identified to be addressed with provider No  none               Method of communication with provider: none.    Utilization: Patient has not had any utilization since our last call.     Care Summary Note: Spoke with Daja. Reports she is doing ok, had a rough night last night so slower getting around this morning. States she probably will not get her vitals in by noon today. Chart reviewed- noted RPM nurse sent a My Chart message on 3/20 about medication changes- Daja states she did not see that message. Daja states she picked up her medication from the pharmacy but has continued to take Hydralazine 50 mg BID like she was, she did not know PCP asked her to increase to TID. Daja states that she has been having bad back pain and attributed elevated BP readings to her pain. BP readings have improved since that day. Daja states that she prefers to keep taking BID at this time since readings are improving and follow up on her back pain. Noted she did complete imaging Dr. Chan ordered. Dr. Chan office actually trying to call patient at time of call- Daja states she will call them back after this call. ACM explained that it could be possible her back pain is related to kidney's- encourage she call Dr. Chan office back ASAP to inquire their reason for call and let them know about back pain with no relief with ASA. Daja voiced understanding. Reports she is urinating fine, no pain, burning, frequency or urgency. Daja mentions that while she was getting the scan the tech asked her about kidney stones- Daja will call Dr. Chan office for follow up. Does currently have office visit scheduled with them for .

## 2025-04-01 NOTE — PROGRESS NOTES
Lasix renogram from 3/20/2025 reviewed: It showed delayed washout both sides consistent with obstructive uropathy.  Split renal function 20% on the left and 80% on the right.  Previous ultrasounds have shown right kidney to be 8 cm left to be 7.4 cm and severe hydronephrosis on the left side without any hydronephrosis on the right.  Will refer her back to urology to see if any interventions will help her.  She does have a left ureteral stent placed by urology in January 2025.  Left nephrostomy tube has been removed.+

## 2025-04-02 NOTE — TELEPHONE ENCOUNTER
Patient has upcoming appointment on 4/14/2025.  Please add review Lasix renogram to her appointment

## 2025-04-07 ENCOUNTER — CARE COORDINATION (OUTPATIENT)
Dept: CASE MANAGEMENT | Age: 65
End: 2025-04-07

## 2025-04-07 ENCOUNTER — HOSPITAL ENCOUNTER (OUTPATIENT)
Dept: INFUSION THERAPY | Age: 65
Discharge: HOME | End: 2025-04-07

## 2025-04-08 ENCOUNTER — CARE COORDINATION (OUTPATIENT)
Dept: CARE COORDINATION | Facility: CLINIC | Age: 65
End: 2025-04-08

## 2025-04-08 NOTE — CARE COORDINATION
4/8/2025 2:25 PM  *  Unable to Reach Date/Time:  4/8/2025 2:25 PM  LPN attempted to reach patient by telephone regarding yellow alert in remote patient monitoring program.   Temperature reading has not been updated x 4 days. Unable to reach pt and unable to leave message. Voicemail full.

## 2025-04-09 ENCOUNTER — HOSPITAL ENCOUNTER (OUTPATIENT)
Dept: INFUSION THERAPY | Age: 65
Discharge: HOME | End: 2025-04-09
Payer: COMMERCIAL

## 2025-04-09 ENCOUNTER — CARE COORDINATION (OUTPATIENT)
Dept: CARE COORDINATION | Age: 65
End: 2025-04-09

## 2025-04-09 DIAGNOSIS — C67.9 UROTHELIAL CARCINOMA OF BLADDER: ICD-10-CM

## 2025-04-09 DIAGNOSIS — C68.9 TRANSITIONAL CELL CARCINOMA: Primary | ICD-10-CM

## 2025-04-09 PROCEDURE — 96523 IRRIG DRUG DELIVERY DEVICE: CPT

## 2025-04-09 PROCEDURE — 2500000003 HC RX 250 WO HCPCS: Performed by: INTERNAL MEDICINE

## 2025-04-09 PROCEDURE — 6360000002 HC RX W HCPCS: Performed by: INTERNAL MEDICINE

## 2025-04-09 RX ORDER — HEPARIN 100 UNIT/ML
500 SYRINGE INTRAVENOUS PRN
Status: DISCONTINUED | OUTPATIENT
Start: 2025-04-09 | End: 2025-04-10 | Stop reason: HOSPADM

## 2025-04-09 RX ORDER — SODIUM CHLORIDE 0.9 % (FLUSH) 0.9 %
5-40 SYRINGE (ML) INJECTION PRN
Status: DISCONTINUED | OUTPATIENT
Start: 2025-04-09 | End: 2025-04-10 | Stop reason: HOSPADM

## 2025-04-09 RX ADMIN — SODIUM CHLORIDE, PRESERVATIVE FREE 20 ML: 5 INJECTION INTRAVENOUS at 10:42

## 2025-04-09 RX ADMIN — HEPARIN 500 UNITS: 100 SYRINGE at 10:43

## 2025-04-09 NOTE — CARE COORDINATION
disease specific assessments  - medication review   - goal progression  - education   - follow up appointment with Urology and Nephrology .   -RPM  Patient  is agreeable to this plan.

## 2025-04-10 ENCOUNTER — CARE COORDINATION (OUTPATIENT)
Dept: CASE MANAGEMENT | Age: 65
End: 2025-04-10

## 2025-04-10 NOTE — CARE COORDINATION
Date/Time:  4/10/2025 1:22 PM  LPN attempted to reach patient by telephone regarding  no metrics in 3 days   in remote patient monitoring program. Left HIPAA compliant message requesting a return call. Will attempt to reach patient again.

## 2025-04-11 ENCOUNTER — CARE COORDINATION (OUTPATIENT)
Dept: CASE MANAGEMENT | Age: 65
End: 2025-04-11

## 2025-04-11 NOTE — CARE COORDINATION
-Remote Alert Monitoring Note      Date/Time:  2025 3:49 PM  Patient Current Location: Home: [address] [place] [address]  Verified patients name and  as identifiers.    Rpm alert to be reviewed by the provider   yellow alert  blood pressure reading (173/83)  Vitals Recheck blood pressure reading (declined)  Additional needs to be addressed by provider: No                   LPN contacted patient by telephone regarding red alert received   Background: PNA  Refer to 911 immediately if:  Patient unresponsive or unable to provide history  Change in cognition or sudden confusion  Patient unable to respond in complete sentences  Intense chest pain/tightness  Any concern for any clinical emergency  Red Alert: Provider response time of 1 hr required for any red alert requiring intervention  Yellow Alert: Provider response time of 3hr required for any escalated yellow alert  Patient Chief Complaint:  Blood Pressure BP Triage  Are you having any Chest Pain? no   Are you having any Shortness of Breath? no   Do you have a headache or have any vision changes? no   Are you having any numbness or tingling? no   Are you having any other health concerns or issues? no  Patient/Caregiver educated on how to properly take a blood pressure. Patient/Caregiver verbalizes understanding.     Clinical Interventions: Reviewed and followed up on alerts and treatments-spoke to patient she denies ches tpain, SOB, dizziness feels good today has been walking up and down basement steps doing laundry prior to BP check she states she will recheck after she has been at rest but has no concerns    Plan/Follow Up: Will continue to review, monitor and address alerts with follow up based on severity of symptoms and risk factors.  **For any new or worsening symptoms or you are concerned in anyway, please contact your Provider or report to the nearest Emergency Room.**

## 2025-04-14 ENCOUNTER — CARE COORDINATION (OUTPATIENT)
Dept: CARE COORDINATION | Facility: CLINIC | Age: 65
End: 2025-04-14

## 2025-04-14 ENCOUNTER — OFFICE VISIT (OUTPATIENT)
Dept: UROLOGY | Age: 65
End: 2025-04-14
Payer: COMMERCIAL

## 2025-04-14 VITALS
WEIGHT: 107 LBS | BODY MASS INDEX: 19.69 KG/M2 | HEIGHT: 62 IN | DIASTOLIC BLOOD PRESSURE: 82 MMHG | TEMPERATURE: 97.5 F | SYSTOLIC BLOOD PRESSURE: 132 MMHG

## 2025-04-14 DIAGNOSIS — C67.9 UROTHELIAL CARCINOMA OF BLADDER WITH INVASION OF MUSCLE: ICD-10-CM

## 2025-04-14 DIAGNOSIS — N13.30 HYDRONEPHROSIS, UNSPECIFIED HYDRONEPHROSIS TYPE: ICD-10-CM

## 2025-04-14 DIAGNOSIS — R39.15 URGENCY OF URINATION: Primary | ICD-10-CM

## 2025-04-14 PROCEDURE — G8427 DOCREV CUR MEDS BY ELIG CLIN: HCPCS | Performed by: UROLOGY

## 2025-04-14 PROCEDURE — 99214 OFFICE O/P EST MOD 30 MIN: CPT | Performed by: UROLOGY

## 2025-04-14 PROCEDURE — 1036F TOBACCO NON-USER: CPT | Performed by: UROLOGY

## 2025-04-14 PROCEDURE — 3079F DIAST BP 80-89 MM HG: CPT | Performed by: UROLOGY

## 2025-04-14 PROCEDURE — 3017F COLORECTAL CA SCREEN DOC REV: CPT | Performed by: UROLOGY

## 2025-04-14 PROCEDURE — 51798 US URINE CAPACITY MEASURE: CPT | Performed by: UROLOGY

## 2025-04-14 PROCEDURE — 3075F SYST BP GE 130 - 139MM HG: CPT | Performed by: UROLOGY

## 2025-04-14 PROCEDURE — G8420 CALC BMI NORM PARAMETERS: HCPCS | Performed by: UROLOGY

## 2025-04-14 NOTE — PROGRESS NOTES
HPI:        Patient is a 64 y.o. female in no acute distress.  She is alert and oriented to person, place, and time.        History  TURBT 4/5/2016 High Grade Ta  TURBT re-resection 5/17/2016 High Grade Ta  6 Weekly BCG instuillations starting 6/16/16  3 weekly BCG starting 7/20/17     Lost to follow-up in 2019     2/2024 Patient is here today as a new patient.  Patient was well-known to our practice several years ago.  She did have multiple bladder resections.  She was lost to follow-up approximately 5 years ago.  Patient did have a recent CT scan.  This film was independently reviewed.  This does show severe left hydronephrosis.  This is down to the level of the bladder.  The bladder does appear to be thickened on the left side.  Patient has had slight worsening of her creatinine.  Patient has been having some left-sided flank pain.  Is unclear how long this has been going on.  Patient has no unintentional weight loss or decreased appetite.  She reports no nausea or vomiting.     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     4/2024 patient opted for chemotherapy and radiation therapy versus radical cystectomy to 2 surgical risk.  Started cisplatin/gemcitabine.      1/2025 cystoscopy with left ureteral dilation and stent placement     Currently  Patient is here today for 4-week follow-up.  We did get a Lasix renogram on the patient.  Patient does have a low function of her left kidney.  Split function was 20% on the left and 80% on the right.  Her left side did appear to be somewhat obstructed with a delayed T half-life.  Patient does have worsening BUN and creatinine today.  BUN is 66 creatinine is 2.7.  Patient is having some pain in her right thigh.  Otherwise no pain on the left.  Radiology is concerned that the stone does not functioning correctly, left.  Past Medical History:   Diagnosis Date

## 2025-04-14 NOTE — CARE COORDINATION
4/14/2025 3:27 PM  *  Unable to Reach Date/Time:  4/14/2025 3:27 PM  LPN attempted to reach patient by telephone regarding yellow alert in remote patient monitoring program. Pulse ox / pulse ox HR, temperature reading and weight have not been updated x 3 days. Unable to reach pt and unable to leave message. Voicemail full. Updated BP / BP HR within RPM parameters.

## 2025-04-16 ENCOUNTER — CARE COORDINATION (OUTPATIENT)
Dept: CASE MANAGEMENT | Age: 65
End: 2025-04-16

## 2025-04-19 RX ORDER — FLECAINIDE ACETATE 50 MG/1
50 TABLET ORAL EVERY 12 HOURS SCHEDULED
Qty: 60 TABLET | Refills: 1 | Status: SHIPPED | OUTPATIENT
Start: 2025-04-19

## 2025-04-19 RX ORDER — APIXABAN 2.5 MG/1
2.5 TABLET, FILM COATED ORAL 2 TIMES DAILY
Qty: 60 TABLET | Refills: 1 | Status: SHIPPED | OUTPATIENT
Start: 2025-04-19

## 2025-04-21 ENCOUNTER — CARE COORDINATION (OUTPATIENT)
Dept: CASE MANAGEMENT | Age: 65
End: 2025-04-21

## 2025-04-22 ENCOUNTER — TELEPHONE (OUTPATIENT)
Dept: UROLOGY | Age: 65
End: 2025-04-22

## 2025-04-22 ENCOUNTER — CARE COORDINATION (OUTPATIENT)
Dept: CASE MANAGEMENT | Age: 65
End: 2025-04-22

## 2025-04-22 NOTE — CARE COORDINATION
Date/Time:  4/22/2025 12:00 PM  LPN attempted to reach patient by telephone regarding yellow alert /86 in remote patient monitoring program. Left HIPAA compliant message requesting a return call. Will attempt to reach patient again.

## 2025-04-22 NOTE — CARE COORDINATION
Remote Patient Monitoring Note      Date/Time:  2025 2:09 PM  Patient Current Location: [facility] contacted patient by telephone regarding yellow alert received for blood pressure reading (171/86). Verified patients name and  as identifiers.  Background: PNA  Clinical Interventions: Reviewed and followed up on alerts and treatments-Spoke to patient she states she is currently at the dr office they just checked her BP and it is 160/71     Plan/Follow Up: Will continue to review, monitor and address alerts with follow up based on severity of symptoms and risk factors.

## 2025-04-24 ENCOUNTER — CARE COORDINATION (OUTPATIENT)
Dept: CARE COORDINATION | Age: 65
End: 2025-04-24

## 2025-04-25 ENCOUNTER — HOSPITAL ENCOUNTER (OUTPATIENT)
Age: 65
Discharge: HOME | End: 2025-04-25
Payer: COMMERCIAL

## 2025-04-25 DIAGNOSIS — N18.4 ANEMIA IN STAGE 4 CHRONIC KIDNEY DISEASE (HCC): ICD-10-CM

## 2025-04-25 DIAGNOSIS — C67.9 UROTHELIAL CARCINOMA OF BLADDER (HCC): ICD-10-CM

## 2025-04-25 DIAGNOSIS — D63.1 ANEMIA IN STAGE 4 CHRONIC KIDNEY DISEASE (HCC): ICD-10-CM

## 2025-04-25 DIAGNOSIS — C67.9 MALIGNANT NEOPLASM OF URINARY BLADDER, UNSPECIFIED SITE (HCC): ICD-10-CM

## 2025-04-25 DIAGNOSIS — N18.4 CKD (CHRONIC KIDNEY DISEASE), STAGE IV (HCC): ICD-10-CM

## 2025-04-25 LAB
ALBUMIN SERPL-MCNC: 4.5 G/DL (ref 3.5–5.2)
ALBUMIN/GLOB SERPL: 1.5 {RATIO} (ref 1–2.5)
ALP SERPL-CCNC: 94 U/L (ref 35–104)
ALT SERPL-CCNC: 18 U/L (ref 5–33)
AMYLASE SERPL-CCNC: 157 U/L (ref 28–100)
ANION GAP SERPL CALCULATED.3IONS-SCNC: 14 MMOL/L (ref 9–17)
AST SERPL-CCNC: 28 U/L
BASOPHILS # BLD: ABNORMAL K/UL (ref 0–0.2)
BASOPHILS NFR BLD: ABNORMAL % (ref 0–2)
BILIRUB SERPL-MCNC: 0.5 MG/DL (ref 0.3–1.2)
BUN SERPL-MCNC: 68 MG/DL (ref 8–23)
CALCIUM SERPL-MCNC: 9.4 MG/DL (ref 8.6–10.4)
CHLORIDE SERPL-SCNC: 94 MMOL/L (ref 98–107)
CO2 SERPL-SCNC: 21 MMOL/L (ref 20–31)
CREAT SERPL-MCNC: 2.5 MG/DL (ref 0.5–0.9)
EOSINOPHIL # BLD: 1.24 K/UL (ref 0–0.4)
EOSINOPHILS RELATIVE PERCENT: 17 % (ref 0–5)
ERYTHROCYTE [DISTWIDTH] IN BLOOD BY AUTOMATED COUNT: 13.5 % (ref 12.1–15.2)
FERRITIN SERPL-MCNC: 35 NG/ML
GFR, ESTIMATED: 21 ML/MIN/1.73M2
GLUCOSE SERPL-MCNC: 98 MG/DL (ref 70–99)
HCT VFR BLD AUTO: 28.5 % (ref 36–46)
HGB BLD-MCNC: 9.6 G/DL (ref 12–16)
IMM GRANULOCYTES # BLD AUTO: ABNORMAL K/UL (ref 0–0.3)
IMM GRANULOCYTES NFR BLD: ABNORMAL %
IRON SATN MFR SERPL: 36 % (ref 20–55)
IRON SERPL-MCNC: 135 UG/DL (ref 37–145)
LIPASE SERPL-CCNC: 100 U/L (ref 13–60)
LYMPHOCYTES NFR BLD: 1.46 K/UL (ref 1–4.8)
LYMPHOCYTES RELATIVE PERCENT: 20 % (ref 15–40)
MCH RBC QN AUTO: 30.7 PG (ref 26–34)
MCHC RBC AUTO-ENTMCNC: 33.7 G/DL (ref 31–37)
MCV RBC AUTO: 91.1 FL (ref 80–100)
MONOCYTES NFR BLD: 0.88 K/UL (ref 0–1)
MONOCYTES NFR BLD: 12 % (ref 4–8)
MORPHOLOGY: ABNORMAL
NEUTROPHILS NFR BLD: 51 % (ref 47–75)
NEUTS SEG NFR BLD: 3.72 K/UL (ref 2.5–7)
PLATELET # BLD AUTO: 458 K/UL (ref 140–450)
POTASSIUM SERPL-SCNC: 4.4 MMOL/L (ref 3.7–5.3)
PROT SERPL-MCNC: 7.6 G/DL (ref 6.4–8.3)
RBC # BLD AUTO: 3.13 M/UL (ref 4–5.2)
SODIUM SERPL-SCNC: 129 MMOL/L (ref 135–144)
TIBC SERPL-MCNC: 375 UG/DL (ref 250–450)
TSH SERPL DL<=0.05 MIU/L-ACNC: 2.07 UIU/ML (ref 0.27–4.2)
UNSATURATED IRON BINDING CAPACITY: 240 UG/DL (ref 112–347)
WBC OTHER # BLD: 7.3 K/UL (ref 3.5–11)

## 2025-04-25 PROCEDURE — 85025 COMPLETE CBC W/AUTO DIFF WBC: CPT

## 2025-04-25 PROCEDURE — 82150 ASSAY OF AMYLASE: CPT

## 2025-04-25 PROCEDURE — 83550 IRON BINDING TEST: CPT

## 2025-04-25 PROCEDURE — 84443 ASSAY THYROID STIM HORMONE: CPT

## 2025-04-25 PROCEDURE — 80053 COMPREHEN METABOLIC PANEL: CPT

## 2025-04-25 PROCEDURE — 83540 ASSAY OF IRON: CPT

## 2025-04-25 PROCEDURE — 82728 ASSAY OF FERRITIN: CPT

## 2025-04-25 PROCEDURE — 83690 ASSAY OF LIPASE: CPT

## 2025-04-25 PROCEDURE — 82533 TOTAL CORTISOL: CPT

## 2025-04-25 PROCEDURE — 36415 COLL VENOUS BLD VENIPUNCTURE: CPT

## 2025-04-25 NOTE — CARE COORDINATION
Ambulatory Care Coordination Note     4/25/2025      Chart reviewed   7 day look of RPM metrics in HRS- 3 days no metrics entered   Office notes with Dr. Chan reviewed- labs ordered, stop oral potassium               Future Appointments   Date Time Provider Department Center   4/28/2025  9:45 AM Familia Shepard MD [place] [facility] spe [facility]   7/1/2025 11:00 AM Nicole Chan MD [facility] [place] None   8/13/2025 10:00 AM Terrell Cortez MD [facility]

## 2025-04-26 LAB
CORTIS SERPL-MCNC: 14.4 UG/DL (ref 2.5–19.5)
CORTISOL COLLECTION INFO: NORMAL

## 2025-04-28 ENCOUNTER — TELEMEDICINE (OUTPATIENT)
Dept: ONCOLOGY | Age: 65
End: 2025-04-28
Payer: COMMERCIAL

## 2025-04-28 DIAGNOSIS — C67.9 MALIGNANT NEOPLASM OF URINARY BLADDER, UNSPECIFIED SITE (HCC): Primary | ICD-10-CM

## 2025-04-28 DIAGNOSIS — I73.9 PERIPHERAL VASCULAR DISEASE: ICD-10-CM

## 2025-04-28 DIAGNOSIS — C67.9 UROTHELIAL CARCINOMA OF BLADDER WITH INVASION OF MUSCLE (HCC): ICD-10-CM

## 2025-04-28 DIAGNOSIS — N18.4 STAGE 4 CHRONIC KIDNEY DISEASE (HCC): ICD-10-CM

## 2025-04-28 PROCEDURE — G8427 DOCREV CUR MEDS BY ELIG CLIN: HCPCS | Performed by: INTERNAL MEDICINE

## 2025-04-28 PROCEDURE — 99214 OFFICE O/P EST MOD 30 MIN: CPT | Performed by: INTERNAL MEDICINE

## 2025-04-28 PROCEDURE — 3017F COLORECTAL CA SCREEN DOC REV: CPT | Performed by: INTERNAL MEDICINE

## 2025-04-28 PROCEDURE — 99211 OFF/OP EST MAY X REQ PHY/QHP: CPT | Performed by: INTERNAL MEDICINE

## 2025-04-28 NOTE — PROGRESS NOTES
was evaluated through a synchronous (real-time) audio-video encounter. The patient (or guardian if applicable) is aware that this is a billable service, which includes applicable co-pays. This Virtual Visit was conducted with patient's (and/or legal guardian's) consent. Patient identification was verified, and a caregiver was present when appropriate.   The patient was located at Facility (Appt Department): Home   provider was located at Facility (Appt Dept): [facility] oncology at [place]        Total time spent for this encounter: Not billed by time       An electronic signature was used to authenticate this note.               Patient ID: Daja Espinosa, 1960, [id number], 64 y.o.  Referred by :  No ref. 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  Embolic stroke on 4/6/2024  Concurrent chemo RT as a definitive treatment started on [place] 10, 2024 (plan for continuous neoadjuvant chemotherapy abandoned as patient not surgical candidate)  Chronic kidney disease  Recurrent admission to the hospital for GI bleed, HEAVENLY, pneumonia, AVR      Hematology oncology treatment  Cisplatin gemcitabine neoadjuvant started on March 19, 2024 every 3 weeks status post 1 cycle and have stopped and the plan changed to concurrent chemo RT   concurrent chemo RT with low-dose biweekly gemcitabine 27 mg/m² started in [place] 10, 2024  Treatment interruptions due to several admission to the hospital  Eliquis  Started immunotherapy with Opdivo on 10/18/2024 with intent to follow PET/CT  Follow-up PET/CT on February 6, 2025 no evidence of metastasis> Opdivo has been discontinued      HISTORY OF PRESENT ILLNESS:    Oncologic History:    Daja Espinosa is a very pleasant 64 y.o. female.  With history of nonmuscle invasive bladder cancer status post multiple resection she lost to follow-up for almost 5 years, Patient did have a recent CT scan.

## 2025-04-29 ENCOUNTER — CARE COORDINATION (OUTPATIENT)
Dept: CARE COORDINATION | Age: 65
End: 2025-04-29

## 2025-04-29 ENCOUNTER — CARE COORDINATION (OUTPATIENT)
Dept: CASE MANAGEMENT | Age: 65
End: 2025-04-29

## 2025-04-29 NOTE — CARE COORDINATION
4/29/2025 12:59 PM  *  Tablet Messaging Message sent to patient via Patient Connect Portal for Remote Patient Monitoring     RPM Nurse message No readings in two days Hello, Please see an important message from your RPM Team:  This is a reminder that we have not received your readings for two days please enter them as soon as possible.     Please do not respond to this message; If you have a question or concern please call your Ambulatory Care Manager or your Primary Care Physician.                   4/29/2025 12:58 PM  *  Unable to Reach Date/Time:  4/29/2025 12:58 PM  LPN attempted to reach patient by telephone regarding  no metrics x2 days  in remote patient monitoring program. Unable to leave VM. Will attempt to reach patient again.

## 2025-05-05 ENCOUNTER — CARE COORDINATION (OUTPATIENT)
Dept: CASE MANAGEMENT | Age: 65
End: 2025-05-05

## 2025-05-06 ENCOUNTER — TELEPHONE (OUTPATIENT)
Dept: UROLOGY | Age: 65
End: 2025-05-06

## 2025-05-06 ENCOUNTER — CARE COORDINATION (OUTPATIENT)
Dept: CARE COORDINATION | Facility: CLINIC | Age: 65
End: 2025-05-06

## 2025-05-06 ENCOUNTER — CARE COORDINATION (OUTPATIENT)
Dept: CASE MANAGEMENT | Age: 65
End: 2025-05-06

## 2025-05-06 NOTE — TELEPHONE ENCOUNTER
Patient was to have left stent exchange completed today.  Writer was told she called surgery to cx d/t illness. I called patient to check on how she was feeling. She reports she feels like she has the flu. Reports body aches, headache, and feels awful.  Told to call office once she is feeling better to get her rescheduled.

## 2025-05-06 NOTE — CARE COORDINATION
-Remote Alert Monitoring Note      Date/Time:  2025 3:21 PM  Patient Current Location: Home: [address] [place] [address]  Verified patients name and  as identifiers.    Rpm alert to be reviewed by the provider   yellow alert  blood pressure reading (176/81)  Vitals Recheck blood pressure reading (167/78)  Additional needs to be addressed by provider: No                   LPN contacted patient by telephone regarding red alert received   Background: PNA  Refer to 911 immediately if:  Patient unresponsive or unable to provide history  Change in cognition or sudden confusion  Patient unable to respond in complete sentences  Intense chest pain/tightness  Any concern for any clinical emergency  Red Alert: Provider response time of 1 hr required for any red alert requiring intervention  Yellow Alert: Provider response time of 3hr required for any escalated yellow alert  Patient Chief Complaint:  Blood Pressure BP Triage  Are you having any Chest Pain? no   Are you having any Shortness of Breath? no   Do you have a headache or have any vision changes? no   Are you having any numbness or tingling? no   Are you having any other health concerns or issues? Flu symptoms  Patient/Caregiver educated on how to properly take a blood pressure. Patient/Caregiver verbalizes understanding.     Clinical Interventions: Reviewed and followed up on alerts and treatments-spoke to patient she denies ches tpain, SOB, dizziness states she is feeling under the weather has flu like symptoms and was unable to weigh today , repeat BP now WNL    Plan/Follow Up: Will continue to review, monitor and address alerts with follow up based on severity of symptoms and risk factors.  **For any new or worsening symptoms or you are concerned in anyway, please contact your Provider or report to the nearest Emergency Room.**

## 2025-05-06 NOTE — CARE COORDINATION
5/6/2025 1:24 PM  *  Unable to Reach Date/Time:  5/6/2025 1:24 PM  LPN attempted to reach patient by telephone regarding yellow alert in remote patient monitoring program. Pulse ox / pulse ox HR not updated x 5 days. Unable to reach pt and unable to leave message; voicemail full. Will update ACM.

## 2025-05-09 ENCOUNTER — CARE COORDINATION (OUTPATIENT)
Dept: CASE MANAGEMENT | Age: 65
End: 2025-05-09

## 2025-05-10 ENCOUNTER — HOSPITAL ENCOUNTER (EMERGENCY)
Age: 65
Discharge: ANOTHER HOSPITAL | End: 2025-05-10
Attending: EMERGENCY MEDICINE
Payer: COMMERCIAL

## 2025-05-10 ENCOUNTER — APPOINTMENT (OUTPATIENT)
Dept: CT IMAGING | Age: 65
End: 2025-05-10
Payer: COMMERCIAL

## 2025-05-10 ENCOUNTER — HOSPITAL ENCOUNTER (INPATIENT)
Age: 65
LOS: 1 days | Discharge: HOME | DRG: 469 | End: 2025-05-11
Attending: INTERNAL MEDICINE | Admitting: INTERNAL MEDICINE
Payer: COMMERCIAL

## 2025-05-10 VITALS
WEIGHT: 104 LBS | DIASTOLIC BLOOD PRESSURE: 99 MMHG | OXYGEN SATURATION: 98 % | BODY MASS INDEX: 18.43 KG/M2 | RESPIRATION RATE: 22 BRPM | SYSTOLIC BLOOD PRESSURE: 209 MMHG | HEART RATE: 83 BPM | TEMPERATURE: 98.1 F | HEIGHT: 63 IN

## 2025-05-10 DIAGNOSIS — E86.0 DEHYDRATION: ICD-10-CM

## 2025-05-10 DIAGNOSIS — N17.9 ACUTE KIDNEY INJURY: Primary | ICD-10-CM

## 2025-05-10 DIAGNOSIS — R11.2 NAUSEA AND VOMITING, UNSPECIFIED VOMITING TYPE: Primary | ICD-10-CM

## 2025-05-10 DIAGNOSIS — N17.9 ACUTE RENAL FAILURE, UNSPECIFIED ACUTE RENAL FAILURE TYPE: ICD-10-CM

## 2025-05-10 DIAGNOSIS — R10.10 PAIN OF UPPER ABDOMEN: ICD-10-CM

## 2025-05-10 DIAGNOSIS — E87.1 HYPONATREMIA: ICD-10-CM

## 2025-05-10 PROBLEM — I16.0 HYPERTENSIVE URGENCY: Status: ACTIVE | Noted: 2025-05-10

## 2025-05-10 LAB
-: ABNORMAL
ALBUMIN SERPL-MCNC: 4.1 G/DL (ref 3.5–5.2)
ALBUMIN SERPL-MCNC: 4.8 G/DL (ref 3.5–5.2)
ALBUMIN/GLOB SERPL: 1.4 {RATIO} (ref 1–2.5)
ALBUMIN/GLOB SERPL: 1.5 {RATIO} (ref 1–2.5)
ALP SERPL-CCNC: 86 U/L (ref 35–104)
ALP SERPL-CCNC: 94 U/L (ref 35–104)
ALT SERPL-CCNC: 11 U/L (ref 10–35)
ALT SERPL-CCNC: 12 U/L (ref 5–33)
ANION GAP SERPL CALCULATED.3IONS-SCNC: 17 MMOL/L (ref 9–16)
ANION GAP SERPL CALCULATED.3IONS-SCNC: 18 MMOL/L (ref 9–16)
ANION GAP SERPL CALCULATED.3IONS-SCNC: 20 MMOL/L (ref 9–17)
AST SERPL-CCNC: 15 U/L (ref 10–35)
AST SERPL-CCNC: 16 U/L
BACTERIA URNS QL MICRO: ABNORMAL
BACTERIA URNS QL MICRO: NORMAL
BASOPHILS # BLD: 0 K/UL (ref 0–0.2)
BASOPHILS # BLD: 0.04 K/UL (ref 0–0.2)
BASOPHILS NFR BLD: 0 % (ref 0–2)
BASOPHILS NFR BLD: 0 % (ref 0–2)
BILIRUB SERPL-MCNC: 0.3 MG/DL (ref 0–1.2)
BILIRUB SERPL-MCNC: 0.6 MG/DL (ref 0.3–1.2)
BILIRUB UR QL STRIP: NEGATIVE
BILIRUB UR QL STRIP: NEGATIVE
BUN SERPL-MCNC: 79 MG/DL (ref 8–23)
BUN SERPL-MCNC: 81 MG/DL (ref 8–23)
BUN SERPL-MCNC: 90 MG/DL (ref 8–23)
CALCIUM SERPL-MCNC: 10 MG/DL (ref 8.6–10.4)
CALCIUM SERPL-MCNC: 9.3 MG/DL (ref 8.6–10.4)
CALCIUM SERPL-MCNC: 9.3 MG/DL (ref 8.6–10.4)
CASTS #/AREA URNS LPF: NORMAL /LPF (ref 0–8)
CHLORIDE SERPL-SCNC: 100 MMOL/L (ref 98–107)
CHLORIDE SERPL-SCNC: 102 MMOL/L (ref 98–107)
CHLORIDE SERPL-SCNC: 90 MMOL/L (ref 98–107)
CLARITY UR: ABNORMAL
CLARITY UR: ABNORMAL
CO2 SERPL-SCNC: 14 MMOL/L (ref 20–31)
CO2 SERPL-SCNC: 14 MMOL/L (ref 20–31)
CO2 SERPL-SCNC: 17 MMOL/L (ref 20–31)
COLOR UR: YELLOW
COLOR UR: YELLOW
COMMENT: ABNORMAL
CREAT SERPL-MCNC: 3.1 MG/DL (ref 0.6–0.9)
CREAT SERPL-MCNC: 3.3 MG/DL (ref 0.6–0.9)
CREAT SERPL-MCNC: 4.1 MG/DL (ref 0.5–0.9)
EKG ATRIAL RATE: 82 BPM
EKG P AXIS: 60 DEGREES
EKG P-R INTERVAL: 176 MS
EKG Q-T INTERVAL: 418 MS
EKG QRS DURATION: 98 MS
EKG QTC CALCULATION (BAZETT): 488 MS
EKG R AXIS: 74 DEGREES
EKG T AXIS: 80 DEGREES
EKG VENTRICULAR RATE: 82 BPM
EOSINOPHIL # BLD: 0 K/UL (ref 0–0.44)
EOSINOPHIL # BLD: 0.22 K/UL (ref 0–0.4)
EOSINOPHILS RELATIVE PERCENT: 0 % (ref 1–4)
EOSINOPHILS RELATIVE PERCENT: 2 % (ref 0–5)
EPI CELLS #/AREA URNS HPF: ABNORMAL /HPF
EPI CELLS #/AREA URNS HPF: NORMAL /HPF (ref 0–5)
ERYTHROCYTE [DISTWIDTH] IN BLOOD BY AUTOMATED COUNT: 13.1 % (ref 12.1–15.2)
ERYTHROCYTE [DISTWIDTH] IN BLOOD BY AUTOMATED COUNT: 13.8 % (ref 11.8–14.4)
ERYTHROCYTE [DISTWIDTH] IN BLOOD BY AUTOMATED COUNT: 13.9 % (ref 11.8–14.4)
GFR, ESTIMATED: 12 ML/MIN/1.73M2
GFR, ESTIMATED: 15 ML/MIN/1.73M2
GFR, ESTIMATED: 16 ML/MIN/1.73M2
GLUCOSE SERPL-MCNC: 119 MG/DL (ref 74–99)
GLUCOSE SERPL-MCNC: 135 MG/DL (ref 74–99)
GLUCOSE SERPL-MCNC: 139 MG/DL (ref 70–99)
GLUCOSE UR STRIP-MCNC: ABNORMAL MG/DL
GLUCOSE UR STRIP-MCNC: ABNORMAL MG/DL
HCT VFR BLD AUTO: 26.2 % (ref 36.3–47.1)
HCT VFR BLD AUTO: 26.5 % (ref 36.3–47.1)
HCT VFR BLD AUTO: 29.9 % (ref 36–46)
HGB BLD-MCNC: 10.4 G/DL (ref 12–16)
HGB BLD-MCNC: 8.9 G/DL (ref 11.9–15.1)
HGB BLD-MCNC: 8.9 G/DL (ref 11.9–15.1)
HGB UR QL STRIP.AUTO: ABNORMAL
HGB UR QL STRIP.AUTO: ABNORMAL
IMM GRANULOCYTES # BLD AUTO: 0.02 K/UL (ref 0–0.3)
IMM GRANULOCYTES # BLD AUTO: 0.08 K/UL (ref 0–0.3)
IMM GRANULOCYTES NFR BLD: 0 % (ref 0–5)
IMM GRANULOCYTES NFR BLD: 1 %
INR PPP: 1.2
KETONES UR STRIP-MCNC: NEGATIVE MG/DL
KETONES UR STRIP-MCNC: NEGATIVE MG/DL
LACTATE BLDV-SCNC: 0.9 MMOL/L (ref 0.5–2.2)
LEUKOCYTE ESTERASE UR QL STRIP: ABNORMAL
LEUKOCYTE ESTERASE UR QL STRIP: ABNORMAL
LIPASE SERPL-CCNC: 82 U/L (ref 13–60)
LYMPHOCYTES NFR BLD: 0.6 K/UL (ref 1.1–3.7)
LYMPHOCYTES NFR BLD: 0.79 K/UL (ref 1–4.8)
LYMPHOCYTES RELATIVE PERCENT: 8 % (ref 15–40)
LYMPHOCYTES RELATIVE PERCENT: 8 % (ref 24–43)
MCH RBC QN AUTO: 30.5 PG (ref 26–34)
MCH RBC QN AUTO: 31.3 PG (ref 25.2–33.5)
MCH RBC QN AUTO: 31.4 PG (ref 25.2–33.5)
MCHC RBC AUTO-ENTMCNC: 33.6 G/DL (ref 28.4–34.8)
MCHC RBC AUTO-ENTMCNC: 34 G/DL (ref 28.4–34.8)
MCHC RBC AUTO-ENTMCNC: 34.8 G/DL (ref 31–37)
MCV RBC AUTO: 87.7 FL (ref 80–100)
MCV RBC AUTO: 92.6 FL (ref 82.6–102.9)
MCV RBC AUTO: 93.3 FL (ref 82.6–102.9)
MONOCYTES NFR BLD: 0.3 K/UL (ref 0.1–1.2)
MONOCYTES NFR BLD: 0.31 K/UL (ref 0–1)
MONOCYTES NFR BLD: 3 % (ref 4–8)
MONOCYTES NFR BLD: 4 % (ref 3–12)
MORPHOLOGY: NORMAL
NEUTROPHILS NFR BLD: 87 % (ref 36–65)
NEUTROPHILS NFR BLD: 87 % (ref 47–75)
NEUTS SEG NFR BLD: 6.52 K/UL (ref 1.5–8.1)
NEUTS SEG NFR BLD: 8.34 K/UL (ref 2.5–7)
NITRITE UR QL STRIP: NEGATIVE
NITRITE UR QL STRIP: NEGATIVE
NRBC BLD-RTO: 0 PER 100 WBC
NRBC BLD-RTO: 0 PER 100 WBC
OSMOLALITY SERPL: 304 MOSM/KG (ref 275–295)
OSMOLALITY UR: 346 MOSM/KG (ref 80–1300)
PH UR STRIP: 6 [PH] (ref 5–8)
PH UR STRIP: 6 [PH] (ref 5–8)
PLATELET # BLD AUTO: 398 K/UL (ref 138–453)
PLATELET # BLD AUTO: 410 K/UL (ref 138–453)
PLATELET # BLD AUTO: 459 K/UL (ref 140–450)
PMV BLD AUTO: 8.7 FL (ref 6–12)
PMV BLD AUTO: 8.8 FL (ref 8.1–13.5)
PMV BLD AUTO: 9.6 FL (ref 8.1–13.5)
POTASSIUM SERPL-SCNC: 4.2 MMOL/L (ref 3.7–5.3)
POTASSIUM SERPL-SCNC: 4.4 MMOL/L (ref 3.7–5.3)
POTASSIUM SERPL-SCNC: 4.4 MMOL/L (ref 3.7–5.3)
PROT SERPL-MCNC: 7.1 G/DL (ref 6.6–8.7)
PROT SERPL-MCNC: 8 G/DL (ref 6.4–8.3)
PROT UR STRIP-MCNC: ABNORMAL MG/DL
PROT UR STRIP-MCNC: ABNORMAL MG/DL
PROTHROMBIN TIME: 15.5 SEC (ref 11.7–14.9)
RBC # BLD AUTO: 2.83 M/UL (ref 3.95–5.11)
RBC # BLD AUTO: 2.84 M/UL (ref 3.95–5.11)
RBC # BLD AUTO: 3.41 M/UL (ref 4–5.2)
RBC #/AREA URNS HPF: ABNORMAL /HPF (ref 0–2)
RBC #/AREA URNS HPF: NORMAL /HPF (ref 0–4)
SODIUM SERPL-SCNC: 127 MMOL/L (ref 135–144)
SODIUM SERPL-SCNC: 132 MMOL/L (ref 136–145)
SODIUM SERPL-SCNC: 133 MMOL/L (ref 136–145)
SODIUM UR-SCNC: 56 MMOL/L
SP GR UR STRIP: 1.01 (ref 1–1.03)
SP GR UR STRIP: 1.01 (ref 1–1.03)
UROBILINOGEN UR STRIP-ACNC: NORMAL EU/DL (ref 0–1)
UROBILINOGEN UR STRIP-ACNC: NORMAL EU/DL (ref 0–1)
WBC #/AREA URNS HPF: ABNORMAL /HPF
WBC #/AREA URNS HPF: NORMAL /HPF (ref 0–5)
WBC OTHER # BLD: 7.5 K/UL (ref 3.5–11.3)
WBC OTHER # BLD: 7.5 K/UL (ref 3.5–11.3)
WBC OTHER # BLD: 9.7 K/UL (ref 3.5–11)

## 2025-05-10 PROCEDURE — 93005 ELECTROCARDIOGRAM TRACING: CPT | Performed by: EMERGENCY MEDICINE

## 2025-05-10 PROCEDURE — 85025 COMPLETE CBC W/AUTO DIFF WBC: CPT

## 2025-05-10 PROCEDURE — 6370000000 HC RX 637 (ALT 250 FOR IP): Performed by: NURSE PRACTITIONER

## 2025-05-10 PROCEDURE — 36415 COLL VENOUS BLD VENIPUNCTURE: CPT

## 2025-05-10 PROCEDURE — 87186 SC STD MICRODIL/AGAR DIL: CPT

## 2025-05-10 PROCEDURE — 99222 1ST HOSP IP/OBS MODERATE 55: CPT | Performed by: NURSE PRACTITIONER

## 2025-05-10 PROCEDURE — 87077 CULTURE AEROBIC IDENTIFY: CPT

## 2025-05-10 PROCEDURE — 6370000000 HC RX 637 (ALT 250 FOR IP)

## 2025-05-10 PROCEDURE — 2060000000 HC ICU INTERMEDIATE R&B

## 2025-05-10 PROCEDURE — 81001 URINALYSIS AUTO W/SCOPE: CPT

## 2025-05-10 PROCEDURE — 80053 COMPREHEN METABOLIC PANEL: CPT

## 2025-05-10 PROCEDURE — 85027 COMPLETE CBC AUTOMATED: CPT

## 2025-05-10 PROCEDURE — 2500000003 HC RX 250 WO HCPCS

## 2025-05-10 PROCEDURE — 2580000003 HC RX 258: Performed by: NURSE PRACTITIONER

## 2025-05-10 PROCEDURE — 99254 IP/OBS CNSLTJ NEW/EST MOD 60: CPT | Performed by: INTERNAL MEDICINE

## 2025-05-10 PROCEDURE — 6360000002 HC RX W HCPCS

## 2025-05-10 PROCEDURE — 2580000003 HC RX 258: Performed by: INTERNAL MEDICINE

## 2025-05-10 PROCEDURE — 83690 ASSAY OF LIPASE: CPT

## 2025-05-10 PROCEDURE — 6360000002 HC RX W HCPCS: Performed by: EMERGENCY MEDICINE

## 2025-05-10 PROCEDURE — 87086 URINE CULTURE/COLONY COUNT: CPT

## 2025-05-10 PROCEDURE — 80048 BASIC METABOLIC PNL TOTAL CA: CPT

## 2025-05-10 PROCEDURE — 99285 EMERGENCY DEPT VISIT HI MDM: CPT

## 2025-05-10 PROCEDURE — 74176 CT ABD & PELVIS W/O CONTRAST: CPT

## 2025-05-10 PROCEDURE — 2500000003 HC RX 250 WO HCPCS: Performed by: INTERNAL MEDICINE

## 2025-05-10 PROCEDURE — 2500000003 HC RX 250 WO HCPCS: Performed by: NURSE PRACTITIONER

## 2025-05-10 PROCEDURE — 83935 ASSAY OF URINE OSMOLALITY: CPT

## 2025-05-10 PROCEDURE — 96375 TX/PRO/DX INJ NEW DRUG ADDON: CPT

## 2025-05-10 PROCEDURE — 99223 1ST HOSP IP/OBS HIGH 75: CPT | Performed by: INTERNAL MEDICINE

## 2025-05-10 PROCEDURE — 87040 BLOOD CULTURE FOR BACTERIA: CPT

## 2025-05-10 PROCEDURE — 85610 PROTHROMBIN TIME: CPT

## 2025-05-10 PROCEDURE — 96376 TX/PRO/DX INJ SAME DRUG ADON: CPT

## 2025-05-10 PROCEDURE — 84300 ASSAY OF URINE SODIUM: CPT

## 2025-05-10 PROCEDURE — 2580000003 HC RX 258: Performed by: EMERGENCY MEDICINE

## 2025-05-10 PROCEDURE — 83930 ASSAY OF BLOOD OSMOLALITY: CPT

## 2025-05-10 PROCEDURE — 83605 ASSAY OF LACTIC ACID: CPT

## 2025-05-10 PROCEDURE — 96361 HYDRATE IV INFUSION ADD-ON: CPT

## 2025-05-10 PROCEDURE — 96374 THER/PROPH/DIAG INJ IV PUSH: CPT

## 2025-05-10 PROCEDURE — 6360000002 HC RX W HCPCS: Performed by: NURSE PRACTITIONER

## 2025-05-10 RX ORDER — SODIUM CHLORIDE 0.9 % (FLUSH) 0.9 %
5-40 SYRINGE (ML) INJECTION EVERY 12 HOURS SCHEDULED
Status: DISCONTINUED | OUTPATIENT
Start: 2025-05-10 | End: 2025-05-11 | Stop reason: HOSPADM

## 2025-05-10 RX ORDER — AMLODIPINE BESYLATE 10 MG/1
10 TABLET ORAL DAILY
Status: DISCONTINUED | OUTPATIENT
Start: 2025-05-10 | End: 2025-05-10 | Stop reason: HOSPADM

## 2025-05-10 RX ORDER — FENTANYL CITRATE 50 UG/ML
100 INJECTION, SOLUTION INTRAMUSCULAR; INTRAVENOUS ONCE
Refills: 0 | Status: COMPLETED | OUTPATIENT
Start: 2025-05-10 | End: 2025-05-10

## 2025-05-10 RX ORDER — METOPROLOL TARTRATE 1 MG/ML
5 INJECTION, SOLUTION INTRAVENOUS EVERY 6 HOURS
Status: DISCONTINUED | OUTPATIENT
Start: 2025-05-10 | End: 2025-05-11 | Stop reason: HOSPADM

## 2025-05-10 RX ORDER — HYDRALAZINE HYDROCHLORIDE 50 MG/1
50 TABLET, FILM COATED ORAL 3 TIMES DAILY
Status: DISCONTINUED | OUTPATIENT
Start: 2025-05-10 | End: 2025-05-11 | Stop reason: HOSPADM

## 2025-05-10 RX ORDER — BUMETANIDE 1 MG/1
2 TABLET ORAL DAILY
Status: DISCONTINUED | OUTPATIENT
Start: 2025-05-10 | End: 2025-05-11 | Stop reason: HOSPADM

## 2025-05-10 RX ORDER — CARVEDILOL 12.5 MG/1
12.5 TABLET ORAL 2 TIMES DAILY WITH MEALS
Status: DISCONTINUED | OUTPATIENT
Start: 2025-05-10 | End: 2025-05-10 | Stop reason: HOSPADM

## 2025-05-10 RX ORDER — LABETALOL HYDROCHLORIDE 5 MG/ML
20 INJECTION, SOLUTION INTRAVENOUS ONCE
Status: COMPLETED | OUTPATIENT
Start: 2025-05-10 | End: 2025-05-10

## 2025-05-10 RX ORDER — SODIUM CHLORIDE 9 MG/ML
INJECTION, SOLUTION INTRAVENOUS CONTINUOUS
Status: DISCONTINUED | OUTPATIENT
Start: 2025-05-10 | End: 2025-05-10

## 2025-05-10 RX ORDER — 0.9 % SODIUM CHLORIDE 0.9 %
1000 INTRAVENOUS SOLUTION INTRAVENOUS ONCE
Status: COMPLETED | OUTPATIENT
Start: 2025-05-10 | End: 2025-05-10

## 2025-05-10 RX ORDER — SPIRONOLACTONE 25 MG/1
25 TABLET ORAL 2 TIMES DAILY
Status: DISCONTINUED | OUTPATIENT
Start: 2025-05-10 | End: 2025-05-11 | Stop reason: HOSPADM

## 2025-05-10 RX ORDER — AMLODIPINE BESYLATE 10 MG/1
10 TABLET ORAL DAILY
Status: DISCONTINUED | OUTPATIENT
Start: 2025-05-10 | End: 2025-05-11 | Stop reason: HOSPADM

## 2025-05-10 RX ORDER — PANTOPRAZOLE SODIUM 40 MG/1
40 TABLET, DELAYED RELEASE ORAL
Status: DISCONTINUED | OUTPATIENT
Start: 2025-05-11 | End: 2025-05-10 | Stop reason: HOSPADM

## 2025-05-10 RX ORDER — ONDANSETRON 2 MG/ML
4 INJECTION INTRAMUSCULAR; INTRAVENOUS ONCE
Status: COMPLETED | OUTPATIENT
Start: 2025-05-10 | End: 2025-05-10

## 2025-05-10 RX ORDER — ATORVASTATIN CALCIUM 80 MG/1
80 TABLET, FILM COATED ORAL NIGHTLY
Status: DISCONTINUED | OUTPATIENT
Start: 2025-05-10 | End: 2025-05-11 | Stop reason: HOSPADM

## 2025-05-10 RX ORDER — DIPHENHYDRAMINE HYDROCHLORIDE 50 MG/ML
25 INJECTION, SOLUTION INTRAMUSCULAR; INTRAVENOUS ONCE
Status: COMPLETED | OUTPATIENT
Start: 2025-05-10 | End: 2025-05-10

## 2025-05-10 RX ORDER — SODIUM CHLORIDE 9 MG/ML
INJECTION, SOLUTION INTRAVENOUS PRN
Status: DISCONTINUED | OUTPATIENT
Start: 2025-05-10 | End: 2025-05-11 | Stop reason: HOSPADM

## 2025-05-10 RX ORDER — ACETAMINOPHEN 650 MG/1
650 SUPPOSITORY RECTAL EVERY 6 HOURS PRN
Status: DISCONTINUED | OUTPATIENT
Start: 2025-05-10 | End: 2025-05-11 | Stop reason: HOSPADM

## 2025-05-10 RX ORDER — ONDANSETRON 4 MG/1
4 TABLET, ORALLY DISINTEGRATING ORAL EVERY 8 HOURS PRN
Status: DISCONTINUED | OUTPATIENT
Start: 2025-05-10 | End: 2025-05-11 | Stop reason: HOSPADM

## 2025-05-10 RX ORDER — CARVEDILOL 12.5 MG/1
12.5 TABLET ORAL 2 TIMES DAILY WITH MEALS
Status: DISCONTINUED | OUTPATIENT
Start: 2025-05-10 | End: 2025-05-11 | Stop reason: HOSPADM

## 2025-05-10 RX ORDER — SODIUM CHLORIDE 0.9 % (FLUSH) 0.9 %
10 SYRINGE (ML) INJECTION PRN
Status: DISCONTINUED | OUTPATIENT
Start: 2025-05-10 | End: 2025-05-11 | Stop reason: HOSPADM

## 2025-05-10 RX ORDER — SODIUM CHLORIDE 9 MG/ML
INJECTION, SOLUTION INTRAVENOUS CONTINUOUS
Status: DISCONTINUED | OUTPATIENT
Start: 2025-05-10 | End: 2025-05-10 | Stop reason: HOSPADM

## 2025-05-10 RX ORDER — PANTOPRAZOLE SODIUM 40 MG/1
40 TABLET, DELAYED RELEASE ORAL
Status: DISCONTINUED | OUTPATIENT
Start: 2025-05-11 | End: 2025-05-11 | Stop reason: HOSPADM

## 2025-05-10 RX ORDER — FLECAINIDE ACETATE 50 MG/1
50 TABLET ORAL EVERY 12 HOURS SCHEDULED
Status: DISCONTINUED | OUTPATIENT
Start: 2025-05-10 | End: 2025-05-11 | Stop reason: HOSPADM

## 2025-05-10 RX ORDER — ONDANSETRON 2 MG/ML
4 INJECTION INTRAMUSCULAR; INTRAVENOUS EVERY 6 HOURS PRN
Status: DISCONTINUED | OUTPATIENT
Start: 2025-05-10 | End: 2025-05-11 | Stop reason: HOSPADM

## 2025-05-10 RX ORDER — ACETAMINOPHEN 325 MG/1
650 TABLET ORAL EVERY 6 HOURS PRN
Status: DISCONTINUED | OUTPATIENT
Start: 2025-05-10 | End: 2025-05-11 | Stop reason: HOSPADM

## 2025-05-10 RX ORDER — FLECAINIDE ACETATE 100 MG/1
50 TABLET ORAL EVERY 12 HOURS SCHEDULED
Status: DISCONTINUED | OUTPATIENT
Start: 2025-05-10 | End: 2025-05-10 | Stop reason: HOSPADM

## 2025-05-10 RX ORDER — PROCHLORPERAZINE EDISYLATE 5 MG/ML
10 INJECTION INTRAMUSCULAR; INTRAVENOUS EVERY 6 HOURS PRN
Status: DISCONTINUED | OUTPATIENT
Start: 2025-05-10 | End: 2025-05-10 | Stop reason: HOSPADM

## 2025-05-10 RX ORDER — POLYETHYLENE GLYCOL 3350 17 G/17G
17 POWDER, FOR SOLUTION ORAL DAILY PRN
Status: DISCONTINUED | OUTPATIENT
Start: 2025-05-10 | End: 2025-05-11 | Stop reason: HOSPADM

## 2025-05-10 RX ORDER — FERROUS SULFATE 325(65) MG
325 TABLET, DELAYED RELEASE (ENTERIC COATED) ORAL
Status: DISCONTINUED | OUTPATIENT
Start: 2025-05-11 | End: 2025-05-11 | Stop reason: HOSPADM

## 2025-05-10 RX ADMIN — METOPROLOL TARTRATE 5 MG: 5 INJECTION INTRAVENOUS at 20:40

## 2025-05-10 RX ADMIN — HYDRALAZINE HYDROCHLORIDE 50 MG: 50 TABLET ORAL at 20:40

## 2025-05-10 RX ADMIN — WATER 1000 MG: 1 INJECTION INTRAMUSCULAR; INTRAVENOUS; SUBCUTANEOUS at 11:55

## 2025-05-10 RX ADMIN — SODIUM CHLORIDE 1000 ML: 0.9 INJECTION, SOLUTION INTRAVENOUS at 06:49

## 2025-05-10 RX ADMIN — ATORVASTATIN CALCIUM 80 MG: 80 TABLET, FILM COATED ORAL at 20:40

## 2025-05-10 RX ADMIN — SODIUM CHLORIDE 1000 ML: 0.9 INJECTION, SOLUTION INTRAVENOUS at 05:46

## 2025-05-10 RX ADMIN — CARVEDILOL 12.5 MG: 12.5 TABLET, FILM COATED ORAL at 17:45

## 2025-05-10 RX ADMIN — APIXABAN 2.5 MG: 5 TABLET, FILM COATED ORAL at 20:40

## 2025-05-10 RX ADMIN — PROCHLORPERAZINE EDISYLATE 10 MG: 5 INJECTION INTRAMUSCULAR; INTRAVENOUS at 10:39

## 2025-05-10 RX ADMIN — ONDANSETRON 4 MG: 2 INJECTION, SOLUTION INTRAMUSCULAR; INTRAVENOUS at 05:47

## 2025-05-10 RX ADMIN — SODIUM CHLORIDE: 0.9 INJECTION, SOLUTION INTRAVENOUS at 14:37

## 2025-05-10 RX ADMIN — LABETALOL HYDROCHLORIDE 20 MG: 5 INJECTION, SOLUTION INTRAVENOUS at 08:15

## 2025-05-10 RX ADMIN — ONDANSETRON 4 MG: 2 INJECTION, SOLUTION INTRAMUSCULAR; INTRAVENOUS at 17:45

## 2025-05-10 RX ADMIN — SODIUM BICARBONATE: 84 INJECTION, SOLUTION INTRAVENOUS at 16:13

## 2025-05-10 RX ADMIN — SODIUM CHLORIDE: 0.9 INJECTION, SOLUTION INTRAVENOUS at 08:15

## 2025-05-10 RX ADMIN — DIPHENHYDRAMINE HYDROCHLORIDE 25 MG: 50 INJECTION INTRAMUSCULAR; INTRAVENOUS at 10:39

## 2025-05-10 RX ADMIN — LABETALOL HYDROCHLORIDE 20 MG: 5 INJECTION INTRAVENOUS at 11:43

## 2025-05-10 RX ADMIN — SODIUM CHLORIDE, PRESERVATIVE FREE 10 ML: 5 INJECTION INTRAVENOUS at 20:45

## 2025-05-10 RX ADMIN — ONDANSETRON 4 MG: 2 INJECTION, SOLUTION INTRAMUSCULAR; INTRAVENOUS at 09:46

## 2025-05-10 RX ADMIN — FENTANYL CITRATE 100 MCG: 50 INJECTION, SOLUTION INTRAMUSCULAR; INTRAVENOUS at 07:12

## 2025-05-10 RX ADMIN — METOPROLOL TARTRATE 5 MG: 5 INJECTION INTRAVENOUS at 15:16

## 2025-05-10 RX ADMIN — FLECAINIDE ACETATE 50 MG: 50 TABLET ORAL at 20:39

## 2025-05-10 ASSESSMENT — PAIN DESCRIPTION - DESCRIPTORS
DESCRIPTORS: ACHING
DESCRIPTORS: ACHING

## 2025-05-10 ASSESSMENT — PAIN DESCRIPTION - ONSET: ONSET: ON-GOING

## 2025-05-10 ASSESSMENT — PAIN DESCRIPTION - LOCATION
LOCATION: ABDOMEN

## 2025-05-10 ASSESSMENT — PAIN DESCRIPTION - ORIENTATION
ORIENTATION: MID;UPPER
ORIENTATION: MID;UPPER

## 2025-05-10 ASSESSMENT — PAIN SCALES - GENERAL
PAINLEVEL_OUTOF10: 7
PAINLEVEL_OUTOF10: 2
PAINLEVEL_OUTOF10: 10
PAINLEVEL_OUTOF10: 8

## 2025-05-10 ASSESSMENT — PAIN DESCRIPTION - FREQUENCY: FREQUENCY: CONTINUOUS

## 2025-05-10 ASSESSMENT — PAIN DESCRIPTION - PAIN TYPE: TYPE: ACUTE PAIN

## 2025-05-10 NOTE — ED PROVIDER NOTES
EMERGENCY DEPARTMENT ENCOUNTER      CHIEF COMPLAINT    Chief Complaint   Patient presents with    Vomiting     Nausea and vomiting for about a week.       HPI    Daja Espinosa is a 64 y.o. female who presentsto ED with nausea vomiting for 1 week.  Patient presents to ED with her  who had similar symptoms for over 2 weeks.  Patient states that she did not feel well. She has been having nausea and vomiting.    PAST MEDICAL HISTORY    Past Medical History:   Diagnosis Date    Alcohol abuse 03/02/2017    Arthritis     Bladder cancer (HCC) 2016    CAD (coronary artery disease)     Cancer (HCC) 2001    vulvar-    Carotid artery occlusion 1993    [facility] where she had a brachiocephalic aorta bypass.    Carotid artery stenosis     Chronic back pain     History of chemotherapy     Hydronephrosis 02/15/2024    Hyperlipidemia     Hypertension     Hypoglycemia     MI (myocardial infarction) ([facility])     Peripheral vascular disease     Stage 4 chronic kidney disease (HCC) 07/13/2024    Takayasu's arteritis ([facility])     Tibial fracture     right    Umbilical hernia     Under care of team 03/29/2024    Cardiologist: Terrell Cortez MD, [place], last visit 2/13/2024    Under care of team 03/29/2024    Oncology: Familia Shepard MD, [place], last visit 3/11/2024    Under care of team 03/29/2024    PCP: Sohail Garcia MD, [place], last visit 3/2024    Unspecified cerebral artery occlusion with cerebral infarction 1993    Wears partial dentures     Dentures/top, partial/bottom       SURGICAL HISTORY    Past Surgical History:   Procedure Laterality Date    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

## 2025-05-10 NOTE — ED NOTES
Patient dry heaving and vomiting scant amounts. Physician informed. Order for zofran acknowledged.

## 2025-05-10 NOTE — ED NOTES
Patient continuing to frequently dry heave and complains of stomach pain. RN notified provider. Orders for compazine and benadryl. Acknowledged.   [Normal] : supple and no neck mass was observed [de-identified] : Patient appears happy and in a good mood [de-identified] : OPx clear without lesions  [de-identified] : no focal deficits  [de-identified] : Breast exam on prior exam: symmetric on appearance. Breasts cystic on palpation, no masses -- previous exam with R fibroadenoma

## 2025-05-10 NOTE — ED NOTES
Report called to   via [phone] and report given to Brandee BELTRAN. Patient departed facility with [facility] ambulance.

## 2025-05-10 NOTE — H&P
hospital setting.  Expected length of stay > 48 hours.    MARGARITA Cotter NP  5/10/2025  3:05 PM    Copy sent to Sohail Garcia MD

## 2025-05-10 NOTE — ED NOTES
Attempted to sit patient up to take oral meds. Patient moaning and not wanting medication. Family did not feel patient could take the medication and requested to hold off. Physician notified

## 2025-05-10 NOTE — CONSULTS
Renal Consult Note    Patient :  Daja Espinosa; 64 y.o. MRN# [id number]  Location:  0448/0448-01  Attending:  Danni Lamas DO  Admit Date:  5/10/2025   Hospital Day: 0    Reason for Consult:     Asked by Danni Mcgregor DO to see for HEAVENLY    History Obtained From:     patient, electronic medical record    History of Present Illness:     Daja Espinosa; 64 y.o. female with past medical history of essential hypertension, CKD stage IV with baseline creatinine 2.5-2.7, left ureteral stent, previous left nephrostomy tube, obstructive uropathy secondary to bladder cancer previously on chemotherapy now on immunotherapy, secondary hyperparathyroidism presented to the hospital with the chief complaint of nausea, vomiting.  Nephrology was consulted for HEAVENLY on CKD.    Patient presented to [place] ER with chief complaints of nausea and vomiting since 2 weeks.  She also had worsening abdominal pain.  During presentation, patient was hypertensive with blood pressures 202/72 mmHg.  Initial labs revealed sodium 127, potassium 4.4, creatinine 4.1, BUN 90, anion gap 20, GFR 12, WBC count 7.5, hemoglobin 8.9.  CT abdomen pelvis was done which showed 2 cm left renal cortical mass and left double-J ureteral stent with persistent mild left hydronephrosis.    Patient has been following outpatient hematology oncology for invasive bladder cancer.  Currently she is on immunotherapy.  She already had chemotherapy and radiation therapy done on 12/2024.  She also follows up with outpatient nephrology for CKD.  Baseline creatinine is 2.1-2.5.  Patient previously had left-sided nephrostomy tube which was removed and last November.  Lasix renogram was also done which showed obstructive uropathy with decreased washout more on left.  Urology placed stent in January and there was plan for cystoscopy and stent exchange with urology this month.    Home medications include Bumex 2 mg daily, Aldactone 25 mg, Coreg 25 mg twice daily,

## 2025-05-10 NOTE — CONSULTS
Today's Date: 5/10/2025  Patient Name: Daja Espinosa  Date of admission: 5/10/2025  2:01 PM  Patient's age: 64 y.o., 1960  Admission Dx: Acute kidney injury [N17.9]    Reason for Consult: Bladder cancer on immunotherapy   Requesting Physician: Danni COCHARN DO    CHIEF COMPLAINT:  No chief complaint on file.      History Obtained From:  patient and chart    HISTORY OF PRESENT ILLNESS:      Daja Espinosa is a 64 y.o. female who is admitted to the hospital on 5/10/2025  for  abd pain NV HEAVENLY    Patient reportedly has not been feeling well for past 1-2 weeks.     Noted to HEAVENLY. She has stent in place and urology on board for hydronephrosis. 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  Embolic stroke on 4/6/2024  Concurrent chemo RT as a definitive treatment started on Johana 10, 2024 (plan for continuous neoadjuvant chemotherapy abandoned as patient not surgical candidate)  Chronic kidney disease  Recurrent admission to the hospital for GI bleed, HEAVENLY, pneumonia, AVR        Hematology oncology treatment  Cisplatin gemcitabine neoadjuvant started on March 19, 2024 every 3 weeks status post 1 cycle and have stopped and the plan changed to concurrent chemo RT   concurrent chemo RT with low-dose biweekly gemcitabine 27 mg/m² started in Johana 10, 2024  Treatment interruptions due to several admission to the hospital  Eliquis  Started immunotherapy with Opdivo on 10/18/2024 with intent to follow PET/CT  Follow-up PET/CT on February 6, 2025 no evidence of metastasis> Opdivo has been discontinued  Last opdivo in Jan 2025     Past Medical History:   has a past medical history of Alcohol abuse, Arthritis, Bladder cancer (HCC), CAD (coronary artery disease), Cancer (HCC), Carotid artery occlusion, Carotid artery stenosis, Chronic back pain, CVA (cerebral vascular accident) (HCC), History of chemotherapy,

## 2025-05-11 ENCOUNTER — RESULTS FOLLOW-UP (OUTPATIENT)
Dept: EMERGENCY DEPT | Age: 65
End: 2025-05-11

## 2025-05-11 VITALS
RESPIRATION RATE: 19 BRPM | HEART RATE: 80 BPM | DIASTOLIC BLOOD PRESSURE: 90 MMHG | OXYGEN SATURATION: 91 % | SYSTOLIC BLOOD PRESSURE: 194 MMHG | TEMPERATURE: 97.8 F

## 2025-05-11 LAB
ANION GAP SERPL CALCULATED.3IONS-SCNC: 16 MMOL/L (ref 9–16)
BUN SERPL-MCNC: 61 MG/DL (ref 8–23)
CALCIUM SERPL-MCNC: 9.6 MG/DL (ref 8.6–10.4)
CHLORIDE SERPL-SCNC: 99 MMOL/L (ref 98–107)
CO2 SERPL-SCNC: 18 MMOL/L (ref 20–31)
CREAT SERPL-MCNC: 2.6 MG/DL (ref 0.6–0.9)
ERYTHROCYTE [DISTWIDTH] IN BLOOD BY AUTOMATED COUNT: 14 % (ref 11.8–14.4)
GFR, ESTIMATED: 20 ML/MIN/1.73M2
GLUCOSE SERPL-MCNC: 108 MG/DL (ref 74–99)
HCT VFR BLD AUTO: 26 % (ref 36.3–47.1)
HGB BLD-MCNC: 8.8 G/DL (ref 11.9–15.1)
MCH RBC QN AUTO: 31.3 PG (ref 25.2–33.5)
MCHC RBC AUTO-ENTMCNC: 33.8 G/DL (ref 28.4–34.8)
MCV RBC AUTO: 92.5 FL (ref 82.6–102.9)
MICROORGANISM SPEC CULT: ABNORMAL
MICROORGANISM SPEC CULT: NORMAL
NRBC BLD-RTO: 0 PER 100 WBC
PLATELET # BLD AUTO: 348 K/UL (ref 138–453)
PMV BLD AUTO: 8.9 FL (ref 8.1–13.5)
POTASSIUM SERPL-SCNC: 4.2 MMOL/L (ref 3.7–5.3)
RBC # BLD AUTO: 2.81 M/UL (ref 3.95–5.11)
SODIUM SERPL-SCNC: 133 MMOL/L (ref 136–145)
SPECIMEN DESCRIPTION: ABNORMAL
SPECIMEN DESCRIPTION: NORMAL
WBC OTHER # BLD: 7.8 K/UL (ref 3.5–11.3)

## 2025-05-11 PROCEDURE — 85027 COMPLETE CBC AUTOMATED: CPT

## 2025-05-11 PROCEDURE — 2580000003 HC RX 258: Performed by: INTERNAL MEDICINE

## 2025-05-11 PROCEDURE — 6370000000 HC RX 637 (ALT 250 FOR IP): Performed by: NURSE PRACTITIONER

## 2025-05-11 PROCEDURE — 2500000003 HC RX 250 WO HCPCS: Performed by: INTERNAL MEDICINE

## 2025-05-11 PROCEDURE — 99232 SBSQ HOSP IP/OBS MODERATE 35: CPT | Performed by: NURSE PRACTITIONER

## 2025-05-11 PROCEDURE — 36415 COLL VENOUS BLD VENIPUNCTURE: CPT

## 2025-05-11 PROCEDURE — 2500000003 HC RX 250 WO HCPCS: Performed by: NURSE PRACTITIONER

## 2025-05-11 PROCEDURE — 99232 SBSQ HOSP IP/OBS MODERATE 35: CPT | Performed by: INTERNAL MEDICINE

## 2025-05-11 PROCEDURE — 80048 BASIC METABOLIC PNL TOTAL CA: CPT

## 2025-05-11 PROCEDURE — 6360000002 HC RX W HCPCS: Performed by: NURSE PRACTITIONER

## 2025-05-11 RX ORDER — CEPHALEXIN 500 MG/1
500 CAPSULE ORAL 2 TIMES DAILY
Qty: 14 CAPSULE | Refills: 0 | Status: SHIPPED | OUTPATIENT
Start: 2025-05-11 | End: 2025-05-18

## 2025-05-11 RX ORDER — SODIUM BICARBONATE 650 MG/1
650 TABLET ORAL 2 TIMES DAILY
Qty: 60 TABLET | Refills: 0 | Status: SHIPPED | OUTPATIENT
Start: 2025-05-11 | End: 2025-06-10

## 2025-05-11 RX ORDER — HYDRALAZINE HYDROCHLORIDE 20 MG/ML
10 INJECTION INTRAMUSCULAR; INTRAVENOUS EVERY 6 HOURS PRN
Status: DISCONTINUED | OUTPATIENT
Start: 2025-05-11 | End: 2025-05-11 | Stop reason: HOSPADM

## 2025-05-11 RX ADMIN — SODIUM BICARBONATE: 84 INJECTION, SOLUTION INTRAVENOUS at 06:43

## 2025-05-11 RX ADMIN — APIXABAN 2.5 MG: 5 TABLET, FILM COATED ORAL at 13:26

## 2025-05-11 RX ADMIN — CARVEDILOL 12.5 MG: 12.5 TABLET, FILM COATED ORAL at 13:28

## 2025-05-11 RX ADMIN — PANTOPRAZOLE SODIUM 40 MG: 40 TABLET, DELAYED RELEASE ORAL at 06:42

## 2025-05-11 RX ADMIN — HYDRALAZINE HYDROCHLORIDE 50 MG: 50 TABLET ORAL at 13:26

## 2025-05-11 RX ADMIN — AMLODIPINE BESYLATE 10 MG: 10 TABLET ORAL at 13:25

## 2025-05-11 RX ADMIN — METOPROLOL TARTRATE 5 MG: 5 INJECTION INTRAVENOUS at 03:31

## 2025-05-11 RX ADMIN — METOPROLOL TARTRATE 5 MG: 5 INJECTION INTRAVENOUS at 13:27

## 2025-05-11 RX ADMIN — HYDRALAZINE HYDROCHLORIDE 10 MG: 20 INJECTION INTRAMUSCULAR; INTRAVENOUS at 13:39

## 2025-05-11 ASSESSMENT — PAIN SCALES - GENERAL: PAINLEVEL_OUTOF10: 0

## 2025-05-11 NOTE — CONSULTS
Jeremy Garcia, Blu, Priscilla Emery, Shashank, Jennifer Jr  Urology Consult      Patient:  Daja Espinosa  MRN: [id number]  YOB: 1960    CHIEF COMPLAINT: Left hydronephrosis with stent in place    HISTORY OF PRESENT ILLNESS:   The patient is a 64 y.o. female with a urological history of high-grade TA bladder cancer status post TURBT and BCG.  Patient was seen at an outside facility due to severe nausea and vomiting and was admitted for HEAVENLY.  Evaluation did show persistent left hydronephrosis within her left kidney and she was transferred to [facility] for further evaluation of her HEAVENLY and hydronephrosis.    Review of the chart shows that she is initially diagnosed back in 2016.  Was lost to follow-up for 5 years and Leonard presented with severe left hydronephrosis.  She underwent TURBT in 2/24 with left ureteral stent placement.  She would then started chemotherapy and radiation therapy for her treatment as radical cystectomy was ruled out due to surgical risk.  Due to her obstruction, her Lasix renogram did show a split function of 20% on the left and 80% on the right.  Patient was scheduled for stent exchange on 5/6, however she did not present to that appointment due to severe nausea.    Initial evaluation reveals a hypertensive, albeit hemodynamically stable and afebrile patient.  Initial labs shows a creatinine of 3.1 (baseline 1.6), hemoglobin 8.9, WBC 7.5, and a UA positive for leukocyte esterase and pyuria.    Patient was seen at 's bedside.  Of note, patient's  is in critical condition due to ischemic bowel status post resection in the ICU.  Patient states that she does not have any associated flank pain.  Due to severe nausea and vomiting, she notes that she has been severely dehydrated at this time.  Patient did want to present to her appointment on 5/6, but due to her illness, she was unable to do so.  Otherwise, patient denies any chest pain,

## 2025-05-11 NOTE — CARE COORDINATION
Patient has been off the floor since 7 AM this morning w/  who is in MICU. Patient and family there, because are terminally extubating patient's . Was unable to complete CM assessment d/t situation and patient not available.

## 2025-05-11 NOTE — PROGRESS NOTES
Jeremy Arellano Santacroce, Khan, Mostafa, Jennifer Encarnacion Jr  Urology Progress Note     Subjective:   AF  Pending AM Labs   PT INR WNL ystdy  No significant flank pain  Ucx pending, Bcxs neg   On CTX     Patient Vitals for the past 24 hrs:   BP Temp Temp src Pulse Resp SpO2   05/11/25 0415 -- -- -- 76 19 91 %   05/11/25 0345 (!) 163/76 97.8 °F (36.6 °C) Temporal 76 26 98 %   05/11/25 0030 (!) 170/78 98.2 °F (36.8 °C) Temporal 74 19 95 %   05/10/25 2040 (!) 159/71 98.1 °F (36.7 °C) Temporal 79 19 --   05/10/25 1630 (!) 161/91 -- -- -- -- --   05/10/25 1542 (!) 172/74 98.4 °F (36.9 °C) Oral -- -- --   05/10/25 1520 (!) 172/74 -- -- 83 -- --   05/10/25 1418 (!) 185/68 98.4 °F (36.9 °C) Oral 84 18 97 %       Intake/Output Summary (Last 24 hours) at 5/11/2025 0705  Last data filed at 5/11/2025 0654  Gross per 24 hour   Intake 773.94 ml   Output 700 ml   Net 73.94 ml       Recent Labs     05/10/25  0516 05/10/25  1429 05/10/25  1539   WBC 9.7 7.5 7.5   HGB 10.4* 8.9* 8.9*   HCT 29.9* 26.5* 26.2*   MCV 87.7 93.3 92.6   * 398 410     Recent Labs     05/10/25  0516 05/10/25  1429 05/10/25  1655   * 132* 133*   K 4.4 4.2 4.4   CL 90* 100 102   CO2 17* 14* 14*   BUN 90* 81* 79*   CREATININE 4.1* 3.3* 3.1*       Recent Labs     05/10/25  1711   COLORU Yellow   PHUR 6.0   WBCUA TOO NUMEROUS TO COUNT   RBCUA TOO NUMEROUS TO COUNT   BACTERIA None   LEUKOCYTESUR MODERATE*   UROBILINOGEN Normal   BILIRUBINUR NEGATIVE       Additional Lab/culture results:    Physical Exam:   HENT:      Head: Normocephalic and atraumatic.   Cardiovascular:      Rate and Rhythm: Normal rate.   Pulmonary:      Effort: Pulmonary effort is normal.   Abdominal:      General: Abdomen is flat.      Palpations: Abdomen is soft.   Neurological:      Mental Status: She is alert and oriented to person, place, and time.     Interval Imaging Findings:    Impression:    64 y.o. female who presents with: HEAVENLY and dehydration     Active  Problem 
Attempted to assess and pass meds in ICU but they are currently weaning and terminally extubating her .  She appears and tells me she is feeling ok as she is sitting in wheelchair next to  holding his hand and crying.  Asked her and family to let a nurse know in ICU if she has any symptoms or feels ill.  
Bp 194/90 Brenda VORA made aware, hydralazine given 20 minutes prior,will watch and repeat BP's again since PO meds were given late and since she is improving  
Nephrology Progress Note      SUBJECTIVE       Pt was seen and examined. No acute issues overnight. Stable hemodynamics .   Continues to be hypertensive consider increasing Coreg to 25 twice daily  BMP results from today reviewed sodium 133, potassium 4.2, chloride 99, bicarb 18, calcium 9.6, BUN 61, creatinine 2.6 mg/dl.  Urine output documented as about 700 cc and x 3 more unmeasured in the last 24 hours.    Patient is currently dealing with family stress, her  is also admitted in the ICU and patient and her family are moving towards changing the CODE STATUS to comfort measures for her  today.    Brief history  64-year-old female with history of CKD stage IV with baseline creatinine 2.5-2.7, obstructive uropathy secondary to bladder cancer previously on chemotherapy/radiotherapy on , now on immunotherapy, left ureteral stent, previous nephrostomy tube removed , secondary hyperparathyroidism, hypertension presented to the hospital with nausea and vomiting.  Nephrology consulted for HEAVENLY on CKD.  Patient initially presented to [facility] ER with nausea and vomiting for the past 2 weeks had worsening abdominal pain, during presentation she was hypertensive with blood pressures 202 x 72, sodium 127, creatinine 4.1, BUN 90, anion gap 20, GFR 12, WBC 7.5 CT abdominal pelvis showed 2 cm renal cortical mass and left double JJ ureteral stent with persistent left hydronephrosis-mild    Also has had Lasix renogram which showed obstructive uropathy with decreased washout more on the left.  Urology placed stent on the left side in January and there was plan for repeat cystoscopy and stent change with urology this month.    Home meds Bumex 2 mg, Aldactone 25, Coreg 25 twice daily, hydralazine 50 3 times daily, clonidine, Eliquis 2.5 twice daily, Norvasc 10, Protonix, Lipitor    OBJECTIVE      CURRENT TEMPERATURE:  Temp: 97.8 °F (36.6 °C)  MAXIMUM TEMPERATURE OVER 24HRS:  Temp (24hrs), Av.2 °F (36.8 °C), 
Patient not evaluated as patient was not in her room and off the floor.  
Patient still in icu,  has passed, dc instructions given, iv taken out, advised to take her BP as soon as she gets home and resume her medications as normally  
Phlebotomy  asked to draw blood from her husbands room and they were agreeable  
Pt. Is visiting  in SICU room 1015. Writer noticed upon assessment of Vance BELTRAN that patient was hypertensive SBP > 200. Repeat blood pressure as charted. Brenda NP with medicine with patient at time of BP reading. Order received for IV hydralazine given by ICU RN. BP repeated, see flowsheets, and writer updated patients nurse Vance BELTRAN.  
34.0   RDW 13.1 13.8 13.9   * 398 410   MPV 8.7 8.8 9.6   INR  --  1.2  --      Chemistry:  Recent Labs     05/10/25  0516 05/10/25  1429 05/10/25  1655   * 132* 133*   K 4.4 4.2 4.4   CL 90* 100 102   CO2 17* 14* 14*   GLUCOSE 139* 135* 119*   BUN 90* 81* 79*   CREATININE 4.1* 3.3* 3.1*   ANIONGAP 20* 18* 17*   LABGLOM 12* 15* 16*   CALCIUM 10.0 9.3 9.3     Recent Labs     05/10/25  0516 05/10/25  1429   AST 16 15   ALT 12 11   ALKPHOS 94 86   BILITOT 0.6 0.3   LIPASE 82*  --      ABG:  Lab Results   Component Value Date/Time    POCPH 7.438 06/16/2024 06:44 AM    POCPCO2 30.9 06/16/2024 06:44 AM    POCPO2 41.0 06/16/2024 06:44 AM    POCHCO3 20.9 06/16/2024 06:44 AM    NBEA 2.8 06/16/2024 06:44 AM    AXOW5UJY 79.0 06/16/2024 06:44 AM    FIO2 100 07/10/2024 02:56 PM     Lab Results   Component Value Date/Time    SPECIAL R HAND 0.5ML 05/10/2025 03:38 PM     Lab Results   Component Value Date/Time    CULTURE NO GROWTH 12 HOURS 05/10/2025 03:38 PM       Radiology:  CT ABDOMEN PELVIS WO CONTRAST Additional Contrast? None  Result Date: 5/10/2025  2 cm left renal cortical mass. Consider renal mass protocol CT or MRI for further evaluation Left double-J ureteral stent with persistent mild left hydronephrosis.       Physical Examination:        General appearance:  alert, cooperative and in mild distress due to spouses medical condition   Mental Status:  oriented to person, place and time and normal affect  Lungs:  clear to auscultation bilaterally, normal effort  Heart:  regular rate and rhythm, no murmur  Abdomen:  soft, nontender, nondistended, normal bowel sounds, no masses, hepatomegaly, splenomegaly  Extremities:  no edema, redness, tenderness in the calves  Skin:  no gross lesions, rashes, induration on exposed areas of skin    Assessment:        Hospital Problems           Last Modified POA    * (Principal) Acute kidney injury 5/10/2025 Yes    Transitional cell carcinoma (HCC) 5/10/2025 Yes    Stroke

## 2025-05-11 NOTE — PLAN OF CARE
Problem: Chronic Conditions and Co-morbidities  Goal: Patient's chronic conditions and co-morbidity symptoms are monitored and maintained or improved  5/11/2025 1003 by Talib Smith  Outcome: Progressing  5/11/2025 0425 by Krystal Farrell, RN  Outcome: Progressing     Problem: Pain  Goal: Verbalizes/displays adequate comfort level or baseline comfort level  5/11/2025 1003 by Talib Smith  Outcome: Progressing  5/11/2025 0425 by Krystal Farrell, RN  Outcome: Progressing     Problem: ABCDS Injury Assessment  Goal: Absence of physical injury  5/11/2025 1003 by Talib Smith  Outcome: Progressing  5/11/2025 0425 by Krystal Farrell, RN  Outcome: Progressing     Problem: Safety - Adult  Goal: Free from fall injury  5/11/2025 1003 by Talib Smith  Outcome: Progressing  5/11/2025 0425 by Krystal Farrell, RN  Outcome: Progressing

## 2025-05-11 NOTE — PLAN OF CARE
Problem: Chronic Conditions and Co-morbidities  Goal: Patient's chronic conditions and co-morbidity symptoms are monitored and maintained or improved  5/11/2025 0425 by Krystal Farrell RN  Outcome: Progressing  5/10/2025 1502 by Talib Smith  Outcome: Progressing     Problem: Pain  Goal: Verbalizes/displays adequate comfort level or baseline comfort level  5/11/2025 0425 by Krystal Farrell RN  Outcome: Progressing  5/10/2025 1502 by Talib Smith  Outcome: Progressing     Problem: ABCDS Injury Assessment  Goal: Absence of physical injury  Outcome: Progressing     Problem: Safety - Adult  Goal: Free from fall injury  Outcome: Progressing

## 2025-05-12 ENCOUNTER — CARE COORDINATION (OUTPATIENT)
Dept: CARE COORDINATION | Age: 65
End: 2025-05-12

## 2025-05-12 LAB
EKG ATRIAL RATE: 82 BPM
EKG P AXIS: 60 DEGREES
EKG P-R INTERVAL: 176 MS
EKG Q-T INTERVAL: 418 MS
EKG QRS DURATION: 98 MS
EKG QTC CALCULATION (BAZETT): 488 MS
EKG R AXIS: 74 DEGREES
EKG T AXIS: 80 DEGREES
EKG VENTRICULAR RATE: 82 BPM

## 2025-05-12 NOTE — DISCHARGE SUMMARY
[facility]  Office: [phone]  Deven Louis DO, Rashid Martinez DO, Koko House DO, Jc Wheat DO, Bahman Silva MD, Radha Joseph MD, Volodymyr Abad MD, Karyn Purvis MD,  Siva Helton MD, Jigna Emery MD, Mayur Tinajero MD,  Dnani Lamas DO, Gayle Guzmán MD, Micha Sosa MD, Lloyd Louis DO, oN Lutz MD,  Ganesh Lee DO, Sherrell Hickey MD, Kayla Roberson MD, Macarena Martin MD,  Gerardo Pate MD, Daisy Stephenson MD, Kelly Connor MD, Clau Calderon MD, Aurelio Pérez MD, Eliceo Milligan MD, Arias Dunn DO, Chely Weller MD, Franky Trinh MD, Danni Farah MD, Mohsin Reza, MD, Vineet Lizama MD, Shirley Waterhouse, CNP,  Savanah Diez, CNP, Arias Lares, CNP,  Brenda Daniels, MALENA, Phoebe Morris, CNP, Morelia Howard, CNP, Meaghan Rogers, CNP, Neris Del Toro, CNP, Krystal Morrow, PA-C, Nancy Tam, CNP, Cody Steele, CNP,  Vanna Cao, CNP, Liyah Marcelino, CNP, Perez Ambriz, PA-C, Aviva Hartman, CNP,  Marj Figueroa, CNS, Isela Montaño, CNP, Anahi Carnes, CNP,   Rosy Benavidez, CNP         [facility]   IN-PATIENT SERVICE   [facility]    Discharge Summary     Patient ID: Daja Espinosa  :  1960   MRN: [id number]     ACCOUNT:  [id number]   Patient's PCP: Sohail Garcia MD  Admit Date: 5/10/2025   Discharge Date: 25  Length of Stay: 1  Code Status:  Prior  Admitting Physician: No admitting provider for patient encounter.  Discharge Physician: MARGARITA Cotter - NP     Active Discharge Diagnoses:     Hospital Problem Lists:  Principal Problem:    Acute kidney injury  Active Problems:    Transitional cell carcinoma (HCC)    Stroke (HCC)    Reactive thrombocytosis    Metabolic acidosis    Paroxysmal atrial fibrillation (HCC)    Obstructive uropathy    CKD (chronic kidney disease) stage 4, GFR 15-29 ml/min (HCC)    Other hydronephrosis    Urothelial carcinoma of bladder with invasion of muscle (HCC)

## 2025-05-12 NOTE — CARE COORDINATION
Ambulatory Care Coordination Note     5/12/2025      In basket message received from RPM nurse following red alert. Please see care coordination note today from RPM nurse.     Planned outreach call to patient deferred at this time since RPM nurse spoke with patient earlier today and patient trying to make plans for  following his sudden passing.      Future Appointments   Date Time Provider Department Center   5/13/2025  9:30 AM [facility] CAT SCAN ROOM [facility] CT [facility] Rad   5/29/2025  1:00 PM Familia Shepard MD tiff onc spe [facility]   7/1/2025 11:00 AM Nicole Chan MD [facility] None   8/13/2025 10:00 AM Terrell Cortez MD [facility]       Care Coordination Plan of Care:   This nurse Care Coordinator will  - plan outreach call to patient in 1 week   -RPM   -support

## 2025-05-12 NOTE — CARE COORDINATION
RN outreached to patient to discuss no RPM readings x3 days. Patient noted to be in the hospital for several days and D/C home. Patient answered and advised that she and her  went to the ED for n/v for several weeks. Noted that she is feeling better, but her  passed while at the hospital. Notes she is with her daughter and is trying to navigate though the process of having her husbands arrangements made. Patient offered supportive listening and comfort.  Patient notes she will schedule her ED f/u later today.   Will update ACM at this time.

## 2025-05-13 ENCOUNTER — CARE COORDINATION (OUTPATIENT)
Dept: CASE MANAGEMENT | Age: 65
End: 2025-05-13

## 2025-05-15 ENCOUNTER — TELEPHONE (OUTPATIENT)
Dept: UROLOGY | Age: 65
End: 2025-05-15

## 2025-05-15 LAB
MICROORGANISM SPEC CULT: NORMAL
MICROORGANISM SPEC CULT: NORMAL
SERVICE CMNT-IMP: NORMAL
SERVICE CMNT-IMP: NORMAL
SPECIMEN DESCRIPTION: NORMAL
SPECIMEN DESCRIPTION: NORMAL

## 2025-05-15 NOTE — TELEPHONE ENCOUNTER
Daja called the office stating she was scheduled today (5/15/2025) for a stent replacement.  States she cancelled it d/t not feeling well.    Patient states she would like this rescheduled, she is in no real hurry.

## 2025-05-16 ENCOUNTER — CARE COORDINATION (OUTPATIENT)
Dept: CASE MANAGEMENT | Age: 65
End: 2025-05-16

## 2025-05-16 NOTE — TELEPHONE ENCOUNTER
Patient reports she was discharged last Saturday from [facility]. Her  passed away the day after at [facility] as well. She reports she will call and make appt with her pcp. She has  planning going on right now. Appt made with Dr KAN on .  She reports she feels fine now but has intermittent hematuria.

## 2025-05-16 NOTE — CARE COORDINATION
-Remote Alert Monitoring Note      Date/Time:  2025 11:46 AM  Patient Current Location: [place]  Verified patients name and  as identifiers.    Rpm alert to be reviewed by the provider   yellow alert  blood pressure reading (175/85)  Vitals Recheck blood pressure reading (na)  Additional needs to be addressed by provider: No                   LPN contacted patient by telephone regarding red alert received   Background: PNA  Refer to 911 immediately if:  Patient unresponsive or unable to provide history  Change in cognition or sudden confusion  Patient unable to respond in complete sentences  Intense chest pain/tightness  Any concern for any clinical emergency  Red Alert: Provider response time of 1 hr required for any red alert requiring intervention  Yellow Alert: Provider response time of 3hr required for any escalated yellow alert  Patient Chief Complaint:  Blood Pressure BP Triage  Are you having any Chest Pain? no   Are you having any Shortness of Breath? no   Do you have a headache or have any vision changes? no   Are you having any numbness or tingling? no   Are you having any other health concerns or issues? stress  Patient/Caregiver educated on how to properly take a blood pressure. Patient/Caregiver verbalizes understanding.     Clinical Interventions: Reviewed and followed up on alerts and treatments-spoke to patient she is on her way to her husbands  she states she is okay but busy and stressed she is taking her medication as directed we will continue to monitor for symptoms    Plan/Follow Up: Will continue to review, monitor and address alerts with follow up based on severity of symptoms and risk factors.  **For any new or worsening symptoms or you are concerned in anyway, please contact your Provider or report to the nearest Emergency Room.**

## 2025-05-16 NOTE — TELEPHONE ENCOUNTER
Please have her seen in 2 to 3 weeks with Dr. KAN in office for evaluation as she just had hospitalization.  From there we can reschedule her

## 2025-05-16 NOTE — TELEPHONE ENCOUNTER
Please advise on reschedule. Patient was recently discharged from [facility]. Thank you!

## 2025-05-16 NOTE — TELEPHONE ENCOUNTER
Patient self cancelled due to not feeling well. What were her symptoms? Patient was to follow up with PCP after discharge from . , is this able to be done prior to reschedule?

## 2025-05-19 ENCOUNTER — HOSPITAL ENCOUNTER (OUTPATIENT)
Age: 65
Setting detail: SPECIMEN
Discharge: HOME | End: 2025-05-19
Payer: COMMERCIAL

## 2025-05-19 ENCOUNTER — HOSPITAL ENCOUNTER (OUTPATIENT)
Age: 65
Discharge: HOME | End: 2025-05-19
Payer: COMMERCIAL

## 2025-05-19 ENCOUNTER — OFFICE VISIT (OUTPATIENT)
Dept: UROLOGY | Age: 65
End: 2025-05-19

## 2025-05-19 ENCOUNTER — CARE COORDINATION (OUTPATIENT)
Dept: CASE MANAGEMENT | Age: 65
End: 2025-05-19

## 2025-05-19 ENCOUNTER — TELEPHONE (OUTPATIENT)
Dept: UROLOGY | Age: 65
End: 2025-05-19

## 2025-05-19 ENCOUNTER — RESULTS FOLLOW-UP (OUTPATIENT)
Dept: UROLOGY | Age: 65
End: 2025-05-19

## 2025-05-19 DIAGNOSIS — K59.09 OTHER CONSTIPATION: ICD-10-CM

## 2025-05-19 DIAGNOSIS — N13.30 HYDRONEPHROSIS, UNSPECIFIED HYDRONEPHROSIS TYPE: ICD-10-CM

## 2025-05-19 DIAGNOSIS — C67.9 UROTHELIAL CARCINOMA OF BLADDER WITH INVASION OF MUSCLE (HCC): ICD-10-CM

## 2025-05-19 DIAGNOSIS — C67.9 UROTHELIAL CARCINOMA OF BLADDER WITH INVASION OF MUSCLE (HCC): Primary | ICD-10-CM

## 2025-05-19 DIAGNOSIS — R39.15 URGENCY OF URINATION: ICD-10-CM

## 2025-05-19 DIAGNOSIS — R39.15 URGENCY OF URINATION: Primary | ICD-10-CM

## 2025-05-19 LAB
ANION GAP SERPL CALCULATED.3IONS-SCNC: 16 MMOL/L (ref 9–16)
BUN SERPL-MCNC: 63 MG/DL (ref 8–23)
BUN/CREAT SERPL: 21 (ref 9–20)
CALCIUM SERPL-MCNC: 9 MG/DL (ref 8.6–10.4)
CHLORIDE SERPL-SCNC: 91 MMOL/L (ref 98–107)
CO2 SERPL-SCNC: 20 MMOL/L (ref 20–31)
CREAT SERPL-MCNC: 3 MG/DL (ref 0.5–0.9)
GFR, ESTIMATED: 17 ML/MIN/1.73M2
GLUCOSE SERPL-MCNC: 114 MG/DL (ref 74–99)
POTASSIUM SERPL-SCNC: 4.2 MMOL/L (ref 3.7–5.3)
SODIUM SERPL-SCNC: 127 MMOL/L (ref 136–145)

## 2025-05-19 PROCEDURE — 87086 URINE CULTURE/COLONY COUNT: CPT

## 2025-05-19 PROCEDURE — 80048 BASIC METABOLIC PNL TOTAL CA: CPT

## 2025-05-19 PROCEDURE — 36415 COLL VENOUS BLD VENIPUNCTURE: CPT

## 2025-05-19 RX ORDER — POLYETHYLENE GLYCOL 3350 17 G/17G
17 POWDER, FOR SOLUTION ORAL DAILY
Qty: 1530 G | Refills: 1 | Status: SHIPPED | OUTPATIENT
Start: 2025-05-19 | End: 2025-06-18

## 2025-05-19 NOTE — TELEPHONE ENCOUNTER
Writer gives patient message. Patient will do testing on Wednesday at [place], and come to [place] Thursday at 145.

## 2025-05-19 NOTE — RESULT ENCOUNTER NOTE
Please let patient and her daughter know that her BUN was 63 and creatinine was 3.0.  Her GFR is very similar to when she was discharged from the hospital.  We will obtain the CAT scan and repeat lab work as scheduled on Friday.  This will determine stent placement.  Please make sure she has increased her water intake as in the office she admitted to not drinking very much at all.

## 2025-05-19 NOTE — TELEPHONE ENCOUNTER
Patient calls in reporting she went to the bathroom and she wiped and it felt like  tampon string. She reports she pulled a blue string out, she thinks it was her stent.  She states she has had bright red blood in the toilet in the past couple days.  She denies any pain. Please advise- thank you!

## 2025-05-19 NOTE — TELEPHONE ENCOUNTER
She can come to [place] for nursing visit today so we can scan her bladder to make sure she is not full.  If she is developing significant pain or inability urinate we recommend she goes to the emergency room.

## 2025-05-19 NOTE — CARE COORDINATION
Date/Time:  5/19/2025 11:04 AM  LPN attempted to reach patient by telephone regarding red alert /94 in remote patient monitoring program. Left HIPAA compliant message requesting a return call. Will attempt to reach patient again.

## 2025-05-19 NOTE — CARE COORDINATION
-Remote Alert Monitoring Note      Date/Time:  2025 12:54 PM  Patient Current Location: [place]  Verified patients name and  as identifiers.    Rpm alert to be reviewed by the provider   red alert  blood pressure reading (184/94)  Vitals Recheck blood pressure reading (ma)  Additional needs to be addressed by provider: No                   LPN contacted patient by telephone regarding red alert received   Background: PNA  Refer to 911 immediately if:  Patient unresponsive or unable to provide history  Change in cognition or sudden confusion  Patient unable to respond in complete sentences  Intense chest pain/tightness  Any concern for any clinical emergency  Red Alert: Provider response time of 1 hr required for any red alert requiring intervention  Yellow Alert: Provider response time of 3hr required for any escalated yellow alert  Patient Chief Complaint:  Blood Pressure BP Triage  Are you having any Chest Pain? no   Are you having any Shortness of Breath? no   Do you have a headache or have any vision changes? no   Are you having any numbness or tingling? no   Are you having any other health concerns or issues? Issues with urinary stent  Patient/Caregiver educated on how to properly take a blood pressure. Patient/Caregiver verbalizes understanding.     Clinical Interventions: Reviewed and followed up on alerts and treatments-spoke to patient she states she is on her way to [place] to see the urologist she states she is having issues with her stent and cannot urinate, she did not take her medications prior to checking BP will repeat reading when she returns home     Plan/Follow Up: Will continue to review, monitor and address alerts with follow up based on severity of symptoms and risk factors.  **For any new or worsening symptoms or you are concerned in anyway, please contact your Provider or report to the nearest Emergency Room.**

## 2025-05-19 NOTE — TELEPHONE ENCOUNTER
Patient reports she now feels like she can only urinate small trickles, she feels like she really has to go.  She denies any pain.  Please advise. She reports she just started her antibiotic that was prescribed when she left [facility] for UTI one week ago.

## 2025-05-19 NOTE — TELEPHONE ENCOUNTER
Please have her get lab work on Wednesday BMP    Please also have her get a KUB to ensure the stent has been removed    Follow-up on Thursday in [place] to discuss plan moving forward

## 2025-05-19 NOTE — PROGRESS NOTES
HPI:    Patient is a 64 y.o. female in no acute distress.  She is alert and oriented to person, place, and time.     History  TURBT 4/5/2016 High Grade Ta  TURBT re-resection 5/17/2016 High Grade Ta  6 Weekly BCG instuillations starting 6/16/16  3 weekly BCG starting 7/20/17     Lost to follow-up in 2019     2/2024 Patient is here today as a new patient.  Patient was well-known to our practice several years ago.  She did have multiple bladder resections.  She was lost to follow-up approximately 5 years ago.  Patient did have a recent CT scan.  This film was independently reviewed.  This does show severe left hydronephrosis.  This is down to the level of the bladder.  The bladder does appear to be thickened on the left side.  Patient has had slight worsening of her creatinine.  Patient has been having some left-sided flank pain.  Is unclear how long this has been going on.  Patient has no unintentional weight loss or decreased appetite.  She reports no nausea or vomiting.     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     4/2024 patient opted for chemotherapy and radiation therapy versus radical cystectomy to 2 surgical risk.  Started cisplatin/gemcitabine.      1/2025 cystoscopy with left ureteral dilation and stent placement     Today:  Patient is here today with complaints of difficulty urinating.  She had a recent hospitalization for acute kidney injury.  She presented to the emergency room in [place] with nausea and vomiting.  She is followed by our office for high-grade bladder cancer.  We do perform left ureteral stent exchanges.  She is due for 1 of these.  She was due for this earlier on the month.  She did have a hospitalization therefore we are unable to do the procedure.  She is on our schedule to have stent exchange early June.  After she was discharged from the hospital she was given a course

## 2025-05-20 ENCOUNTER — TELEPHONE (OUTPATIENT)
Dept: UROLOGY | Age: 65
End: 2025-05-20

## 2025-05-20 LAB
MICROORGANISM SPEC CULT: NO GROWTH
SERVICE CMNT-IMP: NORMAL
SPECIMEN DESCRIPTION: NORMAL

## 2025-05-20 NOTE — TELEPHONE ENCOUNTER
Patient said she can't walk to bathroom without gushing blood or tainted urine. When she has the urge it comes out. Then sometime she gets to restroom and can't go.  She said she doesn't know if she can wait until Friday for testing.  She also wants to know if she should start miralax today.

## 2025-05-21 ENCOUNTER — CARE COORDINATION (OUTPATIENT)
Dept: CARE COORDINATION | Age: 65
End: 2025-05-21

## 2025-05-21 ENCOUNTER — HOSPITAL ENCOUNTER (INPATIENT)
Age: 65
LOS: 3 days | Discharge: HOME | DRG: 468 | End: 2025-05-24
Attending: EMERGENCY MEDICINE | Admitting: INTERNAL MEDICINE
Payer: COMMERCIAL

## 2025-05-21 ENCOUNTER — APPOINTMENT (OUTPATIENT)
Dept: CT IMAGING | Age: 65
DRG: 468 | End: 2025-05-21
Payer: COMMERCIAL

## 2025-05-21 DIAGNOSIS — I10 ESSENTIAL HYPERTENSION: ICD-10-CM

## 2025-05-21 DIAGNOSIS — D62 ACUTE BLOOD LOSS ANEMIA: Primary | ICD-10-CM

## 2025-05-21 DIAGNOSIS — R31.0 GROSS HEMATURIA: ICD-10-CM

## 2025-05-21 LAB
ANION GAP SERPL CALCULATED.3IONS-SCNC: 17 MMOL/L (ref 9–16)
BACTERIA URNS QL MICRO: ABNORMAL
BASOPHILS # BLD: 0 K/UL (ref 0–0.2)
BASOPHILS NFR BLD: 0 % (ref 0–2)
BILIRUB UR QL STRIP: NEGATIVE
BUN SERPL-MCNC: 58 MG/DL (ref 8–23)
BUN/CREAT SERPL: 24 (ref 9–20)
CALCIUM SERPL-MCNC: 9.2 MG/DL (ref 8.6–10.4)
CHLORIDE SERPL-SCNC: 91 MMOL/L (ref 98–107)
CLARITY UR: ABNORMAL
CO2 SERPL-SCNC: 19 MMOL/L (ref 20–31)
COLOR UR: YELLOW
CREAT SERPL-MCNC: 2.4 MG/DL (ref 0.5–0.9)
EOSINOPHIL # BLD: 0.27 K/UL (ref 0–0.44)
EOSINOPHILS RELATIVE PERCENT: 3 % (ref 1–4)
EPI CELLS #/AREA URNS HPF: ABNORMAL /HPF (ref 0–25)
ERYTHROCYTE [DISTWIDTH] IN BLOOD BY AUTOMATED COUNT: 14.2 % (ref 11.8–14.4)
GFR, ESTIMATED: 22 ML/MIN/1.73M2
GLUCOSE SERPL-MCNC: 97 MG/DL (ref 74–99)
GLUCOSE UR STRIP-MCNC: NEGATIVE MG/DL
HCT VFR BLD AUTO: 17.7 % (ref 36.3–47.1)
HCT VFR BLD AUTO: 23.3 % (ref 36.3–47.1)
HGB BLD-MCNC: 6.2 G/DL (ref 11.9–15.1)
HGB BLD-MCNC: 7.9 G/DL (ref 11.9–15.1)
HGB UR QL STRIP.AUTO: ABNORMAL
IMM GRANULOCYTES # BLD AUTO: 0.09 K/UL (ref 0–0.3)
IMM GRANULOCYTES NFR BLD: 1 %
INR PPP: 1.2
KETONES UR STRIP-MCNC: NEGATIVE MG/DL
LACTATE BLDV-SCNC: 0.5 MMOL/L (ref 0.5–2.2)
LEUKOCYTE ESTERASE UR QL STRIP: NEGATIVE
LYMPHOCYTES NFR BLD: 1.18 K/UL (ref 1.1–3.7)
LYMPHOCYTES RELATIVE PERCENT: 13 % (ref 24–43)
MCH RBC QN AUTO: 31.6 PG (ref 25.2–33.5)
MCHC RBC AUTO-ENTMCNC: 34.1 G/DL (ref 28.4–34.8)
MCV RBC AUTO: 92.7 FL (ref 82.6–102.9)
MONOCYTES NFR BLD: 0.64 K/UL (ref 0.1–1.2)
MONOCYTES NFR BLD: 7 % (ref 3–12)
MORPHOLOGY: ABNORMAL
MORPHOLOGY: ABNORMAL
MUCOUS THREADS URNS QL MICRO: ABNORMAL
NEUTROPHILS NFR BLD: 76 % (ref 36–65)
NEUTS SEG NFR BLD: 6.92 K/UL (ref 1.5–8.1)
NITRITE UR QL STRIP: NEGATIVE
NRBC BLD-RTO: 0 PER 100 WBC
PH UR STRIP: 6 [PH] (ref 5–9)
PLATELET # BLD AUTO: 376 K/UL (ref 138–453)
PMV BLD AUTO: 9.1 FL (ref 8.1–13.5)
POTASSIUM SERPL-SCNC: 4.1 MMOL/L (ref 3.7–5.3)
PROT UR STRIP-MCNC: ABNORMAL MG/DL
PROTHROMBIN TIME: 14.6 SEC (ref 11.7–14.1)
RBC # BLD AUTO: 1.93 M/UL (ref 3.95–5.11)
RBC #/AREA URNS HPF: ABNORMAL /HPF (ref 0–2)
SODIUM SERPL-SCNC: 127 MMOL/L (ref 136–145)
SP GR UR STRIP: 1.01 (ref 1.01–1.02)
UROBILINOGEN UR STRIP-ACNC: NORMAL EU/DL (ref 0–1)
WBC #/AREA URNS HPF: ABNORMAL /HPF (ref 0–5)
WBC OTHER # BLD: 9.1 K/UL (ref 3.5–11.3)

## 2025-05-21 PROCEDURE — 85018 HEMOGLOBIN: CPT

## 2025-05-21 PROCEDURE — 86901 BLOOD TYPING SEROLOGIC RH(D): CPT

## 2025-05-21 PROCEDURE — 85610 PROTHROMBIN TIME: CPT

## 2025-05-21 PROCEDURE — 6370000000 HC RX 637 (ALT 250 FOR IP): Performed by: NURSE PRACTITIONER

## 2025-05-21 PROCEDURE — 99285 EMERGENCY DEPT VISIT HI MDM: CPT

## 2025-05-21 PROCEDURE — 1200000000 HC SEMI PRIVATE

## 2025-05-21 PROCEDURE — 85025 COMPLETE CBC W/AUTO DIFF WBC: CPT

## 2025-05-21 PROCEDURE — 81001 URINALYSIS AUTO W/SCOPE: CPT

## 2025-05-21 PROCEDURE — 30233N1 TRANSFUSION OF NONAUTOLOGOUS RED BLOOD CELLS INTO PERIPHERAL VEIN, PERCUTANEOUS APPROACH: ICD-10-PCS | Performed by: INTERNAL MEDICINE

## 2025-05-21 PROCEDURE — 80048 BASIC METABOLIC PNL TOTAL CA: CPT

## 2025-05-21 PROCEDURE — 83605 ASSAY OF LACTIC ACID: CPT

## 2025-05-21 PROCEDURE — 36415 COLL VENOUS BLD VENIPUNCTURE: CPT

## 2025-05-21 PROCEDURE — 85014 HEMATOCRIT: CPT

## 2025-05-21 PROCEDURE — 74176 CT ABD & PELVIS W/O CONTRAST: CPT

## 2025-05-21 PROCEDURE — 36430 TRANSFUSION BLD/BLD COMPNT: CPT

## 2025-05-21 PROCEDURE — P9040 RBC LEUKOREDUCED IRRADIATED: HCPCS

## 2025-05-21 PROCEDURE — 2500000003 HC RX 250 WO HCPCS: Performed by: NURSE PRACTITIONER

## 2025-05-21 PROCEDURE — 6360000002 HC RX W HCPCS: Performed by: NURSE PRACTITIONER

## 2025-05-21 PROCEDURE — 86900 BLOOD TYPING SEROLOGIC ABO: CPT

## 2025-05-21 PROCEDURE — 94761 N-INVAS EAR/PLS OXIMETRY MLT: CPT

## 2025-05-21 PROCEDURE — 86850 RBC ANTIBODY SCREEN: CPT

## 2025-05-21 PROCEDURE — 2580000003 HC RX 258: Performed by: NURSE PRACTITIONER

## 2025-05-21 RX ORDER — AMLODIPINE BESYLATE 10 MG/1
10 TABLET ORAL DAILY
Status: DISCONTINUED | OUTPATIENT
Start: 2025-05-21 | End: 2025-05-24 | Stop reason: HOSPADM

## 2025-05-21 RX ORDER — ACETAMINOPHEN 650 MG/1
650 SUPPOSITORY RECTAL EVERY 6 HOURS PRN
Status: DISCONTINUED | OUTPATIENT
Start: 2025-05-21 | End: 2025-05-24 | Stop reason: HOSPADM

## 2025-05-21 RX ORDER — ACETAMINOPHEN 325 MG/1
650 TABLET ORAL EVERY 6 HOURS PRN
Status: DISCONTINUED | OUTPATIENT
Start: 2025-05-21 | End: 2025-05-23 | Stop reason: SDUPTHER

## 2025-05-21 RX ORDER — SODIUM CHLORIDE 9 MG/ML
INJECTION, SOLUTION INTRAVENOUS CONTINUOUS
Status: DISCONTINUED | OUTPATIENT
Start: 2025-05-21 | End: 2025-05-22

## 2025-05-21 RX ORDER — ONDANSETRON 4 MG/1
4 TABLET, ORALLY DISINTEGRATING ORAL EVERY 8 HOURS PRN
Status: DISCONTINUED | OUTPATIENT
Start: 2025-05-21 | End: 2025-05-24 | Stop reason: HOSPADM

## 2025-05-21 RX ORDER — ACETAMINOPHEN 325 MG/1
650 TABLET ORAL EVERY 6 HOURS PRN
Status: DISCONTINUED | OUTPATIENT
Start: 2025-05-21 | End: 2025-05-24 | Stop reason: HOSPADM

## 2025-05-21 RX ORDER — HYDRALAZINE HYDROCHLORIDE 50 MG/1
50 TABLET, FILM COATED ORAL 3 TIMES DAILY
Status: DISCONTINUED | OUTPATIENT
Start: 2025-05-21 | End: 2025-05-24 | Stop reason: HOSPADM

## 2025-05-21 RX ORDER — SODIUM CHLORIDE 0.9 % (FLUSH) 0.9 %
10 SYRINGE (ML) INJECTION EVERY 12 HOURS SCHEDULED
Status: DISCONTINUED | OUTPATIENT
Start: 2025-05-21 | End: 2025-05-24 | Stop reason: HOSPADM

## 2025-05-21 RX ORDER — BUMETANIDE 1 MG/1
2 TABLET ORAL DAILY
Status: DISCONTINUED | OUTPATIENT
Start: 2025-05-21 | End: 2025-05-24 | Stop reason: HOSPADM

## 2025-05-21 RX ORDER — SODIUM CHLORIDE 9 MG/ML
INJECTION, SOLUTION INTRAVENOUS PRN
Status: DISCONTINUED | OUTPATIENT
Start: 2025-05-21 | End: 2025-05-24 | Stop reason: HOSPADM

## 2025-05-21 RX ORDER — FERROUS SULFATE 325(65) MG
325 TABLET ORAL
Status: DISCONTINUED | OUTPATIENT
Start: 2025-05-22 | End: 2025-05-24

## 2025-05-21 RX ORDER — ATORVASTATIN CALCIUM 40 MG/1
80 TABLET, FILM COATED ORAL NIGHTLY
Status: DISCONTINUED | OUTPATIENT
Start: 2025-05-21 | End: 2025-05-24 | Stop reason: HOSPADM

## 2025-05-21 RX ORDER — ONDANSETRON 2 MG/ML
4 INJECTION INTRAMUSCULAR; INTRAVENOUS EVERY 6 HOURS PRN
Status: DISCONTINUED | OUTPATIENT
Start: 2025-05-21 | End: 2025-05-24 | Stop reason: HOSPADM

## 2025-05-21 RX ORDER — SPIRONOLACTONE 25 MG/1
25 TABLET ORAL 2 TIMES DAILY
Status: DISCONTINUED | OUTPATIENT
Start: 2025-05-21 | End: 2025-05-24 | Stop reason: HOSPADM

## 2025-05-21 RX ORDER — SODIUM CHLORIDE 0.9 % (FLUSH) 0.9 %
10 SYRINGE (ML) INJECTION PRN
Status: DISCONTINUED | OUTPATIENT
Start: 2025-05-21 | End: 2025-05-24 | Stop reason: HOSPADM

## 2025-05-21 RX ORDER — POLYETHYLENE GLYCOL 3350 17 G/17G
17 POWDER, FOR SOLUTION ORAL DAILY
Status: DISCONTINUED | OUTPATIENT
Start: 2025-05-21 | End: 2025-05-24 | Stop reason: HOSPADM

## 2025-05-21 RX ORDER — HEPARIN 100 UNIT/ML
500 SYRINGE INTRAVENOUS PRN
Status: DISCONTINUED | OUTPATIENT
Start: 2025-05-21 | End: 2025-05-24 | Stop reason: HOSPADM

## 2025-05-21 RX ORDER — PANTOPRAZOLE SODIUM 40 MG/1
40 TABLET, DELAYED RELEASE ORAL
Status: DISCONTINUED | OUTPATIENT
Start: 2025-05-22 | End: 2025-05-24 | Stop reason: HOSPADM

## 2025-05-21 RX ORDER — POLYETHYLENE GLYCOL 3350 17 G/17G
17 POWDER, FOR SOLUTION ORAL DAILY PRN
Status: DISCONTINUED | OUTPATIENT
Start: 2025-05-21 | End: 2025-05-24 | Stop reason: HOSPADM

## 2025-05-21 RX ORDER — CARVEDILOL 12.5 MG/1
12.5 TABLET ORAL 2 TIMES DAILY WITH MEALS
Status: DISCONTINUED | OUTPATIENT
Start: 2025-05-21 | End: 2025-05-24 | Stop reason: HOSPADM

## 2025-05-21 RX ADMIN — CARVEDILOL 12.5 MG: 12.5 TABLET, FILM COATED ORAL at 17:07

## 2025-05-21 RX ADMIN — SODIUM CHLORIDE: 0.9 INJECTION, SOLUTION INTRAVENOUS at 17:41

## 2025-05-21 RX ADMIN — SPIRONOLACTONE 25 MG: 25 TABLET ORAL at 22:02

## 2025-05-21 RX ADMIN — BUMETANIDE 2 MG: 1 TABLET ORAL at 17:07

## 2025-05-21 RX ADMIN — HYDRALAZINE HYDROCHLORIDE 50 MG: 50 TABLET ORAL at 22:01

## 2025-05-21 RX ADMIN — SODIUM CHLORIDE, PRESERVATIVE FREE 10 ML: 5 INJECTION INTRAVENOUS at 22:02

## 2025-05-21 RX ADMIN — ONDANSETRON 4 MG: 2 INJECTION, SOLUTION INTRAMUSCULAR; INTRAVENOUS at 22:46

## 2025-05-21 RX ADMIN — AMLODIPINE BESYLATE 10 MG: 10 TABLET ORAL at 17:07

## 2025-05-21 RX ADMIN — ATORVASTATIN CALCIUM 80 MG: 40 TABLET, FILM COATED ORAL at 22:01

## 2025-05-21 ASSESSMENT — PAIN - FUNCTIONAL ASSESSMENT: PAIN_FUNCTIONAL_ASSESSMENT: NONE - DENIES PAIN

## 2025-05-21 ASSESSMENT — PAIN SCALES - GENERAL: PAINLEVEL_OUTOF10: 0

## 2025-05-21 NOTE — H&P
History and Physical    Patient:  Daja Espinosa  MRN: [id number]    Chief Complaint: Fatigue and weakness    History Obtained From:  patient, electronic medical record    PCP: Sohail Garcia MD    History of Present Illness:   The patient is a 64 y.o. female who presented to the emergency room with complaints of fatigue and weakness.  Patient reported over the past couple of days she has not felt well.  Patient does have history of bladder cancer but has completed her chemotherapy and radiation but is on immunotherapy with Dr Shepard.  She does follow with Dr. KAN urology routinely.  Patient was recently hospitalized at [facility] from 5/10/2025 - 5/11/2025 for HEAVENLY after being evaluated in [place] emergency room.  Patient also has obstructive uropathy and follows with Dr. Chan and had been requiring urinary stent exchanges and nephrostomy tubes intermittently.  While at [facility] patient was given fluid resuscitation and evaluated by urology and subsequently discharged home with oral Keflex.  She was then seen in the urology office here on 5/19/2025 and urine culture was obtained and Keflex was continued with plans to follow-up next week to determine the timing of stent placement as she was tentatively scheduled for June.  Patient reported that she accidentally had pulled her tubes out at home and had been having difficulty urinating since that occurred.  Bladder scan was performed while in the urology office and her PVR was 44 mL.  She reported that she had significant gross hematuria however it has slowed down.  Patient reported that she has been feeling weak with no energy.  She is chronically on Eliquis for history of atrial fibrillation.  During patient's evaluation sodium was 127, BUN 58 and creatinine 2.4 which is at baseline for her.  Lactic acid was 0.5.  Patient had no leukocytosis however does have anemia with hemoglobin of 6.2 and hematocrit of 17.7.  INR was 1.2.  Urinalysis was not  with stable angina pectoris 03/30/2017    NSTEMI (non-ST elevated myocardial infarction) (HCC) 03/02/2017    Alcohol abuse 03/02/2017    Urothelial carcinoma of bladder (HCC) 04/14/2016    Bladder mass 04/04/2016    Umbilical hernia     Hyponatremia 06/02/2014    Peripheral vascular disease 06/02/2014    Incisional hernia at bottom of median sternotomy scar 05/09/2012    Low back pain 11/07/2011    Hypertension, essential 11/07/2011    Dyslipidemia 11/07/2011       Plan:     MEDICAL DECISION MAKING:    Primary Problem(s): Gross hematuria  Differential diagnoses: Gross hematuria, pyelonephritis, UTI, renal stones  Condition is an undiagnosed new problem with uncertain prognosis  Condition is stable  Treatment plan:   Appreciate urology  Continue three-way Anguiano catheter  Monitor labs replace electrolytes  ARNOLDO  UCx-no growth  Imaging: no further imaging studies ordered today  Medications:   Hold Eliquis  Continue ferrous sulfate  Continue MiraLAX  Medication Monitoring / High Risk Medications: none      HTN  Condition is a chronic stable condition  Treatment plan: Continue current treatment  Imaging: no further imaging studies ordered today  Medications:   Continue Coreg, hydralazine, Norvasc, Bumex, Aldactone      Nutrition status:   at risk for malnutrition  Dietician consult initiated    Hospital Prophylaxis:   DVT: none due to gross hematuria -SCDs  Stress Ulcer: PPI     MDM Data:   Test interpretation:  My independent EKG interpretation: normal sinus rhythm, PAC's noted  My independent X-ray interpretation:   Reviewed  Management and/or test interpretation discussed with ER MD at time of admission  Consults and Nursing notes were personally reviewed, all current labs and imaging were personally reviewed, tests ordered: CBC, BMP, and history obtained by independent historian       Disposition:  Shared decision making: All test results, treatment options and disposition options were discussed with the patient

## 2025-05-21 NOTE — ED PROVIDER NOTES
[facility] [place] [facility]  [address] [id number]  Dept: [phone]  Dept Fax: [phone]  Loc: [phone]  eMERGENCYdEPARTMENT eNCOUnter      Pt Name: Daja Espinosa  MRN: [id number]  Birthdate 1960of evaluation: 5/21/2025  Provider:MARGARITA EDWARDS CNP    CHIEF COMPLAINT       Chief Complaint   Patient presents with    Fatigue     Patient to the emergency department with family for complaint of feeling weak and tired. Also complains of dizziness.  Patient was recently in the hospital for kidney failure and UTI. Also had cystoscopy with stent placement. Patient reports Monday she accidentally pulled stent out at home and was unable to control her urine and blood with clots was also coming out. Since then the bleeding has subsided.        HISTORY OF PRESENT ILLNESS  (Location/Symptom, Timing/Onset, Context/Setting, Quality, Duration,Modifying Factors, Severity.)   Daja Espinosa is a 64 y.o. female who presents to the emergency department with complaints of weakness and fatigue.  Patient has history of high-grade bladder cancer.  She was diagnosed 2/2024.  She is under the care of Dr. KAN.  She completed radiation and initially was receiving chemotherapy but did not tolerate this.      She was recently hospitalized for HEAVENLY and urinary tract infection.  Patient had a stent placed back in February 2025 which was accidentally pulled out at home 2 days ago.  She was seen in the office due to the difficulty urinating.  She was able to void in the office.  She had several large clots at that time.  Her residual was low.  Bladder scan was performed in the office with PVR of 44 mL.  She says that the bleeding has decreased  but she is feeling weak and has no energy.  CT scan of her abdomen and pelvis without contrast was ordered as well as blood work.  She was concerned because of the weakness and called the urology office today who recommend that she come in

## 2025-05-21 NOTE — TELEPHONE ENCOUNTER
----- Message from MARGARITA Gagnon - CNP sent at 5/21/2025  9:35 AM EDT -----  Call pt - urine cx reviewed and negative for UTI

## 2025-05-21 NOTE — PROGRESS NOTES
Pt arrived from ED to [facility] room 312. Pt is A&Ox 4. Pt assisted x1 to bed. Pt denies any pain. Admission vital signs and assessment completed, see flow sheets for details. Lung sounds are clear throughout. Pt has blood transfusion currently running from ED. Pt has dennis catheter in place and draining. Pt has noticeable left sided weakness due to history of stroke. Pt has port on her right upper chest that Is not accessed at this time. Pt denies any nausea although she states yesterday she did and threw up once that's why she didn't take her home medications yesterday. Pt updated on plan of care and whiteboard updated. Pt oreitned to room and use of call light. Pt denies further needs at this time, call light in reach. Care ongoing.

## 2025-05-21 NOTE — PROGRESS NOTES
Perfect serve sent to Dr. Lopez in regards to patient has poor IV access and has a port.  Patient requests that we use port rather than try and start another IV.  Dr. Lopez gave order to access port and Heparin Flush.

## 2025-05-21 NOTE — CARE COORDINATION
Ambulatory Care Coordination Note     5/21/2025 12:18 PM     Planned to make CC outreach call to patient. Noted patient currently at [facility] ED. Will defer the call.     Future Appointments   Date Time Provider Department Center   5/23/2025 10:30 AM [facility] CAT SCAN ROOM [facility] CT [facility] Rad   5/27/2025  9:45 AM Pilar Lau, APRN - CNP [place] [facility]OLOG[facility]   5/29/2025  1:00 PM Familia Shepard MD [place] [facility]   6/2/2025  2:15 PM Tulio Holder MD [place] [facility]   7/1/2025 11:00 AM [facility] Chan [facility] None   8/13/2025 10:00 AM Terrell Cortez MD [facility]       Care Coordination Plan of Care:   This nurse Care Coordinator will  - plan outreach to patient in 1-2 days for care coordination follow up.

## 2025-05-21 NOTE — TELEPHONE ENCOUNTER
Phone call to patient, patient informed.     Patient states she will be having the CT completed today.

## 2025-05-21 NOTE — PROGRESS NOTES
Patient assessment and vitals completed as charted. Patient A&OX4 and cooperative with assessment. Patient resting comfortably at this time, denies any pain at this time and denies any needs. Call light within patients reach and bed alarm on. Plan of care ongoing.

## 2025-05-22 ENCOUNTER — CARE COORDINATION (OUTPATIENT)
Dept: CARE COORDINATION | Age: 65
End: 2025-05-22

## 2025-05-22 PROBLEM — E43 SEVERE MALNUTRITION: Status: ACTIVE | Noted: 2025-05-22

## 2025-05-22 PROBLEM — D68.69 SECONDARY HYPERCOAGULABLE STATE: Status: ACTIVE | Noted: 2024-04-08

## 2025-05-22 PROBLEM — Z79.01 CURRENT USE OF LONG TERM ANTICOAGULATION: Status: ACTIVE | Noted: 2025-05-22

## 2025-05-22 LAB
ABO/RH: NORMAL
ANION GAP SERPL CALCULATED.3IONS-SCNC: 10 MMOL/L (ref 9–16)
ANTIBODY SCREEN: NEGATIVE
ARM BAND NUMBER: NORMAL
BASOPHILS # BLD: 0.05 K/UL (ref 0–0.2)
BASOPHILS NFR BLD: 1 % (ref 0–2)
BLOOD BANK BLOOD PRODUCT EXPIRATION DATE: NORMAL
BLOOD BANK DISPENSE STATUS: NORMAL
BLOOD BANK ISBT PRODUCT BLOOD TYPE: 9500
BLOOD BANK PRODUCT CODE: NORMAL
BLOOD BANK SAMPLE EXPIRATION: NORMAL
BLOOD BANK UNIT TYPE AND RH: NORMAL
BPU ID: NORMAL
BUN SERPL-MCNC: 58 MG/DL (ref 8–23)
BUN/CREAT SERPL: 23 (ref 9–20)
CALCIUM SERPL-MCNC: 8 MG/DL (ref 8.6–10.4)
CHLORIDE SERPL-SCNC: 97 MMOL/L (ref 98–107)
CO2 SERPL-SCNC: 20 MMOL/L (ref 20–31)
COMPONENT: NORMAL
CREAT SERPL-MCNC: 2.5 MG/DL (ref 0.5–0.9)
CROSSMATCH RESULT: NORMAL
EOSINOPHIL # BLD: 0.47 K/UL (ref 0–0.44)
EOSINOPHILS RELATIVE PERCENT: 5 % (ref 1–4)
ERYTHROCYTE [DISTWIDTH] IN BLOOD BY AUTOMATED COUNT: 17.3 % (ref 11.8–14.4)
GFR, ESTIMATED: 21 ML/MIN/1.73M2
GLUCOSE BLD-MCNC: 132 MG/DL (ref 74–100)
GLUCOSE SERPL-MCNC: 106 MG/DL (ref 74–99)
HCT VFR BLD AUTO: 21.3 % (ref 36.3–47.1)
HGB BLD-MCNC: 7.3 G/DL (ref 11.9–15.1)
IMM GRANULOCYTES # BLD AUTO: 0.07 K/UL (ref 0–0.3)
IMM GRANULOCYTES NFR BLD: 1 %
LYMPHOCYTES NFR BLD: 1.08 K/UL (ref 1.1–3.7)
LYMPHOCYTES RELATIVE PERCENT: 10 % (ref 24–43)
MCH RBC QN AUTO: 30.3 PG (ref 25.2–33.5)
MCHC RBC AUTO-ENTMCNC: 34.3 G/DL (ref 28.4–34.8)
MCV RBC AUTO: 88.4 FL (ref 82.6–102.9)
MONOCYTES NFR BLD: 0.77 K/UL (ref 0.1–1.2)
MONOCYTES NFR BLD: 7 % (ref 3–12)
NEUTROPHILS NFR BLD: 77 % (ref 36–65)
NEUTS SEG NFR BLD: 7.99 K/UL (ref 1.5–8.1)
NRBC BLD-RTO: 0 PER 100 WBC
PLATELET # BLD AUTO: 302 K/UL (ref 138–453)
PMV BLD AUTO: 8.9 FL (ref 8.1–13.5)
POTASSIUM SERPL-SCNC: 4.1 MMOL/L (ref 3.7–5.3)
RBC # BLD AUTO: 2.41 M/UL (ref 3.95–5.11)
SODIUM SERPL-SCNC: 127 MMOL/L (ref 136–145)
TRANSFUSION STATUS: NORMAL
UNIT DIVISION: 0
UNIT ISSUE DATE/TIME: NORMAL
WBC OTHER # BLD: 10.4 K/UL (ref 3.5–11.3)

## 2025-05-22 PROCEDURE — 85025 COMPLETE CBC W/AUTO DIFF WBC: CPT

## 2025-05-22 PROCEDURE — 97162 PT EVAL MOD COMPLEX 30 MIN: CPT

## 2025-05-22 PROCEDURE — 6360000002 HC RX W HCPCS: Performed by: INTERNAL MEDICINE

## 2025-05-22 PROCEDURE — 2500000003 HC RX 250 WO HCPCS: Performed by: NURSE PRACTITIONER

## 2025-05-22 PROCEDURE — 97166 OT EVAL MOD COMPLEX 45 MIN: CPT

## 2025-05-22 PROCEDURE — 2580000003 HC RX 258: Performed by: NURSE PRACTITIONER

## 2025-05-22 PROCEDURE — 1200000000 HC SEMI PRIVATE

## 2025-05-22 PROCEDURE — 80048 BASIC METABOLIC PNL TOTAL CA: CPT

## 2025-05-22 PROCEDURE — 82947 ASSAY GLUCOSE BLOOD QUANT: CPT

## 2025-05-22 PROCEDURE — 94761 N-INVAS EAR/PLS OXIMETRY MLT: CPT

## 2025-05-22 PROCEDURE — 97116 GAIT TRAINING THERAPY: CPT

## 2025-05-22 PROCEDURE — 6360000002 HC RX W HCPCS: Performed by: NURSE PRACTITIONER

## 2025-05-22 PROCEDURE — 6370000000 HC RX 637 (ALT 250 FOR IP): Performed by: NURSE PRACTITIONER

## 2025-05-22 RX ORDER — MORPHINE SULFATE 4 MG/ML
4 INJECTION, SOLUTION INTRAMUSCULAR; INTRAVENOUS
Refills: 0 | Status: DISCONTINUED | OUTPATIENT
Start: 2025-05-22 | End: 2025-05-24 | Stop reason: HOSPADM

## 2025-05-22 RX ORDER — MORPHINE SULFATE 2 MG/ML
2 INJECTION, SOLUTION INTRAMUSCULAR; INTRAVENOUS
Refills: 0 | Status: DISCONTINUED | OUTPATIENT
Start: 2025-05-22 | End: 2025-05-24 | Stop reason: HOSPADM

## 2025-05-22 RX ORDER — PROCHLORPERAZINE EDISYLATE 5 MG/ML
10 INJECTION INTRAMUSCULAR; INTRAVENOUS EVERY 6 HOURS PRN
Status: DISCONTINUED | OUTPATIENT
Start: 2025-05-22 | End: 2025-05-24 | Stop reason: HOSPADM

## 2025-05-22 RX ADMIN — PROCHLORPERAZINE EDISYLATE 10 MG: 5 INJECTION INTRAMUSCULAR; INTRAVENOUS at 19:57

## 2025-05-22 RX ADMIN — SODIUM CHLORIDE: 0.9 INJECTION, SOLUTION INTRAVENOUS at 07:20

## 2025-05-22 RX ADMIN — ONDANSETRON 4 MG: 2 INJECTION, SOLUTION INTRAMUSCULAR; INTRAVENOUS at 10:13

## 2025-05-22 RX ADMIN — HYDRALAZINE HYDROCHLORIDE 50 MG: 50 TABLET ORAL at 13:46

## 2025-05-22 RX ADMIN — ATORVASTATIN CALCIUM 80 MG: 40 TABLET, FILM COATED ORAL at 22:43

## 2025-05-22 RX ADMIN — SPIRONOLACTONE 25 MG: 25 TABLET ORAL at 08:21

## 2025-05-22 RX ADMIN — HYDRALAZINE HYDROCHLORIDE 50 MG: 50 TABLET ORAL at 22:43

## 2025-05-22 RX ADMIN — BUMETANIDE 2 MG: 1 TABLET ORAL at 08:21

## 2025-05-22 RX ADMIN — FERROUS SULFATE TAB 325 MG (65 MG ELEMENTAL FE) 325 MG: 325 (65 FE) TAB at 08:21

## 2025-05-22 RX ADMIN — HYDRALAZINE HYDROCHLORIDE 50 MG: 50 TABLET ORAL at 08:21

## 2025-05-22 RX ADMIN — Medication 12.5 MG: at 13:52

## 2025-05-22 RX ADMIN — PANTOPRAZOLE SODIUM 40 MG: 40 TABLET, DELAYED RELEASE ORAL at 07:20

## 2025-05-22 RX ADMIN — SODIUM CHLORIDE, PRESERVATIVE FREE 10 ML: 5 INJECTION INTRAVENOUS at 22:44

## 2025-05-22 RX ADMIN — SPIRONOLACTONE 25 MG: 25 TABLET ORAL at 22:43

## 2025-05-22 RX ADMIN — MORPHINE SULFATE 4 MG: 4 INJECTION, SOLUTION INTRAMUSCULAR; INTRAVENOUS at 19:57

## 2025-05-22 RX ADMIN — AMLODIPINE BESYLATE 10 MG: 10 TABLET ORAL at 08:21

## 2025-05-22 RX ADMIN — ONDANSETRON 4 MG: 2 INJECTION, SOLUTION INTRAMUSCULAR; INTRAVENOUS at 15:30

## 2025-05-22 RX ADMIN — CARVEDILOL 12.5 MG: 12.5 TABLET, FILM COATED ORAL at 08:21

## 2025-05-22 RX ADMIN — POLYETHYLENE GLYCOL 3350 17 G: 17 POWDER, FOR SOLUTION ORAL at 08:21

## 2025-05-22 ASSESSMENT — PAIN SCALES - GENERAL
PAINLEVEL_OUTOF10: 0
PAINLEVEL_OUTOF10: 9
PAINLEVEL_OUTOF10: 3

## 2025-05-22 ASSESSMENT — PAIN DESCRIPTION - DESCRIPTORS
DESCRIPTORS: ACHING
DESCRIPTORS: ACHING

## 2025-05-22 ASSESSMENT — PAIN DESCRIPTION - ORIENTATION
ORIENTATION: RIGHT;LEFT;MID;UPPER;LOWER
ORIENTATION: RIGHT;LEFT;MID;UPPER
ORIENTATION: MID;LOWER;UPPER;RIGHT;LEFT

## 2025-05-22 ASSESSMENT — PAIN DESCRIPTION - LOCATION
LOCATION: ABDOMEN

## 2025-05-22 ASSESSMENT — PAIN - FUNCTIONAL ASSESSMENT
PAIN_FUNCTIONAL_ASSESSMENT: PREVENTS OR INTERFERES SOME ACTIVE ACTIVITIES AND ADLS
PAIN_FUNCTIONAL_ASSESSMENT: ACTIVITIES ARE NOT PREVENTED

## 2025-05-22 NOTE — PROGRESS NOTES
Physical Therapy  Facility/Department: [facility] MED SURG  Daily Treatment Note  NAME: Daja Espinosa  : 1960  MRN: [id number]    Date of Service: 2025    Discharge Recommendations:  Continue to assess pending progress     Patient Diagnosis(es): The primary encounter diagnosis was Acute blood loss anemia. A diagnosis of Gross hematuria was also pertinent to this visit.    Assessment  Assessment: Bed mobility: CGA/SBA. Transfers:CGA/SBA. Gait with no AD CGa for safety 80ftx1.  Activity Tolerance: Patient limited by fatigue;Patient limited by endurance;Patient tolerated treatment well    Plan  Physical Therapy Plan  General Plan: 2 times a day 7 days a week  Specific Instructions for Next Treatment: Once daily on weekends  Current Treatment Recommendations: Strengthening;ROM;Balance training;Functional mobility training;Gait training;Stair training;Neuromuscular re-education;Transfer training;Safety education & training;Home exercise program;Patient/Caregiver education & training;Therapeutic activities;Manual;Endurance training    Restrictions  Restrictions/Precautions  Restrictions/Precautions: General Precautions, Fall Risk     Subjective   Subjective  Subjective: Pt. in bed upon arrival with family present, agreeable to therapy at this time.  Pain: denies at rest    Objective  Bed Mobility Training  Bed Mobility Training: Yes  Overall Level of Assistance: Contact guard assistance;Stand by assistance  Interventions: Verbal cues  Rolling: Contact guard assistance;Stand by assistance  Supine to Sit: Contact guard assistance;Stand by assistance  Scooting: Contact guard assistance;Stand by assistance  Transfer Training  Transfer Training: Yes  Overall Level of Assistance: Contact guard assistance;Stand by assistance  Interventions: Verbal cues  Sit to Stand: Contact guard assistance;Stand by assistance  Stand to Sit: Contact guard assistance;Stand by assistance  Gait  Gait Training: Yes  Overall Level of

## 2025-05-22 NOTE — PROGRESS NOTES
Physical Therapy  Facility/Department: [facility] MED SURG  Physical Therapy Initial Assessment    Name: Daja Espinosa  : 1960  MRN: [id number]  Date of Service: 2025    Discharge Recommendations:  Continue to assess pending progress          Patient Diagnosis(es): The primary encounter diagnosis was Acute blood loss anemia. A diagnosis of Gross hematuria was also pertinent to this visit.  Past Medical History:  has a past medical history of Alcohol abuse, Arthritis, Bladder cancer (HCC), CAD (coronary artery disease), Cancer (HCC), Carotid artery occlusion, Carotid artery stenosis, Chronic back pain, CVA (cerebral vascular accident) (HCC), History of chemotherapy, Hydronephrosis, Hyperlipidemia, Hypertension, Hypoglycemia, MI (myocardial infarction) (HCC), Paroxysmal atrial fibrillation (HCC), Peripheral vascular disease, Stage 4 chronic kidney disease (HCC), Takayasu's arteritis (HCC), Tibial fracture, Umbilical hernia, Under care of team, Under care of team, Under care of team, Unspecified cerebral artery occlusion with cerebral infarction, and Wears partial dentures.  Past Surgical History:  has a past surgical history that includes Cardiac catheterization (2010); Cardiac surgery (); Vulva surgery (); vascular surgery; vascular surgery (, ); femoral bypass (, ); other surgical history (2016); Bladder surgery (2016); other surgical history; other surgical history; Colonoscopy (N/A, 2021); Upper gastrointestinal endoscopy (N/A, 2021); Colonoscopy (N/A, 2021); hernia repair; ventral hernia repair (N/A, 2022); Cystoscopy (N/A, 2024); Cystoscopy (Left, 2024); TUNNELED CENTRAL VENOUS CATHETER W/ SUBCUTANEOUS PORT (Right, 2024); IR PORT PLACEMENT < 5 YEARS (2024); Carotid angioplasty (Right); Cystocopy (Left, 2024); Anomalous venous return repair (N/A, 2024); Esophagogastroduodenoscopy (2024); Upper  Anemia, D62  Follows Commands: Within Functional Limits  Subjective  Subjective: Pt agrees to PT eval         Social/Functional History  Social/Functional History  Lives With: Alone  Type of Home: House  Home Layout: One level  Home Access: Ramped entrance  Bathroom Shower/Tub: Walk-in shower  Bathroom Toilet: Standard  Bathroom Equipment: Grab bars in shower, Grab bars around toilet, Shower chair  Home Equipment: Walker - Rolling  Has the patient had two or more falls in the past year or any fall with injury in the past year?: No  Prior Level of Assist for ADLs: Independent  Prior Level of Assist for Homemaking: Independent  Prior Level of Assist for Ambulation: Independent household ambulator, with or without device  Prior Level of Assist for Transfers: Independent  Active : Yes  Additional Comments: Pt reports currently not using an AD for mobility. Just lost her  this past weekend.  Vision/Hearing  Vision  Vision: Within Functional Limits  Hearing  Hearing: Within functional limits    Cognition   Orientation  Overall Orientation Status: Within Functional Limits  Cognition  Overall Cognitive Status: WFL    Objective  Temp: 98.1 °F (36.7 °C)  Pulse: 69  Heart Rate Source: Monitor  Respirations: 16  SpO2: 96 %  O2 Device: None (Room air)  BP: (!) 157/82  MAP (Calculated): 107  BP Location: Right upper arm  BP Method: Automatic  Patient Position: Semi fowlers        Gross Assessment  AROM: Within functional limits  Strength: Generally decreased, functional     Bed mobility  Supine to Sit: Contact guard assistance  Scooting: Contact guard assistance  Transfers  Sit to Stand: Contact guard assistance  Stand to Sit: Contact guard assistance  Ambulation  Surface: Level tile  Device: No Device  Assistance: Contact guard assistance  Distance: Pt amb 40ft with no AD, CGAx1, very unsteady but no LOB.     Balance  Sitting - Static: Good  Sitting - Dynamic: Fair;+  Standing - Static: Fair  Standing - Dynamic:

## 2025-05-22 NOTE — PROGRESS NOTES
Progress Note    SUBJECTIVE:    Patient seen for f/u of Gross hematuria.  She resting in bed no distress. Feels better. Family at side and PT.  3-way without bleeding     ROS:   Constitutional: negative  for fevers, and negative for chills.  Respiratory: negative for shortness of breath, negative for cough, and negative for wheezing  Cardiovascular: negative for chest pain, and negative for palpitations  Gastrointestinal: negative for abdominal pain, negative for nausea,negative for vomiting, negative for diarrhea, and negative for constipation     All other systems were reviewed with the patient and are negative unless otherwise stated in HPI      OBJECTIVE:      Vitals:   Vitals:    05/22/25 0715   BP: (!) 157/82   Pulse: 69   Resp: 16   Temp: 98.1 °F (36.7 °C)   SpO2: 96%     Weight - Scale: 42.5 kg (93 lb 11.1 oz)   Height: 160 cm (5' 2.99\")     Weight  Wt Readings from Last 3 Encounters:   05/22/25 42.5 kg (93 lb 11.1 oz)   05/10/25 47.2 kg (104 lb)   04/22/25 50.3 kg (111 lb)     Body mass index is 16.6 kg/m².    24HR INTAKE/OUTPUT:      Intake/Output Summary (Last 24 hours) at 5/22/2025 0746  Last data filed at 5/22/2025 0718  Gross per 24 hour   Intake 487.5 ml   Output 400 ml   Net 87.5 ml     -----------------------------------------------------------------  Exam:    GEN:    Awake, alert and oriented x3.   EYES:  EOMI, pupils equal   NECK: Supple. No lymphadenopathy.  No carotid bruit  CVS:    regular rate and rhythm, no audible murmur  PULM:  CTA, no wheezes, rales or rhonchi, no acute respiratory distress  ABD:    Bowels sounds normal.  Abdomen is soft.  No distention.  no tenderness to palpation.   EXT:   no edema bilaterally .  No calf tenderness.   NEURO: Moves all extremities.  Motor and sensory are grossly intact  SKIN:  No rashes.  No skin lesions.    -----------------------------------------------------------------    Diagnostic Data:      Complete Blood Count:   Recent Labs     05/21/25  1127    last year, is stable comparing with recent PET-CT imaging (no increased   metabolic activity at this level).  Attention to this area is recommended on   additional follow-up imaging per routine for the urinary bladder cancer.   3. Mild left hydronephrosis with prominent left extrarenal pelvis.  No ureter   calculus is seen.  This is not appreciably changed from PET-CT performed   earlier this year.   4. Focal urinary bladder wall thickening and punctate calcifications at the   urinary bladder wall posterior left presumably related to the urinary bladder   cancer described in the clinical history.  No significant interval change.   5. Gallbladder sludge/cholelithiasis; no other findings of acute   cholecystitis.   6.  Calcific atherosclerotic disease aorta.               ASSESSMENT / PLAN:    MEDICAL DECISION MAKING:    Primary Problem(s): Gross hematuria  Differential diagnoses: Gross hematuria, pyelonephritis, UTI, renal stones  Condition is an undiagnosed new problem with uncertain prognosis  Condition is stable  Treatment plan:   Appreciate Dr KAN  Continue three-way Anguiano catheter  Monitor labs replace electrolytes  ARNOLDO  UCx-no growth  Imaging: no further imaging studies ordered today  Medications:   Hold Eliquis  Continue ferrous sulfate  Continue MiraLAX  Medication Monitoring / High Risk Medications: none      HTN  Condition is a chronic stable condition  Treatment plan: Continue current treatment  Imaging: no further imaging studies ordered today  Medications:   Continue Coreg, hydralazine, Norvasc, Bumex, Aldactone    Nutrition status:   severe malnutrition  Dietician consult initiated  I/O  Daily weight  Monitor Daily intake  Nutritional Supplements as tolerated    MALNUTRITION ASSESSMENT AND PLAN    The following was documented by the Dietitian:    Malnutrition Assessment  Context of Malnutrition: Chronic Illness (05/22/25 0726)  Chronic Illness - Energy Intake : No decrease in energy intake (05/22/25  0726)  Chronic Illness - Weight Loss : 5% over 1 month (05/22/25 0726)  Chronic Illness - Body Fat Loss: Severe body fat loss (05/22/25 0726)  Chronic Illness - Body Fat Loss Locations: Orbital;Triceps;Buccal region (05/22/25 0726)  Chronic Illness - Muscle Mass Loss: Severe muscle mass loss (05/22/25 0726)  Chronic Illness - Muscle Mass Loss Location: Hand (interosseous);Temples (temporalis);Clavicles (pectoralis & deltoids) (05/22/25 0726)  Chronic Illness - Fluid Accumulation : No fluid accumulation (05/22/25 0726)  Chronic Illness -  Strength: Not Performed (05/22/25 0726)  Chronic Illness - Malnutrition Score: 15 (05/22/25 0726)  Malnutrition Status: Severe malnutrition (05/22/25 0726)    I agree with the dietitian's malnutrition assessment.    Medical Nutrition Therapy: continue current nutrition therapy    Electronically signed by MARGARITA Johnson CNP on 5/22/25 at 7:46 AM \[facility] [place] [facility]\"" Prophylaxis:   DVT: SCD's   Stress Ulcer: PPI     Disposition:  Shared decision making: All test results, treatment options and disposition options were discussed with the patient today  Social determinants of health that may impact management: none  Code status: Full Code   Disposition: Discharge plan is pending    MIPS Advanced Care Planning documentation:  [x] I have confirmed that the patient's Advance Care Plan is present, Code Status is documented, or surrogate decision maker is listed in the patient's medical record  [If \"yes\", STOP HERE]     [] The patient's Advance Care Plan is NOT present because:    []  I confirmed today that the patient does not wish or was not able to name a   surrogate decision maker or provide and advance care plan.    [] Hospice care is currently being provided or has been provided within the   calendar year.    []  I did NOT confirm today the presence of an Advance Care Plan or surrogate   decision maker documented within the patient's medical record.   [DOES NOT SATISFY

## 2025-05-22 NOTE — CARE COORDINATION
Ambulatory Care Coordination Note     5/22/2025 8:18 AM     Received notification that patient was admitted to [facility] for gross hematuria. Currently listed as inpatient, room 312.     Will notify remote patient monitoring team to pause at this time.     Future Appointments   Date Time Provider Department Center   5/23/2025 10:30 AM [facility] CAT SCAN ROOM [facility] CT [facility] Rad   5/27/2025  9:45 AM Pilar Lau, [facility]ITA - [facility]   5/29/2025  1:00 PM Familia Shepard MD [place] [facility]   6/2/2025  2:15 PM Tulio Holder MD [place] UROLOGY [facility]   7/1/2025 11:00 AM [facility]   8/13/2025 10:00 AM Terrell Cortez MD [facility]       Care Coordination Plan of Care:   This nurse Care Coordinator will  - await notification of patient discharge from [facility] and then resume RPM

## 2025-05-22 NOTE — PROGRESS NOTES
Writer called into room because patient was vomiting. Patient states she thinks not eating for a while and then eating and drinking a bunch of water is what made her vomit. Patient did start vomiting while writer was at bedside and there was a tiny bit of blood, and then patient vomited brown. Patient and family did say patient had just had chocolate ice cream for a snack tonight but would like some Zofran and to see a doctor. Becca VORA notified.

## 2025-05-22 NOTE — PROGRESS NOTES
Writer to bedside to complete morning assessment. Upon entry to room, pt resting in bed with continue bladder irrigation running, respirations even while on room air. Vitals obtained and assessment completed, see flow sheet for details. Pt denies needs from writer at this time. Call light in reach. Care ongoing.

## 2025-05-22 NOTE — PROGRESS NOTES
Patient has been dry heaving with some clear emesis off and on all day but been pretty persistent this afternoon even after Zofran and Phenergan. She had a large BM but her stomach still hurts. Family is very concerned      Dr Lopez. Notified.

## 2025-05-22 NOTE — PROGRESS NOTES
Patients Hgb 7.9, after 1 unit of blood, notified Dr. Lopez. States to not give second unit at this time. Recheck lab in the morning.

## 2025-05-22 NOTE — CARE COORDINATION
Case Management Assessment  Initial Evaluation    Date/Time of Evaluation: 5/22/2025 10:20 AM  Assessment Completed by: Fitz Salgado RN    If patient is discharged prior to next notation, then this note serves as note for discharge by case management.    Patient Name: Daja Espinosa                   YOB: 1960  Diagnosis: Acute blood loss anemia [D62]  Gross hematuria [R31.0]                   Date / Time: 5/21/2025 12:08 PM    Patient Admission Status: Inpatient   Readmission Risk (Low < 19, Mod (19-27), High > 27): Readmission Risk Score: 30.5    Current PCP: Sohail Garcia MD  PCP verified by CM? Yes    Chart Reviewed: Yes      History Provided by: Patient  Patient Orientation: Alert and Oriented, Person, Place, Situation    Patient Cognition: Alert    Hospitalization in the last 30 days (Readmission):  No    If yes, Readmission Assessment in  Navigator will be completed.    Advance Directives:      Code Status: Full Code   Patient's Primary Decision Maker is: Legal Next of Kin      Discharge Planning:    Patient lives with: (P) Family Members Type of Home: (P) House  Primary Care Giver: Self  Patient Support Systems include: Spouse/Significant Other, Family Members, Friends/Neighbors   Current Financial resources: Medicaid  Current community resources: Other (Comment) (Remote patient monitoring.)  Current services prior to admission: (P) Other (Comment) (remote patient monitoring.)            Current DME:              Type of Home Care services:  (P) None    ADLS  Prior functional level: Independent in ADLs/IADLs  Current functional level: Independent in ADLs/IADLs    PT AM-PAC:   /24  OT AM-PAC: 19 /24    Family can provide assistance at DC: Yes  Would you like Case Management to discuss the discharge plan with any other family members/significant others, and if so, who? No  Plans to Return to Present Housing: Yes  Other Identified Issues/Barriers to RETURNING to current housing:  none.  Potential Assistance needed at discharge: (P) N/A            Potential DME:    Patient expects to discharge to: (P) House  Plan for transportation at discharge: (P) Self    Financial    [facility] / Plan: [facility] MEDICAID / Product Type: *No Product type* /     Does insurance require precert for SNF: Yes    Potential assistance Purchasing Medications: (P) No  Meds-to-Beds request:        [email or web address] - [place], [place] - [address] [phone] - F [phone]  [address] [address]  Phone: [phone] Fax: [phone]      Notes:    Factors facilitating achievement of predicted outcomes: Family support, Friend support, Motivated, Cooperative, Pleasant, Sense of humor, Good insight into deficits.    Barriers to discharge: Long standing deficits, Medical complications, Medication management, current medical situation.    Additional Case Management Notes: Met with patient and family members to discuss discharge plan. She lives alone, her  recently passed away and her granddaughter stays with her. She is independent in ADL's and denies any unmet needs. Her nephews live next door and she has excellent family support if needed. She has remote patient monitoring in place she states \"for my heart\". She would like to complete ACP documents while she is here. RUBY Montano notified and will visit with patient today.  The plan is to return home to continue remote monitoring and no additional services.    The Plan for Transition of Care is related to the following treatment goals of Acute blood loss anemia [D62]  Gross hematuria [R31.0]    IF APPLICABLE: The Patient and/or patient representative Daja and her family were provided with a choice of provider and agrees with the discharge plan. Freedom of choice list with basic dialogue that supports the patient's individualized plan of care/goals and shares the quality data associated with the providers was provided to:      Patient Representative Name:       The Patient and/or Patient Representative Agree with the Discharge Plan?      Fitz Salgado RN  Case Management Department  Ph: [phone] Fax: [phone]

## 2025-05-22 NOTE — PROGRESS NOTES
Patient complained of pain in bladder. She was feeling nauseous, Zofran was given.  Writer suspected blood clot.  Irrigated manually with nurse Danica until it cleared.  The continuous irrigation was reattached  and draining clear to pink.  Dr. Holder was notified.  He said to replace catheter if unable to clear in the future.  Care ongoing.

## 2025-05-22 NOTE — PROGRESS NOTES
I have stopped in to see patient a couple of times, but she is very tired and sleepy.  I explained to her that I heard that she would like to complete advance directives.  She tells me that she would like to make her daughters her decision makers, but she if very tired and would like to do it later.  I will check with her later.  Charmaine Olmedo RN  [facility] eugenia Carrizales   Palliative Care Nurse Coordinator  5/22/2025 1:37 PM

## 2025-05-22 NOTE — CARE COORDINATION
05/22/25 1038   Readmission Assessment   Number of Days since last admission? 8-30 days   Previous Disposition Home with Family   Who is being Interviewed Patient   What was the patient's/caregiver's perception as to why they think they needed to return back to the hospital? Other (Comment)  (Stent came out and bleeding was alot.)   Did you visit your Primary Care Physician after you left the hospital, before you returned this time? No   Why weren't you able to visit your PCP? Did not want to go (Comment)  (Felt bad most of the time, in frequent contact with 's office.)   Did you see a specialist, such as Cardiac, Pulmonary, Orthopedic Physician, etc. after you left the hospital? No  (In contact with urology office frequently.)   Who advised the patient to return to the hospital? Physician's Nurse/Office staff   Does the patient report anything that got in the way of taking their medications? No   In our efforts to provide the best possible care to you and others like you, can you think of anything that we could have done to help you after you left the hospital the first time, so that you might not have needed to return so soon? Other (Comment)  (Nothing could have been done.)

## 2025-05-22 NOTE — PROGRESS NOTES
Vitals and assessment completed at this time. CBI checked, running clear at this time. Pt complaining of severe abdominal pain. Abdomen soft, tender. Pt states \"it feels like I have a block.\" Pt has had 2 bowel movements today. Dr faith contacted, see new orders. Pt remains A&Ox4, on RA. All needs met at this time. Call light within reach. Care ongoing.

## 2025-05-22 NOTE — PLAN OF CARE
Problem: Skin/Tissue Integrity  Goal: Skin integrity remains intact  Description: 1.  Monitor for areas of redness and/or skin breakdown2.  Assess vascular access sites hourly3.  Every 4-6 hours minimum:  Change oxygen saturation probe site4.  Every 4-6 hours:  If on nasal continuous positive airway pressure, respiratory therapy assess nares and determine need for appliance change or resting period  Outcome: Progressing  Note: Patient monitored for breakdown. Repositioned every 2 hours and incontinence care completed as needed. Plan of care ongoing.

## 2025-05-22 NOTE — PROGRESS NOTES
Comprehensive Nutrition Assessment    Type and Reason for Visit:  Initial    Nutrition Recommendations/Plan:   Monitor weight; to gain.  Recommendation of a no salt added diet (3-4gm).  Recommendation ONS Ensure 4 oz TID.      Malnutrition Assessment:  Malnutrition Status:  Severe malnutrition (05/22/25 0726)    Context:  Acute Illness     Findings of the 6 clinical characteristics of malnutrition:  Energy Intake:  No decrease in energy intake  Weight Loss:  Greater than 5% over 1 month     Body Fat Loss:  Moderate body fat loss Buccal region, Triceps, Orbital   Muscle Mass Loss:  Moderate muscle mass loss Temples (temporalis), Clavicles (pectoralis & deltoids), Hand (interosseous)  Fluid Accumulation:  No fluid accumulation     Strength:  Not Performed    Nutrition Assessment:    Severe malnutrition r/t increased demand for energy needs aeb weight loss and vomiting. Did not eat most of the day yesterday but when pt did eat she vomited. Vomit consisted of blood. Was admitted 5/10 for nausea, emesis, and abdominal pain. Pt reported since being released she was feeling better till around 5/18. Pt reported appetite has not decreased when she started to feel ill. Throughout bladder cancer treatment (chemotherapy) now in immunotherapy appetite was reported high. Breakfast consist of a donut or oatmeal, lunch a sandwich with chips, and dinner lately has been [employer] or pizza.  Wt loss is significant as daughter and pt reported weighing pt on 5/19 at 105# resulting in a 11.4% weight loss and a 16.2% weight loss in a month. Pt and family had no nutritional concerns at this time. Recommendation of a salt added 3-4gm diet. ONS of Ensure 4 oz TID.    Nutrition Related Findings:    active bowel sounds and no edema Wound Type: None       Current Nutrition Intake & Therapies:    Average Meal Intake: %  Average Supplements Intake: None Ordered  ADULT DIET; Regular  ADULT ORAL NUTRITION SUPPLEMENT; Breakfast, Lunch, Dinner;

## 2025-05-22 NOTE — PROGRESS NOTES
Occupational Therapy  Facility/Department: [facility] MED SURG  Occupational Therapy Initial Assessment    Name: Daja Espinosa  : 1960  MRN: [id number]  Date of Service: 2025    Discharge Recommendations:  Continue to assess pending progress          Patient Diagnosis(es): The primary encounter diagnosis was Acute blood loss anemia. A diagnosis of Gross hematuria was also pertinent to this visit.  Past Medical History:  has a past medical history of Alcohol abuse, Arthritis, Bladder cancer (HCC), CAD (coronary artery disease), Cancer (HCC), Carotid artery occlusion, Carotid artery stenosis, Chronic back pain, CVA (cerebral vascular accident) (HCC), History of chemotherapy, Hydronephrosis, Hyperlipidemia, Hypertension, Hypoglycemia, MI (myocardial infarction) (HCC), Paroxysmal atrial fibrillation (HCC), Peripheral vascular disease, Stage 4 chronic kidney disease (HCC), Takayasu's arteritis (HCC), Tibial fracture, Umbilical hernia, Under care of team, Under care of team, Under care of team, Unspecified cerebral artery occlusion with cerebral infarction, and Wears partial dentures.  Past Surgical History:  has a past surgical history that includes Cardiac catheterization (2010); Cardiac surgery (); Vulva surgery (); vascular surgery; vascular surgery (, ); femoral bypass (, ); other surgical history (2016); Bladder surgery (2016); other surgical history; other surgical history; Colonoscopy (N/A, 2021); Upper gastrointestinal endoscopy (N/A, 2021); Colonoscopy (N/A, 2021); hernia repair; ventral hernia repair (N/A, 2022); Cystoscopy (N/A, 2024); Cystoscopy (Left, 2024); TUNNELED CENTRAL VENOUS CATHETER W/ SUBCUTANEOUS PORT (Right, 2024); IR PORT PLACEMENT < 5 YEARS (2024); Carotid angioplasty (Right); Cystocopy (Left, 2024); Anomalous venous return repair (N/A, 2024); Esophagogastroduodenoscopy (2024); Upper

## 2025-05-22 NOTE — PLAN OF CARE
Problem: Chronic Conditions and Co-morbidities  Goal: Patient's chronic conditions and co-morbidity symptoms are monitored and maintained or improved  Outcome: Progressing     Problem: Discharge Planning  Goal: Discharge to home or other facility with appropriate resources  Outcome: Progressing     Problem: Safety - Adult  Goal: Free from fall injury  5/22/2025 1207 by Meaghan Wing, RN  Outcome: Progressing     Problem: ABCDS Injury Assessment  Goal: Absence of physical injury  Outcome: Progressing     Problem: Skin/Tissue Integrity  Goal: Skin integrity remains intact  Description: 1.  Monitor for areas of redness and/or skin breakdown2.  Assess vascular access sites hourly3.  Every 4-6 hours minimum:  Change oxygen saturation probe site4.  Every 4-6 hours:  If on nasal continuous positive airway pressure, respiratory therapy assess nares and determine need for appliance change or resting period  5/22/2025 1207 by Meaghan Wing, RN  Outcome: Progressing     Problem: Nutrition Deficit:  Goal: Optimize nutritional status  5/22/2025 1207 by Meaghan Wing, RN  Outcome: Progressing

## 2025-05-23 ENCOUNTER — ANESTHESIA EVENT (OUTPATIENT)
Dept: OPERATING ROOM | Age: 65
End: 2025-05-23
Payer: COMMERCIAL

## 2025-05-23 LAB
ANION GAP SERPL CALCULATED.3IONS-SCNC: 11 MMOL/L (ref 9–16)
ANION GAP SERPL CALCULATED.3IONS-SCNC: 11 MMOL/L (ref 9–16)
BASOPHILS # BLD: 0.03 K/UL (ref 0–0.2)
BASOPHILS NFR BLD: 0 % (ref 0–2)
BUN SERPL-MCNC: 50 MG/DL (ref 8–23)
BUN SERPL-MCNC: 53 MG/DL (ref 8–23)
BUN/CREAT SERPL: 17 (ref 9–20)
BUN/CREAT SERPL: 17 (ref 9–20)
CALCIUM SERPL-MCNC: 8.5 MG/DL (ref 8.6–10.4)
CALCIUM SERPL-MCNC: 9 MG/DL (ref 8.6–10.4)
CHLORIDE SERPL-SCNC: 100 MMOL/L (ref 98–107)
CHLORIDE SERPL-SCNC: 97 MMOL/L (ref 98–107)
CO2 SERPL-SCNC: 20 MMOL/L (ref 20–31)
CO2 SERPL-SCNC: 21 MMOL/L (ref 20–31)
CREAT SERPL-MCNC: 3 MG/DL (ref 0.5–0.9)
CREAT SERPL-MCNC: 3.2 MG/DL (ref 0.5–0.9)
EOSINOPHIL # BLD: 0.1 K/UL (ref 0–0.44)
EOSINOPHILS RELATIVE PERCENT: 1 % (ref 1–4)
ERYTHROCYTE [DISTWIDTH] IN BLOOD BY AUTOMATED COUNT: 18 % (ref 11.8–14.4)
GFR, ESTIMATED: 16 ML/MIN/1.73M2
GFR, ESTIMATED: 17 ML/MIN/1.73M2
GLUCOSE SERPL-MCNC: 100 MG/DL (ref 74–99)
GLUCOSE SERPL-MCNC: 137 MG/DL (ref 74–99)
HCT VFR BLD AUTO: 22.5 % (ref 36.3–47.1)
HGB BLD-MCNC: 7.6 G/DL (ref 11.9–15.1)
IMM GRANULOCYTES # BLD AUTO: 0.06 K/UL (ref 0–0.3)
IMM GRANULOCYTES NFR BLD: 1 %
LYMPHOCYTES NFR BLD: 1.08 K/UL (ref 1.1–3.7)
LYMPHOCYTES RELATIVE PERCENT: 14 % (ref 24–43)
MCH RBC QN AUTO: 30.2 PG (ref 25.2–33.5)
MCHC RBC AUTO-ENTMCNC: 33.8 G/DL (ref 28.4–34.8)
MCV RBC AUTO: 89.3 FL (ref 82.6–102.9)
MONOCYTES NFR BLD: 0.59 K/UL (ref 0.1–1.2)
MONOCYTES NFR BLD: 8 % (ref 3–12)
NEUTROPHILS NFR BLD: 76 % (ref 36–65)
NEUTS SEG NFR BLD: 6.01 K/UL (ref 1.5–8.1)
NRBC BLD-RTO: 0 PER 100 WBC
PLATELET # BLD AUTO: 346 K/UL (ref 138–453)
PMV BLD AUTO: 8.8 FL (ref 8.1–13.5)
POTASSIUM SERPL-SCNC: 4 MMOL/L (ref 3.7–5.3)
POTASSIUM SERPL-SCNC: 4.2 MMOL/L (ref 3.7–5.3)
RBC # BLD AUTO: 2.52 M/UL (ref 3.95–5.11)
SODIUM SERPL-SCNC: 128 MMOL/L (ref 136–145)
SODIUM SERPL-SCNC: 132 MMOL/L (ref 136–145)
WBC OTHER # BLD: 7.9 K/UL (ref 3.5–11.3)

## 2025-05-23 PROCEDURE — 1200000000 HC SEMI PRIVATE

## 2025-05-23 PROCEDURE — 94761 N-INVAS EAR/PLS OXIMETRY MLT: CPT

## 2025-05-23 PROCEDURE — 2580000003 HC RX 258: Performed by: NURSE PRACTITIONER

## 2025-05-23 PROCEDURE — 97110 THERAPEUTIC EXERCISES: CPT

## 2025-05-23 PROCEDURE — 6370000000 HC RX 637 (ALT 250 FOR IP): Performed by: NURSE PRACTITIONER

## 2025-05-23 PROCEDURE — 2500000003 HC RX 250 WO HCPCS: Performed by: NURSE PRACTITIONER

## 2025-05-23 PROCEDURE — 80048 BASIC METABOLIC PNL TOTAL CA: CPT

## 2025-05-23 PROCEDURE — 97535 SELF CARE MNGMENT TRAINING: CPT

## 2025-05-23 PROCEDURE — 85025 COMPLETE CBC W/AUTO DIFF WBC: CPT

## 2025-05-23 PROCEDURE — 6360000002 HC RX W HCPCS: Performed by: NURSE PRACTITIONER

## 2025-05-23 PROCEDURE — 36591 DRAW BLOOD OFF VENOUS DEVICE: CPT

## 2025-05-23 RX ORDER — SODIUM CHLORIDE 9 MG/ML
INJECTION, SOLUTION INTRAVENOUS CONTINUOUS
Status: DISCONTINUED | OUTPATIENT
Start: 2025-05-23 | End: 2025-05-24 | Stop reason: HOSPADM

## 2025-05-23 RX ORDER — METRONIDAZOLE 500 MG/100ML
500 INJECTION, SOLUTION INTRAVENOUS EVERY 8 HOURS
Status: DISCONTINUED | OUTPATIENT
Start: 2025-05-23 | End: 2025-05-23

## 2025-05-23 RX ADMIN — AMLODIPINE BESYLATE 10 MG: 10 TABLET ORAL at 08:48

## 2025-05-23 RX ADMIN — HYDRALAZINE HYDROCHLORIDE 50 MG: 50 TABLET ORAL at 16:49

## 2025-05-23 RX ADMIN — ONDANSETRON 4 MG: 2 INJECTION, SOLUTION INTRAMUSCULAR; INTRAVENOUS at 13:17

## 2025-05-23 RX ADMIN — WATER 1000 MG: 1 INJECTION INTRAMUSCULAR; INTRAVENOUS; SUBCUTANEOUS at 08:49

## 2025-05-23 RX ADMIN — SODIUM CHLORIDE, PRESERVATIVE FREE 10 ML: 5 INJECTION INTRAVENOUS at 08:54

## 2025-05-23 RX ADMIN — SODIUM CHLORIDE: 0.9 INJECTION, SOLUTION INTRAVENOUS at 06:52

## 2025-05-23 RX ADMIN — FERROUS SULFATE TAB 325 MG (65 MG ELEMENTAL FE) 325 MG: 325 (65 FE) TAB at 08:48

## 2025-05-23 RX ADMIN — POLYETHYLENE GLYCOL 3350 17 G: 17 POWDER, FOR SOLUTION ORAL at 08:49

## 2025-05-23 RX ADMIN — ATORVASTATIN CALCIUM 80 MG: 40 TABLET, FILM COATED ORAL at 20:55

## 2025-05-23 RX ADMIN — CARVEDILOL 12.5 MG: 12.5 TABLET, FILM COATED ORAL at 16:49

## 2025-05-23 RX ADMIN — CARVEDILOL 12.5 MG: 12.5 TABLET, FILM COATED ORAL at 08:49

## 2025-05-23 RX ADMIN — HYDRALAZINE HYDROCHLORIDE 50 MG: 50 TABLET ORAL at 08:49

## 2025-05-23 RX ADMIN — PANTOPRAZOLE SODIUM 40 MG: 40 TABLET, DELAYED RELEASE ORAL at 07:11

## 2025-05-23 RX ADMIN — ONDANSETRON 4 MG: 2 INJECTION, SOLUTION INTRAMUSCULAR; INTRAVENOUS at 19:20

## 2025-05-23 RX ADMIN — HYDRALAZINE HYDROCHLORIDE 50 MG: 50 TABLET ORAL at 20:55

## 2025-05-23 RX ADMIN — SODIUM CHLORIDE: 0.9 INJECTION, SOLUTION INTRAVENOUS at 19:23

## 2025-05-23 NOTE — PROGRESS NOTES
[facility]  Inpatient/Observation/Outpatient Rehabilitation    Date: 2025  Patient Name: Daja Espinosa       [x] Inpatient Acute/Observation       []  Outpatient  : 1960       [x] Pt refused/declined therapy at this time due to:  x2 attempts made this date, family present upon both arrivals. Pt resting in bed at this time and reports not feeling very well and wanting to rest.      [] Pt cancelled due to:  [] No Reason Given   [] Sick/ill   [] Other:      [] Evaluation held by RN/Provider/Physical Therapist due to:    [] High Heart Rate   [] High Blood Pressure   [] Orthopedic Consult   [] Hgb < 7   [] Other:    [] Pt ordered brace per physician request:  [] Proper fit will be completed and education for wearing/skin checks    [] Pt does not require skilled services due to:      Therapist/Assistant will attempt to see this patient, at our earliest opportunity.       Nancy Harmon, PTA Date: 2025

## 2025-05-23 NOTE — PROGRESS NOTES
Comprehensive Nutrition Assessment    Type and Reason for Visit:  Reassess    Nutrition Recommendations/Plan:   Encourage oral intakes.   Enocurage oral fluids.     Malnutrition Assessment:  Malnutrition Status:  Severe malnutrition (05/22/25 0726)    Context:  Acute Illness     Findings of the 6 clinical characteristics of malnutrition:  Energy Intake:  No decrease in energy intake  Weight Loss:  Greater than 5% over 1 month     Body Fat Loss:  Moderate body fat loss Buccal region, Triceps, Orbital   Muscle Mass Loss:  Moderate muscle mass loss Temples (temporalis), Clavicles (pectoralis & deltoids), Hand (interosseous)  Fluid Accumulation:  No fluid accumulation     Strength:  Not Performed    Nutrition Assessment:    Continued malnutrition with fair PO and emesis noted as well. Moving bowels (formed). Improving serum Na+ but serum creatinine worsening (2.4 to 3.2). Current weight not available to evaluate any changes. No new abdominal workup noted (scans). Sound asleep and did not awaken. Will need to monitor PO tolerance and trend weights, labs.    Nutrition Related Findings:    active b/s. + formed bm. no edema. + emesis. Wound Type: None       Current Nutrition Intake & Therapies:    Average Meal Intake: 51-75%  Average Supplements Intake: Unable to assess (no PO record)  ADULT DIET; Regular  ADULT ORAL NUTRITION SUPPLEMENT; Breakfast, Lunch, Dinner; Standard High Calorie/High Protein Oral Supplement    Anthropometric Measures:  Height: 160 cm (5' 2.99\")  Ideal Body Weight (IBW): 115 lbs (52 kg)    Admission Body Weight: 47.6 kg (104 lb 15 oz)  Current Body Weight: 42.5 kg (93 lb 11.1 oz), 81.5 % IBW. Weight Source: Bed scale  Current BMI (kg/m2): 16.6  Usual Body Weight: 47.6 kg (105 lb) (Monday weighed 105 per pt and daughter)     % Weight Change (Calculated): -10.8                    BMI Categories: Underweight (BMI less than 18.5)    Estimated Daily Nutrient Needs:  Energy Requirements Based On:  Kcal/kg  Weight Used for Energy Requirements: Current  Energy (kcal/day): 6148-7496 (35-40)  Weight Used for Protein Requirements: Current  Protein (g/day): 51-60 (1.2-1.4)  Method Used for Fluid Requirements: 1 ml/kcal  Fluid (ml/day): 1700    Nutrition Diagnosis:   Severe malnutrition related to increase demand for energy/nutrients as evidenced by vomiting, weight loss    Lab Results   Component Value Date     (L) 05/23/2025    K 4.2 05/23/2025     05/23/2025    CO2 21 05/23/2025    BUN 53 (H) 05/23/2025    CREATININE 3.2 (H) 05/23/2025    GLUCOSE 100 (H) 05/23/2025    CALCIUM 9.0 05/23/2025    BILITOT 0.3 05/10/2025    ALKPHOS 86 05/10/2025    AST 15 05/10/2025    ALT 11 05/10/2025    LABGLOM 16 (L) 05/23/2025    GFRAA >60 12/03/2021    GLOB 2.8 07/15/2024     Nutrition Interventions:   Food and/or Nutrient Delivery: Start Oral Nutrition Supplement, Continue Current Diet  Nutrition Education/Counseling: No recommendation at this time  Coordination of Nutrition Care: Continue to monitor while inpatient  Plan of Care discussed with: no one (patient and family asleep)    Goals:  Goals: PO intake 50% or greater, Meet at least 75% of estimated needs  Type of Goal: Continue current goal  Previous Goal Met: No Progress toward Goal(s)    Nutrition Monitoring and Evaluation:   Behavioral-Environmental Outcomes: Knowledge or Skill  Food/Nutrient Intake Outcomes: Diet Advancement/Tolerance, Food and Nutrient Intake, Supplement Intake  Physical Signs/Symptoms Outcomes: Biochemical Data, Nausea or Vomiting, Weight, Meal Time Behavior, Nutrition Focused Physical Findings    Discharge Planning:    Continue current diet     Benji Ortiz RD, LD  Contact: [phone]

## 2025-05-23 NOTE — PROGRESS NOTES
Occupational Therapy  Facility/Department: [facility] MED SURG  Daily Treatment Note  NAME: Daja Espinosa  : 1960  MRN: [id number]    Date of Service: 2025    Discharge Recommendations:  Continue to assess pending progress         Patient Diagnosis(es): The primary encounter diagnosis was Acute blood loss anemia. A diagnosis of Gross hematuria was also pertinent to this visit.     Assessment   Activity Tolerance: Patient tolerated treatment well  Discharge Recommendations: Continue to assess pending progress     Plan  Occupational Therapy Plan  Times Per Day: Once a day  Days Per Week: 7 Days  Current Treatment Recommendations: Strengthening;Balance training;Functional mobility training;Endurance training;Patient/Caregiver education & training;Safety education & training;Equipment evaluation, education, & procurement;Self-Care / ADL;Home management training    Restrictions   General, fall risk    Subjective  Subjective  Subjective: Pt seated in recliner PTA Joseph present. Pt agreed to washing up standing at sink.  Pain: denied  Orientation  Overall Orientation Status: Within Functional Limits  Pain: denies at rest          Objective  Vitals     ADL  Grooming: Stand by assistance  UE Bathing: Stand by assistance  LE Bathing: Stand by assistance  UE Dressing: Stand by assistance  LE Dressing: Stand by assistance  Putting On/Taking Off Footwear: Stand by assistance  Toileting: Stand by assistance  Functional Mobility: Stand by assistance;Contact guard assistance  Functional Mobility Skilled Clinical Factors: no AD  Additional Comments: SBA standing at sink for all self care tasks. 0 LOB noted.  Product Used : Soap and water        Safety Devices  Type of Devices: All fall risk precautions in place;Call light within reach;Chair alarm in place;Left in chair    Patient Education  Education Given To: Patient  Education Provided: Role of Therapy;Plan of Care;ADL Adaptive Strategies;Transfer Training;Fall Prevention

## 2025-05-23 NOTE — ACP (ADVANCE CARE PLANNING)
Advance Care Planning     Palliative Team Advance Care Planning (ACP) Conversation    Date of Conversation: 05/23/25    Individuals present for the conversation: Patient with decision making capacity     ACP documents on file prior to discussion:  -None    Previously completed document/s not on file: Patient / participant reports that there are no previously executed ACP documents.    Healthcare Decision Maker:    Primary Decision Maker: ALIX ESPINOSAALEJANDRINAIA - Child - [phone]    Secondary Decision Maker: Thania Espinosa - Child - [phone]    Supplemental (Other) Decision Maker: Marline Espinosa - Child - [phone]     Conversation Summary:  New health care POA and living will completed. Discussed code status. At this time Daja is a full code and wishes to remain so.     Resuscitation Status:   Code Status: Full Code     Documentation Completed:  -Power of  for Healthcare and -Living Will    I spent 35 minutes with the patient and/or surrogate decision maker discussing the patient's wishes and goals.      Caroline Cm RN

## 2025-05-23 NOTE — PROGRESS NOTES
Patient sleeping on left side. CBI clamped at this time. Urine is clear in dennis at this time. IV fluids restarted, vitals and assessment done. Family at bedside.

## 2025-05-23 NOTE — CONSULTS
Urology Consultation    Patient:  Daja Espinosa  MRN: [id number]  YOB: 1960  Consult requested from Dr. Lopez for purpose of evaluation of gross hematuria.    CHIEF COMPLAINT: Gross hematuria    HISTORY OF PRESENT ILLNESS:   The patient is a 64 y.o. female who presents with gross hematuria.  Patient is well-known to the urology service.  She does have an extensive oncologic history.  Patient does have muscle invasive bladder cancer.  Patient did have a left-sided stent placed.  This did spontaneously fall out last week.  Since then she resumed having gross hematuria.  She did come in to the emergency department with very low hemoglobin.  She has been transfused.  Creatinine is held steady for her baseline.  Since the stent has been removed.  Patient is currently having no flank pain nausea vomiting.  Patient's CBI was clamped this morning.  And has remained clear.  No pain today.    Patient's old records, notes and chart reviewed and summarized above.    Past Medical History:    Past Medical History:   Diagnosis Date    Alcohol abuse 03/02/2017    Arthritis     Bladder cancer (HCC) 2016    CAD (coronary artery disease)     Cancer (HCC) 2001    vulvar-    Carotid artery occlusion 1993    [facility] where she had a brachiocephalic aorta bypass.    Carotid artery stenosis     Chronic back pain     CVA (cerebral vascular accident) (HCC)     History of chemotherapy     Hydronephrosis 02/15/2024    Hyperlipidemia     Hypertension     Hypoglycemia     MI (myocardial infarction) (HCC)     Paroxysmal atrial fibrillation (HCC)     Peripheral vascular disease     Stage 4 chronic kidney disease (HCC) 07/13/2024    Takayasu's arteritis (HCC)     Tibial fracture     right    Umbilical hernia     Under care of team 03/29/2024    Cardiologist: Terrell Cortez MD, [place], last visit 2/13/2024    Under care of team 03/29/2024    Oncology: Familia Shepard MD, [place], last visit 3/11/2024    Under care of team  PLACEMENT GREATER THAN 5 YEARS 6/25/2024 Oli Faria PA [facility] SPECIAL PROCEDURES    OTHER SURGICAL HISTORY  04/2016    TURBT    OTHER SURGICAL HISTORY      brachiocephalic-bypass    OTHER SURGICAL HISTORY      stenting of left internal carotid artery    TUNNELED CENTRAL VENOUS CATHETER W/ SUBCUTANEOUS PORT Right 03/13/2024    Dr Tran/[facility]    UPPER GASTROINTESTINAL ENDOSCOPY N/A 07/22/2021    EGD POLYP SNARE performed by Jose R Jack MD at [facility] ENDOSCOPY    UPPER GASTROINTESTINAL ENDOSCOPY N/A 6/4/2024    ESOPHAGOGASTRODUODENOSCOPY BIOPSY performed by Angelita Oropeza MD at [facility] OR    URETER SURGERY Left 1/14/2025    URETEROSCOPY- DILATATION WITH UROMAX,URETERAL STENT INSERTION performed by Tulio Holder MD at [facility] OR    VASCULAR SURGERY      VASCULAR SURGERY  5/12, 6/12    ken leg vacular surgery.    VENTRAL HERNIA REPAIR N/A 01/05/2022    HERNIA VENTRAL REPAIR performed by Jose R Jack MD at [facility] OR    VULVA SURGERY  2001    cancer       Medications:    Scheduled Meds:   cefTRIAXone (ROCEPHIN) IV  1,000 mg IntraVENous Once    [Held by provider] apixaban  2.5 mg Oral BID    [Held by provider] spironolactone  25 mg Oral BID    amLODIPine  10 mg Oral Daily    atorvastatin  80 mg Oral Nightly    [Held by provider] bumetanide  2 mg Oral Daily    carvedilol  12.5 mg Oral BID WC    ferrous sulfate  325 mg Oral Daily with breakfast    hydrALAZINE  50 mg Oral TID    pantoprazole  40 mg Oral QAM AC    polyethylene glycol  17 g Oral Daily    sodium chloride flush  10 mL IntraVENous 2 times per day     Continuous Infusions:   sodium chloride 75 mL/hr at 05/23/25 0652    sodium chloride      sodium chloride       PRN Meds:.promethazine, prochlorperazine, morphine **OR** morphine, sodium chloride, acetaminophen, sodium chloride flush, sodium chloride, ondansetron **OR** ondansetron, polyethylene glycol, acetaminophen **OR** acetaminophen, heparin (PF)    Allergies:  Patient has no known    WBCUA 2 TO 5   RBCUA 50    MUCUS TRACE*   BACTERIA 1+*   LEUKOCYTESUR NEGATIVE   UROBILINOGEN Normal   BILIRUBINUR NEGATIVE        -----------------------------------------------------------------  Imaging Results: none      Assessment and Plan   Impression:    Patient Active Problem List   Diagnosis    Low back pain    Hypertension, essential    Dyslipidemia    Incisional hernia at bottom of median sternotomy scar    Hyponatremia    Peripheral vascular disease    Umbilical hernia    Bladder mass    Urothelial carcinoma of bladder (HCC)    NSTEMI (non-ST elevated myocardial infarction) ([facility])    Alcohol abuse    Coronary artery disease of native heart with stable angina pectoris    History of colon polyps    Transitional cell carcinoma ([facility])    Bladder wall thickening    Hydronephrosis of left kidney    Acute ischemic stroke ([facility])    Bilateral carotid artery stenosis    Stroke ([facility])    Secondary hypercoagulable state    Chemotherapy adverse reaction    Reactive thrombocytosis    Macrocytic anemia    Melena    History of stroke    Acute kidney injury superimposed on CKD    Metabolic acidosis    Rectal bleed    Anemia, blood loss    CKD (chronic kidney disease), stage IV (HCC)    Community acquired pneumonia of right lower lobe of lung    Hospital-acquired bacterial pneumonia    Atrial fibrillation with rapid ventricular response ([facility])    Aortoiliac occlusive disease ([facility])    PAD (peripheral artery disease)    Acute on chronic respiratory failure with hypoxia ([facility])    Paroxysmal atrial fibrillation ([facility])    SOB (shortness of breath)    Gross hematuria    Obstructive uropathy    Hypokalemia    ATN (acute tubular necrosis)    Bilateral lower extremity edema    History of bladder cancer    HEAVENLY (acute kidney injury)    Anemia of chronic disease    Anemia due to chronic kidney disease    Hypoxia    Acute on chronic respiratory failure (HCC)    Acute-on-chronic respiratory failure ([facility])    Biventricular congestive

## 2025-05-23 NOTE — PROGRESS NOTES
Progress Note    SUBJECTIVE:    Patient seen for f/u of Gross hematuria.  She resting in bed no distress. Feels better. Family at side and PT.  3-way clamped at this time. Dr KAN at side as well.     ROS:   Constitutional: negative  for fevers, and negative for chills.  Respiratory: negative for shortness of breath, negative for cough, and negative for wheezing  Cardiovascular: negative for chest pain, and negative for palpitations  Gastrointestinal: negative for abdominal pain, negative for nausea,negative for vomiting, negative for diarrhea, and negative for constipation     All other systems were reviewed with the patient and are negative unless otherwise stated in HPI      OBJECTIVE:      Vitals:   Vitals:    05/23/25 0645   BP: (!) 154/69   Pulse: 84   Resp: 18   Temp: 99 °F (37.2 °C)   SpO2: 97%     Weight - Scale: 42.5 kg (93 lb 11.1 oz)   Height: 160 cm (5' 2.99\")     Weight  Wt Readings from Last 3 Encounters:   05/22/25 42.5 kg (93 lb 11.1 oz)   05/10/25 47.2 kg (104 lb)   04/22/25 50.3 kg (111 lb)     Body mass index is 16.6 kg/m².    24HR INTAKE/OUTPUT:      Intake/Output Summary (Last 24 hours) at 5/23/2025 0747  Last data filed at 5/23/2025 0725  Gross per 24 hour   Intake 540 ml   Output 2000 ml   Net -1460 ml     -----------------------------------------------------------------  Exam:    GEN:    Awake, alert and oriented x3.   EYES:  EOMI, pupils equal   NECK: Supple. No lymphadenopathy.  No carotid bruit  CVS:    regular rate and rhythm, 3/6 systolic murmur  PULM:  CTA, no wheezes, rales or rhonchi, no acute respiratory distress  ABD:    Bowels sounds normal.  Abdomen is soft.  No distention.  no tenderness to palpation.   EXT:   no edema bilaterally .  No calf tenderness.   NEURO: Moves all extremities.  Motor and sensory are grossly intact  SKIN:  No rashes.  No skin lesions.    -----------------------------------------------------------------    Diagnostic Data:      Complete Blood Count:   Recent  evident.   2. A 2 cm presumed hemorrhagic or proteinaceous cyst, increased in size from   last year, is stable comparing with recent PET-CT imaging (no increased   metabolic activity at this level).  Attention to this area is recommended on   additional follow-up imaging per routine for the urinary bladder cancer.   3. Mild left hydronephrosis with prominent left extrarenal pelvis.  No ureter   calculus is seen.  This is not appreciably changed from PET-CT performed   earlier this year.   4. Focal urinary bladder wall thickening and punctate calcifications at the   urinary bladder wall posterior left presumably related to the urinary bladder   cancer described in the clinical history.  No significant interval change.   5. Gallbladder sludge/cholelithiasis; no other findings of acute   cholecystitis.   6.  Calcific atherosclerotic disease aorta.               ASSESSMENT / PLAN:    MEDICAL DECISION MAKING:    Primary Problem(s): Gross hematuria  Differential diagnoses: Gross hematuria, pyelonephritis, UTI, renal stones  Condition is an undiagnosed new problem with uncertain prognosis  Condition is stable  Treatment plan:   Macy Olivia  Continue three-way Anguiano catheter  Monitor labs replace electrolytes  ARNOLDO  UCx-no growth  Imaging: no further imaging studies ordered today  Medications:   Hold Eliquis  Continue ferrous sulfate  Continue MiraLAX  Start IVF d/t to elevating BUN/CR  Medication Monitoring / High Risk Medications: none      HTN  Condition is a chronic stable condition  Treatment plan: Continue current treatment  Imaging: no further imaging studies ordered today  Medications:   Continue Coreg, hydralazine, Norvasc,  Hold Bumex, Aldactone d/t increasing BUN/CR    Nutrition status:   severe malnutrition  Dietician consult initiated  I/O  Daily weight  Monitor Daily intake  Nutritional Supplements as tolerated    MALNUTRITION ASSESSMENT AND PLAN    The following was documented by the

## 2025-05-23 NOTE — H&P (VIEW-ONLY)
Urology Consultation    Patient:  Djaa Espinosa  MRN: [id number]  YOB: 1960  Consult requested from Dr. Lopez for purpose of evaluation of gross hematuria.    CHIEF COMPLAINT: Gross hematuria    HISTORY OF PRESENT ILLNESS:   The patient is a 64 y.o. female who presents with gross hematuria.  Patient is well-known to the urology service.  She does have an extensive oncologic history.  Patient does have muscle invasive bladder cancer.  Patient did have a left-sided stent placed.  This did spontaneously fall out last week.  Since then she resumed having gross hematuria.  She did come in to the emergency department with very low hemoglobin.  She has been transfused.  Creatinine is held steady for her baseline.  Since the stent has been removed.  Patient is currently having no flank pain nausea vomiting.  Patient's CBI was clamped this morning.  And has remained clear.  No pain today.    Patient's old records, notes and chart reviewed and summarized above.    Past Medical History:    Past Medical History:   Diagnosis Date    Alcohol abuse 03/02/2017    Arthritis     Bladder cancer (HCC) 2016    CAD (coronary artery disease)     Cancer (HCC) 2001    vulvar-    Carotid artery occlusion 1993    [facility] where she had a brachiocephalic aorta bypass.    Carotid artery stenosis     Chronic back pain     CVA (cerebral vascular accident) (HCC)     History of chemotherapy     Hydronephrosis 02/15/2024    Hyperlipidemia     Hypertension     Hypoglycemia     MI (myocardial infarction) (HCC)     Paroxysmal atrial fibrillation (HCC)     Peripheral vascular disease     Stage 4 chronic kidney disease (HCC) 07/13/2024    Takayasu's arteritis (HCC)     Tibial fracture     right    Umbilical hernia     Under care of team 03/29/2024    Cardiologist: Terrell Cortez MD, [place], last visit 2/13/2024    Under care of team 03/29/2024    Oncology: Familia Shepard MD, [place], last visit 3/11/2024    Under care of team

## 2025-05-23 NOTE — PROGRESS NOTES
Pt resting comfortably in bed with eyes closed at this time. No distress noted. Respirations even and unlabored. Care ongoing.

## 2025-05-23 NOTE — PROGRESS NOTES
Physical Therapy  Facility/Department: [facility] MED SURG  Daily Treatment Note  NAME: Daja Espinosa  : 1960  MRN: [id number]    Date of Service: 2025    Discharge Recommendations:  Continue to assess pending progress        Patient Diagnosis(es): The primary encounter diagnosis was Acute blood loss anemia. A diagnosis of Gross hematuria was also pertinent to this visit.    Assessment  Assessment: Pt amb 80 ft x1 IV pole and CGA, forward flexed posture, slow joanna, decreased B step height, mild path deviations, no LOB. Transfers CGA/SBA, bed mobility SBA. Seated BLE ther ex. Dynamic standing ex at counter without UE support for 5 min.  Activity Tolerance: Patient tolerated treatment well    Plan  Physical Therapy Plan  General Plan: 2 times a day 7 days a week  Specific Instructions for Next Treatment: Once daily on weekends  Current Treatment Recommendations: Strengthening;ROM;Balance training;Functional mobility training;Gait training;Stair training;Neuromuscular re-education;Transfer training;Safety education & training;Home exercise program;Patient/Caregiver education & training;Therapeutic activities;Manual;Endurance training    Restrictions  Restrictions/Precautions  Restrictions/Precautions: General Precautions, Fall Risk     Subjective   Subjective  Subjective: Pt in bed upon arrival, family present. pt agreeable to therapy.  Pain: denies at rest    Objective  Vitals     Bed Mobility Training  Bed Mobility Training: Yes  Overall Level of Assistance: Stand by assistance  Interventions: Verbal cues  Supine to Sit: Stand by assistance  Transfer Training  Transfer Training: Yes  Overall Level of Assistance: Contact guard assistance;Stand by assistance  Interventions: Verbal cues  Sit to Stand: Contact guard assistance;Stand by assistance  Stand to Sit: Contact guard assistance;Stand by assistance  Gait  Gait Training: Yes  Overall Level of Assistance: Contact guard assistance  Distance (ft): 80

## 2025-05-23 NOTE — CONSULTS
Palliative Care Inpatient    Patient: Daja Espinosa  Room: 0312/0312-01    Reason For Consult   Goals of care evaluation  Distress management  Guidance and support  Facilitate communications  Assistance in coordinating care    Code Status: Full       Impression: Daja Espinosa is a 64 y.o. year old female  has a past medical history of Alcohol abuse, Arthritis, Bladder cancer (HCC), CAD (coronary artery disease), Cancer (HCC), Carotid artery occlusion, Carotid artery stenosis, Chronic back pain, CVA (cerebral vascular accident) (HCC), History of chemotherapy, Hydronephrosis, Hyperlipidemia, Hypertension, Hypoglycemia, MI (myocardial infarction) (HCC), Paroxysmal atrial fibrillation (HCC), Peripheral vascular disease, Stage 4 chronic kidney disease (HCC), Takayasu's arteritis (HCC), Tibial fracture, Umbilical hernia, Under care of team, Under care of team, Under care of team, Unspecified cerebral artery occlusion with cerebral infarction, and Wears partial dentures..  Currently hospitalized for the management of hematuria.  The Palliative Care Team is following to assist with ACP.     Vital Signs  BP (!) 154/69   Pulse 84   Temp 99 °F (37.2 °C) (Temporal)   Resp 18   Ht 1.6 m (5' 2.99\")   Wt 42.5 kg (93 lb 11.1 oz)   LMP 12/01/2010   SpO2 97%   BMI 16.60 kg/m²     Patient Active Problem List   Diagnosis    Low back pain    Hypertension, essential    Dyslipidemia    Incisional hernia at bottom of median sternotomy scar    Hyponatremia    Peripheral vascular disease    Umbilical hernia    Bladder mass    Urothelial carcinoma of bladder (HCC)    NSTEMI (non-ST elevated myocardial infarction) (HCC)    Alcohol abuse    Coronary artery disease of native heart with stable angina pectoris    History of colon polyps    Transitional cell carcinoma (HCC)    Bladder wall thickening    Hydronephrosis of left kidney    Acute ischemic stroke (HCC)    Bilateral carotid artery stenosis    Stroke (HCC)    Secondary

## 2025-05-23 NOTE — PROGRESS NOTES
Patient vomiting at this time. IV zofran given. Patient thinks she ate too much and it was too heavy of a meal.

## 2025-05-23 NOTE — PROGRESS NOTES
Patient up to chair. Feeling much better. Daughters at bedside. Pills given. CBI remains clamped. Urine is clear yellow in dennis bag. Will continue to monitor.

## 2025-05-24 VITALS
HEART RATE: 82 BPM | DIASTOLIC BLOOD PRESSURE: 83 MMHG | BODY MASS INDEX: 16.72 KG/M2 | SYSTOLIC BLOOD PRESSURE: 178 MMHG | OXYGEN SATURATION: 97 % | WEIGHT: 94.36 LBS | TEMPERATURE: 98.8 F | RESPIRATION RATE: 14 BRPM | HEIGHT: 63 IN

## 2025-05-24 LAB
ANION GAP SERPL CALCULATED.3IONS-SCNC: 11 MMOL/L (ref 9–16)
BASOPHILS # BLD: 0.06 K/UL (ref 0–0.2)
BASOPHILS NFR BLD: 1 % (ref 0–2)
BUN SERPL-MCNC: 39 MG/DL (ref 8–23)
BUN/CREAT SERPL: 16 (ref 9–20)
CALCIUM SERPL-MCNC: 8.7 MG/DL (ref 8.6–10.4)
CHLORIDE SERPL-SCNC: 101 MMOL/L (ref 98–107)
CO2 SERPL-SCNC: 19 MMOL/L (ref 20–31)
CREAT SERPL-MCNC: 2.4 MG/DL (ref 0.5–0.9)
EOSINOPHIL # BLD: 0.25 K/UL (ref 0–0.44)
EOSINOPHILS RELATIVE PERCENT: 4 % (ref 1–4)
ERYTHROCYTE [DISTWIDTH] IN BLOOD BY AUTOMATED COUNT: 17.7 % (ref 11.8–14.4)
GFR, ESTIMATED: 22 ML/MIN/1.73M2
GLUCOSE SERPL-MCNC: 103 MG/DL (ref 74–99)
HCT VFR BLD AUTO: 22.5 % (ref 36.3–47.1)
HGB BLD-MCNC: 7.4 G/DL (ref 11.9–15.1)
IMM GRANULOCYTES # BLD AUTO: 0.07 K/UL (ref 0–0.3)
IMM GRANULOCYTES NFR BLD: 1 %
LYMPHOCYTES NFR BLD: 1.05 K/UL (ref 1.1–3.7)
LYMPHOCYTES RELATIVE PERCENT: 15 % (ref 24–43)
MCH RBC QN AUTO: 30.1 PG (ref 25.2–33.5)
MCHC RBC AUTO-ENTMCNC: 32.9 G/DL (ref 28.4–34.8)
MCV RBC AUTO: 91.5 FL (ref 82.6–102.9)
MONOCYTES NFR BLD: 0.56 K/UL (ref 0.1–1.2)
MONOCYTES NFR BLD: 8 % (ref 3–12)
NEUTROPHILS NFR BLD: 71 % (ref 36–65)
NEUTS SEG NFR BLD: 5.02 K/UL (ref 1.5–8.1)
NRBC BLD-RTO: 0 PER 100 WBC
PLATELET # BLD AUTO: 374 K/UL (ref 138–453)
PMV BLD AUTO: 8.6 FL (ref 8.1–13.5)
POTASSIUM SERPL-SCNC: 3.9 MMOL/L (ref 3.7–5.3)
RBC # BLD AUTO: 2.46 M/UL (ref 3.95–5.11)
SODIUM SERPL-SCNC: 131 MMOL/L (ref 136–145)
WBC OTHER # BLD: 7 K/UL (ref 3.5–11.3)

## 2025-05-24 PROCEDURE — 97110 THERAPEUTIC EXERCISES: CPT

## 2025-05-24 PROCEDURE — 94761 N-INVAS EAR/PLS OXIMETRY MLT: CPT

## 2025-05-24 PROCEDURE — 6370000000 HC RX 637 (ALT 250 FOR IP): Performed by: INTERNAL MEDICINE

## 2025-05-24 PROCEDURE — 2580000003 HC RX 258: Performed by: NURSE PRACTITIONER

## 2025-05-24 PROCEDURE — 6370000000 HC RX 637 (ALT 250 FOR IP): Performed by: NURSE PRACTITIONER

## 2025-05-24 PROCEDURE — 6360000002 HC RX W HCPCS: Performed by: NURSE PRACTITIONER

## 2025-05-24 PROCEDURE — 85025 COMPLETE CBC W/AUTO DIFF WBC: CPT

## 2025-05-24 PROCEDURE — 80048 BASIC METABOLIC PNL TOTAL CA: CPT

## 2025-05-24 RX ORDER — ALPRAZOLAM 0.25 MG
0.25 TABLET ORAL 2 TIMES DAILY
Status: DISCONTINUED | OUTPATIENT
Start: 2025-05-24 | End: 2025-05-24 | Stop reason: HOSPADM

## 2025-05-24 RX ORDER — ALPRAZOLAM 0.5 MG
0.5 TABLET ORAL 2 TIMES DAILY
Status: DISCONTINUED | OUTPATIENT
Start: 2025-05-24 | End: 2025-05-24

## 2025-05-24 RX ORDER — ONDANSETRON 4 MG/1
4 TABLET, ORALLY DISINTEGRATING ORAL EVERY 8 HOURS PRN
Qty: 30 TABLET | Refills: 0 | Status: SHIPPED | OUTPATIENT
Start: 2025-05-24

## 2025-05-24 RX ADMIN — HYDRALAZINE HYDROCHLORIDE 50 MG: 50 TABLET ORAL at 09:12

## 2025-05-24 RX ADMIN — PANTOPRAZOLE SODIUM 40 MG: 40 TABLET, DELAYED RELEASE ORAL at 09:12

## 2025-05-24 RX ADMIN — ALPRAZOLAM 0.25 MG: 0.25 TABLET ORAL at 13:18

## 2025-05-24 RX ADMIN — CARVEDILOL 12.5 MG: 12.5 TABLET, FILM COATED ORAL at 09:12

## 2025-05-24 RX ADMIN — ALPRAZOLAM 0.25 MG: 0.25 TABLET ORAL at 20:07

## 2025-05-24 RX ADMIN — ATORVASTATIN CALCIUM 80 MG: 40 TABLET, FILM COATED ORAL at 20:07

## 2025-05-24 RX ADMIN — SODIUM CHLORIDE: 0.9 INJECTION, SOLUTION INTRAVENOUS at 09:14

## 2025-05-24 RX ADMIN — HYDRALAZINE HYDROCHLORIDE 50 MG: 50 TABLET ORAL at 20:07

## 2025-05-24 RX ADMIN — HYDRALAZINE HYDROCHLORIDE 50 MG: 50 TABLET ORAL at 13:18

## 2025-05-24 RX ADMIN — CARVEDILOL 12.5 MG: 12.5 TABLET, FILM COATED ORAL at 16:24

## 2025-05-24 RX ADMIN — AMLODIPINE BESYLATE 10 MG: 10 TABLET ORAL at 09:12

## 2025-05-24 RX ADMIN — ONDANSETRON 4 MG: 2 INJECTION, SOLUTION INTRAMUSCULAR; INTRAVENOUS at 08:03

## 2025-05-24 NOTE — PLAN OF CARE
Problem: Chronic Conditions and Co-morbidities  Goal: Patient's chronic conditions and co-morbidity symptoms are monitored and maintained or improved  Outcome: Progressing  Flowsheets (Taken 5/24/2025 0224)  Care Plan - Patient's Chronic Conditions and Co-Morbidity Symptoms are Monitored and Maintained or Improved:   Monitor and assess patient's chronic conditions and comorbid symptoms for stability, deterioration, or improvement   Update acute care plan with appropriate goals if chronic or comorbid symptoms are exacerbated and prevent overall improvement and discharge   Collaborate with multidisciplinary team to address chronic and comorbid conditions and prevent exacerbation or deterioration     Problem: Discharge Planning  Goal: Discharge to home or other facility with appropriate resources  Outcome: Progressing  Flowsheets (Taken 5/24/2025 0224)  Discharge to home or other facility with appropriate resources:   Identify barriers to discharge with patient and caregiver   Identify discharge learning needs (meds, wound care, etc)     Problem: Safety - Adult  Goal: Free from fall injury  Outcome: Progressing  Flowsheets (Taken 5/24/2025 0224)  Free From Fall Injury: Instruct family/caregiver on patient safety     Problem: ABCDS Injury Assessment  Goal: Absence of physical injury  Outcome: Progressing  Flowsheets (Taken 5/24/2025 0224)  Absence of Physical Injury: Implement safety measures based on patient assessment     Problem: Skin/Tissue Integrity  Goal: Skin integrity remains intact  Description: 1.  Monitor for areas of redness and/or skin breakdown2.  Assess vascular access sites hourly3.  Every 4-6 hours minimum:  Change oxygen saturation probe site4.  Every 4-6 hours:  If on nasal continuous positive airway pressure, respiratory therapy assess nares and determine need for appliance change or resting period  Outcome: Progressing  Flowsheets (Taken 5/24/2025 0224)  Skin Integrity Remains Intact:   Monitor  for areas of redness and/or skin breakdown   Assess vascular access sites hourly   Check visual cues for pain   Monitor skin under medical devices   Turn and reposition as indicated     Problem: Nutrition Deficit:  Goal: Optimize nutritional status  Outcome: Progressing  Flowsheets (Taken 5/24/2025 0224)  Nutrient intake appropriate for improving, restoring, or maintaining nutritional needs:   Assess nutritional status and recommend course of action   Monitor oral intake, labs, and treatment plans     Problem: Pain  Goal: Verbalizes/displays adequate comfort level or baseline comfort level  Outcome: Progressing  Flowsheets (Taken 5/24/2025 0224)  Verbalizes/displays adequate comfort level or baseline comfort level:   Encourage patient to monitor pain and request assistance   Assess pain using appropriate pain scale   Administer analgesics based on type and severity of pain and evaluate response   Implement non-pharmacological measures as appropriate and evaluate response   Notify Licensed Independent Practitioner if interventions unsuccessful or patient reports new pain   Consider cultural and social influences on pain and pain management

## 2025-05-24 NOTE — PROGRESS NOTES
Occupational Therapy  Facility/Department: [facility] MED SURG  Daily Treatment Note  NAME: Daaj Espinosa  : 1960  MRN: [id number]    Date of Service: 2025    Discharge Recommendations:  Continue to assess pending progress         Patient Diagnosis(es): The primary encounter diagnosis was Acute blood loss anemia. A diagnosis of Gross hematuria was also pertinent to this visit.     Assessment   Assessment: limited by pt feeling dizzy throughout session  Activity Tolerance: Patient tolerated treatment well;Patient limited by endurance  Discharge Recommendations: Continue to assess pending progress     Plan  Occupational Therapy Plan  Times Per Day: Once a day  Days Per Week: 7 Days  Current Treatment Recommendations: Strengthening;Balance training;Functional mobility training;Endurance training;Patient/Caregiver education & training;Safety education & training;Equipment evaluation, education, & procurement;Self-Care / ADL;Home management training    Restrictions    General, fall risk     Subjective  Subjective  Subjective: pt in bed with family present. agreed to ther ex  Pain: denied          Objective  OT Exercises  Exercise Treatment: Pt completed BUE ther ex for increased strength and endurance to complete fxl tasks. pt tolerated AROM x10-15 reps x1 set in all planes with pt reporting tingling sensationon LUE \"its been like that since I woke up\" pt also reporting increased lightheadedness and dizziness with ther ex     Safety Devices  Type of Devices: All fall risk precautions in place;Call light within reach;Left in bed    Patient Education  Education Given To: Patient  Education Provided: Role of Therapy;Plan of Care;ADL Adaptive Strategies;Transfer Training;Fall Prevention Strategies;Mobility Training  Education Method: Demonstration;Verbal  Barriers to Learning: None  Education Outcome: Verbalized understanding;Demonstrated understanding    Goals  Short Term Goals  Time Frame for Short Term Goals: 21  visits  Short Term Goal 1: Patient to be educated on d/c folder, AE/DME and home safety to ensure safe return home.  Short Term Goal 2: Patient to engage in 15 minutes of ther ex/ther act to improve strength and activity tolerance for I/ADL upon return home.  Short Term Goal 3: Patient to complete ADL routine c mod I to ensure safe and indep return home.  Short Term Goal 4: Patient to engage in 10 minutes dynamic standing during functional task of choice without loss of balance for improved  I/ADL independence and safety upon return home.    AM-PAC - ADL       Therapy Time   Individual Concurrent Group Co-treatment   Time In 0955         Time Out 1008         Minutes 13                 Christine STEINBERG 5/24/2025

## 2025-05-24 NOTE — PROGRESS NOTES
Writer Fabina Served Dr. Still about patients request for sleep medication. Dr. Still stated to place an order for 5mg of melatonin.

## 2025-05-24 NOTE — PROGRESS NOTES
Patient and family requested sleep medication stating patient is \"restless and having a hard time getting to sleep\".

## 2025-05-24 NOTE — PROGRESS NOTES
Patient resting in bed with family at bedside at this time. Patient states \" I am feeling a lot better\". Patient denies any nausea, vomiting, and pain at this time. Patient does not show any signs of respiratory distress and denies SOB and chest pain. Patient and family waiting to receive evening medications before leaving. Patient denies any concerns at this time. Call light in reach, bed locked, personal items within reach.

## 2025-05-24 NOTE — PROGRESS NOTES
Pt states she feels so much better since getting xanax and has been able to keep food and drink down. Not nauseous. Asking to still be discharged today. Will message Dr Still.

## 2025-05-24 NOTE — PROGRESS NOTES
Progress Note    SUBJECTIVE:  FU related to ambulated.  Still having episodes of nausea with dry heaves.    OBJECTIVE:    Vitals:   TEMPERATURE:  Current - Temp: 99 °F (37.2 °C); Max - Temp  Av °F (37.2 °C)  Min: 99 °F (37.2 °C)  Max: 99 °F (37.2 °C)  RESPIRATIONS RANGE: Resp  Av  Min: 18  Max: 18  PULSE RANGE: Pulse  Av.5  Min: 79  Max: 86  BLOOD PRESSURE RANGE:  Systolic (24hrs), Av , Min:176 , Max:184   ; Diastolic (24hrs), Av, Min:70, Max:79    PULSE OXIMETRY RANGE: SpO2  Av %  Min: 97 %  Max: 97 %  24HR INTAKE/OUTPUT:    Intake/Output Summary (Last 24 hours) at 2025 0735  Last data filed at 2025 0501  Gross per 24 hour   Intake 2962.79 ml   Output 1700 ml   Net 1262.79 ml     -----------------------------------------------------------------  Exam:  General: A & O x3 and alert  HEENT: Supple neck & negative  Heart: Regular  Lungs: clear to auscultation bilaterally & no retractions  Abdomen: Normal & soft, No tenderness and BS normal  Extremities:  No edema   Neuro: NonFocal     -----------------------------------------------------------------  Diagnostic Data:  Lab Results   Component Value Date    WBC 7.0 2025    HGB 7.4 (L) 2025     2025       Lab Results   Component Value Date    BUN 39 (H) 2025    CREATININE 2.4 (H) 2025     (L) 2025    K 3.9 2025    CALCIUM 8.7 2025     2025    CO2 19 (L) 2025    LABGLOM 22 (L) 2025       Lab Results   Component Value Date    WBCUA 2 TO 5 2025    RBCUA 50  2025    LEUKOCYTESUR NEGATIVE 2025    GLUCOSEU NEGATIVE 2025    KETUA NEGATIVE 2025    PROTEINU 2+ (A) 2025    HGBUR 3+ (A) 2025    CASTUA  05/10/2025     2 TO 5 HYALINE Reference range defined for non-centrifuged specimen.    BACTERIA 1+ (A) 2025    YEAST MANY (A) 2024       Lab Results   Component Value Date    TROPONINT 0.18 (HH)

## 2025-05-24 NOTE — PROGRESS NOTES
Vitals and assessment completed at this time as charted. Pt resting in bed with daughters at bedside. Pt denies pain. Alert and oriented x4. C/o poor appetite and nausea. Will give zofran before meals today and see if that helps. Pt overall feels weak. Educated on POC for today. Fall precautions in place. Call light within reach.

## 2025-05-24 NOTE — PROGRESS NOTES
Port de-accessed at this time. Discharge instructions explained to patient and family at bedside. They understand that Dr Still will call in prescription for xanax tomorrow morning during rounds to pharmacy. She is staying at hospital until evening dose of xanax then her daughters are taking her home. Reviewed medication changes and follow up appointment. Pt aware of outpatient procedure on Tuesday for stent and that surgery will call with a time. Pt agreed to come back to ER or call provider if blood returns in urine. Education provided on blood transfusion, xanax, hematuria. Pt verbalized understanding of all instruction and asked appropriate questions.

## 2025-05-24 NOTE — PROGRESS NOTES
Physical Therapy  Facility/Department: [facility] MED SURG  Daily Treatment Note  NAME: Daja Espinosa  : 1960  MRN: [id number]    Date of Service: 2025    Discharge Recommendations:  Continue to assess pending progress     Patient Diagnosis(es): The primary encounter diagnosis was Acute blood loss anemia. A diagnosis of Gross hematuria was also pertinent to this visit.    Assessment  Assessment: Pt completed supine B LE therex x10 in all planes of motion. Frequent RB given d/t lightheadedness and fatigue. Pt declined further treatment at this time.  Activity Tolerance: Patient tolerated treatment well;Patient limited by endurance    Plan  Physical Therapy Plan  General Plan: 2 times a day 7 days a week  Specific Instructions for Next Treatment: Once daily on weekends  Current Treatment Recommendations: Strengthening;ROM;Balance training;Functional mobility training;Gait training;Stair training;Neuromuscular re-education;Transfer training;Safety education & training;Home exercise program;Patient/Caregiver education & training;Therapeutic activities;Manual;Endurance training    Restrictions  Restrictions/Precautions  Restrictions/Precautions: General Precautions, Fall Risk     Subjective   Subjective  Subjective: Pt in bed upon arrival, family present. pt agreeable to therapy.  Pain: denies at rest    Objective  Bed Mobility Training  Bed Mobility Training: No  Transfer Training  Transfer Training: No  Gait  Gait Training: No (Pt declined transfers and gait at this time and requesting bed exercises d/t not feeling well)  PT Exercises  Exercise Treatment: Supine B LE therex x10 in all planes of motion  Safety Devices  Type of Devices: All fall risk precautions in place;Call light within reach;Left in bed      Goals  Short Term Goals  Time Frame for Short Term Goals: 20 days  Short Term Goal 1: Patient to complete all transfers with SUP and no LOB to decrease fall risk.  Short Term Goal 2: Patient to ambulate  150ft with no AD, SUP with no LOB or fatigue for improved endurance.  Short Term Goal 3: Patient to tolerate 20-30 min of ther ex/act for improved strength.  Short Term Goal 4: Patient to have good standing balance to decrease fall risk.    Education  Patient Education  Education Given To: Patient  Education Provided: Role of Therapy;Plan of Care;Transfer Training  Education Method: Verbal  Barriers to Learning: None  Education Outcome: Verbalized understanding;Continued education needed    Therapy Time   Individual Concurrent Group Co-treatment   Time In 0955         Time Out 1008         Minutes 13           Nancy Harmon, PTA

## 2025-05-24 NOTE — PROGRESS NOTES
Pt states she is keeping down water/ice chips/watermelon however she did have a few episodes of dry heaving. States she does not feel nauseous and cristina not want more antiemetics but asks for something for her anxiety. Pt and daughters state she does have a history of being anxious and dry heaving but usually snaps out of it quickly. Message sent to Dr Still.    Advancement Flap (Single) Text: The defect edges were debeveled with a #15 scalpel blade.  Given the location of the defect and the proximity to free margins a single advancement flap was deemed most appropriate.  Using a sterile surgical marker, an appropriate advancement flap was drawn incorporating the defect and placing the expected incisions within the relaxed skin tension lines where possible.    The area thus outlined was incised deep to adipose tissue with a #15 scalpel blade.  The skin margins were undermined to an appropriate distance in all directions utilizing iris scissors.

## 2025-05-24 NOTE — PROGRESS NOTES
Patient resting comfortably in bed at this time with family at bedside. Patient does not show any signs of respiratory distress at this time,denies SOB and chest pain. Patient states that she has numbness and tingling in her LUE and LLE and that this is normal for her due to hx of stroke. Patient denies pain at this time. Patient reports nausea at this time. Patient denies any other concerns at this. Call light within reach, personal items within reach, bed alarm set,  socks on, bed locked.

## 2025-05-25 NOTE — DISCHARGE SUMMARY
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  Generic drug: apixaban  TAKE 1 TABLET BY MOUTH TWICE DAILY            START taking these medications      ondansetron 4 MG disintegrating tablet  Commonly known as: ZOFRAN-ODT  Take 1 tablet by mouth every 8 hours as needed for Nausea or Vomiting            CHANGE how you take these medications      hydrALAZINE 50 MG tablet  Commonly known as: APRESOLINE  Take 1 tablet by mouth 3 times daily  What changed: additional instructions            CONTINUE taking these medications

## 2025-05-25 NOTE — CONSENT
Informed Consent for Blood Component Transfusion Note    I have discussed with the patient the rationale for blood component transfusion; its benefits in treating or preventing fatigue, organ damage, or death; and its risk which includes mild transfusion reactions, rare risk of blood borne infection, or more serious but rare reactions. I have discussed the alternatives to transfusion, including the risk and consequences of not receiving transfusion. The patient had an opportunity to ask questions and had agreed to proceed with transfusion of blood components.    Electronically signed by Herman Still MD on 5/25/25 at 7:45 AM EDT   Statement Selected

## 2025-05-25 NOTE — PROGRESS NOTES
Patient taken down to ER exit via wheelchair with all belongings to daughters vehicle. Patient going home with daughters at this time.

## 2025-05-27 ENCOUNTER — HOSPITAL ENCOUNTER (OUTPATIENT)
Age: 65
Setting detail: OUTPATIENT SURGERY
Discharge: HOME | End: 2025-05-27
Attending: UROLOGY | Admitting: UROLOGY
Payer: COMMERCIAL

## 2025-05-27 ENCOUNTER — APPOINTMENT (OUTPATIENT)
Dept: GENERAL RADIOLOGY | Age: 65
End: 2025-05-27
Attending: UROLOGY
Payer: COMMERCIAL

## 2025-05-27 ENCOUNTER — CARE COORDINATION (OUTPATIENT)
Dept: CARE COORDINATION | Age: 65
End: 2025-05-27

## 2025-05-27 ENCOUNTER — ANESTHESIA (OUTPATIENT)
Dept: OPERATING ROOM | Age: 65
End: 2025-05-27
Payer: COMMERCIAL

## 2025-05-27 VITALS
RESPIRATION RATE: 15 BRPM | WEIGHT: 101.8 LBS | TEMPERATURE: 97.4 F | BODY MASS INDEX: 18.04 KG/M2 | DIASTOLIC BLOOD PRESSURE: 85 MMHG | HEART RATE: 75 BPM | HEIGHT: 63 IN | SYSTOLIC BLOOD PRESSURE: 157 MMHG | OXYGEN SATURATION: 97 %

## 2025-05-27 LAB
ERYTHROCYTE [DISTWIDTH] IN BLOOD BY AUTOMATED COUNT: 16.1 % (ref 11.8–14.4)
HCT VFR BLD AUTO: 25.6 % (ref 36.3–47.1)
HGB BLD-MCNC: 8.5 G/DL (ref 11.9–15.1)
MCH RBC QN AUTO: 30.2 PG (ref 25.2–33.5)
MCHC RBC AUTO-ENTMCNC: 33.2 G/DL (ref 28.4–34.8)
MCV RBC AUTO: 91.1 FL (ref 82.6–102.9)
NRBC BLD-RTO: 0 PER 100 WBC
PLATELET # BLD AUTO: 406 K/UL (ref 138–453)
PMV BLD AUTO: 8.6 FL (ref 8.1–13.5)
RBC # BLD AUTO: 2.81 M/UL (ref 3.95–5.11)
WBC OTHER # BLD: 5.5 K/UL (ref 3.5–11.3)

## 2025-05-27 PROCEDURE — 7100000000 HC PACU RECOVERY - FIRST 15 MIN: Performed by: UROLOGY

## 2025-05-27 PROCEDURE — 7100000010 HC PHASE II RECOVERY - FIRST 15 MIN: Performed by: UROLOGY

## 2025-05-27 PROCEDURE — 85027 COMPLETE CBC AUTOMATED: CPT

## 2025-05-27 PROCEDURE — 2709999900 HC NON-CHARGEABLE SUPPLY: Performed by: UROLOGY

## 2025-05-27 PROCEDURE — 6360000002 HC RX W HCPCS: Performed by: UROLOGY

## 2025-05-27 PROCEDURE — 2580000003 HC RX 258

## 2025-05-27 PROCEDURE — 6360000002 HC RX W HCPCS: Performed by: NURSE ANESTHETIST, CERTIFIED REGISTERED

## 2025-05-27 PROCEDURE — 3700000001 HC ADD 15 MINUTES (ANESTHESIA): Performed by: UROLOGY

## 2025-05-27 PROCEDURE — 6370000000 HC RX 637 (ALT 250 FOR IP)

## 2025-05-27 PROCEDURE — 3600000013 HC SURGERY LEVEL 3 ADDTL 15MIN: Performed by: UROLOGY

## 2025-05-27 PROCEDURE — C2617 STENT, NON-COR, TEM W/O DEL: HCPCS | Performed by: UROLOGY

## 2025-05-27 PROCEDURE — 3700000000 HC ANESTHESIA ATTENDED CARE: Performed by: UROLOGY

## 2025-05-27 PROCEDURE — 7100000011 HC PHASE II RECOVERY - ADDTL 15 MIN: Performed by: UROLOGY

## 2025-05-27 PROCEDURE — 6370000000 HC RX 637 (ALT 250 FOR IP): Performed by: UROLOGY

## 2025-05-27 PROCEDURE — C1769 GUIDE WIRE: HCPCS | Performed by: UROLOGY

## 2025-05-27 PROCEDURE — 2500000003 HC RX 250 WO HCPCS: Performed by: UROLOGY

## 2025-05-27 PROCEDURE — 7100000001 HC PACU RECOVERY - ADDTL 15 MIN: Performed by: UROLOGY

## 2025-05-27 PROCEDURE — 3600000003 HC SURGERY LEVEL 3 BASE: Performed by: UROLOGY

## 2025-05-27 DEVICE — URETERAL STENT
Type: IMPLANTABLE DEVICE | Status: FUNCTIONAL
Brand: PERCUFLEX™ PLUS

## 2025-05-27 RX ORDER — PROPOFOL 10 MG/ML
INJECTION, EMULSION INTRAVENOUS
Status: DISCONTINUED | OUTPATIENT
Start: 2025-05-27 | End: 2025-05-27 | Stop reason: SDUPTHER

## 2025-05-27 RX ORDER — SODIUM CHLORIDE, SODIUM LACTATE, POTASSIUM CHLORIDE, CALCIUM CHLORIDE 600; 310; 30; 20 MG/100ML; MG/100ML; MG/100ML; MG/100ML
INJECTION, SOLUTION INTRAVENOUS CONTINUOUS
Status: DISCONTINUED | OUTPATIENT
Start: 2025-05-27 | End: 2025-05-27 | Stop reason: HOSPADM

## 2025-05-27 RX ORDER — FENTANYL CITRATE 50 UG/ML
50 INJECTION, SOLUTION INTRAMUSCULAR; INTRAVENOUS EVERY 5 MIN PRN
Status: DISCONTINUED | OUTPATIENT
Start: 2025-05-27 | End: 2025-05-27 | Stop reason: HOSPADM

## 2025-05-27 RX ORDER — LIDOCAINE HYDROCHLORIDE 20 MG/ML
INJECTION, SOLUTION EPIDURAL; INFILTRATION; INTRACAUDAL; PERINEURAL
Status: DISCONTINUED | OUTPATIENT
Start: 2025-05-27 | End: 2025-05-27 | Stop reason: SDUPTHER

## 2025-05-27 RX ORDER — ONDANSETRON 2 MG/ML
INJECTION INTRAMUSCULAR; INTRAVENOUS
Status: DISCONTINUED | OUTPATIENT
Start: 2025-05-27 | End: 2025-05-27 | Stop reason: SDUPTHER

## 2025-05-27 RX ORDER — PROCHLORPERAZINE EDISYLATE 5 MG/ML
5 INJECTION INTRAMUSCULAR; INTRAVENOUS
Status: DISCONTINUED | OUTPATIENT
Start: 2025-05-27 | End: 2025-05-27 | Stop reason: HOSPADM

## 2025-05-27 RX ORDER — LIDOCAINE HYDROCHLORIDE 20 MG/ML
JELLY TOPICAL PRN
Status: DISCONTINUED | OUTPATIENT
Start: 2025-05-27 | End: 2025-05-27 | Stop reason: ALTCHOICE

## 2025-05-27 RX ORDER — ACETAMINOPHEN 325 MG/1
650 TABLET ORAL ONCE
Status: COMPLETED | OUTPATIENT
Start: 2025-05-27 | End: 2025-05-27

## 2025-05-27 RX ORDER — OXYCODONE HYDROCHLORIDE 5 MG/1
5 TABLET ORAL PRN
Status: DISCONTINUED | OUTPATIENT
Start: 2025-05-27 | End: 2025-05-27 | Stop reason: HOSPADM

## 2025-05-27 RX ORDER — HYDRALAZINE HYDROCHLORIDE 20 MG/ML
10 INJECTION INTRAMUSCULAR; INTRAVENOUS
Status: DISCONTINUED | OUTPATIENT
Start: 2025-05-27 | End: 2025-05-27 | Stop reason: HOSPADM

## 2025-05-27 RX ORDER — SODIUM CHLORIDE 0.9 % (FLUSH) 0.9 %
5-40 SYRINGE (ML) INJECTION PRN
Status: DISCONTINUED | OUTPATIENT
Start: 2025-05-27 | End: 2025-05-27 | Stop reason: HOSPADM

## 2025-05-27 RX ORDER — DIMENHYDRINATE 50 MG
50 TABLET ORAL ONCE
Status: COMPLETED | OUTPATIENT
Start: 2025-05-27 | End: 2025-05-27

## 2025-05-27 RX ORDER — LABETALOL HYDROCHLORIDE 5 MG/ML
10 INJECTION, SOLUTION INTRAVENOUS
Status: DISCONTINUED | OUTPATIENT
Start: 2025-05-27 | End: 2025-05-27 | Stop reason: HOSPADM

## 2025-05-27 RX ORDER — SODIUM CHLORIDE 0.9 % (FLUSH) 0.9 %
5-40 SYRINGE (ML) INJECTION EVERY 12 HOURS SCHEDULED
Status: DISCONTINUED | OUTPATIENT
Start: 2025-05-27 | End: 2025-05-27 | Stop reason: HOSPADM

## 2025-05-27 RX ORDER — NALOXONE HYDROCHLORIDE 0.4 MG/ML
INJECTION, SOLUTION INTRAMUSCULAR; INTRAVENOUS; SUBCUTANEOUS PRN
Status: DISCONTINUED | OUTPATIENT
Start: 2025-05-27 | End: 2025-05-27 | Stop reason: HOSPADM

## 2025-05-27 RX ORDER — DEXAMETHASONE SODIUM PHOSPHATE 4 MG/ML
INJECTION, SOLUTION INTRA-ARTICULAR; INTRALESIONAL; INTRAMUSCULAR; INTRAVENOUS; SOFT TISSUE
Status: DISCONTINUED | OUTPATIENT
Start: 2025-05-27 | End: 2025-05-27 | Stop reason: SDUPTHER

## 2025-05-27 RX ORDER — OXYCODONE HYDROCHLORIDE 5 MG/1
10 TABLET ORAL PRN
Status: DISCONTINUED | OUTPATIENT
Start: 2025-05-27 | End: 2025-05-27 | Stop reason: HOSPADM

## 2025-05-27 RX ORDER — ONDANSETRON 2 MG/ML
4 INJECTION INTRAMUSCULAR; INTRAVENOUS
Status: DISCONTINUED | OUTPATIENT
Start: 2025-05-27 | End: 2025-05-27 | Stop reason: HOSPADM

## 2025-05-27 RX ORDER — SODIUM CHLORIDE 9 MG/ML
INJECTION, SOLUTION INTRAVENOUS PRN
Status: DISCONTINUED | OUTPATIENT
Start: 2025-05-27 | End: 2025-05-27 | Stop reason: HOSPADM

## 2025-05-27 RX ORDER — FENTANYL CITRATE 50 UG/ML
INJECTION, SOLUTION INTRAMUSCULAR; INTRAVENOUS
Status: DISCONTINUED | OUTPATIENT
Start: 2025-05-27 | End: 2025-05-27 | Stop reason: SDUPTHER

## 2025-05-27 RX ADMIN — DIMENHYDRINATE 50 MG: 50 TABLET ORAL at 14:03

## 2025-05-27 RX ADMIN — DEXAMETHASONE SODIUM PHOSPHATE 4 MG: 4 INJECTION, SOLUTION INTRAMUSCULAR; INTRAVENOUS at 15:14

## 2025-05-27 RX ADMIN — WATER 2000 MG: 1 INJECTION INTRAMUSCULAR; INTRAVENOUS; SUBCUTANEOUS at 15:13

## 2025-05-27 RX ADMIN — PROPOFOL 100 MG: 10 INJECTION, EMULSION INTRAVENOUS at 15:04

## 2025-05-27 RX ADMIN — LIDOCAINE HYDROCHLORIDE 3 ML: 20 INJECTION, SOLUTION EPIDURAL; INFILTRATION; INTRACAUDAL; PERINEURAL at 15:04

## 2025-05-27 RX ADMIN — FENTANYL CITRATE 50 MCG: 50 INJECTION INTRAMUSCULAR; INTRAVENOUS at 15:04

## 2025-05-27 RX ADMIN — ONDANSETRON 4 MG: 2 INJECTION, SOLUTION INTRAMUSCULAR; INTRAVENOUS at 15:18

## 2025-05-27 RX ADMIN — SODIUM CHLORIDE, POTASSIUM CHLORIDE, SODIUM LACTATE AND CALCIUM CHLORIDE: 600; 310; 30; 20 INJECTION, SOLUTION INTRAVENOUS at 15:02

## 2025-05-27 RX ADMIN — SODIUM CHLORIDE, POTASSIUM CHLORIDE, SODIUM LACTATE AND CALCIUM CHLORIDE: 600; 310; 30; 20 INJECTION, SOLUTION INTRAVENOUS at 14:08

## 2025-05-27 RX ADMIN — ACETAMINOPHEN 650 MG: 325 TABLET ORAL at 14:03

## 2025-05-27 ASSESSMENT — ENCOUNTER SYMPTOMS: SHORTNESS OF BREATH: 0

## 2025-05-27 ASSESSMENT — PAIN - FUNCTIONAL ASSESSMENT: PAIN_FUNCTIONAL_ASSESSMENT: 0-10

## 2025-05-27 ASSESSMENT — PAIN SCALES - GENERAL
PAINLEVEL_OUTOF10: 0

## 2025-05-27 ASSESSMENT — LIFESTYLE VARIABLES: SMOKING_STATUS: 0

## 2025-05-27 NOTE — DISCHARGE INSTRUCTIONS
SAME DAY SURGERY DISCHARGE INSTRUCTIONS    1.  Do not drive or operate hazardous machinery for 24 hours.    2.  Do not make important personal or business decisions for 24 hours.    3.  Do not drink alcoholic beverages for 24 hours.    4.  Do not smoke tobacco products for 24 hours.    5.  Eat light foods (Jell-O, soups, etc....) and drink plenty of fluids (water, Sprite, etc...) up to 8 glasses per day, as you can tolerate.    6.  Limit your activities for 24 hours.  Do not engage in heavy work until your surgeon gives you permission.      7.  Patient should not be left alone for 12-24 hours following surgical procedure.    8.  Wash hands before and after incision care.  It is important to practice good personal hygiene during the post op period.    9.  Call your surgeon for any questions regarding your surgery.    CYSTOSCOPY DISCHARGE INSTRUCTIONS    Possible burning during urination and/or blood tinged urine.    Drink 6-8 glasses of water for the next day or so.  (This helps to flush the urinary tract.)    Call Dr. Holder ([phone]) if you develop:    Fever over 100 degrees  Prolonged soreness/pain  Unusual bleeding/bruising  Unable to urinate or if urine is bloody  You cannot pass urine 8 hours after the test.  You have pain in your belly or your back just below your rib cage.  (This is called flank pain.)  You have frequent urge to urinate but can pass only small amounts of urine.

## 2025-05-27 NOTE — ANESTHESIA POSTPROCEDURE EVALUATION
Department of Anesthesiology  Postprocedure Note    Patient: Daja Espinosa  MRN: [id number]  YOB: 1960  Date of evaluation: 5/27/2025    Procedure Summary       Date: 05/27/25 Room / Location: [facility]    Anesthesia Start: 1502 Anesthesia Stop: 1528    Procedure: CYSTOSCOPY URETERAL STENT INSERTION (Left) Diagnosis:       Gross hematuria      (Gross hematuria [R31.0])    Surgeons: Tulio Holder MD Responsible Provider: Arpan Calabrese APRN - CRNA    Anesthesia Type: general ASA Status: 3            Anesthesia Type: No value filed.    Segundo Phase I: Segundo Score: 10    Segundo Phase II: Segundo Score: 10    Anesthesia Post Evaluation    Patient location during evaluation: bedside  Patient participation: complete - patient participated  Level of consciousness: awake and alert  Airway patency: patent  Nausea & Vomiting: no nausea and no vomiting  Cardiovascular status: hemodynamically stable  Respiratory status: acceptable  Hydration status: stable  Multimodal analgesia pain management approach  Pain management: adequate    No notable events documented.

## 2025-05-27 NOTE — ANESTHESIA PRE PROCEDURE
intravenous.    MIPS: Postoperative opioids intended and Prophylactic antiemetics administered.  Anesthetic plan and risks discussed with patient and child/children.    Use of blood products discussed with patient whom consented to blood products.                    Lloyd Givens, APRN - CRNA   5/27/2025

## 2025-05-27 NOTE — CARE COORDINATION
Ambulatory Care Coordination Note     5/27/2025      Patient outreach attempt by this ACM today to perform care management follow up . ACM was unable to reach the patient by telephone today;   voicemail full and unable to leave a message.      ACM: Thania Toscano RN     Care Summary Note: MARITZA follow up call     PCP/Specialist follow up:   Future Appointments         Provider Specialty Dept Phone    5/29/2025 1:00 PM Familia Shepard MD Oncology [phone]    5/30/2025 1:30 PM Sohail Garcia MD Family Medicine [phone]    6/2/2025 2:15 PM Tulio Holder MD Urology [phone]    7/1/2025 11:00 AM Nicole Chan MD Nephrology [phone]    8/13/2025 10:00 AM Terrell Cortez MD Cardiology [phone]            Follow Up:   Plan for next ACM outreach in approximately 1-2 days  to complete:  - disease specific assessments  - medication review  - goal progression  - education   - hospital follow up.

## 2025-05-27 NOTE — INTERVAL H&P NOTE
Her for left stent placement    History and Physical reviewed  I have examined the patient and no changes    Tulio Holder MD

## 2025-05-27 NOTE — OP NOTE
[address] [address]                            OPERATIVE REPORT      PATIENT NAME: OTILIA KO                 : 1960  MED REC NO: [id number]                          ROOM: [facility]  ACCOUNT NO: [id number]                       ADMIT DATE: 2025  PROVIDER: Tulio Holder MD      DATE OF PROCEDURE:  2025    SURGEON:  Tulio Holder MD    ASSISTANT:  None.    PREOPERATIVE DIAGNOSIS:  Left ureteral obstruction.    POSTOPERATIVE DIAGNOSIS:  Left ureteral obstruction.    PROCEDURES:  Cystoscopy, left ureteral stent placement.    ANESTHESIA:  General.    COMPLICATIONS:  None.    ESTIMATED BLOOD LOSS:  Minimal.    SPECIMENS REMOVED:  6-Kazakh x 24 cm double-J ureteral stent.    DISPOSITION:  Stable.    FINDINGS:  Left ureteral obstruction.    INDICATIONS:  The patient is a 64-year-old female with known left-sided ureteral obstruction, here now for definitive therapy in the form of left ureteral stent placement.    DESCRIPTION OF PROCEDURE:  The patient was taken back to the operating room.  Informed consent including all risks, benefits, and alternatives obtained.  The patient was transferred from the [facility] onto the operating room table where she was induced under general anesthesia, given IV Ancef for preoperative antibiotic prophylaxis.  To begin the case, she was prepped and draped in normal sterile fashion, placed in dorsal lithotomy.  She had a 0.035-inch wire passed up the left ureter into the kidney.  This was confirmed by fluoroscopy.  We then placed a 6-Kazakh x 24 cm double-J ureteral stent over the wire up in the kidney.  Glidewire was removed.  Proximal curl was confirmed by fluoroscopy, distal curl was confirmed by visualization.  At this point in time, the bladder was drained.  She was woken from general anesthesia, transferred to [facility], and taken to the PACU in satisfactory condition by Nursing

## 2025-05-27 NOTE — PROGRESS NOTES
Discharge Criteria    Inpatients must meet Criteria 1 through 7. All other patients are either YES or N/A. If a NO is chosen then Anesthesia or Surgeon must be notified.      1.  Minimum 30 minutes after last dose of sedative medication.    Yes      2.  Systolic BP between 90 - 160. Diastolic BP between 60 - 90.    Yes      3.  Pulse between 60 - 120    Yes      4.  Respirations between 8 - 25.    Yes      5.  SpO2 92% - 100%.    Yes      6.  Able to cough and swallow or return to baseline function.    Yes      7.  Alert and oriented or return to baseline mental status.    Yes      8.  Demonstrates controlled, coordinated movements, ambulates with steady gait, or return to baseline activity function.    Yes      9.  Minimal or no pain or nausea, or at a level tolerable and acceptable to patient.    Yes      10. Takes and retains oral fluids as allowed.    Yes      11. Procedural / perioperative site stable.  Minimal or no bleeding.    Yes          12. If GI endoscopy procedure, minimal or no abdominal distention or passing flatus.    NA      13. Written discharge instructions and emergency telephone number provided.    Yes      14. Accompanied by a responsible adult.    Yes

## 2025-05-28 ENCOUNTER — CARE COORDINATION (OUTPATIENT)
Dept: CARE COORDINATION | Age: 65
End: 2025-05-28

## 2025-05-28 DIAGNOSIS — R31.0 GROSS HEMATURIA: Primary | ICD-10-CM

## 2025-05-28 NOTE — CARE COORDINATION
Ambulatory Care Coordination Note     2025 2:04 PM     Patient Current Location:  Home: [address] [place] [address]     ACM contacted the patient by telephone. Verified name and  with patient as identifiers.         ACM: Thania Toscano RN     Challenges to be reviewed by the provider   Additional needs identified to be addressed with provider No  none               Method of communication with provider: none.    Utilization: Has the patient been discharged from the hospital since your last call? yes -   Call within 2 business days of discharge: Yes    Patient: Daja Espinosa    Patient : 1960   MRN: [id number]    Reason for Admission: gross hematuria  Discharge Date: 25  RURS: Readmission Risk Score: 31.2      Last Discharge Facility       Date Complaint Diagnosis Description Type Department Provider    25   Admission (Discharged) [facility] OR Tulio Holder MD            Was this an external facility discharge? No    Ambulatory Care Manager reviewed discharge instructions with patient. The patient was given an opportunity to ask questions; no further questions or concerns at this time.. The patient verbalized understanding.   Were discharge instructions available to patient? Yes.   Reviewed appropriate site of care based on symptoms and resources available to patient including: PCP  Specialist. The patient agrees to contact the primary care provider and/or specialist office for questions related to their healthcare.     Patients top risk factors for readmission: medical condition-gross hematuria, stent placement    Hospital follow up appointment: Discussed follow up appointments. Patient has hospital follow up appointment scheduled within 7 days of discharge.     Care Summary Note: Spoke with Daja. Reports she is doing well, taking it slow today. Was inpatient at [facility] from - then had stent placed as outpatient on . ACM attempted to call patient yesterday however was at

## 2025-05-29 ENCOUNTER — TELEMEDICINE (OUTPATIENT)
Dept: ONCOLOGY | Age: 65
End: 2025-05-29
Payer: COMMERCIAL

## 2025-05-29 DIAGNOSIS — D50.0 ANEMIA, BLOOD LOSS: Primary | ICD-10-CM

## 2025-05-29 DIAGNOSIS — Z12.31 BREAST CANCER SCREENING BY MAMMOGRAM: ICD-10-CM

## 2025-05-29 DIAGNOSIS — D63.1 ANEMIA DUE TO STAGE 5 CHRONIC KIDNEY DISEASE, NOT ON CHRONIC DIALYSIS (HCC): ICD-10-CM

## 2025-05-29 DIAGNOSIS — C67.9 UROTHELIAL CARCINOMA OF BLADDER WITH INVASION OF MUSCLE (HCC): ICD-10-CM

## 2025-05-29 DIAGNOSIS — N18.4 CKD (CHRONIC KIDNEY DISEASE) STAGE 4, GFR 15-29 ML/MIN (HCC): ICD-10-CM

## 2025-05-29 DIAGNOSIS — I48.0 PAROXYSMAL ATRIAL FIBRILLATION (HCC): ICD-10-CM

## 2025-05-29 DIAGNOSIS — N18.5 ANEMIA DUE TO STAGE 5 CHRONIC KIDNEY DISEASE, NOT ON CHRONIC DIALYSIS (HCC): ICD-10-CM

## 2025-05-29 DIAGNOSIS — N13.39 OTHER HYDRONEPHROSIS: ICD-10-CM

## 2025-05-29 PROCEDURE — G8428 CUR MEDS NOT DOCUMENT: HCPCS | Performed by: INTERNAL MEDICINE

## 2025-05-29 PROCEDURE — 1111F DSCHRG MED/CURRENT MED MERGE: CPT | Performed by: INTERNAL MEDICINE

## 2025-05-29 PROCEDURE — 3017F COLORECTAL CA SCREEN DOC REV: CPT | Performed by: INTERNAL MEDICINE

## 2025-05-29 PROCEDURE — 99211 OFF/OP EST MAY X REQ PHY/QHP: CPT | Performed by: INTERNAL MEDICINE

## 2025-05-29 PROCEDURE — 99214 OFFICE O/P EST MOD 30 MIN: CPT | Performed by: INTERNAL MEDICINE

## 2025-05-29 NOTE — PROGRESS NOTES
Patient ID: Daja Espinosa, 1960, [id number], 64 y.o.  Referred by :  No ref. 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  Embolic stroke on 4/6/2024  Concurrent chemo RT as a definitive treatment started on Johana 10, 2024 (plan for continuous neoadjuvant chemotherapy abandoned as patient not surgical candidate)  Chronic kidney disease  Recurrent admission to the hospital for GI bleed, HEAVENLY, pneumonia, AVR      Hematology oncology treatment  Cisplatin gemcitabine neoadjuvant started on March 19, 2024 every 3 weeks status post 1 cycle and have stopped and the plan changed to concurrent chemo RT   concurrent chemo RT with low-dose biweekly gemcitabine 27 mg/m² started in Johana 10, 2024  Treatment interruptions due to several admission to the hospital  Eliquis  Started immunotherapy with Opdivo on 10/18/2024 with intent to follow PET/CT  Follow-up PET/CT on February 6, 2025 no evidence of metastasis> Opdivo has been discontinued      HISTORY OF PRESENT ILLNESS:    Oncologic History:    Daja Espinosa is a very pleasant 64 y.o. female.  With history of nonmuscle invasive bladder cancer status post multiple resection she lost to follow-up for almost 5 years, Patient did have a recent CT scan. This does show severe left hydronephrosis.  This is down to the level of the bladder.  The bladder does appear to be thickened on the left side.  Patient has had slight worsening of her creatinine.  Patient has been having some left-sided flank pain.  Patient has no unintentional weight loss or decreased appetite.  She reports no nausea or vomiting.     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   Pain significantly improved she still have intermittent hematuria and she was referred for neoadjuvant chemotherapy  CT

## 2025-05-30 ENCOUNTER — OFFICE VISIT (OUTPATIENT)
Dept: FAMILY MEDICINE CLINIC | Age: 65
End: 2025-05-30

## 2025-05-30 VITALS
BODY MASS INDEX: 18.61 KG/M2 | SYSTOLIC BLOOD PRESSURE: 138 MMHG | RESPIRATION RATE: 18 BRPM | DIASTOLIC BLOOD PRESSURE: 82 MMHG | WEIGHT: 105 LBS | HEIGHT: 63 IN

## 2025-05-30 DIAGNOSIS — J96.21 ACUTE ON CHRONIC RESPIRATORY FAILURE WITH HYPOXIA (HCC): ICD-10-CM

## 2025-05-30 DIAGNOSIS — D50.0 ANEMIA, BLOOD LOSS: ICD-10-CM

## 2025-05-30 DIAGNOSIS — N18.4 CKD (CHRONIC KIDNEY DISEASE) STAGE 4, GFR 15-29 ML/MIN (HCC): ICD-10-CM

## 2025-05-30 DIAGNOSIS — I74.09 AORTOILIAC OCCLUSIVE DISEASE (HCC): ICD-10-CM

## 2025-05-30 DIAGNOSIS — R73.03 PRE-DIABETES: ICD-10-CM

## 2025-05-30 DIAGNOSIS — I48.0 PAROXYSMAL ATRIAL FIBRILLATION (HCC): ICD-10-CM

## 2025-05-30 DIAGNOSIS — C67.9 UROTHELIAL CARCINOMA OF BLADDER WITH INVASION OF MUSCLE (HCC): ICD-10-CM

## 2025-05-30 DIAGNOSIS — N13.5 OBSTRUCTION OF LEFT URETER: ICD-10-CM

## 2025-05-30 DIAGNOSIS — I10 HYPERTENSION, ESSENTIAL: ICD-10-CM

## 2025-05-30 DIAGNOSIS — R31.0 GROSS HEMATURIA: Primary | ICD-10-CM

## 2025-05-30 DIAGNOSIS — I50.82 BIVENTRICULAR HEART FAILURE (HCC): ICD-10-CM

## 2025-05-30 DIAGNOSIS — Z09 HOSPITAL DISCHARGE FOLLOW-UP: ICD-10-CM

## 2025-05-30 LAB — HBA1C MFR BLD: 5.3 %

## 2025-05-30 ASSESSMENT — PATIENT HEALTH QUESTIONNAIRE - PHQ9
SUM OF ALL RESPONSES TO PHQ QUESTIONS 1-9: 2
1. LITTLE INTEREST OR PLEASURE IN DOING THINGS: SEVERAL DAYS
SUM OF ALL RESPONSES TO PHQ QUESTIONS 1-9: 2
2. FEELING DOWN, DEPRESSED OR HOPELESS: SEVERAL DAYS

## 2025-05-30 NOTE — PATIENT INSTRUCTIONS
SURVEY:    You may be receiving a survey from [facility] regarding your visit today.    Please complete the survey to enable us to provide the highest quality of care to you and your family.    If you cannot score us a very good on any question, please call the office to discuss how we could have made your experience a very good one.    Thank you.    [facility]  MD Jalil Teixeira, DO Clotilde Patel, CNP  Jc Valentine, MD Stacey Steele, APRN-CNP  Ayaka Beaulieu, Practice Manager  Bethany, MAGO Monteiro, CCMJANNA Taveras, CMA  Sridevi, CMA  Lance, PSC   Nani, LPN  Kristen, CMA

## 2025-05-30 NOTE — PROGRESS NOTES
Post-Discharge Transitional Care  Follow Up      Daja Espinosa   YOB: 1960    Date of Office Visit:  5/30/2025  Date of Hospital Admission: 5/27/25  Date of Hospital Discharge: 5/27/25  Risk of hospital readmission (high >=14%. Medium >=10%) :Readmission Risk Score: 31.2      Care management risk score Rising risk (score 2-5) and Complex Care (Scores >=6): No Risk Score On File     Non face to face  following discharge, date last encounter closed (first attempt may have been earlier): 05/28/2025    Call initiated 2 business days of discharge: Yes    ASSESSMENT/PLAN:   1.Gross hematuria secondary to trauma to the bladder.  Resolved.  2.Anemia, blood loss.  S/p 1 unit PRBC transfusion with improvement in H&H.  3.Obstruction of left ureter.  S/p cystoscopy with left ureteral stent placement on 5/27/25 by Dr. Holder.  Follow-up with urology  4.Hypertension, essential.  Blood pressure stable, however concerned about potential hypotension.  She is advised to take hydralazine twice a day, or stop if systolic BP is less than 110  5.Paroxysmal atrial fibrillation (HCC).  Heart rate controlled.  She is to resume Eliquis  6.CKD (chronic kidney disease) stage 4, GFR 15-29 ml/min (HCC)  7.Pre-diabetes.  Resolved hemoglobin A1c 5.4% today  -     POCT glycosylated hemoglobin (Hb A1C)  8.Biventricular heart failure (HCC).  Stable  9.Acute on chronic respiratory failure with hypoxia (HCC).  Resolved  10.Aortoiliac occlusive disease (HCC).  On medical management  11.Urothelial carcinoma of bladder with invasion of muscle (HCC)      Medical Decision Making: high complexity  Return in about 6 months (around 11/30/2025) for HTN.           Subjective:   HPI:  Follow up of Hospital problems/diagnosis(es): Gross hematuria, acute blood loss anemia, left ureteral obstruction    Inpatient course: Discharge summary reviewed- see chart.    Interval history/Current status:     Daja Espinosa presents to office to follow-up for

## 2025-05-30 NOTE — PROGRESS NOTES
Physician Progress Note      PATIENT:               OTILIA KO  CSN #:                  [id number]  :                       1960  ADMIT DATE:       2025 12:08 PM  DISCH DATE:        2025 8:15 PM  RESPONDING  PROVIDER #:        Herman Still MD          QUERY TEXT:    Gastrointestinal bleeding is documented in the medical record Discharge   Summary by Herman Still MD at 2025.  Please specify the underlying   cause:    The clinical indicators include:  Discharge Summary by Herman Still MD at 2025 64 y.o. female with   history if invasive cancer , fall , on chronic anticoagulation    -\" admitted with fatigue and weakness.  Found to have gross hematuria.    Three-way catheter was inserted.  Urology was involved.  Blood thinner was   discontinued.  Patient had issues with tolerating a diet including severe   nausea.  Several different antiemetics were used.  Eventually benzodiazepines   seem to work really well.  Patient had improvement and was discharged home.    Will outpatient follow-up.  Recommended discontinuing Eliquis until follow-up   with urology. This did spontaneously fall out last week. \"    CT abdomen 2025 Focal urinary bladder wall thickening and punctate   calcifications at the  urinary bladder wall posterior left presumably related to the urinary bladder   cancer described in the clinical history. No significant interval change.    Hematocrit 22.5  21.3  17.7    Hemoglobin  7.4  7.6 7.3   6.2    IV promethazine, IVF, serial lab, image studies, urology consultation    Thank you    Deepthi Donaldson [facility] ,  CDS  Options provided:  -- gross hematuria due to invasive bladder cancer .  -- gross hematuria due to fall  -- gross hematuria due to long term use of anticoagulant  -- Other - I will add my own diagnosis  -- Disagree - Not applicable / Not valid  -- Disagree - Clinically unable to determine / Unknown  -- Refer to Clinical Documentation

## 2025-06-02 ENCOUNTER — CARE COORDINATION (OUTPATIENT)
Dept: CARE COORDINATION | Facility: CLINIC | Age: 65
End: 2025-06-02

## 2025-06-02 NOTE — CARE COORDINATION
6/2/2025 11:44 AM  *  Unable to Reach Date/Time:  6/2/2025 11:44 AM  LPN attempted to reach patient by telephone regarding yellow alert in remote patient monitoring program. Left HIPAA compliant message requesting a return call. Will attempt to reach patient again.

## 2025-06-02 NOTE — CARE COORDINATION
2025 12:20 PM  *  Alert and Triage   -Remote Alert Monitoring Note      Date/Time:  2025 12:20 PM  Patient Current Location: [place]  Verified patients name and  as identifiers.    Rpm alert to be reviewed by the provider   yellow alert  blood pressure reading (175/88), no weight taken / updated x 4 and no pulse ox / pulse ox HR or temperature reading taken / updated x 6 days  Vitals Recheck blood pressure reading (164/86)  Additional needs to be addressed by provider: No             LPN contacted patient by telephone regarding yellow alert received   Background: Pt enrolled in RPM r/t pneumonia   Refer to 911 immediately if:  Patient unresponsive or unable to provide history  Change in cognition or sudden confusion  Patient unable to respond in complete sentences  Intense chest pain/tightness  Any concern for any clinical emergency  Red Alert: Provider response time of 1 hr required for any red alert requiring intervention  Yellow Alert: Provider response time of 3hr required for any escalated yellow alert  Patient Chief Complaint:  Blood Pressure BP Triage  Are you having any Chest Pain? no   Are you having any Shortness of Breath? no   Do you have a headache or have any vision changes? no   Are you having any numbness or tingling? no   Are you having any other health concerns or issues? Nothing new  Patient/Caregiver educated on how to properly take a blood pressure. Patient/Caregiver verbalizes understanding.     Clinical Interventions: Reviewed and followed up on alerts and treatments-Pt speaking in full clear sentences, denies CP, SOB, headache, vision changes numbness and tingling at this time. Confirms medication compliance. Agreeable to recheck BP at this time. Updated reading of 164/86 noted in HRS and is within RPM parameters. Pt educated on RPM adherence, v/u and denies any known PRM equipment / tech issues. Reported temperature reading of 98.6F added to HRS. All updated metrics are within PRM

## 2025-06-03 ENCOUNTER — CARE COORDINATION (OUTPATIENT)
Dept: CASE MANAGEMENT | Age: 65
End: 2025-06-03

## 2025-06-03 NOTE — CARE COORDINATION
-Remote Alert Monitoring Note      Date/Time:  6/3/2025 10:39 AM  Patient Current Location: Home: [address] [address]  Verified patients name and  as identifiers.    Rpm alert to be reviewed by the provider   yellow alert  blood pressure reading (171/88)  Vitals Recheck blood pressure reading (163/91)  Additional needs to be addressed by provider: No                   LPN contacted patient by telephone regarding red alert received   Background: PNA  Refer to 911 immediately if:  Patient unresponsive or unable to provide history  Change in cognition or sudden confusion  Patient unable to respond in complete sentences  Intense chest pain/tightness  Any concern for any clinical emergency  Red Alert: Provider response time of 1 hr required for any red alert requiring intervention  Yellow Alert: Provider response time of 3hr required for any escalated yellow alert  Patient Chief Complaint:  Blood Pressure BP Triage  Are you having any Chest Pain? no   Are you having any Shortness of Breath? no   Do you have a headache or have any vision changes? no   Are you having any numbness or tingling? no   Are you having any other health concerns or issues? no  Patient/Caregiver educated on how to properly take a blood pressure. Patient/Caregiver verbalizes understanding.     Clinical Interventions: Reviewed and followed up on alerts and treatments-Spoke to patient she denies chest pain, SOB, dizziness states she feels good, repeat BP now WNL     Plan/Follow Up: Will continue to review, monitor and address alerts with follow up based on severity of symptoms and risk factors.  **For any new or worsening symptoms or you are concerned in anyway, please contact your Provider or report to the nearest Emergency Room.**

## 2025-06-04 ENCOUNTER — TELEPHONE (OUTPATIENT)
Dept: UROLOGY | Age: 65
End: 2025-06-04

## 2025-06-04 ENCOUNTER — TELEPHONE (OUTPATIENT)
Dept: FAMILY MEDICINE CLINIC | Age: 65
End: 2025-06-04

## 2025-06-04 NOTE — TELEPHONE ENCOUNTER
Bleeding is normal with the stent.  Encouraged her to drink more water.  She does need to contact her primary care provider regarding swelling

## 2025-06-04 NOTE — TELEPHONE ENCOUNTER
Patient informed of response. She verbalizes understanding. She did contact her PCP and awaiting a response.

## 2025-06-04 NOTE — TELEPHONE ENCOUNTER
Patient calls in reporting she had bloody urine starting Monday night. Today it is clear. She had stent placed on 5/27.  She also wanted to let us know she has edema in both of her feet. She reports her sandals left a cosme in her feet, also. Enc her to call her PCP in regards to her swelling. Please advise, thank  you.

## 2025-06-04 NOTE — TELEPHONE ENCOUNTER
Patient contacted the office c/o leg/foot swelling. She had advised she had started experiencing leg and foot swelling after walking outdoors in her yard today. BP has been running good lately. She explained she had contacted the Urology office as well due to bleeding symptoms which had recently started as well. She had been advised to contact PCP in regards to the swelling issues. Patient was unavailable to come in tomorrow on Thursday 06/05/2025, when offered appt at 11:00 am. Please advise. Thank you.

## 2025-06-05 RX ORDER — FUROSEMIDE 20 MG/1
20 TABLET ORAL DAILY PRN
Qty: 30 TABLET | Refills: 0 | Status: SHIPPED | OUTPATIENT
Start: 2025-06-05

## 2025-06-09 ENCOUNTER — CARE COORDINATION (OUTPATIENT)
Dept: CASE MANAGEMENT | Age: 65
End: 2025-06-09

## 2025-06-12 ENCOUNTER — CARE COORDINATION (OUTPATIENT)
Dept: CASE MANAGEMENT | Age: 65
End: 2025-06-12

## 2025-06-12 NOTE — CARE COORDINATION
Date/Time:  6/12/2025 11:52 AM  LPN attempted to reach patient by telephone regarding yellow alert BP  in remote patient monitoring program. Left HIPAA compliant message requesting a return call. Will attempt to reach patient again.  UPDATE patient updated metrics they are now WNL

## 2025-06-13 ENCOUNTER — CARE COORDINATION (OUTPATIENT)
Dept: CARE COORDINATION | Age: 65
End: 2025-06-13

## 2025-06-13 NOTE — CARE COORDINATION
Per AC request, Attempted to reach patient for continued Care Coordination follow up.  Patient was unavailable at the time of my call, and a generic voicemail message was left asking patient to return my call at [phone].  Will advise ACM     Incoming return call from patient. I spoke with the patient for continued Care Coordination follow up.  Patient states she is doing good today.  States yesterday had leg swelling and took Lasix PCP prescribed and the swelling went away.  Denies any leg swelling today. Patient states her weight runs between 106-108.  Patient active on RPM for the last 320 days and was advised will consider graduating from RPM. Patient verbally understands and states she has her own wrist BP cuff, thermometer and pulse oximeter.  She just needs a scale. Will advise AC to see about patient qualifying for scale blessing and will update patient at next outreach. I advised patient to contact PCP office if needed.  No further needs at this time.   CC f/u one week

## 2025-06-16 ENCOUNTER — CARE COORDINATION (OUTPATIENT)
Dept: CASE MANAGEMENT | Age: 65
End: 2025-06-16

## 2025-06-18 ENCOUNTER — TELEPHONE (OUTPATIENT)
Dept: FAMILY MEDICINE CLINIC | Age: 65
End: 2025-06-18

## 2025-06-18 ENCOUNTER — TELEPHONE (OUTPATIENT)
Dept: UROLOGY | Age: 65
End: 2025-06-18

## 2025-06-18 RX ORDER — APIXABAN 2.5 MG/1
2.5 TABLET, FILM COATED ORAL 2 TIMES DAILY
Qty: 60 TABLET | Refills: 1 | Status: SHIPPED | OUTPATIENT
Start: 2025-06-18

## 2025-06-18 RX ORDER — PANTOPRAZOLE SODIUM 40 MG/1
40 TABLET, DELAYED RELEASE ORAL
Qty: 30 TABLET | Refills: 1 | Status: SHIPPED | OUTPATIENT
Start: 2025-06-18

## 2025-06-18 NOTE — TELEPHONE ENCOUNTER
Patient contacted office regarding her oxygen level. Checked oxygen after taking medication that was prescribed for her leg swelling and reports it was 92 and blood pressure is 153/78, she is fatigued but her leg swelling has went down since taking Lasix 20 mg, please advise.

## 2025-06-18 NOTE — TELEPHONE ENCOUNTER
Patient just wanted to let us know that her feet are still swollen and her oxygen was 92 last night.  She is taking the med the PCP gave her.  She will call her PCP today to let them know.  I said I would inform you but to make sure she lets her PCP know.

## 2025-06-24 ENCOUNTER — CARE COORDINATION (OUTPATIENT)
Dept: CARE COORDINATION | Age: 65
End: 2025-06-24

## 2025-06-24 NOTE — CARE COORDINATION
Called pt who said she will start her lasix today. She agrees to go to the ER if she does not feel better

## 2025-06-24 NOTE — CARE COORDINATION
Pcp response \"  I would recommend to try taking Lasix every day and if not improving then needs evaluation in the emergency room if she is feeling short of breath.  Thank you\"

## 2025-06-24 NOTE — CARE COORDINATION
Per [facility] request, I spoke with the patient for continued Care Coordination follow up. Patient states she is not feeling well.  Her O2 sat was 93% this am. Patient states she has not had to use Oxygen since Aug.- Her  has passed and he use to help her with Oxygen in past and currently she has a friend with her to help her with Oxygen. Patient states she has not felt well for one week.  Feels weak, SOB and legs hurt and low oxygen, especially at night.  Advised patient to stay by phone for ACM call.    CCSS contacted [facility] and advised patient not feeling well and symptoms stated above.  AC will reach out to patient.   CC f/u 6.97

## 2025-06-24 NOTE — CARE COORDINATION
Ambulatory Care Coordination Note     2025 12:45 PM     Patient Current Location:  Home: [address] [address]     ACM contacted the patient by telephone. Verified name and  with patient as identifiers.         ACM: Ias Mello RN     Challenges to be reviewed by the provider   Additional needs identified to be addressed with provider Yes  Pt is complaing of feeling SOB leg pain her pulse ox is 93% on room air she does have 02 at home and did put her self on 2L and her pulse ox came back up to %95 her lasix is prn and her legs are not swollen so she did not take any lasix. She also complains of weakness and fatigue  also said she is having gross hematuria s/p stent placement she does see he urologist Thursday and he is aware of her hematuria and per pt he is \"not concerned\"                Method of communication with provider: chart routing.    Utilization: Patient has not had any utilization since our last call.     Care Summary Note: Pt is complaing of feeling SOB leg pain her pulse ox is 93% on room air she does have 02 at home and did put her self on 2L and her pulse ox came back up to %95 her lasix is prn and her legs are not swollen so she did not take any lasix. She also complains of weakness and fatigue she also said she is having gross hematuria s/p stent placement she does see he urologist Thursday and he is aware of her hematuria and per pt he is \"not concerned\"     Offered patient enrollment in the Remote Patient Monitoring (RPM) program for in-home monitoring: Yes, patient enrolled; current status is activated and monitoring.     Assessments Completed:   General Assessment    Do you have any symptoms that are causing concern?: Yes  Progression since Onset: Gradually Worsening  Reported Symptoms: Fatigue, Shortness of Breath          Medications Reviewed:   Patient denies any changes with medications and reports taking all medications as prescribed.    Advance Care Planning:   Not

## 2025-06-26 ENCOUNTER — HOSPITAL ENCOUNTER (OUTPATIENT)
Age: 65
Discharge: HOME | End: 2025-06-26
Payer: COMMERCIAL

## 2025-06-26 ENCOUNTER — OFFICE VISIT (OUTPATIENT)
Dept: UROLOGY | Age: 65
End: 2025-06-26
Payer: COMMERCIAL

## 2025-06-26 VITALS
WEIGHT: 104 LBS | BODY MASS INDEX: 18.43 KG/M2 | HEIGHT: 63 IN | SYSTOLIC BLOOD PRESSURE: 136 MMHG | DIASTOLIC BLOOD PRESSURE: 60 MMHG

## 2025-06-26 DIAGNOSIS — D50.0 ANEMIA, BLOOD LOSS: ICD-10-CM

## 2025-06-26 DIAGNOSIS — D63.1 ANEMIA DUE TO STAGE 5 CHRONIC KIDNEY DISEASE, NOT ON CHRONIC DIALYSIS (HCC): ICD-10-CM

## 2025-06-26 DIAGNOSIS — N18.5 ANEMIA DUE TO STAGE 5 CHRONIC KIDNEY DISEASE, NOT ON CHRONIC DIALYSIS (HCC): ICD-10-CM

## 2025-06-26 DIAGNOSIS — C67.9 UROTHELIAL CARCINOMA OF BLADDER WITH INVASION OF MUSCLE (HCC): Primary | ICD-10-CM

## 2025-06-26 DIAGNOSIS — N13.30 HYDRONEPHROSIS, UNSPECIFIED HYDRONEPHROSIS TYPE: ICD-10-CM

## 2025-06-26 LAB
BASOPHILS # BLD: 0.06 K/UL (ref 0–0.2)
BASOPHILS NFR BLD: 1 % (ref 0–2)
EOSINOPHIL # BLD: 0.7 K/UL (ref 0–0.4)
EOSINOPHILS RELATIVE PERCENT: 15 % (ref 0–5)
ERYTHROCYTE [DISTWIDTH] IN BLOOD BY AUTOMATED COUNT: 17.4 % (ref 12.1–15.2)
FERRITIN SERPL-MCNC: 12 NG/ML
HCT VFR BLD AUTO: 15.3 % (ref 36–46)
HGB BLD-MCNC: 4.8 G/DL (ref 12–16)
IMM GRANULOCYTES # BLD AUTO: 0.01 K/UL (ref 0–0.3)
IMM GRANULOCYTES NFR BLD: 0 % (ref 0–5)
IRON SATN MFR SERPL: 3 % (ref 20–55)
IRON SERPL-MCNC: 12 UG/DL (ref 37–145)
LYMPHOCYTES NFR BLD: 0.82 K/UL (ref 1–4.8)
LYMPHOCYTES RELATIVE PERCENT: 17 % (ref 15–40)
MCH RBC QN AUTO: 25 PG (ref 26–34)
MCHC RBC AUTO-ENTMCNC: 31.4 G/DL (ref 31–37)
MCV RBC AUTO: 79.7 FL (ref 80–100)
MONOCYTES NFR BLD: 0.61 K/UL (ref 0–1)
MONOCYTES NFR BLD: 13 % (ref 4–8)
NEUTROPHILS NFR BLD: 54 % (ref 47–75)
NEUTS SEG NFR BLD: 2.59 K/UL (ref 2.5–7)
PLATELET # BLD AUTO: 468 K/UL (ref 140–450)
PMV BLD AUTO: 9.4 FL (ref 6–12)
RBC # BLD AUTO: 1.92 M/UL (ref 4–5.2)
TIBC SERPL-MCNC: 393 UG/DL (ref 250–450)
UNSATURATED IRON BINDING CAPACITY: 381 UG/DL (ref 112–347)
WBC OTHER # BLD: 4.8 K/UL (ref 3.5–11)

## 2025-06-26 PROCEDURE — 3078F DIAST BP <80 MM HG: CPT | Performed by: PHYSICIAN ASSISTANT

## 2025-06-26 PROCEDURE — 85025 COMPLETE CBC W/AUTO DIFF WBC: CPT

## 2025-06-26 PROCEDURE — 3017F COLORECTAL CA SCREEN DOC REV: CPT | Performed by: PHYSICIAN ASSISTANT

## 2025-06-26 PROCEDURE — 83550 IRON BINDING TEST: CPT

## 2025-06-26 PROCEDURE — 82728 ASSAY OF FERRITIN: CPT

## 2025-06-26 PROCEDURE — G8419 CALC BMI OUT NRM PARAM NOF/U: HCPCS | Performed by: PHYSICIAN ASSISTANT

## 2025-06-26 PROCEDURE — 1036F TOBACCO NON-USER: CPT | Performed by: PHYSICIAN ASSISTANT

## 2025-06-26 PROCEDURE — 99214 OFFICE O/P EST MOD 30 MIN: CPT | Performed by: PHYSICIAN ASSISTANT

## 2025-06-26 PROCEDURE — G8427 DOCREV CUR MEDS BY ELIG CLIN: HCPCS | Performed by: PHYSICIAN ASSISTANT

## 2025-06-26 PROCEDURE — 36415 COLL VENOUS BLD VENIPUNCTURE: CPT

## 2025-06-26 PROCEDURE — 3075F SYST BP GE 130 - 139MM HG: CPT | Performed by: PHYSICIAN ASSISTANT

## 2025-06-26 PROCEDURE — 83540 ASSAY OF IRON: CPT

## 2025-06-26 NOTE — PROGRESS NOTES
HPI:    Patient is a 64 y.o. female in no acute distress.  She is alert and oriented to person, place, and time.      History  TURBT 4/5/2016 High Grade Ta  TURBT re-resection 5/17/2016 High Grade Ta  6 Weekly BCG instuillations starting 6/16/16  3 weekly BCG starting 7/20/17     Lost to follow-up in 2019     2/2024 Patient is here today as a new patient.  Patient was well-known to our practice several years ago.  She did have multiple bladder resections.  She was lost to follow-up approximately 5 years ago.  Patient did have a recent CT scan.  This film was independently reviewed.  This does show severe left hydronephrosis.  This is down to the level of the bladder.  The bladder does appear to be thickened on the left side.  Patient has had slight worsening of her creatinine.  Patient has been having some left-sided flank pain.  Is unclear how long this has been going on.  Patient has no unintentional weight loss or decreased appetite.  She reports no nausea or vomiting.     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     4/2024 patient opted for chemotherapy and radiation therapy versus radical cystectomy to 2 surgical risk.  Started cisplatin/gemcitabine.      1/2025 cystoscopy with left ureteral dilation and stent placement    Stent was removed, but subsequently patient did have significant worsening in her kidney function.    5/27/2025 - left ureteral stent placement    Today:  Patient is here today approximately 1 month status post left ureteral stent placement for ureteral stricture disease.  Patient has been tolerating stent well.  She denies any significant discomfort from the stent.  She continues to have intermittent hematuria.  She is on blood thinners.  She states that she sees blood when she does heavy activity.  She is followed by oncology.  She does have a CBC in process.    Past Medical

## 2025-06-27 ENCOUNTER — HOSPITAL ENCOUNTER (INPATIENT)
Age: 65
LOS: 2 days | Discharge: HOME | DRG: 663 | End: 2025-06-29
Attending: INTERNAL MEDICINE | Admitting: INTERNAL MEDICINE
Payer: COMMERCIAL

## 2025-06-27 ENCOUNTER — APPOINTMENT (OUTPATIENT)
Dept: CT IMAGING | Age: 65
DRG: 663 | End: 2025-06-27
Payer: COMMERCIAL

## 2025-06-27 ENCOUNTER — TELEPHONE (OUTPATIENT)
Dept: ONCOLOGY | Age: 65
End: 2025-06-27

## 2025-06-27 ENCOUNTER — CARE COORDINATION (OUTPATIENT)
Dept: CARE COORDINATION | Age: 65
End: 2025-06-27

## 2025-06-27 DIAGNOSIS — D64.9 SYMPTOMATIC ANEMIA: Primary | ICD-10-CM

## 2025-06-27 DIAGNOSIS — D62 ACUTE BLOOD LOSS ANEMIA: ICD-10-CM

## 2025-06-27 DIAGNOSIS — Z79.01 CURRENT USE OF LONG TERM ANTICOAGULATION: ICD-10-CM

## 2025-06-27 DIAGNOSIS — I10 ESSENTIAL HYPERTENSION: ICD-10-CM

## 2025-06-27 LAB
ANION GAP SERPL CALCULATED.3IONS-SCNC: 16 MMOL/L (ref 9–16)
BASOPHILS # BLD: 0.04 K/UL (ref 0–0.2)
BASOPHILS NFR BLD: 1 % (ref 0–2)
BILIRUB UR QL STRIP: NEGATIVE
BUN SERPL-MCNC: 22 MG/DL (ref 8–23)
BUN/CREAT SERPL: 12 (ref 9–20)
CALCIUM SERPL-MCNC: 8.9 MG/DL (ref 8.6–10.4)
CHLORIDE SERPL-SCNC: 99 MMOL/L (ref 98–107)
CLARITY UR: CLEAR
CO2 SERPL-SCNC: 20 MMOL/L (ref 20–31)
COLOR UR: YELLOW
CREAT SERPL-MCNC: 1.8 MG/DL (ref 0.5–0.9)
DATE, STOOL #1: NORMAL
EOSINOPHIL # BLD: 0.62 K/UL (ref 0–0.44)
EOSINOPHILS RELATIVE PERCENT: 14 % (ref 1–4)
EPI CELLS #/AREA URNS HPF: ABNORMAL /HPF (ref 0–25)
ERYTHROCYTE [DISTWIDTH] IN BLOOD BY AUTOMATED COUNT: 17.4 % (ref 11.8–14.4)
GFR, ESTIMATED: 32 ML/MIN/1.73M2
GLUCOSE SERPL-MCNC: 109 MG/DL (ref 74–99)
GLUCOSE UR STRIP-MCNC: NEGATIVE MG/DL
HCT VFR BLD AUTO: 15 % (ref 36.3–47.1)
HCT VFR BLD AUTO: 17.6 % (ref 36.3–47.1)
HEMOCCULT SP1 STL QL: NEGATIVE
HGB BLD-MCNC: 4.7 G/DL (ref 11.9–15.1)
HGB BLD-MCNC: 5.5 G/DL (ref 11.9–15.1)
HGB UR QL STRIP.AUTO: ABNORMAL
IMM GRANULOCYTES # BLD AUTO: 0.04 K/UL (ref 0–0.3)
IMM GRANULOCYTES NFR BLD: 1 %
INR PPP: 1.3
KETONES UR STRIP-MCNC: NEGATIVE MG/DL
LEUKOCYTE ESTERASE UR QL STRIP: ABNORMAL
LYMPHOCYTES NFR BLD: 0.79 K/UL (ref 1.1–3.7)
LYMPHOCYTES RELATIVE PERCENT: 18 % (ref 24–43)
MCH RBC QN AUTO: 25.3 PG (ref 25.2–33.5)
MCHC RBC AUTO-ENTMCNC: 31.3 G/DL (ref 28.4–34.8)
MCV RBC AUTO: 80.6 FL (ref 82.6–102.9)
MONOCYTES NFR BLD: 0.57 K/UL (ref 0.1–1.2)
MONOCYTES NFR BLD: 13 % (ref 3–12)
MORPHOLOGY: ABNORMAL
MORPHOLOGY: ABNORMAL
MUCOUS THREADS URNS QL MICRO: ABNORMAL
NEUTROPHILS NFR BLD: 53 % (ref 36–65)
NEUTS SEG NFR BLD: 2.34 K/UL (ref 1.5–8.1)
NITRITE UR QL STRIP: NEGATIVE
NRBC BLD-RTO: 0 PER 100 WBC
PARTIAL THROMBOPLASTIN TIME: 29.8 SEC (ref 26.8–34.8)
PH UR STRIP: 6 [PH] (ref 5–9)
PLATELET # BLD AUTO: 418 K/UL (ref 138–453)
PMV BLD AUTO: 10 FL (ref 8.1–13.5)
POTASSIUM SERPL-SCNC: 3.1 MMOL/L (ref 3.7–5.3)
PROT UR STRIP-MCNC: ABNORMAL MG/DL
PROTHROMBIN TIME: 16.4 SEC (ref 11.7–14.1)
RBC # BLD AUTO: 1.86 M/UL (ref 3.95–5.11)
RBC #/AREA URNS HPF: ABNORMAL /HPF (ref 0–2)
SODIUM SERPL-SCNC: 135 MMOL/L (ref 136–145)
SP GR UR STRIP: <1.005 (ref 1.01–1.02)
TIME, STOOL #1: 1216
UROBILINOGEN UR STRIP-ACNC: NORMAL EU/DL (ref 0–1)
WBC #/AREA URNS HPF: ABNORMAL /HPF (ref 0–5)
WBC OTHER # BLD: 4.4 K/UL (ref 3.5–11.3)

## 2025-06-27 PROCEDURE — 86850 RBC ANTIBODY SCREEN: CPT

## 2025-06-27 PROCEDURE — 85730 THROMBOPLASTIN TIME PARTIAL: CPT

## 2025-06-27 PROCEDURE — 85610 PROTHROMBIN TIME: CPT

## 2025-06-27 PROCEDURE — 81001 URINALYSIS AUTO W/SCOPE: CPT

## 2025-06-27 PROCEDURE — 1200000000 HC SEMI PRIVATE

## 2025-06-27 PROCEDURE — 86901 BLOOD TYPING SEROLOGIC RH(D): CPT

## 2025-06-27 PROCEDURE — 36430 TRANSFUSION BLD/BLD COMPNT: CPT

## 2025-06-27 PROCEDURE — 85014 HEMATOCRIT: CPT

## 2025-06-27 PROCEDURE — 30233N1 TRANSFUSION OF NONAUTOLOGOUS RED BLOOD CELLS INTO PERIPHERAL VEIN, PERCUTANEOUS APPROACH: ICD-10-PCS | Performed by: INTERNAL MEDICINE

## 2025-06-27 PROCEDURE — 82270 OCCULT BLOOD FECES: CPT

## 2025-06-27 PROCEDURE — 6370000000 HC RX 637 (ALT 250 FOR IP): Performed by: INTERNAL MEDICINE

## 2025-06-27 PROCEDURE — 74176 CT ABD & PELVIS W/O CONTRAST: CPT

## 2025-06-27 PROCEDURE — 85025 COMPLETE CBC W/AUTO DIFF WBC: CPT

## 2025-06-27 PROCEDURE — 94761 N-INVAS EAR/PLS OXIMETRY MLT: CPT

## 2025-06-27 PROCEDURE — 2580000003 HC RX 258: Performed by: INTERNAL MEDICINE

## 2025-06-27 PROCEDURE — 85018 HEMOGLOBIN: CPT

## 2025-06-27 PROCEDURE — 2700000000 HC OXYGEN THERAPY PER DAY

## 2025-06-27 PROCEDURE — 86900 BLOOD TYPING SEROLOGIC ABO: CPT

## 2025-06-27 PROCEDURE — 99285 EMERGENCY DEPT VISIT HI MDM: CPT

## 2025-06-27 PROCEDURE — P9016 RBC LEUKOCYTES REDUCED: HCPCS

## 2025-06-27 PROCEDURE — 80048 BASIC METABOLIC PNL TOTAL CA: CPT

## 2025-06-27 PROCEDURE — 86920 COMPATIBILITY TEST SPIN: CPT

## 2025-06-27 RX ORDER — PANTOPRAZOLE SODIUM 40 MG/1
40 TABLET, DELAYED RELEASE ORAL
Status: DISCONTINUED | OUTPATIENT
Start: 2025-06-28 | End: 2025-06-29 | Stop reason: HOSPADM

## 2025-06-27 RX ORDER — SODIUM CHLORIDE 9 MG/ML
INJECTION, SOLUTION INTRAVENOUS CONTINUOUS
Status: DISCONTINUED | OUTPATIENT
Start: 2025-06-27 | End: 2025-06-28

## 2025-06-27 RX ORDER — ACETAMINOPHEN 325 MG/1
650 TABLET ORAL EVERY 6 HOURS PRN
Status: DISCONTINUED | OUTPATIENT
Start: 2025-06-27 | End: 2025-06-29 | Stop reason: HOSPADM

## 2025-06-27 RX ORDER — FERROUS SULFATE 325(65) MG
325 TABLET ORAL
Status: ON HOLD | COMMUNITY
End: 2025-06-29

## 2025-06-27 RX ORDER — SODIUM CHLORIDE 0.9 % (FLUSH) 0.9 %
5-40 SYRINGE (ML) INJECTION EVERY 12 HOURS SCHEDULED
Status: DISCONTINUED | OUTPATIENT
Start: 2025-06-27 | End: 2025-06-29 | Stop reason: HOSPADM

## 2025-06-27 RX ORDER — CARVEDILOL 12.5 MG/1
12.5 TABLET ORAL 2 TIMES DAILY WITH MEALS
Status: DISCONTINUED | OUTPATIENT
Start: 2025-06-27 | End: 2025-06-29 | Stop reason: HOSPADM

## 2025-06-27 RX ORDER — SODIUM CHLORIDE 9 MG/ML
INJECTION, SOLUTION INTRAVENOUS PRN
Status: DISCONTINUED | OUTPATIENT
Start: 2025-06-27 | End: 2025-06-29 | Stop reason: HOSPADM

## 2025-06-27 RX ORDER — SODIUM CHLORIDE 0.9 % (FLUSH) 0.9 %
10 SYRINGE (ML) INJECTION PRN
Status: DISCONTINUED | OUTPATIENT
Start: 2025-06-27 | End: 2025-06-29 | Stop reason: HOSPADM

## 2025-06-27 RX ORDER — ALPRAZOLAM 0.25 MG
0.25 TABLET ORAL 2 TIMES DAILY PRN
COMMUNITY

## 2025-06-27 RX ORDER — ONDANSETRON 4 MG/1
4 TABLET, ORALLY DISINTEGRATING ORAL EVERY 8 HOURS PRN
Status: DISCONTINUED | OUTPATIENT
Start: 2025-06-27 | End: 2025-06-29 | Stop reason: HOSPADM

## 2025-06-27 RX ORDER — AMLODIPINE BESYLATE 10 MG/1
10 TABLET ORAL DAILY
Status: DISCONTINUED | OUTPATIENT
Start: 2025-06-28 | End: 2025-06-29 | Stop reason: HOSPADM

## 2025-06-27 RX ORDER — ONDANSETRON 2 MG/ML
4 INJECTION INTRAMUSCULAR; INTRAVENOUS EVERY 6 HOURS PRN
Status: DISCONTINUED | OUTPATIENT
Start: 2025-06-27 | End: 2025-06-29 | Stop reason: HOSPADM

## 2025-06-27 RX ORDER — POTASSIUM CHLORIDE 1500 MG/1
40 TABLET, EXTENDED RELEASE ORAL PRN
Status: DISCONTINUED | OUTPATIENT
Start: 2025-06-27 | End: 2025-06-27 | Stop reason: CLARIF

## 2025-06-27 RX ORDER — POLYETHYLENE GLYCOL 3350 17 G/17G
17 POWDER, FOR SOLUTION ORAL DAILY PRN
Status: DISCONTINUED | OUTPATIENT
Start: 2025-06-27 | End: 2025-06-29 | Stop reason: HOSPADM

## 2025-06-27 RX ORDER — POTASSIUM CHLORIDE 1500 MG/1
40 TABLET, EXTENDED RELEASE ORAL ONCE
Status: COMPLETED | OUTPATIENT
Start: 2025-06-27 | End: 2025-06-27

## 2025-06-27 RX ORDER — ACETAMINOPHEN 650 MG/1
650 SUPPOSITORY RECTAL EVERY 6 HOURS PRN
Status: DISCONTINUED | OUTPATIENT
Start: 2025-06-27 | End: 2025-06-29 | Stop reason: HOSPADM

## 2025-06-27 RX ORDER — POTASSIUM CHLORIDE 7.45 MG/ML
10 INJECTION INTRAVENOUS PRN
Status: DISCONTINUED | OUTPATIENT
Start: 2025-06-27 | End: 2025-06-27 | Stop reason: CLARIF

## 2025-06-27 RX ADMIN — CARVEDILOL 12.5 MG: 12.5 TABLET, FILM COATED ORAL at 17:10

## 2025-06-27 RX ADMIN — SODIUM CHLORIDE: 0.9 INJECTION, SOLUTION INTRAVENOUS at 14:42

## 2025-06-27 RX ADMIN — POTASSIUM CHLORIDE 40 MEQ: 1500 TABLET, EXTENDED RELEASE ORAL at 17:12

## 2025-06-27 ASSESSMENT — PAIN - FUNCTIONAL ASSESSMENT: PAIN_FUNCTIONAL_ASSESSMENT: NONE - DENIES PAIN

## 2025-06-27 NOTE — ED NOTES
Patient states recently she was taken off her iron pills and then restarted them last night on her own and took another one today this AM.

## 2025-06-27 NOTE — PROGRESS NOTES
1st unit of PRBC completed at this time. Writer to redraw hemoglobin and obtain 1hour post VS in 1 hour.

## 2025-06-27 NOTE — PROGRESS NOTES
Urine sent as ordered, patient tolerating blood transfusion without apparent concerns or voiced concerns

## 2025-06-27 NOTE — ED PROVIDER NOTES
[facility] EMERGENCY DEPARTMENT  EMERGENCY DEPARTMENT ENCOUNTER      Pt Name: Daja Espinosa  MRN: [id number]  Birthdate 1960  Date of evaluation: 6/27/2025  Provider: Carlos Osorio PA-C  Note Started: 6/27/25 12:32 PM EDT    CHIEF COMPLAINT       Chief Complaint   Patient presents with    Abnormal Lab     Pt was sent by her oncologist for a blood transfusion due to a hemoglobin of 4.8, pt has hx of bladder cancer and had a sent placed, thinks that's where her bleeding is from          HISTORY OF PRESENT ILLNESS   (Location/Symptom, Timing/Onset, Context/Setting, Quality, Duration, Modifying Factors, Severity)  Note limiting factors.   Daja Espinosa is a 64 y.o. female who presents to the emergency department patient presents with urinary bleeding on Eliquis patient states she has been getting intermittent bleeding she states light red in color but does have an issue with clotting.  Patient also has fatigue and shortness of breath.  Patient denies any fever, chills, nausea, vomiting, abdominal pain, back pain.  Symptoms moderate in severity worse with motion or ambulation.     HPI    Nursing Notes were reviewed.    REVIEW OF SYSTEMS    (2-9 systems for level 4, 10 or more for level 5)     Review of Systems   Constitutional:  Positive for fatigue. Negative for activity change, appetite change and fever.   HENT:  Negative for ear discharge, nosebleeds and voice change.    Eyes:  Negative for discharge.   Respiratory:  Positive for shortness of breath. Negative for apnea.    Cardiovascular:  Negative for chest pain.   Gastrointestinal:  Negative for abdominal distention, abdominal pain, nausea and vomiting.   Genitourinary:  Positive for hematuria.   Musculoskeletal:  Negative for joint swelling.   Skin:  Negative for wound.   Neurological:  Negative for seizures and facial asymmetry.   Hematological:  Bruises/bleeds easily.   All other systems reviewed and are negative.      Except as noted above the remainder

## 2025-06-27 NOTE — PROGRESS NOTES
Patient admitted from ED per kimberlyer per Henrique RN, denies pain or difficulty breathing, oriented to room, does have dyspnea with exertion, assessment and vitals inititated, whiteboard updated, granddaughter at bedside.

## 2025-06-27 NOTE — PROGRESS NOTES
Comprehensive Nutrition Assessment    Type and Reason for Visit:  Initial, Positive nutrition screen (MST 1)    Nutrition Recommendations/Plan:   Continue current diet.   Start 4 oz chocolate ensure enlive TID with meals.      Malnutrition Assessment:  Malnutrition Status:  Severe malnutrition (06/27/25 1451)    Context:  Acute Illness     Findings of the 6 clinical characteristics of malnutrition:  Energy Intake:  No decrease in energy intake  Weight Loss:  Greater than 7.5% over 3 months (8.7%/10#)     Body Fat Loss:  Moderate body fat loss Orbital   Muscle Mass Loss:  Moderate muscle mass loss Temples (temporalis)  Fluid Accumulation:  Unable to assess     Strength:  Not Performed    Nutrition Assessment:    Severe malnutrition r/t inadequate protein/energy intakes/catabolic illness aeb weight loss 9#/8% x 3 months, moderate fat/muscle losses. Altered nutrition related labs r/t impaired nutrient utilization aeb Hgb 4.7, 1 U PRBC's ordered, vitamin D 20-not on supplemental vitamin D-recommended supplemental vitamin D, 10/30/24. Na 135, K+ 3.1, Cr 1.8. Hx bladder cancer. Pt reports eating 3 meals daily and snacking on peanut butter and cheese. Has missing teeth, but denied any meal ingestion issues. PT 16.4/INR 1.3. Pt agreeable to 4 oz chocolate ensure enlive TID with meals. Encouraged calorie dense foods.    Nutrition Related Findings:    unable to assess. No data available. Wound Type: None       Current Nutrition Intake & Therapies:    Average Meal Intake: Unable to assess (no meal intake data)  Average Supplements Intake: None Ordered  ADULT DIET; Full Liquid    Anthropometric Measures:  Height: 160 cm (5' 3\")  Ideal Body Weight (IBW): 115 lbs (52 kg)    Admission Body Weight: 48 kg (105 lb 13.1 oz)  Current Body Weight: 48 kg (105 lb 13.1 oz), 92 % IBW. Weight Source: Bed scale  Current BMI (kg/m2): 18.8  Usual Body Weight: 52.2 kg (115 lb)     % Weight Change (Calculated): -8  Weight Adjustment For: No

## 2025-06-27 NOTE — PROGRESS NOTES
Patient receiving blood without any concerns observed or voiced per patient. Granddaughter at bedside.

## 2025-06-27 NOTE — CARE COORDINATION
Ambulatory Care Coordination Note     6/27/2025 11:51 AM     Planned to make CC outreach call to patient today. Noted patient currently in [facility] ED per Dr. Shepard request following critical hemoglobin lab result and needing transfusion. Currently in ED. Will defer planned call.     Future Appointments   Date Time Provider Department Center   7/1/2025 11:00 AM Nicole Chan MD [facility] None   7/10/2025  1:15 PM Familia Shepard MD tiff onc spe [facility]   8/13/2025 10:00 AM Terrell Cortez MD [facility]   10/30/2025 11:30 AM Osmin Nicole, [facility]

## 2025-06-27 NOTE — TELEPHONE ENCOUNTER
Message on office voicemail at arrival to office this morning of a critical lab was to be reported from [facility].  Call to [facility] lab and learn critical is for Daja with a hgb of 4.8.  Immediately notified Dr. Shepard and he says to get to ED and  needs transfused ASAP.    Call to Daja and she states will get to ED this morning.  When asking of symptoms, she states very tired, sob and even used her oxygen last night.  Explained these would be issues related to the critical low hgb.  She verbalizes the understanding of an urgent transfusion.

## 2025-06-27 NOTE — PROGRESS NOTES
PHARMACY NOTE:    The electrolyte replacement protocol for potassium/magnesium has been discontinued per P&T guidelines because the patient has reduced renal function (CrCl < 30 mL/min).      The patient's most recent potassium & magnesium levels are:  Recent Labs     06/27/25  1056   K 3.1*     Estimated Creatinine Clearance: 24 mL/min (A) (based on SCr of 1.8 mg/dL (H)).    For patients with decreased renal function (below 30ml/min) needing potassium/magnesium supplementation, please order individual bolus doses with appropriate monitoring.      Please contact the inpatient pharmacy with any concerns.  Thank you.  Sonya Tapia [facility]  6/27/2025 5:03 PM

## 2025-06-27 NOTE — DISCHARGE INSTRUCTIONS
Follow-up with cardiology regarding blood thinner Eliquis.  I think you need to be considered for another option.  Ask about Watchman device.    Iron every other day    Blood count 1 week

## 2025-06-27 NOTE — CONSENT
Informed Consent for Blood Component Transfusion Note    I have discussed with the patient the rationale for blood component transfusion; its benefits in treating or preventing fatigue, organ damage, or death; and its risk which includes mild transfusion reactions, rare risk of blood borne infection, or more serious but rare reactions. I have discussed the alternatives to transfusion, including the risk and consequences of not receiving transfusion. The patient had an opportunity to ask questions and had agreed to proceed with transfusion of blood components.    Electronically signed by Herman Still MD on 6/27/25 at 12:09 PM EDT

## 2025-06-28 LAB
ABO/RH: NORMAL
ALBUMIN SERPL-MCNC: 3.8 G/DL (ref 3.5–5.2)
ALBUMIN/GLOB SERPL: 1.6 {RATIO} (ref 1–2.5)
ALP SERPL-CCNC: 98 U/L (ref 35–104)
ALT SERPL-CCNC: 15 U/L (ref 10–35)
ANION GAP SERPL CALCULATED.3IONS-SCNC: 13 MMOL/L (ref 9–16)
ANTIBODY SCREEN: NEGATIVE
ARM BAND NUMBER: NORMAL
AST SERPL-CCNC: 21 U/L (ref 10–35)
BASOPHILS # BLD: 0.06 K/UL (ref 0–0.2)
BASOPHILS NFR BLD: 1 % (ref 0–2)
BILIRUB SERPL-MCNC: 2.5 MG/DL (ref 0–1.2)
BLOOD BANK BLOOD PRODUCT EXPIRATION DATE: NORMAL
BLOOD BANK BLOOD PRODUCT EXPIRATION DATE: NORMAL
BLOOD BANK DISPENSE STATUS: NORMAL
BLOOD BANK DISPENSE STATUS: NORMAL
BLOOD BANK ISBT PRODUCT BLOOD TYPE: 600
BLOOD BANK ISBT PRODUCT BLOOD TYPE: 9500
BLOOD BANK PRODUCT CODE: NORMAL
BLOOD BANK PRODUCT CODE: NORMAL
BLOOD BANK SAMPLE EXPIRATION: NORMAL
BLOOD BANK UNIT TYPE AND RH: NORMAL
BLOOD BANK UNIT TYPE AND RH: NORMAL
BPU ID: NORMAL
BPU ID: NORMAL
BUN SERPL-MCNC: 20 MG/DL (ref 8–23)
BUN/CREAT SERPL: 13 (ref 9–20)
CALCIUM SERPL-MCNC: 8.9 MG/DL (ref 8.6–10.4)
CHLORIDE SERPL-SCNC: 103 MMOL/L (ref 98–107)
CO2 SERPL-SCNC: 20 MMOL/L (ref 20–31)
COMPONENT: NORMAL
COMPONENT: NORMAL
CREAT SERPL-MCNC: 1.5 MG/DL (ref 0.5–0.9)
CROSSMATCH RESULT: NORMAL
CROSSMATCH RESULT: NORMAL
EOSINOPHIL # BLD: 1 K/UL (ref 0–0.44)
EOSINOPHILS RELATIVE PERCENT: 17 % (ref 1–4)
ERYTHROCYTE [DISTWIDTH] IN BLOOD BY AUTOMATED COUNT: 16.9 % (ref 11.8–14.4)
GFR, ESTIMATED: 38 ML/MIN/1.73M2
GLUCOSE SERPL-MCNC: 94 MG/DL (ref 74–99)
HCT VFR BLD AUTO: 22.2 % (ref 36.3–47.1)
HCT VFR BLD AUTO: 23.7 % (ref 36.3–47.1)
HCT VFR BLD AUTO: 23.8 % (ref 36.3–47.1)
HGB BLD-MCNC: 7.2 G/DL (ref 11.9–15.1)
HGB BLD-MCNC: 7.7 G/DL (ref 11.9–15.1)
HGB BLD-MCNC: 7.7 G/DL (ref 11.9–15.1)
IMM GRANULOCYTES # BLD AUTO: <0.03 K/UL (ref 0–0.3)
IMM GRANULOCYTES NFR BLD: 0 %
LYMPHOCYTES NFR BLD: 1.09 K/UL (ref 1.1–3.7)
LYMPHOCYTES RELATIVE PERCENT: 19 % (ref 24–43)
MAGNESIUM SERPL-MCNC: 1.8 MG/DL (ref 1.6–2.4)
MCH RBC QN AUTO: 26.6 PG (ref 25.2–33.5)
MCHC RBC AUTO-ENTMCNC: 32.4 G/DL (ref 28.4–34.8)
MCV RBC AUTO: 82.4 FL (ref 82.6–102.9)
MONOCYTES NFR BLD: 0.59 K/UL (ref 0.1–1.2)
MONOCYTES NFR BLD: 10 % (ref 3–12)
NEUTROPHILS NFR BLD: 53 % (ref 36–65)
NEUTS SEG NFR BLD: 3.13 K/UL (ref 1.5–8.1)
NRBC BLD-RTO: 0 PER 100 WBC
PLATELET # BLD AUTO: 377 K/UL (ref 138–453)
PMV BLD AUTO: 9.9 FL (ref 8.1–13.5)
POTASSIUM SERPL-SCNC: 3.1 MMOL/L (ref 3.7–5.3)
PROT SERPL-MCNC: 6.1 G/DL (ref 6.6–8.7)
RBC # BLD AUTO: 2.89 M/UL (ref 3.95–5.11)
SODIUM SERPL-SCNC: 136 MMOL/L (ref 136–145)
TRANSFUSION STATUS: NORMAL
TRANSFUSION STATUS: NORMAL
UNIT DIVISION: 0
UNIT DIVISION: 0
UNIT ISSUE DATE/TIME: NORMAL
UNIT ISSUE DATE/TIME: NORMAL
WBC OTHER # BLD: 5.9 K/UL (ref 3.5–11.3)

## 2025-06-28 PROCEDURE — 85025 COMPLETE CBC W/AUTO DIFF WBC: CPT

## 2025-06-28 PROCEDURE — 2700000000 HC OXYGEN THERAPY PER DAY

## 2025-06-28 PROCEDURE — 85018 HEMOGLOBIN: CPT

## 2025-06-28 PROCEDURE — 85014 HEMATOCRIT: CPT

## 2025-06-28 PROCEDURE — 6370000000 HC RX 637 (ALT 250 FOR IP): Performed by: INTERNAL MEDICINE

## 2025-06-28 PROCEDURE — 83735 ASSAY OF MAGNESIUM: CPT

## 2025-06-28 PROCEDURE — 1200000000 HC SEMI PRIVATE

## 2025-06-28 PROCEDURE — 94761 N-INVAS EAR/PLS OXIMETRY MLT: CPT

## 2025-06-28 PROCEDURE — 2500000003 HC RX 250 WO HCPCS: Performed by: INTERNAL MEDICINE

## 2025-06-28 PROCEDURE — 80053 COMPREHEN METABOLIC PANEL: CPT

## 2025-06-28 RX ORDER — POTASSIUM CHLORIDE 1500 MG/1
40 TABLET, EXTENDED RELEASE ORAL ONCE
Status: COMPLETED | OUTPATIENT
Start: 2025-06-28 | End: 2025-06-28

## 2025-06-28 RX ADMIN — PANTOPRAZOLE SODIUM 40 MG: 40 TABLET, DELAYED RELEASE ORAL at 07:29

## 2025-06-28 RX ADMIN — POTASSIUM CHLORIDE 40 MEQ: 1500 TABLET, EXTENDED RELEASE ORAL at 11:00

## 2025-06-28 RX ADMIN — AMLODIPINE BESYLATE 10 MG: 10 TABLET ORAL at 07:28

## 2025-06-28 RX ADMIN — CARVEDILOL 12.5 MG: 12.5 TABLET, FILM COATED ORAL at 07:28

## 2025-06-28 RX ADMIN — CARVEDILOL 12.5 MG: 12.5 TABLET, FILM COATED ORAL at 16:45

## 2025-06-28 RX ADMIN — SODIUM CHLORIDE, PRESERVATIVE FREE 10 ML: 5 INJECTION INTRAVENOUS at 07:28

## 2025-06-28 RX ADMIN — SODIUM CHLORIDE, PRESERVATIVE FREE 10 ML: 5 INJECTION INTRAVENOUS at 19:30

## 2025-06-28 NOTE — PROGRESS NOTES
1hour post bloodwork obtained. Sent down to lab. VS obtained. Patient denies any other needs at this time. Call light, bedside table and personal belongings within reach.

## 2025-06-28 NOTE — PROGRESS NOTES
After 1 unit of PRBCs, hemoglobin went from 4.7 to 5.5. Per nursing communication order, states to go ahead and transfuse another unit of PRBCs if hemoglobin remains under 7. Writer already spoke to lab, will call once unit of blood is ready.

## 2025-06-28 NOTE — PROGRESS NOTES
2nd unit of PRBCs started at this time. Writer to stay at bedside for first 15minutes per protocol.

## 2025-06-28 NOTE — PROGRESS NOTES
After 2nd unit of PRBCs patient's hemoglobin went from 5.5 to 7.2.    Hemoglobin will be redrawn with morning labs.

## 2025-06-28 NOTE — PLAN OF CARE
Problem: Chronic Conditions and Co-morbidities  Goal: Patient's chronic conditions and co-morbidity symptoms are monitored and maintained or improved  Outcome: Progressing     Problem: Discharge Planning  Goal: Discharge to home or other facility with appropriate resources  Outcome: Progressing     Problem: Safety - Adult  Goal: Free from fall injury  Outcome: Progressing     Problem: Nutrition Deficit:  Goal: Optimize nutritional status  6/27/2025 2028 by Whitney Pratt RN  Outcome: Progressing     Problem: Cardiovascular - Adult  Goal: Maintains optimal cardiac output and hemodynamic stability  Outcome: Progressing     Problem: Infection - Adult  Goal: Absence of infection during hospitalization  Outcome: Progressing     Problem: Metabolic/Fluid and Electrolytes - Adult  Goal: Electrolytes maintained within normal limits  Outcome: Progressing  Goal: Hemodynamic stability and optimal renal function maintained  Outcome: Progressing     Problem: Hematologic - Adult  Goal: Maintains hematologic stability  Outcome: Progressing

## 2025-06-28 NOTE — H&P
Herman Still M.D.  Internal Medicine History and Phyisical    Patient: Daja Espinosa  Date of Admission: 6/27/2025 10:41 AM  Date of Evaluation: 6/28/2025      Patient:  Daja Espinosa  MRN: [id number]    Chief Complaint:    Chief Complaint   Patient presents with    Abnormal Lab     Pt was sent by her oncologist for a blood transfusion due to a hemoglobin of 4.8, pt has hx of bladder cancer and had a sent placed, thinks that's where her bleeding is from        History Obtained From:  patient, electronic medical record    PCP: Sohail Garcia MD    History of Present Illness:   The patient is a 64 y.o. female who presents with urinary bleeding on Eliquis patient states she has been getting intermittent bleeding she states light red in color but does have an issue with clotting.  Patient also has fatigue and shortness of breath.  Patient denies any fever, chills, nausea, vomiting, abdominal pain, back pain.  Symptoms moderate in severity worse with motion or ambulation.      HPI     Nursing Notes were reviewed.     REVIEW OF SYSTEMS    (2-9 systems for level 4, 10 or more for level 5)      Review of Systems   Constitutional:  Positive for fatigue. Negative for activity change, appetite change and fever.   HENT:  Negative for ear discharge, nosebleeds and voice change.    Eyes:  Negative for discharge.   Respiratory:  Positive for shortness of breath. Negative for apnea.    Cardiovascular:  Negative for chest pain.   Gastrointestinal:  Negative for abdominal distention, abdominal pain, nausea and vomiting.   Genitourinary:  Positive for hematuria.   Musculoskeletal:  Negative for joint swelling.   Skin:  Negative for wound.   Neurological:  Negative for seizures and facial asymmetry.   Hematological:  Bruises/bleeds easily.   All other systems reviewed and are negative.    Past Medical History:        Diagnosis Date    Alcohol abuse 03/02/2017    Arthritis     Bladder cancer (HCC) 2016    CAD (coronary artery disease)

## 2025-06-28 NOTE — PROGRESS NOTES
Writer to bedside to complete morning assessment. Upon entry to room, pt laying in bed, respirations even and unlabored while on room air. Vitals obtained and assessment completed, see flow sheet for details. Pt denies needs from writer at this time. Call light in reach. Care ongoing.

## 2025-06-28 NOTE — PLAN OF CARE
Problem: Chronic Conditions and Co-morbidities  Goal: Patient's chronic conditions and co-morbidity symptoms are monitored and maintained or improved  6/28/2025 0826 by Tanja Wang RN  Outcome: Progressing  6/27/2025 2028 by Whitney Pratt RN  Outcome: Progressing     Problem: Discharge Planning  Goal: Discharge to home or other facility with appropriate resources  6/28/2025 0826 by Tanja Wang RN  Outcome: Progressing  6/27/2025 2028 by Whitney Pratt RN  Outcome: Progressing     Problem: Safety - Adult  Goal: Free from fall injury  6/28/2025 0826 by Tanja Wang RN  Outcome: Progressing  6/27/2025 2028 by Whitney Pratt RN  Outcome: Progressing     Problem: Nutrition Deficit:  Goal: Optimize nutritional status  6/28/2025 0826 by Tanja Wang RN  Outcome: Progressing  6/27/2025 2028 by Whitney Pratt RN  Outcome: Progressing     Problem: Cardiovascular - Adult  Goal: Maintains optimal cardiac output and hemodynamic stability  6/28/2025 0826 by Tanja Wang RN  Outcome: Progressing  6/27/2025 2028 by Whitney Pratt RN  Outcome: Progressing     Problem: Infection - Adult  Goal: Absence of infection during hospitalization  6/28/2025 0826 by Tanja Wang RN  Outcome: Progressing  6/27/2025 2028 by Whitney Pratt RN  Outcome: Progressing     Problem: Metabolic/Fluid and Electrolytes - Adult  Goal: Electrolytes maintained within normal limits  6/28/2025 0826 by Tanja Wang RN  Outcome: Progressing  6/27/2025 2028 by Whitney Pratt RN  Outcome: Progressing  Goal: Hemodynamic stability and optimal renal function maintained  6/28/2025 0826 by Tanja Wang RN  Outcome: Progressing  6/27/2025 2028 by Whitney Pratt RN  Outcome: Progressing     Problem: Hematologic - Adult  Goal: Maintains hematologic stability  6/28/2025 0826 by Tanja Wang RN  Outcome: Progressing  6/27/2025 2028 by Whitney Pratt RN  Outcome: Progressing

## 2025-06-28 NOTE — PROGRESS NOTES
1hour post bloodwork obtained post 2nd unit. Sent down to lab. VS obtained Patient denies needs. Call light, bedside table and personal belongings within reach.

## 2025-06-29 VITALS
SYSTOLIC BLOOD PRESSURE: 172 MMHG | BODY MASS INDEX: 18.55 KG/M2 | HEIGHT: 63 IN | WEIGHT: 104.7 LBS | TEMPERATURE: 98.3 F | HEART RATE: 79 BPM | RESPIRATION RATE: 20 BRPM | DIASTOLIC BLOOD PRESSURE: 81 MMHG | OXYGEN SATURATION: 94 %

## 2025-06-29 LAB
ALBUMIN SERPL-MCNC: 3.6 G/DL (ref 3.5–5.2)
ALBUMIN/GLOB SERPL: 1.5 {RATIO} (ref 1–2.5)
ALP SERPL-CCNC: 92 U/L (ref 35–104)
ALT SERPL-CCNC: 14 U/L (ref 10–35)
ANION GAP SERPL CALCULATED.3IONS-SCNC: 12 MMOL/L (ref 9–16)
AST SERPL-CCNC: 18 U/L (ref 10–35)
BASOPHILS # BLD: 0.05 K/UL (ref 0–0.2)
BASOPHILS NFR BLD: 1 % (ref 0–2)
BILIRUB SERPL-MCNC: 1.4 MG/DL (ref 0–1.2)
BUN SERPL-MCNC: 24 MG/DL (ref 8–23)
BUN/CREAT SERPL: 14 (ref 9–20)
CALCIUM SERPL-MCNC: 8.8 MG/DL (ref 8.6–10.4)
CHLORIDE SERPL-SCNC: 104 MMOL/L (ref 98–107)
CO2 SERPL-SCNC: 19 MMOL/L (ref 20–31)
CREAT SERPL-MCNC: 1.7 MG/DL (ref 0.5–0.9)
EOSINOPHIL # BLD: 1.45 K/UL (ref 0–0.44)
EOSINOPHILS RELATIVE PERCENT: 18 % (ref 1–4)
ERYTHROCYTE [DISTWIDTH] IN BLOOD BY AUTOMATED COUNT: 17 % (ref 11.8–14.4)
GFR, ESTIMATED: 35 ML/MIN/1.73M2
GLUCOSE SERPL-MCNC: 103 MG/DL (ref 74–99)
HCT VFR BLD AUTO: 22.5 % (ref 36.3–47.1)
HGB BLD-MCNC: 7.2 G/DL (ref 11.9–15.1)
IMM GRANULOCYTES # BLD AUTO: 0.03 K/UL (ref 0–0.3)
IMM GRANULOCYTES NFR BLD: 0 %
LYMPHOCYTES NFR BLD: 1.05 K/UL (ref 1.1–3.7)
LYMPHOCYTES RELATIVE PERCENT: 13 % (ref 24–43)
MCH RBC QN AUTO: 26.6 PG (ref 25.2–33.5)
MCHC RBC AUTO-ENTMCNC: 32 G/DL (ref 28.4–34.8)
MCV RBC AUTO: 83 FL (ref 82.6–102.9)
MONOCYTES NFR BLD: 0.77 K/UL (ref 0.1–1.2)
MONOCYTES NFR BLD: 10 % (ref 3–12)
NEUTROPHILS NFR BLD: 58 % (ref 36–65)
NEUTS SEG NFR BLD: 4.72 K/UL (ref 1.5–8.1)
NRBC BLD-RTO: 0 PER 100 WBC
PLATELET # BLD AUTO: 357 K/UL (ref 138–453)
PMV BLD AUTO: 10.2 FL (ref 8.1–13.5)
POTASSIUM SERPL-SCNC: 3.6 MMOL/L (ref 3.7–5.3)
PROT SERPL-MCNC: 5.9 G/DL (ref 6.6–8.7)
RBC # BLD AUTO: 2.71 M/UL (ref 3.95–5.11)
SODIUM SERPL-SCNC: 135 MMOL/L (ref 136–145)
WBC OTHER # BLD: 8.1 K/UL (ref 3.5–11.3)

## 2025-06-29 PROCEDURE — 6370000000 HC RX 637 (ALT 250 FOR IP): Performed by: INTERNAL MEDICINE

## 2025-06-29 PROCEDURE — 94761 N-INVAS EAR/PLS OXIMETRY MLT: CPT

## 2025-06-29 PROCEDURE — 80053 COMPREHEN METABOLIC PANEL: CPT

## 2025-06-29 PROCEDURE — 2500000003 HC RX 250 WO HCPCS: Performed by: INTERNAL MEDICINE

## 2025-06-29 PROCEDURE — 85025 COMPLETE CBC W/AUTO DIFF WBC: CPT

## 2025-06-29 RX ORDER — FERROUS SULFATE 325(65) MG
325 TABLET ORAL EVERY OTHER DAY
COMMUNITY
Start: 2025-06-29

## 2025-06-29 RX ORDER — HYDRALAZINE HYDROCHLORIDE 50 MG/1
50 TABLET, FILM COATED ORAL 2 TIMES DAILY
COMMUNITY
Start: 2025-06-29

## 2025-06-29 RX ADMIN — CARVEDILOL 12.5 MG: 12.5 TABLET, FILM COATED ORAL at 07:53

## 2025-06-29 RX ADMIN — AMLODIPINE BESYLATE 10 MG: 10 TABLET ORAL at 07:53

## 2025-06-29 RX ADMIN — PANTOPRAZOLE SODIUM 40 MG: 40 TABLET, DELAYED RELEASE ORAL at 07:53

## 2025-06-29 RX ADMIN — SODIUM CHLORIDE, PRESERVATIVE FREE 10 ML: 5 INJECTION INTRAVENOUS at 07:53

## 2025-06-29 NOTE — PROGRESS NOTES
Nurse at patient bedside for morning shift assessment.  Upon entering, patient sitting up awake in bed.  Vitals and assessment obtained and documented.  Patient currently on room air oxygen.  Patient denies pain at this time.   White board updated.  Water refreshed.  Patient states all other needs met at this time. Call light and items in reach, side rails up x2, bed in lowest position. Care ongoing.

## 2025-06-29 NOTE — PLAN OF CARE
Problem: Chronic Conditions and Co-morbidities  Goal: Patient's chronic conditions and co-morbidity symptoms are monitored and maintained or improved  Outcome: Adequate for Discharge     Problem: Discharge Planning  Goal: Discharge to home or other facility with appropriate resources  Outcome: Adequate for Discharge     Problem: Safety - Adult  Goal: Free from fall injury  Outcome: Adequate for Discharge     Problem: Nutrition Deficit:  Goal: Optimize nutritional status  Outcome: Adequate for Discharge     Problem: Cardiovascular - Adult  Goal: Maintains optimal cardiac output and hemodynamic stability  Outcome: Adequate for Discharge     Problem: Infection - Adult  Goal: Absence of infection during hospitalization  Outcome: Adequate for Discharge     Problem: Metabolic/Fluid and Electrolytes - Adult  Goal: Electrolytes maintained within normal limits  Outcome: Adequate for Discharge  Goal: Hemodynamic stability and optimal renal function maintained  Outcome: Adequate for Discharge     Problem: Hematologic - Adult  Goal: Maintains hematologic stability  Outcome: Adequate for Discharge

## 2025-06-29 NOTE — DISCHARGE SUMMARY
Discharge Summary    Daja Espinosa  :  1960  MRN:  [id number]    Admit date:  2025      Discharge date:   2025    Admitting Physician:  Herman Still MD    Discharge Diagnoses:      Principal Problem:    Acute blood loss anemia from blood thinner  Active Problems:    Severe malnutrition  Resolved Problems:    * No resolved hospital problems. *      Active Hospital Problems    Diagnosis Date Noted    Acute blood loss anemia from blood thinner [D62] 2025    Severe malnutrition [E43] 2025       Discharge Medications:         Medication List        CHANGE how you take these medications      ferrous sulfate 325 (65 Fe) MG tablet  Commonly known as: IRON 325  What changed: when to take this            CONTINUE taking these medications      acetaminophen 325 MG tablet  Commonly known as: TYLENOL     ALPRAZolam 0.25 MG tablet  Commonly known as: XANAX     amLODIPine 10 MG tablet  Commonly known as: NORVASC  Take 1 tablet by mouth daily     atorvastatin 80 MG tablet  Commonly known as: LIPITOR  Take 1 tablet by mouth nightly     carvedilol 25 MG tablet  Commonly known as: COREG  Take 0.5 tablets by mouth 2 times daily (with meals)     furosemide 20 MG tablet  Commonly known as: Lasix  Take 1 tablet by mouth daily as needed (leg swelling)     hydrALAZINE 50 MG tablet  Commonly known as: APRESOLINE     lidocaine-prilocaine 2.5-2.5 % cream  Commonly known as: EMLA  Apply topically to port site 60 minutes before access as needed.     ondansetron 4 MG disintegrating tablet  Commonly known as: ZOFRAN-ODT  Take 1 tablet by mouth every 8 hours as needed for Nausea or Vomiting     pantoprazole 40 MG tablet  Commonly known as: PROTONIX  TAKE 1 TABLET BY MOUTH EVERY MORNING before breakfast            STOP taking these medications      Eliquis 2.5 MG Tabs tablet  Generic drug: apixaban               Consultants:  none    Hospital Course:   Daja Espinosa is a 64 y.o. female admitted with blood loss

## 2025-06-29 NOTE — PROGRESS NOTES
Discharge instructions reviewed with patient.  Follow-up appointment scheduled with PCP.  Patient to follow-up with cardiologist per Dr. Still for Eliquis management. Reviewed home medications and dosage adjustment as well as next doses of each.  Blood transfusion education sent home with patient.  Pt denies any questions at this time.  All personal belongings sent home with patient.  Escorted to private vehicle.

## 2025-06-30 ENCOUNTER — CARE COORDINATION (OUTPATIENT)
Dept: CARE COORDINATION | Age: 65
End: 2025-06-30

## 2025-06-30 ENCOUNTER — CARE COORDINATION (OUTPATIENT)
Dept: CASE MANAGEMENT | Age: 65
End: 2025-06-30

## 2025-06-30 DIAGNOSIS — D50.0 ANEMIA, BLOOD LOSS: ICD-10-CM

## 2025-06-30 DIAGNOSIS — R09.02 HYPOXIA: Primary | ICD-10-CM

## 2025-06-30 NOTE — CARE COORDINATION
Ambulatory Care Coordination Note     2025 10:50 AM     Patient Current Location:  Home: [address] [place] [address]     ACM contacted the patient by telephone. Verified name and  with patient as identifiers.         ACM: Thania Toscano RN     Challenges to be reviewed by the provider   Additional needs identified to be addressed with provider Yes  Still feeling short of breath like she can not catch her breath. Home pulse ox readings is 92% this morning. Trying to find pulse of from RPM equipment. Was on oxygen at the hospital and has been wearing home oxygen during night time.                Method of communication with provider: chart routing.    Utilization: Has the patient been discharged from the hospital since your last call? yes -   Call within 2 business days of discharge: Yes    Patient: Daja Espinosa    Patient : 1960   MRN: [id number]    Reason for Admission: Acute blood loss from Anemia from blood thinners   Discharge Date: 25  RURS: Readmission Risk Score: 33      Last Discharge Facility       Date Complaint Diagnosis Description Type Department Provider    25 Abnormal Lab Symptomatic anemia ... ED [facility]Dis[facility]            Was this an external facility discharge? No    Ambulatory Care Manager reviewed discharge instructions with patient. The patient was given an opportunity to ask questions; questions regarding ongoing shortness of breath and concerns for suddenly stopping Eliquis  sent to provider for clarification.. The patient verbalized understanding.   Were discharge instructions available to patient? Yes.   Reviewed appropriate site of care based on symptoms and resources available to patient including: PCP  Specialist. The patient agrees to contact the primary care provider and/or specialist office for questions related to their healthcare.       Hospital follow up appointment: Discussed follow up

## 2025-06-30 NOTE — CARE COORDINATION
-Remote Alert Monitoring Note      Date/Time:  2025 12:04 PM  Patient Current Location: Home: [address] [place], [place] [id number]  Verified patients name and  as identifiers.    Rpm alert to be reviewed by the provider   yellow alert  blood pressure reading (180/90)  Vitals Recheck blood pressure reading (157/79)  Additional needs to be addressed by provider: No                   LPN contacted patient by telephone regarding red alert received   Background: PNA  Refer to 911 immediately if:  Patient unresponsive or unable to provide history  Change in cognition or sudden confusion  Patient unable to respond in complete sentences  Intense chest pain/tightness  Any concern for any clinical emergency  Red Alert: Provider response time of 1 hr required for any red alert requiring intervention  Yellow Alert: Provider response time of 3hr required for any escalated yellow alert  Patient Chief Complaint:  Blood Pressure BP Triage  Are you having any Chest Pain? no   Are you having any Shortness of Breath? no   Do you have a headache or have any vision changes? no   Are you having any numbness or tingling? no   Are you having any other health concerns or issues? no  Patient/Caregiver educated on how to properly take a blood pressure. Patient/Caregiver verbalizes understanding.     Clinical Interventions: Reviewed and followed up on alerts and treatments-Spoke to patient she denies chest pain, SOB dizziness repeat BP now WNL patent states she had just taken her medication when she first checked this am she has no concerns     Plan/Follow Up: Will continue to review, monitor and address alerts with follow up based on severity of symptoms and risk factors.  **For any new or worsening symptoms or you are concerned in anyway, please contact your Provider or report to the nearest Emergency Room.**

## 2025-07-01 ENCOUNTER — HOSPITAL ENCOUNTER (OUTPATIENT)
Dept: GENERAL RADIOLOGY | Age: 65
Discharge: HOME | End: 2025-07-03
Payer: COMMERCIAL

## 2025-07-01 DIAGNOSIS — R06.02 SOB (SHORTNESS OF BREATH): ICD-10-CM

## 2025-07-01 DIAGNOSIS — R06.02 SOB (SHORTNESS OF BREATH): Primary | ICD-10-CM

## 2025-07-01 PROCEDURE — 71046 X-RAY EXAM CHEST 2 VIEWS: CPT

## 2025-07-01 NOTE — CARE COORDINATION
Ambulatory Care Coordination Note     7/1/2025 11:23 AM     Patient outreach attempt by this ACM today to perform care management follow up . ACM was unable to reach the patient by telephone today; x 3 attempts   Phone off- receive message \"your call can not be completed as dialed\"      ACM: Thania Toscano, RN     Care Summary Note: needing to update patient on PCP response and ordered testing/imaging.           PCP/Specialist follow up:   Future Appointments         Provider Specialty Dept Phone    7/7/2025 12:30 PM Sohail Garcia MD Family Medicine [phone]    7/10/2025 10:00 AM Rohini Herman, APRN - CNP Cardiology [phone]    7/10/2025 1:15 PM Familia Shepard MD Oncology [phone]    8/5/2025 11:40 AM Nicole Chan MD Nephrology [phone]    8/13/2025 10:00 AM Terrell Cortez MD Cardiology [phone]    10/30/2025 11:30 AM Osmin Nicole PAADRIANA Urology [phone]

## 2025-07-01 NOTE — CARE COORDINATION
Ambulatory Care Coordination Note     2025 2:29 PM       ACM contacted the patient by telephone. Verified name and  with patient as identifiers.         Care Summary Note: Able to reach patient today. States she had her phone turned off this morning. States she still is having a hard time breathing. She was seen by nephrology today. Nephrology ordered a Chest X-ray as well and labs in 2 weeks. Informed Daja of PCP response and orders. States she has already completed a chest x-ray today. Will not be able to get labs done until 7/3. Nephrology thinks she is retaining fluids so made some changes to her medications, adding more \"water pills\" and removing some. Daja was unsure what all was ordered. States she is waiting on the medication at time of call- not ready yet at pharmacy. Has been wearing supplemental oxygen  at night, encouraged her to utilize during day time too if needed, if readings below 90%. Reviewed RPM metrics today, 94%. Does not feel the need for ED, hopeful the medications can help with fluid and ease breathing.     Future Appointments   Date Time Provider Department Center   2025 12:30 PM Sohail Garcia MD [facility] DEP   7/10/2025 10:00 AM Rohini Herman APRN - [facility] W   7/10/2025  1:15 PM Familia Shepard MD tiff onc spe TPP   2025 11:40 AM Nicole Chan MD AFLNe [facility] None   2025 10:00 AM Terrell Cortez MD [facility] W   10/30/2025 11:30 AM Osmin Nicole PA-C [facility] urol [facility]         Follow Up:   Plan for next AC outreach in approximately 1-2 days  to complete:  - disease specific assessments  - medication review   - goal progression  - education   - RPM.   Patient  is agreeable to this plan.

## 2025-07-03 ENCOUNTER — HOSPITAL ENCOUNTER (OUTPATIENT)
Age: 65
Discharge: HOME | End: 2025-07-03
Payer: COMMERCIAL

## 2025-07-03 DIAGNOSIS — R06.02 SOB (SHORTNESS OF BREATH): ICD-10-CM

## 2025-07-03 LAB
BASOPHILS # BLD: 0.07 K/UL (ref 0–0.2)
BASOPHILS NFR BLD: 1 % (ref 0–2)
EOSINOPHIL # BLD: 1.33 K/UL (ref 0–0.4)
EOSINOPHILS RELATIVE PERCENT: 19 % (ref 0–5)
ERYTHROCYTE [DISTWIDTH] IN BLOOD BY AUTOMATED COUNT: 17.9 % (ref 12.1–15.2)
HCT VFR BLD AUTO: 26.3 % (ref 36–46)
HGB BLD-MCNC: 8.4 G/DL (ref 12–16)
IMM GRANULOCYTES # BLD AUTO: ABNORMAL K/UL (ref 0–0.3)
IMM GRANULOCYTES NFR BLD: ABNORMAL %
LYMPHOCYTES NFR BLD: 0.84 K/UL (ref 1–4.8)
LYMPHOCYTES RELATIVE PERCENT: 12 % (ref 15–40)
MCH RBC QN AUTO: 25.8 PG (ref 26–34)
MCHC RBC AUTO-ENTMCNC: 31.9 G/DL (ref 31–37)
MCV RBC AUTO: 80.9 FL (ref 80–100)
MONOCYTES NFR BLD: 0.35 K/UL (ref 0–1)
MONOCYTES NFR BLD: 5 % (ref 4–8)
MORPHOLOGY: ABNORMAL
NEUTROPHILS NFR BLD: 63 % (ref 47–75)
NEUTS SEG NFR BLD: 4.41 K/UL (ref 2.5–7)
PLATELET # BLD AUTO: 438 K/UL (ref 140–450)
PMV BLD AUTO: 9.5 FL (ref 6–12)
RBC # BLD AUTO: 3.25 M/UL (ref 4–5.2)
WBC OTHER # BLD: 7 K/UL (ref 3.5–11)

## 2025-07-03 PROCEDURE — 85025 COMPLETE CBC W/AUTO DIFF WBC: CPT

## 2025-07-03 PROCEDURE — 36415 COLL VENOUS BLD VENIPUNCTURE: CPT

## 2025-07-07 ENCOUNTER — CARE COORDINATION (OUTPATIENT)
Dept: CARE COORDINATION | Age: 65
End: 2025-07-07

## 2025-07-07 ENCOUNTER — OFFICE VISIT (OUTPATIENT)
Dept: FAMILY MEDICINE CLINIC | Age: 65
End: 2025-07-07
Payer: COMMERCIAL

## 2025-07-07 VITALS
SYSTOLIC BLOOD PRESSURE: 138 MMHG | HEIGHT: 63 IN | WEIGHT: 104 LBS | BODY MASS INDEX: 18.43 KG/M2 | DIASTOLIC BLOOD PRESSURE: 77 MMHG | RESPIRATION RATE: 18 BRPM | HEART RATE: 79 BPM | OXYGEN SATURATION: 97 %

## 2025-07-07 DIAGNOSIS — N18.4 CKD (CHRONIC KIDNEY DISEASE) STAGE 4, GFR 15-29 ML/MIN (HCC): ICD-10-CM

## 2025-07-07 DIAGNOSIS — C67.9 MALIGNANT NEOPLASM OF URINARY BLADDER, UNSPECIFIED SITE (HCC): ICD-10-CM

## 2025-07-07 DIAGNOSIS — I10 HYPERTENSION, ESSENTIAL: ICD-10-CM

## 2025-07-07 DIAGNOSIS — E43 SEVERE MALNUTRITION: ICD-10-CM

## 2025-07-07 DIAGNOSIS — Z09 HOSPITAL DISCHARGE FOLLOW-UP: ICD-10-CM

## 2025-07-07 DIAGNOSIS — E87.6 HYPOKALEMIA: ICD-10-CM

## 2025-07-07 DIAGNOSIS — I48.0 PAROXYSMAL ATRIAL FIBRILLATION (HCC): ICD-10-CM

## 2025-07-07 DIAGNOSIS — D50.0 ANEMIA, BLOOD LOSS: Primary | ICD-10-CM

## 2025-07-07 PROCEDURE — 99214 OFFICE O/P EST MOD 30 MIN: CPT | Performed by: INTERNAL MEDICINE

## 2025-07-07 PROCEDURE — 1111F DSCHRG MED/CURRENT MED MERGE: CPT | Performed by: INTERNAL MEDICINE

## 2025-07-07 NOTE — CARE COORDINATION
Ambulatory Care Coordination Note     7/7/2025      Planned to make CC outreach call today. Noted patient scheduled with PCP today. Will defer planned call.   Noted she was able to schedule with Cardiology to follow up blood thinner with recent bleeding.     Future Appointments   Date Time Provider Department Center   7/10/2025 10:30 AM Rohini Herman APRN - [facility] W   7/10/2025  1:15 PM Familia Shepard MD tiff onc spe TPP   8/5/2025 11:40 AM Nicole Chan MD AFLNeph T[facility] None   8/13/2025 10:00 AM Terrell Cortez MD [facility] W   10/7/2025 12:30 PM Sohail Garcia MD [facility]CC DEP   10/30/2025 11:30 AM Osmin Nicole, PA-C renetta urol [facility]       Care Coordination Plan of Care:   This nurse Care Coordinator will  - plan outreach call to patient following Cardiology appointment   -RPM- graduation if remain stable   -offer referral to dietician- high protein diet per PCP

## 2025-07-07 NOTE — PROGRESS NOTES
by mouth daily 90 tablet 3    atorvastatin (LIPITOR) 80 MG tablet Take 1 tablet by mouth nightly 30 tablet 11    lidocaine-prilocaine (EMLA) 2.5-2.5 % cream Apply topically to port site 60 minutes before access as needed. 30 g 1        Medications patient taking as of now reconciled against medications ordered at time of hospital discharge: Yes    A comprehensive review of systems was negative except for what was noted in the HPI.    Objective:       Vitals:    07/07/25 1227   BP: 138/77   BP Site: Right Upper Arm   Patient Position: Sitting   BP Cuff Size: Medium Adult   Pulse: 79   Resp: 18   SpO2: 97%   Weight: 47.2 kg (104 lb)   Height: 1.6 m (5' 3\")      Body mass index is 18.42 kg/m².       General Appearance: alert and oriented to person, place and time, well developed and well- nourished, in no acute distress  Skin: warm and dry, no rash or erythema  Head: normocephalic and atraumatic  Eyes:conjunctivae normal  ENT: tympanic membrane, external ear and ear canal normal bilaterally, nose without deformity, nasal mucosa and turbinates normal without polyps  Neck: supple and non-tender without mass, no thyromegaly or thyroid nodules, no cervical lymphadenopathy  Pulmonary/Chest: clear to auscultation bilaterally- no wheezes, rales or rhonchi, normal air movement, no respiratory distress  Cardiovascular: normal rate, regular rhythm, normal S1 and S2, no murmurs, rubs, clicks, or gallops, distal pulses intact, no carotid bruits  Abdomen: soft, non-tender, non-distended, normal bowel sounds  Extremities: no cyanosis, clubbing or edema  Musculoskeletal: normal range of motion, no joint swelling, deformity or tenderness  Neurologic: no cranial nerve deficit, gait, coordination and speech normal      An electronic signature was used to authenticate this note.  --Sohail Garcia MD

## 2025-07-07 NOTE — PATIENT INSTRUCTIONS
SURVEY:    You may be receiving a survey from [facility] regarding your visit today.    Please complete the survey to enable us to provide the highest quality of care to you and your family.    If you cannot score us a very good on any question, please call the office to discuss how we could have made your experience a very good one.    Thank you.    [facility]  MD Jalil Teixeira, DO Clotilde Patel, CNP  Jc Valentine, MD Stacey Steele, APRN-CNP  Ayaka Beaulieu, Practice Manager  Bethany, MAGO Monteiro, CCMJANNA Taveras, CMA  Sridevi, CMA  Lance, PSC   Nani, LPN  Kristen, CMA

## 2025-07-10 ENCOUNTER — OFFICE VISIT (OUTPATIENT)
Dept: CARDIOLOGY CLINIC | Age: 65
End: 2025-07-10

## 2025-07-10 ENCOUNTER — HOSPITAL ENCOUNTER (OUTPATIENT)
Dept: NON INVASIVE DIAGNOSTICS | Age: 65
Discharge: HOME | End: 2025-07-10

## 2025-07-10 ENCOUNTER — TELEMEDICINE (OUTPATIENT)
Dept: ONCOLOGY | Age: 65
End: 2025-07-10
Payer: COMMERCIAL

## 2025-07-10 VITALS
RESPIRATION RATE: 16 BRPM | HEART RATE: 75 BPM | DIASTOLIC BLOOD PRESSURE: 70 MMHG | SYSTOLIC BLOOD PRESSURE: 136 MMHG | OXYGEN SATURATION: 98 %

## 2025-07-10 DIAGNOSIS — C67.9 MALIGNANT NEOPLASM OF URINARY BLADDER, UNSPECIFIED SITE (HCC): ICD-10-CM

## 2025-07-10 DIAGNOSIS — R06.02 SOB (SHORTNESS OF BREATH): ICD-10-CM

## 2025-07-10 DIAGNOSIS — N28.9 KIDNEY LESION, NATIVE, LEFT: Primary | ICD-10-CM

## 2025-07-10 DIAGNOSIS — R31.9 HEMATURIA, UNSPECIFIED TYPE: ICD-10-CM

## 2025-07-10 DIAGNOSIS — D63.1 ANEMIA DUE TO STAGE 4 CHRONIC KIDNEY DISEASE (HCC): ICD-10-CM

## 2025-07-10 DIAGNOSIS — N13.9 OBSTRUCTIVE UROPATHY: ICD-10-CM

## 2025-07-10 DIAGNOSIS — I10 ESSENTIAL HYPERTENSION: ICD-10-CM

## 2025-07-10 DIAGNOSIS — I25.118 CORONARY ARTERY DISEASE OF NATIVE ARTERY OF NATIVE HEART WITH STABLE ANGINA PECTORIS: ICD-10-CM

## 2025-07-10 DIAGNOSIS — C68.9 TRANSITIONAL CELL CARCINOMA (HCC): ICD-10-CM

## 2025-07-10 DIAGNOSIS — I73.9 PERIPHERAL VASCULAR DISEASE: ICD-10-CM

## 2025-07-10 DIAGNOSIS — R06.02 SOB (SHORTNESS OF BREATH): Primary | ICD-10-CM

## 2025-07-10 DIAGNOSIS — I48.91 ATRIAL FIBRILLATION WITH RAPID VENTRICULAR RESPONSE (HCC): ICD-10-CM

## 2025-07-10 DIAGNOSIS — E78.5 DYSLIPIDEMIA: ICD-10-CM

## 2025-07-10 DIAGNOSIS — N18.4 ANEMIA DUE TO STAGE 4 CHRONIC KIDNEY DISEASE (HCC): ICD-10-CM

## 2025-07-10 PROCEDURE — 1111F DSCHRG MED/CURRENT MED MERGE: CPT | Performed by: INTERNAL MEDICINE

## 2025-07-10 PROCEDURE — G8428 CUR MEDS NOT DOCUMENT: HCPCS | Performed by: INTERNAL MEDICINE

## 2025-07-10 PROCEDURE — 3017F COLORECTAL CA SCREEN DOC REV: CPT | Performed by: INTERNAL MEDICINE

## 2025-07-10 PROCEDURE — 99214 OFFICE O/P EST MOD 30 MIN: CPT | Performed by: INTERNAL MEDICINE

## 2025-07-10 NOTE — PATIENT INSTRUCTIONS
Will continue to hold Eliquis.  Will do EKG today.  Will do referral to EP for Watchman Device.  Will follow up on 08/13/2025 as scheduled.

## 2025-07-10 NOTE — PROGRESS NOTES
Ov Marcella Naidu CNP follow up post   Discharge for GI bleed  Off eliquis now.    No bleeding   No chest pain or sob  No dizziness   No edema    EKG today     Refer to [facility]   For Watchman  Keep appt in August

## 2025-07-10 NOTE — PROGRESS NOTES
PET/CT on February 6, 2025 no evidence of metastasis> discontinue immunotherapy  hydronephrosis of the left kidney> left stent exchange and follow-up with   Acute anemia related to bleed> at this time improved and no hematuria  Anemia of chronic kidney disease stage IV> will give HARVEY and IV iron if hemoglobin trends below 10  Left kidney exophytic lesion> ultrasound of the kidney  CT of the chest no contrast(CT of the abdomen pelvis was done recently on June 26, no evidence of cancer recurrence)  RTC in 2 months with a scan and labs          Daja Espinosa, was evaluated through a synchronous (real-time) audio-video encounter. The patient (or guardian if applicable) is aware that this is a billable service, which includes applicable co-pays. This Virtual Visit was conducted with patient's (and/or legal guardian's) consent. Patient identification was verified, and a caregiver was present when appropriate.   The patient was located at Home: [address] [address]  Provider was located at Facility (Appt Dept): [address] [place] [address]  Confirm you are appropriately licensed, registered, or certified to deliver care in the state where the patient is located as indicated above. If you are not or unsure, please re-schedule the visit: Yes, I confirm.     Daja Espinosa 1960 is a Established patient, presenting virtually for evaluation of the following:                                   Familia Shepard MD                          [facility]                            This note is created with the assistance of a speech recognition program.  While intending to generate a document that actually reflects the content of the visit, the document can still have some errors including those of syntax and sound a like substitutions which may escape proof reading.  It such instances, actual meaning can be extrapolated by contextual diversion.

## 2025-07-13 LAB
EKG ATRIAL RATE: 71 BPM
EKG P AXIS: 83 DEGREES
EKG P-R INTERVAL: 164 MS
EKG Q-T INTERVAL: 432 MS
EKG QRS DURATION: 88 MS
EKG QTC CALCULATION (BAZETT): 469 MS
EKG R AXIS: 63 DEGREES
EKG T AXIS: 73 DEGREES
EKG VENTRICULAR RATE: 71 BPM

## 2025-07-14 ENCOUNTER — CARE COORDINATION (OUTPATIENT)
Dept: CARE COORDINATION | Age: 65
End: 2025-07-14

## 2025-07-14 ENCOUNTER — CARE COORDINATION (OUTPATIENT)
Dept: PRIMARY CARE CLINIC | Age: 65
End: 2025-07-14

## 2025-07-14 ENCOUNTER — CARE COORDINATION (OUTPATIENT)
Dept: CARE COORDINATION | Facility: CLINIC | Age: 65
End: 2025-07-14

## 2025-07-14 DIAGNOSIS — J18.9 COMMUNITY ACQUIRED PNEUMONIA OF RIGHT LOWER LOBE OF LUNG: Primary | ICD-10-CM

## 2025-07-14 NOTE — CARE COORDINATION
Updated metrics are within RPM parameters. BP / BP HR not updated as of this time.     Message sent to patient via Patient Connect Portal for Remote Patient Monitoring     RPM Nurse message No BP / BP HR reading in two days Hello, Please see an important message from your RPM Team:  This is a reminder that we have not received your blood pressure / blood pressure heart rate reading for two days please enter them as soon as possible.    Please do not respond to this message as the messages are not monitored by the remote patient monitoring team. Please log on to your tablet and enter your vitals as you are able. The goal is to have these entered in each day prior to noon.

## 2025-07-14 NOTE — CARE COORDINATION
Daja Espinosa  7/14/25    Care Coordination  placed call to Patient  to arrange RPM kit  through [employer].     CCSS spoke to Patient reviewed with Patient how to pack equipment in original packing. Patient aware [employer] will  equipment in 2-4 days.   All questions and concerns answered.

## 2025-07-14 NOTE — PROGRESS NOTES
Remote Patient Order Discontinued    Received request from Thania Toscano RN   to discontinue order for remote patient monitoring of Pneumonia and order completed.
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
## 2025-07-18 ENCOUNTER — HOSPITAL ENCOUNTER (OUTPATIENT)
Age: 65
Discharge: HOME | End: 2025-07-18
Payer: COMMERCIAL

## 2025-07-18 DIAGNOSIS — N18.4 CKD (CHRONIC KIDNEY DISEASE), STAGE IV (HCC): ICD-10-CM

## 2025-07-18 DIAGNOSIS — N18.4 ANEMIA DUE TO STAGE 4 CHRONIC KIDNEY DISEASE (HCC): ICD-10-CM

## 2025-07-18 DIAGNOSIS — D63.1 ANEMIA DUE TO STAGE 4 CHRONIC KIDNEY DISEASE (HCC): ICD-10-CM

## 2025-07-18 LAB
ABSOLUTE BANDS: 0.06 K/UL (ref 0–1)
ALBUMIN SERPL-MCNC: 4.4 G/DL (ref 3.5–5.2)
ALBUMIN/GLOB SERPL: 1.5 {RATIO} (ref 1–2.5)
ALP SERPL-CCNC: 92 U/L (ref 35–104)
ALT SERPL-CCNC: 23 U/L (ref 5–33)
ANION GAP SERPL CALCULATED.3IONS-SCNC: 15 MMOL/L (ref 9–17)
ANION GAP SERPL CALCULATED.3IONS-SCNC: 16 MMOL/L (ref 9–17)
AST SERPL-CCNC: 30 U/L
BANDS: 1 % (ref 0–10)
BASOPHILS # BLD: ABNORMAL K/UL (ref 0–0.2)
BASOPHILS NFR BLD: ABNORMAL % (ref 0–2)
BILIRUB SERPL-MCNC: 0.8 MG/DL (ref 0.3–1.2)
BUN SERPL-MCNC: 62 MG/DL (ref 8–23)
BUN SERPL-MCNC: 62 MG/DL (ref 8–23)
CALCIUM SERPL-MCNC: 9.2 MG/DL (ref 8.6–10.4)
CALCIUM SERPL-MCNC: 9.3 MG/DL (ref 8.6–10.4)
CHLORIDE SERPL-SCNC: 93 MMOL/L (ref 98–107)
CHLORIDE SERPL-SCNC: 93 MMOL/L (ref 98–107)
CO2 SERPL-SCNC: 20 MMOL/L (ref 20–31)
CO2 SERPL-SCNC: 20 MMOL/L (ref 20–31)
CREAT SERPL-MCNC: 2.5 MG/DL (ref 0.5–0.9)
CREAT SERPL-MCNC: 2.5 MG/DL (ref 0.5–0.9)
EOSINOPHIL # BLD: 0.92 K/UL (ref 0–0.4)
EOSINOPHILS RELATIVE PERCENT: 15 % (ref 0–5)
ERYTHROCYTE [DISTWIDTH] IN BLOOD BY AUTOMATED COUNT: 18.4 % (ref 12.1–15.2)
ERYTHROCYTE [DISTWIDTH] IN BLOOD BY AUTOMATED COUNT: 18.5 % (ref 12.1–15.2)
FERRITIN SERPL-MCNC: 21 NG/ML
GFR, ESTIMATED: 21 ML/MIN/1.73M2
GFR, ESTIMATED: 21 ML/MIN/1.73M2
GLUCOSE SERPL-MCNC: 78 MG/DL (ref 70–99)
GLUCOSE SERPL-MCNC: 79 MG/DL (ref 70–99)
HCT VFR BLD AUTO: 26.4 % (ref 36–46)
HCT VFR BLD AUTO: 26.5 % (ref 36–46)
HGB BLD-MCNC: 8.6 G/DL (ref 12–16)
HGB BLD-MCNC: 8.6 G/DL (ref 12–16)
IMM GRANULOCYTES # BLD AUTO: ABNORMAL K/UL (ref 0–0.3)
IMM GRANULOCYTES NFR BLD: ABNORMAL %
IRON SATN MFR SERPL: 8 % (ref 20–55)
IRON SERPL-MCNC: 32 UG/DL (ref 37–145)
LYMPHOCYTES NFR BLD: 1.71 K/UL (ref 1–4.8)
LYMPHOCYTES RELATIVE PERCENT: 28 % (ref 15–40)
MCH RBC QN AUTO: 25.8 PG (ref 26–34)
MCH RBC QN AUTO: 26 PG (ref 26–34)
MCHC RBC AUTO-ENTMCNC: 32.5 G/DL (ref 31–37)
MCHC RBC AUTO-ENTMCNC: 32.6 G/DL (ref 31–37)
MCV RBC AUTO: 79.6 FL (ref 80–100)
MCV RBC AUTO: 79.8 FL (ref 80–100)
MONOCYTES NFR BLD: 0.49 K/UL (ref 0–1)
MONOCYTES NFR BLD: 8 % (ref 4–8)
MORPHOLOGY: ABNORMAL
NEUTROPHILS NFR BLD: 48 % (ref 47–75)
NEUTS SEG NFR BLD: 2.92 K/UL (ref 2.5–7)
PLATELET # BLD AUTO: 430 K/UL (ref 140–450)
PLATELET # BLD AUTO: 450 K/UL (ref 140–450)
PMV BLD AUTO: 8.4 FL (ref 6–12)
PMV BLD AUTO: 8.8 FL (ref 6–12)
POTASSIUM SERPL-SCNC: 4.2 MMOL/L (ref 3.7–5.3)
POTASSIUM SERPL-SCNC: 4.3 MMOL/L (ref 3.7–5.3)
PROT SERPL-MCNC: 7.3 G/DL (ref 6.4–8.3)
RBC # BLD AUTO: 3.31 M/UL (ref 4–5.2)
RBC # BLD AUTO: 3.33 M/UL (ref 4–5.2)
SODIUM SERPL-SCNC: 128 MMOL/L (ref 135–144)
SODIUM SERPL-SCNC: 129 MMOL/L (ref 135–144)
TIBC SERPL-MCNC: 410 UG/DL (ref 250–450)
UNSATURATED IRON BINDING CAPACITY: 378 UG/DL (ref 112–347)
WBC OTHER # BLD: 6 K/UL (ref 3.5–11)
WBC OTHER # BLD: 6.1 K/UL (ref 3.5–11)

## 2025-07-18 PROCEDURE — 85027 COMPLETE CBC AUTOMATED: CPT

## 2025-07-18 PROCEDURE — 80048 BASIC METABOLIC PNL TOTAL CA: CPT

## 2025-07-18 PROCEDURE — 83540 ASSAY OF IRON: CPT

## 2025-07-18 PROCEDURE — 80053 COMPREHEN METABOLIC PANEL: CPT

## 2025-07-18 PROCEDURE — 83550 IRON BINDING TEST: CPT

## 2025-07-18 PROCEDURE — 82728 ASSAY OF FERRITIN: CPT

## 2025-07-18 PROCEDURE — 85025 COMPLETE CBC W/AUTO DIFF WBC: CPT

## 2025-07-18 PROCEDURE — 36415 COLL VENOUS BLD VENIPUNCTURE: CPT

## 2025-08-01 DIAGNOSIS — N18.4 CHRONIC KIDNEY DISEASE, STAGE IV (SEVERE) (HCC): Primary | ICD-10-CM

## 2025-08-04 ENCOUNTER — TELEPHONE (OUTPATIENT)
Dept: NURSING | Age: 65
End: 2025-08-04

## 2025-08-04 DIAGNOSIS — N18.4 CHRONIC KIDNEY DISEASE, STAGE IV (SEVERE) (HCC): Primary | ICD-10-CM

## 2025-08-05 ENCOUNTER — CARE COORDINATION (OUTPATIENT)
Dept: CARE COORDINATION | Age: 65
End: 2025-08-05

## 2025-08-06 ENCOUNTER — HOSPITAL ENCOUNTER (OUTPATIENT)
Dept: NURSING | Age: 65
Setting detail: INFUSION SERIES
Discharge: HOME | End: 2025-08-06
Payer: COMMERCIAL

## 2025-08-06 ENCOUNTER — HOSPITAL ENCOUNTER (OUTPATIENT)
Age: 65
End: 2025-08-06
Payer: COMMERCIAL

## 2025-08-06 ENCOUNTER — HOSPITAL ENCOUNTER (OUTPATIENT)
Dept: LAB | Age: 65
Discharge: HOME | End: 2025-08-06
Payer: COMMERCIAL

## 2025-08-06 DIAGNOSIS — N18.4 CHRONIC KIDNEY DISEASE, STAGE IV (SEVERE) (HCC): Primary | ICD-10-CM

## 2025-08-06 DIAGNOSIS — N18.4 CHRONIC KIDNEY DISEASE, STAGE IV (SEVERE) (HCC): ICD-10-CM

## 2025-08-06 DIAGNOSIS — R06.02 SOB (SHORTNESS OF BREATH): ICD-10-CM

## 2025-08-06 DIAGNOSIS — E78.5 DYSLIPIDEMIA: ICD-10-CM

## 2025-08-06 DIAGNOSIS — I10 ESSENTIAL HYPERTENSION: ICD-10-CM

## 2025-08-06 DIAGNOSIS — I25.118 CORONARY ARTERY DISEASE OF NATIVE ARTERY OF NATIVE HEART WITH STABLE ANGINA PECTORIS: ICD-10-CM

## 2025-08-06 DIAGNOSIS — D09.0 BLADDER CA IN SITU: ICD-10-CM

## 2025-08-06 LAB
25(OH)D3 SERPL-MCNC: 30.8 NG/ML (ref 30–100)
ALBUMIN SERPL-MCNC: 4.4 G/DL (ref 3.5–5.2)
ALBUMIN/GLOB SERPL: 1.4 {RATIO} (ref 1–2.5)
ALP SERPL-CCNC: 88 U/L (ref 35–104)
ALT SERPL-CCNC: 19 U/L (ref 5–33)
ANION GAP SERPL CALCULATED.3IONS-SCNC: 16 MMOL/L (ref 9–17)
AST SERPL-CCNC: 24 U/L
BASOPHILS # BLD: ABNORMAL K/UL (ref 0–0.2)
BASOPHILS NFR BLD: ABNORMAL % (ref 0–2)
BILIRUB SERPL-MCNC: 0.6 MG/DL (ref 0.3–1.2)
BUN SERPL-MCNC: 72 MG/DL (ref 8–23)
CALCIUM SERPL-MCNC: 9.6 MG/DL (ref 8.6–10.4)
CHLORIDE SERPL-SCNC: 95 MMOL/L (ref 98–107)
CHOLEST SERPL-MCNC: 132 MG/DL (ref 0–199)
CHOLESTEROL/HDL RATIO: 2.6
CO2 SERPL-SCNC: 21 MMOL/L (ref 20–31)
CREAT SERPL-MCNC: 2.4 MG/DL (ref 0.5–0.9)
EOSINOPHIL # BLD: 1.24 K/UL (ref 0–0.4)
EOSINOPHILS RELATIVE PERCENT: 18 % (ref 0–5)
ERYTHROCYTE [DISTWIDTH] IN BLOOD BY AUTOMATED COUNT: 20.5 % (ref 12.1–15.2)
GFR, ESTIMATED: 22 ML/MIN/1.73M2
GLUCOSE SERPL-MCNC: 110 MG/DL (ref 70–99)
HCT VFR BLD AUTO: 28.2 % (ref 36–46)
HCT VFR BLD AUTO: 28.2 % (ref 36–46)
HDLC SERPL-MCNC: 51 MG/DL
HGB BLD-MCNC: 9.3 G/DL (ref 12–16)
HGB BLD-MCNC: 9.3 G/DL (ref 12–16)
IMM GRANULOCYTES # BLD AUTO: ABNORMAL K/UL (ref 0–0.3)
IMM GRANULOCYTES NFR BLD: ABNORMAL %
IRON SATN MFR SERPL: 8 % (ref 20–55)
IRON SERPL-MCNC: 32 UG/DL (ref 37–145)
LDLC SERPL CALC-MCNC: 62 MG/DL (ref 0–100)
LYMPHOCYTES NFR BLD: 1.24 K/UL (ref 1–4.8)
LYMPHOCYTES RELATIVE PERCENT: 18 % (ref 15–40)
MAGNESIUM SERPL-MCNC: 2.2 MG/DL (ref 1.6–2.6)
MCH RBC QN AUTO: 26.6 PG (ref 26–34)
MCHC RBC AUTO-ENTMCNC: 33 G/DL (ref 31–37)
MCV RBC AUTO: 80.8 FL (ref 80–100)
MONOCYTES NFR BLD: 0.35 K/UL (ref 0–1)
MONOCYTES NFR BLD: 5 % (ref 4–8)
MORPHOLOGY: ABNORMAL
MORPHOLOGY: ABNORMAL
NEUTROPHILS NFR BLD: 59 % (ref 47–75)
NEUTS SEG NFR BLD: 4.07 K/UL (ref 2.5–7)
PLATELET # BLD AUTO: 412 K/UL (ref 140–450)
PMV BLD AUTO: 8.7 FL (ref 6–12)
POTASSIUM SERPL-SCNC: 4.1 MMOL/L (ref 3.7–5.3)
PROT SERPL-MCNC: 7.5 G/DL (ref 6.4–8.3)
RBC # BLD AUTO: 3.49 M/UL (ref 4–5.2)
SODIUM SERPL-SCNC: 132 MMOL/L (ref 135–144)
TIBC SERPL-MCNC: 379 UG/DL (ref 250–450)
TRIGL SERPL-MCNC: 96 MG/DL
TSH SERPL DL<=0.05 MIU/L-ACNC: 2.9 UIU/ML (ref 0.27–4.2)
UNSATURATED IRON BINDING CAPACITY: 347 UG/DL (ref 112–347)
VLDLC SERPL CALC-MCNC: 19 MG/DL (ref 1–30)
WBC OTHER # BLD: 6.9 K/UL (ref 3.5–11)

## 2025-08-06 PROCEDURE — 85018 HEMOGLOBIN: CPT

## 2025-08-06 PROCEDURE — 6360000002 HC RX W HCPCS: Performed by: INTERNAL MEDICINE

## 2025-08-06 PROCEDURE — 83540 ASSAY OF IRON: CPT

## 2025-08-06 PROCEDURE — 82306 VITAMIN D 25 HYDROXY: CPT

## 2025-08-06 PROCEDURE — 80053 COMPREHEN METABOLIC PANEL: CPT

## 2025-08-06 PROCEDURE — 96372 THER/PROPH/DIAG INJ SC/IM: CPT

## 2025-08-06 PROCEDURE — 85025 COMPLETE CBC W/AUTO DIFF WBC: CPT

## 2025-08-06 PROCEDURE — 36415 COLL VENOUS BLD VENIPUNCTURE: CPT

## 2025-08-06 PROCEDURE — 85014 HEMATOCRIT: CPT

## 2025-08-06 PROCEDURE — 83735 ASSAY OF MAGNESIUM: CPT

## 2025-08-06 PROCEDURE — 84443 ASSAY THYROID STIM HORMONE: CPT

## 2025-08-06 PROCEDURE — 80061 LIPID PANEL: CPT

## 2025-08-06 PROCEDURE — 83550 IRON BINDING TEST: CPT

## 2025-08-06 RX ADMIN — EPOETIN ALFA-EPBX 8000 UNITS: 10000 INJECTION, SOLUTION INTRAVENOUS; SUBCUTANEOUS at 13:51

## 2025-08-08 ENCOUNTER — HOSPITAL ENCOUNTER (OUTPATIENT)
Dept: VASCULAR LAB | Age: 65
Discharge: HOME | End: 2025-08-10
Payer: COMMERCIAL

## 2025-08-08 DIAGNOSIS — I10 ESSENTIAL HYPERTENSION: ICD-10-CM

## 2025-08-08 DIAGNOSIS — R06.02 SOB (SHORTNESS OF BREATH): ICD-10-CM

## 2025-08-08 DIAGNOSIS — C68.9 TRANSITIONAL CELL CARCINOMA (HCC): ICD-10-CM

## 2025-08-08 DIAGNOSIS — E78.5 DYSLIPIDEMIA: ICD-10-CM

## 2025-08-08 DIAGNOSIS — R31.9 HEMATURIA, UNSPECIFIED TYPE: ICD-10-CM

## 2025-08-08 DIAGNOSIS — I73.9 PERIPHERAL VASCULAR DISEASE: ICD-10-CM

## 2025-08-08 DIAGNOSIS — I25.118 CORONARY ARTERY DISEASE OF NATIVE ARTERY OF NATIVE HEART WITH STABLE ANGINA PECTORIS: ICD-10-CM

## 2025-08-08 LAB
VAS LEFT BULB EDV: 21.4 CM/S
VAS LEFT BULB PSV: 127 CM/S
VAS LEFT CCA DIST EDV: 17.9 CM/S
VAS LEFT CCA DIST PSV: 88.3 CM/S
VAS LEFT CCA MID EDV: 19.9 CM/S
VAS LEFT CCA MID PSV: 119 CM/S
VAS LEFT CCA PROX EDV: 11.8 CM/S
VAS LEFT CCA PROX PSV: 119 CM/S
VAS LEFT ECA EDV: 11.9 CM/S
VAS LEFT ECA PSV: 189 CM/S
VAS LEFT ICA DIST EDV: 22.4 CM/S
VAS LEFT ICA DIST PSV: 82.9 CM/S
VAS LEFT ICA MID EDV: 22.1 CM/S
VAS LEFT ICA MID PSV: 114 CM/S
VAS LEFT ICA PROX EDV: 42.8 CM/S
VAS LEFT ICA PROX PSV: 185 CM/S
VAS LEFT ICA/CCA PSV: 1.6
VAS LEFT SUBCLAVIAN DIST EDV: 0 CM/S
VAS LEFT SUBCLAVIAN DIST PSV: 61 CM/S
VAS LEFT SUBCLAVIAN MID EDV: 0 CM/S
VAS LEFT SUBCLAVIAN MID PSV: 87.1 CM/S
VAS LEFT SUBCLAVIAN PROX EDV: 0 CM/S
VAS LEFT SUBCLAVIAN PROX PSV: 94.3 CM/S
VAS LEFT VERTEBRAL EDV: 0 CM/S
VAS LEFT VERTEBRAL PSV: 102 CM/S
VAS RIGHT BULB EDV: 0 CM/S
VAS RIGHT BULB PSV: 118 CM/S
VAS RIGHT CCA DIST EDV: 16.2 CM/S
VAS RIGHT CCA DIST PSV: 99.5 CM/S
VAS RIGHT CCA MID EDV: 24.1 CM/S
VAS RIGHT CCA MID PSV: 155 CM/S
VAS RIGHT CCA PROX EDV: 19.8 CM/S
VAS RIGHT CCA PROX PSV: 234 CM/S
VAS RIGHT ECA EDV: 22.1 CM/S
VAS RIGHT ECA PSV: 211 CM/S
VAS RIGHT ICA DIST EDV: 14.6 CM/S
VAS RIGHT ICA DIST PSV: 75.6 CM/S
VAS RIGHT ICA MID EDV: 16.7 CM/S
VAS RIGHT ICA MID PSV: 114 CM/S
VAS RIGHT ICA PROX EDV: 24.2 CM/S
VAS RIGHT ICA PROX PSV: 139 CM/S
VAS RIGHT ICA/CCA PSV: 0.9
VAS RIGHT VERTEBRAL EDV: 21.7 CM/S
VAS RIGHT VERTEBRAL PSV: 143 CM/S

## 2025-08-08 PROCEDURE — 93880 EXTRACRANIAL BILAT STUDY: CPT

## 2025-08-13 ENCOUNTER — HOSPITAL ENCOUNTER (OUTPATIENT)
Dept: NURSING | Age: 65
Setting detail: INFUSION SERIES
Discharge: HOME | End: 2025-08-13
Payer: COMMERCIAL

## 2025-08-13 ENCOUNTER — OFFICE VISIT (OUTPATIENT)
Dept: CARDIOLOGY CLINIC | Age: 65
End: 2025-08-13

## 2025-08-13 ENCOUNTER — HOSPITAL ENCOUNTER (OUTPATIENT)
Dept: LAB | Age: 65
Discharge: HOME | End: 2025-08-13
Payer: COMMERCIAL

## 2025-08-13 VITALS
HEART RATE: 70 BPM | BODY MASS INDEX: 18.78 KG/M2 | DIASTOLIC BLOOD PRESSURE: 70 MMHG | WEIGHT: 106 LBS | SYSTOLIC BLOOD PRESSURE: 160 MMHG | OXYGEN SATURATION: 96 %

## 2025-08-13 DIAGNOSIS — I10 ESSENTIAL HYPERTENSION: ICD-10-CM

## 2025-08-13 DIAGNOSIS — I48.91 ATRIAL FIBRILLATION WITH RAPID VENTRICULAR RESPONSE (HCC): Primary | ICD-10-CM

## 2025-08-13 DIAGNOSIS — E78.5 DYSLIPIDEMIA: ICD-10-CM

## 2025-08-13 DIAGNOSIS — N18.4 CHRONIC KIDNEY DISEASE, STAGE IV (SEVERE) (HCC): Primary | ICD-10-CM

## 2025-08-13 DIAGNOSIS — I25.118 CORONARY ARTERY DISEASE OF NATIVE ARTERY OF NATIVE HEART WITH STABLE ANGINA PECTORIS: ICD-10-CM

## 2025-08-13 LAB
FERRITIN SERPL-MCNC: 30 NG/ML
HCT VFR BLD AUTO: 29.7 % (ref 36–46)
HGB BLD-MCNC: 9.8 G/DL (ref 12–16)
IRON SATN MFR SERPL: 9 % (ref 20–55)
IRON SERPL-MCNC: 32 UG/DL (ref 37–145)
TIBC SERPL-MCNC: 374 UG/DL (ref 250–450)
UNSATURATED IRON BINDING CAPACITY: 342 UG/DL (ref 112–347)

## 2025-08-13 PROCEDURE — 83540 ASSAY OF IRON: CPT

## 2025-08-13 PROCEDURE — 82728 ASSAY OF FERRITIN: CPT

## 2025-08-13 PROCEDURE — 36415 COLL VENOUS BLD VENIPUNCTURE: CPT

## 2025-08-13 PROCEDURE — 6360000002 HC RX W HCPCS: Performed by: INTERNAL MEDICINE

## 2025-08-13 PROCEDURE — 96372 THER/PROPH/DIAG INJ SC/IM: CPT

## 2025-08-13 PROCEDURE — 83550 IRON BINDING TEST: CPT

## 2025-08-13 PROCEDURE — 85018 HEMOGLOBIN: CPT

## 2025-08-13 PROCEDURE — 85014 HEMATOCRIT: CPT

## 2025-08-13 RX ADMIN — EPOETIN ALFA-EPBX 8000 UNITS: 10000 INJECTION, SOLUTION INTRAVENOUS; SUBCUTANEOUS at 11:39

## 2025-08-15 PROBLEM — N18.4 ANEMIA DUE TO STAGE 4 CHRONIC KIDNEY DISEASE TREATED WITH ERYTHROPOIETIN (HCC): Status: ACTIVE | Noted: 2024-07-01

## 2025-08-15 RX ORDER — ACETAMINOPHEN 325 MG/1
650 TABLET ORAL
OUTPATIENT
Start: 2025-08-15

## 2025-08-15 RX ORDER — HYDROCORTISONE SODIUM SUCCINATE 100 MG/2ML
100 INJECTION INTRAMUSCULAR; INTRAVENOUS
OUTPATIENT
Start: 2025-08-15

## 2025-08-15 RX ORDER — EPINEPHRINE 1 MG/ML
0.3 INJECTION, SOLUTION INTRAMUSCULAR; SUBCUTANEOUS PRN
OUTPATIENT
Start: 2025-08-15

## 2025-08-15 RX ORDER — HEPARIN 100 UNIT/ML
500 SYRINGE INTRAVENOUS PRN
OUTPATIENT
Start: 2025-08-15

## 2025-08-15 RX ORDER — DIPHENHYDRAMINE HYDROCHLORIDE 50 MG/ML
50 INJECTION, SOLUTION INTRAMUSCULAR; INTRAVENOUS
OUTPATIENT
Start: 2025-08-15

## 2025-08-15 RX ORDER — SODIUM CHLORIDE 9 MG/ML
INJECTION, SOLUTION INTRAVENOUS PRN
OUTPATIENT
Start: 2025-08-15

## 2025-08-15 RX ORDER — SODIUM CHLORIDE 9 MG/ML
5-250 INJECTION, SOLUTION INTRAVENOUS PRN
OUTPATIENT
Start: 2025-08-15

## 2025-08-15 RX ORDER — ONDANSETRON 2 MG/ML
8 INJECTION INTRAMUSCULAR; INTRAVENOUS
OUTPATIENT
Start: 2025-08-15

## 2025-08-15 RX ORDER — ALBUTEROL SULFATE 90 UG/1
4 INHALANT RESPIRATORY (INHALATION) PRN
OUTPATIENT
Start: 2025-08-15

## 2025-08-15 RX ORDER — PANTOPRAZOLE SODIUM 40 MG/1
40 TABLET, DELAYED RELEASE ORAL
Qty: 30 TABLET | Refills: 1 | Status: SHIPPED | OUTPATIENT
Start: 2025-08-15

## 2025-08-15 RX ORDER — SODIUM CHLORIDE 0.9 % (FLUSH) 0.9 %
5-40 SYRINGE (ML) INJECTION PRN
OUTPATIENT
Start: 2025-08-15

## 2025-08-20 ENCOUNTER — HOSPITAL ENCOUNTER (OUTPATIENT)
Dept: NURSING | Age: 65
Setting detail: INFUSION SERIES
Discharge: HOME | End: 2025-08-20

## 2025-08-22 ENCOUNTER — HOSPITAL ENCOUNTER (OUTPATIENT)
Dept: NURSING | Age: 65
Setting detail: INFUSION SERIES
Discharge: HOME | End: 2025-08-22
Payer: COMMERCIAL

## 2025-08-22 VITALS — SYSTOLIC BLOOD PRESSURE: 178 MMHG | DIASTOLIC BLOOD PRESSURE: 79 MMHG

## 2025-08-22 DIAGNOSIS — D63.1 ANEMIA DUE TO STAGE 4 CHRONIC KIDNEY DISEASE TREATED WITH ERYTHROPOIETIN (HCC): Primary | ICD-10-CM

## 2025-08-22 DIAGNOSIS — N18.4 ANEMIA DUE TO STAGE 4 CHRONIC KIDNEY DISEASE TREATED WITH ERYTHROPOIETIN (HCC): Primary | ICD-10-CM

## 2025-08-22 PROCEDURE — 6360000002 HC RX W HCPCS

## 2025-08-22 PROCEDURE — 96365 THER/PROPH/DIAG IV INF INIT: CPT

## 2025-08-22 PROCEDURE — 2580000003 HC RX 258

## 2025-08-22 PROCEDURE — 96361 HYDRATE IV INFUSION ADD-ON: CPT

## 2025-08-22 RX ORDER — SODIUM CHLORIDE 9 MG/ML
5-250 INJECTION, SOLUTION INTRAVENOUS PRN
Status: CANCELLED | OUTPATIENT
Start: 2025-08-29

## 2025-08-22 RX ORDER — ONDANSETRON 2 MG/ML
8 INJECTION INTRAMUSCULAR; INTRAVENOUS
OUTPATIENT
Start: 2025-08-29

## 2025-08-22 RX ORDER — HEPARIN 100 UNIT/ML
500 SYRINGE INTRAVENOUS PRN
OUTPATIENT
Start: 2025-08-29

## 2025-08-22 RX ORDER — SODIUM CHLORIDE 9 MG/ML
5-250 INJECTION, SOLUTION INTRAVENOUS PRN
OUTPATIENT
Start: 2025-08-29

## 2025-08-22 RX ORDER — SODIUM CHLORIDE 9 MG/ML
5-250 INJECTION, SOLUTION INTRAVENOUS PRN
Status: DISCONTINUED | OUTPATIENT
Start: 2025-08-22 | End: 2025-08-23 | Stop reason: HOSPADM

## 2025-08-22 RX ORDER — ACETAMINOPHEN 325 MG/1
650 TABLET ORAL
OUTPATIENT
Start: 2025-08-29

## 2025-08-22 RX ORDER — SODIUM CHLORIDE 0.9 % (FLUSH) 0.9 %
5-40 SYRINGE (ML) INJECTION PRN
OUTPATIENT
Start: 2025-08-29

## 2025-08-22 RX ORDER — HYDROCORTISONE SODIUM SUCCINATE 100 MG/2ML
100 INJECTION INTRAMUSCULAR; INTRAVENOUS
OUTPATIENT
Start: 2025-08-29

## 2025-08-22 RX ORDER — EPINEPHRINE 1 MG/ML
0.3 INJECTION, SOLUTION INTRAMUSCULAR; SUBCUTANEOUS PRN
OUTPATIENT
Start: 2025-08-29

## 2025-08-22 RX ORDER — ALBUTEROL SULFATE 90 UG/1
4 INHALANT RESPIRATORY (INHALATION) PRN
OUTPATIENT
Start: 2025-08-29

## 2025-08-22 RX ORDER — DIPHENHYDRAMINE HYDROCHLORIDE 50 MG/ML
50 INJECTION, SOLUTION INTRAMUSCULAR; INTRAVENOUS
OUTPATIENT
Start: 2025-08-29

## 2025-08-22 RX ORDER — SODIUM CHLORIDE 9 MG/ML
INJECTION, SOLUTION INTRAVENOUS PRN
OUTPATIENT
Start: 2025-08-29

## 2025-08-22 RX ADMIN — SODIUM CHLORIDE 100 ML/HR: 0.9 INJECTION, SOLUTION INTRAVENOUS at 10:52

## 2025-08-22 RX ADMIN — IRON SUCROSE 200 MG: 20 INJECTION, SOLUTION INTRAVENOUS at 10:54

## 2025-08-25 ENCOUNTER — HOSPITAL ENCOUNTER (OUTPATIENT)
Dept: CT IMAGING | Age: 65
Discharge: HOME | End: 2025-08-27
Payer: COMMERCIAL

## 2025-08-25 ENCOUNTER — HOSPITAL ENCOUNTER (OUTPATIENT)
Dept: ULTRASOUND IMAGING | Age: 65
Discharge: HOME | End: 2025-08-27
Payer: COMMERCIAL

## 2025-08-25 ENCOUNTER — HOSPITAL ENCOUNTER (OUTPATIENT)
Dept: NURSING | Age: 65
Setting detail: INFUSION SERIES
Discharge: HOME | End: 2025-08-25

## 2025-08-25 ENCOUNTER — HOSPITAL ENCOUNTER (OUTPATIENT)
Dept: LAB | Age: 65
Discharge: HOME | End: 2025-08-25
Payer: COMMERCIAL

## 2025-08-25 DIAGNOSIS — C67.9 MALIGNANT NEOPLASM OF URINARY BLADDER, UNSPECIFIED SITE (HCC): ICD-10-CM

## 2025-08-25 DIAGNOSIS — N18.4 CHRONIC KIDNEY DISEASE, STAGE IV (SEVERE) (HCC): ICD-10-CM

## 2025-08-25 DIAGNOSIS — N28.9 KIDNEY LESION, NATIVE, LEFT: ICD-10-CM

## 2025-08-25 LAB
HCT VFR BLD AUTO: 31.3 % (ref 36–46)
HGB BLD-MCNC: 10.3 G/DL (ref 12–16)
MORPHOLOGY: NORMAL

## 2025-08-25 PROCEDURE — 85014 HEMATOCRIT: CPT

## 2025-08-25 PROCEDURE — 76770 US EXAM ABDO BACK WALL COMP: CPT

## 2025-08-25 PROCEDURE — 36415 COLL VENOUS BLD VENIPUNCTURE: CPT

## 2025-08-25 PROCEDURE — 71250 CT THORAX DX C-: CPT

## 2025-08-25 PROCEDURE — 85018 HEMOGLOBIN: CPT

## 2025-08-27 DIAGNOSIS — I10 ESSENTIAL HYPERTENSION: ICD-10-CM

## 2025-08-28 RX ORDER — HYDRALAZINE HYDROCHLORIDE 50 MG/1
50 TABLET, FILM COATED ORAL 2 TIMES DAILY
Qty: 180 TABLET | Refills: 1 | Status: SHIPPED | OUTPATIENT
Start: 2025-08-28

## 2025-08-29 ENCOUNTER — HOSPITAL ENCOUNTER (OUTPATIENT)
Dept: NURSING | Age: 65
Setting detail: INFUSION SERIES
Discharge: HOME | End: 2025-08-29
Payer: COMMERCIAL

## 2025-08-29 VITALS — SYSTOLIC BLOOD PRESSURE: 159 MMHG | DIASTOLIC BLOOD PRESSURE: 65 MMHG

## 2025-08-29 DIAGNOSIS — D63.1 ANEMIA DUE TO STAGE 4 CHRONIC KIDNEY DISEASE TREATED WITH ERYTHROPOIETIN (HCC): Primary | ICD-10-CM

## 2025-08-29 DIAGNOSIS — N18.4 ANEMIA DUE TO STAGE 4 CHRONIC KIDNEY DISEASE TREATED WITH ERYTHROPOIETIN (HCC): Primary | ICD-10-CM

## 2025-08-29 PROCEDURE — 6360000002 HC RX W HCPCS

## 2025-08-29 PROCEDURE — 2580000003 HC RX 258

## 2025-08-29 RX ORDER — DIPHENHYDRAMINE HYDROCHLORIDE 50 MG/ML
50 INJECTION, SOLUTION INTRAMUSCULAR; INTRAVENOUS
OUTPATIENT
Start: 2025-09-05

## 2025-08-29 RX ORDER — SODIUM CHLORIDE 9 MG/ML
5-250 INJECTION, SOLUTION INTRAVENOUS PRN
OUTPATIENT
Start: 2025-09-05

## 2025-08-29 RX ORDER — HEPARIN 100 UNIT/ML
500 SYRINGE INTRAVENOUS PRN
OUTPATIENT
Start: 2025-09-05

## 2025-08-29 RX ORDER — SODIUM CHLORIDE 0.9 % (FLUSH) 0.9 %
5-40 SYRINGE (ML) INJECTION PRN
OUTPATIENT
Start: 2025-09-05

## 2025-08-29 RX ORDER — ONDANSETRON 2 MG/ML
8 INJECTION INTRAMUSCULAR; INTRAVENOUS
OUTPATIENT
Start: 2025-09-05

## 2025-08-29 RX ORDER — SODIUM CHLORIDE 9 MG/ML
INJECTION, SOLUTION INTRAVENOUS PRN
OUTPATIENT
Start: 2025-09-05

## 2025-08-29 RX ORDER — EPINEPHRINE 1 MG/ML
0.3 INJECTION, SOLUTION INTRAMUSCULAR; SUBCUTANEOUS PRN
OUTPATIENT
Start: 2025-09-05

## 2025-08-29 RX ORDER — ACETAMINOPHEN 325 MG/1
650 TABLET ORAL
OUTPATIENT
Start: 2025-09-05

## 2025-08-29 RX ORDER — SODIUM CHLORIDE 9 MG/ML
5-250 INJECTION, SOLUTION INTRAVENOUS PRN
Status: DISCONTINUED | OUTPATIENT
Start: 2025-08-29 | End: 2025-08-30 | Stop reason: HOSPADM

## 2025-08-29 RX ORDER — HYDROCORTISONE SODIUM SUCCINATE 100 MG/2ML
100 INJECTION INTRAMUSCULAR; INTRAVENOUS
OUTPATIENT
Start: 2025-09-05

## 2025-08-29 RX ORDER — ALBUTEROL SULFATE 90 UG/1
4 INHALANT RESPIRATORY (INHALATION) PRN
OUTPATIENT
Start: 2025-09-05

## 2025-08-29 RX ADMIN — SODIUM CHLORIDE 100 ML/HR: 9 INJECTION, SOLUTION INTRAVENOUS at 10:35

## 2025-08-29 RX ADMIN — IRON SUCROSE 200 MG: 20 INJECTION, SOLUTION INTRAVENOUS at 10:38

## 2025-09-02 ENCOUNTER — HOSPITAL ENCOUNTER (EMERGENCY)
Age: 65
Discharge: ANOTHER HOSPITAL | End: 2025-09-02
Attending: EMERGENCY MEDICINE
Payer: COMMERCIAL

## 2025-09-02 ENCOUNTER — APPOINTMENT (OUTPATIENT)
Dept: GENERAL RADIOLOGY | Age: 65
End: 2025-09-02
Payer: COMMERCIAL

## 2025-09-02 ENCOUNTER — APPOINTMENT (OUTPATIENT)
Dept: CT IMAGING | Age: 65
End: 2025-09-02
Payer: COMMERCIAL

## 2025-09-02 VITALS
DIASTOLIC BLOOD PRESSURE: 77 MMHG | BODY MASS INDEX: 19.13 KG/M2 | OXYGEN SATURATION: 100 % | HEIGHT: 66 IN | SYSTOLIC BLOOD PRESSURE: 132 MMHG | RESPIRATION RATE: 13 BRPM | TEMPERATURE: 97.2 F | WEIGHT: 119 LBS | HEART RATE: 73 BPM

## 2025-09-02 DIAGNOSIS — R53.1 ACUTE LEFT-SIDED WEAKNESS: ICD-10-CM

## 2025-09-02 DIAGNOSIS — R56.9 SEIZURE (HCC): ICD-10-CM

## 2025-09-02 DIAGNOSIS — R41.89 UNRESPONSIVE STATE: Primary | ICD-10-CM

## 2025-09-02 DIAGNOSIS — I63.9 CEREBROVASCULAR ACCIDENT (CVA), UNSPECIFIED MECHANISM (HCC): ICD-10-CM

## 2025-09-02 LAB
ALBUMIN SERPL-MCNC: 5.1 G/DL (ref 3.5–5.2)
ALBUMIN/GLOB SERPL: 1.5 {RATIO} (ref 1–2.5)
ALP SERPL-CCNC: 87 U/L (ref 35–104)
ALT SERPL-CCNC: 25 U/L (ref 5–33)
AMPHET UR QL SCN: NEGATIVE
ANION GAP SERPL CALCULATED.3IONS-SCNC: 18 MMOL/L (ref 9–17)
AST SERPL-CCNC: 29 U/L
BARBITURATES UR QL SCN: NEGATIVE
BASOPHILS # BLD: ABNORMAL K/UL (ref 0–0.2)
BASOPHILS NFR BLD: ABNORMAL % (ref 0–2)
BENZODIAZ UR QL: NEGATIVE
BILIRUB SERPL-MCNC: 0.8 MG/DL (ref 0.3–1.2)
BUN SERPL-MCNC: 67 MG/DL (ref 8–23)
CALCIUM SERPL-MCNC: 10 MG/DL (ref 8.6–10.4)
CANNABINOIDS UR QL SCN: POSITIVE
CHLORIDE SERPL-SCNC: 96 MMOL/L (ref 98–107)
CO2 SERPL-SCNC: 20 MMOL/L (ref 20–31)
COCAINE UR QL SCN: NEGATIVE
CREAT SERPL-MCNC: 3 MG/DL (ref 0.5–0.9)
EOSINOPHIL # BLD: 1.49 K/UL (ref 0–0.4)
EOSINOPHILS RELATIVE PERCENT: 16 % (ref 0–5)
ERYTHROCYTE [DISTWIDTH] IN BLOOD BY AUTOMATED COUNT: 20.9 % (ref 12.1–15.2)
FENTANYL UR QL: NEGATIVE
GFR, ESTIMATED: 17 ML/MIN/1.73M2
GLUCOSE BLD-MCNC: 138 MG/DL (ref 65–99)
GLUCOSE SERPL-MCNC: 134 MG/DL (ref 70–99)
HCT VFR BLD AUTO: 35.5 % (ref 36–46)
HGB BLD-MCNC: 11.2 G/DL (ref 12–16)
IMM GRANULOCYTES # BLD AUTO: ABNORMAL K/UL (ref 0–0.3)
IMM GRANULOCYTES NFR BLD: ABNORMAL %
LYMPHOCYTES NFR BLD: 2.51 K/UL (ref 1–4.8)
LYMPHOCYTES RELATIVE PERCENT: 27 % (ref 15–40)
MCH RBC QN AUTO: 26.7 PG (ref 26–34)
MCHC RBC AUTO-ENTMCNC: 31.5 G/DL (ref 31–37)
MCV RBC AUTO: 84.7 FL (ref 80–100)
METHADONE UR QL: NEGATIVE
MONOCYTES NFR BLD: 0.84 K/UL (ref 0–1)
MONOCYTES NFR BLD: 9 % (ref 4–8)
MORPHOLOGY: ABNORMAL
MORPHOLOGY: ABNORMAL
NEUTROPHILS NFR BLD: 48 % (ref 47–75)
NEUTS SEG NFR BLD: 4.46 K/UL (ref 2.5–7)
OPIATES UR QL SCN: NEGATIVE
OXYCODONE UR QL SCN: NEGATIVE
PCP UR QL SCN: NEGATIVE
PLATELET # BLD AUTO: 441 K/UL (ref 140–450)
PMV BLD AUTO: 9.1 FL (ref 6–12)
POTASSIUM SERPL-SCNC: 4.7 MMOL/L (ref 3.7–5.3)
PROT SERPL-MCNC: 8.4 G/DL (ref 6.4–8.3)
RBC # BLD AUTO: 4.19 M/UL (ref 4–5.2)
SODIUM SERPL-SCNC: 134 MMOL/L (ref 135–144)
TEST INFORMATION: ABNORMAL
TROPONIN I SERPL HS-MCNC: 43 NG/L (ref 0–14)
WBC OTHER # BLD: 9.3 K/UL (ref 3.5–11)

## 2025-09-02 PROCEDURE — 82947 ASSAY GLUCOSE BLOOD QUANT: CPT

## 2025-09-02 PROCEDURE — 93005 ELECTROCARDIOGRAM TRACING: CPT | Performed by: EMERGENCY MEDICINE

## 2025-09-02 PROCEDURE — 99285 EMERGENCY DEPT VISIT HI MDM: CPT

## 2025-09-02 PROCEDURE — 99291 CRITICAL CARE FIRST HOUR: CPT

## 2025-09-02 PROCEDURE — 6360000004 HC RX CONTRAST MEDICATION: Performed by: EMERGENCY MEDICINE

## 2025-09-02 PROCEDURE — 70450 CT HEAD/BRAIN W/O DYE: CPT

## 2025-09-02 PROCEDURE — 6360000002 HC RX W HCPCS

## 2025-09-02 PROCEDURE — 70498 CT ANGIOGRAPHY NECK: CPT

## 2025-09-02 PROCEDURE — 71045 X-RAY EXAM CHEST 1 VIEW: CPT

## 2025-09-02 PROCEDURE — 96365 THER/PROPH/DIAG IV INF INIT: CPT

## 2025-09-02 PROCEDURE — 6360000002 HC RX W HCPCS: Performed by: EMERGENCY MEDICINE

## 2025-09-02 PROCEDURE — 80307 DRUG TEST PRSMV CHEM ANLYZR: CPT

## 2025-09-02 PROCEDURE — 96375 TX/PRO/DX INJ NEW DRUG ADDON: CPT

## 2025-09-02 PROCEDURE — 85025 COMPLETE CBC W/AUTO DIFF WBC: CPT

## 2025-09-02 PROCEDURE — 80053 COMPREHEN METABOLIC PANEL: CPT

## 2025-09-02 PROCEDURE — 31500 INSERT EMERGENCY AIRWAY: CPT

## 2025-09-02 PROCEDURE — 84484 ASSAY OF TROPONIN QUANT: CPT

## 2025-09-02 RX ORDER — PROPOFOL 10 MG/ML
5-50 INJECTION, EMULSION INTRAVENOUS CONTINUOUS
Status: DISCONTINUED | OUTPATIENT
Start: 2025-09-02 | End: 2025-09-02 | Stop reason: HOSPADM

## 2025-09-02 RX ORDER — IOPAMIDOL 755 MG/ML
100 INJECTION, SOLUTION INTRAVASCULAR
Status: COMPLETED | OUTPATIENT
Start: 2025-09-02 | End: 2025-09-02

## 2025-09-02 RX ORDER — MIDAZOLAM HYDROCHLORIDE 1 MG/ML
INJECTION, SOLUTION INTRAMUSCULAR; INTRAVENOUS
Status: DISCONTINUED
Start: 2025-09-02 | End: 2025-09-02 | Stop reason: WASHOUT

## 2025-09-02 RX ORDER — LORAZEPAM 2 MG/ML
1 INJECTION INTRAMUSCULAR ONCE
Status: COMPLETED | OUTPATIENT
Start: 2025-09-02 | End: 2025-09-02

## 2025-09-02 RX ORDER — PROPOFOL 10 MG/ML
INJECTION, EMULSION INTRAVENOUS
Status: COMPLETED
Start: 2025-09-02 | End: 2025-09-02

## 2025-09-02 RX ADMIN — LORAZEPAM 1 MG: 2 INJECTION, SOLUTION INTRAMUSCULAR; INTRAVENOUS at 19:51

## 2025-09-02 RX ADMIN — PROPOFOL 5 MCG/KG/MIN: 10 INJECTION, EMULSION INTRAVENOUS at 20:11

## 2025-09-02 RX ADMIN — IOPAMIDOL 100 ML: 755 INJECTION, SOLUTION INTRAVENOUS at 21:12

## 2025-09-03 ENCOUNTER — HOSPITAL ENCOUNTER (OUTPATIENT)
Dept: NURSING | Age: 65
Setting detail: INFUSION SERIES
Discharge: HOME | End: 2025-09-03

## 2025-09-03 LAB
EKG ATRIAL RATE: 105 BPM
EKG P AXIS: 74 DEGREES
EKG P-R INTERVAL: 172 MS
EKG Q-T INTERVAL: 370 MS
EKG QRS DURATION: 98 MS
EKG QTC CALCULATION (BAZETT): 489 MS
EKG R AXIS: 78 DEGREES
EKG T AXIS: 67 DEGREES
EKG VENTRICULAR RATE: 105 BPM

## (undated) DEVICE — STRIP,CLOSURE,WOUND,MEDI-STRIP,1/2X4: Brand: MEDLINE

## (undated) DEVICE — 40586 ADVANCED TRENDELENBURG POSITIONING KIT: Brand: 40586 ADVANCED TRENDELENBURG POSITIONING KIT

## (undated) DEVICE — BLADDER EVACUATOR: Brand: UROVAC BLADDER EVACUATOR

## (undated) DEVICE — SUTURE DEV SZ 0 L6IN N ABSORBABLE

## (undated) DEVICE — SEALER ENDOSCP NANO COAT OPN DIV CRV L JAW LIGASURE IMPACT

## (undated) DEVICE — CANNULA SEAL

## (undated) DEVICE — SUTURE PERMA-HAND SZ 2-0 L30IN NONABSORBABLE BLK L26MM SH K833H

## (undated) DEVICE — GOWN,AURORA,NONRNF,XL,30/CS: Brand: MEDLINE

## (undated) DEVICE — FORCEPS BX L240CM JAW DIA2.2MM RAD JAW 4 HOT DISP

## (undated) DEVICE — SOLUTION IV IRRIG POUR BRL 0.9% SODIUM CHL 2F7124

## (undated) DEVICE — BLOCK BITE AD OPN SZ 48FR MOUTHPC ENDOSCP STURDY W/ FOAM

## (undated) DEVICE — 1200CC SUCTION CANISTER WITH HYDROPHOBIC FILTER AND RED LID: Brand: BEMIS

## (undated) DEVICE — Z DISCONTINUED USE 2272117 DRAPE SURG 3 QTR N INVASIVE 2 LAYR DISP

## (undated) DEVICE — ENDO KIT W/SYRINGE: Brand: MEDLINE INDUSTRIES, INC.

## (undated) DEVICE — FORCEP SPEC RETRV BX AD 2 MMX155 CM 5 MM GI OVL CUP W/ NDL

## (undated) DEVICE — JELLY LUBRICATING 4OZ FLIP TOP TB E Z

## (undated) DEVICE — SOLUTION IRRIG 3000ML 0.9% SOD CHL USP UROMATIC PLAS CONT

## (undated) DEVICE — CATHETER URET 5FR L70CM OPN END SGL LUMN INJ HUB FLEXIMA

## (undated) DEVICE — SPONGE DRN W4XL4IN RAYON/POLYESTER 6 PLY NONWOVEN PRECUT

## (undated) DEVICE — CATHETER,FOLEY,3-WAY,22FR,30ML,100%SILI: Brand: MEDLINE

## (undated) DEVICE — BLADELESS OBTURATOR: Brand: WECK VISTA

## (undated) DEVICE — Device: Brand: DEFENDO VALVE AND CONNECTOR KIT

## (undated) DEVICE — TRAP SUCT POLYPECTOMY ST 4 CHMBR

## (undated) DEVICE — TROCARS: Brand: KII® BALLOON BLUNT TIP SYSTEM

## (undated) DEVICE — Z DISCONTINUED USE 2272114 DRAPE SURG UTIL 26X15 IN W/ TAPE N INVASIVE MULTLYR DISP

## (undated) DEVICE — SINGLE-USE BIOPSY FORCEPS: Brand: RADIAL JAW 4

## (undated) DEVICE — HF-RESECTION ELECTRODE PLASMALOOP LOOP, MEDIUM, 24 FR., 12°/16°, ESG TURIS: Brand: OLYMPUS

## (undated) DEVICE — Device: Brand: DISPOSABLE ELECTROSURGICAL SNARE

## (undated) DEVICE — GUIDEWIRE VASC NITINOL HYDROPHIL STR 3 CM 0.035 INX150 CM STD NIT ZIPWIRE

## (undated) DEVICE — GLOVE ORTHO 7 1/2   MSG9475

## (undated) DEVICE — STAPLER SKIN H3.9MM WIRE DIA0.58MM CRWN 6.9MM 35 STPL FIX

## (undated) DEVICE — SUTURE V-LOC 90 3-0 L9IN ABSRB VLT L26MM V-20 1/2 CIR TAPR VLOCM0644

## (undated) DEVICE — MERCY TIFFIN CYSTO-LF: Brand: MEDLINE INDUSTRIES, INC.

## (undated) DEVICE — INFLATION DEVICE: Brand: ENCORE 26

## (undated) DEVICE — MEDI-VAC NON-CONDUCTIVE SUCTION TUBING 6MM X 6.1M (20 FT.) L: Brand: CARDINAL HEALTH

## (undated) DEVICE — Device: Brand: SINGLE USE INJECTOR

## (undated) DEVICE — GARMENT,MEDLINE,DVT,INT,CALF,MED, GEN2: Brand: MEDLINE

## (undated) DEVICE — SOLUTION IRRIG 1000ML 09% SOD CHL USP PIC PLAS CONTAINER

## (undated) DEVICE — INSUFFLATION NEEDLE TO ESTABLISH PNEUMOPERITONEUM.: Brand: INSUFFLATION NEEDLE

## (undated) DEVICE — PENCIL ES L3M BTTN SWCH HOLSTER W/ BLDE ELECTRD EDGE

## (undated) DEVICE — SYRINGE MED 10ML LUERLOCK TIP W/O SFTY DISP

## (undated) DEVICE — DRAINBAG,ANTI-REFLUX TOWER,L/F,2000ML,LL: Brand: MEDLINE

## (undated) DEVICE — SUTURE MCRYL + SZ 4-0 L27IN ABSRB UD L19MM PS-2 3/8 CIR MCP426H

## (undated) DEVICE — Z DISCONTINUED BY MEDLINE USE 2711682 TRAY SKIN PREP DRY W/ PREM GLV

## (undated) DEVICE — SINGLE ACTION PUMPING SYSTEM

## (undated) DEVICE — DRESSING TRNSPAR W4XL4.8IN W/ N ADH PD SURESITE-123 PLUSPD

## (undated) DEVICE — TIP COVER ACCESSORY

## (undated) DEVICE — WARMER SCP 2 ANTIFOG LAP DISP

## (undated) DEVICE — BLADELESS OBTURATOR, LONG: Brand: WECK VISTA

## (undated) DEVICE — Device: Brand: SPOT EX ENDOSCOPIC TATTOO

## (undated) DEVICE — SOLUTION IRRIG 1000ML STRL H2O USP PLAS POUR BTL

## (undated) DEVICE — SHEET, T, LAPAROTOMY, STERILE: Brand: MEDLINE

## (undated) DEVICE — ELECTRODE ES AD CRD L15FT DISP FOR PT BELOW 30LB REM

## (undated) DEVICE — SYRINGE INFL 60ML DISP ALLIANCE II

## (undated) DEVICE — DRAIN SURG 7FR END PERF 1/8IN SIL RND SMOOTH HEAT POLISHED

## (undated) DEVICE — PLUG,CATHETER,DRAINAGE PROTECTOR,TUBE: Brand: MEDLINE

## (undated) DEVICE — INTENDED FOR TISSUE SEPARATION, AND OTHER PROCEDURES THAT REQUIRE A SHARP SURGICAL BLADE TO PUNCTURE OR CUT.: Brand: BARD-PARKER ® DISPOSABLE SCALPELS

## (undated) DEVICE — PLUMEPORT LAPAROSCOPIC SMOKE FILTRATION DEVICE: Brand: PLUMEPORT ACTIV

## (undated) DEVICE — CONE TIP URETERAL CATHETER: Brand: URETERAL CATHETER

## (undated) DEVICE — GLOVE ORANGE PI 7 1/2   MSG9075

## (undated) DEVICE — CUTTING LOOP, BIPOLAR, 0.30MM, 24/26 FR.: Brand: N.A.

## (undated) DEVICE — BALLOON DILATATION CATHETER: Brand: UROMAX ULTRA

## (undated) DEVICE — SUTURE VCRL + SZ 3-0 L27IN ABSRB UD L26MM SH 1/2 CIR VCP416H

## (undated) DEVICE — Device

## (undated) DEVICE — GAUZE,SPONGE,FLUFF,6"X6.75",STRL,5/TRAY: Brand: MEDLINE

## (undated) DEVICE — PACK PROCEDURE SURG CYSTO SVMMC LF

## (undated) DEVICE — GUIDEWIRE URO L150CM DIA .035IN STIFF NIT HYDRPHL STR TIP

## (undated) DEVICE — MASTISOL ADHESIVE LIQ 2/3ML

## (undated) DEVICE — ADAPTER URO SCP UROLOK LL

## (undated) DEVICE — RESERVOIR,SUCTION,100CC,SILICONE: Brand: MEDLINE

## (undated) DEVICE — SUTURE SZ 0 27IN 5/8 CIR UR-6  TAPER PT VIOLET ABSRB VICRYL J603H

## (undated) DEVICE — ARM DRAPE

## (undated) DEVICE — MERCY HEALTH ST CHARLES: Brand: MEDLINE INDUSTRIES, INC.

## (undated) DEVICE — REAGENT TEST UREASE RAPD CLOTEST F/